# Patient Record
Sex: MALE
[De-identification: names, ages, dates, MRNs, and addresses within clinical notes are randomized per-mention and may not be internally consistent; named-entity substitution may affect disease eponyms.]

---

## 2019-09-24 NOTE — NUR
PATIENT BEING NON-COMPLIANT WITH NASAL CANNULA, MD AWARE, EXPLAINED TO PATIENT 
THE RISKS OF NOT WEARING A NASAL CANNULA.

## 2019-09-25 NOTE — HISTORY AND PHYSICAL
PRIMARY CARE PHYSICIAN:  [facility].

 

CHIEF COMPLAINT:  Chest pain, rapid heartbeat, shortness of breath, and abdominal pain.

 

HISTORY OF PRESENT ILLNESS:  The patient is a 54-year-old male, disabled, schizophrenic,

who lives at [facility].  The patient has multiple medical problems including

atrial fibrillation and hypertension.  The patient came in with increasing heart rate,

rapid heart beat, and also abdominal pain.  His heart rate was in the 130 to 140, atrial

fibrillation.  The patient has also complained of abdominal pain.  He does have some

diarrhea.  The patient is otherwise stable at this time.  He did not have any shortness

of breath unless he increase his exertion.  The patient is otherwise stable. 

 

PAST MEDICAL HISTORY:  Hypertension, diabetes type 2, atrial fibrillation,

hypothyroidism, anxiety disorder, depression, schizophrenia, and reflux. 

 

PAST SURGICAL HISTORY:  Left knee surgery and tonsillectomy.

 

SOCIAL HISTORY:  The patient is disabled with his schizophrenia and he lives at [facility].  His mother, per patient is his advocate. 

 

ALLERGIES:  NO KNOWN ALLERGIES.

 

HOME MEDICATIONS:  List will be reviewed and Reconciled.

 

PHYSICAL EXAMINATION:

VITAL SIGNS:  Temperature is 98, blood pressure 125/99, pulse rate is 110 to 130, atrial

fibrillation, rapid rate, and saturation 97% on room air. 

GENERAL:  The patient is not in acute distress.  He is little anxious. 

HEENT:  Normocephalic and atraumatic.  Pupils reactive.  Anicteric. 

NECK:  Supple grossly. 

PULMONARY:  Diminished breath sounds. 

CARDIOVASCULAR:  Tachycardia with atrial fibrillation. 

ABDOMEN:  Soft, obese.  Generalized discomfort, but no rebound or guarding. 

EXTREMITIES:  No cyanosis or edema. 

NEUROLOGIC:  No gross focal deficit.  Moving all extremities.

LABORATORY DATA:  Sodium is 134, potassium 4.8, chloride 99, bicarb 25, BUN 21,

creatinine 1.4, and glucose 273. 

 

WBC 12.5, hemoglobin 14.7, hematocrit 44, and platelets is 272.

 

IMAGING DATA:  Abdominal and pelvic CT scan showed no definitive acute finding.  He has

a small right and left pleural effusion. 

 

IMPRESSION:  

1. Atrial fibrillation with rapid ventricular response.

2. Bilateral pleural effusion secondary to possible early congestive heart failure.

Echocardiogram still pending. 

3. Baseline schizophrenia and disabled.

4. Baseline multiple medical problem as mentioned above.

 

PLAN:  

1. Resume home medication.

2. Antibiotics.

3. Echocardiogram.

4. Consultation with Dr. Huggins, Cardiology.

5. Rate control.

6. We will check for the patient's electrolyte and repeated blood work.

 

 

 

 

______________________________

MD SULEMAN Gaston/JOURDAN

D:  09/25/2019 08:51:01

T:  09/25/2019 16:04:38

Job #:  [id number]

## 2019-09-25 NOTE — XMS REPORT
Summary of Care: 16 - 16

                             Created on: 10/22/2108



JOANNA CABRERA

External Reference #: [id number]

: 1965

Sex: Male



Demographics







                          Address                   [address] [place] [address]

[place]  [address]-

 

                          Home Phone                [phone]

 

                          Preferred Language        English

 

                          Marital Status            Unknown

 

                          Oriental orthodox Affiliation     Mandaen

 

                          Race                      White/

 

                          Ethnic Group              Non-





Author







                          Author                    [facility]

 

                          Organization              [facility]

 

                          Address                   Unknown

 

                          Phone                     Unavailable







Encounter





HQ Shyann(LISA) [id number] Date(s): 16 - 16

[facility] [address] [place] [address]-     [phone]


Discharge Disposition: Home or Self Care

Attending Physician: Chucky Edwards MD





Vital Signs







                    1                   2                   3



                                         Most recent to   



                                         oldest [Reference   



                                         Range]:   

 

                                        170.18 cm 

                    (16 5:51 PM)                         



                                         Height   

 

                                        97.4 DegF 

                          (16 8:10 AM)         97.9 DegF 

                          (16 4:34 AM)         97.9 DegF 

                                        (16 12:08 AM)



                                         Temperature Oral   



                                         [96.4-99.1 DegF]   

 

                                        132/91 mmHg 

                          (16 8:10 AM)         117/87 mmHg 

                          (16 4:34 AM)         110/70 mmHg 

                                        (16 12:08 AM)



                                         Blood Pressure   



                                         [/60-90 mmHg]   

 

                                        18 BRMIN 

                          (16 8:10 AM)         12 BRMIN 

*LOW*

                          (16 4:34 AM)         16 BRMIN 

                                        (16 12:08 AM)



                                         Respiratory Rate   



                                         [14-20 BRMIN]   

 

                                        77 bpm 

                          (16 8:10 AM)         83 bpm 

                          (16 4:34 AM)         85 bpm 

                                        (16 12:08 AM)



                                         Peripheral Pulse   



                                         Rate [ bpm]   

 

                                        95.455 kg 

                    (16 5:51 PM)                         



                                         Weight   

 

                                        32.96 m2 

                    (16 5:51 PM)                         



                                         Body Mass Index   







Problem List







    



              Condition     Effective Dates     Status       Health Status     Informant

 

    



                     [D]Anterior chest     12             Active  



                                         wall pain(Confirmed)    

 

    



                           Anxiety(Confirmed)        Resolved  

 

    



                           BP+ -                     Active  



                                         Hypertension(Confirm    



                                         ed)    

 

    



                           Cardiomyopathy(Confi      Resolved  



                                         rmed)    

 

    



                           CHF - Congestive          Active  



                                         heart    



                                         failure(Confirmed)    

 

    



                           CHF - Congestive          Active  



                                         heart    



                                         failure(Confirmed)    

 

    



                           Depression(Confirmed      Active  



                                         )    

 

    



                           dm(Confirmed)             Active  

 

    



                           Down                      Active  



                                         syndrome(Confirmed)    

 

    



                           GERD -                    Active  



                                         Gastro-esophageal    



                                         reflux    



                                         disease(Confirmed)    

 

    



                           H/O:                      Active  



                                         schizophrenia(Confir    



                                         med)    

 

    



                           HTN(Confirmed)            Active  

 

    



                           ICD (implantable          Active  



                                         cardiac    



                                         defibrillator)    



                                         battery    



                                         depletion(Confirmed)    

 

    



                           NIDDM(Confirmed)          Active  

 

    



                           Obese                     Active  



                                         build(Confirmed)    

 

    



                           Schizophrenia(Confir      Active  



                                         med)    

 

    



                           Short of breath on        Active  



                                         exertion(Confirmed)    

 

    



                           Sleep                     Active  



                                         apnea(Confirmed)    







Allergies, Adverse Reactions, Alerts







   



                 Substance       Reaction        Severity        Status

 

   



                           NKDA                      Active







Medications





Abilify

10 mg, 1 tab, Route: PO, Drug form: TAB, Bedtime, Dosing Weight 95.455, kg, Star
t date: 16 21:00:00 CDT, Duration: 30 day, Stop date: 16 21:00:00 CD
T

Notes: Non-Formulary Drug. (Same as: Abilify)

Start Date: 16

Stop Date: 16

Status: Canceled



Abilify 10 mg oral tablet

10 mg=1 tab, PO, Bedtime, 0 Refill(s)

Start Date: 16

Status: Ordered



acetaminophen

650 mg, 2 tab, Route: PO, Drug form: TAB, Q4H, Dosing Weight 95.455, kg, PRN For
 Temp > 100.4 F, Start date: 16 21:51:00 CDT, Duration: 30 day, Stop date:
 16 21:50:00 CDT

Notes: Do not exceed 4 gm/day.  (Same as: Tylenol)

Start Date: 16

Stop Date: 16

Status: Discontinued



Ambien

5 mg, 0.5 tab, Route: PO, Drug form: TAB, Bedtime, Dosing Weight 95.455, kg, PRN
 Sleep, Start date: 16 21:53:00 CDT, Duration: 30 day, Stop date: 16
 21:52:00 CDT

Notes: (Same As: Ambien)

Start Date: 16

Stop Date: 16

Status: Discontinued



aspirin 81 mg tablet, enteric coated

81 mg, 1 tab, Route: PO, Drug form: ECTAB, Daily, Dosing Weight 95.455, kg, Star
t date: 16 9:00:00 CDT, Duration: 30 day, Stop date: 16 9:00:00 CDT

Notes: Do not crush or chew.(Same As: Ecotrin)

Start Date: 16

Stop Date: 16

Status: Discontinued



BD Normal Saline Flush

10 mL, Route: IVP, Drug Form: INJ, PRN, PRN Line Flush, Start date: 16 22:
24:00 CDT, Duration: 30 day, Stop date: 16 22:23:00 CDT

Notes: (Same as: BD Posiflush)

Start Date: 16

Stop Date: 16

Status: Discontinued



bisacodyl

10 mg, 1 supp, Route: WA, Drug form: SUPP, Daily, Dosing Weight 95.455, kg, PRN 
Constipation, Start date: 16 21:51:00 CDT, Duration: 30 day, Stop date:  21:50:00 CDT

Notes: (Same As: Dulcolax, Bisco-Lax)

Start Date: 16

Stop Date: 16

Status: Discontinued



calcium carbonate

1,000 mg, 2 tab, Route: CHEW, Drug form: CHEWTAB, ONCE, Dosing Weight 95.455, kg
, Priority: STAT, Start date: 16 20:12:00 CDT, Stop date: 16 20:12:0
0 CDT

Notes: (Same As: Tums)Calcium Carbonate 500 gw=281 mg elemental calcium   Dose=_
_____ mg calcium carbonate (______ mg elemental calcium)

Start Date: 16

Stop Date: 16

Status: Completed



carvedilol

3.125 mg, Route: PO, Drug form: TAB, Q12H, Dosing Weight 95.455, kg, Start date:
 16 9:00:00 CDT, Duration: 30 day, Stop date: 16 21:00:00 CDT

Start Date: 16

Stop Date: 16

Status: Canceled



codeine-guaiFENesin 10 mg-100 mg/5 mL oral syrup

10 mL, Route: PO, Drug Form: LIQ, Dosing Weight 95.455, kg, Q4H, PRN Cough, Star
t date: 16 21:51:00 CDT, Duration: 30 day, Stop date: 16 21:50:00 CD
T

Notes: (Same As: Robitussin AC)

Start Date: 16

Stop Date: 16

Status: Discontinued



Cogentin

1 mg, 1 tab, Route: PO, Drug form: TAB, Bedtime, Dosing Weight 95.455, kg, Start
 date: 16 21:00:00 CDT, Duration: 30 day, Stop date: 16 21:00:00 CDT

Notes: (Same As: Cogentin)

Start Date: 16

Stop Date: 16

Status: Canceled



Coreg

25 mg, 1 tab, Route: PO, Drug form: TAB, BID-Meals, Dosing Weight 95.455, kg, St
art date: 16 8:00:00 CDT, Duration: 30 day, Stop date: 16 17:00:00 C
DT

Notes: Give with food. (Same As: Coreg)

Start Date: 16

Stop Date: 16

Status: Discontinued



dextromethorphan-guaiFENesin 10 mg-100 mg/5 mL oral liquid

10 mL, Route: PO, Drug Form: SYRP, Dosing Weight 95.455, kg, Q4H, PRN Cough, Sta
rt date: 16 21:51:00 CDT, Duration: 30 day, Stop date: 16 21:50:00 C
DT

Notes: (dextromethorphan-guaifenesin 10-100mg/5ml 10 ml oral SOLN ud) (Same as: 
Robitussin DM)

Start Date: 16

Stop Date: 16

Status: Discontinued



Dextrose 50% Syringe

25 gm, 50 mL, Route: IVP, Drug Form: INJ, Dosing Weight 95.455, kg, PRN, PRN Blo
od Glucose Results, Start date: 16 21:52:00 CDT, Duration: 30 day, Stop da
te: 16 21:51:00 CDT

Start Date: 16

Stop Date: 16

Status: Discontinued



Dextrose 50% Syringe

12.5 gm, 25 mL, Route: IVP, Drug Form: INJ, Dosing Weight 95.455, kg, PRN, PRN B
lood Glucose Results, Start date: 16 21:52:00 CDT, Duration: 30 day, Stop 
date: 16 21:51:00 CDT

Start Date: 16

Stop Date: 16

Status: Discontinued



diphenhydrAMINE

25 mg, 1 cap, Route: PO, Drug form: CAP, Q6H, Dosing Weight 95.455, kg, PRN Itch
ing, Start date: 16 21:51:00 CDT, Duration: 30 day, Stop date: 16 21
:50:00 CDT

Notes: (Same as: Benadryl)

Start Date: 16

Stop Date: 16

Status: Discontinued



Geodon

20 mg, 1 cap, Route: PO, Drug form: CAP, QPM, Dosing Weight 95.455, kg, Start da
te: 16 17:00:00 CDT, Duration: 30 day, Stop date: 16 17:00:00 CDT

Notes: (Same As: Geodon)Give with Food

Start Date: 16

Stop Date: 16

Status: Canceled



glucagon

1 mg, Route: IM, Drug form: PDR/INJ, PRN, Dosing Weight 95.455, kg, PRN Blood Gl
ucose Results, Start date: 16 21:52:00 CDT, Duration: 30 day, Stop date: 0
16 21:51:00 CDT

Start Date: 16

Stop Date: 16

Status: Discontinued



insulin aspart

10 unit, 0.1 mL, Route: SUB-Q, Drug form: SOLN, TID-Before Meals, Dosing Weight 
95.455, kg, PRN Blood Glucose Results, Start date: 16 21:52:00 CDT, Durati
on: 30 day, Stop date: 16 21:51:00 CDT

Notes: Roll in palms of hands gently;  Do not shake vigorously. (Same as: Novo[place])"single patient use only"WASTE: F/P - Black; E - Municipal Trash Bin  Stable f
or 28 days at room temperature.Expires in _____ days from ______________Date

Start Date: 16

Stop Date: 16

Status: Discontinued



insulin aspart

4 unit, 0.04 mL, Route: SUB-Q, Drug form: SOLN, Bedtime, Dosing Weight 95.455, k
g, PRN Blood Glucose Results, Start date: 16 21:52:00 CDT, Duration: 30 da
y, Stop date: 16 21:51:00 CDT

Notes: Roll in palms of hands gently;  Do not shake vigorously. (Same as: Novo[place])"single patient use only"WASTE: F/P - Black; E - Municipal Trash Bin  Stable f
or 28 days at room temperature.Expires in _____ days from ______________Date

Start Date: 16

Stop Date: 16

Status: Discontinued



insulin aspart

2 unit, 0.02 mL, Route: SUB-Q, Drug form: SOLN, Bedtime, Dosing Weight 95.455, k
g, PRN Blood Glucose Results, Start date: 16 21:52:00 CDT, Duration: 30 da
y, Stop date: 16 21:51:00 CDT

Notes: Roll in palms of hands gently;  Do not shake vigorously. (Same as: Novo[place])"single patient use only"WASTE: F/P - Black; E - Municipal Trash Bin  Stable f
or 28 days at room temperature.Expires in _____ days from ______________Date

Start Date: 16

Stop Date: 16

Status: Discontinued



insulin aspart

3 unit, 0.03 mL, Route: SUB-Q, Drug form: SOLN, Bedtime, Dosing Weight 95.455, k
g, PRN Blood Glucose Results, Start date: 16 21:52:00 CDT, Duration: 30 da
y, Stop date: 16 21:51:00 CDT

Notes: Roll in palms of hands gently;  Do not shake vigorously. (Same as: Demetria [place])"single patient use only"WASTE: F/P - Black; E - Municipal Trash Bin  Stable f
or 28 days at room temperature.Expires in _____ days from ______________Date

Start Date: 16

Stop Date: 16

Status: Discontinued



insulin aspart

1 unit, 0.01 mL, Route: SUB-Q, Drug form: SOLN, Bedtime, Dosing Weight 95.455, k
g, PRN Blood Glucose Results, Start date: 16 21:52:00 CDT, Duration: 30 da
y, Stop date: 16 21:51:00 CDT

Notes: Roll in palms of hands gently;  Do not shake vigorously. (Same as: Demetria [place])"single patient use only"WASTE: F/P - Black; E - Municipal Trash Bin  Stable f
or 28 days at room temperature.Expires in _____ days from ______________Date

Start Date: 16

Stop Date: 16

Status: Discontinued



insulin aspart

4 unit, 0.04 mL, Route: SUB-Q, Drug form: SOLN, TID-Before Meals, Dosing Weight 
95.455, kg, PRN Blood Glucose Results, Start date: 16 21:52:00 CDT, Durati
on: 30 day, Stop date: 16 21:51:00 CDT

Notes: Roll in palms of hands gently;  Do not shake vigorously. (Same as: Demetria [place])"single patient use only"WASTE: F/P - Black; E - Municipal Trash Bin  Stable f
or 28 days at room temperature.Expires in _____ days from ______________Date

Start Date: 16

Stop Date: 16

Status: Discontinued



insulin aspart

2 unit, 0.02 mL, Route: SUB-Q, Drug form: SOLN, TID-Before Meals, Dosing Weight 
95.455, kg, PRN Blood Glucose Results, Start date: 16 21:52:00 CDT, Durati
on: 30 day, Stop date: 16 21:51:00 CDT

Notes: Roll in palms of hands gently;  Do not shake vigorously. (Same as: Demetria [place])"single patient use only"WASTE: F/P - Black; E - Municipal Trash Bin  Stable f
or 28 days at room temperature.Expires in _____ days from ______________Date

Start Date: 16

Stop Date: 16

Status: Discontinued



insulin aspart

6 unit, 0.06 mL, Route: SUB-Q, Drug form: SOLN, TID-Before Meals, Dosing Weight 
95.455, kg, PRN Blood Glucose Results, Start date: 16 21:52:00 CDT, Durati
on: 30 day, Stop date: 16 21:51:00 CDT

Notes: Roll in palms of hands gently;  Do not shake vigorously. (Same as: Demetria [place])"single patient use only"WASTE: F/P - Black; E - Municipal Trash Bin  Stable f
or 28 days at room temperature.Expires in _____ days from ______________Date

Start Date: 16

Stop Date: 16

Status: Discontinued



insulin aspart

8 unit, 0.08 mL, Route: SUB-Q, Drug form: SOLN, TID-Before Meals, Dosing Weight 
95.455, kg, PRN Blood Glucose Results, Start date: 16 21:52:00 CDT, Durati
on: 30 day, Stop date: 16 21:51:00 CDT

Notes: Roll in palms of hands gently;  Do not shake vigorously. (Same as: Demetria [place])"single patient use only"WASTE: F/P - Black; E - Municipal Trash Bin  Stable f
or 28 days at room temperature.Expires in _____ days from ______________Date

Start Date: 16

Stop Date: 16

Status: Discontinued



insulin detemir

10 unit, 0.1 mL, Route: SUB-Q, Drug form: INJ, Q12H, Dosing Weight 95.455, kg, S
tart date: 16 9:00:00 CDT, Duration: 30 day, Stop date: 16 21:00:00 
CDT

Notes: Same as LevemirDo not hold insulin without contacting prescriberWASTE: F/
P - Black; E - Municipal Trash Bin  "single patient use only"

Start Date: 16

Stop Date: 16

Status: Discontinued



isosorbide mononitrate extended release

30 mg, 1 tab, Route: PO, Drug form: ERTAB, Daily, Dosing Weight 95.455, kg, Star
t date: 16 9:00:00 CDT, Duration: 30 day, Stop date: 16 9:00:00 CDT

Notes: (Same as:Imdur)"Do Not Crush"  Take on empty stomach/ full glass of water
.  Do not crush

Start Date: 16

Stop Date: 16

Status: Discontinued



Lipitor

40 mg, 1 tab, Route: PO, Drug form: TAB, Bedtime, Dosing Weight 95.455, kg, Star
t date: 16 21:00:00 CDT, Duration: 30 day, Stop date: 16 21:00:00 CD
T

Notes: (Same as: Lipitor)

Start Date: 16

Stop Date: 16

Status: Canceled



lisinopril

5 mg, Route: PO, Drug form: TAB, Daily, Dosing Weight 95.455, kg, Start date:  9:00:00 CDT, Duration: 30 day, Stop date: 16 9:00:00 CDT

Start Date: 16

Stop Date: 16

Status: Canceled



lisinopril

40 mg, 2 tab, Route: PO, Drug form: TAB, Daily, Dosing Weight 95.455, kg, Start 
date: 16 9:00:00 CDT, Duration: 30 day, Stop date: 16 9:00:00 CDT

Notes: (Same as: Prinivil, Zestril)

Start Date: 16

Stop Date: 16

Status: Discontinued



nitroglycerin 2% ointment

0.5 inch, Route: TOP, Drug Form: OINT, Dosing Weight 95.455, kg, ONCE, STAT, Sta
rt date: 16 18:16:00 CDT, Stop date: 16 18:16:00 CDT

Notes: 1 gram is approximately 1 inch of nitroglycerin ointment    (20 mg NTG pe
r gram) (Same as:Nitro-Bid)

Start Date: 16

Stop Date: 16

Status: Completed



nitroglycerin 2% ointment

0.5 inch, Route: TOP, Drug Form: OINT, Dosing Weight 95.455, kg, TID, PRN Chest 
Pain, Start date: 16 21:51:00 CDT, Duration: 30 day, Stop date: 16 2
1:50:00 CDT

Notes: 1 gram is approximately 1 inch of nitroglycerin ointment    (20 mg NTG pe
r gram) (Same as:Nitro-Bid)

Start Date: 16

Stop Date: 16

Status: Discontinued



nitroglycerin SL Tab

0.4 mg, 1 tab, Route: SL, Drug form: TAB, Q5Min, Dosing Weight 95.455, kg, PRN C
hest Pain, Start date: 16 21:51:00 CDT, Duration: 3 doses or times, Stop d
ate: Limited # of times

Notes: (Same as:Nitroquick, Nitrostat)"Do Not Crush"  Sublingual tablet

Start Date: 16

Stop Date: 16

Status: Discontinued



ondansetron

4 mg, 2 mL, Route: IVP, Drug form: INJ, Q8H, Dosing Weight 95.455, kg, PRN Nause
a & Vomiting, Start date: 16 21:51:00 CDT, Duration: 30 day, Stop date: 
16 21:50:00 CDT

Notes: (Same as: Zofran)  *** MEDICATION WASTE ***Product Size:  4 mgProduct Was
helen:  ___ mg

Start Date: 16

Stop Date: 16

Status: Discontinued



potassium chloride

40 mEq, Route: PO, Drug form: ERTAB, ONCE, Dosing Weight 95.455, kg, Priority: S
TAT, Start date: 16 18:59:00 CDT, Stop date: 16 18:59:00 CDT

Start Date: 16

Stop Date: 16

Status: Completed



Saline Flush 0.9%

10 mL, Route: IVP, Drug Form: INJ, Dosing Weight 95.455, kg, PRN, PRN Line Flush
, Start date: 16 18:16:00 CDT, Duration: 30 day, Stop date: 16 18:15
:00 CDT

Notes: (Same as: BD Posiflush)

Start Date: 16

Stop Date: 16

Status: Discontinued



Saline Flush 0.9%

10 ml, Route: IVP, Drug Form: INJ, Dosing Weight 95.455, kg, Q12H, Start date: 0
16 9:00:00 CDT, Duration: 30 day, Stop date: 16 21:00:00 CDT

Start Date: 16

Stop Date: 16

Status: Canceled



Saline Flush 0.9%

10 ml, Route: IVP, Drug Form: INJ, Dosing Weight 95.455, kg, PRN, PRN Line Flush
, Start date: 16 21:51:00 CDT, Duration: 30 day, Stop date: 16 21:50
:00 CDT

Notes: (Same as: BD Posiflush)

Start Date: 16

Stop Date: 16

Status: Discontinued



simethicone

80 mg, 1 tab, Route: CHEW, Drug form: CHEWTAB, Q6H, Dosing Weight 95.455, kg, WA
N Gas, Start date: 16 21:51:00 CDT, Duration: 2 doses or times, Stop date:
 Limited # of times

Notes: (Same as: Mylicon)

Start Date: 16

Stop Date: 16

Status: Discontinued



Sodium Chloride 0.9% IV

250 mL, Route: IVPB, Start date: 16 22:25:00 CDT, Duration: 30 day, Stop d
ate: 16 22:24:00 CDT, PRN Line Flush

Start Date: 16

Stop Date: 16

Status: Discontinued



sucralfate

1 gm, 1 tab, Route: PO, Drug form: TAB, BID, Dosing Weight 95.455, kg, Start josefa
e: 16 9:00:00 CDT, Duration: 30 day, Stop date: 16 17:00:00 CDT

Notes: May interfere w/enteral feeds -  Take 1 hr before or 2 hr after antacids,
 dairy pdt, meals & minerals -  On empty stomach.  (Same As: Carafate)

Start Date: 16

Stop Date: 16

Status: Discontinued



temazepam

15 mg, 1 cap, Route: PO, Drug form: CAP, Bedtime, Dosing Weight 95.455, kg, PRN 
Insomnia, Start date: 16 21:51:00 CDT, Duration: 30 day, Stop date:  21:50:00 CDT

Notes: (Same As: Restoril)

Start Date: 16

Stop Date: 16

Status: Discontinued



Tessalon Perles

100 mg, 1 cap, Route: PO, Drug form: CAP, Q8H, Dosing Weight 95.455, kg, PRN Cou
gh, Start date: 16 21:51:00 CDT, Duration: 30 day, Stop date: 16 21:
50:00 CDT

Notes: (Same As: Tessalon Perles)"Do Not Crush"

Start Date: 16

Stop Date: 16

Status: Discontinued



trazodone 50 mg oral tablet

50 mg, 1 tab, Route: PO, Drug form: TAB, Bedtime, Dosing Weight 95.455, kg, Star
t date: 16 21:00:00 CDT, Duration: 30 day, Stop date: 16 21:00:00 CD
T

Notes: (Same As: Desyrel)

Start Date: 16

Stop Date: 16

Status: Canceled



Results





ELECTROLYTES





                    1                   2                   3



                                         Most recent to   



                                         oldest [Reference   



                                         Range]:   

 

                                        141 mEq/L 

                          (16 5:52 AM)         146 mEq/L 

*HI*

                          (16 6:25 PM)          



                                         Sodium Lvl [135-145   



                                         mEq/L]   

 

                                        4.1 mEq/L 

                          (16 5:52 AM)         2.9 mEq/L 1

*CRIT*

                          (16 6:25 PM)          



                                         Potassium Lvl   



                                         [3.5-5.1 mEq/L]   

 

                                        106 mEq/L 

                          (16 5:52 AM)         114 mEq/L 

*HI*

                          (16 6:25 PM)          



                                         Chloride Lvl [   



                                         mEq/L]   

 

                                        29 mEq/L 

                          (16 5:52 AM)         25 mEq/L 

                          (16 6:25 PM)          



                                         CO2 [24-32 mEq/L]   

 

                                        10.1 mEq/L 

                          (16 5:52 AM)         9.9 mEq/L 

*LOW*

                          (16 6:25 PM)          



                                         AGAP [10.0-20.0   



                                         mEq/L]   







1Result Comment: Critical Result(s) called to MATT at 2016 18:56 by
BP.  Read back OK.



CHEM PANEL





                    1                   2                   3



                                         Most recent to   



                                         oldest [Reference   



                                         Range]:   

 

                                        1.40 mg/dL 

                          (16 5:52 AM)         1.30 mg/dL 

                          (16 6:25 PM)          



                                         Creatinine Lvl   



                                         [0.50-1.40 mg/dL]   

 

                                        58 mL/min/1.73m2 1

*NA*

                          (16 5:52 AM)         63 mL/min/1.73m2 2

*NA*

                          (16 6:25 PM)          



                                         eGFR   

 

                                        30 mg/dL 

*HI*

                          (16 5:52 AM)         25 mg/dL 

*HI*

                          (16 6:25 PM)          



                                         BUN [7-22 mg/dL]   

 

                                        19 

                    (16 6:25 PM)                          



                                         B/C Ratio [6-25]   

 

                                        206 mg/dL 

*HI*

                          (16 5:52 AM)         121 mg/dL 

*HI*

                          (16 6:25 PM)          



                                         Glucose Lvl [70-99   



                                         mg/dL]   

 

                                        5.6 g/dL 

*LOW*

                    (16 6:25 PM)                          



                                         Total Protein   



                                         [6.4-8.4 g/dL]   

 

                                        3.2 g/dL 

*LOW*

                    (16 6:25 PM)                          



                                         Albumin Lvl [3.5-5.0   



                                         g/dL]   

 

                                        2.4 g/dL 

                    (16 6:25 PM)                          



                                         Globulin [2.0-4.0   



                                         g/dL]   

 

                                        1.3 

                    (16 6:25 PM)                          



                                         A/G Ratio [0.7-1.6]   

 

                                        8.9 mg/dL 

                          (16 5:52 AM)         6.5 mg/dL 

*CRIT*

                          (16 6:25 PM)          



                                         Calcium Lvl   



                                         [8.5-10.5 mg/dL]   

 

                                        2.1 mg/dL 

                    (16 5:52 AM)                          



                                         Magnesium Lvl   



                                         [1.8-2.4 mg/dL]   

 

                                        22 unit/L 

                    (16 6:25 PM)                          



                                         ALT [0-65 unit/L]   

 

                                        17 unit/L 

                    (16 6:25 PM)                          



                                         AST [0-37 unit/L]   

 

                                        80 unit/L 

                    (16 6:25 PM)                          



                                         Alk Phos [   



                                         unit/L]   

 

                                        0.7 mg/dL 

                    (16 6:25 PM)                          



                                         Bili Total [0.2-1.3   



                                         mg/dL]   







1Result Comment: The eGFR is calculated using the CKD-EPI formula. In most 
young, healthy individuals the eGFR will be >90 mL/min/1.73m2. The eGFR declines
with age. An eGFR of 60-89 may be normal in some populations, particularly the 
elderly, for whom the CKD-EPI formula has not been extensively validated. Use of
the eGFR is not recommended in the following populations:



Individuals with unstable creatinine concentrations, including pregnant patients
and those with serious co-morbid conditions.



Patients with extremes in muscle mass or diet. 



The data above are obtained from the National Kid[employer] Disease Education Program (
NKDEP) which additionally recommends that when the eGFR is used in patients with
extremes of body mass index for purposes of drug dosing, the eGFR should be mul
tiplied by the estimated BMI.



2Result Comment: The eGFR is calculated using the CKD-EPI formula. In most 
young, healthy individuals the eGFR will be >90 mL/min/1.73m2. The eGFR declines
with age. An eGFR of 60-89 may be normal in some populations, particularly the 
elderly, for whom the CKD-EPI formula has not been extensively validated. Use of
the eGFR is not recommended in the following populations:



Individuals with unstable creatinine concentrations, including pregnant patients
and those with serious co-morbid conditions.



Patients with extremes in muscle mass or diet. 



The data above are obtained from the National Kid[employer] Disease Education Program (
NKDEP) which additionally recommends that when the eGFR is used in patients with
extremes of body mass index for purposes of drug dosing, the eGFR should be mul
tiplied by the estimated BMI.



CARDIAC ENZYMES





                    1                   2                   3



                                         Most recent to   



                                         oldest [Reference   



                                         Range]:   

 

                                        499 unit/L 

*HI*

                          (16 5:52 AM)         578 unit/L 

*HI*

                          (16 12:52 AM)        549 unit/L 

*HI*

                                        (16 6:25 PM) 



                                         Total CK [   



                                         unit/L]   

 

                                        2.3 ng/mL 

                          (16 5:52 AM)         2.2 ng/mL 

                          (16 12:52 AM)        1.8 ng/mL 

                                        (16 6:25 PM) 



                                         CK MB [0.5-3.6   



                                         ng/mL]   

 

                                        0.3 

                    (16 6:25 PM)                          



                                         CK MB Index   



                                         [0.0-2.5]   

 

                                        0.04 ng/mL 

                          (16 12:52 AM)        0.04 ng/mL 

                          (16 6:25 PM)          



                                         Troponin-I   



                                         [0.00-0.40 ng/mL]   

 

                                        439 pg/mL 

*HI*

                    (16 6:25 PM)                          



                                         BNP [<=100 pg/mL]   







LIPIDS





                    1                   2                   3



                                         Most recent to   



                                         oldest [Reference   



                                         Range]:   

 

                                        3.53 

*LOW*

                    (16 5:52 AM)                          



                                         CHD Risk [4.00-7.30]   

 

                                        106 mg/dL 

                    (16 5:52 AM)                          



                                         Chol [<=199 mg/dL]   

 

                                        170 mg/dL 

*HI*

                    (16 5:52 AM)                          



                                         Trig [<=149 mg/dL]   

 

                                        30 mg/dL 

*LOW*

                    (16 5:52 AM)                          



                                         HDL [>=61 mg/dL]   

 

                                        42 mg/dL 

                    (16 5:52 AM)                          



                                         LDL (Calculated)   



                                         [<=99 mg/dL]   

 

                                        34 

*NA*

                    (16 5:52 AM)                          



                                         VLDL   







DRUG SCREEN





                    1                   2                   3



                                         Most recent to   



                                         oldest [Reference   



                                         Range]:   

 

                                        Negative 

*NA*

                    (16 9:49 AM)                          



                                         U Methadone Scr   



                                         [Negative]   

 

                                        Negative 

*NA*

                    (16 9:49 AM)                          



                                         U Propoxyph Scr   



                                         [Negative]   

 

                                        Negative 

*NA*

                    (16 9:49 AM)                          



                                         U Amph Scr   



                                         [Negative]   

 

                                        Negative 

*NA*

                    (16 9:49 AM)                          



                                         U Annie Scr   



                                         [Negative]   

 

                                        Negative 

*NA*

                    (16 9:49 AM)                          



                                         U Benzodia Scr   



                                         [Negative]   

 

                                        Positive 

*ABN*

                    (16 9:49 AM)                          



                                         U Cocaine Scr   



                                         [Negative]   

 

                                        Negative 

*NA*

                    (16 9:49 AM)                          



                                         U Opiate Scr   



                                         [Negative]   

 

                                        Negative 

*NA*

                    (16 9:49 AM)                          



                                         U Phencyc Scr   



                                         [Negative]   

 

                                        Negative 

*NA*

                    (16 9:49 AM)                          



                                         U Cannab Scr   



                                         [Negative]   

 

                                        See Note 

                    (16 9:49 AM)                          



                                         UDS Note   







HEMATOLOGY





                    1                   2                   3



                                         Most recent to   



                                         oldest [Reference   



                                         Range]:   

 

                                        6.4 K/CMM 

                          (16 5:52 AM)         11.5 K/CMM 

*HI*

                          (16 6:25 PM)          



                                         WBC [3.7-10.4 K/CMM]   

 

                                        4.73 M/CMM 

                          (16 5:52 AM)         5.06 M/CMM 

                          (16 6:25 PM)          



                                         RBC [4.70-6.10   



                                         M/CMM]   

 

                                        14.5 g/dL 

                          (16 5:52 AM)         15.2 g/dL 

                          (16 6:25 PM)          



                                         Hgb [14.0-18.0 g/dL]   

 

                                        42.3 % 

                          (16 5:52 AM)         45.3 % 

                          (16 6:25 PM)          



                                         Hct [42.0-54.0 %]   

 

                                        89.4 fL 

                          (16 5:52 AM)         89.5 fL 

                          (16 6:25 PM)          



                                         MCV [80.0-94.0 fL]   

 

                                        30.6 pg 

                          (16 5:52 AM)         29.9 pg 

                          (16 6:25 PM)          



                                         MCH [27.0-31.0 pg]   

 

                                        34.2 g/dL 

                          (16 5:52 AM)         33.5 g/dL 

                          (16 6:25 PM)          



                                         MCHC [32.0-36.0   



                                         g/dL]   

 

                                        13.5 % 

                          (16 5:52 AM)         13.4 % 

                          (16 6:25 PM)          



                                         RDW [11.5-14.5 %]   

 

                                        131 K/CMM 

*LOW*

                          (16 5:52 AM)         181 K/CMM 

                          (16 6:25 PM)          



                                         Platelet [133-450   



                                         K/CMM]   

 

                                        8.5 fL 

                          (16 5:52 AM)         8.3 fL 

                          (16 6:25 PM)          



                                         MPV [7.4-10.4 fL]   

 

                                        67.0 % 

                          (16 5:52 AM)         74.0 % 

                          (16 6:25 PM)          



                                         Segs [45.0-75.0 %]   

 

                                        18.0 % 

*LOW*

                          (16 5:52 AM)         14.5 % 

*LOW*

                          (16 6:25 PM)          



                                         Lymphocytes   



                                         [20.0-40.0 %]   

 

                                        11.9 % 

                          (16 5:52 AM)         10.8 % 

                          (16 6:25 PM)          



                                         Monocytes [2.0-12.0   



                                         %]   

 

                                        2.6 % 

                          (16 5:52 AM)         0.6 % 

                          (16 6:25 PM)          



                                         Eosinophils [0.0-4.0   



                                         %]   

 

                                        0.5 % 

                          (16 5:52 AM)         0.1 % 

                          (16 6:25 PM)          



                                         Basophils [0.0-1.0   



                                         %]   

 

                                        4.3 K/CMM 

                          (16 5:52 AM)         8.5 K/CMM 

*HI*

                          (16 6:25 PM)          



                                         Segs-Bands #   



                                         [1.5-8.1 K/CMM]   

 

                                        1.2 K/CMM 

                          (16 5:52 AM)         1.7 K/CMM 

                          (16 6:25 PM)          



                                         Lymphocytes #   



                                         [1.0-5.5 K/CMM]   

 

                                        0.8 K/CMM 

                          (16 5:52 AM)         1.2 K/CMM 

*HI*

                          (16 6:25 PM)          



                                         Monocytes # [0.0-0.8   



                                         K/CMM]   

 

                                        0.2 K/CMM 

                          (16 5:52 AM)         0.1 K/CMM 

                          (16 6:25 PM)          



                                         Eosinophils #   



                                         [0.0-0.5 K/CMM]   

 

                                        0.0 K/CMM 

                    (16 6:25 PM)                          



                                         Basophils # [0.0-0.2   



                                         K/CMM]   

 

                                        23.0 seconds 

*HI*

                    (16 6:25 PM)                          



                                         PT [12.0-14.7   



                                         seconds]   

 

                                        2.00 

*HI*

                    (16 6:25 PM)                          



                                         INR [0.85-1.17]   

 

                                        36.4 seconds 

*HI*

                    (16 6:25 PM)                          



                                         PTT [22.9-35.8   



                                         seconds]   







Immunizations





Given and Recorded





   



                 Vaccine         Date            Status          Refusal Reason

 

   



                     influenza virus vaccine, inactivated     10/1/13             Given 







Procedures







   



                 Procedure       Date            Related Diagnosis     Body Site

 

   



                                         ICD - Internal cardiac defibrillator   



                                         procedure1   

 

   



                                         torn ligament2   







1placed 2.5years ago



2left knee ligament surgery



Social History







 



                           Social History Type       Response

 

 



                           Alcohol                   Never

 

 



                           Smoking Status            Former smoker; Type: Cigarettes; Tobacco use per day: 0; Number

 of years:



                                         2; Total pack years: 2; Started at age: 22.0; Stopped at age: 24; Previous



                                         treatment: None; Ready to change: No; Concerns about tobacco use in



                                         household: No; Exposure to Tobacco Smoke None; Cigarette Smoking Last 365



                                         Days No; Reg Smoking Cessation Counseling No







Assessment and Plan

Extracted from:





  



                     Title: Discharge Summary *     Author: Jazzy Bonner NP     Date: 16











Patient:   JOANNA CABRERA            MRN: [id number]            FIN: 
[id number]

Age:   51 years     Sex:  Male     :  1965

Associated Diagnoses:   None

Author:   Jazzy Bonner NP



Attending Physician: Chucky Taylor



Date of admission: 2016



Date of discharge to home: 2016



Admitting diagnosis:

                                        1.  Atypical chest pain possible secondary to cocaine abuse

                                        2.  Systolic congestive heart failure-stable

                                        3.  Chronic renal failure-stable

                                        4.  Hypokalemia

                                        5.  Hypertension-stable

                                        6.  Type 2 diabetes mellitus-stable

                                        7.  History of schizophrenia-stable

                                        8.  History of GERD-stable

                                        9.  Obstructive sleep apnea-stable



Discharge diagnosis:

                                        1.  Atypical chest pain secondary to cocaine abuse-resolved

                                        2.  Systolic congestive heart failure stable

                                        3.  Chronic renal failure-stable

                                        4.  Hypokalemia-resolved

                                        5.  Hypertension-stable

                                        6.  Type 2 diabetes mellitus-stable

                                        7.  History of schizophrenia-stable

                                        8.  History of GERD-stable

                                        9.  Obstructive sleep apnea-stable

                                        10. Substance abuse- Counseling provided



Discharge condition: Stable



Consults: Cardiology consult with Dr. Anderson



Procedures: None



Results Review

General results



Labs (Last four charted values)

WBC                 6.4()H 11.5()

Hgb                 14.5()15.2()

Hct                 42.3()45.3()

Plt                 L 131()181()

Na                  141()H 146()

K                   4.1()C 2.9()

CO2                 29()25()

Cl                  106()H 114()

Cr                  1.40()1.30()

BUN                 H 30()H 25()

Glucose Random      H 206()H 121()

Mg                  2.1()

Ca                  8.9()C 6.5()

PT                  H 23.0()

INR                 H 2.00()

PTT                 H 36.4()

Troponin            0.04()0.04()

CK MB               2.3()2.2()1.8()

Total CK            H 499()H 578()H 549()





Physical Examination

VS/Measurements



Vital Signs (last 24 hrs)_____Last Charted___________

Temp Oral97.4 DegF  (:10)

Heart Rate [id number] bpm  (:10)

Resp Rate    18 BRMIN  (:10)

KIZ446 mmHg  (:10)

DBPH 91mmHg  (:10)

CbV160 %  (:10)

Zmqvil76.455 kg  ()

Qyrarw940.18 cm  ()

BMI32.96  ()





General:  Alert and oriented, No acute distress.

Eye:  Pupils are equal, round and reactive to light, Extraocular movements are 
intact.

HENT:  Normocephalic, Normal hearing, No pharyngeal erythema.

Neck:  Supple, No jugular venous distention.

Respiratory:  Lungs are clear to auscultation, Respirations are non-labored, 
Breath sounds are equal, Symmetrical chest wall expansion.

Cardiovascular:  Normal rate, Regular rhythm, No murmur, No gallop, Good pulses 
equal in all extremities, Normal peripheral perfusion, No edema.

Gastrointestinal:  Soft, Non-tender, Non-distended, Normal bowel sounds.

Musculoskeletal

Normal range of motion.

Normal strength.

No tenderness.

No swelling.

No deformity.

Normal gait.

Integumentary:  Warm.

Neurologic:  Alert, Oriented, Normal sensory, Normal motor function, No focal 
deficits.

Psychiatric:  Cooperative, Appropriate mood & affect, Normal judgment, 
Non-suicidal.



Hospital Course

The patient is a 51-year-old male with a history of systolic CHF, 
cardiomyopathy, pacemaker and defibrillator, hypertension, diabetes type 2, and 
schizoaffective disorder who presented to the emergency room for midsternal 
chest pain with the dizziness.  Patient also reported using cocaine the night 
before starting the symptoms. During the short hospital stay EKG and serial 
cardiac enzymes and an echocardiogram were obtained . Ekg showed atrial 
fibillation and non-specific ST/T wave changes and cardiac enzymes were normal 
except elevated ck. Interrogation of AICD/pacemaker was also done during the st
ay. A cardiology consult with  was also requested and seen and cleared 
for discharge. Patient had a left heart catheterization in  and showed 
normal coronal arterial structures and no stress test is needed at this time per
cardiology. Patient also found to have hypokalemia in the emergency room and was
treated with oral potassium. Substance abuse counseling was provided during the 
stay. Patient needs to follow-up with pcp as needed.



Discharge Plan

Discharge Summary Plan

Prescriptions: Please see home medication reconciliation list.



Discharge time spent: 30 min









 



                           Addendum                  pt. seen and examined agree with NP



                           by Miles,                 Agree with above note.



                                         Bhargavi JC 



                                         on 



                                         2016 



                                         11:13 





Extracted from:





  



                     Title: Clinical Document     Author: Sohail Anderson MD     Date: 16









                                        IMPRESSION:

                                        1.  Atrial fibrillation.

                                        2.  Severe nonischemic dilated cardiomyopathy, AICD placement.

                                        3.  Acute-on-chronic systolic congestive heart failure.

                                        4.  Cocaine abuse.

                                        5.  Acute renal failure.



TREATMENT PLAN:

chest pain could be due to multifactorial.  I did a left heart cardiac 
catheterization on him in .  It showed normal coronary arterial structures. 
I think his chest pain and shortness of breath could be related to recent 
cocaine abuse, in addition to the baseline severe dilated cardiomyopathy.  I 
would like to have an echocardiogram to evaluate left ventricular systolic 
function.  No stress test at this moment.

Overnight, feels better, had a good breakfast,

mild epigastric discomfort

Okay to d/c home to Cardiac standpoint,

 
_____________________________________________________________________________________________

Subjective:



Exp. Problem Focused History: (1-3 HPI elements, 1 ROS)



#



ROS: no fever, no chills

________________________________________________________________________________



Objective:

Expanded Problem Focused PE





Vitals and Temp:



VitalsTmp(F)[id number]

                                         04:[id number] 3.0L/m

                                         00:[id number]---

                                         21:[id number] 3.0L/m

                                         21:2197.605285/780263 3.0L/m

                                         20:10----16481/410842 3.0L/m



                                        24 Hr Tmax: 98.4F (36.89c) at  19:31Vital Signs are the last 5 in the past 

48 hours.



General:  No acute distress.

Respiratory:  Lungs are clear to auscultation, Respirations are non-labored.

Cardiovascular:  Normal rate, Regular rhythm, No murmur, No gallop, Normal 
peripheral perfusion, No edema.

Gastrointestinal:  Soft, Non-tender, Non-distended, Normal bowel sounds, No 
organomegaly.

Integumentary:  Clean, Dry, Intact, Warm, Moist, No rash.



                                        24hr Labs

                                         0800

Glucose IIR024  H

                                         0552

Tvde668  

Kzjx047  H

HDL30  L

LDL (Calculated)42  

VLDL34  

CHD Risk3.53  L

Magnesium Lvl2.1  

Glucose Cvy476  H

BUN30  H

Creatinine Lvl1.40  

Sodium Bvj108  

Potassium Lvl4.1  

Chloride Sep019  

  

AGAP10.1  

Calcium Lvl8.9  

eGFR58  

Total   H

CK MB2.3  

WBC6.4  

RBC4.73  

Hgb14.5  

Hct42.3  

MCV89.4  

MCH30.6  

MCHC34.2  

RDW13.5  

Gvafolzh642  L

MPV8.5  

Segs67.0  

Wfkesjogq09.9  

Ooahzqeupgu15.0  L

Eosinophils2.6  

Basophils0.5  

Segs-Bands #4.3  

Lymphocytes #1.2  

Monocytes #0.8  

Eosinophils #0.2  

                                         0052

Total   H

Troponin-I0.04  

CK MB2.2  

                                         2201

Glucose OUA650  H

                                         1825

Total   H

Troponin-I0.04  

CK MB1.8  

CK MB Index0.3  

Sodium Vio393  H

Potassium Lvl2.9  C

Chloride Wqm404  H

  

AGAP9.9  L

Glucose Jig308  H

Creatinine Lvl1.30  

BUN25  H

B/C Ratio19  

Total Protein5.6  L

Albumin Lvl3.2  L

Globulin2.4  

A/G Ratio1.3  

Calcium Lvl6.5  C

ALT22  

AST17  

Alk Phos80  

Bili Total0.7  

eGFR63  

SXY579  H

WBC11.5  H

RBC5.06  

Hgb15.2  

Hct45.3  

MCV89.5  

MCH29.9  

MCHC33.5  

RDW13.4  

Fffbhnis335  

MPV8.3  

Segs74.0  

Cqwlkpizc03.8  

Ewssbpryxek10.5  L

Eosinophils0.6  

Basophils0.1  

Segs-Bands #8.5  H

Lymphocytes #1.7  

Monocytes #1.2  H

Eosinophils #0.1  

Basophils #0.0  

PT23.0  H

INR2.00  H

PTT36.4  H





Extracted from:





  



                     Title: Clinical Document     Author: Caprice Barnhart NP     Date: 16









                                        History and Physical





Chief Complaint: Chest pain



History of Present Illness

The patient is a 51 year old with history of systolic CHF, cardiomyopathy, 
pacemaker and defibrillator, hypertension, diabetes type 2 manage with po 
medications, and schizoaffective controlled with medications. Patient was 
brought to the emergency department at [facility] for 
midsternal chest pain that is described as a chest pressure that is aggravated 
by a exertion and relieved with rest.  According to patient he has a pacemaker 
and he had a feeling that the pacemaker was squeezing his chest out, which made 
it very difficult to breathe. Patient also reports using coccaine last night, 
which was the first time in 10 years since he has quitted to use of illicits 
drugs. States, he didn't have any chest pain at the time he was using it until 
later this evening. Patient verbalizes experiencing dizziness with chest pain, 
but dinies, diaphoressis, palpitations, nausea, and vomiting; however, he felt 
his stomach getting distended like he is accumulated fluid in the abdomen. Fea
ring he is having a heart attack, he called EMS that he can be transferred to 
the hospital for further evaluation, and treatment. States that he had some 
releif with nitroglycerin given by EMS during transprotation. He was seen and 
evaluated by the ED MD before he was transferred to this unit. At this time, he 
rate his chest pain as 2 out 10. He will be admitted into observation unit for 
further evaluation and monitoring. Condition stable.



Past Medical History

                                        1.  Hypertension.

                                        2.  Type 2 diabetes mellitus.

                                        3.  Schizophrenia.

                                        4.  GERD.

                                        5.  Obstructive sleep apnea.

                                        6.  dilated cardiomyopathy.

                                        7.  Pulmonary emboli.

                                        8.  Systolic congestive heart failure.



Past Surgical History

                                        1.  AICD placements.

                                        2.  Cardiac catheterization.

Family History

Father had coronary artery disease as well as diabetes mellitus.

Social History

Patient denies tobacco use, denies alcohol use, but reports using coccainelast 
night first time after 10 years recession. Lives in group home.

Review Of Systems

Gen. awake alert and oriented deny fever, no chills

HEENT: No headaches no trauma to the head, bilateral eye clear no injection, 
Denies earache, and discharge, nasal passages are clear, no swelling, no 
drainage. No sore, throat, no neck pain

Respiratory system: No wheezing no cough no sputum production no hemoptysis

Cardiovascular system:Chest pressure aggravated by exertion, relief with rest. 
No radiation.

Gastrointestinal system: Patient denies abdominal pain nausea vomiting diarrhea

Musculoskeletal: Denies weakness, no joint pain no deformities

Psychiatric:No signs of depression, no suicidal ideation, positive mood. 
Schizoaffective disorder.

Neurological: Patient denies numbness and tingling no peripheral pain

Endocrine: Patient denying intolerance to cool any heat temperature no diabetic 
thyroid problem



Physical Exam

VitalsTmp(F)[id number]

                                         21:4497.234993/[id number] 3.0L/m

                                         21:2197.771899/259551 3.0L/m

                                         20:10----98070/657958 3.0L/m

                                         19:3198.974483/546363 3.0L/m

                                         17:5198.558030/729634---



General: The patient is a 51 year old male, well groomed, well developed for age
group. She's able to participate in her plan of treatment.

HEENT: Head is atraumatic normocephalic.  Eyes: conjunctivae clear, no icterus, 
no injection.  Ears: No earaches, no discharge. Neck: supple, no 
thyroidomegally, no JVD. Throat: No swellen tonsils, no exudates.

Skin: Intact, no rash, no itc, no lumps.

Cardiovascular: Regular, rate, and rhythms. No murmurs, no gallops.

Respiratory: Normal respiratory effort, no wheezing. CTAB.

Abdomen: Distended, non- tender, firm. Positive BS x 4 quadrants.

Extremities: No joints pain, normal ROM to all extremities.

Phychiatric: AAO x 3, positive mood, no signs of depression

Neurologic: Normal strenght, normal gait, CN 5/5, no numbness, no tingling

Labs

[facility]*

A/G Ratio: 1.3 16

AGAP: 9.9 Low 16

Albumin Lvl: 3.2 Low 16

Alk Phos: 80 16

ALT: 22 16

AST: 17 16

B/C Ratio: 19 16

Basophils: 0.1 16

Basophils #: 0.0 16

Bili Total: 0.7 16

BNP: 439 High 16

BUN: 25 High 16

Calcium Lvl: 6.5 Critical 16

Chloride Lvl: 114 High 16

CK MB: 1.8 16

CK MB Index: 0.3 16

CO2: 25 16

Creatinine Lvl: 1.30 16

eGFR: 63 16

Eosinophils: 0.6 16

Eosinophils #: 0.1 16

Globulin: 2.4 16

Glucose Lvl: 121 High 16

Glucose POC: 146 High 16

Hct: 45.3 16

Hgb: 15.2 16

INR: 2.00 High 16

Lymphocytes: 14.5 Low 16

Lymphocytes #: 1.7 16

MCH: 29.9 16

MCHC: 33.5 16

MCV: 89.5 16

Monocytes: 10.8 16

Monocytes #: 1.2 High 16

MPV: 8.3 16

Platelet: 181 16

Potassium Lvl: 2.9 Critical 16

PT: 23.0 High 16

PTT: 36.4 High 16

RBC: 5.06 07/18/16

RDW: 13.4 16

Segs: 74.0 16

Segs-Bands #: 8.5 High 16

Sodium Lvl: 146 High 16

Total CK: 549 High 16

Total Protein: 5.6 Low 16

Troponin-I: 0.04 16

WBC: 11.5 High 16



Radiology Report

Chest 1view DX

                                        16 19:05:37

IMPRESSION:



The lungs are underinflated.



The intracardiac pacer device is unchanged. The cardiac silhouette appears 
prominent, grossly without radiographic evidence of acute congestive failure.



No consolidation, pleural effusion, or pneumothorax are visible.





ASSESSMENT AND PLAN:

                                        1.  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  We will admit patient

 on observation unit for evaluation. Interrogation of AIDC/pacemaker completed. 
Will continue serial cardiac emzymes. Will continue home coreg. p.r.n 
nitroglycerine SL. p.r.n. oxygen, and morphine for pain. Low dose ASA, ACEI. and
continue coreg. Will consult cardiologist Dr. Anderson and will await further 
recommendations from cardiology as well.

                                        2.  Systolic congestive heart failure. Stable. Last TTE was done in  

shown ejection fraction less than 55%-60%.  We will continue with home 
medications.  The patient has an AICD in place.

                                        3.  Chronic renal failure. Stable. Will monitor with labs.

                                        4.  Hypokalemia. Will replaced electrolytes, and repeat potassium level in am.

                                        5.  Hypertension. Stable. Continue home medications.

                                        6.  Type 2 diabetes mellitus. Stable.  Will start the patient on a sliding scale

 insulin.

                                        7.  History of schizophrenia, stable. Continue home medication

                                        8.  History of gastroesophageal reflux disease, stable. Continue home medication.



                                        9.  Obstructive sleep apnea. Stable. Will monitor for respiratory distress. Follow

 up outpatient.

                                        10. DVT prophylaxis. SCDs. Ambulation. Lovenox 40 mg Subq every 24 hours.



HERNÁN score of 3 which correlates to 13% risk for severe or recurrent ischemic 
requiring urgent revascularization.  Further recommendations per Cardiology 
evaluation in AM.



Patient seen and examined.  Agree with the above assessment and plan.

## 2019-09-25 NOTE — XMS REPORT
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  

 

                                        



Results for this procedure are in the results section.



                 BUN             Routine         2019      Prostate cancer screening 



                                         3:39 PM CDT  

 

                                        



Results for this procedure are in the results section.



                 CREATININE SERUM     Routine         2019      Prostate cancer screening 



                                         3:39 PM CDT  

 

                                        



Results for this procedure are in the results section.



                 YONATAN,POST-VOID     Routine         2019      Prostate cancer screening 



                           RES,US,NON-IMG            2:12 PM CDT  

 

                                        



Results for this procedure are in the results section.



                 POCT URINALYSIS DIPSTICK     Routine         2019      Prostate cancer screening 



documented in this encounter



Results

* CULTURE, URINE/SENSITIVITY ON ALL (2019  3:39 PM CDT)





    



              Component     Value        Ref Range     Performed At     Pathologist



                                         Signature

 

    



                     CULTURE,            SPECIMEN NUMBER: [id number]      CPL 



                           URINE/SENSITIVI           Comment:   



                           TY ON ALL                 CULTURE, URINE/SENSITIVITY ON   



                                         ALL   



                                            



                                         SPECIMEN NUMBER: [id number]   



                                         SPECIMEN COMMENT: URINE   



                                         SOURCE: URINE   



                                         REPORT STATUS: FINAL   



                                          FINAL REPORT:   



                                         2019 NO   



                                         SIGNIFICANT PATHOGENS   



                                         RECOVERED. NO FURTHER   



                                            



                                          WORKUP REQUIRED. SEE BELOW   



                                         FOR ORGANISMS RECOVERED   



                                            



                                          <10,000 CFU/ML   



                                         ALPHA-STREPTOCOCCUS SPECIES   



                                            



                                            



                                            



                                          <10,000 CFU/ML   



                                         STAPHYLOCOCCUS SPECIES   



                                          PRELIMINARY URINE   



                                         CULTURE:   



                                         2019 NO   



                                         GROWTH AFTER 12 HOURS   



                                         Unless   



                                         Otherwise Indicated, All   



                                         Testing Performed At:   



                                         Clinical   



                                         [facility], [address] [place] [address]   



                                         Laboratory   



                                         Director: All Lawson M.D.   



                                         CLIA Number   



                                         [id number]   



                                         [place]. [id number]   













                                         Specimen

 





                                         Urine (substance)









   



                 Performing Organization     Address         City[facility]     Phone Number

 

   



                 [place] [address]     [phone]





* CREATININE SERUM (2019  3:39 PM CDT)





    



              Component     Value        Ref Range     Performed At     Pathologist



                                         Signature

 

    



                 CREATININE      TEST NOT PERFORMED     0.80 - 1.40 MG/DL     CPL 



                                         Comment:   



                                         Unable to perform testing,   



                                         specimen not received.   



                                         Charges adjusted as   



                                         applicable.   

 

    



                 EGFR AA         TEST NOT PERFORMED     >60 ML/MIN/1.73     CPL 

 

    



                 EGFR            TEST NOT PERFORMED     >60 ML/MIN/1.73     CPL 



                                         Comment:   



                                         Unless   



                                         Otherwise Indicated, All   



                                         Testing Performed At:   



                                         Clinical   



                                         [facility], [address] [place] [address]   



                                         Laboratory   



                                         Director: All Lawson M.D.   



                                         CLIA Number   



                                         [id number]   



                                         [place]. [id number]   













                                         Specimen

 





                                         Blood









   



                 Performing Organization     Address         [facility]     Phone Number

 

   



                 [place] [address]     [phone]





* BUN (2019  3:39 PM CDT)





    



              Component     Value        Ref Range     Performed At     Pathologist



                                         Signature

 

    



                 BLOOD UREA      TEST NOT PERFORMED     6 - 20 MG/DL     CPL 



                           NITROGEN                  Comment:   



                                         Unable to perform testing,   



                                         specimen not received.   



                                         Charges adjusted as   



                                         applicable.   



                                         Unless   



                                         Otherwise Indicated, All   



                                         Testing Performed At:   



                                         Clinical   



                                         [facility], [address] [place] [id number]   



                                         Laboratory   



                                         Director: All Lawson M.D.   



                                         CLIA Number   



                                         [id number]   



                                         [place]. [id number]   













                                         Specimen

 





                                         Blood









   



                 Performing Organization     Address         City[facility]     Phone Number

 

   



                 [address] [address]     [phone]





* YONATAN,POST-VOID RES,US,NON-IMG (2019  2:12 PM CDT)





    



              Component     Value        Ref Range     Performed At     Pathologist



                                         [facility]

 

    



                     PVR                 31                  cc/ml  













                                         Specimen

 







* POCT URINALYSIS DIPSTICK (2019)





    



              Component     Value        Ref Range     Performed At     Pathologist



                                         Signature

 

    



                     COLOR UA            Yellow              YELLOW/STRAW  

 

    



                     CLARITY UA          Clear               CLEAR  

 

    



                     GLUCOSE UA          Negative            NEGATIVE  

 

    



                     BILIRUBIN UA        Negative            NEGATIVE  

 

    



                     KETONES UA          Negative            NEGATIVE  

 

    



                     SPECIFIC            1.015               1.005 - 1.035  



                                         GRAVITY UA    

 

    



                     BLOOD UA            Negative            NEGATIVE  

 

    



                     PH UA               7.0                 5 - 9  

 

    



                     PROTEIN UA          1+                  NEGATIVE  

 

    



                     UROBILINOGEN UA     0.02 E.U/DL         NORMAL MG/DL  

 

    



                     LEUKOCYTE           Negative            NEGATIVE  



                                         ESTERASE UA    

 

    



                     NITRITE UA          Negative            NEGATIVE  

 

    



                           REDUCING                  NEGATIVE  



                                         SUBSTANCES    



                                         URINE    













                                         Specimen

 







* PHI PANEL (URO DEPT) (2019)





    



              Component     Value        Ref Range     Performed At     Pathologist



                                         Signature

 

    



                     PSA                 0.696               0 - 4.0 ng/mL  

 

    



                     PSA, FREE           0.179               ng/mL  

 

    



                     FPSA/PSA RATIO      26                  >25 %  

 

    



                     P2PSA               3                   pg/mL  

 

    



                           PHI                       12.4Comment: 0-26.9 PHI Score   



                                         has 90.2% likelihood prostate   



                                         cancer free   













                                         Specimen

 





                                         Serum





* TESTOSTERONE-TOTAL(URO DEPT) (2019)





    



              Component     Value        Ref Range     Performed At     Pathologist



                                         Signature

 

    



                     TESTOSTERONE        218 (A)Comment: Test was     300 - 1,000 ng/dl  



                           LEVEL                     repeated to confirm abnormal   



                                         value.   

 

    



                                         LABORATORY FOR    



                                         MALE    



                                         REPRODUCTIVE    



                                         RESEARCH &    



                                         TESTING    

 

    



                                         CLIA# [id number]    

 

    



                                         DIRECTOR:MANOHAR ZHENG,PH.D.,H    



                                         CLD    













                                         Specimen

 





                                         Serum





documented in this encounter



Visit Diagnoses











                                         Diagnosis

 





                                         Prostate cancer screening - Primary



                                         Special screening for malignant neoplasm of prostate

 





                                         Nocturia



documented in this encounter



Insurance







                                        Type



            Payer      Benefit     Subscriber ID     Effective     Phone      Address 



                           Plan /                    Dates   



                                         Group     

 

                                        Medicare



              [employer]     [employer]     xxxxxxxxx     3/1/2018-P      [address] 



                     [address]                [address]              [address] 



                           [place] 



                           [place]AN                      [id number] 









     



            Guarantor Name     Account     Relation to     Date of     Phone      Billing Address



                     Type                Patient             Birth  

 

     



            Valeriy Vasquez     Personal/F     Self       1965     [phone]     [address] 

[facility]



                     [address]               (Home)              [place] [id number] [phone] 



                                         (Work) 



documented as of this encounter



Advance Directives







                          Patient Representative    Explanation



                           Type                      Date Recorded  

 

                                                     



                                         Advance Directives   



                                         and Living Will   

 

                                                     



                                         Power of

## 2019-09-25 NOTE — XMS REPORT
Encounter CCD: 10/21/2013 to 10/22/2013

                             Created on: 2047



JOANNA CABRERA

External Reference #: [id number]

: 1965

Sex: Male



Demographics







                          Address                   [address] [place] [address]

[place]  [id number]

 

                          Home Phone                [phone]

 

                          Preferred Language        Unknown

 

                          Marital Status            Single

 

                          Spiritism Affiliation     Yazidism

 

                          Race                      White/

 

                          Ethnic Group              Non-





Author







                          Author                    Auto Generated

 

                          Organization              [facility]

 

                          Address                   Unknown

 

                          Phone                     Unavailable







Care Team Providers







                    Care Team Member Name    Role                Phone

 

                    Kalpesh Murillo     CP                  [phone]







Allergies, Adverse Reactions, Alerts







  



                     Substance           Reaction            Status

 

  



                           NKDA                      Active







Problem List







  



                     Condition           Effective Dates     Status

 

  



                     [D]Anterior chest wall pain     2012          Active

 

  



                           BP+ - Hypertension        Active

 

  



                           CHF - Congestive heart failure      Active

 

  



                           CHF - Congestive heart failure      Active

 

  



                           Depression                Active

 

  



                           dm                        Active

 

  



                           Down syndrome             Active

 

  



                           GERD - Gastro-esophageal reflux disease      Active

 

  



                           H/O: schizophrenia        Active

 

  



                           HTN                       Active

 

  



                           ICD (implantable cardiac defibrillator) battery depletion      Active

 

  



                           NIDDM                     Active

 

  



                           Obese build               Active

 

  



                           Sleep apnea               Active







Medications







    



              Medication     Instructions     Start Date     End Date     Status

 

    



              aspirin      325 mg, 1 tab, Route: PO, Drug     10/21/2013     10/21/2013     Deleted



                                         form: TAB, ONCE, Dosing Weight   



                                         90.909, kg, Priority: STAT, Start   



                                         date: 10/21/13 16:48:00, Stop date:   



                                         10/21/13 16:48:00Take with food.   

 

    



                 Dextrose 50% Syringe     12.5 gm, 25 mL, Route: IVP, Drug     10/21/2013      10/22/2013

                                         Discontinued



                                         Form: INJ, Dosing Weight 90.909,   



                                         kg, PRN, PRN Blood Glucose Results,   



                                         Start date: 10/21/13 16:42:00,   



                                         Duration: 30 day, Stop date:   



                                         13 15:41:00   

 

    



              glucagon     1 mg, Route: IM, Drug form:     10/21/2013     10/22/2013     Discontinued





                                         PDR/INJ, PRN, Dosing Weight 90.909,   



                                         kg, PRN Blood Glucose Results,   



                                         Start date: 10/21/13 16:42:00,   



                                         Duration: 30 day, Stop date:   



                                         13 15:41:00   

 

    



                 Dextrose 50% Syringe     25 gm, 50 mL, Route: IVP, Drug     10/21/2013      10/22/2013

                                         Discontinued



                                         Form: INJ, Dosing Weight 90.909,   



                                         kg, PRN, PRN Blood Glucose Results,   



                                         Start date: 10/21/13 16:42:00,   



                                         Duration: 30 day, Stop date:   



                                         13 15:41:00   

 

    



                 insulin aspart     4 unit, 0.04 mL, Route: SUB-Q, Drug     10/21/2013      10/22/2013 

                                         Discontinued



                                         form: SOLN, TID-Before Meals,   



                                         Dosing Weight 90.909, kg, PRN Blood   



                                         Glucose Results, Start date:   



                                         10/21/13 16:42:00, Duration: 30   



                                         day, Stop date: 13   



                                         16:41:00Roll in palms of hands   



                                         gently;  Do not shake vigorously.   



                                         (Same as: NovoLog)"single patient   



                                         use only"  Stable for 28 days at   



                                         room temperature.Expires in _____   



                                         days from ______________Date   

 

    



                 insulin aspart     5 unit, 0.05 mL, Route: SUB-Q, Drug     10/21/2013      10/22/2013 

                                         Discontinued



                                         form: SOLN, TID-Before Meals,   



                                         Dosing Weight 90.909, kg, PRN Blood   



                                         Glucose Results, Start date:   



                                         10/21/13 16:42:00, Duration: 30   



                                         day, Stop date: 13   



                                         16:41:00Roll in palms of hands   



                                         gently;  Do not shake vigorously.   



                                         (Same as: NovoLog)"single patient   



                                         use only"  Stable for 28 days at   



                                         room temperature.Expires in _____   



                                         days from ______________Date   

 

    



                 insulin aspart     1 unit, 0.01 mL, Route: SUB-Q, Drug     10/21/2013      10/22/2013 

                                         Discontinued



                                         form: SOLN, TID-Before Meals,   



                                         Dosing Weight 90.909, kg, PRN Blood   



                                         Glucose Results, Start date:   



                                         10/21/13 16:42:00, Duration: 30   



                                         day, Stop date: 13   



                                         16:41:00Roll in palms of hands   



                                         gently;  Do not shake vigorously.   



                                         (Same as: NovoLog)"single patient   



                                         use only"  Stable for 28 days at   



                                         room temperature.Expires in _____   



                                         days from ______________Date   

 

    



                 insulin aspart     3 unit, 0.03 mL, Route: SUB-Q, Drug     10/21/2013      10/22/2013 

                                         Discontinued



                                         form: SOLN, TID-Before Meals,   



                                         Dosing Weight 90.909, kg, PRN Blood   



                                         Glucose Results, Start date:   



                                         10/21/13 16:42:00, Duration: 30   



                                         day, Stop date: 13   



                                         16:41:00Roll in palms of hands   



                                         gently;  Do not shake vigorously.   



                                         (Same as: NovoLog)"single patient   



                                         use only"  Stable for 28 days at   



                                         room temperature.Expires in _____   



                                         days from ______________Date   

 

    



                 insulin aspart     2 unit, 0.02 mL, Route: SUB-Q, Drug     10/21/2013      10/22/2013 

                                         Discontinued



                                         form: SOLN, TID-Before Meals,   



                                         Dosing Weight 90.909, kg, PRN Blood   



                                         Glucose Results, Start date:   



                                         10/21/13 16:42:00, Duration: 30   



                                         day, Stop date: 13   



                                         16:41:00Roll in palms of hands   



                                         gently;  Do not shake vigorously.   



                                         (Same as: NovoLog)"single patient   



                                         use only"  Stable for 28 days at   



                                         room temperature.Expires in _____   



                                         days from ______________Date   

 

    



              Zocor        20 mg, 1 tab, Route: PO, Drug form:     10/21/2013     10/22/2013     Discontinued





                                         TAB, Bedtime, Start date: 10/21/13   



                                         21:00:00, Duration: 30 day, Stop   



                                         date: 13 21:00:00(Same as:   



                                         Zocor)   

 

    



              Lasix        40 mg, 4 mL, Route: IVP, Drug form:     10/21/2013     10/21/2013     Completed





                                         INJ, ONCE, Dosing Weight 90.909,   



                                         kg, Priority: STAT, Start date:   



                                         10/21/13 16:46:00, Stop date:   



                                         10/21/13 16:46:00(Same as: Lasix)   

 

    



              aspirin 81 mg     81 mg, Route: PO, Drug form:     10/22/2013     10/21/2013     Deleted





                           tablet, chewable          CHEWTAB, Daily, Dosing Weight   



                                         90.909, kg, Start date: 10/22/13   



                                         9:00:00, Duration: 30 day, Stop   



                                         date: 13 9:00:00   

 

    



                 nitroglycerin SL Tab     0.4 mg, 1 tab, Route: SL, Drug     10/21/2013      10/22/2013

                                         Discontinued



                                         form: TAB, Q5Min, Dosing Weight   



                                         90.909, kg, PRN Chest Pain, Start   



                                         date: 10/21/13 18:25:00, Duration:   



                                         3 doses or times, Stop date:   



                                         Limited # of times(Same   



                                         as:Nitroquick, Nitrostat)"Do Not   



                                         Crush"  Sublingual tablet   

 

    



                 Saline Flush 0.9%     5 ml, Route: IVP, Drug Form: INJ,     10/21/2013      10/22/2013

                                         Discontinued



                                         Dosing Weight 90.909, kg, PRN, PRN   



                                         Line Flush, Start date: 10/21/13   



                                         18:25:00, Duration: 30 day, Stop   



                                         date: 13 17:24:00(Same as: BD   



                                         Posiflush)   

 

    



                 Saline Flush 0.9%     5 ml, Route: IVP, Drug Form: INJ,     10/21/2013      10/22/2013

                                         Discontinued



                                         Dosing Weight 90.909, kg, Q12H,   



                                         Start date: 10/21/13 21:00:00,   



                                         Duration: 30 day, Stop date:   



                                         13 9:00:00(Same as: BD   



                                         Posiflush)   

 

    



                 metFORmin 500 mg     500 mg, 1 tab, PO, BID-Meals, 30     10/21/2013      Ordered



                           oral tablet               tab, Substitution Allowed   

 

    



              temazepam 30 mg oral     30 mg, 1 cap, PO, Bedtime,     10/21/2013     10/22/2013     Discontinued





                           capsule                   Substitution Allowed, CAP   

 

    



                 Saline Flush 0.9%     5 mL, Route: IVP, Drug Form: INJ,     10/21/2013      10/22/2013

                                         Discontinued



                                         Dosing Weight 90.909, kg, Q8H, PRN   



                                         Line Flush, Start date: 10/21/13   



                                         14:13:00, Duration: 30 day, Stop   



                                         date: 13 14:12:00, Administer   



                                         at least once every 8 hours   



                                         Administer at least once every 8   



                                         hours(Same as: BD Posiflush)   

 

    



              nitroglycerin 2%     0.5 inch, Route: TOP, Drug Form:     10/21/2013     10/21/2013    

 Completed



                           topical ointment          OINT, Dosing Weight 90.909, kg,   



                                         ONCE, STAT, Start date: 10/21/13   



                                         15:24:00, Stop date: 10/21/13   



                                         15:24:001 gram is approximately 1   



                                         inch of nitroglycerin ointment   



                                         (20 mg NTG per gram) (Same   



                                         as:Nitro-Bid)   

 

    



              Protonix     40 mg, 1 tab, Route: PO, Drug form:     10/21/2013     10/22/2013     Discontinued





                                         ECTAB, Before Dinner, Dosing Weight   



                                         90.909, kg, Priority: STAT, Start   



                                         date: 10/21/13 16:41:00, Duration:   



                                         30 day, Stop date: 13   



                                         16:30:00Tablet should not be chewed   



                                         or crushed.(Same as: Protonix)   

 

    



              aspirin      325 mg, 1 tab, Route: PO, Drug     10/21/2013     10/21/2013     Completed



                                         form: TAB, ONCE, Dosing Weight   



                                         90.909, kg, Priority: STAT, Start   



                                         date: 10/21/13 15:24:00, Stop date:   



                                         10/21/13 15:24:00Take with food.   

 

    



              Zofran       4 mg, 2 mL, Route: IVP, Drug form:     10/21/2013     10/22/2013     Discontinued





                                         INJ, Q8H, Dosing Weight 90.909, kg,   



                                         PRN as needed for nausea/vomiting,   



                                         Priority: STAT, Start date:   



                                         10/21/13 16:41:00, Duration: 30   



                                         day, Stop date: 13   



                                         16:40:00(Same as: Zofran)   

 

    



                 influenza virus     0.5 ml, Route: IM, Drug Form: SUSP,     10/01/2013      10/01/2013

                                         Completed



                           vaccine, inactivated      Start date: 10/01/13 9:00:00, Stop   



                                         date: 10/01/13 9:00:00   

 

    



              Geodon       20 mg, 1 cap, Route: PO, Drug form:     10/21/2013     10/22/2013     Discontinued





                                         CAP, Bedtime, Dosing Weight 90.909,   



                                         kg, Start date: 10/21/13 21:00:00,   



                                         Duration: 30 day, Stop date:   



                                         13 21:00:00(Same As:   



                                         Geodon)Give with Food   

 

    



                 Sodium Chloride 0.9%     25 mL, Route: IV, Start date:     10/21/2013      10/22/2013 

                                         Discontinued



                           IV                        10/21/13 14:20:00, Duration: 30   



                                         day, Stop date: 13 13:19:00,   



                                         PRN Line Flush   

 

    



              Remeron      15 mg, 1 tab, Route: PO, Drug form:     10/21/2013     10/22/2013     Discontinued





                                         TAB, Bedtime, Dosing Weight 90.909,   



                                         kg, Start date: 10/21/13 21:00:00,   



                                         Duration: 30 day, Stop date:   



                                         13 21:00:00(Same as:Remeron)   

 

    



              lisinopril     40 mg, 2 tab, Route: PO, Drug form:     10/22/2013     10/22/2013     Discontinued





                                         TAB, Daily, Dosing Weight 90.909,   



                                         kg, Start date: 10/22/13 9:00:00,   



                                         Duration: 30 day, Stop date:   



                                         13 9:00:00(Same as: Prinivil,   



                                         Zestril)   

 

    



                 furosemide 40 mg     40 mg, 1 tab, Route: PO, Drug form:     10/22/2013      10/22/2013

                                         Discontinued



                           oral tablet               TAB, Daily, Dosing Weight 90.909,   



                                         kg, Start date: 10/22/13 9:00:00,   



                                         Duration: 30 day, Stop date:   



                                         13 9:00:00(Same as: Lasix)   



                                         May cause GI upset.  Give with food   



                                         or milk.   

 

    



              clonazepam     2 mg, 4 tab, Route: PO, Drug form:     10/21/2013     10/22/2013     Discontinued





                                         TAB, Bedtime, Dosing Weight 90.909,   



                                         kg, Start date: 10/21/13 21:00:00,   



                                         Duration: 30 day, Stop date:   



                                         13 21:00:00(Same As:   



                                         Klonopin)   

 

    



                 BD Normal Saline     10 mL, Route: IV, Drug Form: INJ,     10/21/2013      10/22/2013 

                                         Discontinued



                           Flush                     PRN, PRN Line Flush, Start date:   



                                         10/21/13 14:20:00, Duration: 30   



                                         day, Stop date: 13   



                                         13:19:00(Same as: BD Posiflush)   

 

    



              carvedilol     3.125 mg, 1 tab, Route: PO, Drug     10/21/2013     10/22/2013     Discontinued





                                         form: TAB, Q12H, Dosing Weight   



                                         90.909, kg, Start date: 10/21/13   



                                         21:00:00, Duration: 30 day, Stop   



                                         date: 13 9:00:00Give with   



                                         food. (Same As: Coreg)   

 

    



                 simvastatin 20 mg     20 mg, 1 tab, PO, Bedtime, 30 tab,     10/22/2013      Ordered



                           oral tablet               Substitution Allowed, TAB   

 

    



                 pantoprazole 40 mg     40 mg, 1 tab, PO, Before Dinner, 30     10/22/2013      Ordered





                           oral enteric coated       tab, Substitution Allowed, ECTAB   



                                         tablet    

 

    



              aspirin      81 mg, 1 tab, Route: PO, Drug form:     10/22/2013     10/22/2013     Discontinued





                                         CHEWTAB, Daily, Dosing Weight   



                                         90.909, kg, Start date: 10/22/13   



                                         9:00:00, Duration: 30 day, Stop   



                                         date: 13 9:00:00Take with   



                                         food.   

 

    



                 acetaminophen 325 mg     650 mg, 2 tab, Route: PO, Drug     10/21/2013      10/22/2013

                                         Discontinued



                           oral tablet               form: TAB, Q4H, Dosing Weight   



                                         90.909, kg, PRN as needed for   



                                         fever, Start date: 10/21/13   



                                         16:38:00, Duration: 30 day, Stop   



                                         date: 13 16:37:00Do not   



                                         exceed 4 gm/day.  (Same as:   



                                         Tylenol)   

 

    



              Lipitor      10 mg, Route: PO, Bedtime, Dosing     10/21/2013     10/21/2013     Deleted





                                         Weight 90.909, kg, Start date:   



                                         10/21/13 21:00:00, Duration: 30   



                                         day, Stop date: 13 21:00:00   







Immunizations







  



                     Vaccine             Date                Status

 

  



                     influenza virus vaccine, inactivated     10/01/2013          Auth (Verified)







Vital Signs







                Most recent to oldest [Reference Range]:    1               2               3

 

                          Height                    172.72 cm 

                          (10/21/2013 19:16:00)      172.72 cm 

                          (10/21/2013 13:59:00)       

 

                          Temperature Oral [96.4-99.1 DegF]    96.0 DegF 

*LOW*

                          (10/22/2013 16:25:00)      96.0 DegF 

*LOW*

                          (10/22/2013 12:41:00)      96.8 DegF 

                                        (10/22/2013 07:48:00)  

 

                          Systolic Blood Pressure [ mmHg]    117 mmHg 

                          (10/22/2013 16:25:00)      108 mmHg 

                          (10/22/2013 12:41:00)      107 mmHg 

                                        (10/22/2013 07:48:00)  

 

                          Diastolic Blood Pressure [60-90 mmHg]    92 mmHg 

*HI*

                          (10/22/2013 16:25:00)      82 mmHg 

                          (10/22/2013 12:41:00)      76 mmHg 

                                        (10/22/2013 07:48:00)  

 

                          Respiratory Rate [14-20 BRMIN]    20 BRMIN 

                          (10/22/2013 16:25:00)      18 BRMIN 

                          (10/22/2013 12:41:00)      18 BRMIN 

                                        (10/22/2013 07:48:00)  

 

                          Peripheral Pulse Rate [ bpm]    64 bpm 

                          (10/22/2013 16:25:00)      55 bpm 

*LOW*

                          (10/22/2013 12:41:00)      65 bpm 

                                        (10/22/2013 07:48:00)  

 

                          Weight                    105.085 kg 

                          (10/21/2013 19:16:00)      90.909 kg 

                          (10/21/2013 13:59:00)       







Results





BEDSIDE GLUCOSE TESTING





                Most recent to  [Reference Range]:    1               2               3

 

                          Glucose POC [70-99 mg/dL]    111 mg/dL 1

*HI*

                          (10/22/2013 17:32:00)      202 mg/dL 2

*HI*

                          (10/22/2013 11:47:00)      103 mg/dL 3

*HI*

                                        (10/22/2013 07:17:00)  

 

                          Gluc POC Comment 1        Notified RN/MD 

*NA*

                    (10/21/2013 21:17:00)                           







1Interpretive Data:  Upper Reportable Limit: 200 mg/dL.



2Interpretive Data:  Upper Reportable Limit: 200 mg/dL.



3Interpretive Data:  Upper Reportable Limit: 200 mg/dL.



VIRAL - SEROLOGY





                Most recent to oldest [Reference Range]:    1               2               3

 

                          Influ A [Negative]        Negative 

                    (10/21/2013 15:55:00)                           

 

                          Influ B [Negative]        Negative 4

                    (10/21/2013 15:55:00)                           







4Interpretive Data:   Influenza A&B Antigen: 

Due to the low sensitivity of this test a negative result does not exclude influ
brendon virus infection. A diagnosis of influenza should be considered based on a p
atient's clinical presentation and empiric antiviral treatment should be conside
red, if indicated. If more conclusive testing is desired, follow-up confirmatory
 testing with either viral culture or PCR is warranted.



CHEMISTRY





                Most recent to oldest [Reference Range]:    1               2               3

 

                          Sodium Lvl [135-145 mEq/L]    139 mEq/L 

                          (10/22/2013 05:25:00)      139 mEq/L 

                          (10/21/2013 14:36:00)       

 

                          Potassium Lvl [3.5-5.1 mEq/L]    3.9 mEq/L 

                          (10/22/2013 05:25:00)      3.9 mEq/L 

                          (10/21/2013 14:36:00)       

 

                          Chloride Lvl [ mEq/L]    105 mEq/L 

                          (10/22/2013 05:25:00)      106 mEq/L 

                          (10/21/2013 14:36:00)       

 

                          CO2 [24-32 mEq/L]         24 mEq/L 

                          (10/22/2013 05:25:00)      26 mEq/L 

                          (10/21/2013 14:36:00)       

 

                          AGAP [10.0-20.0 mEq/L]    13.9 mEq/L 

                          (10/22/2013 05:25:00)      10.9 mEq/L 

                          (10/21/2013 14:36:00)       

 

                          Creatinine Lvl [0.5-1.4 mg/dL]    1.1 mg/dL 

                          (10/22/2013 05:25:00)      1.2 mg/dL 

                          (10/21/2013 14:36:00)       

 

                          eGFR                      79 mL/min/1.73m2 5

*NA*

                          (10/22/2013 05:25:00)      71 mL/min/1.73m2 6

*NA*

                          (10/21/2013 14:36:00)       

 

                          BUN [7-22 mg/dL]          25 mg/dL 

*HI*

                          (10/22/2013 05:25:00)      20 mg/dL 

                          (10/21/2013 14:36:00)       

 

                          B/C Ratio [6-25]          17 

                    (10/21/2013 14:36:00)                           

 

                          Glucose Lvl [70-99 mg/dL]    124 mg/dL 7

*HI*

                          (10/22/2013 05:25:00)      122 mg/dL 8

*HI*

                          (10/21/2013 14:36:00)       

 

                          Total Protein [6.4-8.4 g/dL]    6.5 g/dL 

                    (10/21/2013 14:36:00)                           

 

                          Albumin Lvl [3.5-5.0 g/dL]    3.5 g/dL 

                    (10/21/2013 14:36:00)                           

 

                          Globulin [2.0-4.0 g/dL]    3.0 g/dL 

                    (10/21/2013 14:36:00)                           

 

                          A/G Ratio [0.7-1.6]       1.2 

                    (10/21/2013 14:36:00)                           

 

                          Calcium Lvl [8.5-10.5 mg/dL]    8.0 mg/dL 

*LOW*

                          (10/22/2013 05:25:00)      8.6 mg/dL 

                          (10/21/2013 14:36:00)       

 

                          ALT [0-65 unit/L]         45 unit/L 

                    (10/21/2013 14:36:00)                           

 

                          AST [0-37 unit/L]         26 unit/L 

                    (10/21/2013 14:36:00)                           

 

                          Alk Phos [ unit/L]    131 unit/L 

                    (10/21/2013 14:36:00)                           

 

                          Bili Total [0.2-1.3 mg/dL]    1.1 mg/dL 

                    (10/21/2013 14:36:00)                           

 

                          Total CK [ unit/L]    118 unit/L 

                          (10/21/2013 23:10:00)      110 unit/L 

                          (10/21/2013 19:00:00)      101 unit/L 

                                        (10/21/2013 14:36:00)  

 

                          CK MB [0.5-3.6 ng/mL]     1.5 ng/mL 

                          (10/21/2013 23:10:00)      1.2 ng/mL 

                          (10/21/2013 19:00:00)      1.1 ng/mL 

                                        (10/21/2013 14:36:00)  

 

                          CK MB Index [0.0-2.5]     1.3 

                          (10/21/2013 23:10:00)      1.1 

                          (10/21/2013 19:00:00)      1.1 

                                        (10/21/2013 14:36:00)  

 

                          Troponin-I [0.00-0.40 ng/mL]    0.03 ng/mL 

                          (10/21/2013 23:10:00)      0.03 ng/mL 

                          (10/21/2013 19:00:00)      0.04 ng/mL 

                                        (10/21/2013 14:36:00)  

 

                          BNP [<=100 pg/mL]         1962 pg/mL 9

*HI*

                    (10/21/2013 14:36:00)                           







5Result Comment: The eGFR is calculated using the CKD-EPI formula. In most 
young, healthy individuals the eGFR will be >90 mL/min/1.73m2. The eGFR declines
with age. An eGFR of 60-89 may be normal in some populations, particularly the 
elderly, for whom the CKD-EPI formula has not been extensively validated. Use of
the eGFR is not recommended in the following populations:



Individuals with unstable creatinine concentrations, including pregnant patients
and those with serious co-morbid conditions.



Patients with extremes in muscle mass or diet. 



The data above are obtained from the National Kid[employer] Education Program (
NK[employer]) which additionally recommends that when the eGFR is used in patients with
extremes of body mass index for purposes of drug dosing, the eGFR should be mul
tiplied by the estimated BMI.



6Result Comment: The eGFR is calculated using the CKD-EPI formula. In most 
young, healthy individuals the eGFR will be >90 mL/min/1.73m2. The eGFR declines
with age. An eGFR of 60-89 may be normal in some populations, particularly the 
elderly, for whom the CKD-EPI formula has not been extensively validated. Use of
the eGFR is not recommended in the following populations:



Individuals with unstable creatinine concentrations, including pregnant patients
and those with serious co-morbid conditions.



Patients with extremes in muscle mass or diet. 



The data above are obtained from the National Kid[employer] Education Program (
NK[employer]) which additionally recommends that when the eGFR is used in patients with
extremes of body mass index for purposes of drug dosing, the eGFR should be mul
tiplied by the estimated BMI.



7Interpretive Data: Adult reference range values reflect the clinical guidelines

of the [employer].



8Interpretive Data: Adult reference range values reflect the clinical guidelines

of the [employer].



9Interpretive Data: Elevated results are in line with increasing severity of 

congestive heart failure. Minor elevations between 100 and 300 

may be seen with Myocardial Ischemia, Sodium retaining drugs, 

and compensated/treated heart failure.



HEMATOLOGY





                Most recent to oldest [Reference Range]:    1               2               3

 

                          WBC [3.7-10.4 K/CMM]      6.0 K/CMM 

                          (10/22/2013 05:25:00)      6.6 K/CMM 

                          (10/21/2013 14:36:00)       

 

                          RBC [4.70-6.10 M/CMM]     4.90 M/CMM 

                          (10/22/2013 05:25:00)      5.22 M/CMM 

                          (10/21/2013 14:36:00)       

 

                          Hgb [14.0-18.0 g/dL]      14.3 g/dL 

                          (10/22/2013 05:25:00)      15.2 g/dL 

                          (10/21/2013 14:36:00)       

 

                          Hct [42.0-54.0 %]         44.1 % 

                          (10/22/2013 05:25:00)      47.2 % 

                          (10/21/2013 14:36:00)       

 

                          MCV [80.0-94.0 fL]        89.9 fL 

                          (10/22/2013 05:25:00)      90.5 fL 

                          (10/21/2013 14:36:00)       

 

                          MCH [27.0-31.0 pg]        29.1 pg 

                          (10/22/2013 05:25:00)      29.2 pg 

                          (10/21/2013 14:36:00)       

 

                          MCHC [32.0-36.0 g/dL]     32.4 g/dL 

                          (10/22/2013 05:25:00)      32.3 g/dL 

                          (10/21/2013 14:36:00)       

 

                          RDW [11.5-14.5 %]         15.6 % 

*HI*

                          (10/22/2013 05:25:00)      15.4 % 

*HI*

                          (10/21/2013 14:36:00)       

 

                          Platelet [133-450 K/CMM]    198 K/CMM 

                          (10/22/2013 05:25:00)      227 K/CMM 

                          (10/21/2013 14:36:00)       

 

                          MPV [7.4-10.4 fL]         9.2 fL 

                          (10/22/2013 05:25:00)      8.9 fL 

                          (10/21/2013 14:36:00)       

 

                          Segs [45.0-75.0 %]        70.6 % 

                          (10/22/2013 05:25:00)      73.0 % 

                          (10/21/2013 14:36:00)       

 

                          Lymphocytes [20.0-40.0 %]    17.7 % 

*LOW*

                          (10/22/2013 05:25:00)      16.7 % 

*LOW*

                          (10/21/2013 14:36:00)       

 

                          Monocytes [2.0-12.0 %]    9.5 % 

                          (10/22/2013 05:25:00)      9.4 % 

                          (10/21/2013 14:36:00)       

 

                          Eosinophils [0.0-4.0 %]    1.6 % 

                          (10/22/2013 05:25:00)      0.7 % 

                          (10/21/2013 14:36:00)       

 

                          Basophils [0.0-1.0 %]     0.6 % 

                          (10/22/2013 05:25:00)      0.2 % 

                          (10/21/2013 14:36:00)       

 

                          Segs-Bands # [1.5-8.1 K/CMM]    4.3 K/CMM 

                          (10/22/2013 05:25:00)      4.8 K/CMM 

                          (10/21/2013 14:36:00)       

 

                          Lymphocytes # [1.0-5.5 K/CMM]    1.1 K/CMM 

                          (10/22/2013 05:25:00)      1.1 K/CMM 

                          (10/21/2013 14:36:00)       

 

                          Monocytes # [0.0-0.8 K/CMM]    0.6 K/CMM 

                          (10/22/2013 05:25:00)      0.6 K/CMM 

                          (10/21/2013 14:36:00)       

 

                          Eosinophils # [0.0-0.5 K/CMM]    0.1 K/CMM 

                          (10/22/2013 05:25:00)      0.0 K/CMM 

                          (10/21/2013 14:36:00)       

 

                          Basophils # [0.0-0.2 K/CMM]    0.0 K/CMM 

                          (10/22/2013 05:25:00)      0.0 K/CMM 

                          (10/21/2013 14:36:00)       

 

                          PT [12.0-14.7 seconds]    18.5 seconds 

*HI*

                    (10/21/2013 14:36:00)                           

 

                          INR [0.85-1.17]           1.57 10

*HI*

                    (10/21/2013 14:36:00)                           

 

                          PTT [22.9-35.8 seconds]    30.2 seconds 11

                    (10/21/2013 14:36:00)                           







10Interpretive Data: RECOMMENDED RANGES FOR PROTIME INR:

   2.0-3.0 for most medical and surgical thromboembolic states.

   2.5-3.5 for artificial heart valves and recurrent embolism.



INR SHOULD BE USED ONLY FOR PATIENTS ON STABLE ANTICOAGULANT THERAPY.



11Interpretive Data: Heparin Therapeutic Range:  57 - 92 Seconds

## 2019-09-25 NOTE — XMS REPORT
Encounter CCD: 10/11/2013 to 10/13/2013

                             Created on: 2066



JOANNA CABRERA CHARLIE

External Reference #: [id number]

: 1965

Sex: Male



Demographics







                          Address                   [address] [place] [address]

[place]  [id number]

 

                          Home Phone                [phone]

 

                          Preferred Language        Unknown

 

                          Marital Status            Single

 

                          Yazidism Affiliation     Christianity

 

                          Race                      White/

 

                          Ethnic Group              Non-





Author







                          Author                    Auto Generated

 

                          Organization              [facility]

 

                          Address                   Unknown

 

                          Phone                     Unavailable







Care Team Providers







                    Care Team Member Name    Role                Phone

 

                    Jose Stinson    CP                  [phone]







Allergies, Adverse Reactions, Alerts







  



                     Substance           Reaction            Status

 

  



                           NKDA                      Active







Problem List







  



                     Condition           Effective Dates     Status

 

  



                     [D]Anterior chest wall pain     2012          Active

 

  



                           BP+ - Hypertension        Resolved

 

  



                           CHF - Congestive heart failure      Active

 

  



                           CHF - Congestive heart failure      Resolved

 

  



                           Depression                Resolved

 

  



                           dm                        Active

 

  



                           Down syndrome             Active

 

  



                           GERD - Gastro-esophageal reflux disease      Resolved

 

  



                           H/O: schizophrenia        Active

 

  



                           HTN                       Active

 

  



                           ICD (implantable cardiac defibrillator) battery depletion      Active

 

  



                           NIDDM                     Resolved

 

  



                           Obese build               Resolved

 

  



                           Sleep apnea               Active







Medications







    



              Medication     Instructions     Start Date     End Date     Status

 

    



                 metoprolol 5 mg/5 ml     5 mg, 5 mL, Route: IVP, Drug form:     10/11/2013      10/11/2013

                                         Completed



                           INJ                       INJ, ONCE, Dosing Weight 129.545,   



                                         kg, Priority: STAT, Start date:   



                                         10/11/13 16:31:00, Stop date:   



                                         10/11/13 16:31:00(Same as:   



                                         Lopressor)   

 

    



              ondansetron     4 mg, 2 mL, Route: IVP, Drug form:     10/11/2013     10/13/2013     Discontinued





                                         INJ, Q8H, Dosing Weight 129.545,   



                                         kg, PRN Nausea & Vomiting, Start   



                                         date: 10/11/13 17:32:00, Duration:   



                                         30 day, Stop date: 11/10/13   



                                         17:31:00(Same as: Zofran)   

 

    



              docusate     100 mg, 1 cap, Route: PO, Drug     10/11/2013     10/13/2013     Discontinued





                                         form: CAP, BID, Dosing Weight   



                                         129.545, kg, PRN Constipation,   



                                         Start date: 10/11/13 17:32:00,   



                                         Duration: 30 day, Stop date:   



                                         11/10/13 17:31:00(Same as: Colace)   



                                         (Do Not Crush)   

 

    



              acetaminophen     650 mg, 20.3 mL, Route: PO, Drug     10/11/2013     10/13/2013     Discontinued





                                         form: LIQ, Q4H, Dosing Weight   



                                         129.545, kg, PRN Pain 1-3/Temp >   



                                         100.4 F, Start date: 10/11/13   



                                         17:32:00, Duration: 30 day, Stop   



                                         date: 11/10/13 17:31:00Max   



                                         acetaminophen=4000mg/day (4   



                                         gm/day).  (Same as: Tylenol)   

 

    



              enalapril     1.25 mg, 1 mL, Route: IVP, Drug     10/11/2013     10/11/2013     Completed





                                         form: INJ, ONCE, Dosing Weight   



                                         129.545, kg, Priority: STAT, Start   



                                         date: 10/11/13 16:30:00, Stop date:   



                                         10/11/13 16:30:00(Same as:   



                                         Vasotec-IV)   

 

    



                 Remeron 15 mg oral     15 mg, 1 tab, PO, Bedtime, 30 tab,     10/11/2013      10/13/2013

                                         Discontinued



                           tablet                    Substitution Allowed, TAB   

 

    



              cyanocobalamin     5 microgram, Route: PO, Drug form:     10/12/2013     10/11/2013    

 Deleted



                                         TAB, Daily, Dosing Weight 108.004,   



                                         kg, Start date: 10/12/13 9:00:00,   



                                         Duration: 30 day, Stop date:   



                                         11/10/13 9:00:00   

 

    



              clonazepam     2 mg, 2 tab, Route: PO, Drug form:     10/11/2013     10/13/2013     Discontinued





                                         TAB, Bedtime, Dosing Weight   



                                         108.004, kg, Start date: 10/11/13   



                                         21:00:00, Duration: 30 day, Stop   



                                         date: 13 21:00:00(Same As:   



                                         Klonopin)   

 

    



              lisinopril     40 mg, 2 tab, Route: PO, Drug form:     10/12/2013     10/13/2013     Discontinued





                                         TAB, Daily, Dosing Weight 108.004,   



                                         kg, Start date: 10/12/13 9:00:00,   



                                         Duration: 30 day, Stop date:   



                                         11/10/13 9:00:00(Same as: Prinivil,   



                                         Zestril)   

 

    



              Geodon       20 mg, 1 cap, Route: PO, Drug form:     10/11/2013     10/13/2013     Discontinued





                                         CAP, Bedtime, Dosing Weight   



                                         108.004, kg, Start date: 10/11/13   



                                         21:00:00, Duration: 30 day, Stop   



                                         date: 13 21:00:00(Same As:   



                                         Geodon)Give with Food   

 

    



              Remeron      15 mg, 1 tab, Route: PO, Drug form:     10/11/2013     10/13/2013     Discontinued





                                         TAB, Bedtime, Dosing Weight   



                                         108.004, kg, Start date: 10/11/13   



                                         21:00:00, Duration: 30 day, Stop   



                                         date: 13 21:00:00(Same   



                                         as:Remeron)   

 

    



              Geodon 20 mg oral     20 mg, 1 cap, PO, Bedtime, with     10/11/2013     10/13/2013    

 Discontinued



                           capsule                   food, 180 cap, Substitution   



                                         Allowed, CAP   



                                         with food   

 

    



              Vitamin B12     1,000 microgram, 1 tab, Route: PO,     10/12/2013     10/13/2013     Discontinued





                                         Drug form: TAB, Daily, Start date:   



                                         10/12/13 9:00:00, Duration: 30 day,   



                                         Stop date: 11/10/13 9:00:00(Same   



                                         As: Vitamin B-12)   

 

    



                 insulin regular 100     5 unit, 0.05 mL, Route: SUB-Q, Drug     10/12/2013      10/13/2013

                                         Discontinued



                           units/mL human            form: SOLN, TID-Before Meals, PRN   



                           recombinant               Blood Glucose Results, Start date:   



                                         10/12/13 20:10:00, Duration: 30   



                                         day, Stop date: 13   



                                         20:09:00(Same as: Humulin R) Roll   



                                         in palms of hands gently;  Do not   



                                         shake vigorously. "single patient   



                                         use only"(Restricted to patients   



                                         requiring a dose >  60 units)   



                                         Stable for 28 days at room   



                                         temperatureExpires in ______ days   



                                         from ______________Date   

 

    



                 insulin regular 100     4 unit, 0.04 mL, Route: SUB-Q, Drug     10/12/2013      10/13/2013

                                         Discontinued



                           units/mL human            form: SOLN, TID-Before Meals, PRN   



                           recombinant               Blood Glucose Results, Start date:   



                                         10/12/13 20:10:00, Duration: 30   



                                         day, Stop date: 13   



                                         20:09:00(Same as: Humulin R) Roll   



                                         in palms of hands gently;  Do not   



                                         shake vigorously. "single patient   



                                         use only"(Restricted to patients   



                                         requiring a dose >  60 units)   



                                         Stable for 28 days at room   



                                         temperatureExpires in ______ days   



                                         from ______________Date   

 

    



                 insulin regular 100     3 unit, 0.03 mL, Route: SUB-Q, Drug     10/12/2013      10/13/2013

                                         Discontinued



                           units/mL human            form: SOLN, TID-Before Meals, PRN   



                           recombinant               Blood Glucose Results, Start date:   



                                         10/12/13 20:10:00, Duration: 30   



                                         day, Stop date: 13   



                                         20:09:00(Same as: Humulin R) Roll   



                                         in palms of hands gently;  Do not   



                                         shake vigorously. "single patient   



                                         use only"(Restricted to patients   



                                         requiring a dose >  60 units)   



                                         Stable for 28 days at room   



                                         temperatureExpires in ______ days   



                                         from ______________Date   

 

    



              furosemide     40 mg, 4 mL, Route: IVP, Drug form:     10/11/2013     10/13/2013     Discontinued





                                         INJ, Q8H, Dosing Weight 129.545,   



                                         kg, Priority: Routine, Start date:   



                                         10/11/13 23:00:00, Duration: 30   



                                         day, Stop date: 11/10/13   



                                         15:00:00(Same as: Lasix)   

 

    



                 insulin regular 100     2 unit, 0.02 mL, Route: SUB-Q, Drug     10/12/2013      10/13/2013

                                         Discontinued



                           units/mL human            form: SOLN, TID-Before Meals, PRN   



                           recombinant               Blood Glucose Results, Start date:   



                                         10/12/13 20:10:00, Duration: 30   



                                         day, Stop date: 13   



                                         20:09:00(Same as: Humulin R) Roll   



                                         in palms of hands gently;  Do not   



                                         shake vigorously. "single patient   



                                         use only"(Restricted to patients   



                                         requiring a dose >  60 units)   



                                         Stable for 28 days at room   



                                         temperatureExpires in ______ days   



                                         from ______________Date   

 

    



                 insulin regular 100     1 unit, 0.01 mL, Route: SUB-Q, Drug     10/12/2013      10/13/2013

                                         Discontinued



                           units/mL human            form: SOLN, TID-Before Meals, PRN   



                           recombinant               Blood Glucose Results, Start date:   



                                         10/12/13 20:10:00, Duration: 30   



                                         day, Stop date: 13   



                                         20:09:00(Same as: Humulin R) Roll   



                                         in palms of hands gently;  Do not   



                                         shake vigorously. "single patient   



                                         use only"(Restricted to patients   



                                         requiring a dose >  60 units)   



                                         Stable for 28 days at room   



                                         temperatureExpires in ______ days   



                                         from ______________Date   

 

    



                 furosemide 40 mg     40 mg, 1 tab, PO, Daily, 30 tab,     10/13/2013      Ordered



                           oral tablet               Substitution Allowed, TAB   

 

    



              aspirin      81 mg, 1 tab, Route: PO, Drug form:     10/12/2013     10/13/2013     Discontinued





                                         ECTAB, Daily, Dosing Weight   



                                         108.004, kg, Start date: 10/12/13   



                                         9:00:00, Duration: 30 day, Stop   



                                         date: 11/10/13 9:00:00Do not crush   



                                         or chew.(Same As: Ecotrin)   

 

    



              furosemide     40 mg, 4 mL, Route: IVP, Drug form:     10/11/2013     10/11/2013     Completed





                                         INJ, ONCE, Dosing Weight 129.545,   



                                         kg, Priority: STAT, Start date:   



                                         10/11/13 14:35:00, Stop date:   



                                         10/11/13 14:35:00(Same as: Lasix)   

 

    



              simethicone     40 mg, 0.5 tab, Route: PO, Drug     10/13/2013     10/13/2013     Discontinued





                                         form: CHEWTAB, Q6H, Dosing Weight   



                                         108.004, kg, PRN Gas, Start date:   



                                         10/13/13 13:23:00, Duration: 30   



                                         day, Stop date: 13   



                                         13:22:00(Same as: Mylicon)   

 

    



              hydromorphone     1 mg, 1 mL, Route: IVP, Drug form:     10/11/2013     10/11/2013     

Completed



                                         INJ, ONCE, Dosing Weight 129.545,   



                                         kg, Priority: STAT, Start date:   



                                         10/11/13 13:00:00, Stop date:   



                                         10/11/13 13:00:00Same as: Dilaudid   

 

    



                 influenza virus     0.5 ml, Route: IM, Drug Form: SUSP,     10/01/2013      10/01/2013

                                         Completed



                           vaccine, inactivated      Start date: 10/01/13 9:00:00, Stop   



                                         date: 10/01/13 9:00:00   

 

    



                 carvedilol 3.125 mg     3.125 mg, 1 tab, PO, Q12H, 60 tab,     10/13/2013      Ordered





                           oral tablet               Substitution Allowed, TAB   

 

    



                 Aspirin Low Dose 81     81 mg, 1 tab, PO, Daily, 30 tab,     10/13/2013      Ordered



                           mg oral tablet            Substitution Allowed, TAB   

 

    



                 Remeron 15 mg oral     15 mg, 1 tab, PO, Bedtime, 14 tab,     10/13/2013      Ordered





                           tablet                    Substitution Allowed, TAB   

 

    



              Dextrose 50% in     25 mL, Route: IVP, Start date:     10/12/2013     10/13/2013     Discontinued





                           Water IV                  10/12/13 20:10:00, Duration: 30   



                                         day, Stop date: 13 19:09:00,   



                                         PRN Blood Glucose Results   

 

    



              Dextrose 50% in     50 mL, Route: IVP, Start date:     10/12/2013     10/13/2013     Discontinued





                           Water IV                  10/12/13 20:09:00, Duration: 30   



                                         day, Stop date: 13 19:08:00,   



                                         PRN Blood Glucose Results   

 

    



                 Geodon 20 mg oral     20 mg, 1 cap, PO, Bedtime, with     10/13/2013      Ordered



                           capsule                   food, 14 cap, Substitution Allowed,   



                                         CAP   



                                         with food   

 

    



                 lisinopril 40 mg     40 mg, 1 tab, PO, Daily, 14 tab,     10/13/2013      Ordered



                           oral tablet               Substitution Allowed, TAB   

 

    



                 clonazepam 2 mg oral     2 mg, 1 tab, PO, Bedtime, 14 tab,     10/13/2013      Ordered





                           tablet                    Substitution Allowed, TAB   

 

    



                 Saline Flush 0.9%     5 mL, Route: IVP, Drug Form: INJ,     10/11/2013      10/13/2013

                                         Discontinued



                                         Dosing Weight 129.545, kg, Q8H, PRN   



                                         Line Flush, Start date: 10/11/13   



                                         13:00:00, Duration: 30 day, Stop   



                                         date: 11/10/13 12:59:00, Administer   



                                         at least once every 8 hours   



                                         Administer at least once every 8   



                                         hours(Same as: BD Posiflush)   

 

    



              hydrALAZINE     20 mg, 1 mL, Route: IV, Drug form:     10/11/2013     10/13/2013     Discontinued





                                         INJ, Q4H, PRN Elevated BP, Start   



                                         date: 10/11/13 19:42:00, Duration:   



                                         30 day, Stop date: 11/10/13   



                                         19:41:00(Same as: Apresoline)   

 

    



              Coreg        3.125 mg, 1 tab, Route: PO, Drug     10/13/2013     10/13/2013     Canceled



                                         form: TAB, Q12H, Dosing Weight   



                                         108.004, kg, Start date: 10/13/13   



                                         21:00:00, Duration: 30 day, Stop   



                                         date: 13 20:59:00Give with   



                                         food. (Same As: Coreg)   

 

    



                 BD Normal Saline     10 mL, Route: IV, Drug Form: INJ,     10/12/2013      10/13/2013 

                                         Discontinued



                           Flush                     Q8H, Start date: 10/12/13 0:00:00,   



                                         Duration: 30 day, Stop date:   



                                         11/10/13 16:00:00(Same as: BD   



                                         Posiflush)   

 

    



                 cyanocobalamin     5 microgram, PO, Daily,     10/11/2013      Ordered



                                         Substitution Allowed, TAB   







Immunizations







  



                     Vaccine             Date                Status

 

  



                     influenza virus vaccine, inactivated     10/01/2013          Auth (Verified)







Vital Signs







                Most recent to oldest [Reference Range]:    1               2               3

 

                          Height                    172.72 cm 

                          (10/11/2013 17:51:00)      172.72 cm 

                          (10/11/2013 12:38:00)       

 

                          Current Weight            97.005 kg 

                          (10/13/2013 00:00:00)      106.364 kg 

                          (10/12/2013 05:00:00)       

 

                          Temperature Oral [96.4-99.1 DegF]    98.0 DegF 

                          (10/13/2013 12:00:00)      98.1 DegF 

                          (10/13/2013 07:51:00)      97.9 DegF 

                                        (10/13/2013 04:00:00)  

 

                          Systolic Blood Pressure [ mmHg]    125 mmHg 

                          (10/13/2013 12:00:00)      125 mmHg 

                          (10/13/2013 07:51:00)      110 mmHg 

                                        (10/13/2013 04:00:00)  

 

                          Diastolic Blood Pressure [60-90 mmHg]    89 mmHg 

                          (10/13/2013 12:00:00)      91 mmHg 

*HI*

                          (10/13/2013 07:51:00)      81 mmHg 

                                        (10/13/2013 04:00:00)  

 

                          Respiratory Rate [14-20 BRMIN]    18 BRMIN 

                          (10/13/2013 12:00:00)      18 BRMIN 

                          (10/13/2013 07:51:00)      18 BRMIN 

                                        (10/13/2013 04:00:00)  

 

                          Peripheral Pulse Rate [ bpm]    68 bpm 

                          (10/13/2013 12:00:00)      81 bpm 

                          (10/13/2013 07:51:00)      70 bpm 

                                        (10/13/2013 04:00:00)  

 

                          Weight                    108.004 kg 

                          (10/11/2013 17:51:00)      129.545 kg 

                          (10/11/2013 12:38:00)       







Results





INFECTIOUS DISEASES





                Most recent to oldest [Reference Range]:    1               2               3

 

                          C difficile DNA [Negative]    Negative 1

                    (10/13/2013 07:00:00)                           







1Interpretive Data: [employer] Clostridium difficile assay utilizes 
loop-mediated isothermal DNA amplification (LAMP) technology to detect a 204 bp 
region of the tcdA gene within the PaLoc gene segment present in all known 
toxigenic C. difficile strains.



The assay utilizes FDA cleared IVD reagents. Performance characteristics have be
en verified by the [facility] within the [facility]. The [facility] is authorized under the Clinical Labo
ratory Improvement Amendment of 1988 (CLIA-88) to perform high complexity testin
g.



BEDSIDE GLUCOSE TESTING





                Most recent to  [Reference Range]:    1               2               3

 

                          Glucose POC [70-99 mg/dL]    132 mg/dL 2

*HI*

                          (10/13/2013 11:46:00)      93 mg/dL 3

                          (10/13/2013 06:24:00)      142 mg/dL 4

*HI*

                                        (10/12/2013 21:25:00)  

 

                          Gluc POC Comment 1        Notify RN/MD 

*NA*

                          (10/13/2013 11:46:00)      Notify RN/MD 

*NA*

                          (10/13/2013 06:24:00)      Notify RN/MD 

*NA*

                                        (10/12/2013 21:25:00)  







2Interpretive Data:  Upper Reportable Limit: 200 mg/dL.



3Interpretive Data:  Upper Reportable Limit: 200 mg/dL.



4Interpretive Data:  Upper Reportable Limit: 200 mg/dL.



CHEMISTRY





                Most recent to oldest [Reference Range]:    1               2               3

 

                          Sodium Lvl [135-145 mEq/L]    141 mEq/L 

                          (10/12/2013 05:29:00)      140 mEq/L 

                          (10/11/2013 13:05:00)       

 

                          Potassium Lvl [3.5-5.1 mEq/L]    3.6 mEq/L 

                          (10/12/2013 05:29:00)      4.2 mEq/L 

                          (10/11/2013 13:05:00)       

 

                          Chloride Lvl [ mEq/L]    100 mEq/L 

                          (10/12/2013 05:29:00)      104 mEq/L 

                          (10/11/2013 13:05:00)       

 

                          CO2 [24-32 mEq/L]         29 mEq/L 

                          (10/12/2013 05:29:00)      24 mEq/L 

                          (10/11/2013 13:05:00)       

 

                          AGAP [10.0-20.0 mEq/L]    15.6 mEq/L 

                          (10/12/2013 05:29:00)      16.2 mEq/L 

                          (10/11/2013 13:05:00)       

 

                          Creatinine Lvl [0.5-1.4 mg/dL]    1.2 mg/dL 

                          (10/12/2013 05:29:00)      1.3 mg/dL 

                          (10/11/2013 13:05:00)       

 

                          eGFR                      71 mL/min/1.73m2 5

*NA*

                          (10/12/2013 05:29:00)      65 mL/min/1.73m2 6

*NA*

                          (10/11/2013 13:05:00)       

 

                          BUN [7-22 mg/dL]          17 mg/dL 

                          (10/12/2013 05:29:00)      20 mg/dL 

                          (10/11/2013 13:05:00)       

 

                          B/C Ratio [6-25]          15 

                    (10/11/2013 13:05:00)                           

 

                          Glucose Lvl [70-99 mg/dL]    95 mg/dL 7

                          (10/12/2013 05:29:00)      115 mg/dL 8

*HI*

                          (10/11/2013 13:05:00)       

 

                          Total Protein [6.4-8.4 g/dL]    6.9 g/dL 

                    (10/11/2013 13:05:00)                           

 

                          Albumin Lvl [3.5-5.0 g/dL]    3.8 g/dL 

                    (10/11/2013 13:05:00)                           

 

                          Globulin [2.0-4.0 g/dL]    3.1 g/dL 

                    (10/11/2013 13:05:00)                           

 

                          A/G Ratio [0.7-1.6]       1.2 

                    (10/11/2013 13:05:00)                           

 

                          Calcium Lvl [8.5-10.5 mg/dL]    8.0 mg/dL 

*LOW*

                          (10/12/2013 05:29:00)      8.4 mg/dL 

*LOW*

                          (10/11/2013 13:05:00)       

 

                          Magnesium Lvl [1.8-2.4 mg/dL]    2.0 mg/dL 

                    (10/11/2013 13:05:00)                           

 

                          ALT [0-65 unit/L]         136 unit/L 

*HI*

                    (10/11/2013 13:05:00)                           

 

                          AST [0-37 unit/L]         153 unit/L 

*HI*

                    (10/11/2013 13::00)                           

 

                          Alk Phos [ unit/L]    126 unit/L 

                    (10/11/2013 13::00)                           

 

                          Bili Total [0.2-1.3 mg/dL]    1.7 mg/dL 

*HI*

                    (10/11/2013 13:05:00)                           

 

                          Total CK [ unit/L]    293 unit/L 

*HI*

                    (10/11/2013 13:05:00)                           

 

                          CK MB [0.5-3.6 ng/mL]     1.8 ng/mL 

                    (10/11/2013 13:05:00)                           

 

                          CK MB Index [0.0-2.5]     0.6 

                    (10/11/2013 13::00)                           

 

                          Troponin-I [0.00-0.40 ng/mL]    0.03 ng/mL 

                    (10/11/2013 13:05:00)                           

 

                          BNP [<=100 pg/mL]         2290 pg/mL 9

*HI*

                    (10/11/2013 13:05:00)                           

 

                          Digoxin Lvl [0.8-2.0 ng/mL]    <0.1 ng/mL 

*LOW*

                    (10/11/2013 13:05:00)                           







5Result Comment: The eGFR is calculated using the CKD-EPI formula. In most 
young, healthy individuals the eGFR will be >90 mL/min/1.73m2. The eGFR declines
with age. An eGFR of 60-89 may be normal in some populations, particularly the 
elderly, for whom the CKD-EPI formula has not been extensively validated. Use of
the eGFR is not recommended in the following populations:



Individuals with unstable creatinine concentrations, including pregnant patients
and those with serious co-morbid conditions.



Patients with extremes in muscle mass or diet. 



The data above are obtained from the National Kidney Disease Education Program (
NKDEP) which additionally recommends that when the eGFR is used in patients with
extremes of body mass index for purposes of drug dosing, the eGFR should be mul
tiplied by the estimated BMI.



6Result Comment: The eGFR is calculated using the CKD-EPI formula. In most 
young, healthy individuals the eGFR will be >90 mL/min/1.73m2. The eGFR declines
with age. An eGFR of 60-89 may be normal in some populations, particularly the 
elderly, for whom the CKD-EPI formula has not been extensively validated. Use of
the eGFR is not recommended in the following populations:



Individuals with unstable creatinine concentrations, including pregnant patients
and those with serious co-morbid conditions.



Patients with extremes in muscle mass or diet. 



The data above are obtained from the National Kid[employer] Disease Education Program (
NKDEP) which additionally recommends that when the eGFR is used in patients with
extremes of body mass index for purposes of drug dosing, the eGFR should be mul
tiplied by the estimated BMI.



7Interpretive Data: Adult reference range values reflect the clinical guidelines

of the American Diabetes Association.



8Interpretive Data: Adult reference range values reflect the clinical guidelines

of the American Diabetes Association.



9Interpretive Data: Elevated results are in line with increasing severity of 

congestive heart failure. Minor elevations between 100 and 300 

may be seen with Myocardial Ischemia, Sodium retaining drugs, 

and compensated/treated heart failure.



HEMATOLOGY





                Most recent to oldest [Reference Range]:    1               2               3

 

                          WBC [3.7-10.4 K/CMM]      7.9 K/CMM 

                          (10/12/2013 05:29:00)      8.8 K/CMM 

                          (10/11/2013 13:05:00)       

 

                          RBC [4.70-6.10 M/CMM]     4.61 M/CMM 

*LOW*

                          (10/12/2013 05:29:00)      5.04 M/CMM 

                          (10/11/2013 13:05:00)       

 

                          Hgb [14.0-18.0 g/dL]      13.8 g/dL 

*LOW*

                          (10/12/2013 05:29:00)      14.7 g/dL 

                          (10/11/2013 13:05:00)       

 

                          Hct [42.0-54.0 %]         41.8 % 

*LOW*

                          (10/12/2013 05:29:00)      45.5 % 

                          (10/11/2013 13:05:00)       

 

                          MCV [80.0-94.0 fL]        90.6 fL 

                          (10/12/2013 05:29:00)      90.3 fL 

                          (10/11/2013 13:05:00)       

 

                          MCH [27.0-31.0 pg]        30.0 pg 

                          (10/12/2013 05:29:00)      29.1 pg 

                          (10/11/2013 13:05:00)       

 

                          MCHC [32.0-36.0 g/dL]     33.1 g/dL 

                          (10/12/2013 05:29:00)      32.2 g/dL 

                          (10/11/2013 13:05:00)       

 

                          RDW [11.5-14.5 %]         15.4 % 

*HI*

                          (10/12/2013 05:29:00)      15.2 % 

*HI*

                          (10/11/2013 13:05:00)       

 

                          Platelet [133-450 K/CMM]    241 K/CMM 

                          (10/12/2013 05:29:00)      266 K/CMM 

                          (10/11/2013 13:05:00)       

 

                          MPV [7.4-10.4 fL]         8.8 fL 

                          (10/12/2013 05:29:00)      8.7 fL 

                          (10/11/2013 13:05:00)       

 

                          Segs [45.0-75.0 %]        73.8 % 

                          (10/12/2013 05:29:00)      81.9 % 

*HI*

                          (10/11/2013 13:05:00)       

 

                          Lymphocytes [20.0-40.0 %]    13.6 % 

*LOW*

                          (10/12/2013 05:29:00)      9.1 % 

*LOW*

                          (10/11/2013 13:05:00)       

 

                          Monocytes [2.0-12.0 %]    12.1 % 

*HI*

                          (10/12/2013 05:29:00)      8.8 % 

                          (10/11/2013 13:05:00)       

 

                          Eosinophils [0.0-4.0 %]    0.1 % 

                          (10/12/2013 05:29:00)      0.1 % 

                          (10/11/2013 13:05:00)       

 

                          Basophils [0.0-1.0 %]     0.4 % 

                          (10/12/2013 05:29:00)      0.1 % 

                          (10/11/2013 13:05:00)       

 

                          Segs-Bands # [1.5-8.1 K/CMM]    5.8 K/CMM 

                          (10/12/2013 05:29:00)      7.2 K/CMM 

                          (10/11/2013 13:05:00)       

 

                          Lymphocytes # [1.0-5.5 K/CMM]    1.1 K/CMM 

                          (10/12/2013 05:29:00)      0.8 K/CMM 

*LOW*

                          (10/11/2013 13:05:00)       

 

                          Monocytes # [0.0-0.8 K/CMM]    1.0 K/CMM 

*HI*

                          (10/12/2013 05:29:00)      0.8 K/CMM 

                          (10/11/2013 13:05:00)       

 

                          Eosinophils # [0.0-0.5 K/CMM]    0.0 K/CMM 

                          (10/12/2013 05:29:00)      0.0 K/CMM 

                          (10/11/2013 13:05:00)       

 

                          Basophils # [0.0-0.2 K/CMM]    0.0 K/CMM 

                    (10/12/2013 05:29:00)

## 2019-09-25 NOTE — DIAGNOSTIC IMAGING REPORT
EXAMINATION:  CHEST SINGLE (PORTABLE)    



INDICATION:      

^chest pain

^[id number]

^[id number]

^Y



COMPARISON:  None

     

FINDINGS:  AP view   



TUBES and LINES:  Single lead left chest wall cardiac device in place with

distal tip projecting over right ventricle. Retrocardiac hazy opacification.



LUNGS:  Lungs are well inflated.  Mild central vascular congestion.



PLEURA:  No significant pleural effusion or pneumothorax.



HEART AND MEDIASTINUM:  The cardiomediastinal silhouette is enlarged.    



BONES AND SOFT TISSUES:  No acute osseous lesion.  Soft tissues are

unremarkable.



UPPER ABDOMEN: No free air under the diaphragm.    



IMPRESSION: 

Enlarged cardiomediastinal silhouette and mild vascular congestion.

Retrocardiac hazy opacification, representing atelectasis and/or pneumonia in

the appropriate clinical context.



Signed by: Dr. Nakul Britton MD on 9/25/2019 12:44 AM

## 2019-09-25 NOTE — XMS REPORT
Encounter CCD: 2014 to 01/15/2014

                             Created on: 2063



JOANNA CABRERA

External Reference #: [id number]

: 1965

Sex: Male



Demographics







                          Address                   [address] [place] [address]

[place]  [id number]

 

                          Home Phone                [phone]

 

                          Preferred Language        Unknown

 

                          Marital Status            Single

 

                          Taoism Affiliation     Taoism

 

                          Race                      White/

 

                          Ethnic Group              Non-





Author







                          Author                    Auto Generated

 

                          Organization              [facility]

 

                          Address                   Unknown

 

                          Phone                     Unavailable







Care Team Providers







                    Care Team Member Name    Role                Phone

 

                    Eric Alejandro       CP                  [phone]







Allergies, Adverse Reactions, Alerts







  



                     Substance           Reaction            Status

 

  



                           NKDA                      Active

 

  



                           potassium chloride        Active

 

  



                           sodium chloride           Active







Problem List







  



                     Condition           Effective Dates     Status

 

  



                     [D]Anterior chest wall pain     2012          Active

 

  



                           Anxiety                   Resolved

 

  



                           BP+ - Hypertension        Active

 

  



                           Cardiomyopathy            Resolved

 

  



                           CHF - Congestive heart failure      Active

 

  



                           CHF - Congestive heart failure      Active

 

  



                           Depression                Active

 

  



                           dm                        Active

 

  



                           Down syndrome             Active

 

  



                           GERD - Gastro-esophageal reflux disease      Active

 

  



                           H/O: schizophrenia        Active

 

  



                           HTN                       Active

 

  



                           ICD (implantable cardiac defibrillator) battery depletion      Active

 

  



                           NIDDM                     Active

 

  



                           Obese build               Active

 

  



                           Short of breath on exertion      Active

 

  



                           Sleep apnea               Active







Medications







    



              Medication     Instructions     Start Date     End Date     Status

 

    



              clonazePAM     2 mg, 2 tab, Route: PO, Drug form:     01/10/2014     2014     Discontinued





                                         TAB, Bedtime, Dosing Weight 101.2,   



                                         kg, Start date: 01/10/14 21:00:00,   



                                         Duration: 30 day, Stop date:   



                                         14 21:00:00(Same As:   



                                         KlonoPIN)   

 

    



              carvedilol     6.25 mg, 1 tab, Route: PO, Drug     01/10/2014     01/15/2014     Discontinued





                                         form: TAB, Q12H, Dosing Weight   



                                         101.2, kg, Start date: 01/10/14   



                                         11:00:00, Duration: 30 day, Stop   



                                         date: 14 9:00:00Give with   



                                         food. (Same As: Coreg)   

 

    



              folic acid     1 mg, 1 tab, Route: PO, Drug form:     2014     01/15/2014     Discontinued





                                         TAB, Daily, Dosing Weight 101.2,   



                                         kg, Start date: 14 10:00:00,   



                                         Duration: 30 day, Stop date:   



                                         14 9:00:00(Same as: Folvite)   

 

    



              Ativan       2 mg, 2 tab, Route: PO, Drug form:     2014     01/10/2014     Discontinued





                                         TAB, Q6H, Dosing Weight 109.091,   



                                         kg, PRN Anxiety, Start date:   



                                         14 17:13:00, Duration: 30   



                                         day, Stop date: 14   



                                         17:12:00(Same as: Ativan)   

 

    



              enoxaparin     110 mg, 0.73 mL, Route: SUB-Q, Drug     2014     Completed





                                         form: INJ, ONCE, Dosing Weight   



                                         109.091, kg, Priority: STAT, Start   



                                         date: 14 13:29:00, Stop date:   



                                         14 13:29:00Nurse to ensure   



                                         documentation of patient education   



                                         per anticoagulation policy. (Same   



                                         as: Lovenox)   

 

    



              magnesium sulfate     2 gm, 50 mL, Route: IVPB, Drug     2014     

Completed



                                         form: INJ, ONCE, Dosing Weight   



                                         101.2, kg, Total dose=2 gm, Start   



                                         date: 14 15:28:00, Duration:   



                                         1 doses or times, Stop date:   



                                         14 15:28:00   

 

    



                 furosemide      60 mg, PO, Daily, 0 Refill(s)     2014      Ordered

 

    



              thiamine     100 mg, 1 tab, Route: PO, Drug     2014     01/15/2014     Discontinued





                                         form: TAB, Daily, Dosing Weight   



                                         101.2, kg, Start date: 14   



                                         10:00:00, Duration: 30 day, Stop   



                                         date: 14 9:00:00(Same As:   



                                         Vitamin B1)   

 

    



                 Advair Diskus 250     1 inhalation, Route: INHALER, Drug     01/10/2014      01/15/2014

                                         Discontinued



                           mcg-50 mcg                Form: AERO, Dosing Weight 101.2,   



                           inhalation powder         kg, BID, Start date: 01/10/14   



                                         17:00:00, Duration: 30 day, Stop   



                                         date: 14 9:00:00(Same as:   



                                         Advair)   

 

    



                 Saline Flush 0.9%     5 ml, Route: IVP, Drug Form: INJ,     2014

                                         Discontinued



                                         Dosing Weight 109.091, kg, Q12H,   



                                         Start date: 14 21:00:00,   



                                         Duration: 30 day, Stop date:   



                                         14 9:00:00(Same as: BD   



                                         Posiflush)   

 

    



                 Saline Flush 0.9%     5 ml, Route: IVP, Drug Form: INJ,     2014

                                         Discontinued



                                         Dosing Weight 109.091, kg, PRN, PRN   



                                         Line Flush, Start date: 14   



                                         16:59:00, Duration: 30 day, Stop   



                                         date: 14 16:58:00(Same as: BD   



                                         Posiflush)   

 

    



              albuterol 0.083%     2.49 mg, 3 mL, Route: NEB, Drug     2014     01/10/2014     

Discontinued



                           inhalation solution       form: SOLN, PRN, Dosing Weight   



                                         109.091, kg, PRN Respiratory   



                                         Protocol, Start date: 14   



                                         16:59:00, Duration: 30 day, Stop   



                                         date: 14 16:58:00SEE RT   



                                         DOCUMENTATION  (Same as: Proventil)   

 

    



              acetaminophen     650 mg, 2 tab, Route: PO, Drug     2014     01/10/2014     Discontinued





                                         form: TAB, Q4H, Dosing Weight   



                                         109.091, kg, PRN For Temp > 100.4   



                                         F, Start date: 14 16:59:00,   



                                         Duration: 30 day, Stop date:   



                                         14 16:58:00Do not exceed 4   



                                         gm/day.  (Same as: Tylenol)   

 

    



              pantoprazole     40 mg, 1 tab, Route: PO, Drug form:     2014     01/10/2014     

Discontinued



                                         ECTAB, Before Breakfast, Dosing   



                                         Weight 109.091, kg, Start date:   



                                         14 7:30:00, Duration: 30 day,   



                                         Stop date: 14 7:30:00Tablet   



                                         should not be chewed or   



                                         crushed.(Same as: Protonix)   

 

    



                 simvastatin 20 mg     20 mg=1 tab, PO, Bedtime, # 30 tab,     2014      Ordered





                           oral tablet               0 Refill(s)   

 

    



              glucagon     1 mg, Route: IV, Drug form:     2014     01/15/2014     Discontinued





                                         PDR/INJ, PRN, PRN Blood Glucose   



                                         Results, Start date: 14   



                                         13:04:00, Duration: 30 day, Stop   



                                         date: 14 13:03:00   

 

    



              Spiriva      18 microgram, 1 inhalation, Route:     2014     01/15/2014     Discontinued





                                         INHALATION, Drug form: CAP, Daily,   



                                         Dosing Weight 101.2, kg, Start   



                                         date: 14 9:00:00, Duration:   



                                         30 day, Stop date: 14   



                                         9:00:00(Same As: Spiriva)   

 

    



              Dextrose 50% in     50 mL, Route: IVP, Start date:     2014     01/15/2014     Discontinued





                           Water IV                  14 13:03:00, Duration: 30   



                                         day, Stop date: 14 13:02:00,   



                                         PRN Blood Glucose Results   

 

    



                 Geodon 20 mg oral     20 mg=1 cap, PO, QPM, 0 Refill(s)     2014      Ordered



                                         capsule    

 

    



                 NovoLog FlexPen     8 unit, 0.08 mL, Route: SUB-Q, Drug     2014      01/15/2014

                                         Discontinued



                                         form: SOLN, Sliding Scale, PRN   



                                         Blood Glucose Results, Start date:   



                                         14 13:03:00, Duration: 30   



                                         day, Stop date: 14   



                                         13:02:00Roll in palms of hands   



                                         gently;  Do not shake vigorously.   



                                         (Same as: NovoLog)"single patient   



                                         use only"  Stable for 28 days at   



                                         room temperature.Expires in _____   



                                         days from ______________Date   

 

    



                 NovoLog FlexPen     6 unit, 0.06 mL, Route: SUB-Q, Drug     2014      01/15/2014

                                         Discontinued



                                         form: SOLN, Sliding Scale, PRN   



                                         Blood Glucose Results, Start date:   



                                         14 13:03:00, Duration: 30   



                                         day, Stop date: 14   



                                         13:02:00Roll in palms of hands   



                                         gently;  Do not shake vigorously.   



                                         (Same as: NovoLog)"single patient   



                                         use only"  Stable for 28 days at   



                                         room temperature.Expires in _____   



                                         days from ______________Date   

 

    



                 NovoLog FlexPen     5 unit, 0.05 mL, Route: SUB-Q, Drug     2014      01/15/2014

                                         Discontinued



                                         form: SOLN, Sliding Scale, PRN   



                                         Blood Glucose Results, Start date:   



                                         14 13:03:00, Duration: 30   



                                         day, Stop date: 14   



                                         13:02:00Roll in palms of hands   



                                         gently;  Do not shake vigorously.   



                                         (Same as: NovoLog)"single patient   



                                         use only"  Stable for 28 days at   



                                         room temperature.Expires in _____   



                                         days from ______________Date   

 

    



              ZyPREXA Zydis     2.5 mg, 0.5 tab, Route: PO, Drug     2014     01/15/2014     Discontinued





                                         form: TABDIS, BID, Start date:   



                                         14 17:00:00, Duration: 30   



                                         day, Stop date: 02/10/14   



                                         9:00:00(Same as: ZyPREXA Zydis   



                                         ODT-Oral Disintegrating Tablet)   

 

    



                 ALPRAZOLam 1 mg oral     1 mg=1 tab, PO, Bedtime, for     2014      Ordered



                           tablet,                   anxiety, 0 Refill(s)   



                                         disintegrating    

 

    



                 NovoLog FlexPen     4 unit, 0.04 mL, Route: SUB-Q, Drug     2014      01/15/2014

                                         Discontinued



                                         form: SOLN, Sliding Scale, PRN   



                                         Blood Glucose Results, Start date:   



                                         14 13:02:00, Duration: 30   



                                         day, Stop date: 14   



                                         13:01:00Roll in palms of hands   



                                         gently;  Do not shake vigorously.   



                                         (Same as: NovoLog)"single patient   



                                         use only"  Stable for 28 days at   



                                         room temperature.Expires in _____   



                                         days from ______________Date   

 

    



                 Tylenol 325 mg oral     650 mg=2 tab, PO, Q4H, Fever, # 120     2014      Ordered





                           tablet                    tab, 0 Refill(s)   

 

    



              Xarelto      15 mg, 1 tab, Route: PO, Drug form:     2014     01/15/2014     Discontinued





                                         TAB, Q12H, Dosing Weight 99.545,   



                                         kg, Start date: 14 9:00:00,   



                                         Duration: 30 day, Stop date:   



                                         14 21:00:00(Same as:   



                                         Xarelto)Administer with food   

 

    



                 thiamine 100 mg oral     100 mg=1 tab, PO, Daily, # 30 tab,     01/15/2014      Ordered





                           tablet                    0 Refill(s)   

 

    



                 rivaroxaban 15 mg     15 mg=1 tab, PO, Q12H, # 60 tab, 0     01/15/2014      Ordered



                           tablet                    Refill(s)   

 

    



                 potassium chloride     20 mEq=1 tab, PO, BID, # 60 tab, 0     01/15/2014      Ordered





                           20 mEq oral tablet,       Refill(s)   



                                         extended release    

 

    



                 OLANZapine 5 mg oral     2.5 mg=0.5 tab, PO, BID, # 30 tab,     01/15/2014      Ordered





                           tablet,                   0 Refill(s)   



                                         disintegrating    

 

    



              aspirin      81 mg, 1 tab, Route: PO, Drug form:     2014     01/15/2014     Discontinued





                                         ECTAB, Daily, Dosing Weight   



                                         109.091, kg, Start date: 14   



                                         9:00:00, Duration: 30 day, Stop   



                                         date: 14 9:00:00Do not crush   



                                         or chew.(Same As: Ecotrin)   

 

    



                 metoprolol tartrate     25 mg, 1 tab, Route: PO, Drug form:     2014      01/15/2014

                                         Discontinued



                                         TAB, Q6H, Dosing Weight 109.091,   



                                         kg, Start date: 14 12:00:00,   



                                         Duration: 30 day, Stop date:   



                                         14 6:00:00(Same as:   



                                         Lopressor)   

 

    



                 nitroglycerin SL Tab     0.4 mg, 1 tab, Route: SL, Drug     2014      01/15/2014

                                         Discontinued



                                         form: TAB, Q5Min, Dosing Weight   



                                         109.091, kg, PRN Chest Pain, Start   



                                         date: 14 11:33:00, Duration:   



                                         3 doses or times, Stop date:   



                                         Limited # of times(Same   



                                         as:Nitroquick, Nitrostat)"Do Not   



                                         Crush"  Sublingual tablet   

 

    



                 Saline Flush 0.9%     5 ml, Route: IVP, Drug Form: INJ,     2014

                                         Discontinued



                                         Dosing Weight 109.091, kg, Q12H,   



                                         Start date: 14 21:00:00,   



                                         Duration: 30 day, Stop date:   



                                         14 9:00:00(Same as: BD   



                                         Posiflush)   

 

    



                 Saline Flush 0.9%     5 ml, Route: IVP, Drug Form: INJ,     2014

                                         Discontinued



                                         Dosing Weight 109.091, kg, PRN, PRN   



                                         Line Flush, Start date: 14   



                                         11:33:00, Duration: 30 day, Stop   



                                         date: 14 11:32:00(Same as: BD   



                                         Posiflush)   

 

    



                 losartan 50 mg oral     50 mg=1 tab, PO, Daily, # 30     01/15/2014      Ordered



                           tablet                    caplet, 0 Refill(s)   

 

    



              Geodon       20 mg, 1 cap, Route: PO, Drug form:     01/10/2014     2014     Discontinued





                                         CAP, QPM, Dosing Weight 101.2, kg,   



                                         Start date: 01/10/14 17:00:00,   



                                         Duration: 30 day, Stop date:   



                                         14 17:00:00(Same As:   



                                         Geodon)Give with Food   

 

    



              thiothixene     5 mg, 1 cap, Route: PO, Drug form:     01/10/2014     2014     Discontinued





                                         CAP, TID, Dosing Weight 101.2, kg,   



                                         Start date: 01/10/14 13:00:00,   



                                         Duration: 30 day, Stop date:   



                                         14 9:00:00(Same As: Navane)   

 

    



                 Advair Diskus 250     1 inhalation, INHALER, BID, # 28     01/15/2014      Ordered



                           mcg-50 mcg                ea, 0 Refill(s)   



                                         inhalation powder    

 

    



              glucagon     1 mg, Route: IM, Drug form:     2014     Discontinued





                                         PDR/INJ, PRN, Dosing Weight   



                                         109.091, kg, PRN Blood Glucose   



                                         Results, Start date: 14   



                                         17:58:00, Duration: 30 day, Stop   



                                         date: 14 17:57:00   

 

    



                 Dextrose 50% Syringe     12.5 gm, 25 mL, Route: IVP, Drug     2014

                                         Discontinued



                                         Form: INJ, Dosing Weight 109.091,   



                                         kg, PRN, PRN Blood Glucose Results,   



                                         Start date: 14 17:58:00,   



                                         Duration: 30 day, Stop date:   



                                         14 17:57:00   

 

    



                 Dextrose 50% Syringe     25 gm, 50 mL, Route: IVP, Drug     2014

                                         Discontinued



                                         Form: INJ, Dosing Weight 109.091,   



                                         kg, PRN, PRN Blood Glucose Results,   



                                         Start date: 14 17:58:00,   



                                         Duration: 30 day, Stop date:   



                                         14 17:57:00   

 

    



                 insulin aspart     2 unit, 0.02 mL, Route: SUB-Q, Drug     2014 

                                         Discontinued



                                         form: SOLN, TID-Before Meals,   



                                         Dosing Weight 109.091, kg, PRN   



                                         Blood Glucose Results, Start date:   



                                         14 17:58:00, Duration: 30   



                                         day, Stop date: 14   



                                         17:57:00Roll in palms of hands   



                                         gently;  Do not shake vigorously.   



                                         (Same as: NovoLog)"single patient   



                                         use only"  Stable for 28 days at   



                                         room temperature.Expires in _____   



                                         days from ______________Date   

 

    



                 insulin aspart     10 unit, 0.1 mL, Route: SUB-Q, Drug     2014 

                                         Discontinued



                                         form: SOLN, TID-Before Meals,   



                                         Dosing Weight 109.091, kg, PRN   



                                         Blood Glucose Results, Start date:   



                                         14 17:58:00, Duration: 30   



                                         day, Stop date: 14   



                                         17:57:00Roll in palms of hands   



                                         gently;  Do not shake vigorously.   



                                         (Same as: NovoLog)"single patient   



                                         use only"  Stable for 28 days at   



                                         room temperature.Expires in _____   



                                         days from ______________Date   

 

    



                 insulin aspart     4 unit, 0.04 mL, Route: SUB-Q, Drug     2014 

                                         Discontinued



                                         form: SOLN, TID-Before Meals,   



                                         Dosing Weight 109.091, kg, PRN   



                                         Blood Glucose Results, Start date:   



                                         14 17:58:00, Duration: 30   



                                         day, Stop date: 14   



                                         17:57:00Roll in palms of hands   



                                         gently;  Do not shake vigorously.   



                                         (Same as: NovoLog)"single patient   



                                         use only"  Stable for 28 days at   



                                         room temperature.Expires in _____   



                                         days from ______________Date   

 

    



                 insulin aspart     8 unit, 0.08 mL, Route: SUB-Q, Drug     2014 

                                         Discontinued



                                         form: SOLN, TID-Before Meals,   



                                         Dosing Weight 109.091, kg, PRN   



                                         Blood Glucose Results, Start date:   



                                         14 17:58:00, Duration: 30   



                                         day, Stop date: 14   



                                         17:57:00Roll in palms of hands   



                                         gently;  Do not shake vigorously.   



                                         (Same as: NovoLog)"single patient   



                                         use only"  Stable for 28 days at   



                                         room temperature.Expires in _____   



                                         days from ______________Date   

 

    



                 insulin aspart     6 unit, 0.06 mL, Route: SUB-Q, Drug     2014 

                                         Discontinued



                                         form: SOLN, TID-Before Meals,   



                                         Dosing Weight 109.091, kg, PRN   



                                         Blood Glucose Results, Start date:   



                                         14 17:58:00, Duration: 30   



                                         day, Stop date: 14   



                                         17:57:00Roll in palms of hands   



                                         gently;  Do not shake vigorously.   



                                         (Same as: NovoLog)"single patient   



                                         use only"  Stable for 28 days at   



                                         room temperature.Expires in _____   



                                         days from ______________Date   

 

    



                 Remeron 15 mg oral     15 mg=1 tab, PO, Bedtime, # 30 tab,     2014      01/15/2014

                                         Discontinued



                           tablet                    0 Refill(s)   

 

    



                 spironolactone     25 mg, 1 tab, Route: PO, Drug form:     01/10/2014      01/15/2014 

                                         Discontinued



                                         TAB, Daily, Dosing Weight 101.2,   



                                         kg, Start date: 01/10/14 11:00:00,   



                                         Duration: 30 day, Stop date:   



                                         14 9:00:00(Same As:   



                                         Aldactone)   

 

    



              simvastatin     20 mg, 1 tab, Route: PO, Drug form:     01/10/2014     01/15/2014     Discontinued





                                         TAB, Bedtime, Dosing Weight 101.2,   



                                         kg, Start date: 01/10/14 21:00:00,   



                                         Duration: 30 day, Stop date:   



                                         14 21:00:00(Same as: Zocor)   

 

    



                 carvedilol 6.25 mg     6.25 mg=1 tab, PO, Q12H, # 60 tab,     2014      Ordered





                           oral tablet               0 Refill(s)   

 

    



              Remeron      15 mg, 1 tab, Route: PO, Drug form:     01/10/2014     2014     Discontinued





                                         TAB, Bedtime, Dosing Weight 101.2,   



                                         kg, Start date: 01/10/14 21:00:00,   



                                         Duration: 30 day, Stop date:   



                                         14 21:00:00(Same as:Remeron)   

 

    



              furosemide     60 mg, 6 mL, Route: IVP, Drug form:     2014     Canceled





                                         INJ, BID, Dosing Weight 109.091,   



                                         kg, Start date: 14 17:00:00,   



                                         Duration: 30 day, Stop date:   



                                         14 9:00:00(Same as: Lasix)   

 

    



              Cozaar       50 mg, 1 tab, Route: PO, Drug form:     2014     01/15/2014     Discontinued





                                         TAB, Daily, Dosing Weight 109.091,   



                                         kg, Start date: 14 18:30:00,   



                                         Duration: 30 day, Stop date:   



                                         14 9:00:00(Same as: Cozaar)   

 

    



                 pantoprazole 40 mg     40 mg=1 tab, PO, Daily, # 30 tab, 0     2014      Ordered





                           oral enteric coated       Refill(s)   



                                         tablet    

 

    



              potassium chloride     20 mEq, 1 tab, Route: PO, Drug     2014     01/15/2014    

 Discontinued



                                         form: ERTAB, BID, Dosing Weight   



                                         109.091, kg, Start date: 14   



                                         18:30:00, Duration: 30 day, Stop   



                                         date: 14 17:00:00(Same as:   



                                         K-Dur 20)"Do Not Crush"  With food   



                                         and full glass of water   

 

    



              Lasix        40 mg, 4 mL, Route: IV, Drug form:     2014     01/15/2014     Discontinued





                                         INJ, BID, Dosing Weight 109.091,   



                                         kg, Start date: 14 18:30:00,   



                                         Duration: 30 day, Stop date:   



                                         14 17:00:00(Same as: Lasix)   

 

    



              metFORMIN 500 mg     500 mg, 1 tab, Route: PO, Drug     01/10/2014     01/15/2014     Discontinued





                           oral tablet               form: TAB, BID, Dosing Weight   



                                         101.2, kg, Start date: 01/10/14   



                                         17:00:00, Duration: 30 day, Stop   



                                         date: 14 9:00:00(Same as:   



                                         Glucophage)  Take with meal   

 

    



                 Sodium Chloride 0.9%     250 mL, Route: IVPB, Start date:     2014      01/15/2014

                                         Discontinued



                           IV                        14 18:06:00, Duration: 30   



                                         day, Stop date: 14 18:05:00,   



                                         PRN Line Flush   

 

    



                 BD Normal Saline     10 mL, Route: IVP, Drug Form: INJ,     2014      01/15/2014

                                         Discontinued



                           Flush                     PRN, PRN Line Flush, Start date:   



                                         14 18:06:00, Duration: 30   



                                         day, Stop date: 14   



                                         18:05:00(Same as: BD Posiflush)   

 

    



              Lovenox      110 mg, 0.73 mL, Route: SUB-Q, Drug     2014     Discontinued





                                         form: INJ, [id number], Dosing Weight   



                                         109.091, kg, Start date: 14   



                                         1:00:00, Duration: 30 day, Stop   



                                         date: 14 4:00:00Nurse to   



                                         ensure documentation of patient   



                                         education per anticoagulation   



                                         policy. (Same as: Lovenox)   

 

    



                 Saline Flush 0.9%     5 mL, Route: IVP, Drug Form: INJ,     2014

                                         Discontinued



                                         Dosing Weight 109.091, kg, Q8H, PRN   



                                         Line Flush, Start date: 14   



                                         6:18:00, Duration: 30 day, Stop   



                                         date: 14 6:17:00, Administer   



                                         at least once every 8 hours   



                                         Administer at least once every 8   



                                         hours(Same as: BD Posiflush)   

 

    



              aspirin      324 mg, Route: PO, ONCE, Dosing     2014     Completed





                                         Weight 109.091, kg, Priority: STAT,   



                                         Start date: 14 6:18:00, Stop   



                                         date: 14 6:18:00   

 

    



                 metFORMIN 500 mg     500 mg=1 tab, PO, BID, # 30 tab, 0     2014      Ordered



                           oral tablet               Refill(s)   

 

    



                 ALPRAZOLam 1 mg oral     1 mg=1 tab, PO, BID, for anxiety, 0     2014      Ordered





                           tablet,                   Refill(s)   



                                         disintegrating    

 

    



                 influenza virus     0.5 ml, Route: IM, Drug Form: SUSP,     10/01/2013      10/01/2013

                                         Completed



                           vaccine, inactivated      Start date: 10/01/13 9:00:00, Stop   



                                         date: 10/01/13 9:00:00   

 

    



              hydrALAZINE     10 mg, 0.5 mL, Route: IV, Drug     2014     Completed





                                         form: INJ, ONCE, Start date:   



                                         14 5:32:00, Stop date:   



                                         14 5:32:00(Same as:   



                                         Apresoline)Push over 5 minutes   

 

    



              Ativan       2 mg, Route: IVP, Drug form: INJ,     2014     Completed





                                         ONCE, Dosing Weight 109.091, kg,   



                                         Priority: STAT, Start date:   



                                         14 9:52:00, Stop date:   



                                         14 9:52:00   

 

    



              potassium chloride     20 mEq, 15 mL, Route: NJ, Drug     2014     01/10/2014    

 Discontinued



                                         form: LIQ, PRN, Dosing Weight   



                                         101.2, kg, PRN Abnormal Lab Result,   



                                         Start date: 14 9:40:00,   



                                         Duration: 30 day, Stop date:   



                                         14 9:39:00, FOR ICU USE ONLY   



                                         FOR ICU USE ONLY(Same as: Potassium   



                                         Chloride)   

 

    



                 sodium phosphate +     30 mmol, 10 mL, Route: IVPB, PRN,     2014      01/10/2014

                                         Discontinued



                           Sodium Chloride 0.9%      Dosing Weight 101.2, kg, PRN   



                            mL                 Abnormal Lab Result, Start date:   



                                         14 9:40:00, Duration: 30 day,   



                                         Stop date: 14 9:39:00, FOR   



                                         ICU USE ONLY   



                                         FOR ICU USE ONLY   

 

    



                 sodium phosphate +     45 mmol, 15 mL, Route: IVPB, Drug     2014      01/10/2014

                                         Discontinued



                           Sodium Chloride 0.9%      form: INJ, PRN, Dosing Weight   



                            mL                 101.2, kg, PRN Abnormal Lab Result,   



                                         Start date: 14 9:40:00,   



                                         Duration: 30 day, Stop date:   



                                         14 9:39:00, FOR ICU USE ONLY   



                                         FOR ICU USE ONLY   

 

    



                 potassium phosphate     15 mmol, 5 mL, Route: IVPB, PRN,     2014      01/10/2014

                                         Discontinued



                           + Sodium Chloride         Dosing Weight 101.2, kg, PRN   



                           0.9%  mL            Abnormal Lab Result, Start date:   



                                         14 9:40:00, Duration: 30 day,   



                                         Stop date: 14 9:39:00, FOR   



                                         ICU USE ONLY   



                                         FOR ICU USE ONLY(Same as: K   



                                         Phosphate.)  1 mMol phoshate has   



                                         1.47 mEq potassium  Infuse over 4   



                                         hours   

 

    



                 potassium phosphate     30 mmol, 10 mL, Route: IVPB, PRN,     2014      01/10/2014

                                         Discontinued



                           + Sodium Chloride         Dosing Weight 101.2, kg, PRN   



                           0.9%  mL            Abnormal Lab Result, Start date:   



                                         14 9:40:00, Duration: 30 day,   



                                         Stop date: 14 9:39:00, FOR   



                                         ICU USE ONLY   



                                         FOR ICU USE ONLY(Same as: K   



                                         Phosphate.)  1 mMol phoshate has   



                                         1.47 mEq potassium  Infuse over 4   



                                         hours   

 

    



                 potassium phosphate     45 mmol, 15 mL, Route: IVPB, Drug     2014      01/10/2014

                                         Discontinued



                           + Sodium Chloride         form: INJ, PRN, Dosing Weight   



                           0.9%  mL            101.2, kg, PRN Abnormal Lab Result,   



                                         Start date: 14 9:40:00, Stop   



                                         date: 14 9:39:00, FOR ICU USE   



                                         ONLY   



                                         FOR ICU USE ONLY(Same as: K   



                                         Phosphate.)  1 mMol phoshate has   



                                         1.47 mEq potassium  Infuse over 4   



                                         hours   

 

    



              magnesium sulfate     2 gm, 50 mL, Route: IVPB, Drug     2014     01/10/2014     

Discontinued



                                         form: INJ, PRN, Dosing Weight   



                                         101.2, kg, PRN Abnormal Lab Result,   



                                         Start date: 14 9:40:00,   



                                         Duration: 30 day, Stop date:   



                                         14 9:39:00, FOR ICU USE ONLY   



                                         FOR ICU USE ONLY   

 

    



                 sodium phosphate +     15 mmol, 5 mL, Route: IVPB, PRN,     2014      01/10/2014

                                         Discontinued



                           Sodium Chloride 0.9%      Dosing Weight 101.2, kg, PRN   



                            mL                 Abnormal Lab Result, Start date:   



                                         14 9:40:00, Duration: 30 day,   



                                         Stop date: 14 9:39:00, FOR   



                                         ICU USE ONLY   



                                         FOR ICU USE ONLY   

 

    



              calcium gluconate     1 gm, 50 mL, Route: IVPB, Drug     2014     01/10/2014     

Discontinued



                                         form: INJ, PRN, Dosing Weight   



                                         101.2, kg, PRN Abnormal Lab Result,   



                                         Start date: 14 9:40:00,   



                                         Duration: 30 day, Stop date:   



                                         14 9:39:00, FOR ICU USE ONLY   



                                         FOR ICU USE ONLY   

 

    



              enoxaparin     110 mg, 0.73 mL, Route: SUB-Q, Drug     2014     Discontinued





                                         form: INJ, [id number], Dosing Weight   



                                         109.091, kg, Start date: 14   



                                         17:00:00, Duration: 30 day, Stop   



                                         date: 14 5:00:00Nurse to   



                                         ensure documentation of patient   



                                         education per anticoagulation   



                                         policy. (Same as: Lovenox)   

 

    



              Colace 100 mg oral     100 mg, 1 cap, Route: PO, Drug     2014     01/15/2014    

 Discontinued



                           capsule                   form: CAP, BID, Dosing Weight   



                                         101.2, kg, Start date: 14   



                                         10:00:00, Duration: 30 day, Stop   



                                         date: 14 9:00:00(Same as:   



                                         Colace) (Do Not Crush)   

 

    



                 aspirin 81 mg     81 mg=1 tab, PO, Daily, # 0 tab, 0     2014      Ordered



                           tablet, enteric           Refill(s)   



                                         coated    

 

    



              Xanax        1 mg, 1 tab, Route: PO, Drug form:     2014     01/15/2014     Discontinued





                                         TAB, Q6H, PRN Anxiety, Start date:   



                                         14 16:24:00, Duration: 30   



                                         day, Stop date: 02/10/14   



                                         16:23:00With food or milk(Same as:   



                                         Xanax)   

 

    



                 lisinopril 40 mg     40 mg=1 tab, PO, Daily, # 30 tab, 0     2014      Ordered



                           oral tablet               Refill(s)   

 

    



                 thiothixene     5 mg, PO, TID, 0 Refill(s)     2014      Ordered

 

    



              Navane       10 mg, 2 cap, Route: PO, Drug form:     2014     01/15/2014     Discontinued





                                         CAP, Bedtime, Start date: 14   



                                         21:00:00, Duration: 30 day, Stop   



                                         date: 14 21:00:00(Same As:   



                                         Navane)   

 

    



                 morphine Sulfate     2 mg, 1 mL, Route: IV, Drug form:     2014 

                                         Completed



                                         INJ, ONCE, Dosing Weight 101.2, kg,   



                                         Priority: NOW, Start date: 14   



                                         20:47:00, Stop date: 14   



                                         20:47:00(Same as:MORPhine Sulfate)   

 

    



                 spironolactone 25 mg     =25 mg, PO, Daily, 0 Refill(s)     2014      Ordered



                                         oral tablet    

 

    



                 clonazePAM 2 mg oral     =2 mg, Bedtime, 0 Refill(s)     2014      Ordered



                                         tablet    

 

    



              lisinopril     40 mg, 2 tab, Route: PO, Drug form:     2014     01/15/2014     Discontinued





                                         TAB, Daily, Dosing Weight 101.2,   



                                         kg, Start date: 14 9:00:00,   



                                         Duration: 30 day, Stop date:   



                                         14 9:00:00(Same as: Prinivil,   



                                         Zestril)   

 

    



              Navane       5 mg, 1 cap, Route: PO, Drug form:     2014     01/15/2014     Discontinued





                                         CAP, BID, Dosing Weight 101.2, kg,   



                                         Start date: 14 9:00:00,   



                                         Duration: 30 day, Stop date:   



                                         02/10/14 17:00:00(Same As: Navane)   

 

    



                 Robitussin 100 mg/5     200 mg, 10 mL, Route: PO, Drug     2014      01/15/2014 

                                         Discontinued



                           mL oral liquid            form: SYRP, Q4H, Dosing Weight   



                                         101.2, kg, PRN Cough, Start date:   



                                         14 14:37:00, Duration: 30   



                                         day, Stop date: 14   



                                         14:36:00(Same as: Robitussin)   

 

    



              ALPRAZOLam     1 mg, 1 tab, Route: PO, Drug form:     01/10/2014     2014     Discontinued





                                         TAB, BID, Dosing Weight 101.2, kg,   



                                         PRN as needed for anxiety, Start   



                                         date: 01/10/14 9:51:00, Duration:   



                                         30 day, Stop date: 14   



                                         9:50:00With food or milk(Same as:   



                                         Xanax)   

 

    



              ZyPREXA Zydis     5 mg, 1 tab, Route: PO, Drug form:     2014     01/15/2014     

Discontinued



                                         TABDIS, Bedtime, Start date:   



                                         14 21:00:00, Duration: 30   



                                         day, Stop date: 14   



                                         21:00:00(Same as: ZyPREXA Zydis   



                                         ODT-Oral Disintegrating Tablet)   







Immunizations







  



                     Vaccine             Date                Status

 

  



                     influenza virus vaccine, inactivated     10/01/2013          Auth (Verified)







Vital Signs







                Most recent to oldest [Reference Range]:    1               2               3

 

                          Height                    172.72 cm 

                          (2014 18:03:00)      180.34 cm 

                          (2014 05:32:00)       

 

                          Current Weight            96.903 kg 

                          (01/15/2014 05:01:00)      98.002 kg 

                          (01/10/2014 06:00:00)      104.007 kg 

                                        (2014 04:00:00)  

 

                          Temperature Oral [96.4-99.1 DegF]    97.8 DegF 

                          (01/15/2014 11:00:00)      97.4 DegF 

                          (01/15/2014 08:11:00)      98.3 DegF 

                                        (01/15/2014 05:01:00)  

 

                          Systolic Blood Pressure [ mmHg]    117 mmHg 

                          (01/15/2014 11:00:00)      108 mmHg 

                          (01/15/2014 09:01:00)      92 mmHg 

                                        (01/15/2014 08:11:00)  

 

                          Diastolic Blood Pressure [60-90 mmHg]    81 mmHg 

                          (01/15/2014 11:00:00)      74 mmHg 

                          (01/15/2014 09:01:00)      60 mmHg 

                                        (01/15/2014 08:11:00)  

 

                          Respiratory Rate [14-20 BRMIN]    20 BRMIN 

                          (01/15/2014 11:00:00)      20 BRMIN 

                          (01/15/2014 08:11:00)      20 BRMIN 

                                        (01/15/2014 05:01:00)  

 

                          Peripheral Pulse Rate [ bpm]    72 bpm 

                          (01/15/2014 11:00:00)      69 bpm 

                          (01/15/2014 08:11:00)      76 bpm 

                                        (01/15/2014 05:01:00)  

 

                          Weight                    98.295 kg 

                          (2014 05:15:00)      99.545 kg 

                          (2014 05:43:00)      101.2 kg 

                                        (2014 18:03:00)  







Results





BACTERIAL - SEROLOGY





                Most recent to oldest [Reference Range]:    1               2               3

 

                          MRSA by PCR               Negative 1

                    (2014 18:50:51)                           







1Interpretive Data: Interpretive Data: The Roche LightCycler MRSA assay is a 
qualitative test for the direct detection of nasal colonization with 
methicillin-resistant Staphylococcus aureus (MRSA) to aid in the prevention and 
control of MRSA infections in healthcare settings. A positive result does not 
indicate an infection or require treatment. A negative result does not exclude 
colonization or infection.



The polymerase chain reaction (PCR) assay detects a proprietary sequence indicat
emilie of the integration of the SCCmec cassette into the Staphylococcus aureus chr
omosome, indicating the presence of MRSA DNA.  The assay utilizes FDA cleared IV
D reagents. Performance characteristics have been verified by the [facility] within the [facility]. The [email or web address]atory is authorized under the Clinical Laboratory Improvement Amendment of 1
988 (CLIA-88) to perform high complexity testing.



BEDSIDE GLUCOSE TESTING





                Most recent to  [Reference Range]:    1               2               3

 

                          Glucose POC [70-99 mg/dL]    175 mg/dL 2

*HI*

                          (01/15/2014 10:11:00)      141 mg/dL 3

*HI*

                          (01/15/2014 07:52:00)      146 mg/dL 4

*HI*

                                        (2014 20:45:00)  

 

                          Gluc POC Comment 1        Notified RN/MD 

*NA*

                          (2014 20:45:00)      Notified RN/MD 

*NA*

                          (2014 17:37:00)      Notified RN/MD 

*NA*

                                        (2014 11:51:00)  







2Interpretive Data:  Upper Reportable Limit: 200 mg/dL.



3Interpretive Data:  Upper Reportable Limit: 200 mg/dL.



4Interpretive Data:  Upper Reportable Limit: 200 mg/dL.



URINALYSIS





                Most recent to oldest [Reference Range]:    1               2               3

 

                          UA Turbidity [Clear]      Clear 

                          (2014 12:50:32)      Clear 

                          (2014 07:25:00)       

 

                          UA Color [Yellow]         Yellow 

*NA*

                          (2014 12:50:32)      Yellow 

*NA*

                          (2014 07:25:00)       

 

                          UA pH [5.0-8.0]           7.5 

                    (2014 12:50:32)                           

 

                          UA pH [5.0-8.0]           6.0 

                    (2014 07:25:00)                           

 

                          UA Spec Grav [<=1.030]    1.008 

                          (2014 12:50:32)      >=1.030 

*ABN*

                          (2014 07:25:00)       

 

                          UA Glucose [Negative mg/dL]    Negative mg/dL 

*NA*

                    (2014 12:50:32)                           

 

                          UA Glucose [Negative]     Negative 

                    (2014 07:25:00)                           

 

                          UA Blood [Negative]       Negative 

                          (2014 12:50:32)      Negative 

                          (2014 07:25:00)       

 

                          UA Ketones [Negative mg/dL]    Negative mg/dL 

*NA*

                    (2014 12:50:32)                           

 

                          UA Ketones [Negative]     Negative 

*NA*

                    (2014 07:25:00)                           

 

                          UA Protein [Negative mg/dL]    10 mg/dL 

*ABN*

                          (2014 12:50:32)      100 mg/dL 

*ABN*

                          (2014 07:25:00)       

 

                          UA Urobilinogen [0.1-1.0 mg/dL]    2.0 mg/dL 

*HI*

                    (2014 12:50:32)                           

 

                          UA Urobilinogen [0.1-1.0 EU/dL]    1.0 EU/dL 

                    (2014 07:25:00)                           

 

                          UA Bili [Negative]        Negative 

*NA*

                          (2014 12:50:32)      Moderate 

*ABN*

                          (2014 07:25:00)       

 

                          UA Leuk Est [Negative]    Negative 

                          (2014 12:50:32)      Negative 

                          (2014 07:25:00)       

 

                          UA Nitrite [Negative]     Negative 

                          (2014 12:50:32)      Negative 

                          (2014 07:25:00)       

 

                          UA WBC [0-5 /HPF]         <1 /HPF 

                    (2014 12:50:32)                           

 

                          UA WBC [None Seen /HPF]    0-2 /HPF 

                    (2014 07:25:00)                           

 

                          UA RBC [0-2 /HPF]         8 /HPF 

*HI*

                          (2014 12:50:32)      0-2 /HPF 

                          (2014 07:25:00)       

 

                          UA Bacteria [None Seen /HPF]    Occasional /HPF 

                    (2014 07:25:00)                           

 

                          UA Sq Epi [Few /LPF]      Occasional /LPF 

*NA*

                          (2014 12:50:32)      Occasional /LPF 

                          (2014 07:25:00)       

 

                          UA Mucus [None Seen /LPF]    Few /LPF 

*NA*

                          (2014 12:50:32)      Moderate /LPF 

*ABN*

                          (2014 07:25:00)       

 

                          Micro?                    Performed 

*NA*

                          (2014 12:50:32)      Performed 

                          (2014 07:25:00)       







CHEMISTRY





                Most recent to oldest [Reference Range]:    1               2               3

 

                          Sodium Lvl [135-145 mEq/L]    142 mEq/L 

                          (2014 05:00:00)      138 mEq/L 

                          (2014 04:30:00)      140 mEq/L 

                                        (2014 05:10:00)  

 

                          Potassium Lvl [3.5-5.1 mEq/L]    3.7 mEq/L 

                          (2014 05:00:00)      4.2 mEq/L 

                          (2014 04:30:00)      3.8 mEq/L 

                                        (2014 05:10:00)  

 

                          Chloride Lvl [ mEq/L]    104 mEq/L 

                          (2014 05:00:00)      99 mEq/L 

                          (2014 04:30:00)      100 mEq/L 

                                        (2014 05:10:00)  

 

                          CO2 [24-32 mEq/L]         30 mEq/L 

                          (2014 05:00:00)      28 mEq/L 

                          (2014 04:30:00)      29 mEq/L 

                                        (2014 05:10:00)  

 

                          AGAP [10.0-20.0 mEq/L]    11.7 mEq/L 

                          (2014 05:00:00)      15.2 mEq/L 

                          (2014 04:30:00)      14.8 mEq/L 

                                        (2014 05:10:00)  

 

                          Creatinine Lvl [0.5-1.4 mg/dL]    1.2 mg/dL 

                          (2014 05:00:00)      1.4 mg/dL 

                          (2014 04:30:00)      1.1 mg/dL 

                                        (2014 05:10:00)  

 

                          eGFR                      73 mL/min/1.73m2 5

*NA*

                          (2014 05:00:00)      59 mL/min/1.73m2 6

*NA*

                          (2014 04:30:00)      79 mL/min/1.73m2 7

*NA*

                                        (2014 05:10:00)  

 

                          BUN [7-22 mg/dL]          21 mg/dL 

                          (2014 05:00:00)      25 mg/dL 

*HI*

                          (2014 04:30:00)      19 mg/dL 

                                        (2014 05:10:00)  

 

                          B/C Ratio [6-25]          16 

                    (2014 06:57:00)                           

 

                          Glucose Lvl [70-99 mg/dL]    115 mg/dL 8

*HI*

                          (2014 05:00:00)      133 mg/dL 9

*HI*

                          (2014 04:30:00)      71 mg/dL 10

                                        (2014 05:10:00)  

 

                          Total Protein [6.4-8.4 g/dL]    5.9 g/dL 

*LOW*

                          (2014 05:00:00)      6.1 g/dL 

*LOW*

                          (2014 04:30:00)      6.0 g/dL 

*LOW*

                                        (2014 05:10:00)  

 

                          Albumin Lvl [3.5-5.0 g/dL]    3.2 g/dL 

*LOW*

                          (2014 05:00:00)      3.4 g/dL 

*LOW*

                          (2014 04:30:00)      3.4 g/dL 

*LOW*

                                        (2014 05:10:00)  

 

                          Globulin [2.0-4.0 g/dL]    2.7 g/dL 

                          (2014 05:00:00)      2.7 g/dL 

                          (2014 04:30:00)      2.6 g/dL 

                                        (2014 05:10:00)  

 

                          A/G Ratio [0.7-1.6]       1.2 

                          (2014 05:00:00)      1.3 

                          (2014 04:30:00)      1.3 

                                        (2014 05:10:00)  

 

                          Calcium Lvl [8.5-10.5 mg/dL]    7.8 mg/dL 

*LOW*

                          (2014 05:00:00)      8.3 mg/dL 

*LOW*

                          (2014 04:30:00)      8.6 mg/dL 

                                        (2014 05:10:00)  

 

                          Phosphorus [2.5-4.5 mg/dL]    3.5 mg/dL 

                          (2014 05:00:00)      4.0 mg/dL 

                          (2014 04:30:00)      4.8 mg/dL 

*HI*

                                        (2014 05:10:00)  

 

                          Magnesium Lvl [1.8-2.4 mg/dL]    2.0 mg/dL 

                          (2014 05:00:00)      1.7 mg/dL 

*LOW*

                          (2014 04:30:00)      1.9 mg/dL 

                                        (2014 05:10:00)  

 

                          ALT [0-65 unit/L]         27 unit/L 

                          (2014 05:00:00)      30 unit/L 

                          (2014 04:30:00)      31 unit/L 

                                        (2014 05:10:00)  

 

                          AST [0-37 unit/L]         23 unit/L 

                          (2014 05:00:00)      32 unit/L 

                          (2014 04:30:00)      38 unit/L 

*HI*

                                        (2014 05:10:00)  

 

                          Alk Phos [ unit/L]    140 unit/L 

*HI*

                          (2014 05:00:00)      149 unit/L 

*HI*

                          (2014 04:30:00)      148 unit/L 

*HI*

                                        (2014 05:10:00)  

 

                          Bili Total [0.2-1.3 mg/dL]    1.4 mg/dL 

*HI*

                          (2014 05:00:00)      2.5 mg/dL 

*HI*

                          (2014 04:30:00)      2.5 mg/dL 

*HI*

                                        (2014 05:10:00)  

 

                          Bili Direct [0.0-0.3 mg/dL]    0.7 mg/dL 

*HI*

                          (2014 05:00:00)      1.3 mg/dL 

*HI*

                          (2014 04:30:00)      1.3 mg/dL 

*HI*

                                        (2014 05:10:00)  

 

                          Bili Indirect [0.0-1.0 mg/dL]    0.7 mg/dL 

                          (2014 05:00:00)      1.2 mg/dL 

*HI*

                          (2014 04:30:00)      1.2 mg/dL 

*HI*

                                        (2014 05:10:00)  

 

                          Total CK [ unit/L]    396 unit/L 

*HI*

                          (2014 04:15:00)      491 unit/L 

*HI*

                          (2014 18:50:00)      447 unit/L 

*HI*

                                        (2014 13:10:00)  

 

                          CK MB [0.5-3.6 ng/mL]     1.4 ng/mL 

                          (2014 04:15:00)      1.6 ng/mL 

                          (2014 18:50:00)      1.6 ng/mL 

                                        (2014 13:10:00)  

 

                          CK MB Index [0.0-2.5]     0.4 

                          (2014 04:15:00)      0.3 

                          (2014 18:50:00)      0.4 

                                        (2014 13:10:00)  

 

                          Troponin-I [0.00-0.40 ng/mL]    0.05 ng/mL 

                          (2014 04:15:00)      0.04 ng/mL 

                          (2014 18:50:00)      0.04 ng/mL 

                                        (2014 13:10:00)  

 

                          BNP [<=100 pg/mL]         2033 pg/mL 11

*HI*

                    (2014 06:57:00)                           

 

                          CHD Risk [4.00-7.30]      6.69 

                          (2014 04:15:00)      6.40 

                          (2014 13:10:00)       

 

                          Chol [<=199 mg/dL]        87 mg/dL 

                          (2014 04:15:00)      96 mg/dL 

                          (2014 13:10:00)       

 

                          Trig [<=149 mg/dL]        70 mg/dL 

                          (2014:15:00)      76 mg/dL 

                          (2014 13:10:00)       

 

                          HDL [>=61 mg/dL]          13 mg/dL 

*LOW*

                          (2014:15:00)      15 mg/dL 

*LOW*

                          (2014 13:10:00)       

 

                          LDL (Calculated) [<=99 mg/dL]    60 mg/dL 

                          (2014 04:15:00)      66 mg/dL 

                          (2014 13:10:00)       

 

                          Hgb A1C [<=5.6 %]         8.6 % 

*HI*

                    (2014 18:50:00)                           

 

                          TSH [0.360-3.740 uIU/mL]    2.300 uIU/mL 

                    (2014 04:15:00)                           

 

                          Ca Ion WB [1.05-1.25 mMol/L]    1.00 mMol/L 

*LOW*

                          (2014 05:00:00)      1.01 mMol/L 

*LOW*

                          (2014 04:30:00)      1.03 mMol/L 

*LOW*

                                        (2014 05:10:00)  

 

                          Ca Norm WB [1.05-1.25 mMol/L]    1.02 mMol/L 

*LOW*

                          (2014 05:00:00)      1.00 mMol/L 

*LOW*

                          (2014 04:30:00)      1.00 mMol/L 

*LOW*

                                        (2014 05:10:00)  

 

                          U Amph Scr [Negative]     Negative 

*NA*

                    (2014 07:25:00)                           

 

                          U Annie Scr [Negative]     Negative 

*NA*

                    (2014 07:25:00)                           

 

                          U Benzodia Scr [Negative]    Positive 

*ABN*

                    (2014 07:25:00)                           

 

                          U Cocaine Scr [Negative]    Negative 

*NA*

                    (2014 07:25:00)                           

 

                          U Opiate Scr [Negative]    Negative 

*NA*

                    (2014 07:25:00)                           

 

                          U Phencyc Scr [Negative]    Negative 

*NA*

                    (2014 07:25:00)                           

 

                          U Cannab Scr [Negative]    Negative 

*NA*

                    (2014 07:25:00)                           

 

                          UDS Note                  See Note 12

                    (2014 07:25:00)                           







5Result Comment: The eGFR is calculated using the CKD-EPI formula. In most 
young, healthy individuals the eGFR will be >90 mL/min/1.73m2. The eGFR declines
 with age. An eGFR of 60-89 may be normal in some populations, particularly the 
elderly, for whom the CKD-EPI formula has not been extensively validated. Use of
 the eGFR is not recommended in the following populations:



Individuals with unstable creatinine concentrations, including pregnant patients
 and those with serious co-morbid conditions.



Patients with extremes in muscle mass or diet. 



The data above are obtained from the National Kidney Disease Education Program (
NKDEP) which additionally recommends that when the eGFR is used in patients with
 extremes of body mass index for purposes of drug dosing, the eGFR should be mul
tiplied by the estimated BMI.



6Result Comment: The eGFR is calculated using the CKD-EPI formula. In most 
young, healthy individuals the eGFR will be >90 mL/min/1.73m2. The eGFR declines
 with age. An eGFR of 60-89 may be normal in some populations, particularly the 
elderly, for whom the CKD-EPI formula has not been extensively validated. Use of
 the eGFR is not recommended in the following populations:



Individuals with unstable creatinine concentrations, including pregnant patients
 and those with serious co-morbid conditions.



Patients with extremes in muscle mass or diet. 



The data above are obtained from the National Kidney Disease Education Program (
NKDEP) which additionally recommends that when the eGFR is used in patients with
 extremes of body mass index for purposes of drug dosing, the eGFR should be mul
tiplied by the estimated BMI.



7Result Comment: The eGFR is calculated using the CKD-EPI formula. In most 
young, healthy individuals the eGFR will be >90 mL/min/1.73m2. The eGFR declines
 with age. An eGFR of 60-89 may be normal in some populations, particularly the 
elderly, for whom the CKD-EPI formula has not been extensively validated. Use of
 the eGFR is not recommended in the following populations:



Individuals with unstable creatinine concentrations, including pregnant patients
 and those with serious co-morbid conditions.



Patients with extremes in muscle mass or diet. 



The data above are obtained from the National Kidney Disease Education Program (
NKDEP) which additionally recommends that when the eGFR is used in patients with
 extremes of body mass index for purposes of drug dosing, the eGFR should be mul
tiplied by the estimated BMI.



8Interpretive Data: Adult reference range values reflect the clinical guidelines

of the American Diabetes Association.



9Interpretive Data: Adult reference range values reflect the clinical guidelines

of the American Diabetes Association.



10Interpretive Data: Adult reference range values reflect the clinical 
guidelines

of the American Diabetes Association.



11Interpretive Data: Elevated results are in line with increasing severity of 

congestive heart failure. Minor elevations between 100 and 300 

may be seen with Myocardial Ischemia, Sodium retaining drugs, 

and compensated/treated heart failure.



12Interpretive Data: Drugs reported as positive have not been confirmed by a 
second 

method and should be used for medical purposes only. To order

confirmation, contact laboratory. 



**note: Below are cut-off concentrations for all urine drugs of 

abuse performed in the laboratory. Some drugs listed in the table 

may not be included in this panel.



Description               Cut-off concentration

--------------------------------------------------

Amphetamine                  1000 ng/mL

Barbiturates                  200 ng/mL

Benzodiazepines               300 ng/mL

Cocaine metabolites           300 ng/mL

Opiates                       300 ng/mL

Phencyclidine                  25 ng/mL

Propoxyphene                  300 ng/mL

Marijuana metabolites          50 ng/mL

Methadone                     300 ng/mL

Urine alcohol                  20 mg/dL



HEMATOLOGY





                Most recent to oldest [Reference Range]:    1               2               3

 

                          WBC [3.7-10.4 K/CMM]      6.6 K/CMM 

                          (2014 05:00:00)      6.6 K/CMM 

                          (2014 08:00:34)      7.7 K/CMM 

                                        (2014 05:10:00)  

 

                          RBC [4.70-6.10 M/CMM]     5.29 M/CMM 

                          (2014 05:00:00)      5.02 M/CMM 

                          (2014 08:00:34)      5.40 M/CMM 

                                        (2014 05:10:00)  

 

                          Hgb [14.0-18.0 g/dL]      13.6 g/dL 

*LOW*

                          (2014 05:00:00)      13.0 g/dL 

*LOW*

                          (2014 08:00:34)      13.7 g/dL 

*LOW*

                                        (2014 05:10:00)  

 

                          Hct [42.0-54.0 %]         42.3 % 

                          (2014 05:00:00)      39.6 % 

*LOW*

                          (2014 08:00:34)      43.3 % 

                                        (2014 05:10:00)  

 

                          MCV [80.0-94.0 fL]        79.9 fL 

*LOW*

                          (2014 05:00:00)      78.8 fL 

*LOW*

                          (2014 08:00:34)      80.1 fL 

                                        (2014 05:10:00)  

 

                          MCH [27.0-31.0 pg]        25.7 pg 

*LOW*

                          (2014 05:00:00)      25.9 pg 

*LOW*

                          (2014 08:00:34)      25.4 pg 

*LOW*

                                        (2014 05:10:00)  

 

                          MCHC [32.0-36.0 g/dL]     32.1 g/dL 

                          (2014 05:00:00)      32.9 g/dL 

                          (2014 08:00:34)      31.7 g/dL 

*LOW*

                                        (2014 05:10:00)  

 

                          RDW [11.5-14.5 %]         18.3 % 

*HI*

                          (2014 05:00:00)      19.0 % 

*HI*

                          (2014 08:00:34)      19.2 % 

*HI*

                                        (2014 05:10:00)  

 

                          Platelet [133-450 K/CMM]    221 K/CMM 

                          (2014 05:00:00)      242 K/CMM 

                          (2014 08:00:34)      254 K/CMM 

                                        (2014 05:10:00)  

 

                          MPV [7.4-10.4 fL]         9.1 fL 

                          (2014 05:00:00)      9.3 fL 

                          (2014 08:00:34)      9.3 fL 

                                        (2014 05:10:00)  

 

                          Segs [45.0-75.0 %]        75.1 % 

*HI*

                          (01/10/2014 05:30:58)      74.8 % 

                          (2014 04:15:00)      79.9 % 

*HI*

                                        (2014 06:25:00)  

 

                          Lymphocytes [20.0-40.0 %]    12.7 % 

*LOW*

                          (01/10/2014 05:30:58)      13.8 % 

*LOW*

                          (2014 04:15:00)      9.7 % 

*LOW*

                                        (2014 06:25:00)  

 

                          Monocytes [2.0-12.0 %]    10.6 % 

                          (01/10/2014 05:30:58)      10.3 % 

                          (2014 04:15:00)      9.0 % 

                                        (2014 06:25:00)  

 

                          Eosinophils [0.0-4.0 %]    1.3 % 

                          (01/10/2014 05:30:58)      0.7 % 

                          (2014 04:15:00)      0.6 % 

                                        (2014 06:25:00)  

 

                          Basophils [0.0-1.0 %]     0.3 % 

                          (01/10/2014 05:30:58)      0.4 % 

                          (2014 04:15:00)      0.8 % 

                                        (2014 06:25:00)  

 

                          Segs-Bands # [1.5-8.1 K/CMM]    6.2 K/CMM 

                          (01/10/2014 05:30:58)      7.0 K/CMM 

                          (2014 04:15:00)      7.4 K/CMM 

                                        (2014 06:25:00)  

 

                          Lymphocytes # [1.0-5.5 K/CMM]    1.0 K/CMM 

                          (01/10/2014 05:30:58)      1.3 K/CMM 

                          (2014 04:15:00)      0.9 K/CMM 

*LOW*

                                        (2014 06:25:00)  

 

                          Monocytes # [0.0-0.8 K/CMM]    0.9 K/CMM 

*HI*

                          (01/10/2014 05:30:58)      1.0 K/CMM 

*HI*

                          (2014 04:15:00)      0.8 K/CMM 

                                        (2014 06:25:00)  

 

                          Eosinophils # [0.0-0.5 K/CMM]    0.1 K/CMM 

                          (01/10/2014 05:30:58)      0.1 K/CMM 

                          (2014 04:15:00)      0.1 K/CMM 

                                        (2014 06:25:00)  

 

                          Basophils # [0.0-0.2 K/CMM]    0.0 K/CMM 

                          (01/10/2014 05:30:58)      0.0 K/CMM 

                          (2014 04:15:00)      0.1 K/CMM 

                                        (2014 06:25:00)  

 

                          Anisocyte [None Seen]     1+ 

*ABN*

                    (01/10/2014 05:30:58)                           

 

                          Target Cell [None Seen]    Slight 

*ABN*

                    (01/10/2014 05:30:58)                           

 

                          Plt Morph                 Normal 

                    (01/10/2014 05:30:58)                           

 

                          PT [12.0-14.7 seconds]    17.0 seconds 

*HI*

                          (2014 05:00:00)      19.3 seconds 

*HI*

                          (2014 04:30:00)      20.3 seconds 

*HI*

                                        (2014 05:10:00)  

 

                          INR [0.85-1.17]           1.40 13

*HI*

                          (2014 05:00:00)      1.65 14

*HI*

                          (2014 04:30:00)      1.77 15

*HI*

                                        (2014 05:10:00)  

 

                          F2 Mutation PCR           Negative 

                    (2014 18:50:00)                           

 

                          F2 Mut Interp             FACTOR II PT:   Negative                                         

             

                                                                              

INTERPRETATION:                                                               

                                                                              

Molecular analysis for the Factor II (Prothrombin) [id number]>A mutation was      

negative.  Other causes of elevated prothrombin levels and hereditary forms   

of venous thrombosis are not ruled out.  Final diagnosis requires correlation 

with clinical history and other pertinent laboratory findings.                

                                                                              

Where appropriate, medical consultation and genetic counseling should be      

offered to inform and explain the risk implications and genetic implications  

of these test results.                                                        

                                                                              

ASSAY LIMITATIONS:                                                            

                                                                                
 

The assay uses the FDA-cleared Roche Factor II (Prothrombin) [id number] IVD      

                                        (Polymerase chain reaction/FRET detection), Roche MagNA Pure LC Instrument as 

well as Roche LightCycler 1.2 Instrument. A 165-bp fragment of Factor II      

gene(FII) containing the Factor II [id number] sequence is amplified in the       

assay. The assay is designed to detect the [id number] mutation only. Other       

causes of elevated prothrombin levels and hereditary forms of venous          

thrombosis are not ruled out. However, the melting curve analysis may         

implicate the presence of a possible rare mutation at position 26592 (Further 

testing will be recommended in the report). A minimum detection level is 198  

copies of Factor II per reaction. The level of agreement between the Factor   

II(Prothrombin) [id number] Kit and sequence analysis was 98.9%. The test result  

must be interpreted along with the patient's clinical history and other       

pertinent laboratory data. This assay has been validated by [facility] 
[facility]. 

*NA*

                    (2014 18:50:00)                           

 

                          F5 Leiden PCR             Negative 

                    (2014 18:50:00)                           

 

                          F5 Leiden Intrp           FACTOR V LEIDEN: Negative                                      

               

                                                                              

INTERPRETATION:                                                               

                                                                              

Molecular analysis for the Factor V Leiden, [id number] mutation was negative.      

Other causes of activated protein C resistance and hereditary forms of venous 

thrombosis are not ruled out. Final diagnosis requires correlation with       

clinical history and other pertinent laboratory findings.                     

                                                                              

Where appropriate, medical consultation and/or genetic counseling should be   

offered to inform and explain the risk implications and genetic implications  

of these test results.                                                        

                                                                 

ASSAY LIMITATIONS:                                                            

The assay uses the FDA-cleared Roche Factor V Leiden IVD(Poymerase chain 
reaction/FRET detection), Roche MagNA Pure LC Instrument as well as Roche  
LightCycler 1.2 Instrument. A 222-bp fragment of Factor V gene (FV) containing 
the Factor V Leiden sequence is amplified in the assay. The assay is designed to
 detect the G [id number] mutation only. Other causes of activated protein C 
resistance and hereditary forms of venous thrombosis are to ruled out. 
However,the melting curve analysis may implicate the presence of possible rare 
mutations at position. 1689, 1692 and 1696 (further testing will be recommended 
in the report). A minimum detection level is 202 copies of Factor V Leiden per 
reaction. The level of agreement between the Factor V Leiden Kit and sequence 
analysis was 99.4%. The test result must be interpreted along with the patient's
 clinical history and other pertinent laboratory data.This assay has been 
validated by [facility]. 

*NA*

                    (2014 18:50:00)                           

 

                          AT III Func [ %]    65 % 

*LOW*

                    (2014 18:50:00)                           

 

                          PTT [22.9-35.8 seconds]    32.6 seconds 16

                    (2014 06:25:19)                           

 

                          dRVV [<=1.20]             0.99 

                    (2014 18:50:00)                           

 

                          Hex Phos N [Negative]     Negative 

                    (2014 18:50:00)                           

 

                          Lup Interp                Negative for lupus anticoagulant by DRVV screen and hexagonal phospholipid



neutralization test.  If there is a strong clinical suspicion of lupus 
anticoagulant,  

additional testing, to include repeat studies at a clinically appropriate 
interval and anticardiolipin antibody assays, is recommended. 

                                                         

Interpretation performed at [facility]. 

*NA*

                    (2014 18:50:00)                           

 

                          Protein C Func [ %]    59 % 

*LOW*

                    (2014 18:50:00)                           

 

                          Protein S Func [ %]    94 % 

                    (2014 18:50:00)                           







13Interpretive Data: RECOMMENDED RANGES FOR PROTIME INR:

   2.0-3.0 for most medical and surgical thromboembolic states.

   2.5-3.5 for artificial heart valves and recurrent embolism.



INR SHOULD BE USED ONLY FOR PATIENTS ON STABLE ANTICOAGULANT THERAPY.



14Interpretive Data: RECOMMENDED RANGES FOR PROTIME INR:

   2.0-3.0 for most medical and surgical thromboembolic states.

   2.5-3.5 for artificial heart valves and recurrent embolism.



INR SHOULD BE USED ONLY FOR PATIENTS ON STABLE ANTICOAGULANT THERAPY.



15Interpretive Data: RECOMMENDED RANGES FOR PROTIME INR:

   2.0-3.0 for most medical and surgical thromboembolic states.

   2.5-3.5 for artificial heart valves and recurrent embolism.



INR SHOULD BE USED ONLY FOR PATIENTS ON STABLE ANTICOAGULANT THERAPY.



16Interpretive Data: Heparin Therapeutic Range:  57 - 92 Seconds



IMMUNOLOGY





                Most recent to oldest [Reference Range]:    1               2               3

 

                          ISRA [Negative]            Negative 

                    (2014 18:50:00)                           

 

                          Cardiolipin IgA           1 IgA Phospholipid Units 17

*NA*

                    (2014 18:50:00)                           

 

                          Cardiolipin IgG           16 IgG Phospholipid Units 18

*NA*

                    (2014 18:50:00)                           

 

                          Cardiolipin IgM           4.0 IgM Phospholipid Units 19

*NA*

                    (2014 18:50:00)                           

 

                          Beta2-Glycoprotein IgM [< OR=20 SMU]    <9 SMU 20

*NA*

                    (2014 18:50:00)                           

 

                          Beta2-Glycoprotein IgG [< OR=20 SGU]    <9 SGU 21

*NA*

                    (2014 18:50:00)                           

 

                          Beta2-Glycoprotein IgA [< OR=20 BETTE]    <9 BETTE 22

*NA*

                    (2014 18:50:00)                           

 

                          Homocyst Tot [3.7-13.9 uMol/L]    11.0 uMol/L 

                    (2014 18:50:00)                           







17Interpretive Data: Reference Ranges for IgA:

0-11  APL ------------------------- Negative

12-20 APL ------------------------- Inconclusive

21-80 APL ------------------------- Low-Med Positive

> 80  APL ------------------------- Strong Positive



18Interpretive Data: Reference Ranges for Ig-14  GPL ------------------------- Negative

15-20 GPL ------------------------- Inconclusive

21-80 GPL ------------------------- Low-Med Positive

> 80  GPL ------------------------- Strong Positive



19Interpretive Data: Reference Ranges for IgM:

0-12.4  MPL ------------------------ Negative

12.5-20 MPL ------------------------ Inconclusive

21-80   MPL ------------------------ Low-Med Positive

> 80    MPL ------------------------ Strong Positive



20Result Comment: Test Performed at:

[facility] [address]  [address]     MYRTLE Lovett MD, PhD



21Result Comment: Test Performed at:

[facility] [address]  [address]     MYRTLE Lovett MD, PhD



22Result Comment: Clinical Significance:

The Antiphospholipid Antibody Syndrome (APS) is a

clinical pathologic correlation that includes a

clinical event (e.g. thrombosis, pregnancy loss,

thrombocytopenia) and persistent positive

Antiphospholipid Antibodies (IgM or IgG LEXX >40

MPL/GPL, IgM or IgG anti-B2GP1 antibodies, or a

Lupus Anticoagulant). The IgA isotype has been

implicated in smaller studies, but have not yet

been incorporated into the APS criteria.

International consensus guidelines suggest waiting

at least 12 weeks before retesting to confirm

antibody persistence. Reference J Thromb Haemost

2006: 4; 295.

Test Performed at:

[facility] [address]  [address]     MYRTLE Lovett MD, PhD

## 2019-09-25 NOTE — XMS REPORT
Summary of Care: 17 - 17

                             Created on: 10/25/2038



JOANNA CABRERA

External Reference #: [id number]

: 1965

Sex: Male



Demographics







                          Address                   [address] [place] [address]  [phone]

 

                          Home Phone                [phone]

 

                          Preferred Language        English

 

                          Marital Status            Single

 

                          Spiritism Affiliation     [employer]

 

                          Race                      White/

 

                          Ethnic Group              Non-





Author







                          Author                    [facility]

 

                          Organization              [facility]

 

                          Address                   Unknown

 

                          Phone                     Unavailable







Encounter





HQ Shyann(LISA) [id number] Date(s): 17 - 17

[facility] [address] [place] [address]-     [phone]


Discharge Disposition: Home or Self Care

Attending Physician: Bernardo Pitts MD





Vital Signs







                    1                   2                   3



                                         Most recent to   



                                         oldest [Reference   



                                         Range]:   

 

                                        170.18 cm 

                    (17 12:56 PM)                         



                                         Height   

 

                                        98.8 DegF 

                          (17 5:13 PM)         97.9 DegF 

                          (17 12:56 PM)         



                                         Temperature Oral   



                                         [96.4-99.1 DegF]   

 

                                        125/92 mmHg 

                          (17 5:13 PM)         153/98 mmHg 

*HI*

                          (17 3:42 PM)         133/93 mmHg 

                                        (17 12:56 PM)



                                         Blood Pressure   



                                         [/60-90 mmHg]   

 

                                        17 BRMIN 

                          (17 5:13 PM)         18 BRMIN 

                          (17 3:42 PM)         18 BRMIN 

                                        (17 12:56 PM)



                                         Respiratory Rate   



                                         [14-20 BRMIN]   

 

                                        80 bpm 

                          (17 5:13 PM)         100 bpm 

                          (17 3:42 PM)         70 bpm 

                                        (17 12:56 PM)



                                         Peripheral Pulse   



                                         Rate [ bpm]   

 

                                        106.818 kg 

                    (17 12:56 PM)                         



                                         Weight   

 

                                        36.88 m2 

                    (17 12:56 PM)                         



                                         Body Mass Index   







Problem List







    



              Condition     Effective Dates     Status       Health Status     Informant

 

    



                     [D]Anterior chest     12             Active  



                                         wall pain(Confirmed)    

 

    



                           Anxiety(Confirmed)        Resolved  

 

    



                           BP+ -                     Active  



                                         Hypertension(Confirm    



                                         ed)    

 

    



                           Cardiomyopathy(Confi      Resolved  



                                         rmed)    

 

    



                           CHF - Congestive          Active  



                                         heart    



                                         failure(Confirmed)    

 

    



                           CHF - Congestive          Active  



                                         heart    



                                         failure(Confirmed)    

 

    



                           Depression(Confirmed      Active  



                                         )    

 

    



                           dm(Confirmed)             Active  

 

    



                           Down                      Active  



                                         syndrome(Confirmed)    

 

    



                           GERD -                    Active  



                                         Gastro-esophageal    



                                         reflux    



                                         disease(Confirmed)    

 

    



                           H/O:                      Active  



                                         schizophrenia(Confir    



                                         med)    

 

    



                           HTN(Confirmed)            Active  

 

    



                           ICD (implantable          Active  



                                         cardiac    



                                         defibrillator)    



                                         battery    



                                         depletion(Confirmed)    

 

    



                           NIDDM(Confirmed)          Active  

 

    



                           Obese                     Active  



                                         build(Confirmed)    

 

    



                           Schizophrenia(Confir      Active  



                                         med)    

 

    



                           Short of breath on        Active  



                                         exertion(Confirmed)    

 

    



                           Sleep                     Active  



                                         apnea(Confirmed)    







Allergies, Adverse Reactions, Alerts







   



                 Substance       Reaction        Severity        Status

 

   



                           NKDA                      Active







Medications





acetaminophen

650 mg, 2 tab, Route: PO, Drug form: TAB, Q4H, Dosing Weight 106.818, kg, PRN Pa
in 1-3/Temp > 100.4 F, Start date: 17 17:59:00 CDT, Duration: 30 day, Stop
 date: 09/15/17 17:58:00 CDT

Notes: Do not exceed 4 gm/day.  (Same as: Tylenol)

Start Date: 17

Stop Date: 17

Status: Discontinued



acetaminophen-hydrocodone 325 mg-5 mg oral tablet

1 tab, Route: PO, Drug Form: TAB, Dosing Weight 106.818, kg, Q4H, PRN Pain Score
 4-6, Start date: 17 17:59:00 CDT, Duration: 30 day, Stop date: 09/15/17 1
7:58:00 CDT

Notes: (Same as: Norco 325/5)  Do not exceed 4gm/day of acetaminophen.

Start Date: 17

Stop Date: 17

Status: Discontinued



aspirin

324 mg, 4 tab, Route: PO, Drug form: CHEWTAB, ONCE, Dosing Weight 106.818, kg, P
riority: STAT, Start date: 17 14:24:00 CDT, Stop date: 17 14:24:00 C
DT

Notes: Take with food.

Start Date: 17

Stop Date: 17

Status: Completed



aspirin 325 mg tablet, enteric coated

325 mg, 1 tab, Route: PO, Drug form: ECTAB, Daily, Dosing Weight 106.818, kg, Pr
iority: NOW, Start date: 17 17:59:00 CDT, Duration: 30 day, Stop date: 09/
15/17 9:00:00 CDT

Notes: (Do Not Crush)  Do not crush or chew.

Start Date: 17

Stop Date: 17

Status: Discontinued



atorvastatin

20 mg, 1 tab, Route: PO, Drug form: TAB, Bedtime, Dosing Weight 106.818, kg, Sta
rt date: 17 21:00:00 CDT, Duration: 30 day, Stop date: 17 21:00:00 C
DT

Notes: (Same As: Lipitor)

Start Date: 17

Stop Date: 17

Status: Discontinued



clonazePAM 1 mg oral tablet

1 mg=1 tab, PO, Bedtime, 0 Refill(s)

Start Date: 17

Status: Ordered



codeine-[id number] mg-300 mg/5 mL oral liquid

5 ml, Route: PO, Drug Form: LIQ, Dosing Weight 106.818, kg, Q4H, PRN Cough/Conge
stion, NOW, Start date: 17 17:59:00 CDT, Duration: 30 day, Stop date:  17:58:00 CDT

Notes: (Same As: Robitussin AC)

Start Date: 17

Stop Date: 17

Status: Discontinued



Dextrose 50% Syringe

25 gm, 50 mL, Route: IVP, Drug Form: INJ, Dosing Weight 106.818, kg, PRN, PRN Bl
ood Glucose Results, Start date: 17 18:02:00 CDT, Duration: 30 day, Stop d
ate: 09/15/17 18:01:00 CDT

Start Date: 17

Stop Date: 17

Status: Discontinued



Dextrose 50% Syringe

12.5 gm, 25 mL, Route: IVP, Drug Form: INJ, Dosing Weight 106.818, kg, PRN, PRN 
Blood Glucose Results, Start date: 17 18:02:00 CDT, Duration: 30 day, Stop
 date: 09/15/17 18:01:00 CDT

Start Date: 17

Stop Date: 17

Status: Discontinued



digoxin 250 mcg (0.25 mg) oral tablet

0.25 mg, PO, Daily, # 30 tab, 0 Refill(s)

Start Date: 17

Status: Ordered



DuoNeb inhalation solution

3 ml, Route: INHALATION, Drug Form: SOLN, Dosing Weight 106.818, kg, Q6H, PRN Re
spiratory Protocol, Start date: 17 17:59:00 CDT, Duration: 30 day, Stop da
te: 09/15/17 17:58:00 CDT

Notes: (Same as: Duoneb)

Start Date: 17

Stop Date: 17

Status: Discontinued



famotidine

20 mg, 2 mL, Route: IVP, Drug form: INJ, Q12H, Dosing Weight 106.818, kg, Priori
ty: NOW, Start date: 17 17:59:00 CDT, Duration: 30 day, Stop date: 09/15/1
7 9:00:00 CDT

Notes: (Same as: Pepcid)Can be dilute in 5-10cc NS  IVP: Slow IV push over at le
ast 2 minutes.

Start Date: 17

Stop Date: 17

Status: Discontinued



glucagon

1 mg, Route: IM, Drug form: PDR/INJ, PRN, Dosing Weight 106.818, kg, PRN Blood G
lucose Results, Start date: 17 18:02:00 CDT, Duration: 30 day, Stop date: 
09/15/17 18:01:00 CDT

Start Date: 17

Stop Date: 17

Status: Discontinued



hydrALAZINE

10 mg, 0.5 mL, Route: IV, Drug form: INJ, Q4H, Dosing Weight 106.818, kg, PRN Hy
pertension, Start date: 17 17:59:00 CDT, Duration: 30 day, Stop date:  17:58:00 CDT, HTN

Notes: (Same as: Apresoline)Push over 5 minutes

Start Date: 17

Stop Date: 17

Status: Discontinued



insulin lispro

4 unit, 0.04 mL, Route: SUB-Q, Drug form: SOLN, Bedtime, Dosing Weight 106.818, 
kg, PRN Blood Glucose Results, Start date: 17 18:02:00 CDT, Duration: 30 d
ay, Stop date: 09/15/17 18:01:00 CDT

Notes: Roll in palms of hands gently;  Do not shake `vigorously. (Same as: Humal
og )"Single Patient Use Only "WASTE: F/P - Black; E - Municipal Trash Bin  Stabl
e for 28 days at room temperature.Expires in _____ days from ______________Date

Start Date: 17

Stop Date: 17

Status: Discontinued



insulin lispro

1 unit, 0.01 mL, Route: SUB-Q, Drug form: SOLN, Bedtime, Dosing Weight 106.818, 
kg, PRN Blood Glucose Results, Start date: 17 18:02:00 CDT, Duration: 30 d
ay, Stop date: 09/15/17 18:01:00 CDT

Notes: Roll in palms of hands gently;  Do not shake `vigorously. (Same as: Humal
og )"Single Patient Use Only "WASTE: F/P - Black; E - Municipal Trash Bin  Stabl
e for 28 days at room temperature.Expires in _____ days from ______________Date

Start Date: 17

Stop Date: 17

Status: Discontinued



insulin lispro

3 unit, 0.03 mL, Route: SUB-Q, Drug form: SOLN, Bedtime, Dosing Weight 106.818, 
kg, PRN Blood Glucose Results, Start date: 17 18:02:00 CDT, Duration: 30 d
ay, Stop date: 09/15/17 18:01:00 CDT

Notes: Roll in palms of hands gently;  Do not shake `vigorously. (Same as: Humal
og )"Single Patient Use Only "WASTE: F/P - Black; E - Municipal Trash Bin  Stabl
e for 28 days at room temperature.Expires in _____ days from ______________Date

Start Date: 17

Stop Date: 17

Status: Discontinued



insulin lispro

2 unit, 0.02 mL, Route: SUB-Q, Drug form: SOLN, Bedtime, Dosing Weight 106.818, 
kg, PRN Blood Glucose Results, Start date: 17 18:02:00 CDT, Duration: 30 d
ay, Stop date: 09/15/17 18:01:00 CDT

Notes: Roll in palms of hands gently;  Do not shake `vigorously. (Same as: Humal
og )"Single Patient Use Only "WASTE: F/P - Black; E - Municipal Trash Bin  Stabl
e for 28 days at room temperature.Expires in _____ days from ______________Date

Start Date: 17

Stop Date: 17

Status: Discontinued



insulin lispro

4 unit, 0.04 mL, Route: SUB-Q, Drug form: SOLN, TID-Before Meals, Dosing Weight 
106.818, kg, PRN Blood Glucose Results, Start date: 17 18:02:00 CDT, Durat
ion: 30 day, Stop date: 09/15/17 18:01:00 CDT

Notes: Roll in palms of hands gently;  Do not shake `vigorously. (Same as: Humal
og )"Single Patient Use Only "WASTE: F/P - Black; E - Municipal Trash Bin  Stabl
e for 28 days at room temperature.Expires in _____ days from ______________Date

Start Date: 17

Stop Date: 17

Status: Discontinued



insulin lispro

8 unit, 0.08 mL, Route: SUB-Q, Drug form: SOLN, TID-Before Meals, Dosing Weight 
106.818, kg, PRN Blood Glucose Results, Start date: 17 18:02:00 CDT, Durat
ion: 30 day, Stop date: 09/15/17 18:01:00 CDT

Notes: Roll in palms of hands gently;  Do not shake `vigorously. (Same as: Humal
og )"Single Patient Use Only "WASTE: F/P - Black; E - Municipal Trash Bin  Stabl
e for 28 days at room temperature.Expires in _____ days from ______________Date

Start Date: 17

Stop Date: 17

Status: Discontinued



insulin lispro

2 unit, 0.02 mL, Route: SUB-Q, Drug form: SOLN, TID-Before Meals, Dosing Weight 
106.818, kg, PRN Blood Glucose Results, Start date: 17 18:02:00 CDT, Durat
ion: 30 day, Stop date: 09/15/17 18:01:00 CDT

Notes: Roll in palms of hands gently;  Do not shake `vigorously. (Same as: Humal
og )"Single Patient Use Only "WASTE: F/P - Black; E - Municipal Trash Bin  Stabl
e for 28 days at room temperature.Expires in _____ days from ______________Date

Start Date: 17

Stop Date: 17

Status: Discontinued



insulin lispro

6 unit, 0.06 mL, Route: SUB-Q, Drug form: SOLN, TID-Before Meals, Dosing Weight 
106.818, kg, PRN Blood Glucose Results, Start date: 17 18:02:00 CDT, Durat
ion: 30 day, Stop date: 09/15/17 18:01:00 CDT

Notes: Roll in palms of hands gently;  Do not shake `vigorously. (Same as: Humal
og )"Single Patient Use Only "WASTE: F/P - Black; E - Municipal Trash Bin  Stabl
e for 28 days at room temperature.Expires in _____ days from ______________Date

Start Date: 17

Stop Date: 17

Status: Discontinued



insulin lispro

10 unit, 0.1 mL, Route: SUB-Q, Drug form: SOLN, TID-Before Meals, Dosing Weight 
106.818, kg, PRN Blood Glucose Results, Start date: 17 18:02:00 CDT, Durat
ion: 30 day, Stop date: 09/15/17 18:01:00 CDT

Notes: Roll in palms of hands gently;  Do not shake `vigorously. (Same as: Brittnee
og )"Single Patient Use Only "WASTE: F/P - Black; E - Municipal Trash Bin  Stabl
e for 28 days at room temperature.Expires in _____ days from ______________Date

Start Date: 17

Stop Date: 17

Status: Discontinued



lisinopril

5 mg, 1 tab, Route: PO, Drug form: TAB, Daily, Dosing Weight 106.818, kg, Priori
ty: NOW, Start date: 17 17:59:00 CDT, Duration: 30 day, Stop date: 09/15/1
7 9:00:00 CDT

Notes: (Same as: Prinivil, Zestril)

Start Date: 17

Stop Date: 17

Status: Discontinued



Lovenox

40 mg, Route: SUB-Q, Drug form: INJ, [id number], Dosing Weight 106.818, kg, Priori
ty: NOW, Start date: 17 17:59:00 CDT, Duration: 30 day, Stop date: 
7 17:59:00 CDT

Start Date: 17

Stop Date: 17

Status: Discontinued



Milk of Magnesia

30 ml, Route: PO, Drug Form: SUSP, Dosing Weight 106.818, kg, Q6H, PRN Heartburn
, Start date: 17 17:59:00 CDT, Duration: 30 day, Stop date: 09/15/17 17:58
:00 CDT

Notes: (Same as: Milk of Magnesia, MOM)

Start Date: 17

Stop Date: 17

Status: Discontinued



MiraLax

17 gm, 1 pkt, Route: PO, Drug form: PWDR, Daily, Dosing Weight 106.818, kg, PRN 
Constipation, Priority: Within 4 hours, Start date: 17 17:59:00 CDT, Durat
ion: 30 day, Stop date: 09/15/17 17:58:00 CDT

Notes: Dissolve in 8 oz of water or juice.(Same as: Miralax)

Start Date: 17

Stop Date: 17

Status: Discontinued



morphine Sulfate

1 mg, 0.25 mL, Route: IVP, Drug form: INJ, Q2H, Dosing Weight 106.818, kg, PRN P
ain Score 4-6, Start date: 17 17:59:00 CDT, Duration: 30 day, Stop date: 0
9/15/17 17:58:00 CDT

Notes: (Same as:MORPhine Sulfate)

Start Date: 17

Stop Date: 17

Status: Discontinued



nitroglycerin SL Tab

0.4 mg, 1 tab, Route: SL, Drug form: TAB, Q5Min, Dosing Weight 106.818, kg, PRN 
Chest Pain, Start date: 17 17:59:00 CDT, Duration: 3 doses or times, Stop 
date: Limited # of times

Notes: (Same as:Nitroquick, Nitrostat)"Do Not Crush"  Sublingual tablet

Start Date: 17

Stop Date: 17

Status: Discontinued



ondansetron

4 mg, 2 mL, Route: IVP, Drug form: INJ, Q4H, Dosing Weight 106.818, kg, PRN Naus
ea & Vomiting, Start date: 17 17:59:00 CDT, Duration: 30 day, Stop date: 
09/15/17 17:58:00 CDT

Notes: (Same as: Zofran)  *** MEDICATION WASTE ***Product Size:  4 mgProduct Was
helen:  ___ mg

Start Date: 17

Stop Date: 17

Status: Discontinued



pantoprazole 40 mg intravenous injection

40 mg, PO, Daily, 0 Refill(s)

Start Date: 17

Status: Ordered



Saline Flush 0.9%

10 ml, Route: IVP, Drug Form: INJ, Dosing Weight 106.818, kg, PRN, PRN Line Flus
h, Start date: 17 17:59:00 CDT, Duration: 30 day, Stop date: 09/15/17 17:5
8:00 CDT

Notes: (Same as: BD Posiflush)

Start Date: 17

Stop Date: 17

Status: Discontinued



Saline Flush 0.9%

10 ml, Route: IVP, Drug Form: INJ, Dosing Weight 106.818, kg, Q12H, Start date: 
17 21:00:00 CDT, Duration: 30 day, Stop date: 09/15/17 9:00:00 CDT

Notes: (Same as: BD Posiflush)

Start Date: 17

Stop Date: 17

Status: Discontinued



Saline Flush 0.9%

10 mL, Route: IVP, Drug Form: INJ, Dosing Weight 106.818, kg, PRN, PRN Line Flus
h, Start date: 17 14:24:00 CDT, Duration: 30 day, Stop date: 09/15/17 14:2
3:00 CDT

Notes: (Same as: BD Posiflush)

Start Date: 17

Stop Date: 17

Status: Discontinued



Sodium Chloride 0.9% (Bolus) IV

1,000 mL, 1000 ml/hr, Infuse Over: 1 hr, Route: IV, 1,000, Drug form: INJ, ONCE,
 Priority: STAT, Dosing Weight 106.818 kg, Start date: 17 16:29:00 CDT, Du
ration: 1 doses or times, Stop date: 17 16:29:00 CDT

Start Date: 17

Stop Date: 17

Status: Completed



sodium chloride 0.9% 1000 ml INJ 1,000 mL

1,000 mL, Rate: 150 ml/hr, Infuse over: 6.7 hr, Route: IV, Dosing Weight 106.818
 kg, Total Volume: 1,000, Priority: STAT, Start date: 17 18:01:00 CDT, Dur
ation: 1 doses or times, Stop date: 17 0:42:00 CDT

Start Date: 17

Stop Date: 17

Status: Discontinued



thiothixene 5 mg oral capsule

5 mg=1 cap, PO, Bedtime, 0 Refill(s)

Start Date: 17

Status: Ordered



trazodone 50 mg oral tablet

50 mg=1 tab, PO, Bedtime, # 30 tab, 1 Refill(s)

Start Date: 17

Status: Ordered



Xarelto

20 mg, 1 tab, Route: PO, Drug form: TAB, QPM, Dosing Weight 106.818, kg, Priorit
y: NOW, Start date: 17 20:45:00 CDT, Duration: 30 day, Stop date: 09/15/17
 17:00:00 CDT

Notes: (Same as: Xarelto)Administer with food

Start Date: 17

Stop Date: 17

Status: Discontinued



Xarelto

20 mg, 1 tab, Route: PO, Drug form: TAB, QPM, Dosing Weight 106.818, kg, Priorit
y: NOW, Start date: 17 18:02:00 CDT, Duration: 30 day, Stop date: 09/15/17
 17:00:00 CDT

Notes: (Same as: Xarelto)Administer with food

Start Date: 17

Stop Date: 17

Status: Deleted



zolpidem 10 mg oral tablet

10 mg=1 tab, PO, Bedtime, PRN for sleep, # 14 tab, 0 Refill(s)

Start Date: 17

Stop Date: 17

Status: Ordered



Results





ELECTROLYTES





 



                           Most recent to            1



                                         oldest [Reference 



                                         Range]: 

 

 



                           Sodium Lvl [135-145       138 mEq/L



                           mEq/L]                    (17 3:33 PM)

 

 



                           Potassium Lvl             3.9 mEq/L



                           [3.5-5.1 mEq/L]           (17 3:33 PM)

 

 



                           Chloride Lvl [      102 mEq/L



                           mEq/L]                    (17 3:33 PM)

 

 



                           CO2 [24-32 mEq/L]         31 mEq/L



                                         (17 3:33 PM)

 

 



                           AGAP [10.0-20.0           8.9 mEq/L



                           mEq/L]                    *LOW*



                                         (17 3:33 PM)







CHEM PANEL





 



                           Most recent to            1



                                         oldest [Reference 



                                         Range]: 

 

 



                           Creatinine Lvl            1.64 mg/dL



                           [0.50-1.40 mg/dL]         *HI*



                                         (17 3:33 PM)

 

 



                           eGFR                      47 mL/min/1.73m2 1



                                         *NA*



                                         (17 3:33 PM)

 

 



                           BUN [7-22 mg/dL]          16 mg/dL



                                         (17 3:33 PM)

 

 



                           B/C Ratio [6-25]          10



                                         (17 3:33 PM)

 

 



                           Glucose Lvl [70-99        143 mg/dL



                           mg/dL]                    *HI*



                                         (17 3:33 PM)

 

 



                           Total Protein             8.3 g/dL



                           [6.4-8.4 g/dL]            (17 3:33 PM)

 

 



                           Albumin Lvl [3.5-5.0      4.0 g/dL



                           g/dL]                     (17 3:33 PM)

 

 



                           Globulin [2.7-4.2         4.3 g/dL



                           g/dL]                     *HI*



                                         (17 3:33 PM)

 

 



                           A/G Ratio [0.7-1.6]       0.9



                                         (17 3:33 PM)

 

 



                           Calcium Lvl               8.4 mg/dL



                           [8.5-10.5 mg/dL]          *LOW*



                                         (17 3:33 PM)

 

 



                           ALT [0-65 unit/L]         38 unit/L



                                         (17 3:33 PM)

 

 



                           AST [0-37 unit/L]         81 unit/L



                                         *HI*



                                         (17 3:33 PM)

 

 



                           Alk Phos [          118 unit/L



                           unit/L]                   (17 3:33 PM)

 

 



                           Bili Total [0.2-1.3       0.5 mg/dL



                           mg/dL]                    (17 3:33 PM)

 

 



                           Lipase Lvl [        275 unit/L



                           unit/L]                   (17 3:33 PM)







1Result Comment: The eGFR is calculated using the CKD-EPI formula. In most 
young, healthy individuals the eGFR will be >90 mL/min/1.73m2. The eGFR declines
with age. An eGFR of 60-89 may be normal in some populations, particularly the 
elderly, for whom the CKD-EPI formula has not been extensively validated. Use of
the eGFR is not recommended in the following populations:



Individuals with unstable creatinine concentrations, including pregnant patients
and those with serious co-morbid conditions.



Patients with extremes in muscle mass or diet. 



The data above are obtained from the National Kid[employer] Education Program (
NKDEP) which additionally recommends that when the eGFR is used in patients with
extremes of body mass index for purposes of drug dosing, the eGFR should be mul
tiplied by the estimated BMI.



CARDIAC ENZYMES





 



                           Most recent to            1



                                         oldest [Reference 



                                         Range]: 

 

 



                           Total CK [          2494 unit/L



                           unit/L]                   *NA*



                                         (17 3:33 PM)

 

 



                           CK MB [0.5-3.6            2.8 ng/mL



                           ng/mL]                    (17 3:33 PM)

 

 



                           CK MB Index               See Note 1



                           [0.0-2.5]                 (17 3:33 PM)

 

 



                           Troponin-I                0.03 ng/mL



                           [0.00-0.40 ng/mL]         (17 3:33 PM)

 

 



                           BNP [<=100 pg/mL]         252 pg/mL



                                         *HI*



                                         (17 3:33 PM)







1Result Comment: Calculated values are not available



HEMATOLOGY





 



                           Most recent to            1



                                         oldest [Reference 



                                         Range]: 

 

 



                           WBC [3.7-10.4 K/CMM]      7.8 K/CMM



                                         (17 3:33 PM)

 

 



                           RBC [4.70-6.10            5.00 M/CMM



                           M/CMM]                    (17 3:33 PM)

 

 



                           Hgb [14.0-18.0 g/dL]      15.6 g/dL



                                         (17 3:33 PM)

 

 



                           Hct [42.0-54.0 %]         46.1 %



                                         (17 3:33 PM)

 

 



                           MCV [80.0-94.0 fL]        92.2 fL



                                         (17 3:33 PM)

 

 



                           MCH [27.0-31.0 pg]        31.2 pg



                                         *HI*



                                         (17 3:33 PM)

 

 



                           MCHC [32.0-36.0           33.8 g/dL



                           g/dL]                     (17 3:33 PM)

 

 



                           RDW [11.5-14.5 %]         15.3 %



                                         *HI*



                                         (17 3:33 PM)

 

 



                           Platelet [133-450         206 K/CMM



                           K/CMM]                    (17 3:33 PM)

 

 



                           MPV [7.4-10.4 fL]         8.0 fL



                                         (17 3:33 PM)

 

 



                           Segs [45.0-75.0 %]        73.9 %



                                         (17 3:33 PM)

 

 



                           Lymphocytes               14.3 %



                           [20.0-40.0 %]             *LOW*



                                         (17 3:33 PM)

 

 



                           Monocytes [2.0-12.0       9.8 %



                           %]                        (17 3:33 PM)

 

 



                           Eosinophils [0.0-4.0      1.9 %



                           %]                        (17 3:33 PM)

 

 



                           Basophils [0.0-1.0        0.1 %



                           %]                        (17 3:33 PM)

 

 



                           Segs-Bands #              5.8 K/CMM



                           [1.5-8.1 K/CMM]           (17 3:33 PM)

 

 



                           Lymphocytes #             1.1 K/CMM



                           [1.0-5.5 K/CMM]           (17 3:33 PM)

 

 



                           Monocytes # [0.0-0.8      0.8 K/CMM



                           K/CMM]                    (17 3:33 PM)

 

 



                           Eosinophils #             0.1 K/CMM



                           [0.0-0.5 K/CMM]           (17 3:33 PM)

 

 



                           Basophils # [0.0-0.2      0.0 K/CMM



                           K/CMM]                    (17 3:33 PM)







Immunizations





Given and Recorded





   



                 Vaccine         Date            Status          Refusal Reason

 

   



                     influenza virus vaccine, inactivated     10/1/13             Given 









Not Given





   



                 Vaccine         Date            Status          Refusal Reason

 

   



                 pneumococcal 23-valent vaccine     17         Not Given       Patient Refuses







Procedures







   



                 Procedure       Date            Related Diagnosis     Body Site

 

   



                                         ICD - Internal cardiac defibrillator   



                                         procedure1   

 

   



                                         torn ligament2   







1placed 2.5years ago



2left knee ligament surgery



Social History







 



                           Social History Type       Response

 

 



                           Substance Abuse           Use: Current.  Type: Cocaine.  Recreational Drug Route: Inhaled.

  Amount:



                                         uses 5x a year.

 

 



                           Alcohol                   Never

 

 



                           Smoking Status            Former smoker; Type: Cigarettes; Tobacco use per day: 0; Number

 of years:



                                         2; Total pack years: 2; Started [age] age: 22.0; Stopped at age: 24; Previous



                                         treatment: None; Ready to change: No; Concerns about tobacco use in



                                         household: No; Exposure to Tobacco Smoke None; Cigarette Smoking Last 365



                                         Days No; Reg Smoking Cessation Counseling No







Assessment and Plan

Extracted from:





  



                     Title: Discharge Summary *     Author: Bernardo Pitts MD     Date: 17









                                         Discharge Information

Date of Admission:



      



Date of Discharge:



       



Active Diagnosis During this Hospitalization:





Chest pain

Acute rhabdomyolysis

History of chronic atrial fibrillation

History of advanced congestive heart failure combined systolic and diastolic 
status post pacemaker and defibrillator placement stable

History of for chronic kidney disease stage III

History of diabetes mellitus type 2 uncontrolled

History of schizophrenia 

Chronic Diagnosis : Please see the Past Medical History





 Operations and procedures :





Discharge Condition:

Stable



Complications:

None



Discharge Meds: Please see the list of Discharge Medications:



Discharge Follow Up:

F/U Primary Care Doctor in 1 week.

F/U



Discharge Instructions:



Please take the medications as prescribed and F/U with your doctor as 
instructed.

Please call your doctor or Return to the ER if symptoms worsen or return.



Diet : Heart healthy

Activity: No restrictions

Restrictions: No restrictions.





Total Time Spent in discharge: 35 Minutes

 

                                         Discharge Plan

Discharge Summary Plan

Discharge Status: stable.

Discharge instructions given: to patient.

Discharge disposition: discharge to home.

Prescriptions: reviewed.

Course

Improving.

Education and Follow-up

Counseled: patient, regarding diagnosis, regarding treatment, regarding 
medications.





Extracted from:





  



                     Title: Clinical Document     Author: Bernardo Pitts MD     Date: 17









                                        Disussed with Dr Anderson

The cardiologist and lillian madriagl for the patient to be discharged tonight.

Will d/c the patient





Extracted from:





  



                     Title: Clinical Document     Author: Sohail Anderson MD     Date: 17













PLAN & TREATMENT



chest pain: likely due to s/p substance, cocaine abuse, (monday)

   he has mild chest tender

   had a very good dinner, feels fine now,

   Okay to d/c home,

   case d/w Dr. Pitts



Atrial Fibrillation: Rate currently controlled, continue amiodarone and ASA.

Severe Nonischemic LV Systolic DCMP: [facility]  revealed no CAD, current 
exacerbation due to tachycardia and illicit drug use.

Acute on CKD: Likely due to illicit drug use.

DM Type 2: Continue Rx.

HTN: BP Stable.



Tele: atrial with HR controlled well

Less soB

okay to d/c home

advise to be off cocaine again

___________________________________________________,

 
_____________________________________________________________________________________________

Subjective:



Exp. Problem Focused History: (1-3 HPI elements, 1 ROS)



#



ROS: no fever, no chills

________________________________________________________________________________



Objective:

Expanded Problem Focused PE





Vitals and Temp:



VitalsTmp(F)ZpfjhYPVDJoY9ROH2

                                         17:[id number]/857801---

                                         15:42----299892/974466---

                                         12:5697.952758/788619---



                                        24 Hr Tmax: 98.8F (37.11c) at  17:13Vital Signs are the last 5 in the past 

48 hours.



General:  No acute distress.

Respiratory:  Lungs are clear to auscultation, Respirations are non-labored.

Cardiovascular:  Normal rate, Regular rhythm, No murmur, No gallop, Normal 
peripheral perfusion, No edema.

Gastrointestinal:  Soft, Non-tender, Non-distended, Normal bowel sounds, No 
organomegaly.

Integumentary:  Clean, Dry, Intact, Warm, Moist, No rash.



                                        24hr Labs

                                         1826

Glucose POC75  

                                         1533

Lipase Aww137  

Sodium Abu233  

Potassium Lvl3.9  

Chloride Izr753  

  

AGAP8.9  L

Glucose Rlw088  H

Creatinine Lvl1.64  H

BUN16  

B/C Ratio10  

Total Protein8.3  

Albumin Lvl4.0  

Globulin4.3  H

A/G Ratio0.9  

Calcium Lvl8.4  L

ALT38  

AST81  H

Alk Tpxg507  

Bili Total0.5  

eGFR47  

Troponin-I0.03  

CK MB2.8  

XKN598  H

Total GY0623  

CK MB IndexSee Note  

WBC7.8  

RBC5.00  

Hgb15.6  

Hct46.1  

MCV92.2  

MCH31.2  H

MCHC33.8  

RDW15.3  H

Lqdglnbk683  

MPV8.0  

Segs73.9  

Monocytes9.8  

Owwpxnlyvca53.3  L

Eosinophils1.9  

Basophils0.1  

Segs-Bands #5.8  

Lymphocytes #1.1  

Monocytes #0.8  

Eosinophils #0.1  

Basophils #0.0  





Extracted from:





  



                     Title: General Admission H&P *     Author: Bernardo Pitts MD     Date: 17











Patient:   JOANNA CABRERA            MRN: [id number]            FIN: 
[id number]

Age:   52 years     Sex:  Male     :  1965

Associated Diagnoses:   None

Author:   Bernardo Pitts MD



Chief Complaint

Acute chest pain shortness of breath palpitations



History of Present Illness



PCP/Cardiologist: Dr. Tony Moran



                                        51 y/o M with PMHx of DM, Afib on Xarelto, HTN, combined systolic and diastolic 

CHF CKD stage III and schizophrenia presents to the ED for evaluation of 
generalized chest pain that started 1 hour PTA. Pain is described as constant, 
moderate, "tightness", does not radiate, and has no exacerbating or alleviating 
factors. Additionally reports mild SOB and palpitations. Denies N/V, swelling, 
or any other symptoms. Denies LOC.  Denies association of the chest pain with 
exertion food intake and deep breathing .  Chest pain almost resolved in the ER

patient has a SHx of pacemaker and defibrillator placement. Patient is currently
on xarelto. Of note, patient is a smoker.  He is also says he used cocaine 2 
days ago he has been having a history of polysubstance abuse in the past found 
to have acute rhabdomyolysis

We will admit the patient for chest pain rule out acute coronary syndrome at 
this time EKG and cardiac enzymes negative and the ER will do observation





Review of Systems

Constitutional:  Negative except as documented in history of present illness.

Eye

Ear/Nose/Mouth/Throat

Respiratory:  Negative except as documented in history of present illness.

Cardiovascular:  Negative except as documented in history of present illness.

Breast

Gastrointestinal:  Negative except as documented in history of present illness.

Immunologic:  Negative except as documented in history of present illness.

Musculoskeletal:  Negative except as documented in history of present illness.

Integumentary:  Negative except as documented in history of present illness.

Neurologic:  Negative except as documented in history of present illness.

Psychiatric:  Negative except as documented in history of present illness.

All other systems are negative

All other review of systems were obtained and pertinent positives and negatives 
were discussed in the history of presenting illness



Health Status

Allergies:

Allergic Reactions (Selected)

Severity Not Documented

NKDA- No reactions were documented.,

Allergies (1) ActiveReaction

NKDANone Documented



Current medications:  (Selected)

Inpatient Medications

Ordered

Dextrose 50% Syringe: 25 mL, IVP, PRN, PRN: Blood Glucose Results

Dextrose 50% Syringe: 50 mL, IVP, PRN, PRN: Blood Glucose Results

DuoNeb inhalation solution: 3 ml, INHALATION, Q6H, PRN: Respiratory Protocol

Milk of Magnesia: 30 ml, PO, Q6H, PRN: Heartburn

MiraLax: 17 gm, PO, Daily, PRN: Constipation

Saline Flush 0.9%: 10 mL, IVP, PRN, PRN: Line Flush

Saline Flush 0.9%: 10 ml, IVP, PRN, PRN: Line Flush

Saline Flush 0.9%: 10 ml, IVP, Q12H

Sodium Chloride 0.9% IV 1000 mL: 150 ml/hr, IV, Stop: 17 0:42:00 CDT

Xarelto: 20 mg, PO, QPM

acetaminophen-hydrocodone 325 mg-5 mg oral tablet: 1 tab, PO, Q4H, PRN: Pain 
Score 4-6

acetaminophen: 650 mg, PO, Q4H, PRN: Pain 1-3/Temp > 100.4 F

aspirin 325 mg tablet, enteric coated: 325 mg, PO, Daily

atorvastatin: 20 mg, PO, Bedtime

codeine-ffckPILinsg30 mg-300 mg/5 mL oral liquid: 5 ml, PO, Q4H, PRN: 
Cough/Congestion

famotidine: 20 mg, IVP, Q12H

glucagon: 1 mg, IM, PRN, PRN: Blood Glucose Results

hydrALAZINE: 10 mg, IV, Q4H, PRN: HTN

insulin lispro: 1 unit, SUB-Q, Bedtime, PRN: Blood Glucose Results

insulin lispro: 10 unit, SUB-Q, TID-Before Meals, PRN: Blood Glucose Results

insulin lispro: 2 unit, SUB-Q, Bedtime, PRN: Blood Glucose Results

insulin lispro: 2 unit, SUB-Q, TID-Before Meals, PRN: Blood Glucose Results

insulin lispro: 3 unit, SUB-Q, Bedtime, PRN: Blood Glucose Results

insulin lispro: 4 unit, SUB-Q, Bedtime, PRN: Blood Glucose Results

insulin lispro: 4 unit, SUB-Q, TID-Before Meals, PRN: Blood Glucose Results

insulin lispro: 6 unit, SUB-Q, TID-Before Meals, PRN: Blood Glucose Results

insulin lispro: 8 unit, SUB-Q, TID-Before Meals, PRN: Blood Glucose Results

lisinopril: 5 mg, PO, Daily

morphine Sulfate: 1 mg, IVP, Q2H, PRN: Pain Score 4-6

nitroglycerin SL Tab: 0.4 mg, SL, Q5Min, PRN: Chest Pain

ondansetron: 4 mg, IVP, Q4H, PRN: Nausea & Vomiting

Prescriptions

Prescribed

carvedilol 3.125 mg oral tablet: 3.125 mg, 1 tab, PO, Q12H, 60 tab, 0 Refill(s)

lisinopril 5 mg oral tablet: 5 mg, 1 tab, PO, Daily, 30 tab, 0 Refill(s)

rivaroxaban 20 mg oral tablet: 20 mg, 1 tab, PO, Daily, 30 tab, 0 Refill(s)

torsemide 20 mg oral tablet: 20 mg, 1 tab, PO, Daily, 30 tab, 0 Refill(s)

Documented Medications

Documented

ARIPiprazole: 5 mg, PO, Daily, 0 Refill(s)

FLUoxetine: 40 mg, PO, Daily, 0 Refill(s)

Vitamin D3 50,000 intl units oral capsule: 50,000 IntlUnit, 1 cap, PO, qWeek, 
for 12 week, 12 cap, 0 Refill(s)

glipiZIDE: 10 mg, PO, Daily, 0 Refill(s)

metFORMIN: 500 mg, PO, Daily, 0 Refill(s),

Medications (31) Active

Scheduled: (6)

aspirin  325 mg, PO, Daily

atorvastatin  20 mg, PO, Bedtime

famotidine  20 mg, IVP, Q12H

lisinopril  5 mg, PO, Daily

rivaroxaban  20 mg, PO, QPM

sodium chloride  10 ml, IVP, Q12H

Continuous: (1)

Sodium Chloride 0.9% IV 1000 mL  1,000 mL, IV, 150 ml/hr

PRN: (24)

acetaminophen  650 mg, PO, Q4H

acetaminophen-hydrocodone  1 tab, PO, Q4H

albuterol-ipratropium  3 ml, INHALATION, Q6H

codeine-guaiFENesin  5 ml, PO, Q4H

Dextrose 50% in Water IV  25 mL, IVP, PRN

Dextrose 50% in Water IV  50 mL, IVP, PRN

glucagon  1 mg, IM, PRN

hydrALAZINE  10 mg, IV, Q4H

insulin lispro  2 unit, SUB-Q, TID-Before Meals

insulin lispro  4 unit, SUB-Q, TID-Before Meals

insulin lispro  6 unit, SUB-Q, TID-Before Meals

insulin lispro  8 unit, SUB-Q, TID-Before Meals

insulin lispro  10 unit, SUB-Q, TID-Before Meals

insulin lispro  1 unit, SUB-Q, Bedtime

insulin lispro  2 unit, SUB-Q, Bedtime

insulin lispro  3 unit, SUB-Q, Bedtime

insulin lispro  4 unit, SUB-Q, Bedtime

magnesium hydroxide  30 ml, PO, Q6H

morphine Sulfate  1 mg, IVP, Q2H

nitroglycerin  0.4 mg, SL, Q5Min

ondansetron  4 mg, IVP, Q4H

polyethylene glycol 3350  17 gm, PO, Daily

sodium chloride  10 ml, IVP, PRN

sodium chloride 0.9% 10 ml flush syr BD  10 mL, IVP, PRN



Problem list:

All Problems

[D]Anterior chest wall pain / 804738976 / Confirmed

BP+ - Hypertension / 906236902 / Confirmed

CHF - Congestive heart failure / 723956929 / Confirmed

CHF - Congestive heart failure / 686726450 / Confirmed

Depression / 746376562 / Confirmed

dm / 235185698 / Confirmed

Down syndrome / 73285000 / Confirmed

GERD - Gastro-esophageal reflux disease / 4258362265 / Confirmed

H/O: schizophrenia / 246292719 / Confirmed

HTN / 401.9 / Confirmed

ICD (implantable cardiac defibrillator) battery depletion / V53.32 / Confirmed

NIDDM / 683058238 / Confirmed

Obese build / 975212926 / Confirmed

Schizophrenia / 28407556 / Confirmed

Short of breath on exertion / 3462018008 / Confirmed

Sleep apnea / 134537112 / Confirmed,

Active Problems (16)

BP+ - Hypertension 

CHF - Congestive heart failure 

CHF - Congestive heart failure 

Depression 

dm 

Down syndrome 

GERD - Gastro-esophageal reflux disease 

H/O: schizophrenia 

HTN 

ICD (implantable cardiac defibrillator) battery depletion 

NIDDM 

Obese build 

Schizophrenia 

Short of breath on exertion 

Sleep apnea 

[D]Anterior chest wall pain 





Histories

Past Medical History:

Active

BP+ - Hypertension (460505118)

CHF - Congestive heart failure (566833768)

NIDDM (882058368)

Obese build (513234568)

Sleep apnea (242518174)

GERD - Gastro-esophageal reflux disease (5284023733)

ICD (implantable cardiac defibrillator) battery depletion (V53.32)

Depression (707749857)

HTN (401.9)

dm (780882604)

Schizophrenia (09154372)

Resolved

Cardiomyopathy (799498584):  Resolved.

Anxiety (08859032):  Resolved.

Family History:

Type 2 diabetes mellitus

Father



Procedure history:

torn ligament (SNOMED CT 069785337).

Comments:

                                        2013 04:51 - Maya Barnard RN

left knee ligament surgery

ICD - Internal cardiac defibrillator procedure (SNOMED CT 946252671).

Comments:

                                        10/21/2013 19:28 - Catie Mejia

placed 2.5years ago

Social History



Social & Psychosocial Habits







Alcohol

                                        2017  Use: Never



Substance Abuse

                                        2017  Use: Current

  Type: Cocaine

  Route Inhaled

  Amount uses 5x a year



Tobacco

                                        2017  Use: Former smoker

  Type: Cigarettes

  Tobacco use per day: 0

  Number of years: 2

  Total pack years: 2

  Started at age: 22.0 Years

  Stopped at age: 24 Years

  Previous treatment: None

  Ready to change: No

  Concerns about tobacco use in household: No

  Exposure to Tobacco Smoke None

  Cigarette Smoking Last 365 Days No

  Reg Smoking Cessation Counseling No

                                        .



Physical Examination

VS/Measurements

Measurements from flowsheet : Measurements

                                        2017 12:59

Heparin Dosing Weight (kg)

                                        82.39

                                        2017 12:56

Height

                                        170.18 cm



Height Collection Method

Stated



Weight

                                        106.818 kg



Dosing Weight Difference Percent

                                        5.381 %



Dosing Weight Collection Method

Estimated



Body Surface Area

                                        2.2471 m2



Body Mass Index

                                        36.88 m2



,

Vital Signs (last 24 hrs)_____Last Charted___________

Temp Oral98.8 DegF  (AUG 16 17:)

Heart Rate Uatndqkmsj10 bpm  (AUG 16 17:)

Resp Rate    17 BRMIN  (AUG 16 17:)

SUZ899 mmHg  (AUG 16 17:)

DBPH 92mmHg  (AUG 16 17:)

CoT732 %  (AUG 16 17:)

Dufkee280.81 kg  (AUG 16 12:56)

Vtaomi681.18 cm  (AUG 16 12:)

BMI36.88  (AUG 16 12:)



General:  Alert and oriented, No acute distress.

Neck:  Supple.

Respiratory:  Lungs are clear to auscultation, Respirations are non-labored, 
Symmetrical chest wall expansion, No chest wall tenderness.

Cardiovascular:  Normal rate, Normal peripheral perfusion, No edema.

Gastrointestinal:  Soft, Non-tender, Non-distended.

Musculoskeletal

Normal range of motion.

Integumentary:  Warm, Pink.

Neurologic:  Alert, Oriented.

Psychiatric:  Cooperative.



Review / Management

Results review:

Labs (Last four charted values)

WBC                 7.8(AUG 16)

Hgb                 15.6(AUG 16)

Hct                 46.1(AUG 16)

Plt                 206(AUG 16)

Na                  138(AUG 16)

K                   3.9(AUG 16)

CO2                 31(AUG 16)

Cl                  102(AUG 16)

Cr                  H 1.64(AUG 16)

BUN                 16(AUG 16)

Glucose Random      H 143(AUG 16)

Ca                  L 8.4(AUG 16)

Troponin            0.03(AUG 16)

CK MB               2.8(AUG 16)

Total CK            2494(AUG 16).



Impression and Plan



Chest pain rule out acute coronary syndrome at this time EKG cardiac enzymes are
 negative we will do serial cardiac enzymes will continue daily aspirin and 
statin as needed morphine oxygen Nitrostat sublingual since the patient has a 
history of advanced congestive heart failure and multiple risk factors we will 
consult Dr. Anderson the cardiologist for further evaluation and management



Acute rhabdomyolysis we will continue IV fluids recheck CK level in a.m. 1 L 
bolus has been given in  the ER



History of chronic atrial fibrillation on Xarelto at this time heart rate is a 
stable we will continue home medications once available and continue  Xarelto



History of advanced congestive heart failure combined systolic and diastolic 
status post pacemaker and defibrillator placement we will continue 
anticoagulation with Xarelto will resume home medications at this time 
congestive heart failure is a stable without any decompensation



History of for chronic kidney disease stage III at this time stable we will 
monitor continue with IV fluid hydration recheck BMP in a.m.



History of diabetes mellitus type 2 uncontrolled with hemoglobin A1c in  was
 more than 10 we will continue sliding scale insulin and resume insulin home 
dose once available



History of schizophrenia at this time stable we will resume home meds once 
available



Anticoagulation with Xarelto no need of further anticoagulation prophylaxis









 



                           Addendum                  History of cocaine abuse and he used it 2 days ago will avoid beta blockers

 will get stat



                           by Hong,                 urine drug screen



                                         Bernardo Rowell MD on 



                                         2017 



                                         18:15

## 2019-09-25 NOTE — XMS REPORT
Summary of Care: 18 - 18

                             Created on: 2097



AJJOANNA CRUZ

External Reference #: [id number]

: 1965

Sex: Male



Demographics







                          Address                   [address] [place] [address]  [address]

 

                          Home Phone                [phone]

 

                          Preferred Language        English

 

                          Marital Status            Single

 

                          Christian Affiliation     V[employer]

 

                          Race                      White

 

                                        Additional Race(s)  

 

                          Ethnic Group              Non-





Author







                          Author                    [facility]

 

                          Organization              [facility]

 

                          Address                   Unknown

 

                          Phone                     Unavailable







Encounter





HQ Shyann(FIN) [id number] Date(s): 18 - 18

[facility] [address] [place] [address]-     [phone]


Encounter Diagnosis

AF (atrial fibrillation) (Discharge Diagnosis) - 18

Acute chest pain (Discharge Diagnosis) - 18

Discharge Disposition: Home or Self Care

Attending Physician: Jose Banks MD





Vital Signs







  



                     Most recent to      1                   2



                                         oldest [Reference  



                                         Range]:  

 

  



                           Temperature Oral          97.8 DegF 



                           [96.4-99.1 DegF]          (18 6:00 PM) 

 

  



                     Blood Pressure      149/105 mmHg        136/108 mmHg



                     [/60-90 mmHg]     *HI*                (18 6:00 PM)



                                         (18 9:56 PM) 

 

  



                     Respiratory Rate     20 BRMIN            20 BRMIN



                     [14-20 BRMIN]       (18 9:56 PM)     (18 6:00 PM)

 

  



                     Peripheral Pulse     85 bpm              80 bpm



                     Rate [ bpm]     (18 9:56 PM)     (18 6:00 PM)

 

  



                           Weight                    109.091 kg 



                                         (18 6:00 PM) 







Problem List







    



              Condition     Effective Dates     Status       Health Status     Informant

 

    



                     [D]Anterior chest     12             Active  



                                         wall pain(Confirmed)    

 

    



                           Anxiety(Confirmed)        Active  

 

    



                           Atrial                    Active  



                                         fibrillation(Confirm    



                                         ed)    

 

    



                           BP+ -                     Active  



                                         Hypertension(Confirm    



                                         ed)    

 

    



                           Pacemaker(Confirmed)      Active  

 

    



                           Cardiomyopathy(Confi      Resolved  



                                         rmed)    

 

    



                           CHF - Congestive          Active  



                                         heart    



                                         failure(Confirmed)    

 

    



                           CHF - Congestive          Active  



                                         heart    



                                         failure(Confirmed)    

 

    



                           Chronic renal             Active  



                                         failure(Confirmed)    

 

    



                           Down                      Active  



                                         syndrome(Confirmed)    

 

    



                           GERD -                    Active  



                                         Gastro-esophageal    



                                         reflux    



                                         disease(Confirmed)    

 

    



                           H/O:                      Active  



                                         schizophrenia(Confir    



                                         med)    

 

    



                           HTN(Confirmed)            Active  

 

    



                           ICD (implantable          Active  



                                         cardiac    



                                         defibrillator)    



                                         battery    



                                         depletion(Confirmed)    

 

    



                           NIDDM(Confirmed)          Active  

 

    



                           Obese                     Active  



                                         build(Confirmed)    

 

    



                           Schizophrenia(Confir      Active  



                                         med)    

 

    



                           Short of breath on        Active  



                                         exertion(Confirmed)    

 

    



                           Sleep                     Active  



                                         apnea(Confirmed)    

 

    



                           Sleep                     Active  



                                         apnea(Confirmed)    







Allergies, Adverse Reactions, Alerts







   



                 Substance       Reaction        Severity        Status

 

   



                           NKDA                      Active

 

   



                           Sodium Chloride Compound       Active







Medications





furosemide

20 mg, Route: IVP, Drug form: INJ, ONCE, Dosing Weight 109.091, kg, Priority: ST
AT, Start date: 18 20:38:00 CDT, Stop date: 18 20:38:00 CDT

Start Date: 18

Stop Date: 18

Status: Completed



Results





ELECTROLYTES





  



                     Most recent to      1                   2



                                         oldest [Reference  



                                         Range]:  

 

  



                           Sodium Lvl [135-145       137 mEq/L 



                           mEq/L]                    (18 7:01 PM) 

 

  



                           Potassium Lvl             3.9 mEq/L 



                           [3.5-5.1 mEq/L]           (18 7:01 PM) 

 

  



                           Chloride Lvl [      106 mEq/L 



                           mEq/L]                    (18 7:01 PM) 

 

  



                           CO2 [24-32 mEq/L]         23 mEq/L 



                                         *LOW* 



                                         (18 7:01 PM) 

 

  



                           AGAP [10.0-20.0           11.9 mEq/L 



                           mEq/L]                    (18 7:01 PM) 







CHEM PANEL





  



                     Most recent to      1                   2



                                         oldest [Reference  



                                         Range]:  

 

  



                           Creatinine Lvl            1.30 mg/dL 



                           [0.50-1.40 mg/dL]         (18 7:01 PM) 

 

  



                           eGFR                      62 mL/min/1.73m2 1 



                                         *NA* 



                                         (18 7:01 PM) 

 

  



                           BUN [7-22 mg/dL]          15 mg/dL 



                                         (18 7:01 PM) 

 

  



                           B/C Ratio [6-25]          12 



                                         (18 7:01 PM) 

 

  



                           Glucose Lvl [70-99        80 mg/dL 



                           mg/dL]                    (18 7:01 PM) 

 

  



                           Total Protein             7.3 g/dL 



                           [6.4-8.4 g/dL]            (18 7:01 PM) 

 

  



                           Albumin Lvl [3.5-5.0      3.8 g/dL 



                           g/dL]                     (18 7:01 PM) 

 

  



                           Globulin [2.7-4.2         3.5 g/dL 



                           g/dL]                     (18 7: PM) 

 

  



                           A/G Ratio [0.7-1.6]       1.1 



                                         (18 7: PM) 

 

  



                           Calcium Lvl               8.3 mg/dL 



                           [8.5-10.5 mg/dL]          *LOW* 



                                         (18 7: PM) 

 

  



                           Phosphorus [2.5-4.5       3.5 mg/dL 



                           mg/dL]                    (18 7: PM) 

 

  



                           Magnesium Lvl             1.8 mg/dL 



                           [1.8-2.4 mg/dL]           (18 7: PM) 

 

  



                           ALT [0-65 unit/L]         38 unit/L 



                                         (18 7 PM) 

 

  



                           AST [0-37 unit/L]         28 unit/L 



                                         (18 7: PM) 

 

  



                           Alk Phos [          158 unit/L 



                           unit/L]                   *HI* 



                                         (18 7: PM) 

 

  



                           Bili Total [0.2-1.3       1.3 mg/dL 



                           mg/dL]                    (18 7:01 PM) 

 

  



                           Lipase Lvl [        87 unit/L 



                           unit/L]                   (18 7: PM) 







1Result Comment: The eGFR is calculated using the CKD-EPI formula. In most 
young, healthy individuals the eGFR will be >90 mL/min/1.73m2. The eGFR declines
with age. An eGFR of 60-89 may be normal in some populations, particularly the 
elderly, for whom the CKD-EPI formula has not been extensively validated. Use of
the eGFR is not recommended in the following populations:



Individuals with unstable creatinine concentrations, including pregnant patients
and those with serious co-morbid conditions.



Patients with extremes in muscle mass or diet. 



The data above are obtained from the National Kidney Disease Education Program (
NKDEP) which additionally recommends that when the eGFR is used in patients with
extremes of body mass index for purposes of drug dosing, the eGFR should be mul
tiplied by the estimated BMI.



CARDIAC ENZYMES





  



                     Most recent to      1                   2



                                         oldest [Reference  



                                         Range]:  

 

  



                           Total CK [          262 unit/L 



                           unit/L]                   *HI* 



                                         (18 7:01 PM) 

 

  



                           CK MB [0.5-3.6            2.6 ng/mL 



                           ng/mL]                    (18 7:01 PM) 

 

  



                           CK MB Index               1.0 



                           [0.0-2.5]                 (18 7:01 PM) 

 

  



                     Troponin-I          0.03 ng/mL          0.02 ng/mL



                     [0.00-0.40 ng/mL]     (18 9:02 PM)     (18 7:01 PM)







HEMATOLOGY





  



                     Most recent to      1                   2



                                         oldest [Reference  



                                         Range]:  

 

  



                           WBC [3.7-10.4 K/CMM]      10.2 K/CMM 



                                         (18 6:50 PM) 

 

  



                           RBC [4.70-6.10            4.56 M/CMM 



                           M/CMM]                    *LOW* 



                                         (18 6:50 PM) 

 

  



                           Hgb [14.0-18.0 g/dL]      13.7 g/dL 



                                         *LOW* 



                                         (18 6:50 PM) 

 

  



                           Hct [42.0-54.0 %]         41.0 % 



                                         *LOW* 



                                         (18 6:50 PM) 

 

  



                           MCV [80.0-94.0 fL]        89.8 fL 



                                         (18 6:50 PM) 

 

  



                           MCH [27.0-31.0 pg]        30.0 pg 



                                         (18 6:50 PM) 

 

  



                           MCHC [32.0-36.0           33.4 g/dL 



                           g/dL]                     (18 6:50 PM) 

 

  



                           RDW [11.5-14.5 %]         18.3 % 



                                         *HI* 



                                         (18 6:50 PM) 

 

  



                           MPV [7.4-10.4 fL]         7.9 fL 



                                         (18 6:50 PM) 

 

  



                           Platelet [133-450         210 K/CMM 



                           K/CMM]                    (18 6:50 PM) 

 

  



                           Segs [45.0-75.0 %]        81.0 % 



                                         *HI* 



                                         (18 6:50 PM) 

 

  



                           Lymphocytes               11.3 % 



                           [20.0-40.0 %]             *LOW* 



                                         (18 6:50 PM) 

 

  



                           Monocytes [2.0-12.0       6.8 % 



                           %]                        (18 6:50 PM) 

 

  



                           Eosinophils [0.0-4.0      0.8 % 



                           %]                        (18 6:50 PM) 

 

  



                           Basophils [0.0-1.0        0.1 % 



                           %]                        (18 6:50 PM) 

 

  



                           Segs-Bands #              8.3 K/CMM 



                           [1.5-8.1 K/CMM]           *HI* 



                                         (18 6:50 PM) 

 

  



                           Lymphocytes #             1.2 K/CMM 



                           [1.0-5.5 K/CMM]           (18 6:50 PM) 

 

  



                           Monocytes # [0.0-0.8      0.7 K/CMM 



                           K/CMM]                    (18 6:50 PM) 

 

  



                           Eosinophils #             0.1 K/CMM 



                           [0.0-0.5 K/CMM]           (18 6:50 PM) 

 

  



                           Basophils # [0.0-0.2      0.0 K/CMM 



                           K/CMM]                    (18 6:50 PM) 

 

  



                           PT [12.0-14.7             18.4 seconds 



                           seconds]                  *HI* 



                                         (18 7:01 PM) 

 

  



                           INR [0.85-1.17]           1.52 



                                         *HI* 



                                         (18 7:01 PM) 

 

  



                           PTT [22.9-35.8            38.8 seconds 



                           seconds]                  *HI* 



                                         (18 7:01 PM) 







Immunizations





Given and Recorded





   



                 Vaccine         Date            Status          Refusal Reason

 

   



                     pneumococcal 23-valent vaccine     3/16/18             Given 

 

   



                     influenza virus vaccine, inactivated     3/16/18             Given 

 

   



                     influenza virus vaccine, inactivated     10/1/13             Given 









Not Given





   



                 Vaccine         Date            Status          Refusal Reason

 

   



                 pneumococcal 23-valent vaccine     17         Not Given       Patient Refuses







Procedures







    



              Procedure     Date         Related Diagnosis     Body Site     Status

 

    



                           ICD - Internal cardiac defibrillator        Completed



                                         procedure1    

 

    



                           torn ligament2            Completed







1placed 2.5years ago



2left knee ligament surgery



Social History







 



                           Social History Type       Response

 

 



                           Substance Abuse           Use: Past.  Type: Cocaine.  Recreational Drug Route: Inhaled.



 

 



                           Alcohol                   Never

 

 



                           Smoking Status            Former smoker; Type: Cigarettes; Previous treatment: None; Ready

 to change:



                                         No; Concerns about tobacco use in household: No; Exposure to Tobacco Smoke



                                         None; Cigarette Smoking Last 365 Days No; Reg Smoking Cessation Counseling



                                         No; Tobacco use per day: 0; Number of years: 2; Total pack years: 2;



                                         Started at age: 22.0; Stopped at age: 24;



                                         entered on: 18







Assessment and Plan





No data available for this section

## 2019-09-25 NOTE — XMS REPORT
Continuity of Care Document

                             Created on: 2019



JOANNA VASQUEZ

External Reference #: [id number]

: 1965

Sex: Male



Demographics







                          Address                   [address] [place] [address]

[place]  [address]

 

                          Home Phone                [phone]

 

                          Preferred Language        English

 

                          Marital Status            Unknown

 

                          [employer] Affiliation     Unknown

 

                          Race                      Unknown

 

                          Ethnic Group              Unknown





Author







                          Author                    [employer]

 

                          Address                   Unknown

 

                          Phone                     Unavailable







Care Team Providers







                    Care Team Member Name    Role                Phone

 

                    [employer]    Unavailable         Unavailable



                                    



Problems

                    





                    Problem                            Status                            Onset Date     

                          Classification                            Date Reported       

                          Comments                            Source                    

 

                    AF                                                        2018                

                                                07/10/2018                                     

                                        [facility]                    

 

                    Acute chest pain                                                        2018  

                                                      07/10/2018                     

                                                      [facility]                    

 

                    CHEST PAIN                            Active                            2018  

                                                                                       

                                        [facility]              

      

 

                    Chest pain, unspecified                                                        2018                   

                                                      [facility]                    

 

                    PALPITATION                            Active                            2018 

                                                                                       

                                        [facility]                    

 

                          ACUTE CHEST PAIN, ATRIAL FIBRILLATION WI                            Active        

                    2018                                                         

                                                       [facility]                  

  

 

                    Precordial pain                                                        2018                      

                                                      [facility]                    

 

                    DIZZINESS                            Active                            2018   

                                                                                       

                                        [facility]                    

 

                          ATYPICAL CHEST PAIN, ATRIAL FIBRILLATION                            Active        

                    10/27/2017                                                         

                                                       [facility]                  

  

 

                          Discharge Diagnosis: Cocaine abuse                                                

                    2017                                                        10/02/2017   

                                                      [facility]                    



 

                          Discharge Diagnosis: Atypical chest pain                                          

                    2017                                                        10/02/2017

                                                        [facility]                 

   

 

                    CHEST PAIN/SOB                            Active                            2017

                                                                                       

                                        [facility]                    

 

                          RHABDOMYOLYSIS, CHEST TIGHTNESS, ACUTE O                            Active        

                    2017                                                         

                                                       [facility]                  

  

 

                    SYNCOPE                            Active                            2017     

                                                                                       

                                        [facility]                    

 

                          ATRIAL FIBRILLATION WITH RVR                            Active                    

                    2017                                                                     

                                                      [facility]                    

 

                    Discharge Diagnosis: Dyspnea                                                        

2017         

                                                      [facility]                    

 

                          Discharge Diagnosis: Acute pulmonary edema                                        

                    2017

                                                        [facility]                 

   

 

                          Discharge Diagnosis: Nasal congestion                                             

                    2017

                                                        [facility]                 

   

 

                    DIFF. BREATHING                            Active                            2017

                                                                                       

                                        [facility]                    

 

                    CHF I50.22 I42.9                            Active                            2016

                                                                                       

                                        [facility]                    

 

                    HYPOTENSION                            Active                            2015 

                                                                                       

                                        [facility]                    

 

                    ABD PAIN                            Active                            2014    

                                                                                       

                                        [facility]                    

 

                    SHORTNESS OF BREATH                            Active                            12/15/2013

                                                                                       

                                        [facility]                    

 

                    SUICIDAL IDEATION                            Active                            2013

                                                                                       

                                        [facility]                    

 

                          FOOD POISIONING / CHEST PAIN                            Active                    

                    2013                                                                     

                                                      [facility]                    

 

                    SOB                            Active                            2013         

                                                                                        

                                        [facility]                    

 

                          ACUTE CHF EXACERBATION, DYSPNEA                            Active                 

                    2013                                                                  

                                                      [facility]                    

 

                    CHF CHEST PAIN                            Active                            10/29/2013

                                                                                       

                                        [facility]                    

 

                    ACUTE CHF EXACERBATION                            Active                            

10/11/2013                                                                           

                                                      [facility]                    

 

                    IRREGULAR HEART RATE                            Active                            10/11/2013

                                                                                       

                                        [facility]                    

 

                    ABDOMINAL PAIN                            Active                            2013

                                                                                       

                                        [facility]                    

 

                    DYSPNEA,CHEST PAIN                            Active                            2013

                                                                                       

                                        [facility]                    

 

                          CHEST PAIN, CHF EXACERBATION                            Active                    

                    2013                                                                     

                                                      [facility]                    

 

                          FEELING ANXIOUS AND CAN'T SLEEP                            Active                 

                    2012                                                                  

                                                      [facility]                    

 

                          [D]Anterior chest wall pain                            Active                     

                    2012                            Problem                            07/10/2018

                                                        [facility]                    

 

                          [D]Anterior chest wall pain                            Active                     

                    2012                            Problem                            10/04/2013

                                                        [facility]             

       

 

                    FOREIGN OBJECT IN EAR                            Active                            2012

                                                                                       

                                        [facility]                    

 

                    ICD battery depletion                            Active                             

                           Problem                            2014             

                                                      [facility]

                    

 

                    Anxiety                            Active                                           

                    Problem                            07/10/2018                             

                                        [facility]          

          

 

                    BP+ - Hypertension                            Active                                

                          Problem                            07/10/2018                

                                                      [facility]

                    

 

                    Cardiomyopathy                            Resolved                                  

                          Problem                            07/10/2018                  

                                                      [facility] 

                   

 

                          CHF - Congestive heart failure                            Active                  

                                                Problem                            07/10/2018    

                                                      [facility]                    

 

                    Depression                            Active                                        

                          Problem                            2017                        

                                                      [facility]       

             

 

                    dm                            Active                                                

                    Problem                            2017                                  

                                        [facility]               

     

 

                    Down syndrome                            Active                                     

                          Problem                            07/10/2018                     

                                                      [facility]    

                

 

                          GERD - Gastro-esophageal reflux disease                            Active         

                                                Problem                            07/10/2018

                                                        [facility]                    

 

                    H/O: schizophrenia                            Active                                

                          Problem                            07/10/2018                

                                                      [facility]

                    

 

                    HTN                            Active                                               

                    Problem                            2013                                 

                                        [facility]              

      

 

                    NIDDM                            Active                                             

                    Problem                            07/10/2018                               

                                        [facility]            

        

 

                    Obese build                            Active                                       

                          Problem                            07/10/2018                       

                                                      [facility]      

              

 

                    Sleep apnea                            Active                                       

                          Problem                            07/10/2018                       

                                                      [facility]      

              

 

                          CHF - Congestive heart failure                            Active                  

                                                Problem                            10/04/2013    

                                                      [facility]                 

   

 

                    Down syndrome                            Active                                     

                          Problem                            10/04/2013                     

                                                      [facility]                    

 

                    H/O: schizophrenia                            Active                                

                          Problem                            10/04/2013                

                                                      [facility]                    

 

                    BP+ - Hypertension                            Resolved                              

                          Problem                            10/04/2013              

                                                      [facility]                    

 

                          GERD - Gastro-esophageal reflux disease                            Resolved       

                                                 Problem                            10/04/2013

                                                        [facility]             

       

 

                    NIDDM                            Resolved                                           

                    Problem                            10/04/2013                             

                                        [facility]                    

 

                    Obese build                            Resolved                                     

                          Problem                            10/04/2013                     

                                                      [facility]                    

 

                    Sleep apnea                            Active                                       

                          Problem                            10/04/2013                       

                                                      [facility]                    

 

                    Depression                            Resolved                                      

                          Problem                            10/04/2013                      

                                                      [facility]                    

 

                    dm                            Active                                                

                    Problem                            10/04/2013                                  

                                        [facility]                    

 

                    HTN                            Active                                               

                    Problem                            07/10/2018                                 

                                        [facility]              

      

 

                          Short of breath on exertion                            Active                     

                                                Problem                            07/10/2018       

                                                      [facility]                    

 

                          ICD battery depletion(Confirmed)                            Active                

                                                Problem                            07/10/2018  

                                                      [facility]  

                  

 

                    Schizophrenia                            Active                                     

                          Problem                            07/10/2018                     

                                                      [facility]    

                

 

                    Atrial fibrillation                            Active                               

                          Problem                            07/10/2018               

                                                      [facility]                    

 

                    Pacemaker                            Active                                         

                          Problem                            07/10/2018                         

                                                      [facility]                    

 

                    Chronic renal failure                            Active                             

                           Problem                            07/10/2018             

                                                      [facility]                    

 

                    Chronic atrial fibrillation                                                         

                                                       2018                    

                                                      [facility]                    

 

                    Dilated cardiomyopathy                                                              

                                                      2018                         

                                                      [facility]                    

 

                                        Hypertensive heart and chronic kidney disease with heart failure and stage 1 through

 stage 4 chronic kidney disease, or unspecified chronic kidney disease          
                                                                                         

                    2018                                                        [facility]

                    

 

                                        Type 2 diabetes mellitus with diabetic chronic kidney disease                   

                                                                                                  

                    2018                                                        [facility]   

                 

 

                          Chronic kidney disease, unspecified                                               

                                                                            2018            

                                                      [facility]                    

 

                          Chronic systolic heart failure                                                    

                                                                            2018                 

                                                      [facility]                    

 

                          Presence of automatic cardiac defibrillator                                       

                                                                            2018    

                                                      [facility]                    

 

                          Gastro-esophageal reflux disease without esophagitis                              

                                                                                  2018

                                                        [facility]                 

   

 

                    Schizophrenia, unspecified                                                          

                                                      2018                     

                                                      [facility]                    

 

                    Cough                                                                               

                                                2018                                            

                                        [facility]                    

 

                          Long term use of anticoagulants                                                   

                                                                            2018                

                                                      [facility]                    

 

                    Other long term drug therapy                                                        

                                                        2018                   

                                                      [facility]                    

 

                          Morbid obesity due to excess calories                                             

                                                                            2018          

                                                      [facility]                    

 

                          Patient's other noncompliance with medication regimen                             

                                                                                   2018

                                                        [facility]                 

   

 

                    Encounter for immunization                                                          

                                                      2018                     

                                                      [facility]                    

 

                          Obstructive sleep apnea (pediatric)                                               

                                                                            2018            

                                                      [facility]                    

 

                          Type 2 diabetes mellitus without complications                                    

                                                                            2018 

                                                       [facility]                  

  

 

                          Personal history of nicotine dependence                                           

                                                                            2018        

                                                      [facility]                    

 

                          Unspecified systolic heart failure                                                

                                                                            2018             

                                                      [facility]                    

 

                          Long term use of antithrombotics/antiplatelets                                    

                                                                            2018 

                                                       [facility]                  

  

 

                    Long term use of insulin                                                            

                                                      2018                       

                                                      [facility]                    

 

                          Presence of cardiac pacemaker                                                     

                                                                            2018                  

                                                      [facility]                    

 

                          Body mass index 29.0-29.9, adult                                                  

                                                                            2018               

                                                      [facility]                    

 

                    HEART FAILURE NOS                            Active                                 

                                                                                       

                                        [facility]                    

 

                    SYNCOPE AND COLLAPSE                            Active                              

                                                                                       

                                        [facility]                    

 

                    CHF NOS                            Active                                           

                                                                                              

                                        [facility]                    

 

                    SUICIDAL IDEATION                            Active                                 

                                                                                       

                                        [facility]                    

 

                    CHEST PAIN NOS                            Active                                    

                                                                                       

                                        [facility]                    



 

                          UNSPECIFIED ATRIAL FIBRILLATION                            Active                 

                                                                                                

                                                      [facility]                    

 

                          CHEST PAIN, UNSPECIFIED                            Active                         

                                                                                                      

                                        [facility]                    

 

                    OTHER CHEST PAIN                            Active                                  

                                                                                       

                                        [facility]                    



                                                                                
                                                                                
                                                                                
                                                                                
                                                                                
                                                                                
                                                                                
                                                                                
                                                                                
                                                                                
                                                                                
                                                                                
                                                                                
                                                                                
                                                                                
                                                                                
                                                                                
                                                                                
                                                                                
                                                                                
                                        



Medications

                    





                    Medication                            Details                            Route      

                          Status                            Patient Instructions         

                          Ordering Provider                            Order Date           

                                        Source                    

 

                          Furosemide                            20 mg, Route: IVP, Drug form: INJ, ONCE, Dosing

 Weight 109.091, kg, Priority: STAT, Start date: 18 20:38:00 CDT, Stop 
date: 18 20:38:00 CDT                                                        Inactive

                                                                                    2018

                                        [facility]                    

 

                          rivaroxaban                            20 mg, 1 tab, Route: PO, Drug form: TAB, Daily,

 Dosing Weight 106.364, kg, Start date: 18 9:00:00 CDT, Duration: 30 day, 
Stop date: 18 9:00:00 CDTNotes: (Same as: Xarelto) Administer with food   
                                                      Inactive                        

                                                                            2018                 

                                        [facility]                    

 

                          Digoxin                            125 microgram, 1 tab, Route: PO, Drug form: TAB,

 Daily, Dosing Weight 106.364, kg, Start date: 18 9:00:00 CDT, Duration: 
30 day, Stop date: 18 9:00:00 CDTNotes: Take on an Empty Stomach  (Same 
as: Lanoxin)                                                        Inactive         

                                                                            2018  

                                        [facility]                    

 

                          Saphris                            5 mg, Route: SL, Drug form: TAB, Daily, Dosing 

Weight 106.364, kg, Start date: 18 9:00:00 CDT, Duration: 30 day, Stop 
date: 18 9:00:00 CDT                                                        Inactive

                                                                                    2018

                                        [facility]                    

 

                          oxcarbazepine                            600 mg, 2 tab, Route: PO, Drug form: TAB,

 Bedtime, Dosing Weight 106.364, kg, Start date: 18 21:00:00 CDT, 
Duration: 30 day, Stop date: 18 21:00:00 CDTNotes: Do not crush or chew. 
(Same as: Trileptal)                                                        No Longer

 Active                                                                              

                          2018                            [facility]                    

 

                          Levemir                            Route: SUB-Q, Bedtime, Dosing Weight 106.364, kg,

 Start date: 18 21:00:00 CDT, Duration: 30 day, Stop date: 18 
21:00:00 CDT                                                        Inactive         

                                                                            2018  

                                        [facility]                    

 

                          Clonazepam                            1 mg, 1 tab, Route: PO, Drug form: TAB, Bedtime,

 Dosing Weight 106.364, kg, Start date: 18 21:00:00 CDT, Duration: 30 day,
 Stop date: 18 21:00:00 CDTNotes: (Same As: KlonoPIN)                     
                                                No Longer Active                                    

                                                2018                            [facility]                    

 

                          Coreg                            25 mg, 1 tab, Route: PO, Drug form: TAB, BID, Dosing

 Weight 106.364, kg, Start date: 18 21:00:00 CDT, Duration: 30 day, Stop 
date: 18 9:00:00 CDTNotes: Give with food. (Same As: Coreg)               
                                                No Longer Active                              

                                                      2018                     

                                        [facility]                    

 

                          atorvastatin                            40 mg, 1 tab, Route: PO, Drug form: TAB, Bedtime,

 Dosing Weight 106.364, kg, Start date: 18 21:00:00 CDT, Duration: 30 day,
 Stop date: 18 21:00:00 CDTNotes: (Same as: Lipitor)                      
                                                No Longer Active                                     

                                                2018                            [facility]                    

 

                          insulin glargine                            22 unit, 0.22 mL, Route: SUB-Q, Drug form:

 SOLN, Bedtime, Start date: 18 21:00:00 CDT, Duration: 30 day, Stop date: 
18 21:00:00 CDTNotes: (Same as: Lantus) Do not hold insulin without contac
ting prescriber  WASTE: F/P - Black; E - Municipal Trash Bin  "single patient 
use only"                                                        No Longer Active    

                                                                                2018

                                        [facility]                    

 

                          pantoprazole                            40 mg, 1 tab, Route: PO, Drug form: ECTAB,

 Daily, Dosing Weight 106.364, kg, Start date: 18 17:33:00 CDT, Duration: 
30 day, Stop date: 18 9:00:00 CDTNotes: Tablet should not be chewed or 
crushed. (Same as: Protonix)                                                        No

 Longer Active                                                                       

                          2018                            [facility]                    

 

                          Metformin                            850 mg, 1 tab, Route: PO, Drug form: TAB, BID,

 Dosing Weight 106.364, kg, Start date: 18 17:00:00 CDT, Duration: 30 day,
 Stop date: 18 9:00:00 CDTNotes: (Same as: Glucophage)  Take with meal    
                                                      No Longer Active                 

                                                                            2018          

                                        [facility]                    

 

                          Glipizide                            10 mg, 1 tab, Route: PO, Drug form: TAB, BID,

 Dosing Weight 106.364, kg, Start date: 18 17:00:00 CDT, Duration: 30 day,
 Stop date: 18 9:00:00 CDTNotes: (Same as: Glucotrol)  30 min before 
meals.                                                        No Longer Active       

                                                                             2018

                                        [facility]                    

 

                          Furosemide                            20 mg, 1 tab, Route: PO, Drug form: TAB, BID,

 Dosing Weight 106.364, kg, Start date: 18 17:00:00 CDT, Duration: 30 day,
 Stop date: 18 9:00:00 CDTNotes: (Same as: Lasix)  May cause GI upset.  
Give with food or milk.                                                        No Longer

 Active                                                                              

                          2018                            [facility]                    

 

                          NovoLOG FlexPen                            15 unit, Route: SUB-Q, TID-Before Meals,

 Dosing Weight 106.364, kg, Start date: 18 16:30:00 CDT, Duration: 30 day,
 Stop date: 18 11:30:00 CDT                                                  

                    Inactive                                                                         

                          2018                            [facility]                    

 

                          Humalog                            15 unit, 0.15 mL, Route: SUB-Q, Drug form: SOLN,

 TID-Before Meals, Start date: 18 16:30:00 CDT, Duration: 30 day, Stop 
date: 18 11:30:00 CDTNotes: (Same as: Humalog ) Roll in palms of hands 
gently;  Do not shake `vigorously. "Single Patient Use Only "     WASTE: F/P - 
Black; E - Municipal Trash Bin  Stable for 28 days at room temperature. Expires 
in _____ days from ______________Date                                              

                    No Longer Active                                                             

                          2018                            [facility]            

        

 

                          Dextrose 50% Syringe                            25 gm, 50 mL, Route: IVP, Drug Form:

 INJ, Dosing Weight 106.364, kg, PRN, PRN Blood Glucose Results, Start date: 
18 14:25:00 CDT, Duration: 30 day, Stop date: 18 14:24:00 CDT       
                                                      No Longer Active                    

                                                                            2018             

                                        [facility]                    

 

                          Glucagon                            1 mg, Route: IM, Drug form: PDR/INJ, PRN, Dosing

 Weight 106.364, kg, PRN Blood Glucose Results, Start date: 18 14:25:00 
CDT, Duration: 30 day, Stop date: 18 14:24:00 CDT                            

                            No Longer Active                                         

                                                2018                            [facility]

                    

 

                          Insulin Lispro                            3 unit, 0.03 mL, Route: SUB-Q, Drug form:

 SOLN, TID-Before Meals, Dosing Weight 106.364, kg, PRN Blood Glucose Results, 
Start date: 18 14:25:00 CDT, Duration: 30 day, Stop date: 18 14:24:
00 CDTNotes: (Same as: Humalog ) Roll in palms of hands gently;  Do not shake 
`vigorously. "Single Patient Use Only "     WASTE: F/P - Black; E - Municipal 
Trash Bin  Stable for 28 days at room temperature. Expires in _____ days from 
______________Date                                                        No Longer Active

                                                                                    2018

                                        [facility]                    

 

                          Tylenol                            650 mg, 2 tab, Route: PO, Drug form: TAB, Q4H, 

Dosing Weight 106.364, kg, PRN Pain 1-3/Temp > 100.4 F, Start date: 18 
14:18:00 CDT, Duration: 30 day, Stop date: 18 14:17:00 CDTNotes: Do not 
exceed 4 gm/day.  (Same as: Tylenol)                                               

                    No Longer Active                                                              

                          2018                            [facility]             

       

 

                          Sodium Chloride 0.9% (Bolus) IV                            500 mL, 500 ml/hr, Infuse

 Over: 1 hr, Route: IV, 500, Drug form: INJ, ONCE, Priority: STAT, Dosing Weight
 86.364 kg, Start date: 18 8:37:00 CDT, Stop date: 18 8:37:00 CDT   
                                                      Inactive                        

                                                                            2018                 

                                        [facility]                    

 

                          Cardizem                            10 mg, 2 mL, Route: IVP, Drug form: INJ, ONCE,

 Dosing Weight 86.364, kg, Priority: STAT, Start date: 18 8:20:00 CDT, 
Stop date: 18 8:20:00 CDTNotes: (Same as: Cardizem)                       
                                                Inactive                                              

                                                2018                            [facility]

                    

 

                          Aluminum Hydroxide / Magnesium Hydroxide / Simethicone                            

30 mL, Route: PO, Drug Form: SUSP, Dosing Weight 86.364, kg, ONCE, STAT, Start 
date: 18 8:18:00 CDT, Stop date: 18 8:18:00 CDTNotes: (aluminum 
hydroxide-magnesium hyd-simethicone [id number]/5ml 30 ml ud INA)              
                                                Inactive                                     

                                                2018                            [facility]                    

 

                          Aspirin                            325 mg, Route: PO, Drug form: TAB, ONCE, Dosing

 Weight 86.364, kg, Priority: STAT, Start date: 18 7:20:00 CDT, Stop date:
 18 7:20:00 CDT                                                        Inactive

                                                                                    2018

                                        [facility]                    

 

                          pantoprazole                            40 mg, 1 tab, Route: PO, Drug form: ECTAB,

 Daily, Dosing Weight 72.727, kg, Start date: 18 9:00:00 CDT, Duration: 30
 day, Stop date: 04/15/18 9:00:00 CDTNotes: Tablet should not be chewed or cr
ushed. (Same as: Protonix)                                                        Inactive

                                                                                    2018

                                        [facility]                    

 

                          Saphris                            5 mg, Route: SL, Drug form: TAB, Daily, Dosing 

Weight 72.727, kg, Start date: 18 9:00:00 CDT, Duration: 30 day, Stop 
date: 04/15/18 9:00:00 CDT                                                        Inactive

                                                                                    2018

                                        [facility]                    

 

                          Digoxin                            125 microgram, 1 tab, Route: PO, Drug form: TAB,

 Daily, Dosing Weight 72.727, kg, Start date: 18 9:00:00 CDT, Duration: 30
 day, Stop date: 04/15/18 9:00:00 CDTNotes: Take on an Empty Stomach  (Same as: 
Lanoxin)                                                        Inactive             

                                                                            2018      

                                        [facility]                    

 

                          Tessalon Perles                            200 mg, 2 cap, Route: PO, Drug form: CAP,

 Q6H, Dosing Weight 72.727, kg, PRN Cough/Congestion, Start date: 18 
21:26:00 CDT, Duration: 30 day, Stop date: 04/15/18 21:25:00 CDTNotes: (Same As:
 Tessalon Perles) "Do Not Crush"                                                   

                    No Longer Active                                                                  

                          2018                            [facility]                 

   

 

                          Risperdal                            2 mg, 1 tab, Route: PO, Drug form: TAB, Bedtime,

 Dosing Weight 90.909, kg, Start date: 18 21:00:00 CDT, Duration: 30 day, 
Stop date: 18 21:00:00 CDTNotes: (Same as: Risperdal)                     
                                                Inactive                                            

                                                2018                            [facility]

                    

 

                          oxcarbazepine                            600 mg, 1 tab, Route: PO, Drug form: TAB,

 Bedtime, Dosing Weight 72.727, kg, Start date: 18 21:00:00 CDT, Duration:
 30 day, Stop date: 18 21:00:00 CDTNotes: (Same as: Trileptal)            
                                                      No Longer Active                         

                                                                            2018                  

                                        [facility]                    

 

                          Clonazepam                            1 mg, 1 tab, Route: PO, Drug form: TAB, Bedtime,

 Dosing Weight 72.727, kg, Start date: 18 21:00:00 CDT, Duration: 30 day, 
Stop date: 18 21:00:00 CDTNotes: (Same As: KlonoPIN)                      
                                                No Longer Active                                     

                                                2018                            [facility]                    

 

                          atorvastatin                            40 mg, 1 tab, Route: PO, Drug form: TAB, Bedtime,

 Dosing Weight 72.727, kg, Start date: 18 21:00:00 CDT, Duration: 30 day, 
Stop date: 18 21:00:00 CDTNotes: (Same as: Lipitor)                       
                                                No Longer Active                                      

                                                2018                            [facility]                    

 

                          insulin glargine                            22 unit, 0.22 mL, Route: SUB-Q, Drug form:

 SOLN, Bedtime, Start date: 18 21:00:00 CDT, Duration: 30 day, Stop date: 
18 21:00:00 CDTNotes: (Same as: Lantus) Do not hold insulin without contac
ting prescriber  WASTE: F/P - Black; E - [facility]  "single patient 
use only"                                                        No Longer Active    

                                                                                2018

                                        [facility]                    

 

                          Levemir                            Route: SUB-Q, Bedtime, Dosing Weight 72.727, kg,

 Start date: 18 21:00:00 CDT, Duration: 30 day, Stop date: 18 
21:00:00 CDT                                                        Inactive         

                                                                            2018  

                                        [facility]                    

 

                          Coreg                            25 mg, 1 tab, Route: PO, Drug form: TAB, BID, Dosing

 Weight 72.727, kg, Start date: 18 17:00:00 CDT, Duration: 30 day, Stop 
date: 04/15/18 9:00:00 CDTNotes: Give with food. (Same As: Coreg)               
                                                No Longer Active                              

                                                      2018                     

                                        [facility]                    

 

                          Glimepride 2 mg tab                            Glimepride 2 mg tab, 4 mg, Drug form:

 MISC, Route: PO, BID, 18 17:00:00 CDT, Duration: 30 day, Stop date: 
04/15/18 9:00:00 CDT                                                        Inactive 

                                                                                   2018

                                        [facility]                    

 

                          Furosemide                            20 mg, 1 tab, Route: PO, Drug form: TAB, BID,

 Dosing Weight 72.727, kg, Start date: 18 17:00:00 CDT, Duration: 30 day, 
Stop date: 04/15/18 9:00:00 CDTNotes: (Same as: Lasix)  May cause GI upset.  
Give with food or milk.                                                        No Longer

 Active                                                                              

                          2018                            [facility]                    

 

                          Glipizide                            10 mg, 1 tab, Route: PO, Drug form: TAB, BID,

 Dosing Weight 72.727, kg, Start date: 18 17:00:00 CDT, Duration: 30 day, 
Stop date: 04/15/18 9:00:00 CDTNotes: (Same as: Glucotrol)  30 min before meals.
                                                        No Longer Active             

                                                                            2018      

                                        [facility]                    

 

                          Metformin                            850 mg, 1 tab, Route: PO, Drug form: TAB, BID,

 Dosing Weight 72.727, kg, Start date: 18 17:00:00 CDT, Duration: 30 day, 
Stop date: 04/15/18 9:00:00 CDTNotes: (Same as: Glucophage)  Take with meal     
                                                      No Longer Active                  

                                                                            2018           

                                        [facility]                    

 

                          Insulin Lispro                            10 unit, 0.1 mL, Route: SUB-Q, Drug form:

 SOLN, TID-Before Meals, Dosing Weight 72.727, kg, PRN Blood Glucose Results, 
Start date: 18 16:15:00 CDT, Duration: 30 day, Stop date: 04/15/18 16:14:0
0 CDTNotes: (Same as: Humalog ) Roll in palms of hands gently;  Do not shake 
`vigorously. "Single Patient Use Only "     WASTE: F/P - Black; E - [facility] 
Trash [facility]  Stable for 28 days at room temperature. Expires in _____ days from 
______________Date                                                        No Longer Active

                                                                                    2018

                                        [facility]                    

 

                          Dextrose 50% Syringe                            12.5 gm, 25 mL, Route: IVP, Drug Form:

 INJ, Dosing Weight 72.727, kg, PRN, PRN Blood Glucose Results, Start date: 
18 16:15:00 CDT, Duration: 30 day, Stop date: 04/15/18 16:14:00 CDT       
                                                      No Longer Active                    

                                                                            2018             

                                        [facility]                    

 

                          Glucagon                            1 mg, Route: IM, Drug form: PDR/INJ, PRN, Dosing

 Weight 72.727, kg, PRN Blood Glucose Results, Start date: 18 16:15:00 
CDT, Duration: 30 day, Stop date: 04/15/18 16:14:00 CDT                            

                            No Longer Active                                         

                                                2018                            [facility]

                    

 

                          Tylenol                            650 mg, 2 tab, Route: PO, Drug form: TAB, Q4H, 

Dosing Weight 72.727, kg, PRN Pain 1-3/Temp > 100.4 F, Start date: 18 
16:08:00 CDT, Duration: 30 day, Stop date: 04/15/18 16:07:00 CDTNotes: Do not 
exceed 4 gm/day.  (Same as: Tylenol)                                               

                    No Longer Active                                                              

                          2018                            [facility]             

       

 

                          Humalog                            See Instructions, 15-25 units before meals depending

 on the blood sugar, 0 Refill(s)                                                   

                    Active                                                                            

                          2018                            [facility]                    

 

                          Levemir                            22units, SUB-Q, Bedtime, 0 Refill(s)           

                                                Active                                    

                                                2018                            [facility]                    

 

                          oxcarbazepine                            600 mg=1 tab, PO, Bedtime, # 30 tab, 0 Refill(s)

                                                        Active                       

                                                                            2018                

                                        [facility]                    

 

                          atorvastatin                            40 mg, PO, Bedtime, 0 Refill(s)           

                                                Active                                    

                                                2018                            [facility]                    

 

                          Glipizide                            10 mg, PO, BID, 0 Refill(s)                  

                                                Active                                           

                                                2018                            [facility]

                    

 

                          Metformin                            850 mg, PO, BID, 0 Refill(s)                 

                                                Active                                          

                                                2018                            [facility]

                    

 

                          Coreg                            25 mg, PO, BID, 0 Refill(s)                      

                                                Active                                               

                                                2018                            [facility]

                    

 

                          Asenapine 5 MG Sublingual Tablet [Saphris]                            5 mg=1 tab, 

SL, Daily, 0 Refill(s)                                                        Active 

                                                                                   2018

                                        [facility]                    

 

                          Digoxin                            125 microgram, PO, Daily, 0 Refill(s)          

                                                Active                                   

                                                 2018                            

[facility]                    

 

                    zolpidem                            Bedtime, 0 Refill(s)                            

                            Inactive                                                 

                                                2018                            [facility]  

                  

 

                          Furosemide                            20 mg, PO, BID, 0 Refill(s)                 

                                                Active                                          

                                                2018                            [facility]

                    

 

                          Spironolactone                            25 mg, 1 tab, Route: PO, Drug form: TAB,

 BID, Dosing Weight 90.909, kg, Start date: 18 9:00:00 CDT, Duration: 30 
day, Stop date: 18 17:00:00 CDTNotes: (Same As: Aldactone)                
                                                Inactive                                       

                                                2018                            [facility]

                    

 

                                        12 HR ranolazine 500 MG Extended Release Tablet [Ranexa]                        

                                        500 mg, 1 tab, Route: PO, Drug form: TAB, BID, Dosing Weight 90.909, kg, Start 

date: 18 9:00:00 CDT, Duration: 30 day, Stop date: 18 17:00:00 
CDTNotes: Same as Ranexa "Do Not Crush"                                            

                    Inactive                                                                   

                          2018                            [facility]                  

  

 

                          Metformin                            1,000 mg, 2 tab, Route: PO, Drug form: TAB, BID,

 Dosing Weight 90.909, kg, Start date: 18 9:00:00 CDT, Duration: 30 day, 
Stop date: 18 17:00:00 CDTNotes: (Same as: Glucophage)  Take with meal    
                                                      Inactive                         

                                                                            2018                  

                                        [facility]                    

 

                          Lisinopril                            2.5 mg, 0.5 tab, Route: PO, Drug form: TAB, 

Daily, Dosing Weight 90.909, kg, Start date: 18 9:00:00 CDT, Duration: 30 
day, Stop date: 18 9:00:00 CDTNotes: (Same as: Prinivil, Zestril)         
                                                      No Longer Active                      

                                                                            2018               

                                        [facility]                    

 

                          glimepiride                            4 mg, Route: PO, Drug form: TAB, BID, Dosing

 Weight 90.909, kg, Start date: 18 9:00:00 CDT, Duration: 30 day, Stop 
date: 18 17:00:00 CDT                                                        Inactive

                                                                                    2018

                                        [facility]                    

 

                          Furosemide 40 MG Oral Tablet [Lasix]                            40 mg, 1 tab, Route:

 PO, Drug form: TAB, BID, Dosing Weight 90.909, kg, Start date: 18 9:00:00
 CDT, Duration: 30 day, Stop date: 18 17:00:00 CDTNotes: (Same as: Lasix) 
 May cause GI upset.  Give with food or milk.                                      

                    Inactive                                                             

                          2018                            [facility]            

        

 

                          Digoxin 0.25 MG Oral Tablet                            0.25 mg, 1 tab, Route: PO, 

Drug form: TAB, Daily, Dosing Weight 90.909, kg, Start date: 18 9:00:00 
CDT, Duration: 30 day, Stop date: 18 9:00:00 CDTNotes: Take on an Empty 
Stomach  (Same as: Lanoxin)                                                        Inactive

                                                                                    2018

                                        [facility]                    

 

                          carvedilol                            18.75 mg, 3 tab, Route: PO, Drug form: TAB, 

Q12H, Dosing Weight 90.909, kg, Start date: 18 9:00:00 CDT, Duration: 30 
day, Stop date: 18 21:00:00 CDTNotes: Give with food. (Same As: Coreg)    
                                                      Inactive                         

                                                                            2018                  

                                        [facility]                    

 

                          Aspirin 81 MG Enteric Coated Tablet                            81 mg, 1 tab, Route:

 PO, Drug form: ECTAB, Daily, Dosing Weight 90.909, kg, Start date: 18 
9:00:00 CDT, Duration: 30 day, Stop date: 18 9:00:00 CDTNotes: Do not 
crush or chew. (Same As: Ecotrin)                                                  

                    No Longer Active                                                                 

                          2018                            [facility]                

    

 

                          Zofran                            4 mg, Route: IVP, Drug form: INJ, ONCE, Dosing Weight

 90.909, kg, Priority: STAT, Start date: 18 3:31:00 CDT, Stop date: 
18 3:31:00 CDT                                                        Inactive 

                                                                                   2018

                                        [facility]                    

 

                          Ondansetron                            4 mg, 2 mL, Route: IVP, Drug form: INJ, ONCE,

 Dosing Weight 90.909, kg, Priority: STAT, Start date: 18 3:07:00 CDT, 
Stop date: 18 3:07:00 CDTNotes: (Same as: Zofran)   *** MEDICATION WASTE *
** Product Size:  4 mg Product Wasted:  ___ mg                                     

                    Inactive                                                            

                          2018                            [facility]           

         

 

                          Morphine                            2 mg, 0.5 mL, Route: IVP, Drug form: INJ, ONCE,

 Dosing Weight 90.909, kg, Priority: STAT, Start date: 18 3:07:00 CDT, 
Stop date: 18 3:07:00 CDTNotes: (Same as:MORPhine Sulfate)                
                                                Inactive                                       

                                                2018                            [facility]

                    

 

                          Aspirin                            324 mg, 4 tab, Route: PO, Drug form: CHEWTAB, ONCE,

 Dosing Weight 90.909, kg, Priority: STAT, Start date: 18 3:07:00 CDT, 
Stop date: 18 3:07:00 CDTNotes: Take with food.                              

                          Inactive                                                   

                                                2018                            [facility]    

                

 

                          Saline Flush 0.9%                            10 mL, Route: IVP, Drug Form: INJ, Dosing

 Weight 90.909, kg, PRN, PRN Line Flush, Start date: 18 3:07:00 CDT, 
Duration: 30 day, Stop date: 04/15/18 3:06:00 CDTNotes: (Same as: BD Posiflush) 
                                                       No Longer Active              

                                                                            2018       

                                        [facility]                    

 

                          rivaroxaban                            20 mg, 1 tab, Route: PO, Drug form: TAB, Dinner,

 Dosing Weight 99.6, kg, Start date: 17 17:00:00 CST, Duration: 30 day, 
Stop date: 18 17:00:00 CSTNotes: (Same as: Xarelto) Administer with food  
                                                      Inactive                       

                                                                            2017                

                                        [facility]                    

 

                          tizanidine 2 mg oral capsule                            2 mg=1 cap, PO, Q8H, PRN for

 muscle spasm, # 21 cap, 0 Refill(s), Pharmacy: [facility] [id number]      
                                                  Active                               

                                                      2017                      

                                        [facility]                    

 

                                        potassium chloride 20 mEq oral tablet, extended release                         

                                        20 mEq=1 tab, PO, Daily, # 3 tab, 0 Refill(s), Pharmacy: [facility] [id number]

                                                        Active                       

                                                                            2017                

                                        [facility]                    

 

                          tizanidine 2 mg oral capsule                            2 mg=1 cap, PO, Q8H, PRN for

 muscle spasm, # 21 cap, 0 Refill(s), Pharmacy: [facility] [id number]      
                                                  Inactive                             

                                                       2017                    

                                        [facility]                    

 

                                        potassium chloride 20 mEq oral tablet, extended release                         

                                        20 mEq=1 tab, PO, Daily, # 3 tab, 0 Refill(s), Pharmacy: [facility] [id number]

                                                        Inactive                     

                                                                            2017              

                                        [facility]                    

 

                          K-Dur 20                            40 mEq, 2 tab, Route: PO, Drug form: ERTAB, ONCE,

 Start date: 17 10:00:00 CST, Stop date: 17 10:00:00 CSTNotes: (Same
 as: K-Dur 20) "Do Not Crush"  With food and full glass of water                
                                                Inactive                                       

                                                2017                            [facility]

                    

 

                          Lasix                            40 mg, 1 tab, Route: PO, Drug form: TAB, BID, Dosing

 Weight 99.6, kg, Start date: 17 9:00:00 CST, Duration: 30 day, Stop date:
 18 17:00:00 CSTNotes: (Same as: Lasix)  May cause GI upset.  Give with 
food or milk.                                                        Inactive        

                                                                            2017 

                                        [facility]                    

 

                          Coreg                            12.5 mg, 1 tab, Route: PO, Drug form: TAB, Q12H, 

Dosing Weight 99.6, kg, Start date: 17 9:00:00 CST, Duration: 30 day, Stop
 date: 18 21:00:00 CSTNotes: Give with food. (Same As: Coreg)             
                                                Inactive                                    

                                                2017                            [facility]                    

 

                          ranolazine                            500 mg, 1 tab, Route: PO, Drug form: TAB, BID,

 Dosing Weight 99.6, kg, Start date: 17 9:00:00 CST, Duration: 30 day, 
Stop date: 18 17:00:00 CSTNotes: Same as Ranexa "Do Not Crush"            
                                                Inactive                                   

                                                 2017                            

[facility]                    

 

                                        potassium chloride 20 mEq oral tablet, extended release                         

                                        40 mEq, 30 mL, Route: PO, Drug form: LIQ, ONCE, Dosing Weight 99.6, kg, Start date:

 17 8:36:00 CST, Stop date: 17 8:36:00 CSTNotes: (Same as: Potassium
 Chloride)                                                        Inactive           

                                                                            2017    

                                        [facility]                    

 

                                        pneumococcal capsular polysaccharide type 1 vaccine / pneumococcal capsular polysaccharide

 type 10A vaccine / pneumococcal capsular polysaccharide type 11A vaccine / 
pneumococcal capsular polysaccharide type 12F vaccine / pneumococcal capsular 
polysacchar                             0.5 mL, Route: IM, Drug Form: INJ, ONCALL, Start

 date: 17 6:38:10 CST, Stop date: 18 6:33:10 CSTNotes: (Same as: 
Pneumovax 23)  Refrigerate                                                        Inactive

                                                                                    2017

                                        [facility]                    

 

                          Insulin Lispro                            1 unit, 0.01 mL, Route: SUB-Q, Drug form:

 SOLN, Bedtime, Dosing Weight 99.6, kg, PRN Blood Glucose Results, Start date: 
17 6:10:00 CST, Duration: 30 day, Stop date: 18 6:09:00 CSTNotes: R
oll in palms of hands gently;  Do not shake `vigorously. (Same as: Humalog )  
"Single Patient Use Only "  WASTE: F/P - Black; E - Municipal Trash Bin  Stable 
for 28 days at room temperature. Expires in _____ days from ______________Date  
                                                      Inactive                       

                                                                            2017                

                                        [facility]                    

 

                          Glucagon                            1 mg, Route: IM, PRN, Dosing Weight 99.6, kg, 

PRN Blood Glucose Results, Start date: 17 6:10:00 CST, Duration: 30 day, 
Stop date: 18 6:09:00 CST                                                    

                    Inactive                                                                           

                          2017                            [facility]                    

 

                          Dextrose 50% Syringe                            50 mL, Route: IVP, Dosing Weight 99.6,

 kg, PRN, PRN Blood Glucose Results, Start date: 17 6:10:00 CST, Duration:
 30 day, Stop date: 18 6:09:00 CST                                           

                    Inactive                                                                  

                          2017                            [facility]                 

   

 

                          Insulin Lispro                            5 unit, 0.05 mL, Route: SUB-Q, Drug form:

 SOLN, TID-Before Meals, Dosing Weight 99.6, kg, PRN Blood Glucose Results, 
Start date: 17 6:09:00 CST, Duration: 30 day, Stop date: 18 6:08:00 
CSTNotes: Roll in palms of hands gently;  Do not shake `vigorously. (Same as: 
Humalog )  "Single Patient Use Only "  WASTE: F/P - Black; E - Municipal Trash 
Bin  Stable for 28 days at room temperature. Expires in _____ days from 
______________Date                                                        Inactive   

                                                                                 2017

                                        [facility]                    

 

                          Dextrose 50% Syringe                            25 gm, 50 mL, Route: IVP, Drug Form:

 INJ, Dosing Weight 99.6, kg, PRN, PRN Blood Glucose Results, Start date: 
17 6:09:00 CST, Duration: 30 day, Stop date: 18 6:08:00 CST         
                                                Inactive                                

                                                      2017                       

                                        [facility]                    

 

                          Glucagon                            1 mg, Route: IM, Drug form: PDR/INJ, PRN, Dosing

 Weight 99.6, kg, PRN Blood Glucose Results, Start date: 17 6:09:00 CST, 
Duration: 30 day, Stop date: 18 6:08:00 CST                                  

                      Inactive                                                         

                           2017                            [facility]        

            

 

                          Ondansetron                            4 mg, 2 mL, Route: IVP, Drug form: INJ, Q6H,

 Dosing Weight 99.6, kg, PRN Nausea & Vomiting, Start date: 17 4:38:00 
CST, Duration: 30 day, Stop date: 18 4:37:00 CSTNotes: (Same as: Zofran)  
 *** MEDICATION WASTE *** Product Size:  4 mg Product Wasted:  ___ mg           
                                                Inactive                                  

                                                      2017                         

                                        [facility]                    

 

                          Acetaminophen                            650 mg, 2 tab, Route: PO, Drug form: TAB,

 Q4H, Dosing Weight 99.6, kg, PRN Pain 1-3/Temp > 100.4 F, Start date: 17 
4:38:00 CST, Duration: 30 day, Stop date: 18 4:37:00 CSTNotes: Do not ex
ceed 4 gm/day.  (Same as: Tylenol)                                                 

                    Inactive                                                                        

                          2017                            [facility]                    

 

                          Trazodone Hydrochloride 50 MG Oral Tablet                            50 mg, 1 tab,

 Route: PO, Drug form: TAB, Bedtime, Dosing Weight 99.6, kg, Priority: STAT, 
Start date: 17 1:20:00 CST, Duration: 30 day, Stop date: 18 21:00:00
 CSTNotes: (Same As: Desyrel)                                                        

Inactive                                                                             

                          2017                            [facility]                    

 

                          Furosemide 40 MG Oral Tablet [Lasix]                            40 mg=1 tab, PO, BID,

 0 Refill(s)                                                        Active           

                                                                            2017    

                                        [facility]                    

 

                          digoxin 250 mcg (0.25 mg) oral tablet                            0.25 mg, PO, Daily,

 0 Refill(s)                                                        Active           

                                                                            2017    

                                        [facility]                    

 

                          Bisacodyl 5 MG Enteric Coated Tablet [Dulcolax]                            10 mg=2

 tab, PO, Daily, PRN Constipation, 0 Refill(s)                                     

                    Active                                                              

                          2017                            [facility]             

       

 

                          aspirin 81 mg tablet, enteric coated                            81 mg=1 tab, PO, Daily,

 # 90 tab, 3 Refill(s)                                                        Active 

                                                                                   2017

                                        [facility]                    

 

                          loratadine 10 mg oral tablet                            10 mg=1 tab, PO, Daily, 0 

Refill(s)                                                        Active              

                                                                            2017       

                                        [facility]                    

 

                          Acetaminophen 325 MG Oral Tablet [Tylenol]                            650 mg=2 tab,

 PO, Q4H, PRN Fever, 0 Refill(s)                                                   

                    Active                                                                            

                          2017                            [facility]                    

 

                          Zolpidem tartrate 5 MG Oral Tablet [Ambien]                            5 mg=1 tab,

 PO, Bedtime, PRN for sleep, 0 Refill(s)                                           

                    Active                                                                    

                          2017                            [facility]                   

 

 

                          spironolactone 25 mg oral tablet                            25 mg=1 tab, PO, BID, 

0 Refill(s)                                                        Active            

                                                                            2017     

                                        [facility]                    

 

                                        200 ACTUAT Albuterol 0.09 MG/ACTUAT / Ipratropium Bromide 0.018 MG/ACTUAT Metered

 Dose Inhaler [Combivent]                            2 puff, INHALATION, QID, # 29 

gm, 0 Refill(s)                                                        Active        

                                                                            2017 

                                        [facility]                    

 

                          glimepiride 4 mg oral tablet                            4 mg=1 tab, PO, BID, 0 Refill(s)

                                                        Active                       

                                                                            2017                

                                        [facility]                    

 

                                        Albuterol 0.833 MG/ML / Ipratropium Bromide 0.167 MG/ML Inhalant Solution [DuoNeb]

                                        3 mL, NEB, Q6H, PRN as needed for shortness of breath or

 wheezing, 0 Refill(s)                                                        Active 

                                                                                   2017

                                        [facility]                    

 

                                        12 HR ranolazine 500 MG Extended Release Tablet [Ranexa]                        

                          500 mg=1 tab, PO, BID, 0 Refill(s)                                               

                    Active                                                                        

                          2017                            [facility]                    

 

                          pantoprazole 40 mg oral enteric coated tablet                            40 mg=1 tab,

 PO, Daily, 0 Refill(s)                                                        Active

                                                                                    2017

                                        [facility]                    

 

                          Potassium Chloride                            40 mEq, 2 tab, Route: PO, Drug form:

 ERTAB, ONCE, Dosing Weight 101.364, kg, Priority: STAT, Start date: 17 
22:25:00 CST, Stop date: 17 22:25:00 CSTNotes: (Same as: K-Dur 20) "Do Not
 Crush"  With food and full glass of water                                         

                    Inactive                                                                

                          2017                            [facility]               

     

 

                          Aspirin 81 MG Chewable Tablet                            324 mg, 4 tab, Route: PO,

 Drug form: CHEWTAB, ONCE, Dosing Weight 101.364, kg, Priority: STAT, Start 
date: 17 20:53:00 CST, Stop date: 17 20:53:00 CSTNotes: Take with 
food.                                                        Inactive                

                                                                            2017         

                                        [facility]                    

 

                          Saline Flush 0.9%                            10 mL, Route: IVP, Drug Form: INJ, Dosing

 Weight 101.364, kg, PRN, PRN Line Flush, Start date: 17 20:47:00 CST, 
Duration: 30 day, Stop date: 18 20:46:00 CSTNotes: (Same as: BD Posiflush)
                                                        No Longer Active             

                                                                            2017      

                                        [facility]                    

 

                          torsemide                            20 mg, 2 tab, Route: PO, Drug form: TAB, Daily,

 Dosing Weight 101.364, kg, Start date: 17 9:00:00 CST, Duration: 30 day, 
Stop date: 17 9:00:00 CSTNotes: (Same As: Demadex)                        
                                                No Longer Active                                       

                                                2017                            [facility]                    

 

                          Lisinopril                            2.5 mg, 0.5 tab, Route: PO, Drug form: TAB, 

Daily, Dosing Weight 101.364, kg, Start date: 17 9:00:00 CST, Duration: 30
 day, Stop date: 17 9:00:00 CSTNotes: (Same as: Prinivil, Zestril)        
                                                      No Longer Active                     

                                                                            2017              

                                        [facility]                    

 

                          carvedilol                            18.75 mg, 1.5 tab, Route: PO, Drug form: TAB,

 Q12H, Dosing Weight 101.364, kg, Start date: 17 9:00:00 CST, Duration: 30
 day, Stop date: 17 21:00:00 CSTNotes: Give with food. (Same As: Coreg)   
                                                      No Longer Active                

                                                                            2017         

                                        [facility]                    

 

                          Geodon                            10 mg, Route: IM, Drug form: PDR/INJ, ONCE, Dosing

 Weight 101.364, kg, PRN Agitation, Start date: 17 6:38:00 CSTNotes: 
Reconstitute with 1.2 ml of sterile water.  Final concentration=20 mg/1ml. **** 
Maximum 40 mg/24 hours ***** (Same As: Geodon).   *** MEDICATION WASTE *** 
Product Size:  20 mg Product Wasted:  _10__ mg                                     

                    Inactive                                                            

                          2017                            [facility]       

             

 

                          Lorazepam                            1 mg, 1 tab, Route: PO, Drug form: TAB, ONCE,

 Dosing Weight 101.364, kg, Priority: NOW, Start date: 17 4:55:00 CST, 
Stop date: 17 4:55:00 CSTNotes: (Same as: Ativan)                            

                            Inactive                                                 

                                                2017                            [facility]

                    

 

                          Trazodone Hydrochloride 50 MG Oral Tablet                            50 mg, 1 tab,

 Route: PO, Drug form: TAB, Bedtime, Dosing Weight 101.364, kg, Start date: 
17 23:30:00 CST, Duration: 30 day, Stop date: 17 21:00:00 CSTNotes: 
(Same As: Desyrel)                                                        No Longer Active

                                                                                    2017

                                        [facility]                    

 

                          Risperdal                            2 mg, 2 tab, Route: PO, Drug form: TAB, Bedtime,

 Dosing Weight 101.364, kg, Start date: 17 23:30:00 CST, Duration: 30 day,
 Stop date: 17 21:00:00 CSTNotes: (Same as: Risperdal)                    
                                                No Longer Active                                   

                                                 2017                            

[facility]                    

 

                          Clonazepam                            1 mg, 2 tab, Route: PO, Drug form: TAB, Bedtime,

 Dosing Weight 101.364, kg, Start date: 17 23:30:00 CST, Duration: 30 day,
 Stop date: 17 21:00:00 CSTNotes: (Same As: KlonoPIN)                     
                                                No Longer Active                                    

                                                2017                            [facility]                    

 

                          morphine Sulfate                            6 mg, 3 mL, Route: PO, Drug form: SOLN,

 Q4H, PRN Pain Score 7-10, Start date: 17 22:13:00 CST, Duration: 30 day, 
Stop date: 17 22:12:00 CSTNotes: (Same as:MORPhine Sulfate)               
                                                No Longer Active                              

                                                      2017                     

                                        [facility]                    

 

                          Insulin Lispro                            4 unit, 0.04 mL, Route: SUB-Q, Drug form:

 SOLN, TID-Before Meals, Dosing Weight 101.364, kg, PRN Blood Glucose Results, 
Start date: 17 21:28:00 CST, Duration: 30 day, Stop date: 17 21:27:
00 CSTNotes: Roll in palms of hands gently;  Do not shake `vigorously. (Same as:
 Humalog )  "Single Patient Use Only "  WASTE: F/P - Black; E - Municipal Trash 
Bin  Stable for 28 days at room temperature. Expires in _____ days from 
______________Date                                                        No Longer Active

                                                                                    2017

                                        [facility]                    

 

                          Glucagon                            1 mg, Route: IM, Drug form: PDR/INJ, PRN, Dosing

 Weight 101.364, kg, PRN Blood Glucose Results, Start date: 17 21:28:00 
CST, Duration: 30 day, Stop date: 17 21:27:00 CST                            

                            No Longer Active                                         

                                                2017                            [facility]                    

 

                          Dextrose 50% Syringe                            25 gm, 50 mL, Route: IVP, Drug Form:

 INJ, Dosing Weight 101.364, kg, PRN, PRN Blood Glucose Results, Start date: 
17 21:28:00 CST, Duration: 30 day, Stop date: 17 21:27:00 CST       
                                                      No Longer Active                    

                                                                            2017             

                                        [facility]                    

 

                                        Acetaminophen 325 MG / Hydrocodone Bitartrate 5 MG Oral Tablet                  

                                        1 tab, Route: PO, Drug Form: TAB, Dosing Weight 101.364, kg, Q4H, PRN Pain

 Score 4-6, Start date: 17 21:21:00 CST, Duration: 30 day, Stop date: 
17 21:20:00 CSTNotes: (Same as: Norco 325/5)  Do not exceed 4gm/day of 
acetaminophen.                                                        No Longer Active

                                                                                    2017

                                        [facility]                    

 

                          Morphine                            2 mg, Route: IVP, Q4H, Dosing Weight 101.364, 

kg, PRN Pain Score 7-10, Start date: 17 21:21:00 CST, Duration: 30 day, 
Stop date: 17 21:20:00 CST                                                   

                    Inactive                                                                          

                          2017                            [facility]                    

 

                          rivaroxaban 20 mg oral tablet                            20 mg=1 tab, PO, Daily, 0

 Refill(s)                                                        Active             

                                                                            2017      

                                        [facility]                    

 

                          torsemide 20 mg oral tablet                            20 mg=1 tab, PO, Daily, # 30

 tab, 1 Refill(s)                                                        Active      

                                                                              2017

                                        [facility]                    

 

                          Saline Flush 0.9%                            10 mL, Route: IVP, Drug Form: INJ, Dosing

 Weight 102.727, kg, PRN, PRN Line Flush, Start date: 17 16:50:00 CST, 
Duration: 30 day, Stop date: 17 16:49:00 CSTNotes: (Same as: BD Posiflush)
                                                        No Longer Active             

                                                                            2017      

                                        [facility]                    

 

                          trazodone 50 mg oral tablet                            50 mg, 1 tab, Route: PO, Drug

 form: TAB, Bedtime, Dosing Weight 101.449, kg, Start date: 10/28/17 21:00:00 
CDT, Duration: 30 day, Stop date: 17 21:00:00 CSTNotes: (Same As: Desyrel)
                                                        No Longer Active             

                                                                            10/29/2017      

                                        [facility]                    

 

                          Risperdal                            2 mg, 1 tab, Route: PO, Drug form: TAB, Bedtime,

 Dosing Weight 101.449, kg, Start date: 10/28/17 21:00:00 CDT, Duration: 30 day,
 Stop date: 17 21:00:00 CSTNotes: (Same as: Risperdal)                    
                                                No Longer Active                                   

                                                 10/29/2017                            

[facility]                    

 

                          clonazePAM                            1 mg, 1 tab, Route: PO, Drug form: TAB, Bedtime,

 Dosing Weight 101.449, kg, Start date: 10/28/17 21:00:00 CDT, Duration: 30 day,
 Stop date: 17 21:00:00 CSTNotes: (Same As: KlonoPIN)                     
                                                No Longer Active                                    

                                                10/29/2017                            [facility]                    

 

                          digoxin 125 mcg (0.125 mg) oral tablet                            125 microgram=1 

tab, PO, Daily, # 30 tab, 0 Refill(s), Pharmacy: [facility] [id number]     
                                                   Active                              

                                                      10/28/2017                     

                                        [facility]                    

 

                          digoxin 250 mcg (0.25 mg) oral tablet                            0.125 mg, PO, Daily,

 # 30 tab, 0 Refill(s)                                                        Inactive

                                                                                    10/28/2017

                                        [facility]                    

 

                          torsemide                            20 mg, 1 tab, Route: PO, Drug form: TAB, Daily,

 Dosing Weight 101.449, kg, Start date: 10/28/17 9:00:00 CDT, Duration: 30 day, 
Stop date: 17 9:00:00 CSTNotes: (Same As: Demadex)                        
                                                No Longer Active                                       

                                                10/28/2017                            [facility]

                    

 

                          rivaroxaban                            20 mg, 1 tab, Route: PO, Drug form: TAB, Daily,

 Dosing Weight 101.449, kg, Start date: 10/28/17 9:00:00 CDT, Duration: 30 day, 
Stop date: 17 9:00:00 CSTNotes: (Same as: Xarelto) Administer with food   
                                                      No Longer Active                

                                                                            10/28/2017         

                                        [facility]                    

 

                          Ranexa 500 mg oral tablet, extended release                            500 mg, 1 tab,

 Route: PO, Drug form: TAB, Q12H, Dosing Weight 101.449, kg, Start date: 
10/28/17 9:00:00 CDT, Duration: 30 day, Stop date: 17 21:00:00 CSTNotes: 
Same as Ranexa "Do Not Crush"                                                        

No Longer Active                                                                     

                          10/28/2017                            [facility]                    



 

                          lisinopril                            2.5 mg, 0.5 tab, Route: PO, Drug form: TAB, 

Daily, Dosing Weight 101.449, kg, Start date: 10/28/17 9:00:00 CDT, Duration: 30
 day, Stop date: 17 9:00:00 CSTNotes: (Same as: Prinivil, Zestril)        
                                                      No Longer Active                     

                                                                            10/28/2017              

                                        [facility]                    

 

                          digoxin 250 mcg (0.25 mg) oral tablet                            0.125 mg, 1 tab, 

Route: PO, Drug form: TAB, Daily, Dosing Weight 101.449, kg, Start date: 
10/28/17 9:00:00 CDT, Stop date: 17 9:00:00 CSTNotes: Take on an Empty 
Stomach  (Same as: Lanoxin)                                                        No

 Longer Active                                                                       

                          10/28/2017                            [facility]                    

 

                          carvedilol                            18.75 mg, 1.5 tab, Route: PO, Drug form: TAB,

 Q12H, Dosing Weight 101.449, kg, Start date: 10/28/17 9:00:00 CDT, Duration: 30
 day, Stop date: 17 21:00:00 CSTNotes: Give with food. (Same As: Coreg)   
                                                      No Longer Active                

                                                                            10/28/2017         

                                        [facility]                    

 

                          pantoprazole                            40 mg, 1 tab, Route: PO, Drug form: ECTAB,

 Before Breakfast, Dosing Weight 101.449, kg, Start date: 10/28/17 7:30:00 CDT, 
Duration: 30 day, Stop date: 17 7:30:00 CSTNotes: Tablet should not be 
chewed or crushed. (Same as: Protonix)                                             

                    No Longer Active                                                            

                          10/28/2017                            [facility]           

         

 

                          Sodium Chloride 0.9% IV                            250 mL, Route: IVPB, Start date:

 10/28/17 3:54:00 CDT, Duration: 30 day, Stop date: 17 2:53:00 CST, PRN 
Line Flush                                                        No Longer Active   

                                                                                 10/28/2017

                                        [facility]                    

 

                          zolpidem                            10 mg, 2 tab, Route: PO, Drug form: TAB, Bedtime,

 Dosing Weight 101.449, kg, PRN Sleep, Start date: 10/28/17 3:49:00 CDT, 
Duration: 30 day, Stop date: 17 3:48:00 CSTNotes: (Same As: Ambien)       
                                                      No Longer Active                    

                                                                            10/28/2017             

                                        [facility]                    

 

                          potassium chloride                            40 mEq, 2 tab, Route: PO, Drug form:

 ERTAB, ONCE, Dosing Weight 101.449, kg, Start date: 10/28/17 3:48:00 CDT, Stop 
date: 10/28/17 3:48:00 CDTNotes: (Same as: K-Dur 20) "Do Not Crush"  With food 
and full glass of water                                                        Inactive

                                                                                    10/28/2017

                                        [facility]                    

 

                          Dextrose 50% Syringe                            25 gm, 50 mL, Route: IVP, Drug Form:

 INJ, Dosing Weight 101.449, kg, PRN, PRN Blood Glucose Results, Start date: 
10/28/17 1:57:00 CDT, Duration: 30 day, Stop date: 17 0:56:00 CST         
                                                      No Longer Active                      

                                                                            10/28/2017               

                                        [facility]                    

 

                          glucagon                            1 mg, Route: IM, Drug form: PDR/INJ, PRN, Dosing

 Weight 101.449, kg, PRN Blood Glucose Results, Start date: 10/28/17 1:57:00 
CDT, Duration: 30 day, Stop date: 17 0:56:00 CST                             

                           No Longer Active                                          

                                                10/28/2017                            [facility]

                    

 

                          insulin lispro                            4 unit, 0.04 mL, Route: SUB-Q, Drug form:

 SOLN, Sliding Scale, Dosing Weight 101.449, kg, PRN Blood Glucose Results, 
Start date: 10/28/17 1:57:00 CDT, Duration: 30 day, Stop date: 17 0:56:00 
CSTNotes: Roll in palms of hands gently;  Do not shake `vigorously. (Same as: 
Humalog )  "Single Patient Use Only "  WASTE: F/P - Black; E - Municipal Trash 
Bin  Stable for 28 days at room temperature. Expires in _____ days from 
______________Date                                                        No Longer Active

                                                                                    10/28/2017

                                        [facility]                    

 

                          morphine Sulfate                            6 mg, 3 mL, Route: PO, Drug form: SOLN,

 Q4H, Dosing Weight 101.449, kg, PRN Pain Score 7-10, Start date: 10/28/17 
1:54:00 CDT, Duration: 30 day, Stop date: 17 1:53:00 CSTNotes: (Same 
as:MORPhine Sulfate)                                                        No Longer

 Active                                                                              

                          10/28/2017                            [facility]                    

 

                          ondansetron                            4 mg, 2 mL, Route: IVP, Drug form: INJ, Q6H,

 Dosing Weight 101.449, kg, PRN Nausea & Vomiting, Start date: 10/28/17 1:54:00 
CDT, Duration: 30 day, Stop date: 17 1:53:00 CSTNotes: (Same as: Zofran)  
 *** MEDICATION WASTE *** Product Size:  4 mg Product Wasted:  ___ mg           
                                                      No Longer Active                        

                                                                            10/28/2017                 

                                        [facility]                    

 

                          acetaminophen                            650 mg, 2 tab, Route: PO, Drug form: TAB,

 Q4H, Dosing Weight 101.449, kg, PRN Pain 1-3/Temp > 100.4 F, Start date: 
10/28/17 1:54:00 CDT, Duration: 30 day, Stop date: 17 1:53:00 CSTNotes: Do
 not exceed 4 gm/day.  (Same as: Tylenol)                                          

                    No Longer Active                                                         

                           10/28/2017                            [facility]        

            

 

                          Ranexa 500 mg oral tablet, extended release                            500 mg=1 tab,

 PO, Q12H, 0 Refill(s)                                                        Active 

                                                                                   10/28/2017

                                        [facility]                    

 

                          carvedilol 12.5 mg oral tablet                            18.75 mg=1.5 tab, PO, Q12H,

 0 Refill(s)                                                        Active           

                                                                            10/28/2017    

                                        [facility]                    

 

                          lisinopril 2.5 mg oral tablet                            2.5 mg=1 tab, PO, Daily, 

0 Refill(s)                                                        Active            

                                                                            10/28/2017     

                                        [facility]                    

 

                          glipiZIDE 10 mg oral tablet, extended release                            10 mg=1 tab,

 PO, BID, 0 Refill(s)                                                        Active  

                                                                                  10/28/2017

                                        [facility]                    

 

                          Risperdal 2 mg oral tablet                            2 mg=1 tab, PO, Bedtime, # 60

 tab, 0 Refill(s)                                                        Active      

                                                                              10/28/2017

                                        [facility]                    

 

                          Norco 10/325 oral tablet                            1 tab, Route: PO, Drug Form: TAB,

 Dosing Weight 102.273, kg, ONCE, STAT, Start date: 10/27/17 20:43:00 CDT, Stop 
date: 10/27/17 20:43:00 CDT                                                        Inactive

                                                                                    10/28/2017

                                        [facility]                    

 

                          Ativan                            1 mg, Route: PO, Drug form: TAB, ONCE, Dosing Weight

 102.273, kg, Priority: STAT, Start date: 10/27/17 20:43:00 CDT, Stop date: 
10/27/17 20:43:00 CDT                                                        Inactive

                                                                                    10/28/2017

                                        [facility]                    

 

                          ondansetron                            4 mg, Route: IVP, ONCE, Dosing Weight 102.273,

 kg, Priority: STAT, Start date: 10/27/17 20:32:00 CDT, Stop date: 10/27/17 
20:32:00 CDT                                                        Inactive         

                                                                            10/28/2017  

                                        [facility]                    

 

                          nitroglycerin 2% ointment                            1 inch, Route: TOP, Dosing Weight

 102.273, kg, ONCE, STAT, Start date: 10/27/17 20:32:00 CDT, Stop date: 10/27/17
 20:32:00 CDT                                                        Inactive        

                                                                            10/28/2017 

                                        [facility]                    

 

                          Saline Flush 0.9%                            10 mL, Route: IVP, Drug Form: INJ, Dosing

 Weight 102.273, kg, PRN, PRN Line Flush, Start date: 10/27/17 20:32:00 CDT, 
Duration: 30 day, Stop date: 17 19:31:00 CSTNotes: (Same as: BD Posiflush)
                                                        No Longer Active             

                                                                            10/28/2017      

                                        [facility]                    

 

                          Saline Flush 0.9%                            10 mL, Route: IVP, Drug Form: INJ, Dosing

 Weight 107.273, kg, PRN, PRN Line Flush, Start date: 17 1:17:00 CDT, 
Duration: 30 day, Stop date: 10/29/17 1:16:00 CDTNotes: (Same as: BD Posiflush) 
                                                       Inactive                      

                                                                            2017               

                                        [facility]                    

 

                          Saline Flush 0.9%                            10 ml, Route: IVP, Drug Form: INJ, Dosing

 Weight 106.818, kg, Q12H, Start date: 17 21:00:00 CDT, Duration: 30 day, 
Stop date: 09/15/17 9:00:00 CDTNotes: (Same as: BD Posiflush)                   
                                                Inactive                                          

                                                2017                            [facility]

                    

 

                          atorvastatin                            20 mg, 1 tab, Route: PO, Drug form: TAB, Bedtime,

 Dosing Weight 106.818, kg, Start date: 17 21:00:00 CDT, Duration: 30 day,
 Stop date: 17 21:00:00 CDTNotes: (Same As: Lipitor)                      
                                                Inactive                                             

                                                2017                            [facility]

                    

 

                          Xarelto                            20 mg, 1 tab, Route: PO, Drug form: TAB, QPM, Dosing

 Weight 106.818, kg, Priority: NOW, Start date: 17 20:45:00 CDT, Duration:
 30 day, Stop date: 09/15/17 17:00:00 CDTNotes: (Same as: Xarelto) Administer 
with food                                                        Inactive            

                                                                            2017     

                                        [facility]                    

 

                          thiothixene 5 mg oral capsule                            5 mg=1 cap, PO, Bedtime, 

0 Refill(s)                                                        Active            

                                                                            2017     

                                        [facility]                    

 

                          zolpidem 10 mg oral tablet                            10 mg=1 tab, PO, Bedtime, PRN

 for sleep, # 14 tab, 0 Refill(s)                                                  

                    Active                                                                           

                          2017                            [facility]                    

 

                          pantoprazole 40 mg intravenous injection                            40 mg, PO, Daily,

 0 Refill(s)                                                        Active           

                                                                            2017    

                                        [facility]                    

 

                          digoxin 250 mcg (0.25 mg) oral tablet                            0.25 mg, PO, Daily,

 # 30 tab, 0 Refill(s)                                                        Active 

                                                                                   2017

                                        [facility]                    

 

                          clonazePAM 1 mg oral tablet                            1 mg=1 tab, PO, Bedtime, 0 

Refill(s)                                                        Active              

                                                                            2017       

                                        [facility]                    

 

                          Trazodone Hydrochloride 50 MG Oral Tablet                            50 mg=1 tab, 

PO, Bedtime, # 30 tab, 1 Refill(s)                                                 

                    Active                                                                          

                          2017                            [facility]                    

 

                          Xarelto                            20 mg, 1 tab, Route: PO, Drug form: TAB, QPM, Dosing

 Weight 106.818, kg, Priority: NOW, Start date: 17 18:02:00 CDT, Duration:
 30 day, Stop date: 09/15/17 17:00:00 CDTNotes: (Same as: Xarelto) Administer 
with food                                                        Inactive            

                                                                            2017     

                                        [facility]                    

 

                          Insulin Lispro                            4 unit, 0.04 mL, Route: SUB-Q, Drug form:

 SOLN, Bedtime, Dosing Weight 106.818, kg, PRN Blood Glucose Results, Start 
date: 17 18:02:00 CDT, Duration: 30 day, Stop date: 09/15/17 18:01:00 
CDTNotes: Roll in palms of hands gently;  Do not shake `vigorously. (Same as: 
Humalog )  "Single Patient Use Only "  WASTE: F/P - Black; E - Municipal Trash 
Bin  Stable for 28 days at room temperature. Expires in _____ days from 
______________Date                                                        Inactive   

                                                                                 2017

                                        [facility]                    

 

                          Dextrose 50% Syringe                            25 gm, 50 mL, Route: IVP, Drug Form:

 INJ, Dosing Weight 106.818, kg, PRN, PRN Blood Glucose Results, Start date: 
17 18:02:00 CDT, Duration: 30 day, Stop date: 09/15/17 18:01:00 CDT       
                                                 Inactive                              

                                                      2017                     

                                        [facility]                    

 

                          Glucagon                            1 mg, Route: IM, Drug form: PDR/INJ, PRN, Dosing

 Weight 106.818, kg, PRN Blood Glucose Results, Start date: 17 18:02:00 
CDT, Duration: 30 day, Stop date: 09/15/17 18:01:00 CDT                            

                            Inactive                                                 

                                                2017                            [facility]  

                  

 

                          sodium chloride 0.9% 1000 ml INJ 1,000 mL                            1,000 mL, Rate:

 150 ml/hr, Infuse over: 6.7 hr, Route: IV, Dosing Weight 106.818 kg, Total 
Volume: 1,000, Priority: STAT, Start date: 17 18:01:00 CDT, Duration: 1 
doses or times, Stop date: 17 0:42:00 CDT                                    

                    Inactive                                                           

                          2017                            [facility]          

          

 

                          Saline Flush 0.9%                            10 ml, Route: IVP, Drug Form: INJ, Dosing

 Weight 106.818, kg, PRN, PRN Line Flush, Start date: 17 17:59:00 CDT, 
Duration: 30 day, Stop date: 09/15/17 17:58:00 CDTNotes: (Same as: BD Posiflush)
                                                        Inactive                     

                                                                            2017              

                                        [facility]                    

 

                          Milk of Magnesia                            30 ml, Route: PO, Drug Form: SUSP, Dosing

 Weight 106.818, kg, Q6H, PRN Heartburn, Start date: 17 17:59:00 CDT, 
Duration: 30 day, Stop date: 09/15/17 17:58:00 CDTNotes: (Same as: Milk of 
Magnesia, MOM)                                                        Inactive       

                                                                             2017

                                        [facility]                    

 

                          Hydralazine                            10 mg, 0.5 mL, Route: IV, Drug form: INJ, Q4H,

 Dosing Weight 106.818, kg, PRN Hypertension, Start date: 17 17:59:00 CDT,
 Duration: 30 day, Stop date: 09/15/17 17:58:00 CDT, HTNNotes: (Same as: 
Apresoline) Push over 5 minutes                                                    

                    Inactive                                                                           

                          2017                            [facility]                    

 

                          Famotidine                            20 mg, 2 mL, Route: IVP, Drug form: INJ, Q12H,

 Dosing Weight 106.818, kg, Priority: NOW, Start date: 17 17:59:00 CDT, 
Duration: 30 day, Stop date: 09/15/17 9:00:00 CDTNotes: (Same as: Pepcid) Can be
 dilute in 5-10cc NS  IVP: Slow IV push over at least 2 minutes.                
                                                Inactive                                       

                                                2017                            [facility]

                    

 

                                        Codeine Phosphate 2 MG/ML / Guaifenesin 60 MG/ML Oral Solution                  

                                        5 ml, Route: PO, Drug Form: LIQ, Dosing Weight 106.818, kg, Q4H, PRN Cough/Congestion,

 NOW, Start date: 17 17:59:00 CDT, Duration: 30 day, Stop date: 09/15/17 
17:58:00 CDTNotes: (Same As: Robitussin AC)                                        

                    Inactive                                                               

                          2017                            [facility]              

      

 

                          Miralax                            17 gm, 1 pkt, Route: PO, Drug form: PWDR, Daily,

 Dosing Weight 106.818, kg, PRN Constipation, Priority: Within 4 hours, Start 
date: 17 17:59:00 CDT, Duration: 30 day, Stop date: 09/15/17 17:58:00 
CDTNotes: Dissolve in 8 oz of water or juice. (Same as: Miralax)                
                                                Inactive                                       

                                                2017                            [facility]

                    

 

                                        Albuterol 0.833 MG/ML / Ipratropium Bromide 0.167 MG/ML Inhalant Solution [DuoNeb]

                                        3 ml, Route: INHALATION, Drug Form: SOLN, Dosing Weight

 106.818, kg, Q6H, PRN Respiratory Protocol, Start date: 17 17:59:00 CDT, 
Duration: 30 day, Stop date: 09/15/17 17:58:00 CDTNotes: (Same as: Duoneb)      
                                                  Inactive                             

                                                       2017                    

                                        [facility]                    

 

                          Lovenox                            40 mg, Route: SUB-Q, Drug form: INJ, [id number], 

Dosing Weight 106.818, kg, Priority: NOW, Start date: 17 17:59:00 CDT, 
Duration: 30 day, Stop date: 17 17:59:00 CDT                                 

                       Inactive                                                        

                            2017                            [facility]       

             

 

                          Ondansetron                            4 mg, 2 mL, Route: IVP, Drug form: INJ, Q4H,

 Dosing Weight 106.818, kg, PRN Nausea & Vomiting, Start date: 17 17:59:00
 CDT, Duration: 30 day, Stop date: 09/15/17 17:58:00 CDTNotes: (Same as: Zofran)
   *** MEDICATION WASTE *** Product Size:  4 mg Product Wasted:  ___ mg         
                                                Inactive                                

                                                      2017                       

                                        [facility]                    

 

                                        Acetaminophen 325 MG / Hydrocodone Bitartrate 5 MG Oral Tablet                  

                                        1 tab, Route: PO, Drug Form: TAB, Dosing Weight 106.818, kg, Q4H, PRN Pain

 Score 4-6, Start date: 17 17:59:00 CDT, Duration: 30 day, Stop date: 
09/15/17 17:58:00 CDTNotes: (Same as: Norco 325/5)  Do not exceed 4gm/day of 
acetaminophen.                                                        Inactive       

                                                                             2017

                                        [facility]                    

 

                          Acetaminophen                            650 mg, 2 tab, Route: PO, Drug form: TAB,

 Q4H, Dosing Weight 106.818, kg, PRN Pain 1-3/Temp > 100.4 F, Start date: 
17 17:59:00 CDT, Duration: 30 day, Stop date: 09/15/17 17:58:00 CDTNotes: 
Do not exceed 4 gm/day.  (Same as: Tylenol)                                        

                    Inactive                                                               

                          2017                            [facility]              

      

 

                          Morphine                            1 mg, 0.25 mL, Route: IVP, Drug form: INJ, Q2H,

 Dosing Weight 106.818, kg, PRN Pain Score 4-6, Start date: 17 17:59:00 
CDT, Duration: 30 day, Stop date: 09/15/17 17:58:00 CDTNotes: (Same as:MORPhine 
Sulfate)                                                        Inactive             

                                                                            2017      

                                        [facility]                    

 

                          Lisinopril                            5 mg, 1 tab, Route: PO, Drug form: TAB, Daily,

 Dosing Weight 106.818, kg, Priority: NOW, Start date: 17 17:59:00 CDT, 
Duration: 30 day, Stop date: 09/15/17 9:00:00 CDTNotes: (Same as: Prinivil, 
Zestril)                                                        Inactive             

                                                                            2017      

                                        [facility]                    

 

                          Nitroglycerin                            0.4 mg, 1 tab, Route: SL, Drug form: TAB,

 Q5Min, Dosing Weight 106.818, kg, PRN Chest Pain, Start date: 17 17:59:00
 CDT, Duration: 3 doses or times, Stop date: Limited # of timesNotes: (Same as
:Nitroquick, Nitrostat) "Do Not Crush"  Sublingual tablet                       
                                                Inactive                                              

                                                2017                            [facility]

                    

 

                          Aspirin 325 MG Enteric Coated Tablet                            325 mg, 1 tab, Route:

 PO, Drug form: ECTAB, Daily, Dosing Weight 106.818, kg, Priority: NOW, Start 
date: 17 17:59:00 CDT, Duration: 30 day, Stop date: 09/15/17 9:00:00 
CDTNotes: (Do Not Crush)  Do not crush or chew.                                    

                    Inactive                                                           

                          2017                            [facility]          

          

 

                          Sodium Chloride 0.9% (Bolus) IV                            1,000 mL, 1000 ml/hr, Infuse

 Over: 1 hr, Route: IV, 1,000, Drug form: INJ, ONCE, Priority: STAT, Dosing 
Weight 106.818 kg, Start date: 17 16:29:00 CDT, Duration: 1 doses or 
times, Stop date: 17 16:29:00 CDT                                            

                    Inactive                                                                   

                          2017                            [facility]                  

  

 

                          Saline Flush 0.9%                            10 mL, Route: IVP, Drug Form: INJ, Dosing

 Weight 106.818, kg, PRN, PRN Line Flush, Start date: 17 14:24:00 CDT, 
Duration: 30 day, Stop date: 09/15/17 14:23:00 CDTNotes: (Same as: BD Posiflush)
                                                        Inactive                     

                                                                            2017              

                                        [facility]                    

 

                          Aspirin                            324 mg, 4 tab, Route: PO, Drug form: CHEWTAB, ONCE,

 Dosing Weight 106.818, kg, Priority: STAT, Start date: 17 14:24:00 CDT, 
Stop date: 17 14:24:00 CDTNotes: Take with food.                             

                           Inactive                                                  

                                                2017                            [facility]   

                 

 

                          carvedilol 3.125 mg oral tablet                            3.125 mg=1 tab, PO, Q12H,

 # 60 tab, 0 Refill(s), given to patient                                           

                    Active                                                                    

                          2017                            [facility]                   

 

 

                          Levemir                            10 unit, 0.1 mL, Route: SUB-Q, Drug form: INJ, 

Bedtime, Dosing Weight 101.364, kg, Start date: 17 21:00:00 CDT, Duration:
 30 day, Stop date: 17 21:00:00 CDTNotes: Same as Levemir Do not hold 
insulin without contacting prescriber  WASTE: F/P - Black; E - Municipal Trash 
Bin  "single patient use only"                                                     

                    No Longer Active                                                                    

                          2017                            [facility]                   

 

 

                          Coreg                            3.125 mg, 1 tab, Route: PO, Drug form: TAB, Q12H,

 Dosing Weight 101.364, kg, Start date: 17 21:00:00 CDT, Duration: 30 day,
 Stop date: 17 9:00:00 CDTNotes: Give with food. (Same As: Coreg)         
                                                      No Longer Active                      

                                                                            2017               

                                        [facility]                    

 

                          atorvastatin                            40 mg, 1 tab, Route: PO, Drug form: TAB, Bedtime,

 Dosing Weight 101.364, kg, Start date: 17 21:00:00 CDT, Duration: 30 day,
 Stop date: 17 21:00:00 CDTNotes: (Same as: Lipitor)                      
                                                No Longer Active                                     

                                                2017                            [facility]                    

 

                          thiothixene 5 mg oral capsule                            5 mg=1 cap, PO, Bedtime, 

# 60 cap, 0 Refill(s)                                                        No Longer

 Active                                                                              

                          2017                            [facility]                    

 

                          clonazePAM 1 mg oral tablet                            1 mg=1 tab, PO, Q12H, PRN Anxiety,

 # 30 tab, 0 Refill(s)                                                        No Longer

 Active                                                                              

                          2017                            [facility]                    

 

                          Ambien                            See Instructions, 15mg PO Bedtime, 0 Refill(s)  

                                                      No Longer Active               

                                                                            2017        

                                        [facility]                    

 

                          pantoprazole                            40 mg, 1 tab, Route: PO, Drug form: ECTAB,

 Before Dinner, Dosing Weight 101.364, kg, Start date: 17 16:30:00 CDT, 
Duration: 30 day, Stop date: 17 16:30:00 CDTNotes: Tablet should not be c
hewed or crushed. (Same as: Protonix)                                              

                    No Longer Active                                                             

                          2017                            [facility]            

        

 

                          Fluoxetine                            40 mg, 2 cap, Route: PO, Drug form: CAP, Daily,

 Dosing Weight 101.364, kg, Priority: NOW, Start date: 17 16:18:00 CDT, 
Duration: 30 day, Stop date: 17 9:00:00 CDTNotes: (Same as: Prozac, 
Sarafem)                                                        No Longer Active     

                                                                               2017

                                        [facility]                    

 

                          ARIPiprazole                            5 mg, 1 tab, Route: PO, Drug form: TAB, Daily,

 Dosing Weight 101.364, kg, Priority: NOW, Start date: 17 16:17:00 CDT, 
Duration: 30 day, Stop date: 17 9:00:00 CDTNotes: Non-Formulary Drug.  
(Same as: Abilify)                                                        No Longer Active

                                                                                    2017

                                        [facility]                    

 

                          carvedilol 25 mg oral tablet                            25 mg=1 tab, PO, BID, # 60

 tab, 0 Refill(s)                                                        Inactive    

                                                                                2017

                                        [facility]                    

 

                          rivaroxaban 20 mg oral tablet                            20 mg=1 tab, PO, Daily, #

 30 tab, 0 Refill(s)                                                        Active   

                                                                                 2017

                                        [facility]                    

 

                          lisinopril 5 mg oral tablet                            5 mg=1 tab, PO, Daily, # 30

 tab, 0 Refill(s)                                                        Active      

                                                                              2017

                                        [facility]                    

 

                          torsemide 20 mg oral tablet                            20 mg=1 tab, PO, Daily, # 30

 tab, 0 Refill(s)                                                        Active      

                                                                              2017

                                        [facility]                    

 

                          torsemide                            20 mg, 1 tab, Route: PO, Drug form: TAB, Daily,

 Dosing Weight 101.364, kg, Start date: 17 9:00:00 CDT, Duration: 30 day, 
Stop date: 17 9:00:00 CDTNotes: (Same As: Demadex)                        
                                                No Longer Active                                       

                                                2017                            [facility]

                    

 

                          rivaroxaban                            20 mg, 1 tab, Route: PO, Drug form: TAB, Daily,

 Dosing Weight 101.364, kg, Start date: 17 9:00:00 CDT, Duration: 30 day, 
Stop date: 17 9:00:00 CDTNotes: (Same as: Xarelto) Administer with food   
                                                      No Longer Active                

                                                                            2017         

                                        [facility]                    

 

                          Lisinopril                            5 mg, 1 tab, Route: PO, Drug form: TAB, Daily,

 Dosing Weight 101.364, kg, Start date: 17 9:00:00 CDT, Duration: 30 day, 
Stop date: 17 9:00:00 CDTNotes: (Same as: Prinivil, Zestril)              
                                                No Longer Active                             

                                                       2017                    

                                        [facility]                    

 

                          Coreg                            25 mg, 1 tab, Route: PO, Drug form: TAB, BID, Dosing

 Weight 101.364, kg, Start date: 17 9:00:00 CDT, Duration: 30 day, Stop 
date: 17 17:00:00 CDTNotes: Give with food. (Same As: Coreg)              
                                                Inactive                                     

                                                2017                            [facility]                    

 

                          aspirin 81 mg tablet, enteric coated                            81 mg, 1 tab, Route:

 PO, Drug form: ECTAB, Daily, Dosing Weight 101.364, kg, Start date: 17 
9:00:00 CDT, Duration: 30 day, Stop date: 17 9:00:00 CDTNotes: Do not 
crush or chew. (Same As: Ecotrin)                                                  

                    No Longer Active                                                                 

                          2017                            [facility]                

    

 

                          Clonazepam                            0.5 mg, 0.5 tab, Route: PO, Drug form: TAB, 

ONCE, Dosing Weight 101.364, kg, Start date: 17 1:28:00 CDT, Stop date: 
17 1:28:00 CDTNotes: (Same As: KlonoPIN)                                     

                    Inactive                                                            

                          2017                            [facility]           

         

 

                          Insulin, Aspart, Human                            1 unit, 0.01 mL, Route: SUB-Q, Drug

 form: SOLN, Bedtime, Dosing Weight 101.364, kg, PRN Blood Glucose Results, 
Start date: 17 1:19:00 CDT, Duration: 30 day, Stop date: 17 1:18:00 
CDTNotes: Roll in palms of hands gently;  Do not shake vigorously. (Same as: 
NovoLOG) "single patient use only"  WASTE: F/P - Black; E - Municipal Trash Bin 
 Stable for 28 days at room temperature. Expires in _____ days from 
______________Date                                                        No Longer Active

                                                                                    2017

                                        [facility]                    

 

                          Dextrose 50% Syringe                            12.5 gm, 25 mL, Route: IVP, Drug Form:

 INJ, Dosing Weight 101.364, kg, PRN, PRN Blood Glucose Results, Start date: 
17 1:19:00 CDT, Duration: 30 day, Stop date: 17 1:18:00 CDT         
                                                      No Longer Active                      

                                                                            2017               

                                        [facility]                    

 

                          Glucagon                            1 mg, Route: IM, Drug form: PDR/INJ, PRN, Dosing

 Weight 101.364, kg, PRN Blood Glucose Results, Start date: 17 1:19:00 
CDT, Duration: 30 day, Stop date: 17 1:18:00 CDT                             

                           No Longer Active                                          

                                                2017                            [facility]

                    

 

                                        insulin detemir 100 UNT/ML Injectable Solution [Levemir]                        

                          20 unit, SUB-Q, Bedtime, 0 Refill(s)                                             

                    Inactive                                                                    

                          2017                            [facility]                   

 

 

                          Ondansetron                            4 mg, 1 tab, Route: PO, Drug form: TAB, Q8H,

 Dosing Weight 101.364, kg, PRN Nausea & Vomiting, Start date: 17 1:08:00 
CDT, Duration: 30 day, Stop date: 17 1:07:00 CDTNotes: (Same as: Zofran)  
                                                      No Longer Active               

                                                                            2017        

                                        [facility]                    

 

                          Morphine                            2 mg, 1 mL, Route: IVP, Drug form: INJ, Q6H, Dosing

 Weight 101.364, kg, PRN Pain Score 4-6, Start date: 17 1:08:00 CDT, 
Duration: 30 day, Stop date: 17 1:07:00 CDTNotes: (Same as:MORPhine 
Sulfate)                                                        No Longer Active     

                                                                               2017

                                        [facility]                    

 

                          Diphenhydramine                            25 mg, 1 cap, Route: PO, Drug form: CAP,

 Bedtime, Dosing Weight 101.364, kg, PRN Insomnia, Start date: 17 1:08:00 
CDT, Duration: 30 day, Stop date: 17 1:07:00 CDTNotes: (Same as: Benadryl)
                                                        No Longer Active             

                                                                            2017      

                                        [facility]                    

 

                          Acetaminophen                            650 mg, 2 tab, Route: PO, Drug form: TAB,

 Q4H, Dosing Weight 101.364, kg, PRN Headache 1-3, Start date: 17 1:08:00 
CDT, Duration: 30 day, Stop date: 17 1:07:00 CDTNotes: Do not exceed 4 g
m/day.  (Same as: Tylenol)                                                        No 

Longer Active                                                                        

                          2017                            [facility]                    

 

                                        Acetaminophen 325 MG / Hydrocodone Bitartrate 5 MG Oral Tablet                  

                                        1 tab, Route: PO, Drug Form: TAB, Dosing Weight 101.364, kg, Q4H, PRN Pain

 Score 4-6, Start date: 17 1:08:00 CDT, Duration: 30 day, Stop date: 
17 1:07:00 CDTNotes: (Same as: Norco 325/5)  Do not exceed 4gm/day of 
acetaminophen.                                                        No Longer Active

                                                                                    2017

                                        [facility]                    

 

                          Fluoxetine                            40 mg, PO, Daily, 0 Refill(s)               

                                                Active                                        

                                                2017                            [facility]

                    

 

                          Vitamin D3 50,000 intl units oral capsule                            50,000 IntlUnit=1

 cap, PO, qWeek, # 12 cap, 0 Refill(s)                                             

                    Active                                                                      

                          2017                            [facility]                    

 

                          thiothixene 5 mg oral capsule                            5 mg=1 cap, PO, Bedtime, 

# 60 cap, 0 Refill(s)                                                        Inactive

                                                                                    2017

                                        [facility]                    

 

                          Aspirin                            324 mg, 4 tab, Route: PO, Drug form: CHEWTAB, ONCE,

 Dosing Weight 101.364, kg, Priority: STAT, Start date: 17 20:38:00 CDT, 
Stop date: 17 20:38:00 CDTNotes: Take with food.                             

                           Inactive                                                  

                                                2017                            [facility]   

                 

 

                          Ondansetron                            4 mg, 2 mL, Route: IVP, Drug form: INJ, ONCE,

 Dosing Weight 101.364, kg, Priority: STAT, Start date: 17 20:38:00 CDT, 
Stop date: 17 20:38:00 CDTNotes: (Same as: Zofran)   *** MEDICATION WASTE 
*** Product Size:  4 mg Product Wasted:  ___ mg                                    

                    Inactive                                                           

                          2017                            [facility]          

          

 

                          Morphine                            4 mg, 1 mL, Route: IVP, Drug form: INJ, ONCE, 

Dosing Weight 101.364, kg, Priority: STAT, Start date: 17 20:38:00 CDT, 
Stop date: 17 20:38:00 CDTNotes: (Same as:MORPhine Sulfate)               
                                                Inactive                                      

                                                2017                            [facility]                    

 

                          Saline Flush 0.9%                            10 mL, Route: IVP, Drug Form: INJ, Dosing

 Weight 101.364, kg, PRN, PRN Line Flush, Start date: 17 20:38:00 CDT, 
Duration: 30 day, Stop date: 17 20:37:00 CDTNotes: (Same as: BD Posiflush)
                                                        No Longer Active             

                                                                            2017      

                                        [facility]                    

 

                          Clonazepam                            1 mg, 1 tab, Route: PO, Drug form: TAB, ONCE,

 Dosing Weight 102.926, kg, Priority: STAT, Start date: 17 14:30:00 CDT, 
Stop date: 17 14:30:00 CDTNotes: (Same As: KlonoPIN)                      
                                                Inactive                                             

                                                2017                            [facility]

                    

 

                          Morphine                            2 mg, 1 mL, Route: IVP, Drug form: INJ, Q2H, Dosing

 Weight 102.926, kg, PRN Pain Score 7-10, Start date: 17 3:05:00 CDT, 
Duration: 30 day, Stop date: 17 3:04:00 CDTNotes: (Same as:MORPhine 
Sulfate)                                                        Inactive             

                                                                            2017      

                                        [facility]                    

 

                          Insulin, Aspart, Human                            2 unit, 0.02 mL, Route: SUB-Q, Drug

 form: SOLN, Sliding Scale, Dosing Weight 102.926, kg, PRN Blood Glucose 
Results, Start date: 17 2:03:00 CDT, Duration: 30 day, Stop date: 17
 2:02:00 CDTNotes: Roll in palms of hands gently;  Do not shake vigorously. 
(Same as: NovoLOG) "single patient use only"  WASTE: F/P - Black; E - [facility]  Stable for 28 days at room temperature. Expires in _____ days from 
______________Date                                                        Inactive   

                                                                                 2017

                                        [facility]                    

 

                          Glucagon                            1 mg, Route: IM, Drug form: PDR/INJ, PRN, Dosing

 Weight 102.926, kg, PRN Blood Glucose Results, Start date: 17 2:03:00 
CDT, Duration: 30 day, Stop date: 17 2:02:00 CDT                             

                           Inactive                                                  

                                                2017                            [facility]   

                 

 

                          Dextrose 50% Syringe                            25 gm, 50 mL, Route: IVP, Drug Form:

 INJ, Dosing Weight 102.926, kg, PRN, PRN Blood Glucose Results, Start date: 
17 2:03:00 CDT, Duration: 30 day, Stop date: 17 2:02:00 CDT         
                                                Inactive                                

                                                      2017                       

                                        [facility]                    

 

                          Sodium Chloride 0.9% IV                            250 mL, Route: IVPB, Start date:

 17 0:57:00 CDT, Duration: 30 day, Stop date: 17 0:56:00 CDT, PRN 
Line Flush                                                        Inactive           

                                                                            2017    

                                        [facility]                    

 

                          BD Normal Saline Flush                            10 mL, Route: IVP, Drug Form: INJ,

 PRN, PRN Line Flush, Start date: 17 0:57:00 CDT, Duration: 30 day, Stop 
date: 17 0:56:00 CDTNotes: (Same as: BD Posiflush)                        
                                                Inactive                                               

                                                2017                            [facility]

                    

 

                          Ondansetron                            4 mg, 2 mL, Route: IVP, Drug form: INJ, Q6H,

 Dosing Weight 90.909, kg, PRN Nausea & Vomiting, Start date: 17 0:55:00 
CDT, Duration: 30 day, Stop date: 17 0:54:00 CDTNotes: (Same as: Zofran)  
 *** MEDICATION WASTE *** Product Size:  4 mg Product Wasted:  ___ mg           
                                                Inactive                                  

                                                      2017                         

                                        [facility]                    

 

                          Sodium Chloride 0.154 MEQ/ML Injectable Solution                            500 mL,

 500 ml/hr, Infuse Over: 1 hr, Route: IV, ONCE, Priority: STAT, Dosing Weight 
90.909 kg, Start date: 17 22:58:00 CDT, Duration: 1 doses or times, Stop 
date: 17 22:58:00 CDT                                                        Inactive

                                                                                    2017

                                        [facility]                    

 

                          Cardizem                            20 mg, Route: IVP, ONCE, Dosing Weight 90.909,

 kg, Priority: STAT, Start date: 17 21:59:00 CDT, Stop date: 17 
21:59:00 CDT                                                        Inactive         

                                                                            2017  

                                        [facility]                    

 

                          Aspirin                            325 mg, Route: PO, Drug form: TAB, ONCE, Dosing

 Weight 90.909, kg, Priority: STAT, Start date: 17 21:58:00 CDT, Stop 
date: 17 21:58:00 CDT                                                        Inactive

                                                                                    2017

                                        [facility]                    

 

                          rivaroxaban                            20 mg, 1 tab, Route: PO, Drug form: TAB, Daily,

 Dosing Weight 99, kg, Start date: 17 9:00:00 CDT, Duration: 30 day, Stop 
date: 17 9:00:00 CDTNotes: (Same as: Xarelto) Administer with food        
                                                Inactive                               

                                                      2017                      

                                        [facility]                    

 

                          Clonazepam                            1 mg, 1 tab, Route: PO, Drug form: TAB, Daily,

 Dosing Weight 99, kg, Start date: 17 9:00:00 CDT, Duration: 30 day, Stop 
date: 17 9:00:00 CDTNotes: (Same As: KlonoPIN)                               

                          Inactive                                                    

                                                2017                            [facility]     

               

 

                          ARIPiprazole                            5 mg, 1 tab, Route: PO, Drug form: TAB, Daily,

 Dosing Weight 99, kg, Start date: 17 9:00:00 CDT, Duration: 30 day, Stop 
date: 17 9:00:00 CDTNotes: Non-Formulary Drug.  (Same as: Abilify)        
                                                Inactive                               

                                                      2017                      

                                        [facility]                    

 

                          Glipizide                            10 mg, 1 tab, Route: PO, Drug form: TAB, Before

 Breakfast, Dosing Weight 99, kg, Start date: 17 7:30:00 CDT, Duration: 30
 day, Stop date: 17 7:30:00 CDTNotes: (Same as: Glucotrol)  30 min before 
meals.                                                        Inactive               

                                                                            2017        

                                        [facility]                    

 

                          Cogentin                            1 mg, 1 tab, Route: PO, Drug form: TAB, Bedtime,

 Dosing Weight 99, kg, Start date: 17 21:00:00 CDT, Duration: 30 day, Stop
 date: 17 21:00:00 CDTNotes: (Same As: Cogentin)                             

                           No Longer Active                                          

                                                2017                            [facility]

                    

 

                          Ambien                            5 mg, 0.5 tab, Route: PO, Drug form: TAB, Bedtime,

 Dosing Weight 99, kg, PRN Sleep, Start date: 17 12:20:00 CDT, Duration: 
30 day, Stop date: 17 12:19:00 CDTNotes: (Same As: Ambien)                
                                                No Longer Active                               

                                                      2017                      

                                        [facility]                    

 

                          sodium phosphate                            15 mmol, 250 mL, Route: IVPB, Drug form:

 INJ, ONCE, Dosing Weight 99, kg, PRN Abnormal Lab Result, Priority: NOW, Start 
date: 17 11:39:00 CDT                                                        Inactive

                                                                                    2017

                                        [facility]                    

 

                          Potassium Chloride 1.33 MEQ/ML Oral Solution                            40 mEq, 30

 mL, Route: PO, Drug form: LIQ, ONCE, Dosing Weight 99, kg, Priority: NOW, Start
 date: 17 11:39:00 CDT, Stop date: 17 11:39:00 CDTNotes: (Same as: 
Potassium Chloride)                                                        Inactive  

                                                                                  2017

                                        [facility]                    

 

                          Coreg                            3.125 mg, Route: PO, Drug form: TAB, Daily, Dosing

 Weight 99, kg, Start date: 17 9:00:00 CDT, Duration: 30 day, Stop date: 
17 9:00:00 CDT                                                        No Longer

 Active                                                                              

                          2017                            [facility]                    

 

                                        potassium chloride 20 mEq oral tablet, extended release                         

                                        20 mEq, 1 tab, Route: PO, Drug form: ERTAB, ONCE, Dosing Weight 99, kg, Start date:

 17 7:45:00 CDT, Stop date: 17 7:45:00 CDTNotes: (Same as: K-Dur 20)
 "Do Not Crush"  With food and full glass of water                                 

                       Inactive                                                        

                            2017                            [facility]       

             

 

                          atorvastatin                            10 mg, 1 tab, Route: PO, Drug form: TAB, Bedtime,

 Dosing Weight 104.545, kg, Start date: 17 21:00:00 CDT, Duration: 30 day,
 Stop date: 17 21:00:00 CDTNotes: (Same As: Lipitor)                      
                                                Inactive                                             

                                                2017                            [facility]

                    

 

                          atorvastatin                            10 mg, PO, Daily, 0 Refill(s)             

                                                Active                                      

                                                2017                            [facility]                    

 

                          rivaroxaban                            20 mg, PO, Daily, 0 Refill(s)              

                                                Active                                       

                                                2017                            [facility]

                    

 

                          Lisinopril                            5 mg, PO, Daily, 0 Refill(s)                

                                                Active                                         

                                                2017                            [facility]

                    

 

                          pantoprazole                            40 mg, PO, Daily, # 30 tab, 0 Refill(s)   

                                                     Active                            

                                                        2017                   

                                        [facility]                    

 

                          ARIPiprazole                            5 mg, PO, Daily, 0 Refill(s)              

                                                Active                                       

                                                2017                            [facility]

                    

 

                          Glipizide                            10 mg, PO, Daily, 0 Refill(s)                

                                                Active                                         

                                                2017                            [facility]

                    

 

                          Clonazepam                            1 mg, PO, Daily, 0 Refill(s)                

                                                Active                                         

                                                2017                            [facility]

                    

 

                          Metolazone                            2.5 mg, PO, qWeek, # 15 tab, 0 Refill(s)    

                                                    Active                             

                                                       2017                    

                                        [facility]                    

 

                          Metformin                            500 mg, PO, Daily, 0 Refill(s)               

                                                Active                                        

                                                2017                            [facility]

                    

 

                          torsemide                            20 mg, PO, Daily, # 20 tab, 0 Refill(s)      

                                                  Active                               

                                                      2017                      

                                        [facility]                    

 

                          Glipizide 5 MG Oral Tablet                            5 mg, 1 tab, Route: PO, Drug

 form: TAB, Daily, Dosing Weight 99, kg, Priority: NOW, Start date: 17 
12:21:00 CDT, Duration: 30 day, Stop date: 17 9:00:00 CDTNotes: (Same as: 
Glucotrol)  30 min before meals.                                                   

                    No Longer Active                                                                  

                          2017                            [facility]                 

   

 

                          Metformin hydrochloride 500 MG Oral Tablet                            500 mg, Route:

 PO, Drug form: TAB, BID, Dosing Weight 99, kg, Priority: NOW, Start date: 
17 12:21:00 CDT, Duration: 30 day, Stop date: 17 9:00:00 CDT        
                                                Inactive                               

                                                      2017                      

                                        [facility]                    

 

                          Sodium Bicarbonate 650 MG Oral Tablet                            650 mg, 1 tab, Route:

 PO, Drug form: TAB, TID, Dosing Weight 99, kg, Priority: NOW, Start date: 
17 11:29:00 CDT, Duration: 2 day, Stop date: 17 9:00:00 CDTNotes: 
"Dissolve tablet in a glass of water prior to oral administration.  STOMACH 
WARNING: To avoid serious injury, do not take until tablet is completely 
dissolved.  It is very important not to take this product when overly full from 
food or drink."                                                        No Longer Active

                                                                                    2017

                                        [facility]                    

 

                          Potassium Chloride 1.33 MEQ/ML Oral Solution                            40 mEq, 30

 mL, Route: PO, Drug form: LIQ, ONCE, Dosing Weight 99, kg, Start date: 17
 11:28:00 CDT, Stop date: 17 11:28:00 CDT                                    

                    Inactive                                                           

                          2017                            [facility]          

          

 

                          potassium chloride                            20 mEq, 1 tab, Route: PO, Drug form:

 ERTAB, ONCE, Dosing Weight 99, kg, Start date: 17 10:44:00 CDT, Stop 
date: 17 10:44:00 CDTNotes: (Same as: K-Dur 20) "Do Not Crush"  With food 
and full glass of water                                                        Inactive

                                                                                    2017

                                        [facility]                    

 

                          heparin                            5,000 unit, 1 mL, Route: SUB-Q, Drug form: INJ,

 Q12H, Dosing Weight 104.545, kg, Start date: 17 9:00:00 CDT, Duration: 30
 day, Stop date: 17 21:00:00 CDTNotes: porcine heparin                    
                                                No Longer Active                                   

                                                 2017                            

[facility]                    

 

                          Saline Flush 0.9%                            10 ml, Route: IVP, Drug Form: INJ, Dosing

 Weight 104.545, kg, Q12H, Start date: 17 9:00:00 CDT, Duration: 30 day, 
Stop date: 17 21:00:00 CDTNotes: (Same as: BD Posiflush)                  
                                                No Longer Active                                 

                                                      2017                        

                                        [facility]                    

 

                          Amiodarone                            200 mg, 1 tab, Route: PO, Drug form: TAB, Q12H,

 Dosing Weight 104.545, kg, Start date: 17 9:00:00 CDT, Duration: 30 day, 
Stop date: 17 21:00:00 CDTNotes: (Same as: Cordarone)                     
                                                No Longer Active                                    

                                                2017                            [facility]                    

 

                          Aspirin 325 MG Enteric Coated Tablet                            325 mg, 1 tab, Route:

 PO, Drug form: ECTAB, Daily, Dosing Weight 104.545, kg, Start date: 17 
9:00:00 CDT, Duration: 30 day, Stop date: 17 9:00:00 CDTNotes: (Do Not 
Crush)  Do not crush or chew.                                                        

No Longer Active                                                                     

                          2017                            [facility]                    



 

                          pantoprazole                            40 mg, Route: IVP, Drug form: INJ, Before 

Dinner, Dosing Weight 104.545, kg, Start date: 17 0:12:00 CDT, Duration: 
30 day, Stop date: 17 16:30:00 CDTNotes: For IV push reconstitute with 10 
ml 0.9% sodium chloride and push over 2 minutes. (Same as: Protonix)            
                                                      No Longer Active                         

                                                                            2017                  

                                        [facility]                    

 

                          Sodium Chloride 0.154 MEQ/ML Injectable Solution                            1,000 

mL, 1,000 ml/hr, Infuse Over: 1 hr, Route: IV, ONCE, Priority: STAT, Dosing 
Weight 104.545 kg, Start date: 17 0:05:00 CDT, Duration: 1 doses or times,
 Stop date: 17 0:05:00 CDT                                                   

                    Inactive                                                                          

                          2017                            [facility]                    

 

                          Insulin, Aspart, Human                            6 unit, 0.06 mL, Route: SUB-Q, Drug

 form: SOLN, TID-Before Meals, Dosing Weight 104.545, kg, PRN Blood Glucose 
Results, Start date: 17 0:05:00 CDT, Duration: 30 day, Stop date: 17
 0:04:00 CDTNotes: Roll in palms of hands gently;  Do not shake vigorously. 
(Same as: NovoLOG) "single patient use only"  WASTE: F/P - Black; E - [facility]  Stable for 28 days at room temperature. Expires in _____ days from 
______________Date                                                        No Longer Active

                                                                                    2017

                                        [facility]                    

 

                          Dextrose 50% Syringe                            12.5 gm, 25 mL, Route: IVP, Drug Form:

 INJ, Dosing Weight 104.545, kg, PRN, PRN Blood Glucose Results, Start date: 
17 0:05:00 CDT, Duration: 30 day, Stop date: 17 0:04:00 CDT         
                                                      No Longer Active                      

                                                                            2017               

                                        [facility]                    

 

                          Glucagon                            1 mg, Route: IM, Drug form: PDR/INJ, PRN, Dosing

 Weight 104.545, kg, PRN Blood Glucose Results, Start date: 17 0:05:00 
CDT, Duration: 30 day, Stop date: 17 0:04:00 CDT                             

                           No Longer Active                                          

                                                2017                            [facility]

                    

 

                                        Albuterol 0.833 MG/ML / Ipratropium Bromide 0.167 MG/ML Inhalant Solution [DuoNeb]

                                        3 mL, Route: NEB, Drug Form: SOLN, Dosing Weight 104.545,

 kg, RQID, PRN Shortness of breath, Start date: 17 0:03:00 CDT, Duration: 
30 day, Stop date: 17 0:02:00 CDTNotes: (Same as: Duoneb)                 
                                                No Longer Active                                

                                                      2017                       

                                        [facility]                    

 

                          Hydralazine                            20 mg, 1 mL, Route: IVP, Drug form: INJ, Q4H,

 Dosing Weight 104.545, kg, PRN See Nurse's Notes, Start date: 17 0:03:00 
CDT, Duration: 30 day, Stop date: 17 0:02:00 CDT, systolic > 160Notes: 
(Same as: Apresoline) Push over 5 minutes                                          

                    No Longer Active                                                         

                           2017                            [facility]        

            

 

                          Trazodone                            50 mg, 1 tab, Route: PO, Drug form: TAB, Bedtime,

 Dosing Weight 104.545, kg, PRN Insomnia, Start date: 17 0:03:00 CDT, 
Duration: 30 day, Stop date: 17 0:02:00 CDTNotes: (Same As: Desyrel)      
                                                      No Longer Active                   

                                                                            2017            

                                        [facility]                    

 

                          Tylenol                            650 mg, 2 tab, Route: PO, Drug form: TAB, Q6H, 

Dosing Weight 104.545, kg, PRN See Nurse's Notes, Start date: 17 0:03:00 
CDT, Duration: 30 day, Stop date: 17 0:02:00 CDT, Pain 1-3/Temp > 100.4 F,
 headacheNotes: Do not exceed 4 gm/day.  (Same as: Tylenol)                     
                                                No Longer Active                                    

                                                2017                            [facility]                    

 

                          Docusate Sodium 100 MG Oral Capsule [Colace]                            100 mg, 1 

cap, Route: PO, Drug form: CAP, BID, Dosing Weight 104.545, kg, PRN 
Constipation, Start date: 17 0:03:00 CDT, Duration: 30 day, Stop date: 
17 0:02:00 CDTNotes: (Same as: Colace) (Do Not Crush)                     
                                                No Longer Active                                    

                                                2017                            [facility]                    

 

                          Guaifenesin 20 MG/ML Oral Solution [Robitussin]                            100 mg,

 5 mL, Route: PO, Drug form: SYRP, Q4H, Dosing Weight 104.545, kg, PRN See 
Nurse's Notes, Start date: 17 0:03:00 CDT, Duration: 30 day, Stop date: 
17 0:02:00 CDT, as needed for congestion, coughNotes: (Same as: 
Robitussin)                                                        No Longer Active  

                                                                                  2017

                                        [facility]                    

 

                          Saline Flush 0.9%                            10 ml, Route: IVP, Drug Form: INJ, Dosing

 Weight 104.545, kg, PRN, PRN Line Flush, Start date: 17 0:03:00 CDT, 
Duration: 30 day, Stop date: 17 0:02:00 CDT                                  

                      Inactive                                                         

                           2017                            [facility]        

            

 

                          Ondansetron                            4 mg, 1 tab, Route: PO, Drug form: TAB, Q8H,

 Dosing Weight 104.545, kg, PRN Nausea & Vomiting, Start date: 17 0:03:00 
CDT, Duration: 30 day, Stop date: 17 0:02:00 CDTNotes: (Same as: Zofran)  
                                                      No Longer Active               

                                                                            2017        

                                        [facility]                    

 

                          Morphine                            2 mg, 1 mL, Route: IVP, Drug form: INJ, Q15Min,

 Dosing Weight 104.545, kg, PRN Chest Pain, Start date: 17 0:03:00 CDT, 
Duration: 2 doses or times, Stop date: Limited # of timesNotes: (Same as:M
ORPhine Sulfate)                                                        No Longer Active

                                                                                    2017

                                        [facility]                    

 

                          Amiodarone                            150 mg, Route: IVPB, ONCE, Dosing Weight 104.545,

 kg, Start date: 17 0:03:00 CDT, Stop date: 17 0:03:00 CDT          
                                                Inactive                                 

                                                      2017                        

                                        [facility]                    

 

                                        Acetaminophen 325 MG / Hydrocodone Bitartrate 5 MG Oral Tablet                  

                                        2 tab, Route: PO, Drug Form: TAB, Dosing Weight 104.545, kg, Q6H, PRN Pain

 Score 4-6, Start date: 17 0:03:00 CDT, Duration: 30 day, Stop date: 
17 0:02:00 CDTNotes: (Same as: Norco 325/5)  Do not exceed 4gm/day of 
acetaminophen.                                                        No Longer Active

                                                                                    2017

                                        [facility]                    

 

                          Diltiazem                            30 mg, 1 tab, Route: PO, Drug form: TAB, Q6H,

 Dosing Weight 104.545, kg, Priority: STAT, Start date: 17 0:03:00 CDT, 
Duration: 30 day, Stop date: 17 0:00:00 CDTNotes: (Same as: Cardizem)  
Before meals                                                        Inactive         

                                                                            2017  

                                        [facility]                    

 

                          Sodium Chloride 0.154 MEQ/ML Injectable Solution                            1,000 

mL, Rate: 60 ml/hr, Infuse over: 16.7 hr, Route: IV, Dosing Weight 104.545 kg, 
Total Volume: 1,000, Start date: 17 0:03:00 CDT, Stop date: 17 
0:02:00 CDT                                                        No Longer Active  

                                                                                  2017

                                        [facility]                    

 

                          Aspirin                            324 mg, 4 tab, Route: CHEW, Drug form: CHEWTAB,

 ONCE, Dosing Weight 104.545, kg, Priority: STAT, Start date: 17 23:08:00 
CDT, Stop date: 17 23:08:00 CDTNotes: Take with food.                     
                                                Inactive                                            

                                                2017                            [facility]

                    

 

                          Insulin regular                            4 unit, 0.04 mL, Route: IVP, Drug form:

 SOLN, ONCE, Dosing Weight 104.545, kg, Priority: STAT, Start date: 17 
22:47:00 CDT, Stop date: 17 22:47:00 CDTNotes: (Same as: Humulin R) Roll 
in palms of hands gently;  Do not shake vigorously. "single patient use only"  
(Restricted to patients requiring a dose >  60 units)  WASTE: F/P - Black; E - 
Municipal Trash Bin  Stable for 28 days at room temperature Expires in ______ 
days from ______________Date                                                        Inactive

                                                                                    2017

                                        [facility]                    

 

                          Diltiazem                            10 mg, 2 mL, Route: IVP, Drug form: INJ, ONCE,

 Dosing Weight 104.545, kg, Priority: STAT, Start date: 17 22:34:00 CDT, 
Stop date: 17 22:34:00 CDTNotes: (Same as: Cardizem)                      
                                                Inactive                                             

                                                2017                            [facility]

                    

 

                          Sodium Chloride 0.9% IV                            250 mL, Route: IVPB, Start date:

 17 21:20:00 CDT, Duration: 30 day, Stop date: 17 21:19:00 CDT, PRN 
Line Flush                                                        No Longer Active   

                                                                                 2017

                                        [facility]                    

 

                          BD Normal Saline Flush                            10 mL, Route: IVP, Drug Form: INJ,

 PRN, PRN Line Flush, Start date: 17 21:20:00 CDT, Duration: 30 day, Stop 
date: 17 21:19:00 CDTNotes: (Same as: BD Posiflush)                       
                                                No Longer Active                                      

                                                2017                            [facility]                    

 

                          Saline Flush 0.9%                            10 mL, Route: IVP, Drug Form: INJ, Dosing

 Weight 104.545, kg, PRN, PRN Line Flush, Start date: 17 21:15:00 CDT, 
Duration: 30 day, Stop date: 17 21:14:00 CDT                                 

                       Inactive                                                        

                            2017                            [facility]       

             

 

                          Loratadine 10 MG Oral Tablet [Claritin]                            10 mg=1 tab, PO,

 Daily, X 7 day, # 7 tab, 0 Refill(s)                                              

                    Active                                                                       

                          2017                            [facility]                    

 

                          Furosemide                            40 mg, 4 mL, Route: IVP, Drug form: INJ, ONCE,

 Dosing Weight 104.545, kg, Priority: STAT, Start date: 17 1:03:00 CDT, 
Stop date: 17 1:03:00 CDTNotes: (Same as: Lasix)   *** MEDICATION WASTE 
*** Product Size:  40 mg Product Wasted:  ___ mg                                   

                     Inactive                                                          

                          2017                            [facility]         

           

 

                          Benadryl                            12.5 mg, 0.25 mL, Route: IVP, Drug form: INJ, 

ONCE, Dosing Weight 104.545, kg, Priority: STAT, Start date: 17 0:09:00 
CDT, Stop date: 17 0:09:00 CDTNotes: (Same as: Benadryl)                  
                                                Inactive                                         

                                                2017                            [facility]

                    

 

                          Trazodone Hydrochloride 50 MG Oral Tablet                            50 mg, 1 tab,

 Route: PO, Drug form: TAB, Bedtime, Dosing Weight 95.455, kg, Start date: 
16 21:00:00 CDT, Duration: 30 day, Stop date: 16 21:00:00 CDTNotes: 
(Same As: Desyrel)                                                        Inactive   

                                                                                 2016

                                        [facility]                    

 

                          Cogentin                            1 mg, 1 tab, Route: PO, Drug form: TAB, Bedtime,

 Dosing Weight 95.455, kg, Start date: 16 21:00:00 CDT, Duration: 30 day, 
Stop date: 16 21:00:00 CDTNotes: (Same As: Cogentin)                      
                                                Inactive                                             

                                                2016                            [facility]

                    

 

                          Lipitor                            40 mg, 1 tab, Route: PO, Drug form: TAB, Bedtime,

 Dosing Weight 95.455, kg, Start date: 16 21:00:00 CDT, Duration: 30 day, 
Stop date: 16 21:00:00 CDTNotes: (Same as: Lipitor)                       
                                                Inactive                                              

                                                2016                            [facility]

                    

 

                          Abilify                            10 mg, 1 tab, Route: PO, Drug form: TAB, Bedtime,

 Dosing Weight 95.455, kg, Start date: 16 21:00:00 CDT, Duration: 30 day, 
Stop date: 16 21:00:00 CDTNotes: Non-Formulary Drug. (Same as: Abilify)   
                                                      Inactive                        

                                                                            2016                 

                                        [facility]                    

 

                          Geodon                            20 mg, 1 cap, Route: PO, Drug form: CAP, QPM, Dosing

 Weight 95.455, kg, Start date: 16 17:00:00 CDT, Duration: 30 day, Stop 
date: 16 17:00:00 CDTNotes: (Same As: Geodon) Give with Food              
                                                Inactive                                     

                                                2016                            [facility]                    

 

                          insulin detemir                            10 unit, 0.1 mL, Route: SUB-Q, Drug form:

 INJ, Q12H, Dosing Weight 95.455, kg, Start date: 16 9:00:00 CDT, 
Duration: 30 day, Stop date: 16 21:00:00 CDTNotes: Same as Levemir Do not 
hold insulin without contacting prescriber  WASTE: F/P - Black; E - Municipal 
Trash Bin  "single patient use only"                                               

                    Inactive                                                                      

                          2016                            [facility]                    

 

                          Saline Flush 0.9%                            10 ml, Route: IVP, Drug Form: INJ, Dosing

 Weight 95.455, kg, Q12H, Start date: 16 9:00:00 CDT, Duration: 30 day, 
Stop date: 16 21:00:00 CDT                                                   

                    No Longer Active                                                                  

                          2016                            [facility]                 

   

 

                          isosorbide mononitrate extended release                            30 mg, 1 tab, Route:

 PO, Drug form: ERTAB, Daily, Dosing Weight 95.455, kg, Start date: 16 
9:00:00 CDT, Duration: 30 day, Stop date: 16 9:00:00 CDTNotes: (Same 
as:Imdur) "Do Not Crush&quot;  Take on empty stomach/ full glass of water.  Do 
not crush                                                        Inactive            

                                                                            2016     

                                        [facility]                    

 

                          Aspirin 81 MG Enteric Coated Tablet                            81 mg, 1 tab, Route:

 PO, Drug form: ECTAB, Daily, Dosing Weight 95.455, kg, Start date: 16 
9:00:00 CDT, Duration: 30 day, Stop date: 16 9:00:00 CDTNotes: Do not 
crush or chew. (Same As: Ecotrin)                                                  

                    Inactive                                                                         

                          2016                            [facility]                    

 

                          Lisinopril                            5 mg, Route: PO, Drug form: TAB, Daily, Dosing

 Weight 95.455, kg, Start date: 16 9:00:00 CDT, Duration: 30 day, Stop 
date: 16 9:00:00 CDT                                                        No 

Longer Active                                                                        

                          2016                            [facility]                    

 

                          carvedilol                            3.125 mg, Route: PO, Drug form: TAB, Q12H, Dosing

 Weight 95.455, kg, Start date: 16 9:00:00 CDT, Duration: 30 day, Stop 
date: 16 21:00:00 CDT                                                        No

 Longer Active                                                                       

                          2016                            [facility]                    

 

                          Sucralfate                            1 gm, 1 tab, Route: PO, Drug form: TAB, BID,

 Dosing Weight 95.455, kg, Start date: 16 9:00:00 CDT, Duration: 30 day, 
Stop date: 16 17:00:00 CDTNotes: May interfere w/enteral feeds -  Take 1 
hr before or 2 hr after antacids, dairy pdt, meals & minerals -  On empty 
stomach.  (Same As: Carafate)                                                        

Inactive                                                                             

                          2016                            [facility]                    

 

                          Coreg                            25 mg, 1 tab, Route: PO, Drug form: TAB, BID-Meals,

 Dosing Weight 95.455, kg, Start date: 16 8:00:00 CDT, Duration: 30 day, 
Stop date: 16 17:00:00 CDTNotes: Give with food. (Same As: Coreg)         
                                                Inactive                                

                                                      2016                       

                                        [facility]                    

 

                          Sodium Chloride 0.9% IV                            250 mL, Route: IVPB, Start date:

 16 22:25:00 CDT, Duration: 30 day, Stop date: 16 22:24:00 CDT, PRN 
Line Flush                                                        No Longer Active   

                                                                                 2016

                                        [facility]                    

 

                          BD Normal Saline Flush                            10 mL, Route: IVP, Drug Form: INJ,

 PRN, PRN Line Flush, Start date: 16 22:24:00 CDT, Duration: 30 day, Stop 
date: 16 22:23:00 CDTNotes: (Same as: BD Posiflush)                       
                                                No Longer Active                                      

                                                2016                            [facility]                    

 

                          Ambien                            5 mg, 0.5 tab, Route: PO, Drug form: TAB, Bedtime,

 Dosing Weight 95.455, kg, PRN Sleep, Start date: 16 21:53:00 CDT, 
Duration: 30 day, Stop date: 16 21:52:00 CDTNotes: (Same As: Ambien)      
                                                      No Longer Active                   

                                                                            2016            

                                        [facility]                    

 

                          Insulin, Aspart, Human                            10 unit, 0.1 mL, Route: SUB-Q, Drug

 form: SOLN, TID-Before Meals, Dosing Weight 95.455, kg, PRN Blood Glucose 
Results, Start date: 16 21:52:00 CDT, Duration: 30 day, Stop date: 
16 21:51:00 CDTNotes: Roll in palms of hands gently;  Do not shake 
vigorously. (Same as: NovoLOG) "single patient use only"  WASTE: F/P - Black; E 
- Municipal Trash Bin  Stable for 28 days at room temperature. Expires in _____ 
days from ______________Date                                                        No

 Longer Active                                                                       

                          2016                            [facility]                    

 

                          Glucagon                            1 mg, Route: IM, Drug form: PDR/INJ, PRN, Dosing

 Weight 95.455, kg, PRN Blood Glucose Results, Start date: 16 21:52:00 
CDT, Duration: 30 day, Stop date: 16 21:51:00 CDT                            

                            No Longer Active                                         

                                                2016                            [facility]

                    

 

                          Dextrose 50% Syringe                            25 gm, 50 mL, Route: IVP, Drug Form:

 INJ, Dosing Weight 95.455, kg, PRN, PRN Blood Glucose Results, Start date: 
16 21:52:00 CDT, Duration: 30 day, Stop date: 16 21:51:00 CDT       
                                                      No Longer Active                    

                                                                            2016             

                                        [facility]                    

 

                                        Codeine Phosphate 2 MG/ML / Guaifenesin 20 MG/ML Oral Solution                  

                                        10 mL, Route: PO, Drug Form: LIQ, Dosing Weight 95.455, kg, Q4H, PRN Cough,

 Start date: 16 21:51:00 CDT, Duration: 30 day, Stop date: 16 
21:50:00 CDTNotes: (Same As: Robitussin AC)                                        

                          No Longer Active                                                     

                                                2016                            [facility]      

              

 

                          Acetaminophen                            650 mg, 2 tab, Route: PO, Drug form: TAB,

 Q4H, Dosing Weight 95.455, kg, PRN For Temp > 100.4 F, Start date: 16 
21:51:00 CDT, Duration: 30 day, Stop date: 16 21:50:00 CDTNotes: Do not 
exceed 4 gm/day.  (Same as: Tylenol)                                               

                    No Longer Active                                                              

                          2016                            [facility]             

       

 

                          Tessalon Perles                            100 mg, 1 cap, Route: PO, Drug form: CAP,

 Q8H, Dosing Weight 95.455, kg, PRN Cough, Start date: 16 21:51:00 CDT, 
Duration: 30 day, Stop date: 16 21:50:00 CDTNotes: (Same As: Tessalon Rory
es) "Do Not Crush"                                                        No Longer Active

                                                                                    2016

                                        [facility]                    

 

                          Diphenhydramine                            25 mg, 1 cap, Route: PO, Drug form: CAP,

 Q6H, Dosing Weight 95.455, kg, PRN Itching, Start date: 16 21:51:00 CDT, 
Duration: 30 day, Stop date: 16 21:50:00 CDTNotes: (Same as: Benadryl)    
                                                      No Longer Active                 

                                                                            2016          

                                        [facility]                    

 

                          Bisacodyl                            10 mg, 1 supp, Route: FL, Drug form: SUPP, Daily,

 Dosing Weight 95.455, kg, PRN Constipation, Start date: 16 21:51:00 CDT, 
Duration: 30 day, Stop date: 16 21:50:00 CDTNotes: (Same As: Dulcolax, 
Bisco-Lax)                                                        No Longer Active   

                                                                                 2016

                                        [facility]                    

 

                          Simethicone                            80 mg, 1 tab, Route: CHEW, Drug form: CHEWTAB,

 Q6H, Dosing Weight 95.455, kg, PRN Gas, Start date: 16 21:51:00 CDT, 
Duration: 2 doses or times, Stop date: Limited # of timesNotes: (Same as: Myl
icon)                                                        No Longer Active        

                                                                            2016 

                                        [facility]                    

 

                                        dextromethorphan-guaiFENesin 10 mg-100 mg/5 mL oral liquid                      

                                        10 mL, Route: PO, Drug Form: SYRP, Dosing Weight 95.455, kg, Q4H, PRN Cough, 

Start date: 16 21:51:00 CDT, Duration: 30 day, Stop date: 16 
21:50:00 CDTNotes: (dextromethorphan-guaifenesin 10-100mg/5ml 10 ml oral SOLN 
ud) (Same as: Robitussin DM)                                                        No

 Longer Active                                                                       

                          2016                            [facility]                    

 

                          Ondansetron                            4 mg, 2 mL, Route: IVP, Drug form: INJ, Q8H,

 Dosing Weight 95.455, kg, PRN Nausea & Vomiting, Start date: 16 21:51:00 
CDT, Duration: 30 day, Stop date: 16 21:50:00 CDTNotes: (Same as: Zofran) 
  *** MEDICATION WASTE *** Product Size:  4 mg Product Wasted:  ___ mg          
                                                      No Longer Active                       

                                                                            2016                

                                        [facility]                    

 

                          Temazepam                            15 mg, 1 cap, Route: PO, Drug form: CAP, Bedtime,

 Dosing Weight 95.455, kg, PRN Insomnia, Start date: 16 21:51:00 CDT, 
Duration: 30 day, Stop date: 16 21:50:00 CDTNotes: (Same As: Restoril)    
                                                      No Longer Active                 

                                                                            2016          

                                        [facility]                    

 

                          Saline Flush 0.9%                            10 ml, Route: IVP, Drug Form: INJ, Dosing

 Weight 95.455, kg, PRN, PRN Line Flush, Start date: 16 21:51:00 CDT, 
Duration: 30 day, Stop date: 16 21:50:00 CDTNotes: (Same as: BD Posiflush)
                                                        Inactive                     

                                                                            2016              

                                        [facility]                    

 

                          Nitroglycerin                            0.4 mg, 1 tab, Route: SL, Drug form: TAB,

 Q5Min, Dosing Weight 95.455, kg, PRN Chest Pain, Start date: 16 21:51:00 
CDT, Duration: 3 doses or times, Stop date: Limited # of timesNotes: (Same as:
Nitroquick, Nitrostat) "Do Not Crush"  Sublingual tablet                        
                                                No Longer Active                                       

                                                2016                            [facility]

                    

 

                          Nitroglycerin 0.02 MG/MG Topical Ointment                            0.5 inch, Route:

 TOP, Drug Form: OINT, Dosing Weight 95.455, kg, TID, PRN Chest Pain, Start 
date: 16 21:51:00 CDT, Duration: 30 day, Stop date: 16 21:50:00 
CDTNotes: 1 gram is approximately 1 inch of nitroglycerin ointment    (20 mg NTG
 per gram) (Same as:Nitro-Bid)                                                     

                    No Longer Active                                                                    

                          2016                            [facility]                   

 

 

                          aripiprazole 10 MG Oral Tablet [Abilify]                            10 mg=1 tab, PO,

 Bedtime, 0 Refill(s)                                                        Active  

                                                                                  2016

                                        [facility]                    

 

                          Calcium Carbonate                            1,000 mg, 2 tab, Route: CHEW, Drug form:

 CHEWTAB, ONCE, Dosing Weight 95.455, kg, Priority: STAT, Start date: 16 
20:12:00 CDT, Stop date: 16 20:12:00 CDTNotes: (Same As: Tums) Calcium Car
bonate 500 mu=363 mg elemental calcium   Dose=______ mg calcium carbonate 
(______ mg elemental calcium)                                                        

Inactive                                                                             

                          2016                            [facility]                    

 

                          potassium chloride                            40 mEq, Route: PO, Drug form: ERTAB,

 ONCE, Dosing Weight 95.455, kg, Priority: STAT, Start date: 16 18:59:00 
CDT, Stop date: 16 18:59:00 CDT                                              

                    Inactive                                                                     

                          2016                            [facility]                    



 

                          Nitroglycerin 0.02 MG/MG Topical Ointment                            0.5 inch, Route:

 TOP, Drug Form: OINT, Dosing Weight 95.455, kg, ONCE, STAT, Start date: 
16 18:16:00 CDT, Stop date: 16 18:16:00 CDTNotes: 1 gram is 
approximately 1 inch of nitroglycerin ointment    (20 mg NTG per gram) (Same 
as:Nitro-Bid)                                                        Inactive        

                                                                            2016 

                                        [facility]                    

 

                          Saline Flush 0.9%                            10 mL, Route: IVP, Drug Form: INJ, Dosing

 Weight 95.455, kg, PRN, PRN Line Flush, Start date: 16 18:16:00 CDT, 
Duration: 30 day, Stop date: 16 18:15:00 CDTNotes: (Same as: BD Posiflush)
                                                        Inactive                     

                                                                            2016              

                                        [facility]                    

 

                          tramadol 50 mg oral tablet                            50 mg=1 tab, PO, Q6H, Pain, 

# 40 tab, 0 Refill(s)                                                        Active  

                                                      [place]                            2014

                                        [facility]                    

 

                          ondansetron 4 mg oral disintegrating strip                            4 mg=1 ea, PO,

 Q8H, # 10 tab, 0 Refill(s)                                                        Active

                                                        [place]                         

                          2014                            [facility]                    

 

                          Lasix                            40 mg, 4 mL, Route: IVP, Drug form: INJ, ONCE, Dosing

 Weight 106.818, kg, Priority: STAT, Start date: 14 8:17:00, Stop date: 
14 8:17:00(Same as: Lasix)                                                   

                    Inactive                                                        [place]              

                          2014                            [facility]                 

   

 

                          aspirin                            324 mg, 4 tab, Route: PO, Drug form: CHEWTAB, ONCE,

 Dosing Weight 106.818, kg, Priority: STAT, Start date: 14 4:32:00, Stop 
date: 14 4:32:00Take with food.                                              

                    Inactive                                                        [place]         

                          2014                            [facility]            

        

 

                          Saline Flush 0.9%                            5 mL, Route: IVP, Drug Form: INJ, Dosing

 Weight 106.818, kg, Q8H, PRN Line Flush, Start date: 14 4:32:00, 
Duration: 30 day, Stop date: 14 4:31:00, Administer at least once every 8 
hoursAdminister at least once every 8 hours(Same as: BD Posiflush)              
                                                Inactive                                     

                          [place]                            2014                        

                                        [facility]                    

 

                          ondansetron                            4 mg, 2 mL, Route: IVP, Drug form: INJ, ONCE,

 Dosing Weight 106.818, kg, Priority: STAT, Start date: 14 4:32:00, Stop 
date: 14 4:32:00(Same as: Zofran)                                            

                    Inactive                                                        [place]       

                          2014                            [facility]          

          

 

                          Maalox Advanced Maximum Strength oral suspension                            30 mL,

 PO, QID-Before Meals, # 500 mL, 0 Refill(s)                                       

                    Active                                                        [place]   

                          2014                            [facility]      

              

 

                          omeprazole 20 mg oral delayed release capsule                            20 mg=1 cap,

 PO, Daily, # 30 cap, 0 Refill(s)                                                  

                    Active                                                        [place]              

                          2014                            [facility]                 

   

 

                          Zofran ODT 4 mg oral tablet, disintegrating                            4 mg=1 tab,

 PO, BID, Nausea and Vomiting, Dissolve tab under tongue, # 10 tab, 0 
Refill(s)Dissolve tab under tongue                                                 

                    Active                                                        [place]             

                          2014                            [facility]                

    

 

                          GI cocktail                            30 mL, Route: PO, Drug Form: SUSP, Dosing Weight

 104.545, kg, ONCE, STAT, Start date: 14 10:50:00, Stop date: 14 
10:50:00G.I. Cocktail=antacid with simethicone 22.5 mL - lidocaine viscous 7.5 
mL                                                        Inactive                   

                                                [place]                            2014       

                                        [facility]                    

 

                          Kayexalate                            30 gm, 120 mL, Route: PO, Drug form: SUSP, ONCE,

 Dosing Weight 104.545, kg, Priority: STAT, Start date: 14 10:46:00, Stop 
date: 14 10:46:00(sodium polystyrene sulfonate 15 gm/60 ml INA)  Shake 
well before use.   (Same as: Kayexalate, SPS)                                      

                    Inactive                                                        [place]

                            2014                            [facility]   

                 

 

                          ondansetron                            4 mg, 2 mL, Route: IVP, Drug form: INJ, ONCE,

 Dosing Weight 104.545, kg, Priority: STAT, Start date: 14 9:53:00, Stop 
date: 14 9:53:00(Same as: Zofran)                                            

                    Inactive                                                        [place]      

                          2014                            [facility]         

           

 

                          aspirin 325 mg tablet                            325 mg, 1 tab, Route: PO, Drug form:

 TAB, ONCE, Dosing Weight 104.545, kg, Priority: STAT, Start date: 14 
9:53:00, Stop date: 14 9:53:00Take with food.                                

                          Inactive                                                     

                    [facility] [place]                            2014                            [facility]

                    

 

                          Sodium Chloride 0.9% (Bolus)  mL                            500 mL, Rate: 500

 ml/hr, Infuse over: 1 hr, Route: IV, Dosing Weight 104.545 kg, Total Volume: 
500, Priority: STAT, Start date: 14 9:53:00, Duration: 1 doses or times, 
Stop date: 14 10:52:00                                                        Inactive

                                                        [facility] [place]                        

                          2014                            [facility]                    

 

                          Saline Flush 0.9%                            5 ml, Route: IVP, Drug Form: INJ, Dosing

 Weight 104.545, kg, PRN, PRN Line Flush, Start date: 14 9:53:00, 
Duration: 30 day, Stop date: 14 10:52:00(Same as: BD Posiflush)           
                                                Inactive                                  

                          [facility] [place]                            2014                    

                                        [facility]                    

 

                          Protonix + sodium chloride 10 mL                            40 mg, Route: IVP, ONCE,

 Dosing Weight 104.545, kg, Priority: STAT, Start date: 14 9:53:00, Stop 
date: 14 9:53:00For IV push reconstitute with 10 ml 0.9% sodium chloride 
and push over 2 minutes. (Same as: Protonix)                                       

                    Inactive                                                        [facility] [place] 

                           2014                            [facility]    

                

 

                          ranitidine 150 mg oral tablet                            150 mg=1 tab, PO, BID, # 

60 tab, 0 Refill(s)                                                        Active    

                                                    [place]                            2014

                                        [facility]                    

 

                          Ultram 50 mg oral tablet                            50 mg=1 tab, PO, Q4H, pain, # 

20 tab, 0 Refill(s)                                                        Inactive  

                                                      [place]                            

2014                              [facility]                    

 

                          Zofran ODT 4 mg oral tablet, disintegrating                            4 mg=1 tab,

 PO, TID, as needed for nausea/vomiting, Dissolve tab under tongue, # 10 tab, 0 
Refill(s)Dissolve tab under tongue                                                 

                    Active                                                        [place]             

                          2014                            [facility]                    



 

                          morphine Sulfate                            2 mg, 1 mL, Route: IVP, Drug form: INJ,

 ONCE, Dosing Weight 111.364, kg, Priority: STAT, Start date: 14 10:45:00,
 Stop date: 14 10:45:00(Same as:MORPhine Sulfate)                            

                            Inactive                                                 

                    [place]                            2014                            [facility]

                    

 

                          Sodium Chloride 0.9% (Bolus)  mL                            250 mL, Rate: 1,000

 ml/hr, Infuse over: 15 minutes, Route: IV, Dosing Weight 111.364 kg, Total 
Volume: 250, Priority: STAT, Start date: 14 9:23:00, Duration: 1 doses or 
times, Stop date: 14 9:37:00                                                 

                    Inactive                                                        [place]           

                          2014                            [facility]                  

  

 

                          Saline Flush 0.9%                            5 mL, Route: IVP, Drug Form: INJ, Dosing

 Weight 111.364, kg, PRN, PRN Line Flush, Start date: 14 9:23:00, 
Duration: 24 hr, Stop date: 14 9:22:00(Same as: BD Posiflush)             
                                                Inactive                                    

                          [place]                            2014                      

                                        [facility]                    

 

                          pantoprazole                            40 mg, Route: IVP, Drug form: INJ, ONCE, Dosing

 Weight 111.364, kg, For IV push reconstitute with 10 ml 0.9% sodium chloride 
and push over at least 3 minutes, Priority: STAT, Start date: 14 9:23:00, 
Stop date: 14 9:23:00For IV push reconstitute with 10 ml 0.9% sodium 
chloride and push over 2 minutes. (Same as: Protonix)                              

                          Inactive                                                   

                    [place]                            2014                            [facility]

                    

 

                          ondansetron                            4 mg, 2 mL, Route: IVP, Drug form: INJ, ONCE,

 Dosing Weight 111.364, kg, Priority: STAT, Start date: 14 9:23:00, Stop 
date: 14 9:23:00(Same as: Zofran)                                            

                    Inactive                                                        [place]      

                          2014                            [facility]             

       

 

                          thiamine 100 mg oral tablet                            100 mg=1 tab, PO, Daily, # 

30 tab, 0 Refill(s)                                                        Active    

                                                    [facility]                            01/15/2014

                                        [facility]                    

 

                          rivaroxaban 15 mg tablet                            15 mg=1 tab, PO, Q12H, # 60 tab,

 0 Refill(s)                                                        Active           

                                                [facility]                            01/15/2014

                                        [facility]                    

 

                                        potassium chloride 20 mEq oral tablet, extended release                         

                          20 mEq=1 tab, PO, BID, # 60 tab, 0 Refill(s)                                      

                    Active                                                        [facility]  

                          01/15/2014                            [facility]         

           

 

                          OLANZapine 5 mg oral tablet, disintegrating                            2.5 mg=0.5 

tab, PO, BID, # 30 tab, 0 Refill(s)                                                

                    Active                                                        [facility]            

                          01/15/2014                            [facility]                   

 

 

                          losartan 50 mg oral tablet                            50 mg=1 tab, PO, Daily, # 30

 caplet, 0 Refill(s)                                                        Active   

                                                     [facility]                            01/15/2014

                                        [facility]                    

 

                          Advair Diskus 250 mcg-50 mcg inhalation powder                            1 inhalation,

 INHALER, BID, # 28 ea, 0 Refill(s)                                                

                    Active                                                        [facility]            

                          01/15/2014                            [facility]                   

 

 

                          Xarelto                            15 mg, 1 tab, Route: PO, Drug form: TAB, Q12H, 

Dosing Weight 99.545, kg, Start date: 14 9:00:00, Duration: 30 day, Stop 
date: 14 21:00:00(Same as: Xarelto) Administer with food                  
                                                No Longer Active                                 

                          [facility]                            2014                   

                                        [facility]                    

 

                          magnesium sulfate                            2 gm, 50 mL, Route: IVPB, Drug form: 

INJ, ONCE, Dosing Weight 101.2, kg, Total dose=2 gm, Start date: 14 
15:28:00, Duration: 1 doses or times, Stop date: 14 15:28:00              
                                                Inactive                                     

                          [place]                            2014                        

                                        [facility]                    

 

                          Navane                            5 mg, 1 cap, Route: PO, Drug form: CAP, BID, Dosing

 Weight 101.2, kg, Start date: 14 9:00:00, Duration: 30 day, Stop date: 
02/10/14 17:00:00(Same As: Navane)                                                 

                    No Longer Active                                                        [place]   

                          2014                            [facility]          

          

 

                          Navane                            10 mg, 2 cap, Route: PO, Drug form: CAP, Bedtime,

 Start date: 14 21:00:00, Duration: 30 day, Stop date: 14 
21:00:00(Same As: Navane)                                                        No Longer

 Active                                                        [place]              

                          2014                            [facility]                    

 

                          ZyPREXA Zydis                            5 mg, 1 tab, Route: PO, Drug form: TABDIS,

 Bedtime, Start date: 14 21:00:00, Duration: 30 day, Stop date: 14 
21:00:00(Same as: ZyPREXA Zydis ODT-Oral Disintegrating Tablet)                 
                                                No Longer Active                                

                          [facility] [place]                            2014               

                                        [facility]                    

 

                          morphine Sulfate                            2 mg, 1 mL, Route: IV, Drug form: INJ,

 ONCE, Dosing Weight 101.2, kg, Priority: NOW, Start date: 14 20:47:00, 
Stop date: 14 20:47:00(Same as:MORPhine Sulfate)                             

                           Inactive                                                  

                    [facility]                            2014                            [facility]

                    

 

                          ZyPREXA Zydis                            2.5 mg, 0.5 tab, Route: PO, Drug form: TABDIS,

 BID, Start date: 14 17:00:00, Duration: 30 day, Stop date: 02/10/14 
9:00:00(Same as: ZyPREXA Zydis ODT-Oral Disintegrating Tablet)                  
                                                No Longer Active                                 

                          [facility]                            2014                

                                        [facility]                    

 

                          Xanax                            1 mg, 1 tab, Route: PO, Drug form: TAB, Q6H, PRN 

Anxiety, Start date: 14 16:24:00, Duration: 30 day, Stop date: 02/10/14 
16:23:00With food or milk (Same as: Xanax)                                         

                    No Longer Active                                                        

[facility]                            2014                            [facility]

                    

 

                          Spiriva                            18 microgram, 1 inhalation, Route: INHALATION, 

Drug form: CAP, Daily, Dosing Weight 101.2, kg, Start date: 14 9:00:00, 
Duration: 30 day, Stop date: 14 9:00:00(Same As: Spiriva)                 
                                                No Longer Active                                

                          [place]                            2014                 

                                        [facility]                    

 

                          lisinopril                            40 mg, 2 tab, Route: PO, Drug form: TAB, Daily,

 Dosing Weight 101.2, kg, Start date: 14 9:00:00, Duration: 30 day, Stop 
date: 14 9:00:00(Same as: Prinivil, Zestril)                                 

                          No Longer Active                                              

                    [place]                            2014                            [facility]

                    

 

                          clonazePAM                            2 mg, 2 tab, Route: PO, Drug form: TAB, Bedtime,

 Dosing Weight 101.2, kg, Start date: 01/10/14 21:00:00, Duration: 30 day, Stop 
date: 14 21:00:00(Same As: KlonoPIN)                                         

                    No Longer Active                                                        

[place]                            2014                            [facility]  

                  

 

                          simvastatin                            20 mg, 1 tab, Route: PO, Drug form: TAB, Bedtime,

 Dosing Weight 101.2, kg, Start date: 01/10/14 21:00:00, Duration: 30 day, Stop 
date: 14 21:00:00(Same as: Zocor)                                            

                    No Longer Active                                                        [place]

                            2014                            [facility]       

             

 

                          Remeron                            15 mg, 1 tab, Route: PO, Drug form: TAB, Bedtime,

 Dosing Weight 101.2, kg, Start date: 01/10/14 21:00:00, Duration: 30 day, Stop 
date: 14 21:00:00(Same as:Remeron)                                           

                    No Longer Active                                                        [place]

                            2014                            [facility]       

             

 

                          Advair Diskus 250 mcg-50 mcg inhalation powder                            1 inhalation,

 Route: INHALER, Drug Form: AERO, Dosing Weight 101.2, kg, BID, Start date: 
01/10/14 17:00:00, Duration: 30 day, Stop date: 14 9:00:00(Same as: 
Advair)                                                        No Longer Active      

                                                  [place]                            01/10/2014

                                        [facility]                    

 

                          Geodon                            20 mg, 1 cap, Route: PO, Drug form: CAP, QPM, Dosing

 Weight 101.2, kg, Start date: 01/10/14 17:00:00, Duration: 30 day, Stop date: 
14 17:00:00(Same As: Geodon) Give with Food                                  

                          No Longer Active                                               

                    [place]                            01/10/2014                            [facility]

                    

 

                          metFORMIN 500 mg oral tablet                            500 mg, 1 tab, Route: PO, 

Drug form: TAB, BID, Dosing Weight 101.2, kg, Start date: 01/10/14 17:00:00, 
Duration: 30 day, Stop date: 14 9:00:00(Same as: Glucophage)  Take with 
meal                                                        No Longer Active         

                                                [place]                            01/10/2014

                                        [facility]                    

 

                          thiothixene                            5 mg, 1 cap, Route: PO, Drug form: CAP, TID,

 Dosing Weight 101.2, kg, Start date: 01/10/14 13:00:00, Duration: 30 day, Stop 
date: 14 9:00:00(Same As: Navane)                                            

                    No Longer Active                                                        [place]

                            01/10/2014                            [facility]       

             

 

                          carvedilol                            6.25 mg, 1 tab, Route: PO, Drug form: TAB, Q12H,

 Dosing Weight 101.2, kg, Start date: 01/10/14 11:00:00, Duration: 30 day, Stop 
date: 14 9:00:00Give with food. (Same As: Coreg)                             

                           No Longer Active                                          

                    [place]                            01/10/2014                            [facility]                    

 

                          spironolactone                            25 mg, 1 tab, Route: PO, Drug form: TAB,

 Daily, Dosing Weight 101.2, kg, Start date: 01/10/14 11:00:00, Duration: 30 
day, Stop date: 14 9:00:00(Same As: Aldactone)                               

                          No Longer Active                                            

                    [place]                            01/10/2014                            [facility]

                    

 

                          ALPRAZOLam                            1 mg, 1 tab, Route: PO, Drug form: TAB, BID,

 Dosing Weight 101.2, kg, PRN as needed for anxiety, Start date: 01/10/14 
9:51:00, Duration: 30 day, Stop date: 14 9:50:00With food or milk (Same 
as: Xanax)                                                        No Longer Active   

                                                     [place]                            01/10/2014

                                        [facility]                    

 

                          clonazePAM 2 mg oral tablet                            =2 mg, Bedtime, 0 Refill(s)

                                                        Active                       

                                                [place]                            01/10/2014           

                                        [facility]                    

 

                          Robitussin 100 mg/5 mL oral liquid                            200 mg, 10 mL, Route:

 PO, Drug form: SYRP, Q4H, Dosing Weight 101.2, kg, PRN Cough, Start date: 
14 14:37:00, Duration: 30 day, Stop date: 14 14:36:00(Same as: 
Robitussin)                                                        No Longer Active  

                                                      [place]                       

                          2014                            [facility]                    

 

                          furosemide                            60 mg, PO, Daily, 0 Refill(s)               

                                                Active                                        

                                                2014                            [facility]

                    

 

                          simvastatin 20 mg oral tablet                            20 mg=1 tab, PO, Bedtime,

 # 30 tab, 0 Refill(s)                                                        Active 

                                                       [place]                         

                          2014                            [facility]                    

 

                          ALPRAZOLam 1 mg oral tablet, disintegrating                            1 mg=1 tab,

 PO, Bedtime, for anxiety, 0 Refill(s)                                             

                    Active                                                                      

                          2014                            [facility]                    

 

                          carvedilol 6.25 mg oral tablet                            6.25 mg=1 tab, PO, Q12H,

 # 60 tab, 0 Refill(s)                                                        Active 

                                                       [place]                         

                          2014                            [facility]                    

 

                          pantoprazole 40 mg oral enteric coated tablet                            40 mg=1 tab,

 PO, Daily, # 30 tab, 0 Refill(s)                                                  

                    Active                                                                           

                          2014                            [facility]                    

 

                          metFORMIN 500 mg oral tablet                            500 mg=1 tab, PO, BID, # 30

 tab, 0 Refill(s)                                                        Active      

                                                  [place]                            2014

                                        [facility]                    

 

                          lisinopril 40 mg oral tablet                            40 mg=1 tab, PO, Daily, # 

30 tab, 0 Refill(s)                                                        Active    

                                                    [place]                            2014

                                        [facility]                    

 

                          thiothixene                            5 mg, PO, TID, 0 Refill(s)                 

                                                Active                                          

                    [place]                            2014                            [facility]                    

 

                          spironolactone 25 mg oral tablet                            =25 mg, PO, Daily, 0 Refill(s)

                                                        Active                       

                                                [place]                            2014           

                                        [facility]                    

 

                          Geodon 20 mg oral capsule                            20 mg=1 cap, PO, QPM, 0 Refill(s)

                                                        Active                       

                                                [place]                            2014           

                                        [facility]                    

 

                          Tylenol 325 mg oral tablet                            650 mg=2 tab, PO, Q4H, Fever,

 # 120 tab, 0 Refill(s)                                                        Active

                                                                                    2014

                                        [facility]                    

 

                          Remeron 15 mg oral tablet                            15 mg=1 tab, PO, Bedtime, # 30

 tab, 0 Refill(s)                                                        No Longer Active

                                                        [place]                        

                          2014                            [facility]                    

 

                          aspirin 81 mg tablet, enteric coated                            81 mg=1 tab, PO, Daily,

 # 0 tab, 0 Refill(s)                                                        Active  

                                                                                  2014

                                        [facility]                    

 

                          glucagon                            1 mg, Route: IV, Drug form: PDR/INJ, PRN, PRN 

Blood Glucose Results, Start date: 14 13:04:00, Duration: 30 day, Stop 
date: 14 13:03:00                                                        No Longer

 Active                                                        [place]              

                          2014                            [facility]                    

 

                          Dextrose 50% in Water IV                            50 mL, Route: IVP, Start date:

 14 13:03:00, Duration: 30 day, Stop date: 14 13:02:00, PRN Blood 
Glucose Results                                                        No Longer Active

                                                        [place]                     

                          2014                            [facility]                    

 

                          NovoLog FlexPen                            8 unit, 0.08 mL, Route: SUB-Q, Drug form:

 SOLN, Sliding Scale, PRN Blood Glucose Results, Start date: 14 13:03:00, 
Duration: 30 day, Stop date: 14 13:02:00Roll in palms of hands gently;  Do
 not shake vigorously. (Same as: NovoLog)  "single patient use only"  Stable for
 28 days at room temperature. Expires in _____ days from ______________Date     
                                                      No Longer Active                  

                                                [place]                            2014   

                                        [facility]                    

 

                          NovoLog FlexPen                            4 unit, 0.04 mL, Route: SUB-Q, Drug form:

 SOLN, Sliding Scale, PRN Blood Glucose Results, Start date: 14 13:02:00, 
Duration: 30 day, Stop date: 14 13:01:00Roll in palms of hands gently;  Do
 not shake vigorously. (Same as: NovoLog)  "single patient use only"  Stable for
 28 days at room temperature. Expires in _____ days from ______________Date     
                                                      No Longer Active                  

                                                [place]                            2014   

                                        [facility]                    

 

                          folic acid                            1 mg, 1 tab, Route: PO, Drug form: TAB, Daily,

 Dosing Weight 101.2, kg, Start date: 14 10:00:00, Duration: 30 day, Stop 
date: 14 9:00:00(Same as: Folvite)                                           

                    No Longer Active                                                        [place]

                            2014                            [facility]       

             

 

                          thiamine                            100 mg, 1 tab, Route: PO, Drug form: TAB, Daily,

 Dosing Weight 101.2, kg, Start date: 14 10:00:00, Duration: 30 day, Stop 
date: 14 9:00:00(Same As: Vitamin B1)                                        

                          No Longer Active                                                     

                    [place]                            2014                            [facility] 

                   

 

                          Colace 100 mg oral capsule                            100 mg, 1 cap, Route: PO, Drug

 form: CAP, BID, Dosing Weight 101.2, kg, Start date: 14 10:00:00, 
Duration: 30 day, Stop date: 14 9:00:00(Same as: Colace) (Do Not Crush)   
                                                      No Longer Active                

                                                [place]                            2014    

                                        [facility]                    

 

                          potassium chloride                            20 mEq, 15 mL, Route: NJ, Drug form:

 LIQ, PRN, Dosing Weight 101.2, kg, PRN Abnormal Lab Result, Start date: 
14 9:40:00, Duration: 30 day, Stop date: 14 9:39:00, FOR ICU USE 
ONLYFOR ICU USE ONLY(Same as: Potassium Chloride)                                  

                          No Longer Active                                               

                    [place]                            2014                            [facility]

                    

 

                          sodium phosphate + Sodium Chloride 0.9%  mL                            30 mmol,

 10 mL, Route: IVPB, PRN, Dosing Weight 101.2, kg, PRN Abnormal Lab Result, 
Start date: 14 9:40:00, Duration: 30 day, Stop date: 14 9:39:00, FOR
 ICU USE ONLYFOR ICU USE ONLY                                                        

No Longer Active                                                        [place]        

                          2014                            [facility]               

     

 

                          potassium phosphate + Sodium Chloride 0.9%  mL                            15

 mmol, 5 mL, Route: IVPB, PRN, Dosing Weight 101.2, kg, PRN Abnormal Lab Result,
 Start date: 14 9:40:00, Duration: 30 day, Stop date: 14 9:39:00, 
FOR ICU USE ONLYFOR ICU USE ONLY(Same as: K Phosphate.)  1 mMol phoshate has 
1.47 mEq potassium  Infuse over 4 hours                                            

                    No Longer Active                                                        [place]

                            2014                            [facility]       

             

 

                          magnesium sulfate                            2 gm, 50 mL, Route: IVPB, Drug form: 

INJ, PRN, Dosing Weight 101.2, kg, PRN Abnormal Lab Result, Start date: 14
 9:40:00, Duration: 30 day, Stop date: 14 9:39:00, FOR ICU USE ONLYFOR ICU
 USE ONLY                                                        No Longer Active    

                                                    [place]                            2014

                                        [facility]                    

 

                          calcium gluconate                            1 gm, 50 mL, Route: IVPB, Drug form: 

INJ, PRN, Dosing Weight 101.2, kg, PRN Abnormal Lab Result, Start date: 14
 9:40:00, Duration: 30 day, Stop date: 14 9:39:00, FOR ICU USE ONLYFOR ICU
 USE ONLY                                                        No Longer Active    

                                                    [place]                            2014

                                        [facility]                    

 

                          aspirin                            81 mg, 1 tab, Route: PO, Drug form: ECTAB, Daily,

 Dosing Weight 109.091, kg, Start date: 14 9:00:00, Duration: 30 day, Stop
 date: 14 9:00:00Do not crush or chew. (Same As: Ecotrin)                 
                                                No Longer Active                                

                          [place]                            2014                  

                                        [facility]                    

 

                          pantoprazole                            40 mg, 1 tab, Route: PO, Drug form: ECTAB,

 Before Breakfast, Dosing Weight 109.091, kg, Start date: 14 7:30:00, 
Duration: 30 day, Stop date: 14 7:30:00Tablet should not be chewed or 
crushed. (Same as: Protonix)                                                        No

 Longer Active                                                        [place]          

                          2014                            [facility]                 

   

 

                          hydrALAZINE                            10 mg, 0.5 mL, Route: IV, Drug form: INJ, ONCE,

 Start date: 14 5:32:00, Stop date: 14 5:32:00(Same as: Apresoline) 
Push over 5 minutes                                                        Inactive  

                                                      [place]                        

                          2014                            [facility]                    

 

                          Lovenox                            110 mg, 0.73 mL, Route: SUB-Q, Drug form: INJ, 

[id number], Dosing Weight 109.091, kg, Start date: 14 1:00:00, Duration: 30 
day, Stop date: 14 4:00:00Nurse to ensure documentation of patient ed
ucation per anticoagulation policy. (Same as: Lovenox)                             

                           No Longer Active                                          

                    [place]                            2014                            [facility]                    

 

                          Saline Flush 0.9%                            5 ml, Route: IVP, Drug Form: INJ, Dosing

 Weight 109.091, kg, Q12H, Start date: 14 21:00:00, Duration: 30 day, Stop
 date: 14 9:00:00(Same as: BD Posiflush)                                     

                    Inactive                                                        [place]

                            2014                            [facility]       

             

 

                          Cozaar                            50 mg, 1 tab, Route: PO, Drug form: TAB, Daily, 

Dosing Weight 109.091, kg, Start date: 14 18:30:00, Duration: 30 day, Stop
 date: 14 9:00:00(Same as: Cozaar)                                           

                    No Longer Active                                                        [facility]

                            2014                            [facility]       

             

 

                          potassium chloride                            20 mEq, 1 tab, Route: PO, Drug form:

 ERTAB, BID, Dosing Weight 109.091, kg, Start date: 14 18:30:00, Duration:
 30 day, Stop date: 14 17:00:00(Same as: K-Dur 20) "Do Not Crush"  With 
food and full glass of water                                                        No

 Longer Active                                                        [facility]          

                          2014                            [facility]                 

   

 

                          Lasix                            40 mg, 4 mL, Route: IV, Drug form: INJ, BID, Dosing

 Weight 109.091, kg, Start date: 14 18:30:00, Duration: 30 day, Stop date:
 14 17:00:00(Same as: Lasix)                                                 

                    No Longer Active                                                        [facility]   

                          2014                            [facility]          

          

 

                          Sodium Chloride 0.9% IV                            250 mL, Route: IVPB, Start date:

 14 18:06:00, Duration: 30 day, Stop date: 14 18:05:00, PRN Line 
Flush                                                        No Longer Active        

                                                [facility]                            2014

                                        [facility]                    

 

                          BD Normal Saline Flush                            10 mL, Route: IVP, Drug Form: INJ,

 PRN, PRN Line Flush, Start date: 14 18:06:00, Duration: 30 day, Stop 
date: 14 18:05:00(Same as: BD Posiflush)                                     

                          No Longer Active                                                  

                    [facility]                            2014                            [facility]

                    

 

                          glucagon                            1 mg, Route: IM, Drug form: PDR/INJ, PRN, Dosing

 Weight 109.091, kg, PRN Blood Glucose Results, Start date: 14 17:58:00, 
Duration: 30 day, Stop date: 14 17:57:00                                     

                          No Longer Active                                                  

                    [place]                            2014                            [facility]

                    

 

                          Dextrose 50% Syringe                            12.5 gm, 25 mL, Route: IVP, Drug Form:

 INJ, Dosing Weight 109.091, kg, PRN, PRN Blood Glucose Results, Start date: 
14 17:58:00, Duration: 30 day, Stop date: 14 17:57:00               
                                                No Longer Active                              

                          [place]                            2014                

                                        [facility]                    

 

                          insulin aspart                            2 unit, 0.02 mL, Route: SUB-Q, Drug form:

 SOLN, TID-Before Meals, Dosing Weight 109.091, kg, PRN Blood Glucose Results, 
Start date: 14 17:58:00, Duration: 30 day, Stop date: 14 17:57:00Ro
ll in palms of hands gently;  Do not shake vigorously. (Same as: NovoLog)  
"single patient use only"  Stable for 28 days at room temperature. Expires in 
_____ days from ______________Date                                                 

                    No Longer Active                                                        [place]   

                          2014                            [facility]          

          

 

                          Ativan                            2 mg, 2 tab, Route: PO, Drug form: TAB, Q6H, Dosing

 Weight 109.091, kg, PRN Anxiety, Start date: 14 17:13:00, Duration: 30 
day, Stop date: 14 17:12:00(Same as: Ativan)                                 

                          No Longer Active                                              

                    [place]                            2014                            [facility]

                    

 

                          furosemide                            60 mg, 6 mL, Route: IVP, Drug form: INJ, BID,

 Dosing Weight 109.091, kg, Start date: 14 17:00:00, Duration: 30 day, 
Stop date: 14 9:00:00(Same as: Lasix)                                        

                    Inactive                                                        [facility]

                            2014                            [facility]       

             

 

                          enoxaparin                            110 mg, 0.73 mL, Route: SUB-Q, Drug form: INJ,

 [id number], Dosing Weight 109.091, kg, Start date: 14 17:00:00, Duration: 
30 day, Stop date: 14 5:00:00Nurse to ensure documentation of patient e
ducation per anticoagulation policy. (Same as: Lovenox)                            

                            Inactive                                                 

                    [facility]                            2014                            [facility]

                    

 

                          Saline Flush 0.9%                            5 ml, Route: IVP, Drug Form: INJ, Dosing

 Weight 109.091, kg, PRN, PRN Line Flush, Start date: 14 16:59:00, 
Duration: 30 day, Stop date: 14 16:58:00(Same as: BD Posiflush)           
                                                Inactive                                  

                          [facility]                            2014                  

                                        [facility]                    

 

                          albuterol 0.083% inhalation solution                            2.49 mg, 3 mL, Route:

 NEB, Drug form: SOLN, PRN, Dosing Weight 109.091, kg, PRN Respiratory Protocol,
 Start date: 14 16:59:00, Duration: 30 day, Stop date: 14 
16:58:00SEE RT DOCUMENTATION  (Same as: Proventil)                                 

                          No Longer Active                                              

                    Jacklyn                            2014                            [facility]

                    

 

                          acetaminophen                            650 mg, 2 tab, Route: PO, Drug form: TAB,

 Q4H, Dosing Weight 109.091, kg, PRN For Temp > 100.4 F, Start date: 14 
16:59:00, Duration: 30 day, Stop date: 14 16:58:00Do not exceed 4 gm/day. 
 (Same as: Tylenol)                                                        No Longer 

Active                                                        Jacklyn                  

                          2014                            [facility]                    

 

                          enoxaparin                            110 mg, 0.73 mL, Route: SUB-Q, Drug form: INJ,

 ONCE, Dosing Weight 109.091, kg, Priority: STAT, Start date: 14 13:29:00,
 Stop date: 14 13:29:00Nurse to ensure documentation of patient education 
per anticoagulation policy. (Same as: Lovenox)                                     

                    Inactive                                                        Sophia

                            2014                            [facility]       

             

 

                          metoprolol tartrate                            25 mg, 1 tab, Route: PO, Drug form:

 TAB, Q6H, Dosing Weight 109.091, kg, Start date: 14 12:00:00, Duration: 
30 day, Stop date: 14 6:00:00(Same as: Lopressor)                            

                            No Longer Active                                         

                    Alejandro                            2014                            [facility]                    

 

                          nitroglycerin SL Tab                            0.4 mg, 1 tab, Route: SL, Drug form:

 TAB, Q5Min, Dosing Weight 109.091, kg, PRN Chest Pain, Start date: 14 
11:33:00, Duration: 3 doses or times, Stop date: Limited # of times(Same 
as:Nitroquick, Nitrostat) "Do Not Crush"  Sublingual tablet                     
                                                No Longer Active                                    

                          Alejandro                            2014                      

                                        [facility]                    

 

                          Saline Flush 0.9%                            5 ml, Route: IVP, Drug Form: INJ, Dosing

 Weight 109.091, kg, PRN, PRN Line Flush, Start date: 14 11:33:00, 
Duration: 30 day, Stop date: 14 11:32:00(Same as: BD Posiflush)           
                                                Inactive                                  

                          Alejandro                            2014                    

                                        [facility]                    

 

                          Ativan                            2 mg, Route: IVP, Drug form: INJ, ONCE, Dosing Weight

 109.091, kg, Priority: STAT, Start date: 14 9:52:00, Stop date: 14 
9:52:00                                                        Inactive              

                                                [facility]                            2014

                                        [facility]                    

 

                          Saline Flush 0.9%                            5 mL, Route: IVP, Drug Form: INJ, Dosing

 Weight 109.091, kg, Q8H, PRN Line Flush, Start date: 14 6:18:00, 
Duration: 30 day, Stop date: 14 6:17:00, Administer at least once every 8 
hoursAdminister at least once every 8 hours(Same as: BD Posiflush)              
                                                Inactive                                     

                          [facility]                            2014                

                                        [facility]                    

 

                          aspirin                            324 mg, Route: PO, ONCE, Dosing Weight 109.091,

 kg, Priority: STAT, Start date: 14 6:18:00, Stop date: 14 6:18:00  
                                                      Inactive                       

                                                [facility]                            2014    

                                        [facility]                    

 

                          promethazine 25 mg oral tablet                            25 mg=1 tab, PO, Q6H, Nausea

 & Vomiting, # 15 tab, 0 Refill(s)                                                 

                    Active                                                        Clyde            

                          12/15/2013                            [facility]                   

 

 

                          Phenergan                            25 mg, 1 mL, Route: IM, Drug form: INJ, ONCE,

 Dosing Weight 129.545, kg, Priority: STAT, Start date: 12/15/13 3:16:00, Stop 
date: 12/15/13 3:16:00Do not give IV push.  (Same as: Phenergan)                
                                                Inactive                                       

                          Clyde                            12/15/2013                        

                                        [facility]                    

 

                          Reglan                            10 mg, Route: IVP, Drug form: INJ, ONCE, Dosing 

Weight 129.545, kg, Priority: STAT, Start date: 12/15/13 2:58:00, Stop date: 
12/15/13 2:58:00                                                        Inactive     

                                                   Clyde                            12/15/2013

                                        [facility]                    

 

                          Sodium Chloride 0.9% (Bolus)  mL                            500 mL, Rate: 1,000

 ml/hr, Infuse over: 30 minutes, Route: IV, Dosing Weight 129.545 kg, Total 
Volume: 500, Priority: STAT, Start date: 12/15/13 2:58:00, Duration: 1 doses or 
times, Stop date: 12/15/13 3:27:00                                                 

                    Inactive                                                        Clyde          

                          12/15/2013                            [facility]                 

   

 

                          Saline Flush 0.9%                            5 mL, Route: IVP, Drug Form: INJ, Dosing

 Weight 129.545, kg, PRN, PRN Line Flush, Start date: 12/15/13 2:58:00, 
Duration: 24 hr, Stop date: 13 2:57:00(Same as: BD Posiflush)             
                                                Inactive                                    

                          Clyde                            12/15/2013                     

                                        [facility]                    

 

                          ALPRAZOLam 1 mg oral tablet, disintegrating                            =2 mg, PO, 

Bedtime, # 30 tab, 0 Refill(s)                                                     

                    Active                                                        [facility] [place]              

                          2013                            [facility]                    

 

                          thiothixene 5 mg oral capsule                            5 mg=1 cap, PO, TID, # 90

 cap, 0 Refill(s)                                                        Active      

                                                  [facility] [place]                            2013

                                        [facility]                    

 

                          Haldol                            5 mg, 1 mL, Route: IM, Drug form: INJ, ONCE, Start

 date: 13 16:26:00, Stop date: 13 16:26:00(Same as: Haldol)         
                                                      No Longer Active                      

                                                [facility]                            2013       

                                        [facility]                    

 

                          Dextrose 50% Syringe                            12.5 gm, 25 mL, Route: IVP, Drug Form:

 INJ, Dosing Weight 129.545, kg, PRN, PRN Blood Glucose Results, Start date: 
13 15:33:00, Duration: 30 day, Stop date: 14 15:32:00               
                                                No Longer Active                              

                          [facility] [place]               

                                        [facility]                    

 

                          glucagon                            1 mg, Route: IM, Drug form: PDR/INJ, PRN, Dosing

 Weight 129.545, kg, PRN Blood Glucose Results, Start date: 13 15:33:00, 
Duration: 30 day, Stop date: 14 15:32:00                                     

                          No Longer Active                                                  

                    [facility] [place]                            [facility]

                    

 

                          insulin aspart                            4 unit, 0.04 mL, Route: SUB-Q, Drug form:

 SOLN, TID-Before Meals, Dosing Weight 129.545, kg, PRN Blood Glucose Results, 
Start date: 13 15:33:00, Duration: 30 day, Stop date: 14 15:32:00Ro
ll in palms of hands gently;  Do not shake vigorously. (Same as: NovoLog)  
"single patient use only"  Stable for 28 days at room temperature. Expires in 
_____ days from ______________Date                                                 

                    No Longer Active                                                        [facility] [place]  

                          [place]                            [facility]         

           

 

                          albuterol-ipratropium 2.5-0.5 mg inhalation solution                            3 

ml, Route: NEB, Drug Form: SOLN, Dosing Weight 129.545, kg, PRN, PRN Respiratory
 Protocol, Start date: 13 10:07:00, Duration: 30 day, Stop date: 14 
10:06:00(Same as: Duoneb)                                                        No Longer

 Active                                                        [facility] [place]                

                          [place]                            [facility]                    

 

                          Sodium Chloride 0.9% IV                            250 mL, Route: IVPB, Start date:

 13 21:05:00, Duration: 30 day, Stop date: 14 21:04:00, PRN Line 
Flush                                                        No Longer Active        

                                                [facility]                            2013

                                        [facility]                    

 

                          BD Normal Saline Flush                            10 mL, Route: IVP, Drug Form: INJ,

 PRN, PRN Line Flush, Start date: 13 21:05:00, Duration: 30 day, Stop 
date: 14 21:04:00(Same as: BD Posiflush)                                     

                          No Longer Active                                                  

                    [facility]                            2013                            [facility]

                    

 

                          acetaminophen-hydrocodone 325 mg-5 mg oral tablet                            2 tab,

 Route: PO, Drug Form: TAB, Q6H, PRN Pain Score 4-6, Start date: 13 
21:04:00, Duration: 30 day, Stop date: 14 21:03:00(Same as: Norco 325/5)  
Do not exceed 4gm/day of acetaminophen.                                            

                    No Longer Active                                                        [facility]

                            2013                            [facility]       

             

 

                          acetaminophen-hydrocodone 325 mg-5 mg oral tablet                            1 tab,

 Route: PO, Drug Form: TAB, Q6H, PRN Pain Score 4-6, Start date: 13 
21:03:00, Duration: 30 day, Stop date: 14 21:02:00(Same as: Norco 325/5)  
Do not exceed 4gm/day of acetaminophen.                                            

                    No Longer Active                                                        [facility]

                            2013                            [facility]       

             

 

                          Klonopin                            2 mg, 2 tab, Route: PO, Drug form: TAB, Bedtime,

 Dosing Weight 129.545, kg, Start date: 13 21:00:00, Duration: 30 day, 
Stop date: 13 21:00:00(Same As: Klonopin)                                    

                          No Longer Active                                                 

                    [facility]                            2013                            [facility]

                    

 

                          Xanax                            2 mg, 2 tab, Route: PO, Drug form: TAB, Bedtime, 

Start date: 13 21:00:00, Duration: 30 day, Stop date: 13 
21:00:00With food or milk (Same as: Xanax)                                         

                    No Longer Active                                                        

[facility] [place]                            2013                            [facility]

                    

 

                          Norco 5/325 oral tablet                            Route: PO, Drug Form: TAB, Dosing

 Weight 129.545, kg, Q6H, PRN as needed for pain, Start date: 13 20:12:00,
 Duration: 30 day, Stop date: 14 20:11:00                                    

                    Inactive                                                        [facility]

                            2013                            [facility]       

             

 

                          Xanax                            1 mg, 1 tab, Route: PO, Drug form: TAB, BID, Start

 date: 13 17:00:00, Duration: 30 day, Stop date: 14 9:00:00With food
 or milk (Same as: Xanax)                                                        No Longer

 Active                                                        [facility] [place]              

                          2013                            [facility]                    

 

                          Cogentin                            1 mg, 1 tab, Route: PO, Drug form: TAB, TID, Start

 date: 13 17:00:00, Duration: 30 day, Stop date: 14 13:00:00(Same 
As: Cogentin)                                                        Inactive        

                                                [facility]                            2013

                                        [facility]                    

 

                          Navane                            5 mg, 1 cap, Route: PO, Drug form: CAP, TID, Start

 date: 13 17:00:00, Duration: 30 day, Stop date: 14 13:00:00(Same 
As: Navane)                                                        No Longer Active  

                                                      Shonda                       

                          2013                            [facility]                    

 

                          Klonopin 2 mg oral tablet                            2 mg=1 tab, PO, Bedtime, 0 Refill(s)

                                                        No Longer Active             

                                                [facility]                            2013

                                        [facility]                    

 

                    Xanax                            2.5 mg, PO, 0 Refill(s)                            

                            No Longer Active                                         

                                                2013                            [facility]

                    

 

                          Norco 5/325 oral tablet                            1-2 tab, PO, Q4-6H, PRN, 15 tab,

 Pain, Substitution Allowed, Maintenance                                           

                    On Hold                                                        [facility]     

                          2013                            [facility]            

        

 

                          spironolactone                            25 mg, 1 tab, Route: PO, Drug form: TAB,

 Daily, Dosing Weight 91.5, kg, Start date: 13 9:00:00, Duration: 30 day, 
Stop date: 13 9:00:00(Same As: Aldactone)                                    

                          No Longer Active                                                 

                    Cory                            2013                            [facility]

                    

 

                          lisinopril                            40 mg, 2 tab, Route: PO, Drug form: TAB, Daily,

 Dosing Weight 91.5, kg, Start date: 13 9:00:00, Duration: 30 day, Stop 
date: 13 9:00:00(Same as: Prinivil, Zestril)                                 

                          No Longer Active                                              

                    Cory                            2013                            [facility]                    

 

                          Geodon                            20 mg, 1 cap, Route: PO, Drug form: CAP, Bedtime,

 Dosing Weight 91.5, kg, Start date: 13 21:00:00, Duration: 30 day, Stop 
date: 13 21:00:00(Same As: Geodon) Give with Food                            

                            Inactive                                                 

                    Cory                            2013                            [facility]

                    

 

                          simvastatin                            20 mg, 1 tab, Route: PO, Drug form: TAB, Bedtime,

 Dosing Weight 91.5, kg, Start date: 13 21:00:00, Duration: 30 day, Stop 
date: 13 21:00:00(Same as: Zocor)                                            

                    Inactive                                                        Cory   

                          2013                            [facility]          

          

 

                          Remeron                            15 mg, 1 tab, Route: PO, Drug form: TAB, Bedtime,

 Dosing Weight 91.5, kg, Start date: 13 21:00:00, Duration: 30 day, Stop 
date: 13 21:00:00(Same as:Remeron)                                           

                    Inactive                                                        [facility]  

                          2013                            [facility]         

           

 

                          clonazepam                            2 mg, 2 tab, Route: PO, Drug form: TAB, Bedtime,

 Dosing Weight 91.5, kg, Start date: 13 21:00:00, Duration: 30 day, Stop 
date: 13 21:00:00(Same As: Klonopin)                                         

                    Inactive                                                        [facility]

                            2013                            [facility]       

             

 

                          carvedilol                            6.25 mg, 1 tab, Route: PO, Drug form: TAB, Q12H,

 Dosing Weight 91.5, kg, Start date: 13 21:00:00, Duration: 30 day, Stop 
date: 13 9:00:00Give with food. (Same As: Coreg)                             

                           Inactive                                                  

                    [facility]                            2013                            [facility]

                    

 

                          Lasix                            60 mg, 6 mL, Route: IV, Drug form: INJ, ONCE, Start

 date: 13 17:51:00, Stop date: 13 17:51:00(Same as: Lasix)          
                                                Inactive                                 

                          [facility]                            2013                

                                        [facility]                    

 

                          pantoprazole                            40 mg, 1 tab, Route: PO, Drug form: ECTAB,

 Before Dinner, Dosing Weight 91.5, kg, Start date: 13 16:30:00, Duration:
 30 day, Stop date: 13 16:30:00Tablet should not be chewed or crushed. 
(Same as: Protonix)                                                        Inactive  

                                                      [facility]                       

                          2013                            [facility]                    

 

                          metFORmin 500 mg oral tablet                            500 mg, 1 tab, Route: PO, 

Drug form: TAB, BID-Meals, Dosing Weight 91.5, kg, Start date: 13 9:16:00,
 Duration: 30 day, Stop date: 13 8:00:00(Same as: Glucophage)  Take with 
meal                                                        Inactive                 

                                                [facility]                            2013  

                                        [facility]                    

 

                          acetaminophen 325 mg oral tablet                            650 mg, 2 tab, Route: 

PO, Drug form: TAB, Q4H, Dosing Weight 91.5, kg, PRN as needed for fever, Start 
date: 13 9:09:00, Duration: 30 day, Stop date: 13 9:08:00Do not 
exceed 4 gm/day.  (Same as: Tylenol)                                               

                    Inactive                                                        [facility]      

                          2013                            [facility]             

       

 

                          Saline Flush 0.9%                            5 ml, Route: IVP, Drug Form: INJ, Dosing

 Weight 129.545, kg, Q12H, Start date: 13 9:00:00, Duration: 30 day, Stop 
date: 13 21:00:00(Same as: BD Posiflush)                                     

                    Inactive                                                        [place]

                            2013                            [facility]       

             

 

                          spironolactone                            25 mg, 1 tab, Route: PO, Drug form: TAB,

 Daily, Dosing Weight 129.545, kg, Start date: 13 9:00:00, Duration: 30 
day, Stop date: 13 9:00:00(Same As: Aldactone)                               

                          Inactive                                                    

                    [place]                            2013                            [facility]

                    

 

                          carvedilol                            6.25 mg, 1 tab, Route: PO, Drug form: TAB, Q12H,

 Dosing Weight 129.545, kg, Start date: 13 9:00:00, Duration: 30 day, Stop
 date: 13 21:00:00Give with food. (Same As: Coreg)                        
                                                Inactive                                               

                    [place]                            2013                            [facility]

                    

 

                          furosemide                            60 mg, 6 mL, Route: IVP, Drug form: INJ, Q12H,

 Dosing Weight 129.545, kg, Start date: 13 9:00:00, Duration: 30 day, Stop
 date: 13 21:00:00(Same as: Lasix)                                           

                    Inactive                                                        [place]     

                          2013                            [facility]            

        

 

                          aspirin 81 mg tablet, enteric coated                            81 mg, 1 tab, Route:

 PO, Drug form: ECTAB, Daily, Dosing Weight 129.545, kg, Start date: 13 
9:00:00, Duration: 30 day, Stop date: 13 9:00:00Do not crush or chew. 
(Same As: Ecotrin)                                                        Inactive   

                                                     [place]                            2013

                                        [facility]                    

 

                          lisinopril                            40 mg, 2 tab, Route: PO, Drug form: TAB, Daily,

 Dosing Weight 129.545, kg, Start date: 13 9:00:00, Duration: 30 day, Stop
 date: 13 9:00:00(Same as: Prinivil, Zestril)                                

                          Inactive                                                     

                    [place]                            2013                            [facility] 

                   

 

                          insulin aspart                            2 unit, 0.02 mL, Route: SUB-Q, Drug form:

 SOLN, TID-Before Meals, Dosing Weight 129.545, kg, PRN Blood Glucose Results, 
Start date: 13 0:24:00, Duration: 30 day, Stop date: 13 0:23:00Roll 
in palms of hands gently;  Do not shake vigorously. (Same as: NovoLog)  "single 
patient use only"  Stable for 28 days at room temperature. Expires in _____ days
 from ______________Date                                                        Inactive

                                                        [place]                        

                          2013                            [facility]                    

 

                          glucagon                            1 mg, Route: IM, Drug form: PDR/INJ, PRN, Dosing

 Weight 129.545, kg, PRN Blood Glucose Results, Start date: 13 0:24:00, 
Duration: 30 day, Stop date: 13 0:23:00                                      

                    Inactive                                                        [place]

                            2013                            [facility]       

             

 

                          Dextrose 50% Syringe                            25 gm, 50 mL, Route: IVP, Drug Form:

 INJ, Dosing Weight 129.545, kg, PRN, PRN Blood Glucose Results, Start date: 
13 0:24:00, Duration: 30 day, Stop date: 13 0:23:00                 
                                                Inactive                                        

                    [place]                            2013                            

[facility]                    

 

                          Saline Flush 0.9%                            5 ml, Route: IVP, Drug Form: INJ, Dosing

 Weight 129.545, kg, PRN, PRN Line Flush, Start date: 13 0:23:00, 
Duration: 30 day, Stop date: 13 0:22:00(Same as: BD Posiflush)            
                                                Inactive                                   

                          [place]                            2013                     

                                        [facility]                    

 

                          ondansetron                            4 mg, 2 mL, Route: IVP, Drug form: INJ, Q8H,

 Dosing Weight 129.545, kg, PRN Nausea & Vomiting, Start date: 13 0:23:00,
 Duration: 30 day, Stop date: 13 0:22:00(Same as: Zofran)                 
                                                Inactive                                        

                    [place]                            2013                            

[facility]                    

 

                          Ativan                            1 mg, 0.5 mL, Route: IVP, Drug form: INJ, Q6H, Dosing

 Weight 129.545, kg, PRN Anxiety, Start date: 13 0:23:00, Duration: 30 
day, Stop date: 13 0:22:00(Same as: Ativan)                                  

                      Inactive                                                        [place]

                            2013                            [facility]       

             

 

                          furosemide                            60 mg, Route: IV, ONCE, Dosing Weight 129.545,

 kg, Priority: STAT, Start date: 13 22:46:00, Stop date: 13 22:46:00
                                                        No Longer Active             

                                                [place]                            2013 

                                        [facility]                    

 

                          Sodium Chloride 0.9% IV                            250 mL, Route: IVPB, Start date:

 13 21:06:00, Duration: 30 day, Stop date: 13 21:05:00, PRN Line 
Flush                                                        No Longer Active        

                                                [facility]                            2013

                                        [facility]                    

 

                          BD Normal Saline Flush                            10 mL, Route: IVP, Drug Form: INJ,

 PRN, PRN Line Flush, Start date: 13 21:06:00, Duration: 30 day, Stop 
date: 13 21:05:00(Same as: BD Posiflush)                                     

                          No Longer Active                                                  

                    [facility]                            2013                            [facility]

                    

 

                          aspirin                            325 mg, 1 tab, Route: PO, Drug form: TAB, ONCE,

 Dosing Weight 129.545, kg, Priority: STAT, Start date: 13 20:44:00, Stop 
date: 13 20:44:00Take with food.                                             

                    Inactive                                                        [place]       

                          2013                            [facility]              

      

 

                          Saline Flush 0.9%                            5 mL, Route: IVP, Drug Form: INJ, Dosing

 Weight 129.545, kg, Q8H, PRN Line Flush, Start date: 13 20:44:00, 
Duration: 30 day, Stop date: 13 20:43:00, Administer at least once every 8
 hoursAdminister at least once every 8 hours                                       

                    Inactive                                                        [place] 

                           2013                            [facility]        

            

 

                          Lasix 40 mg oral tablet                            40 mg, 1 tab, Route: PO, Drug form:

 TAB, BID, Dosing Weight 127.273, kg, Start date: 10/31/13 17:00:00, Duration: 
30 day, Stop date: 13 9:00:00(Same as: Lasix)  May cause GI upset.  Give 
with food or milk.                                                        Inactive   

                                                     [place]                            10/31/2013

                                        [facility]                    

 

                          spironolactone 25 mg oral tablet                            25 mg, 1 tab, PO, Daily,

 30 tab, Substitution Allowed, TAB                                                 

                    Active                                                        [facility]           

                          10/31/2013                            [facility]              

      

 

                          simvastatin 20 mg oral tablet                            20 mg, 1 tab, PO, Bedtime,

 30 tab, Substitution Allowed, TAB                                                 

                    Active                                                        [facility]           

                          10/31/2013                            [facility]              

      

 

                          pantoprazole 40 mg oral enteric coated tablet                            40 mg, 1 

tab, PO, Before Dinner, 30 tab, Substitution Allowed, ECTAB                     
                                                Active                                              

                    [place]                            10/31/2013                            [facility]                    

 

                          metFORmin 500 mg oral tablet                            500 mg, 1 tab, PO, BID-Meals,

 60 tab, Substitution Allowed, TAB                                                 

                    Active                                                        [place]           

                          10/31/2013                            [facility]              

      

 

                          lisinopril 40 mg oral tablet                            40 mg, 1 tab, PO, Daily, 14

 tab, Substitution Allowed, TAB                                                    

                    Active                                                        [place]              

                          10/31/2013                            [facility]                 

   

 

                          furosemide 40 mg oral tablet                            40 mg, 1 tab, PO, Daily, 30

 tab, Substitution Allowed, TAB                                                    

                    Active                                                        [place]              

                          10/31/2013                            [facility]                 

   

 

                          carvedilol 6.25 mg oral tablet                            6.25 mg, 1 tab, PO, Q12H,

 60 tab, Substitution Allowed, TAB                                                 

                    Active                                                        [facility]           

                          10/31/2013                            [facility]              

      

 

                          Aspirin Low Dose 81 mg oral tablet                            81 mg, 1 tab, PO, Daily,

 30 tab, Substitution Allowed, TAB                                                 

                    Active                                                        [facility]           

                          10/31/2013                            [facility]              

      

 

                          Geodon                            20 mg, 1 cap, Route: PO, Drug form: CAP, Bedtime,

 Dosing Weight 127.273, kg, Start date: 10/30/13 21:00:00, Duration: 30 day, 
Stop date: 13 21:00:00(Same As: Geodon) Give with Food                    
                                                No Longer Active                                   

                          [facility]                            10/31/2013                   

                                        [facility]                    

 

                          simvastatin                            20 mg, 1 tab, Route: PO, Drug form: TAB, Bedtime,

 Dosing Weight 127.273, kg, Start date: 10/30/13 21:00:00, Duration: 30 day, 
Stop date: 13 21:00:00(Same as: Zocor)                                       

                          No Longer Active                                                    

                    [place]                            10/31/2013                            [facility]                    

 

                          Remeron                            15 mg, 1 tab, Route: PO, Drug form: TAB, Bedtime,

 Dosing Weight 127.273, kg, Start date: 10/30/13 21:00:00, Duration: 30 day, 
Stop date: 13 21:00:00(Same as:Remeron)                                      

                          No Longer Active                                                   

                    [place]                            10/31/2013                            [facility]                    

 

                          clonazepam                            2 mg, 2 tab, Route: PO, Drug form: TAB, Bedtime,

 Dosing Weight 127.273, kg, Start date: 10/30/13 21:00:00, Duration: 30 day, 
Stop date: 13 21:00:00(Same As: Klonopin)                                    

                          No Longer Active                                                 

                    [place]                            10/31/2013                            [facility]                    

 

                          metFORmin 500 mg oral tablet                            500 mg, 1 tab, Route: PO, 

Drug form: TAB, BID-Meals, Dosing Weight 127.273, kg, Start date: 10/30/13 
17:00:00, Duration: 30 day, Stop date: 13 8:00:00(Same as: Glucophage)  
Take with meal                                                        No Longer Active

                                                        [facility]                      

                          10/30/2013                            [facility]                    

 

                          pantoprazole                            40 mg, 1 tab, Route: PO, Drug form: ECTAB,

 Before Dinner, Dosing Weight 127.273, kg, Start date: 10/30/13 16:30:00, 
Duration: 30 day, Stop date: 13 16:30:00Tablet should not be chewed or 
crushed. (Same as: Protonix)                                                        No

 Longer Active                                                        [facility]        

                          10/30/2013                            [facility]           

         

 

                          aspirin                            81 mg, 1 tab, Route: PO, Drug form: CHEWTAB, Daily,

 Dosing Weight 127.273, kg, Start date: 10/30/13 12:57:00, Duration: 30 day, 
Stop date: 13 9:00:00Take with food.                                         

                    No Longer Active                                                        

[place]                            10/30/2013                            [facility]                    

 

                          spironolactone                            25 mg, 1 tab, Route: PO, Drug form: TAB,

 Daily, Dosing Weight 127.273, kg, Start date: 10/30/13 9:00:00, Duration: 30 
day, Stop date: 13 9:00:00(Same As: Aldactone)                               

                          No Longer Active                                            

                    [place]                            10/30/2013                            [facility]                    

 

                          Lasix                            40 mg, Route: IVP, Drug form: INJ, Daily, Dosing 

Weight 127.273, kg, Start date: 10/30/13 9:00:00, Duration: 30 day, Stop date: 
13 9:00:00                                                        No Longer Active

                                                        [facility]                      

                          10/30/2013                            [facility]                    

 

                          lisinopril                            40 mg, 2 tab, Route: PO, Drug form: TAB, Daily,

 Dosing Weight 127.273, kg, Start date: 10/30/13 9:00:00, Duration: 30 day, Stop
 date: 13 9:00:00(Same as: Prinivil, Zestril)                                

                          No Longer Active                                             

                    [place]                            10/30/2013                            [facility]                    

 

                          Saline Flush 0.9%                            3 ml, Route: IVP, Drug Form: INJ, Dosing

 Weight 127.273, kg, Q8H, Start date: 10/30/13 0:00:00, Duration: 30 day, Stop 
date: 13 16:00:00(Same as: BD Posiflush)                                     

                          No Longer Active                                                  

                    [place]                            10/30/2013                            [facility]                    

 

                          Coreg                            6.25 mg, 1 tab, Route: PO, Drug form: TAB, Q12H, 

Dosing Weight 127.273, kg, Start date: 10/29/13 21:00:00, Duration: 30 day, Stop
 date: 13 9:00:00Give with food. (Same As: Coreg)                            

                            No Longer Active                                         

                    [place]                            10/30/2013                            [facility]                    

 

                          Lasix                            20 mg, 2 mL, Route: IV, Drug form: INJ, BID, Dosing

 Weight 127.273, kg, Start date: 10/29/13 17:00:00, Duration: 30 day, Stop date:
 13 9:00:00(Same as: Lasix)                                                  

                    No Longer Active                                                        [place]    

                          10/29/2013                            [facility]       

             

 

                          temazepam                            15 mg, 1 cap, Route: PO, Drug form: CAP, Bedtime,

 Dosing Weight 127.273, kg, PRN Sleep, Start date: 10/29/13 15:31:00, Duration: 
30 day, Stop date: 13 15:30:00(Same As: Restoril)                            

                            No Longer Active                                         

                          [facility]                            10/29/2013                         

                                        [facility]                    

 

                          Norco 5/325 oral tablet                            1 tab, Route: PO, Drug Form: TAB,

 Dosing Weight 127.273, kg, Q6H, PRN Pain, Start date: 10/29/13 15:31:00, 
Duration: 30 day, Stop date: 13 15:30:00(Same as: Norco 325/5)  Do not 
exceed 4gm/day of acetaminophen.                                                   

                    No Longer Active                                                        [facility]   

                          10/29/2013                            [facility]      

              

 

                          Tylenol                            650 mg, 2 tab, Route: PO, Drug form: TAB, Q6H, 

Dosing Weight 127.273, kg, PRN Fever, Start date: 10/29/13 15:30:00, Duration: 
30 day, Stop date: 13 15:29:00Do not exceed 4 gm/day.  (Same as: Tylenol) 
                                                       No Longer Active              

                                                [facility]                            10/29/2013

                                        [facility]                    

 

                          Zofran                            4 mg, 2 mL, Route: IV, Drug form: INJ, Q8H, Dosing

 Weight 127.273, kg, PRN Nausea, Start date: 10/29/13 15:30:00, Duration: 30 
day, Stop date: 13 14:29:00(Same as: Zofran)                                 

                          No Longer Active                                              

                    [facility]                            10/29/2013                            [facility]                    

 

                          potassium chloride                            40 mEq, 2 tab, Route: PO, Drug form:

 ERTAB, ONCE, Dosing Weight 127.273, kg, Start date: 10/29/13 15:28:00, Stop 
date: 10/29/13 15:28:00(Same as: K-Dur 20) "Do Not Crush"  With food and full 
glass of water                                                        Inactive       

                                                 [facility]                            10/29/2013

                                        [facility]                    

 

                          Saline Flush 0.9%                            5 ml, Route: IVP, Drug Form: INJ, Dosing

 Weight 127.273, kg, PRN, PRN Line Flush, Start date: 10/29/13 15:16:00, 
Duration: 30 day, Stop date: 13 14:15:00(Same as: BD Posiflush)           
                                                      No Longer Active                        

                                                [place]                            10/29/2013          

                                        [facility]                    

 

                          enoxaparin                            40 mg, 0.4 mL, Route: SUB-Q, Drug form: INJ,

 [id number], Dosing Weight 127.273, kg, Start date: 10/29/13 15:00:00, Duration: 
30 day, Stop date: 13 15:00:00(Same as: Lovenox)                             

                           No Longer Active                                          

                    [place]                            10/29/2013                            

[facility]                    

 

                          Lasix                            40 mg, 4 mL, Route: IVP, Drug form: INJ, ONCE, Dosing

 Weight 127.273, kg, Priority: STAT, Start date: 10/29/13 13:02:00, Stop date: 
10/29/13 13:02:00(Same as: Lasix)                                                  

                    Inactive                                                        [place]         

                          10/29/2013                            [facility]            

        

 

                          Saline Flush 0.9%                            5 mL, Route: IVP, Drug Form: INJ, Dosing

 Weight 127.273, kg, Q8H, PRN Line Flush, Start date: 10/29/13 10:02:00, 
Duration: 30 day, Stop date: 13 10:01:00, Administer at least once every 8
 hoursAdminister at least once every 8 hours(Same as: BD Posiflush)             
                                                No Longer Active                            

                            [place]                            10/29/2013           

                                        [facility]                    

 

                          aspirin                            324 mg, 4 tab, Route: PO, Drug form: CHEWTAB, ONCE,

 Dosing Weight 127.273, kg, Priority: STAT, Start date: 10/29/13 10:02:00, Stop 
date: 10/29/13 10:02:00Take with food.                                             

                    Inactive                                                        [facility]    

                          10/29/2013                            [facility]       

             

 

                          simvastatin 20 mg oral tablet                            20 mg, 1 tab, PO, Bedtime,

 30 tab, Substitution Allowed, TAB                                                 

                    Active                                                        [facility]             

                          10/22/2013                            [facility]                

    

 

                          pantoprazole 40 mg oral enteric coated tablet                            40 mg, 1 

tab, PO, Before Dinner, 30 tab, Substitution Allowed, ECTAB                     
                                                Active                                              

                    [facility]                            10/22/2013                            [facility]                    

 

                          aspirin 81 mg tablet, chewable                            81 mg, Route: PO, Drug form:

 CHEWTAB, Daily, Dosing Weight 90.909, kg, Start date: 10/22/13 9:00:00, 
Duration: 30 day, Stop date: 13 9:00:00                                      

                          No Longer Active                                                   

                    [place]                            10/22/2013                            [facility]

                    

 

                          lisinopril                            40 mg, 2 tab, Route: PO, Drug form: TAB, Daily,

 Dosing Weight 90.909, kg, Start date: 10/22/13 9:00:00, Duration: 30 day, Stop 
date: 13 9:00:00(Same as: Prinivil, Zestril)                                 

                       Inactive                                                        

[facility]                            10/22/2013                            [facility]

                    

 

                          furosemide 40 mg oral tablet                            40 mg, 1 tab, Route: PO, Drug

 form: TAB, Daily, Dosing Weight 90.909, kg, Start date: 10/22/13 9:00:00, 
Duration: 30 day, Stop date: 13 9:00:00(Same as: Lasix)  May cause GI 
upset.  Give with food or milk.                                                    

                    Inactive                                                        [facility]              

                          10/22/2013                            [facility]                 

   

 

                          aspirin                            81 mg, 1 tab, Route: PO, Drug form: CHEWTAB, Daily,

 Dosing Weight 90.909, kg, Start date: 10/22/13 9:00:00, Duration: 30 day, Stop 
date: 13 9:00:00Take with food.                                              

                    Inactive                                                        [facility]        

                          10/22/2013                            [facility]           

         

 

                          Zocor                            20 mg, 1 tab, Route: PO, Drug form: TAB, Bedtime,

 Start date: 10/21/13 21:00:00, Duration: 30 day, Stop date: 13 
21:00:00(Same as: Zocor)                                                        No Longer

 Active                                                        [facility]               

                          10/22/2013                            [facility]                  

  

 

                          Saline Flush 0.9%                            5 ml, Route: IVP, Drug Form: INJ, Dosing

 Weight 90.909, kg, Q12H, Start date: 10/21/13 21:00:00, Duration: 30 day, Stop 
date: 13 9:00:00(Same as: BD Posiflush)                                      

                          No Longer Active                                                   

                    [place]                            10/22/2013                            [facility]

                    

 

                          Geodon                            20 mg, 1 cap, Route: PO, Drug form: CAP, Bedtime,

 Dosing Weight 90.909, kg, Start date: 10/21/13 21:00:00, Duration: 30 day, Stop
 date: 13 21:00:00(Same As: Geodon) Give with Food                        
                                                No Longer Active                                       

                          [facility]                            10/22/2013                         

                                        [facility]                    

 

                          Remeron                            15 mg, 1 tab, Route: PO, Drug form: TAB, Bedtime,

 Dosing Weight 90.909, kg, Start date: 10/21/13 21:00:00, Duration: 30 day, Stop
 date: 13 21:00:00(Same as:Remeron)                                          

                    No Longer Active                                                        [facility]

                            10/22/2013                            [facility]   

                 

 

                          clonazepam                            2 mg, 4 tab, Route: PO, Drug form: TAB, Bedtime,

 Dosing Weight 90.909, kg, Start date: 10/21/13 21:00:00, Duration: 30 day, Stop
 date: 13 21:00:00(Same As: Klonopin)                                        

                          No Longer Active                                                     

                    [facility]                            10/22/2013                            [facility]

                    

 

                          carvedilol                            3.125 mg, 1 tab, Route: PO, Drug form: TAB, 

Q12H, Dosing Weight 90.909, kg, Start date: 10/21/13 21:00:00, Duration: 30 day,
 Stop date: 13 9:00:00Give with food. (Same As: Coreg)                    
                                                No Longer Active                                   

                          [facility]                            10/22/2013                     

                                        [facility]                    

 

                          Lipitor                            10 mg, Route: PO, Bedtime, Dosing Weight 90.909,

 kg, Start date: 10/21/13 21:00:00, Duration: 30 day, Stop date: 13 
21:00:00                                                        Inactive             

                                                [facility]                            10/22/2013 

                                        [facility]                    

 

                          nitroglycerin SL Tab                            0.4 mg, 1 tab, Route: SL, Drug form:

 TAB, Q5Min, Dosing Weight 90.909, kg, PRN Chest Pain, Start date: 10/21/13 
18:25:00, Duration: 3 doses or times, Stop date: Limited # of times(Same 
as:Nitroquick, Nitrostat) "Do Not Crush"  Sublingual tablet                     
                                                No Longer Active                                    

                          [facility]                            10/21/2013                      

                                        [facility]                    

 

                          Saline Flush 0.9%                            5 ml, Route: IVP, Drug Form: INJ, Dosing

 Weight 90.909, kg, PRN, PRN Line Flush, Start date: 10/21/13 18:25:00, 
Duration: 30 day, Stop date: 13 17:24:00(Same as: BD Posiflush)           
                                                      No Longer Active                        

                                                [facility]                            10/21/2013            

                                        [facility]                    

 

                          aspirin                            325 mg, 1 tab, Route: PO, Drug form: TAB, ONCE,

 Dosing Weight 90.909, kg, Priority: STAT, Start date: 10/21/13 16:48:00, Stop 
date: 10/21/13 16:48:00Take with food.                                             

                    Inactive                                                        [facility]       

                          10/21/2013                            [facility]          

          

 

                          Lasix                            40 mg, 4 mL, Route: IVP, Drug form: INJ, ONCE, Dosing

 Weight 90.909, kg, Priority: STAT, Start date: 10/21/13 16:46:00, Stop date: 
10/21/13 16:46:00(Same as: Lasix)                                                  

                    Inactive                                                        [facility]            

                          10/21/2013                            [facility]               

     

 

                          Dextrose 50% Syringe                            12.5 gm, 25 mL, Route: IVP, Drug Form:

 INJ, Dosing Weight 90.909, kg, PRN, PRN Blood Glucose Results, Start date: 
10/21/13 16:42:00, Duration: 30 day, Stop date: 13 15:41:00               
                                                No Longer Active                              

                          [facility]                            10/21/2013                

                                        [facility]                    

 

                          glucagon                            1 mg, Route: IM, Drug form: PDR/INJ, PRN, Dosing

 Weight 90.909, kg, PRN Blood Glucose Results, Start date: 10/21/13 16:42:00, 
Duration: 30 day, Stop date: 13 15:41:00                                     

                          No Longer Active                                                  

                    [facility]                            10/21/2013                            [facility]

                    

 

                          insulin aspart                            4 unit, 0.04 mL, Route: SUB-Q, Drug form:

 SOLN, TID-Before Meals, Dosing Weight 90.909, kg, PRN Blood Glucose Results, 
Start date: 10/21/13 16:42:00, Duration: 30 day, Stop date: 13 16:41:00Rol
l in palms of hands gently;  Do not shake vigorously. (Same as: NovoLog)  
"single patient use only"  Stable for 28 days at room temperature. Expires in 
_____ days from ______________Date                                                 

                    No Longer Active                                                        [facility]   

                          10/21/2013                            [facility]      

              

 

                          Protonix                            40 mg, 1 tab, Route: PO, Drug form: ECTAB, Before

 Dinner, Dosing Weight 90.909, kg, Priority: STAT, Start date: 10/21/13 
16:41:00, Duration: 30 day, Stop date: 13 16:30:00Tablet should not be c
hewed or crushed. (Same as: Protonix)                                              

                    No Longer Active                                                        [facility]

                            10/21/2013                            [facility]   

                 

 

                          Zofran                            4 mg, 2 mL, Route: IVP, Drug form: INJ, Q8H, Dosing

 Weight 90.909, kg, PRN as needed for nausea/vomiting, Priority: STAT, Start 
date: 10/21/13 16:41:00, Duration: 30 day, Stop date: 13 16:40:00(Same as:
 Zofran)                                                        No Longer Active     

                                                   [facility]                            10/21/2013

                                        [facility]                    

 

                          acetaminophen 325 mg oral tablet                            650 mg, 2 tab, Route: 

PO, Drug form: TAB, Q4H, Dosing Weight 90.909, kg, PRN as needed for fever, 
Start date: 10/21/13 16:38:00, Duration: 30 day, Stop date: 13 16:37:00Do 
not exceed 4 gm/day.  (Same as: Tylenol)                                           

                    No Longer Active                                                        [facility]

                            10/21/2013                            [facility]   

                 

 

                          metFORmin 500 mg oral tablet                            500 mg, 1 tab, PO, BID-Meals,

 30 tab, Substitution Allowed                                                        

Active                                                                               

                          10/21/2013                            [facility]                    

 

                          temazepam 30 mg oral capsule                            30 mg, 1 cap, PO, Bedtime,

 Substitution Allowed, CAP                                                        No 

Longer Active                                                                        

                          10/21/2013                            [facility]                   

 

 

                          nitroglycerin 2% topical ointment                            0.5 inch, Route: TOP,

 Drug Form: OINT, Dosing Weight 90.909, kg, ONCE, STAT, Start date: 10/21/13 
15:24:00, Stop date: 10/21/13 15:24:001 gram is approximately 1 inch of 
nitroglycerin ointment    (20 mg NTG per gram) (Same as:Nitro-Bid)              
                                                Inactive                                     

                          [facility]                            10/21/2013                       

                                        [facility]                    

 

                          aspirin                            325 mg, 1 tab, Route: PO, Drug form: TAB, ONCE,

 Dosing Weight 90.909, kg, Priority: STAT, Start date: 10/21/13 15:24:00, Stop 
date: 10/21/13 15:24:00Take with food.                                             

                    Inactive                                                        [facility]       

                          10/21/2013                            [facility]          

          

 

                          Sodium Chloride 0.9% IV                            25 mL, Route: IV, Start date: 10/21/13

 14:20:00, Duration: 30 day, Stop date: 13 13:19:00, PRN Line Flush       
                                                      No Longer Active                    

                                                [facility]                            10/21/2013      

                                        [facility]                    

 

                          BD Normal Saline Flush                            10 mL, Route: IV, Drug Form: INJ,

 PRN, PRN Line Flush, Start date: 10/21/13 14:20:00, Duration: 30 day, Stop 
date: 13 13:19:00(Same as: BD Posiflush)                                     

                          No Longer Active                                                  

                    [facility]                            10/21/2013                            [facility]                    

 

                          Saline Flush 0.9%                            5 mL, Route: IVP, Drug Form: INJ, Dosing

 Weight 90.909, kg, Q8H, PRN Line Flush, Start date: 10/21/13 14:13:00, 
Duration: 30 day, Stop date: 13 14:12:00, Administer at least once every 8
 hoursAdminister at least once every 8 hours(Same as: BD Posiflush)             
                                                No Longer Active                            

                            [facility]                            10/21/2013            

                                        [facility]                    

 

                          Coreg                            3.125 mg, 1 tab, Route: PO, Drug form: TAB, Q12H,

 Dosing Weight 108.004, kg, Start date: 10/13/13 21:00:00, Duration: 30 day, 
Stop date: 13 20:59:00Give with food. (Same As: Coreg)                    
                                                Inactive                                           

                    [facility]                            10/14/2013                            [facility]                    

 

                          carvedilol 3.125 mg oral tablet                            3.125 mg, 1 tab, PO, Q12H,

 60 tab, Substitution Allowed, TAB                                                 

                    Active                                                        [place]           

                          10/13/2013                            [facility]              

      

 

                          Aspirin Low Dose 81 mg oral tablet                            81 mg, 1 tab, PO, Daily,

 30 tab, Substitution Allowed, TAB                                                 

                    Active                                                        [place]           

                          10/13/2013                            [facility]              

      

 

                          Remeron 15 mg oral tablet                            15 mg, 1 tab, PO, Bedtime, 14

 tab, Substitution Allowed, TAB                                                    

                    Active                                                        [place]              

                          10/13/2013                            [facility]                 

   

 

                          Geodon 20 mg oral capsule                            20 mg, 1 cap, PO, Bedtime, with

 food, 14 cap, Substitution Allowed, CAPwith food                                  

                      Active                                                        [place]

                            10/13/2013                            [facility]   

                 

 

                          lisinopril 40 mg oral tablet                            40 mg, 1 tab, PO, Daily, 14

 tab, Substitution Allowed, TAB                                                    

                    Active                                                        [place]              

                          10/13/2013                            [facility]                 

   

 

                          clonazepam 2 mg oral tablet                            2 mg, 1 tab, PO, Bedtime, 14

 tab, Substitution Allowed, TAB                                                    

                    Active                                                        [place]              

                          10/13/2013                            [facility]                 

   

 

                          furosemide 40 mg oral tablet                            40 mg, 1 tab, PO, Daily, 30

 tab, Substitution Allowed, TAB                                                    

                    Active                                                        [place]              

                          10/13/2013                            [facility]                 

   

 

                          simethicone                            40 mg, 0.5 tab, Route: PO, Drug form: CHEWTAB,

 Q6H, Dosing Weight 108.004, kg, PRN Gas, Start date: 10/13/13 13:23:00, 
Duration: 30 day, Stop date: 13 13:22:00(Same as: Mylicon)                
                                                Inactive                                       

                          [place]                            10/13/2013                       

                                        [facility]                    

 

                          insulin regular 100 units/mL human recombinant                            5 unit, 

0.05 mL, Route: SUB-Q, Drug form: SOLN, TID-Before Meals, PRN Blood Glucose 
Results, Start date: 10/12/13 20:10:00, Duration: 30 day, Stop date: 13 
20:09:00(Same as: Humulin R) Roll in palms of hands gently;  Do not shake 
vigorously. "single patient use only"  (Restricted to patients requiring a dose 
>  60 units)  Stable for 28 days at room temperature Expires in ______ days from
 ______________Date                                                        No Longer 

Active                                                        [place]                

                          10/13/2013                            [facility]                   

 

 

                          Dextrose 50% in Water IV                            25 mL, Route: IVP, Start date:

 10/12/13 20:10:00, Duration: 30 day, Stop date: 13 19:09:00, PRN Blood 
Glucose Results                                                        No Longer Active

                                                        [place]                      

                          10/13/2013                            [facility]                    

 

                          Dextrose 50% in Water IV                            50 mL, Route: IVP, Start date:

 10/12/13 20:09:00, Duration: 30 day, Stop date: 13 19:08:00, PRN Blood 
Glucose Results                                                        No Longer Active

                                                        [place]                      

                          10/13/2013                            [facility]                    

 

                          cyanocobalamin                            5 microgram, Route: PO, Drug form: TAB, 

Daily, Dosing Weight 108.004, kg, Start date: 10/12/13 9:00:00, Duration: 30 
day, Stop date: 11/10/13 9:00:00                                                   

                    No Longer Active                                                        [place]   

                          10/12/2013                            [facility]      

              

 

                          lisinopril                            40 mg, 2 tab, Route: PO, Drug form: TAB, Daily,

 Dosing Weight 108.004, kg, Start date: 10/12/13 9:00:00, Duration: 30 day, Stop
 date: 11/10/13 9:00:00(Same as: Prinivil, Zestril)                                

                          No Longer Active                                             

                    [place]                            10/12/2013                            [facility]                    

 

                          Vitamin B12                            1,000 microgram, 1 tab, Route: PO, Drug form:

 TAB, Daily, Start date: 10/12/13 9:00:00, Duration: 30 day, Stop date: 11/10/13
 9:00:00(Same As: Vitamin B-12)                                                    

                    No Longer Active                                                        [place]    

                          10/12/2013                            [facility]       

             

 

                          aspirin                            81 mg, 1 tab, Route: PO, Drug form: ECTAB, Daily,

 Dosing Weight 108.004, kg, Start date: 10/12/13 9:00:00, Duration: 30 day, Stop
 date: 11/10/13 9:00:00Do not crush or chew. (Same As: Ecotrin)                 
                                                No Longer Active                                

                          [place]                            10/12/2013                

                                        [facility]                    

 

                          BD Normal Saline Flush                            10 mL, Route: IV, Drug Form: INJ,

 Q8H, Start date: 10/12/13 0:00:00, Duration: 30 day, Stop date: 11/10/13 
16:00:00(Same as: BD Posiflush)                                                    

                    No Longer Active                                                        [place]    

                          10/12/2013                            [facility]       

             

 

                          furosemide                            40 mg, 4 mL, Route: IVP, Drug form: INJ, Q8H,

 Dosing Weight 129.545, kg, Priority: Routine, Start date: 10/11/13 23:00:00, 
Duration: 30 day, Stop date: 11/10/13 15:00:00(Same as: Lasix)                  
                                                No Longer Active                                 

                          [place]                            10/12/2013                 

                                        [facility]                    

 

                          clonazepam                            2 mg, 2 tab, Route: PO, Drug form: TAB, Bedtime,

 Dosing Weight 108.004, kg, Start date: 10/11/13 21:00:00, Duration: 30 day, 
Stop date: 13 21:00:00(Same As: Klonopin)                                    

                          No Longer Active                                                 

                    [place]                            10/12/2013                            [facility]                    

 

                          Geodon                            20 mg, 1 cap, Route: PO, Drug form: CAP, Bedtime,

 Dosing Weight 108.004, kg, Start date: 10/11/13 21:00:00, Duration: 30 day, 
Stop date: 13 21:00:00(Same As: Geodon) Give with Food                    
                                                No Longer Active                                   

                          [place]                            10/12/2013                   

                                        [facility]                    

 

                          Remeron                            15 mg, 1 tab, Route: PO, Drug form: TAB, Bedtime,

 Dosing Weight 108.004, kg, Start date: 10/11/13 21:00:00, Duration: 30 day, 
Stop date: 13 21:00:00(Same as:Remeron)                                      

                          No Longer Active                                                   

                    [place]                            10/12/2013                            [facility]                    

 

                          hydrALAZINE                            20 mg, 1 mL, Route: IV, Drug form: INJ, Q4H,

 PRN Elevated BP, Start date: 10/11/13 19:42:00, Duration: 30 day, Stop date: 
11/10/13 19:41:00(Same as: Apresoline)                                             

                    No Longer Active                                                        [place]

                            10/12/2013                            [facility]   

                 

 

                          cyanocobalamin                            5 microgram, PO, Daily, Substitution Allowed,

 TAB                                                        Active                   

                                                [place]                            10/11/2013     

                                        [facility]                    

 

                          Remeron 15 mg oral tablet                            15 mg, 1 tab, PO, Bedtime, 30

 tab, Substitution Allowed, TAB                                                    

                    No Longer Active                                                        [place]    

                          10/11/2013                            [facility]       

             

 

                          Geodon 20 mg oral capsule                            20 mg, 1 cap, PO, Bedtime, with

 food, 180 cap, Substitution Allowed, CAPwith food                                 

                          No Longer Active                                              

                    [place]                            10/11/2013                            [facility]                    

 

                          ondansetron                            4 mg, 2 mL, Route: IVP, Drug form: INJ, Q8H,

 Dosing Weight 129.545, kg, PRN Nausea & Vomiting, Start date: 10/11/13 
17:32:00, Duration: 30 day, Stop date: 11/10/13 17:31:00(Same as: Zofran)       
                                                      No Longer Active                    

                                                [place]                            10/11/2013      

                                        [facility]                    

 

                          docusate                            100 mg, 1 cap, Route: PO, Drug form: CAP, BID,

 Dosing Weight 129.545, kg, PRN Constipation, Start date: 10/11/13 17:32:00, 
Duration: 30 day, Stop date: 11/10/13 17:31:00(Same as: Colace) (Do Not Crush)  
                                                      No Longer Active               

                                                [place]                            10/11/2013 

                                        [facility]                    

 

                          acetaminophen                            650 mg, 20.3 mL, Route: PO, Drug form: LIQ,

 Q4H, Dosing Weight 129.545, kg, PRN Pain 1-3/Temp > 100.4 F, Start date: 
10/11/13 17:32:00, Duration: 30 day, Stop date: 11/10/13 17:31:00Max 
acetaminophen=4000mg/day (4 gm/day).  (Same as: Tylenol)                        
                                                No Longer Active                                       

                          [place]                            10/11/2013                       

                                        [facility]                    

 

                          metoprolol 5 mg/5 ml INJ                            5 mg, 5 mL, Route: IVP, Drug form:

 INJ, ONCE, Dosing Weight 129.545, kg, Priority: STAT, Start date: 10/11/13 
16:31:00, Stop date: 10/11/13 16:31:00(Same as: Lopressor)                      
                                                Inactive                                             

                    [facility]                            10/11/2013                            [facility]                    

 

                          enalapril                            1.25 mg, 1 mL, Route: IVP, Drug form: INJ, ONCE,

 Dosing Weight 129.545, kg, Priority: STAT, Start date: 10/11/13 16:30:00, Stop 
date: 10/11/13 16:30:00(Same as: Vasotec-IV)                                       

                    Inactive                                                        [facility]

                            10/11/2013                            [facility]   

                 

 

                          furosemide                            40 mg, 4 mL, Route: IVP, Drug form: INJ, ONCE,

 Dosing Weight 129.545, kg, Priority: STAT, Start date: 10/11/13 14:35:00, Stop 
date: 10/11/13 14:35:00(Same as: Lasix)                                            

                    Inactive                                                        [facility]  

                          10/11/2013                            [facility]     

               

 

                          hydromorphone                            1 mg, 1 mL, Route: IVP, Drug form: INJ, ONCE,

 Dosing Weight 129.545, kg, Priority: STAT, Start date: 10/11/13 13:00:00, Stop 
date: 10/11/13 13:00:00Same as: Dilaudid                                           

                    Inactive                                                        [facility] 

                           10/11/2013                            [facility]    

                

 

                          Saline Flush 0.9%                            5 mL, Route: IVP, Drug Form: INJ, Dosing

 Weight 129.545, kg, Q8H, PRN Line Flush, Start date: 10/11/13 13:00:00, 
Duration: 30 day, Stop date: 11/10/13 12:59:00, Administer at least once every 8
 hoursAdminister at least once every 8 hours(Same as: BD Posiflush)             
                                                No Longer Active                            

                            [facility]                            10/11/2013          

                                        [facility]                    

 

                          Flagyl 500 mg oral tablet                            500 mg, 1 tab, PO, Q8H, 21 tab,

 Substitution Allowed                            PO                            Active

                                                        [facility]                        

                          10/02/2013                            [facility]                    

 

                          Cipro 500 mg oral tablet                            500 mg, 1 tab, PO, Q12H, 14 tab,

 Substitution Allowed, TAB                            PO                            Active

                                                        [facility]                        

                          10/02/2013                            [facility]                    

 

                          Flagyl 500 mg oral tablet                            500 mg, 1 tab, PO, Q8H, 21 tab,

 Substitution Allowed                            PO                            No Longer

 Active                                                        [facility]                 

                          10/02/2013                            [facility]                    



 

                          Cipro 500 mg oral tablet                            500 mg, 1 tab, PO, Q12H, 14 tab,

 Substitution Allowed, TAB                            PO                            No

 Longer Active                                                        [facility]          

                          10/02/2013                            [facility]             

       

 

                          Lactated Ringers Injection IV 1,000 mL                            1,000 mL, Rate: 

25 ml/hr, Infuse over: 40 hr, Route: IV, Dosing Weight 111.364 kg, Total Volume:
 1,000, Start date: 10/02/13 9:23:00, Duration: 30 day, Stop date: 13 
9:22:00                            IV                            No Longer Active    

                                                    [place]                            10/02/2013

                                        [facility]                    

 

                          ondansetron                            4 mg, 2 mL, Route: IVP, Drug form: INJ, ONCE,

 Dosing Weight 111.364, kg, PRN Nausea & Vomiting, Start date: 10/02/13 9:22:00 
                           IVP                            No Longer Active           

                                                [place]                            10/02/2013 

                                        [facility]                    

 

                          naloxone                            0.04 mg, 0.1 mL, Route: IVP, Drug form: INJ, Q2MIN,

 Dosing Weight 111.364, kg, PRN Narcotic Reversal, Start date: 10/02/13 9:22:00,
 Duration: 8 doses or times, Stop date: Limited # of times                      
                    IVP                            No Longer Active                                  

                          [place]                            10/02/2013                      

                                        [facility]                    

 

                          morphine Sulfate                            2 mg, 1 mL, Route: IVP, Drug form: INJ,

 Q5Min, Dosing Weight 111.364, kg, PRN Pain Score 4-6, Start date: 10/02/13 
9:22:00, Duration: 5 doses or times, Stop date: Limited # of times              
                          IVP                            No Longer Active                        

                                                [place]                            10/02/2013              

                                        [facility]                    

 

                          flumazenil                            0.2 mg, 2 mL, Route: IVP, Drug form: INJ, PRN,

 Dosing Weight 111.364, kg, PRN Benzodiazepine Reversal, Initial dose, Start 
date: 10/02/13 9:22:00, Duration: 30 day, Stop date: 13 9:21:00           
                          IVP                            No Longer Active                     

                                                [place]                            10/02/2013           

                                        [facility]                    

 

                          GoLYTELY                            2,000 mL, Route: PO, Drug Form: PDR/REC, Dosing

 Weight 111.364, kg, ONCE, NOW, Start date: 10/01/13 19:36:00, Stop date: 
10/01/13 19:36:00                            PO                            No Longer 

Active                                                        [place]                  

                          10/02/2013                            [facility]                    

 

                          influenza virus vaccine, inactivated                            0.5 ml, Route: IM,

 Drug Form: SUSP, Start date: 10/01/13 9:00:00, Stop date: 10/01/13 9:00:00     
                                                   Inactive                            

                            [facility]                            10/01/2013             

                                        [facility]                    

 

                          spironolactone                            25 mg, Route: PO, Drug form: TAB, Daily,

 Dosing Weight 111.364, kg, Start date: 10/01/13 9:00:00, Duration: 30 day, Stop
 date: 10/30/13 9:00:00                            PO                            No Longer

 Active                                                        [place]                 

                          10/01/2013                            [facility]                    



 

                          lisinopril                            40 mg, 2 tab, Route: PO, Drug form: TAB, Daily,

 Dosing Weight 111.364, kg, Start date: 10/01/13 9:00:00, Duration: 30 day, Stop
 date: 10/30/13 9:00:00                            PO                            No Longer

 Active                                                        [facility]                 

                          10/01/2013                            [facility]                    



 

                          aspirin                            81 mg, 1 tab, Route: PO, Drug form: CHEWTAB, Daily,

 Dosing Weight 111.364, kg, Start date: 10/01/13 9:00:00, Duration: 30 day, Stop
 date: 10/30/13 9:00:00                            PO                            No Longer

 Active                                                        [facility]                 

                          10/01/2013                            [facility]                    



 

                          risperidone                            3 mg, 3 tab, Route: PO, Drug form: TAB, Bedtime,

 Dosing Weight 111.364, kg, Start date: 13 21:00:00, Duration: 30 day, 
Stop date: 10/29/13 21:00:00                            PO                         

                    No Longer Active                                                        [facility]       

                          10/01/2013                            [facility]          

          

 

                          mirtazapine                            30 mg, 2 tab, Route: PO, Drug form: TAB, Bedtime,

 Dosing Weight 111.364, kg, Start date: 13 21:00:00, Duration: 30 day, 
Stop date: 10/29/13 21:00:00                            PO                         

                    No Longer Active                                                        [facility]       

                          10/01/2013                            [facility]          

          

 

                          clonazepam                            2 mg, 4 tab, Route: PO, Drug form: TAB, Bedtime,

 Dosing Weight 111.364, kg, Start date: 13 21:00:00, Duration: 30 day, 
Stop date: 10/29/13 21:00:00                            PO                         

                    No Longer Active                                                        [facility]       

                          10/01/2013                            [facility]          

          

 

                          furosemide 40 mg oral tablet                            40 mg, 1 tab, Route: PO, Drug

 form: TAB, BID, Dosing Weight 111.364, kg, Start date: 13 17:00:00, 
Duration: 30 day, Stop date: 10/30/13 9:00:00                            PO        

                          No Longer Active                                                 

                    [facility]                            2013                            [facility]

                    

 

                          Prinivil                            40 mg, 2 tab, Route: PO, Drug form: TAB, Daily,

 Dosing Weight 111.364, kg, Start date: 13 16:00:00, Duration: 30 day, 
Stop date: 10/30/13 9:00:00                            PO                            

No Longer Active                                                        [facility]        

                          2013                            [facility]           

         

 

                          1 NS 1,000 mL                            1,000 mL, Rate: 50 ml/hr, Infuse over: 

20 hr, Route: IV, Dosing Weight 111.364 kg, Total Volume: 1,000, Start date: 
13 15:56:00, Duration: 30 day, Stop date: 10/30/13 15:55:00               
                    IV                            No Longer Active                            

                            [place]                            2013              

                                        [facility]                    

 

                          hydrALAZINE                            10 mg, 1 tab, Route: PO, Drug form: TAB, Q6H,

 Dosing Weight 111.364, kg, PRN Elevated BP, for sbp > 160, Start date: 13
 15:10:00, Duration: 30 day, Stop date: 10/30/13 15:09:00                       
                    PO                            No Longer Active                                    

                          [facility]                            2013                      

                                        [facility]                    

 

                          aspirin                            325 mg, 1 tab, Route: PO, Drug form: TAB, ONCE,

 Dosing Weight 111.364, kg, Start date: 13 15:09:00, Stop date: 13 
15:09:00                            PO                            No Longer Active   

                                                      [facility]                         

                          2013                            [facility]                    

 

                          Flagyl                            500 mg, 1 tab, Route: PO, Drug form: TAB, [id number],

 Dosing Weight 129.545, kg, Start date: 13 15:00:00, Duration: 30 day, 
Stop date: 10/30/13 9:00:00                            PO                            

No Longer Active                                                        [facility]      

                          2013                            [facility]         

           

 

                          Cipro I.V. 400 mg/200 mL intravenous solution                            400 mg, 200

 mL, Route: IV, Drug form: INJ, [id number], Dosing Weight 129.545, kg, Start date: 
13 15:00:00, Duration: 30 day, Stop date: 10/30/13 3:00:00                
                    IV                            No Longer Active                             

                           [facility]                            2013             

                                        [place] [address] NS 1,000 mL                            1,000 mL, Rate: 100 ml/hr, Infuse over:

 10 hr, Route: IV, Dosing Weight 111.364 kg, Total Volume: 1,000, Start date: 
13 14:37:00, Duration: 30 day, Stop date: 10/30/13 14:36:00               
                    IV                            No Longer Active                            

                            [place]                            2013              

                                        [facility]                    

 

                          Tylenol                            650 mg, 2 tab, Route: PO, Drug form: TAB, Q4H, 

Dosing Weight 129.545, kg, PRN Fever, Start date: 13 13:56:00, Duration: 
30 day, Stop date: 10/30/13 13:55:00                            PO                 

                    No Longer Active                                                        [facility]

                            2013                            [facility]   

                 

 

                          Saline Flush 0.9%                            5 ml, Route: IVP, Drug Form: INJ, Dosing

 Weight 129.545, kg, PRN, PRN Line Flush, Start date: 13 13:49:00, 
Duration: 30 day, Stop date: 10/30/13 13:48:00                            IVP      

                          No Longer Active                                               

                    [facility]                            2013                            [facility]                    

 

                          ondansetron                            4 mg, 2 mL, Route: IVP, Drug form: INJ, Q8H,

 Dosing Weight 129.545, kg, PRN Nausea & Vomiting, Start date: 13 
13:49:00, Duration: 30 day, Stop date: 10/30/13 13:48:00                        
                    IVP                            No Longer Active                                    

                          [facility]                            2013                    

                                        [facility]                    

 

                          morphine Sulfate                            2 mg, 1 mL, Route: IVP, Drug form: INJ,

 Q3H, Dosing Weight 129.545, kg, PRN Pain Score 1-5, Start date: 13 
13:49:00, Duration: 30 day, Stop date: 10/30/13 13:48:00                        
                    IVP                            No Longer Active                                    

                          [facility]                            2013                    

                                        [facility]                    

 

                          Sodium Chloride 0.9% IV                            25 mL, Route: IV, Start date: 13

 11:10:00, Duration: 30 day, Stop date: 10/30/13 11:09:00, PRN Line Flush       
                          IV                            No Longer Active                  

                                                [place]                            2013    

                                        [facility]                    

 

                          BD Normal Saline Flush                            10 mL, Route: IV, Drug Form: INJ,

 PRN, PRN Line Flush, Start date: 13 11:10:00, Duration: 30 day, Stop 
date: 10/30/13 11:09:00                            IV                            No Longer

 Active                                                        [facility]               

                          2013                            [facility]                  

  

 

                          Zofran                            4 mg, Route: IVP, Drug form: INJ, ONCE, Dosing Weight

 129.545, kg, Priority: STAT, Start date: 13 11:07:00, Stop date: 13
 11:07:00                            IVP                            No Longer Active 

                                                       [facility]                       

                          2013                            [facility]                    

 

                          Zofran                            4 mg, Route: IVP, Drug form: INJ, ONCE, Dosing Weight

 129.545, kg, Priority: STAT, Start date: 13 11:06:00, Stop date: 13
 11:06:00                            IVP                            No Longer Active 

                                                       [facility]                       

                          2013                            [facility]                    

 

                          digoxin 125 mcg (0.125 mg) oral tablet                            125 microgram, 1

 tab, PO, Daily, 30 tab, Substitution Allowed, TAB                            PO   

                         Active                                                        

[facility]                            2013                            [facility]

                    

 

                          potassium chloride 20 mEq/15 mL oral liquid                            40 mEq, 30 

mL, PO, Daily, 7 mL, Substitution Allowed, LIQ                            PO       

                     Active                                                        [facility]

                            2013                            [facility]   

                 

 

                          Glucophage 500 mg oral tablet                            500 mg, 1 tab, PO, BID-Meals,

 60 tab, Substitution Allowed, TAB                            PO                   

                    Active                                                        [facility]            

                          2013                            [facility]               

     

 

                          calcium-vitamin D 500 mg-125 units oral tablet                            1 tab, PO,

 BID, 30 tab, Substitution Allowed, Maintenance, TAB                            PO 

                           Active                                                    

                    [facility]                            2013                            [facility]

                    

 

                          magnesium sulfate 2gm / NS 50ml (premixed)                            2 gm, 50 mL,

 Route: IVPB, Drug form: INJ, ONCE, Dosing Weight 103.21, kg, Start date: 
13 11:00:00, Duration: 2 hr, Stop date: 13 11:00:00                 
                    IVPB                            No Longer Active                            

                            [facility]                            2013            

                                        [facility]                    

 

                          potassium chloride 20 mEq/15 mL oral liquid                            40 mEq, 30 

mL, Route: PO, Drug form: LIQ, ONCE, Dosing Weight 103.21, kg, Start date: 
13 11:00:00, Stop date: 13 11:00:00                            PO      

                          No Longer Active                                               

                    [facility]                            2013                            [facility]                    

 

                          metFORmin                            500 mg, 1 tab, Route: PO, Drug form: TAB, BID-

Meals, Dosing Weight 106.676, kg, Start date: 13 8:00:00, Duration: 30 
day, Stop date: 13 17:00:00                            PO                    

                    No Longer Active                                                        [facility]   

                          2013                            [facility]      

              

 

                          calcium-vitamin D 500 mg-125 units oral tablet                            1 tab, Route:

 PO, Drug Form: TAB, Dosing Weight 106.676, kg, BID, Start date: 13 
17:00:00, Duration: 30 day, Stop date: 13 9:00:00                            

PO                            No Longer Active                                       

                    [facility]                            2013                            

[facility]                    

 

                          potassium chloride 20 mEq/15 mL oral liquid                            20 mEq, 15 

mL, Route: PO, Drug form: LIQ, ONCE, Dosing Weight 106.676, kg, Start date: 
13 16:01:00, Stop date: 13 16:01:00                            PO      

                          No Longer Active                                               

                    [facility]                            2013                            [facility]                    

 

                          potassium chloride                            40 mEq, 30 mL, Route: PO, Drug form:

 LIQ, Daily, Start date: 13 16:00:00, Duration: 30 day, Stop date: 
13 9:00:00                            PO                            No Longer Active

                                                        [facility]                         

                          2013                            [facility]                    

 

                          Lasix                            40 mg, 4 mL, Route: IVP, Drug form: INJ, Q8H, Dosing

 Weight 106.676, kg, Start date: 13 16:00:00, Duration: 30 day, Stop date:
 13 8:00:00                            IVP                            No Longer

 Active                                                        [facility]               

                          2013                            [facility]                  

  

 

                          Zaroxolyn                            2.5 mg, 1 tab, Route: PO, Drug form: TAB, ONCE,

 Dosing Weight 106.676, kg, Start date: 13 15:54:00, Stop date: 13 
15:54:00                            PO                            No Longer Active   

                                                      [facility]                         

                          2013                            [facility]                    

 

                                        potassium chloride 20 mEq oral tablet, extended release                         

                                        20 mEq, 1 tab, Route: PO, Drug form: ERTAB, ONCE, Dosing Weight 106.676, kg, Start

 date: 13 11:54:00, Stop date: 13 11:54:00                            PO

                            No Longer Active                                         

                    [facility]                            2013                            [facility]                    

 

                          aspirin                            81 mg, Route: PO, Drug form: TAB, Daily, Dosing

 Weight 106.676, kg, Start date: 13 9:00:00, Duration: 30 day, Stop date: 
13 9:00:00                            PO                            No Longer Active

                                                        [facility]                         

                          2013                            [facility]                    

 

                          Tylenol                            650 mg, 2 tab, Route: PO, Drug form: TAB, Q6H, 

PRN Pain, Start date: 13 22:42:00, Duration: 30 day, Stop date: 13 
22:41:00                            PO                            No Longer Active   

                                                     [facility]                            2013

                                        [facility]                    

 

                          risperidone                            3 mg, 3 tab, Route: PO, Drug form: TAB, Bedtime,

 Dosing Weight 106.676, kg, Start date: 13 21:00:00, Duration: 30 day, 
Stop date: 13 21:00:00                            PO                         

                    No Longer Active                                                        [facility]        

                          2013                            [facility]           

         

 

                          flurazepam                            15 mg, Route: PO, Drug form: CAP, Bedtime, Dosing

 Weight 106.676, kg, Start date: 13 21:00:00, Duration: 30 day, Stop date:
 13 21:00:00                            PO                            No Longer

 Active                                                        [facility]                  

                          2013                            [facility]                    

 

                          clonazepam                            2 mg, 4 tab, Route: PO, Drug form: TAB, Bedtime,

 Dosing Weight 106.676, kg, Start date: 13 21:00:00, Duration: 30 day, 
Stop date: 13 21:00:00                            PO                         

                    No Longer Active                                                        [facility]        

                          2013                            [facility]           

         

 

                          Restoril                            15 mg, 1 cap, Route: PO, Drug form: CAP, Bedtime,

 Start date: 13 21:00:00, Duration: 30 day, Stop date: 13 21:00:00  
                          PO                            No Longer Active             

                                                [facility]                            2013  

                                        [facility]                    

 

                          Levemir                            10 unit, 0.1 mL, Route: SUB-Q, Drug form: INJ, 

Daily, Dosing Weight 106.676, kg, Start date: 13 13:30:00, Duration: 30 
day, Stop date: 13 9:00:00                            SUB-Q                  

                    No Longer Active                                                        [facility] 

                           2013                            [facility]    

                

 

                          Dextrose 50% Syringe                            25 gm, 50 mL, Route: IVP, Drug Form:

 INJ, Dosing Weight 106.676, kg, PRN, PRN Blood Glucose Results, Start date: 
13 12:44:00, Duration: 30 day, Stop date: 13 12:43:00               
                          IVP                            No Longer Active                         

                                                [facility]                            2013              

                                        [facility]                    

 

                          glucagon                            1 mg, Route: IM, Drug form: PDR/INJ, PRN, Dosing

 Weight 106.676, kg, PRN Blood Glucose Results, Start date: 13 12:44:00, 
Duration: 30 day, Stop date: 13 12:43:00                            IM       

                          No Longer Active                                                

                    [facility]                            2013                            [facility]

                    

 

                          insulin aspart                            8 unit, 0.08 mL, Route: SUB-Q, Drug form:

 SOLN, TID-Before Meals, Dosing Weight 106.676, kg, PRN Blood Glucose Results, 
Start date: 13 12:44:00, Duration: 30 day, Stop date: 13 12:43:00   
                          SUB-Q                            No Longer Active           

                                                [facility]                            2013

                                        [facility]                    

 

                          digoxin 125 mcg (0.125 mg) oral tablet                            0.125 mg, 1 tab,

 Route: PO, Drug form: TAB, Daily, Dosing Weight 106.676, kg, Start date: 
13 11:30:00, Duration: 30 day, Stop date: 13 9:00:00                
                    PO                            No Longer Active                             

                           [facility]                            2013             

                                        [facility]                    

 

                          aspirin 81 mg tablet, enteric coated                            81 mg, 1 tab, Route:

 PO, Drug form: ECTAB, Daily, Dosing Weight 106.676, kg, Start date: 13 
11:30:00, Duration: 30 day, Stop date: 13 9:00:00                            

PO                            No Longer Active                                       

                          [facility]                            2013                       

                                        [facility]                    

 

                                        potassium chloride 20 mEq oral tablet, extended release                         

                                        40 mEq, 2 tab, Route: PO, Drug form: ERTAB, Daily, Dosing Weight 106.676, kg, Start

 date: 13 11:30:00, Duration: 30 day, Stop date: 13 9:00:00         
                          PO                            No Longer Active                    

                                                [facility]                            2013      

                                        [facility]                    

 

                          Lasix                            40 mg, 4 mL, Route: IVP, Drug form: INJ, Daily, Dosing

 Weight 106.676, kg, Start date: 13 11:30:00, Duration: 30 day, Stop date:
 13 9:00:00                            IVP                            No Longer

 Active                                                        [facility]               

                          2013                            [facility]                  

  

 

                          ibuprofen 200 mg oral tablet                            200 mg, 1 tab, PO, PRN, as

 needed for pain, Substitution Allowed                            PO               

                    Active                                                                    

                          2013                            [facility]               

     

 

                          acetaminophen 325 mg oral tablet                            325 mg, 1 tab, PO, PRN,

 as needed for pain, Substitution Allowed                            PO            

                    Active                                                                 

                          2013                            [facility]            

        

 

                          Aspirin Low Dose 81 mg oral tablet                            81 mg, 1 tab, PO, Daily,

 Substitution Allowed                            PO                            Active

                                                        [facility]                         

                          2013                            [facility]                    

 

                          lisinopril 40 mg oral tablet                            40 mg, 1 tab, PO, Daily, Substitution

 Allowed                            PO                            Active             

                                                                            2013      

                                        [facility]                    

 

                          risperidone 3 mg oral tablet                            3 mg, 1 tab, PO, Bedtime, 

60 tab, Substitution Allowed, TAB                            PO                    

                    Active                                                        [facility]             

                          2013                            [facility]                

    

 

                          carvedilol 12.5 mg oral tablet                            12.5 mg, 1 tab, PO, BID,

 Substitution Allowed                            PO                            No Longer

 Active                                                                              

                          2013                            [facility]                    

 

                          spironolactone 25 mg oral tablet                            25 mg, 1 tab, PO, Daily,

 Substitution Allowed                            PO                            Active

                                                                                    2013

                                        [facility]                    

 

                          flurazepam 15 mg oral capsule                            15 mg, 1 cap, PO, Bedtime,

 Substitution Allowed, CAP                            PO                            Active

                                                        [facility]                         

                          2013                            [facility]                    

 

                          mirtazapine 30 mg oral tablet                            30 mg, 1 tab, PO, Bedtime,

 Substitution Allowed                            PO                            Active

                                                                                    2013

                                        [facility]                    

 

                          furosemide 40 mg oral tablet                            40 mg, 1 tab, PO, BID, Substitution

 Allowed                            PO                            Active             

                                                                            2013      

                                        [facility]                    

 

                          temazepam 30 mg oral capsule                            30 mg, 1 cap, PO, Bedtime,

 Substitution Allowed, CAP                            PO                            Active

                                                                                    2013

                                        [facility]                    

 

                          Saline Flush 0.9%                            5 ml, Route: IVP, Drug Form: INJ, Dosing

 Weight 102.727, kg, Q12H, Start date: 13 9:00:00, Duration: 30 day, Stop 
date: 13 21:00:00                            IVP                            No 

Longer Active                                                        [place]           

                          2013                            [facility]              

      

 

                          spironolactone                            25 mg, 1 tab, Route: PO, Drug form: TAB,

 Daily, Dosing Weight 102.727, kg, Start date: 13 9:00:00, Duration: 30 
day, Stop date: 13 9:00:00                            PO                     

                    No Longer Active                                                        [place]   

                          2013                            [facility]      

              

 

                          lisinopril                            40 mg, 2 tab, Route: PO, Drug form: TAB, Daily,

 Dosing Weight 102.727, kg, Start date: 13 9:00:00, Duration: 30 day, Stop
 date: 13 9:00:00                            PO                            No Longer

 Active                                                        [place]                 

                          2013                            [facility]                    



 

                          carvedilol                            12.5 mg, 1 tab, Route: PO, Drug form: TAB, Q12H,

 Dosing Weight 102.727, kg, Start date: 13 9:00:00, Duration: 30 day, Stop
 date: 13 21:00:00                            PO                            No 

Longer Active                                                        [place]           

                          2013                            [facility]              

      

 

                          Saline Flush 0.9%                            5 ml, Route: IVP, Drug Form: INJ, Dosing

 Weight 102.727, kg, PRN, PRN Line Flush, Start date: 13 1:26:00, 
Duration: 30 day, Stop date: 13 1:25:00                            IVP       

                          No Longer Active                                                

                    [place]                            2013                            [facility]                    

 

                          temazepam                            15 mg, 1 cap, Route: PO, Drug form: CAP, Bedtime,

 Dosing Weight 102.727, kg, PRN Insomnia, Start date: 13 1:26:00, 
Duration: 30 day, Stop date: 13 1:25:00                            PO        

                          No Longer Active                                                 

                    [facility]                            2013                            [facility]

                    

 

                          ondansetron                            4 mg, 2 mL, Route: IVP, Drug form: INJ, Q8H,

 Dosing Weight 102.727, kg, PRN Nausea & Vomiting, Start date: 13 1:26:00,
 Duration: 30 day, Stop date: 13 1:25:00                            IVP      

                          No Longer Active                                               

                    [place]                            2013                            [facility]                    

 

                          Coreg                            12.5 mg, 1 tab, Route: PO, Drug form: TAB, ONCE, 

Dosing Weight 102.727, kg, Start date: 13 23:39:00, Stop date: 13 
23:39:00                            PO                            No Longer Active   

                                                     [place]                            2013

                                        [facility]                    

 

                          hydrALAZINE                            20 mg, 1 mL, Route: IVP, Drug form: INJ, ONCE,

 Dosing Weight 102.727, kg, Priority: STAT, Start date: 13 23:39:00, Stop 
date: 13 23:39:00                            IVP                            No 

Longer Active                                                        [place]           

                          2013                            [facility]              

      

 

                          mirtazapine                            30 mg, 2 tab, Route: PO, Drug form: TAB, ONCE,

 Dosing Weight 102.727, kg, Start date: 13 23:38:00, Stop date: 13 
23:38:00                            PO                            No Longer Active   

                                                     [place]                            2013

                                        [facility]                    

 

                          Lasix                            40 mg, 4 mL, Route: IVP, Drug form: INJ, ONCE, Dosing

 Weight 102.727, kg, Priority: STAT, Start date: 13 22:54:00, Stop date: 
13 22:54:00                            IVP                            No Longer

 Active                                                        [place]                 

                          2013                            [facility]                    



 

                          Sodium Chloride 0.9% IV                            25 mL, Route: IV, Start date: 13

 22:22:00, Duration: 30 day, Stop date: 13 22:21:00, PRN Line Flush       
                          IV                            No Longer Active                  

                                                [place]                            2013      

                                        [facility]                    

 

                          BD Normal Saline Flush                            10 mL, Route: IV, Drug Form: INJ,

 PRN, PRN Line Flush, Start date: 13 22:22:00, Duration: 30 day, Stop 
date: 13 22:21:00                            IV                            No Longer

 Active                                                        [place]                 

                          2013                            [facility]                    



 

                          clonidine                            0.1 mg, 1 tab, Route: PO, Drug form: TAB, ONCE,

 Dosing Weight 102.727, kg, Priority: STAT, Start date: 13 22:19:00, Stop 
date: 13 22:19:00                            PO                            No Longer

 Active                                                        [place]                 

                          2013                            [facility]                    



 

                          GI cocktail                            30 mL, Route: PO, Drug Form: SUSP, Dosing Weight

 102.727, kg, ONCE, STAT, Start date: 13 22:19:00, Stop date: 13 
22:19:00                            PO                            No Longer Active   

                                                     [place]                            2013

                                        [facility]                    

 

                          aspirin                            324 mg, 4 tab, Route: PO, Drug form: CHEWTAB, ONCE,

 Dosing Weight 102.727, kg, Priority: STAT, Start date: 13 22:18:00, Stop 
date: 13 22:18:00                            PO                            No Longer

 Active                                                        [place]                 

                          2013                            [facility]                    



 

                          Saline Flush 0.9%                            5 mL, Route: IVP, Drug Form: INJ, Dosing

 Weight 102.727, kg, Q8H, PRN Line Flush, Start date: 13 22:18:00, 
Duration: 30 day, Stop date: 13 22:17:00, Administer at least once every 8
 hoursAdminister at least once every 8 hours                            IVP        

                          No Longer Active                                                 

                    [place]                            2013                            [facility]

                    

 

                          Ultram 50 mg oral tablet                            50 mg, 1 tab, PO, Q4H, PRN, 20

 tab, pain, Substitution Allowed                            PO                     

                    Active                                                        [place]              

                          2012                            [facility]                 

   

 

                          folic acid 1 mg oral tablet                            1 mg, 1 tab, PO, Daily, 10 

tab, Substitution Allowed, TAB                            PO                       

                    Active                                                        [place]                

                          2012                            [facility]                   

 

 

                          risperidone 1 mg oral tablet                            2 mg, 2 tab, PO, Daily, 10

 tab, Substitution Allowed, TAB                            PO                      

                    Active                                                        [place]               

                          2012                            [facility]                  

  

 

                          atorvastatin 10 mg oral tablet                            10 mg, 1 tab, PO, QPM, 30

 tab, Substitution Allowed, TAB                            PO                      

                    Active                                                        [place]               

                          2012                            [facility]                  

  

 

                          Lasix 40 mg oral tablet                            60 mg, 3 tab, Route: PO, Drug form:

 TAB, Daily, Dosing Weight 105.909, kg, Start date: 12 9:00:00, Duration: 
30 day, Stop date: 10/15/12 9:00:00                            PO                  

                    No Longer Active                                                        [facility]

                            2012                            [facility]   

                 

 

                          Tylenol                            650 mg, 2 tab, Route: PO, Drug form: TAB, Q4H, 

PRN Pain, Start date: 12 5:23:00, Duration: 30 day, Stop date: 10/16/12 
5:22:00                            PO                            No Longer Active    

                                                    [place]                            2012

                                        [facility]                    

 

                          Sodium Chloride 0.9% IV                            25 mL, Route: IV, Start date: 09/15/12

 12:46:00, Duration: 30 day, Stop date: 10/15/12 12:45:00, PRN Line Flush       
                          IV                            No Longer Active                  

                                                [place]ayo                            09/15/2012    

                                        [facility]                    

 

                          Lasix 40 mg oral tablet                            60 mg, 1.5 tab, PO, Daily, 45 tab,

 2, 2, Substitution Allowed, TAB                            PO                     

                    Active                                                        [facility]           

                          09/15/2012                            [facility]              

      

 

                          aspirin 81 mg tablet, enteric coated                            81 mg, 1 tab, PO, 

Daily, 0 tab, Substitution Allowed, ECTAB                            PO            

                    Active                                                        [facility]  

                          09/15/2012                            [facility]     

               

 

                          Lasix 40 mg oral tablet                            40 mg, 1 tab, PO, Daily, 90 tab,

 Substitution Allowed, TAB                            PO                            No

 Longer Active                                                        [facility]        

                          09/15/2012                            [facility]           

         

 

                          spironolactone 25 mg oral tablet                            25 mg, 1 tab, PO, Daily,

 90 tab, Substitution Allowed, TAB                            PO                   

                    Active                                                        [facility]         

                          09/15/2012                            [facility]            

        

 

                          Coreg 12.5 mg oral tablet                            12.5 mg, 1 tab, PO, BID, 180 

tab, Substitution Allowed, TAB                            PO                       

                    Active                                                        [facility]             

                          09/15/2012                            [facility]                

    

 

                          lisinopril 10 mg oral tablet                            10 mg, 1 tab, PO, Daily, 90

 tab, Substitution Allowed, TAB                            PO                      

                    Active                                                        [facility]            

                          09/15/2012                            [facility]               

     

 

                          folic acid                            1 mg, 1 tab, Route: PO, Drug form: TAB, Daily,

 Dosing Weight 105.909, kg, Priority: NOW, Start date: 09/15/12 11:00:00, 
Duration: 30 day, Stop date: 10/15/12 9:00:00                            PO        

                          No Longer Active                                                 

                    [place]                            09/15/2012                            [facility]

                    

 

                          thiamine                            100 mg, 1 mL, Route: IM, Drug form: INJ, Daily,

 Dosing Weight 105.909, kg, Start date: 09/15/12 11:00:00, Duration: 30 day, 
Stop date: 10/14/12 11:00:00                            IM                         

                    No Longer Active                                                        [place]        

                          09/15/2012                            [facility]           

         

 

                          Zaroxolyn                            5 mg, 1 tab, Route: PO, Drug form: TAB, After

 Breakfast, Dosing Weight 105.909, kg, Start date: 09/15/12 8:30:00, Duration: 
30 day, Stop date: 10/14/12 8:30:00                            PO                  

                    No Longer Active                                                        [place]

                            09/15/2012                            [facility]   

                 

 

                          atorvastatin                            10 mg, 1 tab, Route: PO, Drug form: TAB, QPM,

 Dosing Weight 105.909, kg, Start date: 12 17:00:00, Duration: 30 day, 
Stop date: 10/13/12 17:00:00                            PO                         

                    No Longer Active                                                        [facility]     

                          2012                            [facility]        

            

 

                          carvedilol                            12.5 mg, 1 tab, Route: PO, Drug form: TAB, BID,

 Dosing Weight 105.909, kg, Start date: 12 9:00:00, Duration: 30 day, Stop
 date: 10/13/12 17:00:00                            PO                            No 

Longer Active                                                        [facility]         

                          2012                            [facility]            

        

 

                          spironolactone                            25 mg, 1 tab, Route: PO, Drug form: TAB,

 Daily, Dosing Weight 105.909, kg, Start date: 12 9:00:00, Duration: 30 
day, Stop date: 10/13/12 9:00:00                            PO                     

                    No Longer Active                                                        [facility] 

                           2012                            [facility]    

                

 

                          risperidone                            2 mg, 2 tab, Route: PO, Drug form: TAB, Daily,

 Dosing Weight 105.909, kg, Start date: 12 9:00:00, Duration: 30 day, Stop
 date: 10/13/12 9:00:00                            PO                            No Longer

 Active                                                        [facility]               

                          2012                            [facility]                  

  

 

                                        potassium chloride 20 mEq oral tablet, extended release                         

                                        20 mEq, 1 tab, Route: PO, Drug form: ERTAB, ONCE, Dosing Weight 105.909, kg, Start

 date: 12 7:10:00, Stop date: 12 7:10:00                            PO 

                           No Longer Active                                          

                    [facility]                            2012                            

[facility]                    

 

                          Robitussin-DM                            10 mL, Route: PO, Drug Form: LIQ, Q4H, PRN

 Cough, Start date: 12 22:26:00, Duration: 30 day, Stop date: 10/13/12 
22:25:00                            PO                            No Longer Active   

                                                      [facility]                         

                          2012                            [facility]                    

 

                          clonazepam                            2 mg, 4 tab, Route: PO, Drug form: TAB, Bedtime,

 Dosing Weight 105.909, kg, PRN Insomnia, Start date: 12 21:49:00, 
Duration: 30 day, Stop date: 10/13/12 21:48:00                            PO       

                          No Longer Active                                                

                    [facility]                            2012                            [facility]                    

 

                          atorvastatin 10 mg oral tablet                            Daily, Substitution Allowed

                                                        No Longer Active             

                                                [facility]                            2012

                                        [facility]                    

 

                          risperidone                            2 mg, Daily, Substitution Allowed          

                                                      No Longer Active                       

                                                [facility]                            2012         

                                        [facility]                    

 

                          clonazepam 2 mg oral tablet                            Bedtime, Substitution Allowed

                                                        Active                       

                                                [facility]                            2012         

                                        [facility]                    

 

                          mirtazapine 30 mg oral tablet                            Bedtime, Substitution Allowed

                                                        No Longer Active             

                                                                            2012      

                                        [facility]                    

 

                          flurazepam 30 mg oral capsule                            Bedtime, Substitution Allowed

                                                        No Longer Active             

                                                                            2012      

                                        [facility]                    

 

                          spironolactone 25 mg oral tablet                            Daily, Substitution Allowed

                                                        No Longer Active             

                                                [facility]                            2012

                                        [facility]                    

 

                          furosemide                            40 mg, Daily, Substitution Allowed          

                                                      No Longer Active                       

                                                                            2012                

                                        [facility]                    

 

                          carvedilol 25 mg oral tablet                            BID, Substitution Allowed 

                                                       No Longer Active              

                                                [facility]                            2012

                                        [facility]                    

 

                          Saline Flush 0.9%                            5 ml, Route: IVP, Drug Form: INJ, Dosing

 Weight 105.909, kg, Q12H, Start date: 12 9:00:00, Duration: 30 day, Stop 
date: 10/12/12 21:00:00                            IVP                            No 

Longer Active                                                        [facility]         

                          2012                            [facility]            

        

 

                          furosemide                            40 mg, 4 mL, Route: IVP, Drug form: INJ, Q12H,

 Dosing Weight 105.909, kg, Start date: 12 9:00:00, Duration: 30 day, Stop
 date: 10/12/12 21:00:00                            IVP                            No

 Longer Active                                                        [place]        

                          2012                            [facility]           

         

 

                          famotidine                            20 mg, 1 tab, Route: PO, Drug form: TAB, Q12H,

 Dosing Weight 105.909, kg, Start date: 12 9:00:00, Duration: 30 day, Stop
 date: 10/12/12 21:00:00                            PO                            No 

Longer Active                                                        [facility]         

                          2012                            [facility]            

        

 

                          enoxaparin                            40 mg, 0.4 mL, Route: SUB-Q, Drug form: INJ,

 [id number], Dosing Weight 105.909, kg, Start date: 12 8:00:00, Duration: 30
 day, Stop date: 10/12/12 8:00:00                            SUB-Q                 

                    No Longer Active                                                        [facility]

                            2012                            [facility]   

                 

 

                          aspirin 81 mg tablet, chewable                            81 mg, 1 tab, Route: PO,

 Drug form: CHEWTAB, Breakfast, Dosing Weight 105.909, kg, Start date: 12 
8:00:00, Duration: 30 day, Stop date: 10/12/12 8:00:00                            PO

                            No Longer Active                                         

                          [facility]                            2012                         

                                        [facility]                    

 

                          lisinopril                            10 mg, 1 tab, Route: PO, Drug form: TAB, Daily,

 Dosing Weight 105.909, kg, Priority: NOW, Start date: 12 7:51:00, 
Duration: 30 day, Stop date: 10/12/12 9:00:00                            PO        

                          No Longer Active                                                 

                    [facility]                            2012                            [facility]                    

 

                          Saline Flush 0.9%                            5 ml, Route: IVP, Drug Form: INJ, Dosing

 Weight 105.909, kg, PRN, PRN Line Flush, Start date: 12 2:57:00, 
Duration: 30 day, Stop date: 10/13/12 2:56:00                            IVP       

                          No Longer Active                                                

                    [place]                            2012                            [facility]                    

 

                          ondansetron                            4 mg, 2 mL, Route: IVP, Drug form: INJ, Q8H,

 Dosing Weight 105.909, kg, PRN Nausea & Vomiting, Start date: 12 2:57:00,
 Duration: 30 day, Stop date: 10/13/12 2:56:00                            IVP      

                          No Longer Active                                               

                    [place]                            2012                            [facility]                    

 

                          ibuprofen                            600 mg, 1 tab, Route: PO, Drug form: TAB, ONCE,

 Dosing Weight 105.909, kg, Priority: STAT, Start date: 12 0:54:00, Stop 
date: 12 0:54:00                            PO                            No Longer

 Active                                                        [facility]               

                          2012                            [facility]                  

  

 

                          Lasix                            40 mg, Route: IVP, Drug form: INJ, ONCE, Dosing Weight

 105.909, kg, Priority: STAT, Start date: 12 23:26:00, Stop date: 12
 23:26:00                            IVP                            No Longer Active 

                                                       [facility]                       

                          2012                            [facility]                    

 

                          nitroglycerin 2% ointment                            1 inch, Route: TOP, Drug Form:

 OINT, Dosing Weight 105.909, kg, ONCE, STAT, Start date: 12 22:27:00, 
Stop date: 12 22:27:00                            TOP                        

                    No Longer Active                                                        [facility]    

                          2012                            [facility]       

             

 

                          aspirin 81 mg tablet, chewable                            324 mg, 4 tab, Route: PO,

 Drug form: CHEWTAB, ONCE, Dosing Weight 105.909, kg, Priority: STAT, Start 
date: 12 22:26:00, Stop date: 12 22:26:00                            PO

                            No Longer Active                                         

                          [facility]                            2012                         

                                        [facility]                    

 

                          Saline Flush 0.9%                            5 ml, Route: IVP, Drug Form: INJ, Dosing

 Weight 105.909, kg, PRN, PRN Line Flush, Start date: 12 22:07:00, 
Duration: 30 day, Stop date: 10/12/12 22:06:00                            IVP      

                          No Longer Active                                               

                    [facility]                            2012                            [facility]                    

 

                          aspirin 325 mg tablet                            325 mg, 1 tab, Route: PO, Drug form:

 TAB, ONCE, Dosing Weight 105.909, kg, Priority: STAT, Start date: 12 
22:07:00, Stop date: 12 22:07:00                            PO               

                    No Longer Active                                                        [facility]

                            2012                            [facility]   

                 

 

                          A/B Otic solution                            2 drp, OTIC, Q4H, PRN for pain, 15 ml,

 Substitute Allowed, SOLN                            OTIC                            

Active                                                        [facility]                  

                          2012                            [facility]                    

 

                          ibuprofen 800 mg oral tablet                            800 mg, PO, TID, PRN, 10 tab,

 Pain, Substitution Allowed, Take with foodTake with food                       
                    PO                            Active                                              

                    Alejandro                            2012                            [facility]                    

 

                          Cortisporin Otic solution                            2 drp, OTIC, QID, 10 ml, Substitute

 Allowed                            OTIC                            Active           

                                                [facility]                            2012

                                        [facility]                    

 

                          amoxicillin 500 mg oral tablet                            500 mg, 1 tab, PO, TID, 

30 tab, Substitution Allowed, TAB                            PO                    

                    Active                                                        [facility]            

                          2012                            [facility]               

     



                                                                                
                                                                                
                                                                                
                                                                                
                                                                                
                                                                                
                                                                                
                                                                                
                                                                                
                                                                                
                                                                                
                                                                                
                                                                                
                                                                                
                                                                                
                                                                                
                                                                                
                                                                                
                                                                                
                                                                                
                                                                                
                                                                                
                                                                                
                                                                                
                                                                                
                                                                                
                                                                                
                                                                                
                                                                                
                                                                                
                                                                                
                                                                                
                                                                                
                                                                                
                                                                                
                                                                                
                                                                                
                                                                                
                                                                                
                                                                                
                                                                                
                                                                                
                                                                                
                                                                                
                                                                                
                                                                                
                                                                                
                                                                                
                                                                                
                                                                                
                                                                                
                                                                                
                                                                                
                                                                                
                                                                                
                                                                                
                                                                                
                                                                                
                                                                                
                                                                                
                                                                                
                                                                                
                                                                                
                                                                                
                                                                                
                                                                                
                                                                                
                                                                                
                                                                                
                                                                                
                                                                                
                                                                                
                                                                                
                                                                                
                                                                                
                                                                                
                                                                                
                                                                                
                                                                                
                                                                                
                                                                                
                                                                                
                                                                                
                                                                                
                                                                                
                                                                                
                                                                                
                                                                                
                                                                                
                                                                                
                                                                                
                                                                                
                                                                                
                                                                                
                                                                                
                                                                                
                                                                                
                                                                                
                                                                                
                                                                                
                                                                                
                                                                                
                                                                                
                                                                                
                                                                                
                                                                                
                                                                                
                                                                                
                                                                                
                                                                                
                                                                                
                                                                                
                                                                                
                                                                                
                                                                                
                                                                                
                                                                                
                                                                                
                                                                                
                                                                                
                                                                                
                                                                                
                                                                                
                                                                                
                                                                                
                                                                                
                                                                                
                                                                                
                                                                                
                                                                                
                                                                                
                                                                                
                                                                                
                                                                                
                                                                                
                                                                                
                                                                                
                                                                                
                                                                                
                                                                                
                                                                                
                                                                                
                                                                                
                                    



Allergies, Adverse Reactions, Alerts

                    





                    Substance                            Category                            Reaction   

                          Severity                            Reaction type           

                          Status                            Date Reported                     

                          Comments                            Source                    

 

                          potassium chloride                            drug allergy                        

                                                                            Allergy                    

                                                                                                   

                                        [facility]                    

 

                    sodium chloride                            drug allergy                             

                                                       Allergy                       

                                                                                                    

[facility]                    

 

                          Sodium Chloride Compound                            Assertion                     

                                                                            Drug allergy            

                    Active                                                                 

                                        [facility]                    



                                                                        



Immunizations

                    





                    Immunization                            Date Given                            Site  

                          Status                            Last Updated             

                          Comments                            Source                    

 

                          pneumococcal 23-valent vaccine                            2018              

                          Left Deltoid                            completed                      

                    [facility]         

           

 

                          influenza virus vaccine, inactivated                            2018        

                          Right Deltoid                            completed               

                    [facility]  

                  

 

                          pneumococcal 23-valent vaccine                            2017              

                                                Not Given                                    

                                                      [facility]        

            

 

                          pneumococcal 23-valent vaccine                            2017              

                                                Not Given                                    

                                                      [facility]                    

 

                          influenza virus vaccine, inactivated                            10/01/2013        

                                                completed                            [facility]                    

 

                          influenza virus vaccine, inactivated                            10/01/2013        

                          Right deltoid                            completed               

                    [facility]                    



                                                                                
                                            



Results







                    Order Name                            Results                            Value      

                          Reference Range                            Date                

                          Interpretation                            Comments                       

                                        Source                    

 

                    CARDIAC ENZYMES                            Troponin-I          0.03                       

                    0.00 - 0.40                            2018                                 

                                                      [facility]                    

 

                    CARDIAC ENZYMES                            CK MB Index         1.0                       

                    0.0 - 2.5                            2018                                   

                                                      [facility]                    

 

                CARDIAC ENZYMES                            Total CK        262                            

12 - 191                            2018                                       

                                                      [facility]                    

 

                CARDIAC ENZYMES                            CK MB           2.6                            0.5

 - 3.6                            2018                                         

                                                      [facility]                    

 

                    CARDIAC ENZYMES                            Troponin-I          0.02                       

                    0.00 - 0.40                            2018                                 

                                                      [facility]                    

 

                CHEM PANEL                            Magnesium Lvl    1.8                            

1.8 - 2.4                            2018                                      

                                                      [facility]                    

 

                CHEM PANEL                            eGFR            62                                      

                    2018                                                        Result

 Comment: The eGFR is calculated using the CKD-EPI formula. In most young, 
healthy individuals the eGFR will be >90 mL/min/1.73m2. The eGFR declines with 
age. An eGFR of 60-89 may be normal in some populations, particularly the 
elderly, for whom the CKD-EPI formula has not been extensively validated. Use of
 the eGFR is not recommended in the following populations:<br/><br/>Individuals 
with unstable creatinine concentrations, including pregnant patients and those 
with serious co-morbid conditions.<br/><br/>Patients with extremes in muscle 
mass or diet. <br/><br/>The data above are obtained from the [employer] 
D[employer] Education Program (NKDEP) which additionally recommends that when the 
eGFR is used in patients with extremes of body mass index for purposes of drug 
dosing, the eGFR should be multiplied by the estimated BMI.                     
                                        [facility]                    

 

                CHEM PANEL                            CO2             23                            24 - 32    

                          2018                                                   

                                                      [facility]                    

 

                CHEM PANEL                            Calcium Lvl     8.3                            8.5

 - 10.5                            2018                                        

                                                      [facility]                    

 

                CHEM PANEL                            AGAP            11.9                            10.0 - 20.0

                            2018                                               

                                                      [facility]                    

 

                CHEM PANEL                            B/C Ratio       12                            6 - 25

                            2018                                               

                                                      [facility]                    

 

                CHEM PANEL                            Potassium Lvl    3.9                            

3.5 - 5.1                            2018                                      

                                                      [facility]                    

 

                CHEM PANEL                            Chloride Lvl    106                            95

 - 109                            2018                                         

                                                      [facility]                    

 

                CHEM PANEL                            Sodium Lvl      137                            135

 - 145                            2018                                         

                                                      [facility]                    

 

                CHEM PANEL                            Albumin Lvl     3.8                            3.5

 - 5.0                            2018                                         

                                                      [facility]                    

 

                CHEM PANEL                            ALT             38                            0 - 65     

                          2018                                                    

                                                      [facility]                    

 

                CHEM PANEL                            AST             28                            0 - 37     

                          2018                                                    

                                                      [facility]                    

 

                    CHEM PANEL                            Creatinine Lvl      1.30                        

                    0.50 - 1.40                            2018                                  

                                                      [facility]                    

 

                CHEM PANEL                            Glucose Lvl     80                            70 

- 99                            2018                                           

                                                      [facility]                    

 

                CHEM PANEL                            BUN             15                            7 - 22     

                          2018                                                    

                                                      [facility]                    

 

                CHEM PANEL                            A/G Ratio       1.1                            0.7 

- 1.6                            2018                                          

                                                      [facility]                    

 

                CHEM PANEL                            Globulin        3.5                            2.7 -

 4.2                            2018                                           

                                                      [facility]                    

 

                CHEM PANEL                            Bili Total      1.3                            0.2

 - 1.3                            2018                                         

                                                      [facility]                    

 

                CHEM PANEL                            Total Protein    7.3                            

6.4 - 8.4                            2018                                      

                                                      [facility]                    

 

                CHEM PANEL                            Alk Phos        158                            39 - 

136                            2018                                            

                                                      [facility]                    

 

                CHEM PANEL                            Phosphorus      3.5                            2.5

 - 4.5                            2018                                         

                                                      [facility]                    

 

                CHEM PANEL                            Lipase Lvl      87                            73 -

 393                            2018                                           

                                                      [facility]                    

 

                HEMATOLOGY                            PTT             38.8                            22.9 - 35.8

                            2018                                               

                                                      [facility]                    

 

                HEMATOLOGY                            PT              18.4                            12.0 - 14.7

                            2018                                               

                                                      [facility]                            INR             1.52                            0.85 - 1.17

                            2018                                               

                                                      [facility]                            Basophils #     0.0                            0.0

 - 0.2                            2018                                         

                                                      [facility]                            Eosinophils #    0.1                            

0.0 - 0.5                            2018                                      

                                                      [facility]                            Monocytes #     0.7                            0.0

 - 0.8                            2018                                         

                                                      [facility]                            Lymphocytes #    1.2                            

1.0 - 5.5                            2018                                      

                                                      [facility]                            Monocytes       6.8                            2.0 

- 12.0                            2018                                         

                                                      [facility]                            Lymphocytes     11.3                            20.0

 - 40.0                            2018                                        

                                                      [facility]                            Segs            81.0                            45.0 - 75.0

                            2018                                               

                                                      [facility]                            Eosinophils     0.8                            0.0

 - 4.0                            2018                                         

                                                      [facility]                            Basophils       0.1                            0.0 

- 1.0                            2018                                          

                                                      [facility]                            Segs-Bands #    8.3                            1.5

 - 8.1                            2018                                         

                                                      [facility]                            MCV             89.8                            80.0 - 94.0

                            2018                                               

                                                      [facility]                            Hgb             13.7                            14.0 - 18.0

                            2018                                               

                                                      [facility]                            WBC             10.2                            3.7 - 10.4

                            2018                                               

                                                      [facility]                            Hct             41.0                            42.0 - 54.0

                            2018                                               

                                                      [facility]                            RBC             4.56                            4.70 - 6.10

                            2018                                               

                                                      [facility]                            Platelet        210                            133 -

 450                            2018                                           

                                                      [facility]                            MCHC            33.4                            32.0 - 36.0

                            2018                                               

                                                      [facility]                            RDW             18.3                            11.5 - 14.5

                            2018                                               

                                                      [facility]                            MCH             30.0                            27.0 - 31.0

                            2018                                               

                                                      [facility]                    

 

                HEMATOLOGY                            MPV             7.9                            7.4 - 10.4

                            2018                                               

                                                      [facility]                    

 

                CHEM PANEL                            eGFR            57                                      

                    2018                                                        Result

 Comment: The eGFR is calculated using the CKD-EPI formula. In most young, 
healthy individuals the eGFR will be >90 mL/min/1.73m2. The eGFR declines with 
age. An eGFR of 60-89 may be normal in some populations, particularly the 
elderly, for whom the CKD-EPI formula has not been extensively validated. Use of
 the eGFR is not recommended in the following populations:<br/><br/>Individuals 
with unstable creatinine concentrations, including pregnant patients and those 
with serious co-morbid conditions.<br/><br/>Patients with extremes in muscle 
mass or diet. <br/><br/>The data above are obtained from the National Kid[employer] 
Disease Education Program (NKDEP) which additionally recommends that when the 
eGFR is used in patients with extremes of body mass index for purposes of drug 
dosing, the eGFR should be multiplied by the estimated BMI.                     
                                        [facility]                    

 

                CHEM PANEL                            Glucose Lvl     147                            70

 - 99                            2018                                          

                                                      [facility]                    

 

                CHEM PANEL                            Sodium Lvl      139                            135

 - 145                            2018                                         

                                                      [facility]                    

 

                    CHEM PANEL                            Creatinine Lvl      1.40                        

                    0.50 - 1.40                            2018                                  

                                                      [facility]                    

 

                CHEM PANEL                            Chloride Lvl    102                            95

 - 109                            2018                                         

                                                      [facility]                    

 

                CHEM PANEL                            Potassium Lvl    4.2                            

3.5 - 5.1                            2018                                      

                                                      [facility]                    

 

                CHEM PANEL                            BUN             21                            7 - 22     

                          2018                                                    

                                                      [facility]                    

 

                CHEM PANEL                            Calcium Lvl     7.9                            8.5

 - 10.5                            2018                                        

                                                      [facility]                    

 

                CHEM PANEL                            CO2             30                            24 - 32    

                          2018                                                   

                                                      [facility]                    

 

                CHEM PANEL                            AGAP            11.2                            10.0 - 20.0

                            2018                                               

                                                      [facility]                    

 

                    CARDIAC ENZYMES                            CK MB Index         1.2                       

                    0.0 - 2.5                            2018                                   

                                                      [facility]                    

 

                CARDIAC ENZYMES                            CK MB           2.7                            0.5

 - 3.6                            2018                                         

                                                      [facility]                    

 

                    CARDIAC ENZYMES                            Troponin-I          0.02                       

                    0.00 - 0.40                            2018                                 

                                                      [facility]                    

 

                CARDIAC ENZYMES                            Total CK        221                            

12 - 191                            2018                                       

                                                      [facility]                    

 

                CARDIAC ENZYMES                            Total CK        195                            

12 - 191                            2018                                       

                                                      [facility]                    

 

                    CARDIAC ENZYMES                            Troponin-I          0.03                       

                    0.00 - 0.40                            2018                                 

                                                      [facility]                    

 

                    CARDIAC ENZYMES                            CK MB Index         1.2                       

                    0.0 - 2.5                            2018                                   

                                                      [facility]                    

 

                CARDIAC ENZYMES                            CK MB           2.4                            0.5

 - 3.6                            2018                                         

                                                      [facility]                    

 

                    CARDIAC ENZYMES                            Troponin-I          0.03                       

                    0.00 - 0.40                            2018                                 

                                                      [facility]                    

 

                CARDIAC ENZYMES                            CK MB           2.6                            0.5

 - 3.6                            2018                                         

                                                      [facility]                    

 

                CARDIAC ENZYMES                            Total CK        214                            

12 - 191                            2018                                       

                                                      [facility]                    

 

                    CARDIAC ENZYMES                            CK MB Index         1.2                       

                    0.0 - 2.5                            2018                                   

                                                      [facility]                    

 

                CHEM PANEL                            Albumin Lvl     3.6                            3.5

 - 5.0                            2018                                         

                                                      [facility]                    

 

                CHEM PANEL                            ALT             46                            0 - 65     

                          2018                                                    

                                                      [facility]                    

 

                CHEM PANEL                            Total Protein    7.1                            

6.4 - 8.4                            2018                                      

                                                      [facility]                    

 

                CHEM PANEL                            Globulin        3.5                            2.7 -

 4.2                            2018                                           

                                                      [facility]                    

 

                CHEM PANEL                            Bili Total      1.1                            0.2

 - 1.3                            2018                                         

                                                      [facility]                    

 

                CHEM PANEL                            Alk Phos        131                            39 - 

136                            2018                                            

                                                      [facility]                    

 

                CHEM PANEL                            AST             20                            0 - 37     

                          2018                                                    

                                                      [facility]                    

 

                CHEM PANEL                            A/G Ratio       1.0                            0.7 

- 1.6                            2018                                          

                                                      [facility]                    

 

                CHEM PANEL                            Bili Indirect    0.8                            

0.0 - 1.0                            2018                                      

                                                      [facility]                    

 

                CHEM PANEL                            Bili Direct     0.3                            0.0

 - 0.3                            2018                                         

                                                      [facility]                    

 

                CHEM PANEL                            eGFR            53                                      

                    2018                                                        Result

 Comment: The eGFR is calculated using the CKD-EPI formula. In most young, 
healthy individuals the eGFR will be >90 mL/min/1.73m2. The eGFR declines with 
age. An eGFR of 60-89 may be normal in some populations, particularly the 
elderly, for whom the CKD-EPI formula has not been extensively validated. Use of
 the eGFR is not recommended in the following populations:<br/><br/>Individuals 
with unstable creatinine concentrations, including pregnant patients and those 
with serious co-morbid conditions.<br/><br/>Patients with extremes in muscle 
mass or diet. <br/><br/>The data above are obtained from the National Kid[employer] 
Disease Education Program (NKDEP) which additionally recommends that when the 
eGFR is used in patients with extremes of body mass index for purposes of drug 
dosing, the eGFR should be multiplied by the estimated BMI.                     
                                        [facility]                    

 

                CHEM PANEL                            AGAP            13.1                            10.0 - 20.0

                            2018                                               

                                                      [facility]                    

 

                CHEM PANEL                            Glucose Lvl     176                            70

 - 99                            2018                                          

                                                      [facility]                    

 

                CHEM PANEL                            BUN             27                            7 - 22     

                          2018                                                    

                                                      [facility]                    

 

                CHEM PANEL                            CO2             29                            24 - 32    

                          2018                                                   

                                                      [facility]                    

 

                CHEM PANEL                            Potassium Lvl    4.1                            

3.5 - 5.1                            2018                                      

                                                      [facility]                    

 

                    CHEM PANEL                            Creatinine Lvl      1.50                        

                    0.50 - 1.40                            2018                                  

                                                      [facility]                    

 

                CHEM PANEL                            Sodium Lvl      137                            135

 - 145                            2018                                         

                                                      [facility]                    

 

                CHEM PANEL                            Chloride Lvl    99                            95

 - 109                            2018                                         

                                                      [facility]                    

 

                CHEM PANEL                            Calcium Lvl     8.4                            8.5

 - 10.5                            2018                                        

                                                      [facility]                    

 

                HEMATOLOGY                            MPV             8.2                            7.4 - 10.4

                            2018                                               

                                                      [facility]                            RDW             14.9                            11.5 - 14.5

                            2018                                               

                                                      [facility]                            Platelet        203                            133 -

 450                            2018                                           

                                                      [facility]                            WBC             10.1                            3.7 - 10.4

                            2018                                               

                                                      [facility]                            RBC             4.31                            4.70 - 6.10

                            2018                                               

                                                      [facility]                            Hct             41.2                            42.0 - 54.0

                            2018                                               

                                                      [facility]                            MCV             95.6                            80.0 - 94.0

                            2018                                               

                                                      [facility]                            Hgb             13.5                            14.0 - 18.0

                            2018                                               

                                                      [facility]                            MCH             31.4                            27.0 - 31.0

                            2018                                               

                                                      [facility]                            MCHC            32.8                            32.0 - 36.0

                            2018                                               

                                                      [facility]                            Monocytes       5.6                            2.0 

- 12.0                            2018                                         

                                                      [facility]                    

 

                HEMATOLOGY                            Segs            80.4                            45.0 - 75.0

                            2018                                               

                                                      [facility]                            Lymphocytes     12.0                            20.0

 - 40.0                            2018                                        

                                                      [facility]                            Eosinophils     2.0                            0.0

 - 4.0                            2018                                         

                                                      [facility]                            Segs-Bands #    8.1                            1.5

 - 8.1                            2018                                         

                                                      [facility]                    

 

                HEMATOLOGY                            Basophils       0.0                            0.0 

- 1.0                            2018                                          

                                                      [facility]                            Lymphocytes #    1.2                            

1.0 - 5.5                            2018                                      

                                                      [facility]                    

 

                HEMATOLOGY                            Monocytes #     0.6                            0.0

 - 0.8                            2018                                         

                                                      [facility]                    

 

                HEMATOLOGY                            Eosinophils #    0.2                            

0.0 - 0.5                            2018                                      

                                                      [facility]                    

 

                HEMATOLOGY                            Basophils #     0.0                            0.0

 - 0.2                            2018                                         

                                                      [facility]                    

 

                TOXICOLOGY                            Digoxin Lvl     0.5                            0.8

 - 2.0                            2018                                         

                                                      [facility]                    

 

                    CARDIAC ENZYMES                            Troponin-I          0.03                       

                    0.00 - 0.40                            2018                                 

                                                      [facility]                    

 

                    CARDIAC ENZYMES                            Troponin-I          <0.02                      

                    0.00 - 0.40                            2018                                

                                                      [facility]                    

 

                    DRUG SCREEN                            UDS Note            See Note 

*NA*

                    (3/16/18 4:30 AM)                                                        2018 

                                                                                   [facility]

                    

 

                    DRUG SCREEN                            U Amph Scr          Negative 

*NA*

                    (3/16/18 4:30 AM)                            Negative                            2018

                                                                                    [facility]                    

 

                    DRUG SCREEN                            U Phencyc Scr       Negative 

*NA*

                    (3/16/18 4:30 AM)                            Negative                            2018

                                                                                    [facility]                    

 

                    DRUG SCREEN                            U Cocaine Scr       Negative 

*NA*

                    (3/16/18 4:30 AM)                            Negative                            2018

                                                                                    [facility]                    

 

                    DRUG SCREEN                            U Opiate Scr        Negative 

*NA*

                    (3/16/18 4:30 AM)                            Negative                            2018

                                                                                    [facility]                    

 

                    DRUG SCREEN                            U Annie Scr          Negative 

*NA*

                    (3/16/18 4:30 AM)                            Negative                            2018

                                                                                    [facility]                    

 

                    DRUG SCREEN                            U Benzodia Scr      Negative 

*NA*

                    (3/16/18 4:30 AM)                            Negative                            2018

                                                                                    [facility]                    

 

                    DRUG SCREEN                            U Cannab Scr        Negative 

*NA*

                    (3/16/18 4:30 AM)                            Negative                            2018

                                                                                    [facility]                    

 

                    URINE AND STOOL                            UA Color            Yellow                       

                                                2018                                            

                                                      [facility]                    

 

                    URINE AND STOOL                            UA Urobilinogen     2.0                   

                    0.1 - 1.0                            2018                               

                                                      [facility]                    



 

                    URINE AND STOOL                            UA Blood            Negative 

                    (3/16/18 4:30 AM)                            Negative                            2018

                                                                                    [facility]                    

 

                    URINE AND STOOL                            UA Mucus            Few /LPF                     

                    None Seen /LPF                            2018                            

                                                        [facility]                 

   

 

                URINE AND STOOL                            UA RBC          <1                            0 -

 2                            2018                                             

                                                      [facility]                    

 

                    URINE AND STOOL                            UA Bili             Negative 

*NA*

                    (3/16/18 4:30 AM)                            Negative                            2018

                                                                                    [facility]                    

 

                    URINE AND STOOL                            UA Ketones          Negative mg/dL             

                          Negative mg/dL                            2018                  

                                                                            [facility]         

           

 

                    URINE AND STOOL                            UA Protein          30 mg/dL                   

                          Negative mg/dL                            2018                        

                                                                            [facility]               

     

 

                    URINE AND STOOL                            UA Glucose          Negative mg/dL             

                          Negative mg/dL                            2018                  

                                                                            [facility]         

           

 

                URINE AND STOOL                            UA pH           6.0                            5.0

 - 8.0                            2018                                         

                                                      [facility]                    

 

                    URINE AND STOOL                            UA Spec Grav        1.014                    

                    <=1.030                            2018                                  

                                                      [facility]                    

 

                    URINE AND STOOL                            UA Turbidity        Clear 

                    (3/16/18 4:30 AM)                            Clear                            2018

                                                                                    [facility]                    

 

                    URINE AND STOOL                            UA Leuk Est         Negative 

                    (3/16/18 4:30 AM)                            Negative                            2018

                                                                                    [facility]                    

 

                URINE AND STOOL                            UA WBC          <1                            0 -

 5                            2018                                             

                                                      [facility]                    

 

                    URINE AND STOOL                            UA Nitrite          Negative 

                    (3/16/18 4:30 AM)                            Negative                            2018

                                                                                    [facility]                    

 

                    URINE AND STOOL                            UA Sq Epi           Occasional /LPF             

                          Few /LPF                            2018                        

                                                                            [facility]               

     

 

                    CARDIAC ENZYMES                            CK MB Index         0.6                       

                    0.0 - 2.5                            2018                                   

                                                      [facility]                    

 

                CARDIAC ENZYMES                            BNP             726                            <=100

 pg/mL                            2018                                         

                                                      [facility]                    

 

                    CARDIAC ENZYMES                            Troponin-I          0.03                       

                    0.00 - 0.40                            2018                                 

                                                      [facility]                    

 

                CARDIAC ENZYMES                            CK MB           1.8                            0.5

 - 3.6                            2018                                         

                                                      [facility]                    

 

                CARDIAC ENZYMES                            Total CK        327                            

12 - 191                            2018                                       

                                                      [facility]                    

 

                CHEM PANEL                            A/G Ratio       1.1                            0.7 

- 1.6                            2018                                          

                                                      [facility]                    

 

                CHEM PANEL                            Bili Indirect    0.9                            

0.0 - 1.0                            2018                                      

                                                      [facility]                    

 

                CHEM PANEL                            Bili Direct     0.1                            0.0

 - 0.3                            2018                                         

                                                      [facility]                    

 

                CHEM PANEL                            Globulin        3.5                            2.7 -

 4.2                            2018                                           

                                                      [facility]                    

 

                CHEM PANEL                            Bili Total      1.0                            0.2

 - 1.3                            2018                                         

                                                      [facility]                    

 

                CHEM PANEL                            Alk Phos        109                            39 - 

136                            2018                                            

                                                      [facility]                    

 

                CHEM PANEL                            Albumin Lvl     3.8                            3.5

 - 5.0                            2018                                         

                                                      [facility]                    

 

                CHEM PANEL                            Total Protein    7.3                            

6.4 - 8.4                            2018                                      

                                                      [facility]                    

 

                CHEM PANEL                            AST             46                            0 - 37     

                          2018                                                    

                                                      [facility]                    

 

                CHEM PANEL                            ALT             18                            0 - 65     

                          2018                                                    

                                                      [facility]                    

 

                CHEM PANEL                            Lipase Lvl      105                            73 

- 393                            2018                                          

                                                      [facility]                    

 

                CHEM PANEL                            eGFR            53                                      

                    2018                                                        Result

 Comment: The eGFR is calculated using the CKD-EPI formula. In most young, 
healthy individuals the eGFR will be >90 mL/min/1.73m2. The eGFR declines with 
age. An eGFR of 60-89 may be normal in some populations, particularly the 
elderly, for whom the CKD-EPI formula has not been extensively validated. Use of
 the eGFR is not recommended in the following populations:<br/><br/>Individuals 
with unstable creatinine concentrations, including pregnant patients and those 
with serious co-morbid conditions.<br/><br/>Patients with extremes in muscle 
mass or diet. <br/><br/>The data above are obtained from the [employer] 
D[employer] Education Program (NKDEP) which additionally recommends that when the 
eGFR is used in patients with extremes of body mass index for purposes of drug 
dosing, the eGFR should be multiplied by the estimated BMI.                     
                                        [facility]                    

 

                CHEM PANEL                            Calcium Lvl     8.4                            8.5

 - 10.5                            2018                                        

                                                      [facility]                    

 

                CHEM PANEL                            CO2             29                            24 - 32    

                          2018                                                   

                                                      [facility]                    

 

                CHEM PANEL                            AGAP            10.3                            10.0 - 20.0

                            2018                                               

                                                      [facility]                    

 

                CHEM PANEL                            Potassium Lvl    5.3                            

3.5 - 5.1                            2018                                      

                                                      [facility]                    

 

                CHEM PANEL                            Sodium Lvl      137                            135

 - 145                            2018                                         

                                                      [facility]                    

 

                CHEM PANEL                            Chloride Lvl    103                            95

 - 109                            2018                                         

                                                      [facility]                    

 

                CHEM PANEL                            BUN             21                            7 - 22     

                          2018                                                    

                                                      [facility]                    

 

                    CHEM PANEL                            Creatinine Lvl      1.50                        

                    0.50 - 1.40                            2018                                  

                                                      [facility]                    

 

                CHEM PANEL                            Glucose Lvl     140                            70

 - 99                            2018                                          

                                                      [facility]                    

 

                HEMATOLOGY                            Basophils       0.2                            0.0 

- 1.0                            2018                                          

                                                      [facility]                    

 

                HEMATOLOGY                            Eosinophils     1.8                            0.0

 - 4.0                            2018                                         

                                                      [facility]                    

 

                HEMATOLOGY                            Lymphocytes #    1.0                            

1.0 - 5.5                            2018                                      

                                                      [facility]                    

 

                HEMATOLOGY                            Segs-Bands #    6.8                            1.5

 - 8.1                            2018                                         

                                                      [facility]                    

 

                HEMATOLOGY                            Monocytes #     0.6                            0.0

 - 0.8                            2018                                         

                                                      [facility]                    

 

                HEMATOLOGY                            Lymphocytes     11.3                            20.0

 - 40.0                            2018                                        

                                                      [facility]                            Segs            79.2                            45.0 - 75.0

                            2018                                               

                                                      [facility]                            Monocytes       7.5                            2.0 

- 12.0                            2018                                         

                                                      [facility]                            Basophils #     0.0                            0.0

 - 0.2                            2018                                         

                                                      [facility]                            Eosinophils #    0.2                            

0.0 - 0.5                            2018                                      

                                                      [facility]                            PTT             48.6                            22.9 - 35.8

                            2018                                               

                                                      [facility]                            RDW             14.1                            11.5 - 14.5

                            2018                                               

                                                      [facility]                            MCH             31.7                            27.0 - 31.0

                            2018                                               

                                                      [facility]                            Platelet        229                            133 -

 450                            2018                                           

                                                      [facility]                            MPV             8.4                            7.4 - 10.4

                            2018                                               

                                                      [facility]                            MCHC            33.1                            32.0 - 36.0

                            2018                                               

                                                      [facility]                            MCV             95.7                            80.0 - 94.0

                            2018                                               

                                                      [facility]                            Hgb             13.6                            14.0 - 18.0

                            2018                                               

                                                      [facility]                            Hct             41.1                            42.0 - 54.0

                            2018                                               

                                                      [facility]                            WBC             8.5                            3.7 - 10.4

                            2018                                               

                                                      [facility]                            RBC             4.29                            4.70 - 6.10

                            2018                                               

                                                      [facility]                            PT              32.2                            12.0 - 14.7

                            2018                                               

                                                      [facility]                            INR             3.08                            0.85 - 1.17

                            2018                                               

                                                      [facility]                    

 

                    CARDIAC ENZYMES                            Troponin-I          0.04                       

                    0.00 - 0.40                            2017                                 

                                                      [facility]                    

 

                CARDIAC ENZYMES                            Total CK        177                            

12 - 191                            2017                                       

                                                      [facility]                    

 

                CARDIAC ENZYMES                            CK MB           0.9                            0.5

 - 3.6                            2017                                         

                                                      [facility]                    

 

                    CARDIAC ENZYMES                            CK MB Index         0.5                       

                    0.0 - 2.5                            2017                                   

                                                      [facility]                    

 

                CHEM PANEL                            eGFR            42                                      

                    2017                                                        Result

 Comment: The eGFR is calculated using the CKD-EPI formula. In most young, 
healthy individuals the eGFR will be >90 mL/min/1.73m2. The eGFR declines with 
age. An eGFR of 60-89 may be normal in some populations, particularly the 
elderly, for whom the CKD-EPI formula has not been extensively validated. Use of
 the eGFR is not recommended in the following populations:<br/><br/>Individuals 
with unstable creatinine concentrations, including pregnant patients and those 
with serious co-morbid conditions.<br/><br/>Patients with extremes in muscle 
mass or diet. <br/><br/>The data above are obtained from the National Kid[employer] 
Disease Education Program (NKDEP) which additionally recommends that when the 
eGFR is used in patients with extremes of body mass index for purposes of drug 
dosing, the eGFR should be multiplied by the estimated BMI.                     
                                        [facility]                    

 

                CHEM PANEL                            Potassium Lvl    3.0                            

3.5 - 5.1                            2017                                      

                                        Result Comment: Critical Result(s) called to gloria knight at 2017

 07:31 by fabi.  Read back OK.                            [facility]               

     

 

                CHEM PANEL                            Sodium Lvl      139                            135

 - 145                            2017                                         

                                                      [facility]                    

 

                CHEM PANEL                            CO2             32                            24 - 32    

                          2017                                                   

                                                      [facility]                    

 

                CHEM PANEL                            Chloride Lvl    99                            95

 - 109                            2017                                         

                                                      [facility]                    

 

                CHEM PANEL                            Calcium Lvl     9.2                            8.5

 - 10.5                            2017                                        

                                                      [facility]                    

 

                CHEM PANEL                            AGAP            11.0                            10.0 - 20.0

                            2017                                               

                                                      [facility]                    

 

                    CHEM PANEL                            Creatinine Lvl      1.80                        

                    0.50 - 1.40                            2017                                  

                                                      [facility]                    

 

                CHEM PANEL                            BUN             36                            7 - 22     

                          2017                                                    

                                                      [facility]                    

 

                CHEM PANEL                            Glucose Lvl     66                            70 

- 99                            2017                                           

                                                      [facility]                    

 

                CARDIAC ENZYMES                            CK MB           0.9                            0.5

 - 3.6                            2017                                         

                                                      [facility]                    

 

                    CARDIAC ENZYMES                            CK MB Index         0.6                       

                    0.0 - 2.5                            2017                                   

                                                      [facility]                    

 

                CARDIAC ENZYMES                            Total CK        163                            

12 - 191                            2017                                       

                                                      [facility]                    

 

                    CARDIAC ENZYMES                            Troponin-I          0.04                       

                    0.00 - 0.40                            2017                                 

                                                      [facility]                    

 

                TOXICOLOGY                            Digoxin Lvl     0.8                            0.8

 - 2.0                            2017                                         

                                                      [facility]                    

 

                    CARDIAC ENZYMES                            CK MB Index         0.8                       

                    0.0 - 2.5                            2017                                   

                                                      [facility]                    

 

                CARDIAC ENZYMES                            BNP             144                            <=100

 pg/mL                            2017                                         

                                                      [facility]                    

 

                    CARDIAC ENZYMES                            Troponin-I          0.03                       

                    0.00 - 0.40                            2017                                 

                                                      [facility]                    

 

                CARDIAC ENZYMES                            Total CK        197                            

12 - 191                            2017                                       

                                                      [facility]                    

 

                CARDIAC ENZYMES                            CK MB           1.5                            0.5

 - 3.6                            2017                                         

                                                      [facility]                    

 

                CHEM PANEL                            eGFR            37                                      

                    2017                                                        Result

 Comment: The eGFR is calculated using the CKD-EPI formula. In most young, 
healthy individuals the eGFR will be >90 mL/min/1.73m2. The eGFR declines with 
age. An eGFR of 60-89 may be normal in some populations, particularly the 
elderly, for whom the CKD-EPI formula has not been extensively validated. Use of
 the eGFR is not recommended in the following populations:<br/><br/>Individuals 
with unstable creatinine concentrations, including pregnant patients and those 
with serious co-morbid conditions.<br/><br/>Patients with extremes in muscle 
mass or diet. <br/><br/>The data above are obtained from the National Kidney 
Disease Education Program (NKDEP) which additionally recommends that when the 
eGFR is used in patients with extremes of body mass index for purposes of drug 
dosing, the eGFR should be multiplied by the estimated BMI.                     
                                        [facility]                    

 

                CHEM PANEL                            Potassium Lvl    2.9                            

3.5 - 5.1                            2017                                      

                                        Result Comment: Critical Result(s) called to Mirian Lee at 2017

 22:20 by ln.  Read back OK.                            [facility]               

     

 

                CHEM PANEL                            Glucose Lvl     175                            70

 - 99                            2017                                          

                                                      [facility]                    

 

                CHEM PANEL                            BUN             35                            7 - 22     

                          2017                                                    

                                                      [facility]                    

 

                CHEM PANEL                            Sodium Lvl      137                            135

 - 145                            2017                                         

                                                      [facility]                    

 

                    CHEM PANEL                            Creatinine Lvl      2.00                        

                    0.50 - 1.40                            2017                                  

                                                      [facility]                    

 

                CHEM PANEL                            CO2             32                            24 - 32    

                          2017                                                   

                                                      [facility]                    

 

                CHEM PANEL                            Chloride Lvl    97                            95

 - 109                            2017                                         

                                                      [facility]                    

 

                CHEM PANEL                            AGAP            10.9                            10.0 - 20.0

                            2017                                               

                                                      [facility]                    

 

                CHEM PANEL                            AST             18                            0 - 37     

                          2017                                                    

                                                      [facility]                    

 

                CHEM PANEL                            Alk Phos        160                            39 - 

136                            2017                                            

                                                      [facility]                    

 

                CHEM PANEL                            Bili Total      0.5                            0.2

 - 1.3                            2017                                         

                                                      [facility]                    

 

                CHEM PANEL                            B/C Ratio       18                            6 - 25

                            2017                                               

                                                      [facility]                    

 

                CHEM PANEL                            Albumin Lvl     4.0                            3.5

 - 5.0                            2017                                         

                                                      [facility]                    

 

                CHEM PANEL                            Calcium Lvl     8.7                            8.5

 - 10.5                            2017                                        

                                                      [facility]                    

 

                CHEM PANEL                            Total Protein    7.6                            

6.4 - 8.4                            2017                                      

                                                      [facility]                    

 

                CHEM PANEL                            ALT             24                            0 - 65     

                          2017                                                    

                                                      [facility]                    

 

                CHEM PANEL                            Globulin        3.6                            2.7 -

 4.2                            2017                                           

                                                      [facility]                    

 

                CHEM PANEL                            A/G Ratio       1.1                            0.7 

- 1.6                            2017                                          

                                                      [facility]                    

 

                HEMATOLOGY                            Eosinophils     1.6                            0.0

 - 4.0                            2017                                         

                                                      [facility]                            Monocytes       8.4                            2.0 

- 12.0                            2017                                         

                                                      [facility]                            Eosinophils #    0.2                            

0.0 - 0.5                            2017                                      

                                                      [facility]                            Monocytes #     0.8                            0.0

 - 0.8                            2017                                         

                                                      [facility]                            Lymphocytes     13.0                            20.0

 - 40.0                            2017                                        

                                                      [facility]                            Segs            76.9                            45.0 - 75.0

                            2017                                               

                                                      [facility]                            Segs-Bands #    7.1                            1.5

 - 8.1                            2017                                         

                                                      [facility]                            Lymphocytes #    1.2                            

1.0 - 5.5                            2017                                      

                                                      [facility]                            Basophils       0.1                            0.0 

- 1.0                            2017                                          

                                                      [facility]                            Basophils #     0.0                            0.0

 - 0.2                            2017                                         

                                                      [facility]                            INR             2.12                            0.85 - 1.17

                            2017                                               

                                                      [facility]                            PT              24.0                            12.0 - 14.7

                            2017                                               

                                                      [facility]                            PTT             40.9                            22.9 - 35.8

                            2017                                               

                                                      [facility]                            Hct             45.4                            42.0 - 54.0

                            2017                                               

                                                      [facility]                            WBC             9.2                            3.7 - 10.4

                            2017                                               

                                                      [facility]                            MCHC            33.9                            32.0 - 36.0

                            2017                                               

                                                      [facility]                            Platelet        195                            133 -

 450                            2017                                           

                                                      [facility]                            MPV             8.1                            7.4 - 10.4

                            2017                                               

                                                      [facility]                            RDW             16.6                            11.5 - 14.5

                            2017                                               

                                                      [facility]                            MCH             29.0                            27.0 - 31.0

                            2017                                               

                                                      [facility]                            RBC             5.30                            4.70 - 6.10

                            2017                                               

                                                      [facility]                            Hgb             15.4                            14.0 - 18.0

                            2017                                               

                                                      [facility]                            MCV             85.7                            80.0 - 94.0

                            2017                                               

                                                      [facility]                            INR             1.37                            0.85 - 1.17

                            2017                                               

                                                      [facility]                    

 

                HEMATOLOGY                            PT              16.9                            12.0 - 14.7

                            2017                                               

                                                      [facility]                    

 

                HEMATOLOGY                            PTT             33.7                            22.9 - 35.8

                            2017                                               

                                                      [facility]                    

 

                    DRUG SCREEN                            U Amph Scr          Negative 

*NA*

                    (17 2:03 AM)                            Negative                            2017

                                                                                    [facility]                    

 

                    DRUG SCREEN                            U Annie Scr          Negative 

*NA*

                    (17 2:03 AM)                            Negative                            2017

                                                                                    [facility]                    

 

                    DRUG SCREEN                            U Cannab Scr        Negative 

*NA*

                    (17 2:03 AM)                            Negative                            2017

                                                                                    [facility]                    

 

                    DRUG SCREEN                            U Opiate Scr        Positive 

*ABN*

                    (17 2:03 AM)                            Negative                            2017

                                                                                    [facility]                    

 

                    DRUG SCREEN                            UDS Note            See Note 

                    (17 2:03 AM)                                                        2017

                                                                                    [facility]                    

 

                    DRUG SCREEN                            U Phencyc Scr       Negative 

*NA*

                    (17 2:03 AM)                            Negative                            2017

                                                                                    [facility]                    

 

                    DRUG SCREEN                            U Cocaine Scr       Negative 

*NA*

                    (17 2:03 AM)                            Negative                            2017

                                                                                    [facility]                    

 

                    DRUG SCREEN                            U Benzodia Scr      Negative 

*NA*

                    (17 2:03 AM)                            Negative                            2017

                                                                                    [facility]                    

 

                HEMATOLOGY                            INR             2.41                            0.85 - 1.17

                            2017                                               

                                                      [facility]                    

 

                HEMATOLOGY                            PTT             44.3                            22.9 - 35.8

                            2017                                               

                                                      [facility]                    

 

                HEMATOLOGY                            PT              26.5                            12.0 - 14.7

                            2017                                               

                                                      [facility]                    

 

                    CARDIAC ENZYMES                            Troponin-I          <0.02                      

                    0.00 - 0.40                            2017                                

                                                      [facility]                 

   

 

                CARDIAC ENZYMES                            Total CK        152                            

12 - 191                            2017                                       

                                                      [facility]                    

 

                CARDIAC ENZYMES                            CK MB           1.1                            0.5

 - 3.6                            2017                                         

                                                      [facility]                    

 

                    CARDIAC ENZYMES                            CK MB Index         0.7                       

                    0.0 - 2.5                            2017                                   

                                                      [facility]                    



 

                LIPIDS                            VLDL            31                                          

                    2017                                                               

                                        [facility]                    

 

                LIPIDS                            LDL (Calculated)    92                            <=99

 mg/dL                            2017                                         

                                                      [facility]                    

 

                LIPIDS                            CHD Risk        4.62                            4.00 - 7.30

                            2017                                               

                                                      [facility]                    

 

                LIPIDS                            HDL             34                            >=61 mg/dL     

                          2017                                                    

                                                      [facility]                    

 

                LIPIDS                            Chol            157                            <=199 mg/dL  

                          2017                                                 

                                                      [facility]                    

 

                LIPIDS                            Trig            154                            <=149 mg/dL  

                          2017                                                 

                                                      [facility]                    

 

                    CARDIAC ENZYMES                            CK MB Index         0.7                       

                    0.0 - 2.5                            2017                                   

                                                      [facility]                    



 

                CARDIAC ENZYMES                            CK MB           1.1                            0.5

 - 3.6                            2017                                         

                                                      [facility]                    

 

                    CARDIAC ENZYMES                            Troponin-I          <0.02                      

                    0.00 - 0.40                            2017                                

                                                      [facility]                 

   

 

                CARDIAC ENZYMES                            Total CK        168                            

12 - 191                            2017                                       

                                                      [facility]                    

 

                CARDIAC ENZYMES                            BNP             205                            <=100

 pg/mL                            2017                                         

                                                      [facility]                    

 

                CARDIAC ENZYMES                            CK MB           0.9                            0.5

 - 3.6                            2017                                         

                                                      [facility]                    

 

                CARDIAC ENZYMES                            Total CK        224                            

12 - 191                            2017                                       

                                                      [facility]                    

 

                    CARDIAC ENZYMES                            Troponin-I          <0.02                      

                    0.00 - 0.40                            2017                                

                                                      [facility]                 

   

 

                    CARDIAC ENZYMES                            CK MB Index         0.4                       

                    0.0 - 2.5                            2017                                   

                                                      [facility]                    



 

                CHEM PANEL                            eGFR            43                                      

                    2017                                                        Result

 Comment: The eGFR is calculated using the CKD-EPI formula. In most young, 
healthy individuals the eGFR will be >90 mL/min/1.73m2. The eGFR declines with 
age. An eGFR of 60-89 may be normal in some populations, particularly the 
elderly, for whom the CKD-EPI formula has not been extensively validated. Use of
 the eGFR is not recommended in the following populations:<br/><br/>Individuals 
with unstable creatinine concentrations, including pregnant patients and those 
with serious co-morbid conditions.<br/><br/>Patients with extremes in muscle 
mass or diet. <br/><br/>The data above are obtained from the National Kid[employer] 
Disease Education Program (NKDEP) which additionally recommends that when the 
eGFR is used in patients with extremes of body mass index for purposes of drug 
dosing, the eGFR should be multiplied by the estimated BMI.                     
                                        [facility]                    

 

                CHEM PANEL                            A/G Ratio       1.0                            0.7 

- 1.6                            2017                                          

                                                      [facility]                    

 

                CHEM PANEL                            B/C Ratio       16                            6 - 25

                            2017                                               

                                                      [facility]                    

 

                CHEM PANEL                            Globulin        3.8                            2.7 -

 4.2                            2017                                           

                                                      [facility]                    

 

                CHEM PANEL                            Bili Total      0.6                            0.2

 - 1.3                            2017                                         

                                                      [facility]                    

 

                CHEM PANEL                            ALT             26                            0 - 65     

                          2017                                                    

                                                      [facility]                    

 

                CHEM PANEL                            AST             24                            0 - 37     

                          2017                                                    

                                                      [facility]                    

 

                CHEM PANEL                            AGAP            11.3                            10.0 - 20.0

                            2017                                               

                                                      [facility]                    

 

                CHEM PANEL                            Alk Phos        107                            39 - 

136                            2017                                            

                                                      [facility]                    

 

                CHEM PANEL                            Total Protein    7.5                            

6.4 - 8.4                            2017                                      

                                                      [facility]                    

 

                CHEM PANEL                            Albumin Lvl     3.7                            3.5

 - 5.0                            2017                                         

                                                      [facility]                    

 

                CHEM PANEL                            CO2             32                            24 - 32    

                          2017                                                   

                                                      [facility]                    

 

                CHEM PANEL                            BUN             29                            7 - 22     

                          2017                                                    

                                                      [facility]                    

 

                    CHEM PANEL                            Creatinine Lvl      1.79                        

                    0.50 - 1.40                            2017                                  

                                                      [facility]                   

 

 

                CHEM PANEL                            Calcium Lvl     8.8                            8.5

 - 10.5                            2017                                        

                                                      [facility]                    

 

                CHEM PANEL                            Chloride Lvl    99                            95

 - 109                            2017                                         

                                                      [facility]                    

 

                CHEM PANEL                            Sodium Lvl      138                            135

 - 145                            2017                                         

                                                      [facility]                    

 

                CHEM PANEL                            Potassium Lvl    4.3                            

3.5 - 5.1                            2017                                      

                                                      [facility]                    

 

                CHEM PANEL                            Glucose Lvl     76                            70 

- 99                            2017                                           

                                                      [facility]                    

 

                HEMATOLOGY                            PTT             49.6                            22.9 - 35.8

                            2017                                               

                                                      [facility]                    

 

                HEMATOLOGY                            D-Dimer         <0.27                                

                          2017                                                   

                                                      [facility]                    

 

                HEMATOLOGY                            INR             3.17                            0.85 - 1.17

                            2017                                               

                                                      [facility]                    

 

                HEMATOLOGY                            PT              33.0                            12.0 - 14.7

                            2017                                               

                                                      [facility]                    

 

                HEMATOLOGY                            RDW             16.1                            11.5 - 14.5

                            2017                                               

                                                      [facility]                    

 

                HEMATOLOGY                            MCHC            34.2                            32.0 - 36.0

                            2017                                               

                                                      [facility]                    

 

                HEMATOLOGY                            MCH             28.4                            27.0 - 31.0

                            2017                                               

                                                      [facility]                    

 

                HEMATOLOGY                            MCV             83.3                            80.0 - 94.0

                            2017                                               

                                                      [facility]                    

 

                HEMATOLOGY                            MPV             8.3                            7.4 - 10.4

                            2017                                               

                                                      [facility]                    

 

                HEMATOLOGY                            Platelet        178                            133 -

 450                            2017                                           

                                                      [facility]                    

 

                HEMATOLOGY                            Hgb             15.8                            14.0 - 18.0

                            2017                                               

                                                      [facility]                    

 

                HEMATOLOGY                            RBC             5.55                            4.70 - 6.10

                            2017                                               

                                                      [facility]                    

 

                HEMATOLOGY                            WBC             13.9                            3.7 - 10.4

                            2017                                               

                                                      [facility]                    

 

                HEMATOLOGY                            Hct             46.2                            42.0 - 54.0

                            2017                                               

                                                      [facility]                    

 

                HEMATOLOGY                            Monocytes #     1.1                            0.0

 - 0.8                            2017                                         

                                                      [facility]                    

 

                HEMATOLOGY                            Lymphocytes #    1.3                            

1.0 - 5.5                            2017                                      

                                                      [facility]                    

 

                HEMATOLOGY                            Eosinophils #    0.2                            

0.0 - 0.5                            2017                                      

                                                      [facility]                    

 

                HEMATOLOGY                            Segs            80.6                            45.0 - 75.0

                            2017                                               

                                                      [facility]                    

 

                HEMATOLOGY                            Lymphocytes     9.6                            20.0

 - 40.0                            2017                                        

                                                      [facility]                    

 

                HEMATOLOGY                            Monocytes       7.9                            2.0 

- 12.0                            2017                                         

                                                      [facility]                    

 

                HEMATOLOGY                            Basophils       0.3                            0.0 

- 1.0                            2017                                          

                                                      [facility]                    

 

                HEMATOLOGY                            Eosinophils     1.6                            0.0

 - 4.0                            2017                                         

                                                      [facility]                    

 

                HEMATOLOGY                            Segs-Bands #    11.2                            

1.5 - 8.1                            2017                                      

                                                      [facility]                    

 

                TOXICOLOGY                            Digoxin Lvl     0.9                            0.8

 - 2.0                            2017                                         

                                                      [facility]                    

 

                CARDIAC ENZYMES                            CK MB           1.2                            0.5

 - 3.6                            10/28/2017                                         

                                                      [facility]                    

 

                    CARDIAC ENZYMES                            CK MB Index         1.0                       

                    0.0 - 2.5                            10/28/2017                                   

                                                      [facility]                    

 

                    CARDIAC ENZYMES                            Troponin-I          0.02                       

                    0.00 - 0.40                            10/28/2017                                 

                                                      [facility]                    

 

                CARDIAC ENZYMES                            Total CK        123                            

12 - 191                            10/28/2017                                       

                                                      [facility]                    

 

                CHEM PANEL                            eGFR            37                                      

                    10/28/2017                                                        Result

 Comment: The eGFR is calculated using the CKD-EPI formula. In most young, 
healthy individuals the eGFR will be >90 mL/min/1.73m2. The eGFR declines with 
age. An eGFR of 60-89 may be normal in some populations, particularly the 
elderly, for whom the CKD-EPI formula has not been extensively validated. Use of
 the eGFR is not recommended in the following populations:<br/><br/>Individuals 
with unstable creatinine concentrations, including pregnant patients and those 
with serious co-morbid conditions.<br/><br/>Patients with extremes in muscle 
mass or diet. <br/><br/>The data above are obtained from the [employer] Kid[employer] 
Disease Education Program (NKDEP) which additionally recommends that when the 
eGFR is used in patients with extremes of body mass index for purposes of drug 
dosing, the eGFR should be multiplied by the estimated BMI.                     
                                        [facility]                    

 

                CHEM PANEL                            Glucose Lvl     159                            70

 - 99                            10/28/2017                                          

                                                      [facility]                    

 

                    CHEM PANEL                            Creatinine Lvl      2.00                        

                    0.50 - 1.40                            10/28/2017                                  

                                                      [facility]                    

 

                CHEM PANEL                            BUN             38                            7 - 22     

                          10/28/2017                                                    

                                                      [facility]                    

 

                CHEM PANEL                            Chloride Lvl    92                            95

 - 109                            10/28/2017                                         

                                                      [facility]                    

 

                CHEM PANEL                            Potassium Lvl    3.1                            

3.5 - 5.1                            10/28/2017                                      

                                                      [facility]                    

 

                CHEM PANEL                            Sodium Lvl      134                            135

 - 145                            10/28/2017                                         

                                                      [facility]                    

 

                CHEM PANEL                            AGAP            14.1                            10.0 - 20.0

                            10/28/2017                                               

                                                      [facility]                    

 

                CHEM PANEL                            CO2             31                            24 - 32    

                          10/28/2017                                                   

                                                      [facility]                    

 

                CHEM PANEL                            Calcium Lvl     9.8                            8.5

 - 10.5                            10/28/2017                                        

                                                      [facility]                    

 

                HEMATOLOGY                            Hgb             16.8                            14.0 - 18.0

                            10/28/2017                                               

                                                      [facility]                            WBC             10.2                            3.7 - 10.4

                            10/28/2017                                               

                                                      [facility]                            RBC             5.88                            4.70 - 6.10

                            10/28/2017                                               

                                                      [facility]                            MPV             8.4                            7.4 - 10.4

                            10/28/2017                                               

                                                      [facility]                            Platelet        187                            133 -

 450                            10/28/2017                                           

                                                      [facility]                    

 

                HEMATOLOGY                            RDW             16.4                            11.5 - 14.5

                            10/28/2017                                               

                                                      [facility]                    

 

                HEMATOLOGY                            MCV             84.6                            80.0 - 94.0

                            10/28/2017                                               

                                                      [facility]                            Hct             49.8                            42.0 - 54.0

                            10/28/2017                                               

                                                      [facility]                            MCH             28.6                            27.0 - 31.0

                            10/28/2017                                               

                                                      [facility]                            MCHC            33.8                            32.0 - 36.0

                            10/28/2017                                               

                                                      [facility]                    

 

                HEMATOLOGY                            Eosinophils     1.6                            0.0

 - 4.0                            10/28/2017                                         

                                                      [facility]                    

 

                HEMATOLOGY                            Basophils       0.4                            0.0 

- 1.0                            10/28/2017                                          

                                                      [facility]                            Lymphocytes #    1.9                            

1.0 - 5.5                            10/28/2017                                      

                                                      [facility]                    

 

                HEMATOLOGY                            Segs-Bands #    6.7                            1.5

 - 8.1                            10/28/2017                                         

                                                      [facility]                    

 

                HEMATOLOGY                            Eosinophils #    0.2                            

0.0 - 0.5                            10/28/2017                                      

                                                      [facility]                    

 

                HEMATOLOGY                            Monocytes #     1.3                            0.0

 - 0.8                            10/28/2017                                         

                                                      [facility]                    

 

                HEMATOLOGY                            Monocytes       13.2                            2.0

 - 12.0                            10/28/2017                                        

                                                      [facility]                    

 

                HEMATOLOGY                            Lymphocytes     18.5                            20.0

 - 40.0                            10/28/2017                                        

                                                      [facility]                    

 

                HEMATOLOGY                            Segs            66.3                            45.0 - 75.0

                            10/28/2017                                               

                                                      [facility]                    

 

                    DRUG SCREEN                            U Phencyc Scr       Negative 

*NA*

                    (10/27/17 9:45 PM)                            Negative                            10/28/2017

                                                                                    [facility]                    

 

                    DRUG SCREEN                            UDS Note            See Note 

                    (10/27/17 9:45 PM)                                                        10/28/2017

                                                                                    [facility]                    

 

                    DRUG SCREEN                            U Cocaine Scr       Negative 

*NA*

                    (10/27/17 9:45 PM)                            Negative                            10/28/2017

                                                                                    [facility]                    

 

                    DRUG SCREEN                            U Cannab Scr        Negative 

*NA*

                    (10/27/17 9:45 PM)                            Negative                            10/28/2017

                                                                                    [facility]                    

 

                    DRUG SCREEN                            U Opiate Scr        Negative 

*NA*

                    (10/27/17 9:45 PM)                            Negative                            10/28/2017

                                                                                    [facility]                    

 

                    DRUG SCREEN                            U Amph Scr          Negative 

*NA*

                    (10/27/17 9:45 PM)                            Negative                            10/28/2017

                                                                                    [facility]                    

 

                    DRUG SCREEN                            U Benzodia Scr      Negative 

*NA*

                    (10/27/17 9:45 PM)                            Negative                            10/28/2017

                                                                                    [facility]                    

 

                    DRUG SCREEN                            U Annie Scr          Negative 

*NA*

                    (10/27/17 9:45 PM)                            Negative                            10/28/2017

                                                                                    [facility]                    

 

                    CARDIAC ENZYMES                            CK MB Index         0.9                       

                    0.0 - 2.5                            10/28/2017                                   

                                                      [facility]                    

 

                CARDIAC ENZYMES                            BNP             200                            <=100

 pg/mL                            10/28/2017                                         

                                                      [facility]                    

 

                CARDIAC ENZYMES                            CK MB           1.2                            0.5

 - 3.6                            10/28/2017                                         

                                                      [facility]                    

 

                    CARDIAC ENZYMES                            Troponin-I          0.02                       

                    0.00 - 0.40                            10/28/2017                                 

                                                      [facility]                    

 

                CARDIAC ENZYMES                            Total CK        139                            

12 - 191                            10/28/2017                                       

                                                      [facility]                    

 

                CHEM PANEL                            eGFR            35                                      

                    10/28/2017                                                        Result

 Comment: The eGFR is calculated using the CKD-EPI formula. In most young, 
healthy individuals the eGFR will be >90 mL/min/1.73m2. The eGFR declines with 
age. An eGFR of 60-89 may be normal in some populations, particularly the 
elderly, for whom the CKD-EPI formula has not been extensively validated. Use of
 the eGFR is not recommended in the following populations:<br/><br/>Individuals 
with unstable creatinine concentrations, including pregnant patients and those 
with serious co-morbid conditions.<br/><br/>Patients with extremes in muscle 
mass or diet. <br/><br/>The data above are obtained from the [employer] 
[employer] ([employer]) which additionally recommends that when the 
eGFR is used in patients with extremes of body mass index for purposes of drug 
dosing, the eGFR should be multiplied by the estimated BMI.                     
                                        [facility]                    

 

                CHEM PANEL                            Chloride Lvl    94                            95

 - 109                            10/28/2017                                         

                                                      [facility]                    

 

                CHEM PANEL                            ALT             34                            0 - 65     

                          10/28/2017                                                    

                                                      [facility]                    

 

                CHEM PANEL                            A/G Ratio       1.0                            0.7 

- 1.6                            10/28/2017                                          

                                                      [facility]                    

 

                CHEM PANEL                            B/C Ratio       17                            6 - 25

                            10/28/2017                                               

                                                      [facility]                    

 

                CHEM PANEL                            CO2             34                            24 - 32    

                          10/28/2017                                                   

                                                      [facility]                    

 

                CHEM PANEL                            Total Protein    8.5                            

6.4 - 8.4                            10/28/2017                                      

                                                      [facility]                    

 

                CHEM PANEL                            Albumin Lvl     4.2                            3.5

 - 5.0                            10/28/2017                                         

                                                      [facility]                    

 

                CHEM PANEL                            Calcium Lvl     9.4                            8.5

 - 10.5                            10/28/2017                                        

                                                      [facility]                    

 

                CHEM PANEL                            Potassium Lvl    3.1                            

3.5 - 5.1                            10/28/2017                                      

                                                      [facility]                    

 

                CHEM PANEL                            Sodium Lvl      136                            135

 - 145                            10/28/2017                                         

                                                      [facility]                    

 

                CHEM PANEL                            AST             24                            0 - 37     

                          10/28/2017                                                    

                                                      [facility]                    

 

                CHEM PANEL                            Alk Phos        140                            39 - 

136                            10/28/2017                                            

                                                      [facility]                    

 

                CHEM PANEL                            Globulin        4.3                            2.7 -

 4.2                            10/28/2017                                           

                                                      [facility]                    

 

                CHEM PANEL                            AGAP            11.1                            10.0 - 20.0

                            10/28/2017                                               

                                                      [facility]                    

 

                CHEM PANEL                            Bili Total      0.6                            0.2

 - 1.3                            10/28/2017                                         

                                                      [facility]                    

 

                    CHEM PANEL                            Creatinine Lvl      2.10                        

                    0.50 - 1.40                            10/28/2017                                  

                                                      [facility]                    

 

                CHEM PANEL                            Glucose Lvl     159                            70

 - 99                            10/28/2017                                          

                                                      [facility]                    

 

                CHEM PANEL                            BUN             35                            7 - 22     

                          10/28/2017                                                    

                                                      [facility]                    

 

                CHEM PANEL                            Lipase Lvl      158                            73 

- 393                            10/28/2017                                          

                                                      [facility]                    

 

                HEMATOLOGY                            Lymphocytes #    1.2                            

1.0 - 5.5                            10/28/2017                                      

                                                      [facility]                    

 

                HEMATOLOGY                            Monocytes #     0.8                            0.0

 - 0.8                            10/28/2017                                         

                                                      [facility]                    

 

                HEMATOLOGY                            Eosinophils #    0.1                            

0.0 - 0.5                            10/28/2017                                      

                                                      [facility]                    

 

                HEMATOLOGY                            Basophils #     0.0                            0.0

 - 0.2                            10/28/2017                                         

                                                      [facility]                    

 

                HEMATOLOGY                            Lymphocytes     11.0                            20.0

 - 40.0                            10/28/2017                                        

                                                      [facility]                    

 

                HEMATOLOGY                            Segs            81.2                            45.0 - 75.0

                            10/28/2017                                               

                                                      [facility]                            Segs-Bands #    9.1                            1.5

 - 8.1                            10/28/2017                                         

                                                      [facility]                    

 

                HEMATOLOGY                            Basophils       0.1                            0.0 

- 1.0                            10/28/2017                                          

                                                      [facility]                    

 

                HEMATOLOGY                            Eosinophils     0.5                            0.0

 - 4.0                            10/28/2017                                         

                                                      [facility]                            Monocytes       7.2                            2.0 

- 12.0                            10/28/2017                                         

                                                      [facility]                            Hgb             17.8                            14.0 - 18.0

                            10/28/2017                                               

                                                      [facility]                            Hct             54.1                            42.0 - 54.0

                            10/28/2017                                               

                                                      [facility]                            RBC             6.17                            4.70 - 6.10

                            10/28/2017                                               

                                                      [facility]                            WBC             11.2                            3.7 - 10.4

                            10/28/2017                                               

                                                      [facility]                            Platelet        194                            133 -

 450                            10/28/2017                                           

                                                      [facility]                            RDW             16.0                            11.5 - 14.5

                            10/28/2017                                               

                                                      [facility]                            MCHC            32.8                            32.0 - 36.0

                            10/28/2017                                               

                                                      [facility]                            MCH             28.8                            27.0 - 31.0

                            10/28/2017                                               

                                                      [facility]                            MCV             87.7                            80.0 - 94.0

                            10/28/2017                                               

                                                      [facility]                            MPV             8.4                            7.4 - 10.4

                            10/28/2017                                               

                                                      [facility]                            PTT             42.9                            22.9 - 35.8

                            10/28/2017                                               

                                                      [facility]                    

 

                HEMATOLOGY                            PT              26.9                            12.0 - 14.7

                            10/28/2017                                               

                                                      [facility]                    

 

                HEMATOLOGY                            INR             2.45                            0.85 - 1.17

                            10/28/2017                                               

                                                      [facility]                    

 

                    CARDIAC ENZYMES                            Troponin-I          0.04                       

                    0.00 - 0.40                            2017                                 

                                                      [facility]                    

 

                    DRUG SCREEN                            U Amph Scr          Negative 

*NA*

                    (17 2:30 AM)                            Negative                            2017

                                                                                    [facility]                    

 

                    DRUG SCREEN                            U Annie Scr          Negative 

*NA*

                    (17 2:30 AM)                            Negative                            2017

                                                                                    [facility]                    

 

                    DRUG SCREEN                            U Benzodia Scr      Negative 

*NA*

                    (17 2:30 AM)                            Negative                            2017

                                                                                    [facility]                    

 

                    DRUG SCREEN                            U Cannab Scr        Negative 

*NA*

                    (17 2:30 AM)                            Negative                            2017

                                                                                    [facility]                    

 

                    DRUG SCREEN                            U Cocaine Scr       Positive 

*ABN*

                    (17 2:30 AM)                            Negative                            2017

                                                                                    [facility]                    

 

                    DRUG SCREEN                            U Opiate Scr        Negative 

*NA*

                    (17 2:30 AM)                            Negative                            2017

                                                                                    [facility]                    

 

                    DRUG SCREEN                            U Phencyc Scr       Negative 

*NA*

                    (17 2:30 AM)                            Negative                            2017

                                                                                    [facility]                    

 

                    DRUG SCREEN                            UDS Note            See Note 

                    (17 2:30 AM)                                                        2017 

                                                                                   [facility]

                    

 

                CARDIAC ENZYMES                            Total CK        117                            

12 - 191                            2017                                       

                                                      [facility]                    

 

                CARDIAC ENZYMES                            CK MB           1.6                            0.5

 - 3.6                            2017                                         

                                                      [facility]                    

 

                    CARDIAC ENZYMES                            Troponin-I          0.04                       

                    0.00 - 0.40                            2017                                 

                                                      [facility]                    

 

                CHEM PANEL                            eGFR            45                                      

                    2017                                                        Result

 Comment: The eGFR is calculated using the CKD-EPI formula. In most young, 
healthy individuals the eGFR will be >90 mL/min/1.73m2. The eGFR declines with 
age. An eGFR of 60-89 may be normal in some populations, particularly the 
elderly, for whom the CKD-EPI formula has not been extensively validated. Use of
 the eGFR is not recommended in the following populations:<br/><br/>Individuals 
with unstable creatinine concentrations, including pregnant patients and those 
with serious co-morbid conditions.<br/><br/>Patients with extremes in muscle 
mass or diet. <br/><br/>The data above are obtained from the [employer] Kid[employer] 
Disease Education Program (NKDEP) which additionally recommends that when the 
eGFR is used in patients with extremes of body mass index for purposes of drug 
dosing, the eGFR should be multiplied by the estimated BMI.                     
                                        [facility]                    

 

                CHEM PANEL                            Bili Total      0.5                            0.2

 - 1.3                            2017                                         

                                                      [facility]                    

 

                CHEM PANEL                            AGAP            9.6                            10.0 - 20.0

                            2017                                               

                                                      [facility]                    

 

                CHEM PANEL                            B/C Ratio       10                            6 - 25

                            2017                                               

                                                      [facility]                    

 

                CHEM PANEL                            Globulin        3.5                            2.7 -

 4.2                            2017                                           

                                                      [facility]                    

 

                CHEM PANEL                            A/G Ratio       0.9                            0.7 

- 1.6                            2017                                          

                                                      [facility]                    

 

                CHEM PANEL                            Chloride Lvl    103                            95

 - 109                            2017                                         

                                                      [facility]                    

 

                CHEM PANEL                            CO2             33                            24 - 32    

                          2017                                                   

                                                      [facility]                    

 

                CHEM PANEL                            Calcium Lvl     8.1                            8.5

 - 10.5                            2017                                        

                                                      [facility]                    

 

                CHEM PANEL                            Albumin Lvl     3.1                            3.5

 - 5.0                            2017                                         

                                                      [facility]                    

 

                CHEM PANEL                            Total Protein    6.6                            

6.4 - 8.4                            2017                                      

                                                      [facility]                    

 

                CHEM PANEL                            BUN             17                            7 - 22     

                          2017                                                    

                                                      [facility]                    

 

                    CHEM PANEL                            Creatinine Lvl      1.70                        

                    0.50 - 1.40                            2017                                  

                                                      [facility]                    

 

                CHEM PANEL                            Glucose Lvl     61                            70 

- 99                            2017                                           

                                                      [facility]                    

 

                CHEM PANEL                            Sodium Lvl      142                            135

 - 145                            2017                                         

                                                      [facility]                    

 

                CHEM PANEL                            Potassium Lvl    3.6                            

3.5 - 5.1                            2017                                      

                                                      [facility]                    

 

                CHEM PANEL                            Alk Phos        135                            39 - 

136                            2017                                            

                                                      [facility]                    

 

                CHEM PANEL                            ALT             32                            0 - 65     

                          2017                                                    

                                                      [facility]                    

 

                CHEM PANEL                            AST             10                            0 - 37     

                          2017                                                    

                                                      [facility]                    

 

                HEMATOLOGY                            RBC             4.70                            4.70 - 6.10

                            2017                                               

                                                      [facility]                    

 

                HEMATOLOGY                            WBC             9.8                            3.7 - 10.4

                            2017                                               

                                                      [facility]                            Hgb             13.8                            14.0 - 18.0

                            2017                                               

                                                      [facility]                    

 

                HEMATOLOGY                            RDW             15.8                            11.5 - 14.5

                            2017                                               

                                                      [facility]                    

 

                HEMATOLOGY                            Hct             42.4                            42.0 - 54.0

                            2017                                               

                                                      [facility]                    

 

                HEMATOLOGY                            MCHC            32.5                            32.0 - 36.0

                            2017                                               

                                                      [facility]                            MCH             29.3                            27.0 - 31.0

                            2017                                               

                                                      [facility]                    

 

                HEMATOLOGY                            MCV             90.2                            80.0 - 94.0

                            2017                                               

                                                      [facility]                    

 

                HEMATOLOGY                            MPV             8.4                            7.4 - 10.4

                            2017                                               

                                                      [facility]                    

 

                HEMATOLOGY                            Platelet        210                            133 -

 450                            2017                                           

                                                      [facility]                    

 

                HEMATOLOGY                            Eosinophils     1.7                            0.0

 - 4.0                            2017                                         

                                                      [facility]                    

 

                HEMATOLOGY                            Monocytes       7.6                            2.0 

- 12.0                            2017                                         

                                                      [facility]                    

 

                HEMATOLOGY                            Basophils       0.4                            0.0 

- 1.0                            2017                                          

                                                      [facility]                    

 

                HEMATOLOGY                            Lymphocytes     13.3                            20.0

 - 40.0                            2017                                        

                                                      [facility]                    

 

                HEMATOLOGY                            Monocytes #     0.7                            0.0

 - 0.8                            2017                                         

                                                      [facility]                    

 

                HEMATOLOGY                            Lymphocytes #    1.3                            

1.0 - 5.5                            2017                                      

                                                      [facility]                    

 

                HEMATOLOGY                            Basophils #     0.0                            0.0

 - 0.2                            2017                                         

                                                      [facility]                    

 

                HEMATOLOGY                            Eosinophils #    0.2                            

0.0 - 0.5                            2017                                      

                                                      [facility]                    

 

                HEMATOLOGY                            Segs-Bands #    7.5                            1.5

 - 8.1                            2017                                         

                                                      [facility]                    

 

                HEMATOLOGY                            Segs            77.0                            45.0 - 75.0

                            2017                                               

                                                      [facility]                    

 

                    CARDIAC ENZYMES                            CK-MB INDEX         See Note 1

                    (17 3:33 PM)                            0.0 - 2.5                            2017

                                                        Result Comment: Calculated values

 are not available                            [facility]                    

 

                    CARDIAC ENZYMES                            Troponin-I          0.03                       

                    0.00 - 0.40                            2017                                 

                                                      [facility]                    

 

                CARDIAC ENZYMES                            CK MB           2.8                            0.5

 - 3.6                            2017                                         

                                                      [facility]                    

 

                    CARDIAC ENZYMES                            Total CK            2494                         

                    12 - 191                            2017                                      

                                                      [facility]                    

 

                CARDIAC ENZYMES                            BNP             252                            <=100

 pg/mL                            2017                                         

                                                      [facility]                    

 

                CHEM PANEL                            eGFR            47                                      

                    2017                                                        Result

 Comment: The eGFR is calculated using the CKD-EPI formula. In most young, 
healthy individuals the eGFR will be >90 mL/min/1.73m2. The eGFR declines with 
age. An eGFR of 60-89 may be normal in some populations, particularly the 
elderly, for whom the CKD-EPI formula has not been extensively validated. Use of
 the eGFR is not recommended in the following populations:<br/><br/>Individuals 
with unstable creatinine concentrations, including pregnant patients and those 
with serious co-morbid conditions.<br/><br/>Patients with extremes in muscle 
mass or diet. <br/><br/>The data above are obtained from the National Kid[employer] 
Disease Education Program (NKDEP) which additionally recommends that when the 
eGFR is used in patients with extremes of body mass index for purposes of drug 
dosing, the eGFR should be multiplied by the estimated BMI.                     
                                        [facility]                    

 

                CHEM PANEL                            A/G Ratio       0.9                            0.7 

- 1.6                            2017                                          

                                                      [facility]                    

 

                CHEM PANEL                            Calcium Lvl     8.4                            8.5

 - 10.5                            2017                                        

                                                      [facility]                    

 

                CHEM PANEL                            Total Protein    8.3                            

6.4 - 8.4                            2017                                      

                                                      [facility]                    

 

                CHEM PANEL                            Albumin Lvl     4.0                            3.5

 - 5.0                            2017                                         

                                                      [facility]                    

 

                CHEM PANEL                            ALT             38                            0 - 65     

                          2017                                                    

                                                      [facility]                    

 

                CHEM PANEL                            CO2             31                            24 - 32    

                          2017                                                   

                                                      [facility]                    

 

                CHEM PANEL                            Globulin        4.3                            2.7 -

 4.2                            2017                                           

                                                      [facility]                    

 

                CHEM PANEL                            B/C Ratio       10                            6 - 25

                            2017                                               

                                                      [facility]                    

 

                CHEM PANEL                            Bili Total      0.5                            0.2

 - 1.3                            2017                                         

                                                      [facility]                    

 

                CHEM PANEL                            AGAP            8.9                            10.0 - 20.0

                            2017                                               

                                                      [facility]                    

 

                CHEM PANEL                            Alk Phos        118                            39 - 

136                            2017                                            

                                                      [facility]                    

 

                CHEM PANEL                            AST             81                            0 - 37     

                          2017                                                    

                                                      [facility]                    

 

                    CHEM PANEL                            Creatinine Lvl      1.64                        

                    0.50 - 1.40                            2017                                  

                                                      [facility]                    

 

                CHEM PANEL                            Glucose Lvl     143                            70

 - 99                            2017                                          

                                                      [facility]                    

 

                CHEM PANEL                            BUN             16                            7 - 22     

                          2017                                                    

                                                      [facility]                    

 

                CHEM PANEL                            Potassium Lvl    3.9                            

3.5 - 5.1                            2017                                      

                                                      [facility]                    

 

                CHEM PANEL                            Chloride Lvl    102                            95

 - 109                            2017                                         

                                                      [facility]                    

 

                CHEM PANEL                            Sodium Lvl      138                            135

 - 145                            2017                                         

                                                      [facility]                    

 

                CHEM PANEL                            Lipase Lvl      275                            73 

- 393                            2017                                          

                                                      [facility]                    

 

                HEMATOLOGY                            Basophils #     0.0                            0.0

 - 0.2                            2017                                         

                                                      [facility]                    

 

                HEMATOLOGY                            Basophils       0.1                            0.0 

- 1.0                            2017                                          

                                                      [facility]                    

 

                HEMATOLOGY                            Segs-Bands #    5.8                            1.5

 - 8.1                            2017                                         

                                                      [facility]                    

 

                HEMATOLOGY                            Eosinophils #    0.1                            

0.0 - 0.5                            2017                                      

                                                      [facility]                    

 

                HEMATOLOGY                            Monocytes #     0.8                            0.0

 - 0.8                            2017                                         

                                                      [facility]                    

 

                HEMATOLOGY                            Lymphocytes #    1.1                            

1.0 - 5.5                            2017                                      

                                                      [facility]                    

 

                HEMATOLOGY                            Eosinophils     1.9                            0.0

 - 4.0                            2017                                         

                                                      [facility]                    

 

                HEMATOLOGY                            Segs            73.9                            45.0 - 75.0

                            2017                                               

                                                      [facility]                    

 

                HEMATOLOGY                            Monocytes       9.8                            2.0 

- 12.0                            2017                                         

                                                      [facility]                    

 

                HEMATOLOGY                            Lymphocytes     14.3                            20.0

 - 40.0                            2017                                        

                                                      [facility]                            Platelet        206                            133 -

 450                            2017                                           

                                                      [facility]                            MPV             8.0                            7.4 - 10.4

                            2017                                               

                                                      [facility]                            RDW             15.3                            11.5 - 14.5

                            2017                                               

                                                      [facility]                            MCH             31.2                            27.0 - 31.0

                            2017                                               

                                                      [facility]                            MCHC            33.8                            32.0 - 36.0

                            2017                                               

                                                      [facility]                            Hct             46.1                            42.0 - 54.0

                            2017                                               

                                                      [facility]                            Hgb             15.6                            14.0 - 18.0

                            2017                                               

                                                      [facility]                            WBC             7.8                            3.7 - 10.4

                            2017                                               

                                                      [facility]                            MCV             92.2                            80.0 - 94.0

                            2017                                               

                                                      [facility]                            RBC             5.00                            4.70 - 6.10

                            2017                                               

                                                      [facility]                    

 

                    CARDIAC ENZYMES                            Troponin-I          <0.02                      

                    0.00 - 0.40                            2017                                

                                                      [facility]                    

 

                CARDIAC ENZYMES                            CK MB           1.1                            0.5

 - 3.6                            2017                                         

                                                      [facility]                    

 

                CARDIAC ENZYMES                            Total CK        160                            

12 - 191                            2017                                       

                                                      [facility]                    

 

                CARDIAC ENZYMES                            Total CK        193                            

12 - 191                            2017                                       

                                                      [facility]                    

 

                    CARDIAC ENZYMES                            Troponin-I          0.02                       

                    0.00 - 0.40                            2017                                 

                                                      [facility]                    

 

                CARDIAC ENZYMES                            Total CK        195                            

12 - 191                            2017                                       

                                                      [facility]                    

 

                CARDIAC ENZYMES                            CK MB           1.2                            0.5

 - 3.6                            2017                                         

                                                      [facility]                    

 

                CHEM PANEL                            Calcium Lvl     8.3                            8.5

 - 10.5                            2017                                        

                                                      [facility]                    

 

                CHEM PANEL                            AGAP            11.9                            10.0 - 20.0

                            2017                                               

                                                      [facility]                    

 

                CHEM PANEL                            eGFR            57                                      

                    2017                                                        Result

 Comment: The eGFR is calculated using the CKD-EPI formula. In most young, 
healthy individuals the eGFR will be >90 mL/min/1.73m2. The eGFR declines with 
age. An eGFR of 60-89 may be normal in some populations, particularly the 
elderly, for whom the CKD-EPI formula has not been extensively validated. Use of
 the eGFR is not recommended in the following populations:<br/><br/>Individuals 
with unstable creatinine concentrations, including pregnant patients and those 
with serious co-morbid conditions.<br/><br/>Patients with extremes in muscle 
mass or diet. <br/><br/>The data above are obtained from the National Kid[employer] 
Disease Education Program (NKDEP) which additionally recommends that when the 
eGFR is used in patients with extremes of body mass index for purposes of drug 
dosing, the eGFR should be multiplied by the estimated BMI.                     
                                        [facility]                    

 

                CHEM PANEL                            CO2             27                            24 - 32    

                          2017                                                   

                                                      [facility]                    

 

                CHEM PANEL                            BUN             23                            7 - 22     

                          2017                                                    

                                                      [facility]                    

 

                    CHEM PANEL                            Creatinine Lvl      1.40                        

                    0.50 - 1.40                            2017                                  

                                                      [facility]                    

 

                CHEM PANEL                            Sodium Lvl      141                            135

 - 145                            2017                                         

                                                      [facility]                    

 

                CHEM PANEL                            Chloride Lvl    106                            95

 - 109                            2017                                         

                                                      [facility]                    

 

                CHEM PANEL                            Potassium Lvl    3.9                            

3.5 - 5.1                            2017                                      

                                                      [facility]                    

 

                CHEM PANEL                            Glucose Lvl     196                            70

 - 99                            2017                                          

                                                      [facility]                    

 

                    DRUG SCREEN                            UDS Note            See Note 

                    (17 12:02 AM)                                                        2017

                                                                                    [facility]                    

 

                    DRUG SCREEN                            U Phencyc Scr       Negative 

*NA*

                    (17 12:02 AM)                            Negative                            2017

                                                                                    [facility]                    

 

                    DRUG SCREEN                            U Opiate Scr        Positive 

*ABN*

                    (17 12:02 AM)                            Negative                            2017

                                                                                    [facility]                    

 

                    DRUG SCREEN                            U Cannab Scr        Negative 

*NA*

                    (17 12:02 AM)                            Negative                            2017

                                                                                    [facility]                    

 

                    DRUG SCREEN                            U Cocaine Scr       Positive 

*ABN*

                    (17 12:02 AM)                            Negative                            2017

                                                                                    [facility]                    

 

                    DRUG SCREEN                            U Amph Scr          Negative 

*NA*

                    (17 12:02 AM)                            Negative                            2017

                                                                                    [facility]                    

 

                    DRUG SCREEN                            U Benzodia Scr      Negative 

*NA*

                    (17 12:02 AM)                            Negative                            2017

                                                                                    [facility]                    

 

                    DRUG SCREEN                            U Annie Scr          Negative 

*NA*

                    (17 12:02 AM)                            Negative                            2017

                                                                                    [facility]                    

 

                    CARDIAC ENZYMES                            CK MB Index         0.7                       

                    0.0 - 2.5                            2017                                   

                                                      [facility]                    

 

                CARDIAC ENZYMES                            BNP             208                            <=100

 pg/mL                            2017                                         

                                                      [facility]                    

 

                CARDIAC ENZYMES                            CK MB           1.7                            0.5

 - 3.6                            2017                                         

                                                      [facility]                    

 

                    CARDIAC ENZYMES                            Troponin-I          0.02                       

                    0.00 - 0.40                            2017                                 

                                                      [facility]                    

 

                CHEM PANEL                            eGFR            50                                      

                    2017                                                        Result

 Comment: The eGFR is calculated using the CKD-EPI formula. In most young, 
healthy individuals the eGFR will be >90 mL/min/1.73m2. The eGFR declines with 
age. An eGFR of 60-89 may be normal in some populations, particularly the 
elderly, for whom the CKD-EPI formula has not been extensively validated. Use of
 the eGFR is not recommended in the following populations:<br/><br/>Individuals 
with unstable creatinine concentrations, including pregnant patients and those 
with serious co-morbid conditions.<br/><br/>Patients with extremes in muscle 
mass or diet. <br/><br/>The data above are obtained from the National Kid[employer] 
Disease Education Program (NKDEP) which additionally recommends that when the 
eGFR is used in patients with extremes of body mass index for purposes of drug 
dosing, the eGFR should be multiplied by the estimated BMI.                     
                                        [facility]                    

 

                CHEM PANEL                            Albumin Lvl     3.9                            3.5

 - 5.0                            2017                                         

                                                      [facility]                    

 

                CHEM PANEL                            AST             28                            0 - 37     

                          2017                                                    

                                                      [facility]                    

 

                CHEM PANEL                            ALT             27                            0 - 65     

                          2017                                                    

                                                      [facility]                    

 

                CHEM PANEL                            Bili Total      0.6                            0.2

 - 1.3                            2017                                         

                                                      [facility]                    

 

                CHEM PANEL                            Alk Phos        124                            39 - 

136                            2017                                            

                                                      [facility]                    

 

                CHEM PANEL                            B/C Ratio       13                            6 - 25

                            2017                                               

                                                      [facility]                    

 

                CHEM PANEL                            AGAP            11.7                            10.0 - 20.0

                            2017                                               

                                                      [facility]                    

 

                CHEM PANEL                            A/G Ratio       1.1                            0.7 

- 1.6                            2017                                          

                                                      [facility]                    

 

                CHEM PANEL                            Globulin        3.4                            2.7 -

 4.2                            2017                                           

                                                      [facility]                    

 

                    CHEM PANEL                            Creatinine Lvl      1.56                        

                    0.50 - 1.40                            2017                                  

                                                      [facility]                    

 

                CHEM PANEL                            BUN             21                            7 - 22     

                          2017                                                    

                                                      [facility]                    

 

                CHEM PANEL                            Sodium Lvl      142                            135

 - 145                            2017                                         

                                                      [facility]                    

 

                CHEM PANEL                            Potassium Lvl    3.7                            

3.5 - 5.1                            2017                                      

                                                      [facility]                    

 

                CHEM PANEL                            Chloride Lvl    104                            95

 - 109                            2017                                         

                                                      [facility]                    

 

                CHEM PANEL                            Calcium Lvl     8.4                            8.5

 - 10.5                            2017                                        

                                                      [facility]                    

 

                CHEM PANEL                            CO2             30                            24 - 32    

                          2017                                                   

                                                      [facility]                    

 

                CHEM PANEL                            Total Protein    7.3                            

6.4 - 8.4                            2017                                      

                                                      [facility]                    

 

                CHEM PANEL                            Glucose Lvl     188                            70

 - 99                            2017                                          

                                                      [facility]                            Monocytes #     0.6                            0.0

 - 0.8                            2017                                         

                                                      [facility]                            Lymphocytes #    1.1                            

1.0 - 5.5                            2017                                      

                                                      [facility]                            Segs-Bands #    7.0                            1.5

 - 8.1                            2017                                         

                                                      [facility]                            Basophils       0.1                            0.0 

- 1.0                            2017                                          

                                                      [facility]                            Eosinophils     0.9                            0.0

 - 4.0                            2017                                         

                                                      [facility]                            Monocytes       6.9                            2.0 

- 12.0                            2017                                         

                                                      [facility]                            Lymphocytes     12.9                            20.0

 - 40.0                            2017                                        

                                                      [facility]                            Segs            79.2                            45.0 - 75.0

                            2017                                               

                                                      [facility]                            Eosinophils #    0.1                            

0.0 - 0.5                            2017                                      

                                                      [facility]                            Basophils #     0.0                            0.0

 - 0.2                            2017                                         

                                                      [facility]                            PTT             43.5                            22.9 - 35.8

                            2017                                               

                                                      [facility]                            INR             1.79                            0.85 - 1.17

                            2017                                               

                                                      [facility]                            PT              21.1                            12.0 - 14.7

                            2017                                               

                                                      [facility]                            MPV             8.4                            7.4 - 10.4

                            2017                                               

                                                      [facility]                            MCHC            33.2                            32.0 - 36.0

                            2017                                               

                                                      [facility]                            Platelet        169                            133 -

 450                            2017                                           

                                                      [facility]                            RDW             14.3                            11.5 - 14.5

                            2017                                               

                                                      [facility]                            Hct             47.9                            42.0 - 54.0

                            2017                                               

                                                      [facility]                            WBC             8.9                            3.7 - 10.4

                            2017                                               

                                                      [facility]                            MCV             91.5                            80.0 - 94.0

                            2017                                               

                                                      [facility]                            MCH             30.4                            27.0 - 31.0

                            2017                                               

                                                      [facility]                            RBC             5.23                            4.70 - 6.10

                            2017                                               

                                                      [facility]                            Hgb             15.9                            14.0 - 18.0

                            2017                                               

                                                      [facility]                    

 

                CHEM PANEL                            eGFR            69                                      

                    2017                                                        Result

 Comment: The eGFR is calculated using the CKD-EPI formula. In most young, 
healthy individuals the eGFR will be >90 mL/min/1.73m2. The eGFR declines with 
age. An eGFR of 60-89 may be normal in some populations, particularly the 
elderly, for whom the CKD-EPI formula has not been extensively validated. Use of
 the eGFR is not recommended in the following populations:<br/><br/>Individuals 
with unstable creatinine concentrations, including pregnant patients and those 
with serious co-morbid conditions.<br/><br/>Patients with extremes in muscle 
mass or diet. <br/><br/>The data above are obtained from the National Kid[employer] 
Disease Education Program (NKDEP) which additionally recommends that when the 
eGFR is used in patients with extremes of body mass index for purposes of drug 
dosing, the eGFR should be multiplied by the estimated BMI.                     
                                        [facility]                    

 

                CHEM PANEL                            B/C Ratio       24                            6 - 25

                            2017                                               

                                                      [facility]                    

 

                CHEM PANEL                            Total Protein    6.6                            

6.4 - 8.4                            2017                                      

                                                      [facility]                    

 

                CHEM PANEL                            ALT             24                            0 - 65     

                          2017                                                    

                                                      [facility]                    

 

                CHEM PANEL                            Calcium Lvl     8.5                            8.5

 - 10.5                            2017                                        

                                                      [facility]                    

 

                CHEM PANEL                            Bili Total      0.6                            0.2

 - 1.3                            2017                                         

                                                      [facility]                    

 

                CHEM PANEL                            Potassium Lvl    3.9                            

3.5 - 5.1                            2017                                      

                                        Result Comment: Specimen Slightly Hemolyzed.                     

                                        [facility]                    

 

                CHEM PANEL                            AST             26                            0 - 37     

                          2017                                                    

                                                      [facility]                    

 

                CHEM PANEL                            Alk Phos        94                            39 - 136

                            2017                                               

                                                      [facility]                    

 

                CHEM PANEL                            Sodium Lvl      137                            135

 - 145                            2017                                         

                                                      [facility]                    

 

                CHEM PANEL                            CO2             26                            24 - 32    

                          2017                                                   

                                                      [facility]                    

 

                CHEM PANEL                            AGAP            11.9                            10.0 - 20.0

                            2017                                               

                                                      [facility]                    

 

                CHEM PANEL                            Chloride Lvl    103                            95

 - 109                            2017                                         

                                                      [facility]                    

 

                CHEM PANEL                            Albumin Lvl     2.9                            3.5

 - 5.0                            2017                                         

                                                      [facility]                    

 

                CHEM PANEL                            Globulin        3.7                            2.7 -

 4.2                            2017                                           

                                                      [facility]                    

 

                CHEM PANEL                            A/G Ratio       0.8                            0.7 

- 1.6                            2017                                          

                                                      [facility]                    

 

                    CHEM PANEL                            Creatinine Lvl      1.20                        

                    0.50 - 1.40                            2017                                  

                                                      [facility]                    

 

                CHEM PANEL                            BUN             29                            7 - 22     

                          2017                                                    

                                                      [facility]                    

 

                CHEM PANEL                            Glucose Lvl     157                            70

 - 99                            2017                                          

                                                      [facility]                            MCHC            35.6                            32.0 - 36.0

                            2017                                               

                                                      [facility]                            MPV             10.0                            7.4 - 10.4

                            2017                                               

                                                      [facility]                    

 

                HEMATOLOGY                            RDW             14.7                            11.5 - 14.5

                            2017                                               

                                                      [facility]                            Platelet        203                            133 -

 450                            2017                                           

                                                      [facility]                            MCV             88.4                            80.0 - 94.0

                            2017                                               

                                                      [facility]                            MCH             31.5                            27.0 - 31.0

                            2017                                               

                                                      [facility]                            Hgb             16.5                            14.0 - 18.0

                            2017                                               

                                                      [facility]                            Hct             46.4                            42.0 - 54.0

                            2017                                               

                                                      [facility]                            RBC             5.25                            4.70 - 6.10

                            2017                                               

                                                      [facility]                            WBC             9.7                            3.7 - 10.4

                            2017                                               

                                                      [facility]                    

 

                HEMATOLOGY                            Basophils #     0.0                            0.0

 - 0.2                            2017                                         

                                                      [facility]                            Monocytes #     0.8                            0.0

 - 0.8                            2017                                         

                                                      [facility]                    

 

                HEMATOLOGY                            Segs-Bands #    6.8                            1.5

 - 8.1                            2017                                         

                                                      [facility]                            Lymphocytes #    1.9                            

1.0 - 5.5                            2017                                      

                                                      [facility]                    

 

                HEMATOLOGY                            Eosinophils #    0.1                            

0.0 - 0.5                            2017                                      

                                                      [facility]                    

 

                HEMATOLOGY                            Eosinophils     1.1                            0.0

 - 4.0                            2017                                         

                                                      [facility]                    

 

                HEMATOLOGY                            Lymphocytes     19.8                            20.0

 - 40.0                            2017                                        

                                                      [facility]                            Monocytes       8.7                            2.0 

- 12.0                            2017                                         

                                                      [facility]                    

 

                HEMATOLOGY                            Segs            70.1                            45.0 - 75.0

                            2017                                               

                                                      [facility]                    

 

                HEMATOLOGY                            Basophils       0.3                            0.0 

- 1.0                            2017                                          

                                                      [facility]                    

 

                    SPECIAL CHEMISTRY                            Hgb A1C             10.3                        

                    <=5.6 %                            2017                                      

                                                      [facility]                    

 

                    CARDIAC ENZYMES                            Troponin-I          <0.02                      

                    0.00 - 0.40                            2017                                

                                                      [facility]                    

 

                    CARDIAC ENZYMES                            Troponin-I          <0.02                      

                    0.00 - 0.40                            2017                                

                                                      [facility]                    

 

                CARDIAC ENZYMES                            Total CK        182                            

12 - 191                            2017                                       

                                                      [facility]                    

 

                CARDIAC ENZYMES                            CK MB           1.4                            0.5

 - 3.6                            2017                                         

                                                      [facility]                    

 

                    CARDIAC ENZYMES                            CK MB Index         0.8                       

                    0.0 - 2.5                            2017                                   

                                                      [facility]                    

 

                CHEM PANEL                            eGFR            45                                      

                    2017                                                        Result

 Comment: The eGFR is calculated using the CKD-EPI formula. In most young, 
healthy individuals the eGFR will be >90 mL/min/1.73m2. The eGFR declines with 
age. An eGFR of 60-89 may be normal in some populations, particularly the 
elderly, for whom the CKD-EPI formula has not been extensively validated. Use of
 the eGFR is not recommended in the following populations:<br/><br/>Individuals 
with unstable creatinine concentrations, including pregnant patients and those 
with serious co-morbid conditions.<br/><br/>Patients with extremes in muscle 
mass or diet. <br/><br/>The data above are obtained from the National Kid[employer] 
Disease Education Program (NKDEP) which additionally recommends that when the 
eGFR is used in patients with extremes of body mass index for purposes of drug 
dosing, the eGFR should be multiplied by the estimated BMI.                     
                                        [facility]                    

 

                CHEM PANEL                            B/C Ratio       19                            6 - 25

                            2017                                               

                                                      [facility]                    

 

                CHEM PANEL                            AST             16                            0 - 37     

                          2017                                                    

                                                      [facility]                    

 

                CHEM PANEL                            Alk Phos        127                            39 - 

136                            2017                                            

                                                      [facility]                    

 

                CHEM PANEL                            AGAP            12.5                            10.0 - 20.0

                            2017                                               

                                                      [facility]                    

 

                CHEM PANEL                            Bili Total      0.5                            0.2

 - 1.3                            2017                                         

                                                      [facility]                    

 

                CHEM PANEL                            Globulin        3.4                            2.7 -

 4.2                            2017                                           

                                                      [facility]                    

 

                CHEM PANEL                            A/G Ratio       1.2                            0.7 

- 1.6                            2017                                          

                                                      [facility]                    

 

                CHEM PANEL                            Calcium Lvl     9.0                            8.5

 - 10.5                            2017                                        

                                                      [facility]                    

 

                CHEM PANEL                            Total Protein    7.4                            

6.4 - 8.4                            2017                                      

                                                      [facility]                    

 

                CHEM PANEL                            Albumin Lvl     4.0                            3.5

 - 5.0                            2017                                         

                                                      [facility]                    

 

                CHEM PANEL                            ALT             23                            0 - 65     

                          2017                                                    

                                                      [facility]                    

 

                CHEM PANEL                            CO2             32                            24 - 32    

                          2017                                                   

                                                      [facility]                    

 

                CHEM PANEL                            Glucose Lvl     204                            70

 - 99                            2017                                          

                                                      [facility]                    

 

                CHEM PANEL                            Chloride Lvl    98                            95

 - 109                            2017                                         

                                                      [facility]                    

 

                CHEM PANEL                            Potassium Lvl    3.5                            

3.5 - 5.1                            2017                                      

                                                      [facility]                    

 

                CHEM PANEL                            BUN             33                            7 - 22     

                          2017                                                    

                                                      [facility]                    

 

                    CHEM PANEL                            Creatinine Lvl      1.70                        

                    0.50 - 1.40                            2017                                  

                                                      [facility]                    

 

                CHEM PANEL                            Sodium Lvl      139                            135

 - 145                            2017                                         

                                                      [facility]                    

 

                    DRUG SCREEN                            U Cannab Scr        Negative 

*NA*

                    (17 10:30 PM)                            Negative                            2017

                                                                                    [facility]                    

 

                    DRUG SCREEN                            U Cocaine Scr       Negative 

*NA*

                    (17 10:30 PM)                            Negative                            2017

                                                                                    [facility]                    

 

                    DRUG SCREEN                            U Phencyc Scr       Negative 

*NA*

                    (17 10:30 PM)                            Negative                            2017

                                                                                    [facility]                    

 

                    DRUG SCREEN                            U Opiate Scr        Negative 

*NA*

                    (17 10:30 PM)                            Negative                            2017

                                                                                    [facility]                    

 

                    DRUG SCREEN                            UDS Note            See Note 

                    (17 10:30 PM)                                                        2017 

                                                                                   [facility]

                    

 

                    DRUG SCREEN                            U Amph Scr          Negative 

*NA*

                    (17 10:30 PM)                            Negative                            2017

                                                                                    [facility]                    

 

                    DRUG SCREEN                            U Benzodia Scr      Negative 

*NA*

                    (17 10:30 PM)                            Negative                            2017

                                                                                    [facility]                    

 

                    DRUG SCREEN                            U Annie Scr          Negative 

*NA*

                    (17 10:30 PM)                            Negative                            2017

                                                                                    [facility]                    

 

                HEMATOLOGY                            INR             1.62                            0.85 - 1.17

                            2017                                               

                                                      [facility]                    

 

                HEMATOLOGY                            PT              19.5                            12.0 - 14.7

                            2017                                               

                                                      [facility]                            PTT             32.7                            22.9 - 35.8

                            2017                                               

                                                      [facility]                            MPV             8.6                            7.4 - 10.4

                            2017                                               

                                                      [facility]                            RBC             5.85                            4.70 - 6.10

                            2017                                               

                                                      [facility]                            WBC             12.5                            3.7 - 10.4

                            2017                                               

                                                      [facility]                    

 

                HEMATOLOGY                            RDW             14.1                            11.5 - 14.5

                            2017                                               

                                                      [facility]                    

 

                HEMATOLOGY                            MCV             90.2                            80.0 - 94.0

                            2017                                               

                                                      [facility]                    

 

                HEMATOLOGY                            Platelet        189                            133 -

 450                            2017                                           

                                                      [facility]                            MCHC            33.3                            32.0 - 36.0

                            2017                                               

                                                      [facility]                            MCH             30.0                            27.0 - 31.0

                            2017                                               

                                                      [facility]                    

 

                HEMATOLOGY                            Hct             52.8                            42.0 - 54.0

                            2017                                               

                                                      [facility]                    

 

                HEMATOLOGY                            Hgb             17.6                            14.0 - 18.0

                            2017                                               

                                                      [facility]                    

 

                HEMATOLOGY                            Segs            83.7                            45.0 - 75.0

                            2017                                               

                                                      [facility]                    

 

                HEMATOLOGY                            Lymphocytes     10.2                            20.0

 - 40.0                            2017                                        

                                                      [facility]                    

 

                HEMATOLOGY                            Monocytes #     0.7                            0.0

 - 0.8                            2017                                         

                                                      [facility]                    

 

                HEMATOLOGY                            Eosinophils #    0.1                            

0.0 - 0.5                            2017                                      

                                                      [facility]                    

 

                HEMATOLOGY                            Basophils #     0.0                            0.0

 - 0.2                            2017                                         

                                                      [facility]                    

 

                HEMATOLOGY                            Monocytes       5.6                            2.0 

- 12.0                            2017                                         

                                                      [facility]                    

 

                HEMATOLOGY                            Eosinophils     0.4                            0.0

 - 4.0                            2017                                         

                                                      [facility]                    

 

                HEMATOLOGY                            Basophils       0.1                            0.0 

- 1.0                            2017                                          

                                                      [facility]                            Lymphocytes #    1.3                            

1.0 - 5.5                            2017                                      

                                                      [facility]                            Segs-Bands #    10.5                            

1.5 - 8.1                            2017                                      

                                                      [facility]                    

 

                    URINE AND STOOL                            UA Protein          Negative mg/dL             

                          Negative mg/dL                            2017                  

                                                                            [facility]         

           

 

                    URINE AND STOOL                            UA Turbidity        Clear 

                    (17 10:30 PM)                            Clear                            2017

                                                                                    [facility]                    

 

                    URINE AND STOOL                            UA Spec Grav        1.016                    

                    <=1.030                            2017                                  

                                                      [facility]                    

 

                URINE AND STOOL                            UA pH           5.0                            5.0

 - 8.0                            2017                                         

                                                      [facility]                    

 

                    URINE AND STOOL                            UA Hyal Cast        7                        

                    0 - 2                            2017                                        

                                                      [facility]                    

 

                    URINE AND STOOL                            UA Sq Epi           None Seen                   

                                                2017                                        

                                                      [facility]                    

 

                    URINE AND STOOL                            UA Leuk Est         Negative 

                    (17 10:30 PM)                            Negative                            2017

                                                                                    [facility]                    

 

                    URINE AND STOOL                            UA Blood            Negative 

                    (17 10:30 PM)                            Negative                            2017

                                                                                    [facility]                    

 

                    URINE AND STOOL                            UA Nitrite          Negative 

                    (17 10:30 PM)                            Negative                            2017

                                                                                    [facility]                    

 

                    URINE AND STOOL                            UA Ketones          Negative mg/dL             

                          Negative mg/dL                            2017                  

                                                                            [facility]         

           

 

                    URINE AND STOOL                            UA Bili             Negative 

*NA*

                    (17 10:30 PM)                            Negative                            2017

                                                                                    [facility]                    

 

                URINE AND STOOL                            UA RBC          <1                            0 -

 2                            2017                                             

                                                      [facility]                    

 

                    URINE AND STOOL                            UA Mucus            Few /LPF                     

                    None Seen /LPF                            2017                            

                                                        [facility]                 

   

 

                    URINE AND STOOL                            UA Glucose          Negative mg/dL             

                          Negative mg/dL                            2017                  

                                                                            [facility]         

           

 

                    URINE AND STOOL                            UA Urobilinogen     <=1.0 mg/dL           

                          0.1 - 1.0                            2017                     

                                                                            [facility]            

        

 

                    URINE AND STOOL                            UA Color            Light Yellow 

*NA*

                    (17 10:30 PM)                            Yellow                            2017

                                                                                    [facility]                    

 

                CARDIAC ENZYMES                            CK MB           12.1                            0.5

 - 3.6                            2017                                         

                                                      [facility]                    

 

                    CARDIAC ENZYMES                            Total CK            3802                         

                    12 - 191                            2017                                      

                                                      [facility]                    

 

                CHEM PANEL                            Magnesium Lvl    2.2                            

1.8 - 2.4                            2017                                      

                                                      [facility]                    

 

                ELECTROLYTES                            AGAP            11.5                            10.0 -

 20.0                            2017                                          

                                                      [facility]                    

 

                ELECTROLYTES                            Globulin        3.1                            2.7

 - 4.2                            2017                                         

                                                      [facility]                    

 

                ELECTROLYTES                            B/C Ratio       19                            6 -

 25                            2017                                            

                                                      [facility]                    

 

                ELECTROLYTES                            A/G Ratio       1.1                            0.7

 - 1.6                            2017                                         

                                                      [facility]                    

 

                ELECTROLYTES                            eGFR            77                                    

                    2017                                                        Result

 Comment: The eGFR is calculated using the CKD-EPI formula. In most young, 
healthy individuals the eGFR will be >90 mL/min/1.73m2. The eGFR declines with 
age. An eGFR of 60-89 may be normal in some populations, particularly the 
elderly, for whom the CKD-EPI formula has not been extensively validated. Use of
 the eGFR is not recommended in the following populations:<br/><br/>Individuals 
with unstable creatinine concentrations, including pregnant patients and those 
with serious co-morbid conditions.<br/><br/>Patients with extremes in muscle 
mass or diet. <br/><br/>The data above are obtained from the [employer] 
D[employer] Education Program (NKDEP) which additionally recommends that when the 
eGFR is used in patients with extremes of body mass index for purposes of drug 
dosing, the eGFR should be multiplied by the estimated BMI.                     
                                        [facility]                    

 

                ELECTROLYTES                            Bili Total      0.4                            0.2

 - 1.3                            2017                                         

                                                      [facility]                    

 

                ELECTROLYTES                            Alk Phos        109                            39 

- 136                            2017                                          

                                                      [facility]                    

 

                ELECTROLYTES                            AST             182                            0 - 37  

                          2017                                                 

                                                      [facility]                    

 

                ELECTROLYTES                            Glucose Lvl     131                            

70 - 99                            2017                                        

                                                      [facility]                    

 

                ELECTROLYTES                            BUN             21                            7 - 22   

                          2017                                                  

                                                      [facility]                    

 

                ELECTROLYTES                            Sodium Lvl      140                            135

 - 145                            2017                                         

                                                      [facility]                    

 

                    ELECTROLYTES                            Creatinine Lvl      1.10                      

                    0.50 - 1.40                            2017                                

                                                      [facility]                    

 

                ELECTROLYTES                            ALT             109                            0 - 65  

                          2017                                                 

                                                      [facility]                    

 

                ELECTROLYTES                            Albumin Lvl     3.3                            

3.5 - 5.0                            2017                                      

                                                      [facility]                    

 

                    ELECTROLYTES                            Total Protein       6.4                        

                    6.4 - 8.4                            2017                                    

                                                      [facility]                    

 

                ELECTROLYTES                            Calcium Lvl     8.2                            

8.5 - 10.5                            2017                                     

                                                      [facility]                    

 

                ELECTROLYTES                            CO2             23                            24 - 32  

                          2017                                                 

                                                      [facility]                    

 

                    ELECTROLYTES                            Chloride Lvl        110                         

                    95 - 109                            2017                                      

                                                      [facility]                    

 

                    ELECTROLYTES                            Potassium Lvl       4.5                        

                    3.5 - 5.1                            2017                                    

                                                      [facility]                    

 

                HEMATOLOGY                            Basophils       0.3                            0.0 

- 1.0                            2017                                          

                                                      [facility]                    

 

                HEMATOLOGY                            Eosinophils     2.3                            0.0

 - 4.0                            2017                                         

                                                      [facility]                            Basophils #     0.0                            0.0

 - 0.2                            2017                                         

                                                      [facility]                            Lymphocytes     21.0                            20.0

 - 40.0                            2017                                        

                                                      [facility]                            Monocytes       7.9                            2.0 

- 12.0                            2017                                         

                                                      [facility]                            Segs            68.5                            45.0 - 75.0

                            2017                                               

                                                      [facility]                            Eosinophils #    0.2                            

0.0 - 0.5                            2017                                      

                                                      [facility]                            Lymphocytes #    1.5                            

1.0 - 5.5                            2017                                      

                                                      [facility]                            Monocytes #     0.6                            0.0

 - 0.8                            2017                                         

                                                      [facility]                            Segs-Bands #    5.0                            1.5

 - 8.1                            2017                                         

                                                      [facility]                            MCHC            33.5                            32.0 - 36.0

                            2017                                               

                                                      [facility] [facility]                            RDW             13.2                            11.5 - 14.5

                            2017                                               

                                                      [facility]                            MCH             30.4                            27.0 - 31.0

                            2017                                               

                                                      [facility]                            Hct             44.5                            42.0 - 54.0

                            2017                                               

                                                      [facility]                            MCV             90.9                            80.0 - 94.0

                            2017                                               

                                                      [facility]                            MPV             9.0                            7.4 - 10.4

                            2017                                               

                                                      [facility]                            Platelet        139                            133 -

 450                            2017                                           

                                                      [facility]                            Hgb             14.9                            14.0 - 18.0

                            2017                                               

                                                      [facility]                    

 

                HEMATOLOGY                            RBC             4.89                            4.70 - 6.10

                            2017                                               

                                                      [facility]                    

 

                HEMATOLOGY                            WBC             7.3                            3.7 - 10.4

                            2017                                               

                                                      [facility]                    

 

                HEMATOLOGY                            PT              14.6                            12.0 - 14.7

                            2017                                               

                                                      [facility]                    

 

                HEMATOLOGY                            INR             1.12                            0.85 - 1.17

                            2017                                               

                                                      [facility]                    

 

                    CARDIAC ENZYMES                            Total CK            6880                         

                    12 - 191                            2017                                      

                                                      [facility]                    

 

                CHEM PANEL                            Magnesium Lvl    2.0                            

1.8 - 2.4                            2017                                      

                                                      [facility]                    

 

                CHEM PANEL                            Phosphorus      2.1                            2.5

 - 4.5                            2017                                         

                                                      [facility]                    

 

                ELECTROLYTES                            AGAP            12.3                            10.0 -

 20.0                            2017                                          

                                                      [facility]                    

 

                ELECTROLYTES                            Globulin        2.9                            2.7

 - 4.2                            2017                                         

                                                      [facility]                    

 

                ELECTROLYTES                            B/C Ratio       21                            6 -

 25                            2017                                            

                                                      [facility]                    

 

                ELECTROLYTES                            A/G Ratio       1.0                            0.7

 - 1.6                            2017                                         

                                                      [facility]                    

 

                ELECTROLYTES                            Bili Total      0.5                            0.2

 - 1.3                            2017                                         

                                                      [facility]                    

 

                ELECTROLYTES                            Alk Phos        119                            39 

- 136                            2017                                          

                                                      [facility]                    

 

                    ELECTROLYTES                            ASPARTATE TRANSAMINASE    219               

                    0 - 37                            2017                              

                                                      [facility]                   

 

 

                ELECTROLYTES                            eGFR            63                                    

                    2017                                                        Result

 Comment: The eGFR is calculated using the CKD-EPI formula. In most young, 
healthy individuals the eGFR will be >90 mL/min/1.73m2. The eGFR declines with 
age. An eGFR of 60-89 may be normal in some populations, particularly the 
elderly, for whom the CKD-EPI formula has not been extensively validated. Use of
 the eGFR is not recommended in the following populations:<br/><br/>Individuals 
with unstable creatinine concentrations, including pregnant patients and those 
with serious co-morbid conditions.<br/><br/>Patients with extremes in muscle 
mass or diet. <br/><br/>The data above are obtained from the National Kid[employer] 
Disease Education Program (NKDEP) which additionally recommends that when the 
eGFR is used in patients with extremes of body mass index for purposes of drug 
dosing, the eGFR should be multiplied by the estimated BMI.                     
                                        [facility]                    

 

                    ELECTROLYTES                            ALANINE AMINOTRANSFERASE    87              

                    0 - 65                            2017                             

                                                       [facility]                  

  

 

                ELECTROLYTES                            Albumin Lvl     3.0                            

3.5 - 5.0                            2017                                      

                                                      [facility]                    

 

                ELECTROLYTES                            BUN             27                            7 - 22   

                          2017                                                  

                                                      [facility]                    

 

                ELECTROLYTES                            Glucose Lvl     180                            

70 - 99                            2017                                        

                                                      [facility]                    

 

                ELECTROLYTES                            Calcium Lvl     7.8                            

8.5 - 10.5                            2017                                     

                                                      [facility]                    

 

                    ELECTROLYTES                            Total Protein       5.9                        

                    6.4 - 8.4                            2017                                    

                                                      [facility]                    

 

                ELECTROLYTES                            CO2             26                            24 - 32  

                          2017                                                 

                                                      [facility]                    

 

                    ELECTROLYTES                            Potassium Lvl       3.3                        

                    3.5 - 5.1                            2017                                    

                                                      [facility]                    

 

                    ELECTROLYTES                            Chloride Lvl        103                         

                    95 - 109                            2017                                      

                                                      [facility]                    

 

                ELECTROLYTES                            Sodium Lvl      138                            135

 - 145                            2017                                         

                                                      [facility]                    

 

                    ELECTROLYTES                            Creatinine Lvl      1.30                      

                    0.50 - 1.40                            2017                                

                                                      [facility]                    

 

                HEMATOLOGY                            MPV             9.1                            7.4 - 10.4

                            2017                                               

                                                      [facility]                            MCH             30.9                            27.0 - 31.0

                            2017                                               

                                                      [facility]                            MCV             89.1                            80.0 - 94.0

                            2017                                               

                                                      [facility]                    

 

                HEMATOLOGY                            Platelet        129                            133 -

 450                            2017                                           

                                                      [facility]                            RDW             13.4                            11.5 - 14.5

                            2017                                               

                                                      [facility]                            MCHC            34.7                            32.0 - 36.0

                            2017                                               

                                                      [facility]                            Hgb             15.0                            14.0 - 18.0

                            2017                                               

                                                      [facility]                            WBC             6.8                            3.7 - 10.4

                            2017                                               

                                                      [facility]                            Hct             43.4                            42.0 - 54.0

                            2017                                               

                                                      [facility]                            RBC             4.87                            4.70 - 6.10

                            2017                                               

                                                      [facility]                    

 

                HEMATOLOGY                            PT              14.6                            12.0 - 14.7

                            2017                                               

                                                      [facility]                    

 

                HEMATOLOGY                            INR             1.12                            0.85 - 1.17

                            2017                                               

                                                      [facility]                            Lymphocytes     19.0                            20.0

 - 40.0                            2017                                        

                                                      [facility]                            Monocytes       8.2                            2.0 

- 12.0                            2017                                         

                                                      [facility]                    

 

                HEMATOLOGY                            Segs-Bands #    4.8                            1.5

 - 8.1                            2017                                         

                                                      [facility]                    

 

                HEMATOLOGY                            Eosinophils     2.1                            0.0

 - 4.0                            2017                                         

                                                      [facility]                    

 

                HEMATOLOGY                            Basophils       0.3                            0.0 

- 1.0                            2017                                          

                                                      [facility]                    

 

                HEMATOLOGY                            Monocytes #     0.6                            0.0

 - 0.8                            2017                                         

                                                      [facility]                    

 

                HEMATOLOGY                            Eosinophils #    0.1                            

0.0 - 0.5                            2017                                      

                                                      [facility]                    

 

                HEMATOLOGY                            Lymphocytes #    1.3                            

1.0 - 5.5                            2017                                      

                                                      [facility]                    

 

                HEMATOLOGY                            Segs            70.4                            45.0 - 75.0

                            2017                                               

                                                      [facility]                    

 

                    CARDIAC ENZYMES                            Troponin-I          0.12                       

                    0.00 - 0.40                            2017                                 

                                                      [facility]                    

 

                    CARDIAC ENZYMES                            Total CK            77582                        

                    12 - 191                            2017                                     

                                                      [facility]                    

 

                    CARDIAC ENZYMES                            CK MB Index         0.3                       

                    0.0 - 2.5                            2017                                   

                                                      [facility]                    

 

                CARDIAC ENZYMES                            CK MB           31.0                            0.5

 - 3.6                            2017                                         

                                                      [facility]                    

 

                    CARDIAC ENZYMES                            Troponin-I          0.25                       

                    0.00 - 0.40                            2017                                 

                                                      [facility]                    

 

                CARDIAC ENZYMES                            CK MB           28.7                            0.5

 - 3.6                            2017                                         

                                                      [facility]                    

 

                CHEM PANEL                            Magnesium Lvl    2.1                            

1.8 - 2.4                            2017                                      

                                                      [facility]                    

 

                CHEM PANEL                            eGFR            30                                      

                    2017                                                        Result

 Comment: The eGFR is calculated using the CKD-EPI formula. In most young, 
healthy individuals the eGFR will be >90 mL/min/1.73m2. The eGFR declines with 
age. An eGFR of 60-89 may be normal in some populations, particularly the 
elderly, for whom the CKD-EPI formula has not been extensively validated. Use of
 the eGFR is not recommended in the following populations:<br/><br/>Individuals 
with unstable creatinine concentrations, including pregnant patients and those 
with serious co-morbid conditions.<br/><br/>Patients with extremes in muscle 
mass or diet. <br/><br/>The data above are obtained from the National Kid[employer] 
Disease Education Program (NKDEP) which additionally recommends that when the 
eGFR is used in patients with extremes of body mass index for purposes of drug 
dosing, the eGFR should be multiplied by the estimated BMI.                     
                                        [facility]                    

 

                CHEM PANEL                            AST             231                            0 - 37    

                          2017                                                   

                                                      [facility]                    

 

                CHEM PANEL                            Alk Phos        96                            39 - 136

                            2017                                               

                                                      [facility]                    

 

                CHEM PANEL                            Bili Total      1.0                            0.2

 - 1.3                            2017                                         

                                                      [facility]                    

 

                CHEM PANEL                            A/G Ratio       0.7                            0.7 

- 1.6                            2017                                          

                                                      [facility]                    

 

                CHEM PANEL                            Globulin        5.3                            2.7 -

 4.2                            2017                                           

                                                      [facility]                    

 

                CHEM PANEL                            ALT             72                            0 - 65     

                          2017                                                    

                                                      [facility]                    

 

                CHEM PANEL                            B/C Ratio       16                            6 - 25

                            2017                                               

                                                      [facility]                    

 

                CHEM PANEL                            Calcium Lvl     8.4                            8.5

 - 10.5                            2017                                        

                                                      [facility]                    

 

                CHEM PANEL                            Albumin Lvl     3.7                            3.5

 - 5.0                            2017                                         

                                                      [facility]                    

 

                CHEM PANEL                            Total Protein    9.0                            

6.4 - 8.4                            2017                                      

                                                      [facility]                    

 

                CHEM PANEL                            Glucose Lvl     270                            70

 - 99                            2017                                          

                                                      [facility]                    

 

                CHEM PANEL                            Sodium Lvl      134                            135

 - 145                            2017                                         

                                                      [facility]                    

 

                    CHEM PANEL                            Creatinine Lvl      2.40                        

                    0.50 - 1.40                            2017                                  

                                                      [facility]                    

 

                CHEM PANEL                            BUN             39                            7 - 22     

                          2017                                                    

                                                      [facility]                    

 

                CHEM PANEL                            Potassium Lvl    3.1                            

3.5 - 5.1                            2017                                      

                                                      [facility]                    

 

                CHEM PANEL                            Chloride Lvl    96                            95

 - 109                            2017                                         

                                                      [facility]                    

 

                CHEM PANEL                            CO2             22                            24 - 32    

                          2017                                                   

                                                      [facility]                    

 

                CHEM PANEL                            AGAP            19.1                            10.0 - 20.0

                            2017                                               

                                                      [facility]                    

 

                HEMATOLOGY                            WBC             14.7                            3.7 - 10.4

                            2017                                               

                                                      [facility]                            RBC             5.12                            4.70 - 6.10

                            2017                                               

                                                      [facility]                            MPV             9.2                            7.4 - 10.4

                            2017                                               

                                                      [facility]                            RDW             13.7                            11.5 - 14.5

                            2017                                               

                                                      [facility]                            Platelet        169                            133 -

 450                            2017                                           

                                                      [facility]                            MCH             31.0                            27.0 - 31.0

                            2017                                               

                                                      [facility]                            MCV             89.1                            80.0 - 94.0

                            2017                                               

                                                      [facility]                            MCHC            34.8                            32.0 - 36.0

                            2017                                               

                                                      [facility]                    

 

                HEMATOLOGY                            Hgb             15.9                            14.0 - 18.0

                            2017                                               

                                                      [facility]                    

 

                HEMATOLOGY                            Hct             45.6                            42.0 - 54.0

                            2017                                               

                                                      [facility]                    

 

                HEMATOLOGY                            PT              16.4                            12.0 - 14.7

                            2017                                               

                                                      [facility]                    

 

                HEMATOLOGY                            INR             1.30                            0.85 - 1.17

                            2017                                               

                                                      [facility]                    

 

                HEMATOLOGY                            Basophils       0.1                            0.0 

- 1.0                            2017                                          

                                                      [facility]                    

 

                HEMATOLOGY                            Monocytes #     1.7                            0.0

 - 0.8                            2017                                         

                                                      [facility]                    

 

                HEMATOLOGY                            Lymphocytes #    1.8                            

1.0 - 5.5                            2017                                      

                                                      [facility]                    

 

                HEMATOLOGY                            Segs-Bands #    11.1                            

1.5 - 8.1                            2017                                      

                                                      [facility]                    

 

                HEMATOLOGY                            Segs            75.8                            45.0 - 75.0

                            2017                                               

                                                      [facility]                    

 

                HEMATOLOGY                            Eosinophils     0.3                            0.0

 - 4.0                            2017                                         

                                                      [facility]                    

 

                HEMATOLOGY                            Lymphocytes     12.0                            20.0

 - 40.0                            2017                                        

                                                      [facility]                    

 

                HEMATOLOGY                            Monocytes       11.8                            2.0

 - 12.0                            2017                                        

                                                      [facility]                    

 

                LIPIDS                            CHD Risk        5.00                            4.00 - 7.30

                            2017                                               

                                                      [facility]                    

 

                LIPIDS                            VLDL            38                                          

                    2017                                                               

                                        [facility]                    

 

                LIPIDS                            LDL (Calculated)    66                            <=99

 mg/dL                            2017                                         

                                                      [facility]                    

 

                LIPIDS                            Chol            130                            <=199 mg/dL  

                          2017                                                 

                                                      [facility]                    

 

                LIPIDS                            Trig            189                            <=149 mg/dL  

                          2017                                                 

                                                      [facility]                    

 

                LIPIDS                            HDL             26                            >=61 mg/dL     

                          2017                                                    

                                                      [facility]                    

 

                    DRUG SCREEN                            U Annie Scr          Negative 

*NA*

                    (17 12:27 AM)                            Negative                            2017

                                                                                    [facility]                    

 

                    DRUG SCREEN                            U Cocaine Scr       Positive 

*ABN*

                    (17 12:27 AM)                            Negative                            2017

                                                                                    [facility]                    

 

                    DRUG SCREEN                            U Benzodia Scr      Negative 

*NA*

                    (17 12:27 AM)                            Negative                            2017

                                                                                    [facility]                    

 

                    DRUG SCREEN                            U Amph Scr          Negative 

*NA*

                    (17 12:27 AM)                            Negative                            2017

                                                                                    [facility]                    

 

                    DRUG SCREEN                            U Phencyc Scr       Negative 

*NA*

                    (17 12:27 AM)                            Negative                            2017

                                                                                    [facility]                    

 

                    DRUG SCREEN                            U Opiate Scr        Negative 

*NA*

                    (17 12:27 AM)                            Negative                            2017

                                                                                    [facility]                    

 

                    DRUG SCREEN                            U Cannab Scr        Negative 

*NA*

                    (17 12:27 AM)                            Negative                            2017

                                                                                    [facility]                    

 

                    DRUG SCREEN                            UDS Note            See Note 

                    (17 12:27 AM)                                                        2017

                                                                                    [facility]                    

 

                    URINE AND STOOL                            UA Urobilinogen     <=1.0 mg/dL           

                          0.1 - 1.0                            2017                     

                                                                            [facility]            

        

 

                    URINE AND STOOL                            UA Color            Yellow                       

                                                2017                                            

                                                      [facility]                    

 

                    URINE AND STOOL                            UA Gran Cast        3                        

                                                2017                                             

                                                      [facility]                    

 

                    URINE AND STOOL                            UA Glucose          500 mg/dL                  

                          Negative mg/dL                            2017                       

                                                                            [facility]              

      

 

                URINE AND STOOL                            UA pH           5.0                            5.0

 - 8.0                            2017                                         

                                                      [facility]                    

 

                    URINE AND STOOL                            UA Spec Grav        1.022                    

                    <=1.030                            2017                                  

                                                      [facility]                    

 

                    URINE AND STOOL                            UA Protein          100 mg/dL                  

                          Negative mg/dL                            2017                       

                                                                            [facility]              

      

 

                    URINE AND STOOL                            UA Turbidity        Moderate 

*ABN*

                    (17 12:27 AM)                            Clear                            2017

                                                                                    [facility]                    

 

                    URINE AND STOOL                            UA Mucus            Few /LPF                     

                    None Seen /LPF                            2017                            

                                                        [facility]                 

   

 

                    URINE AND STOOL                            UA Hyal Cast        38                       

                    0 - 2                            2017                                       

                                                      [facility]                    

 

                    URINE AND STOOL                            UA Sq Epi           Many /LPF                   

                    Few /LPF                            2017                                

                                                      [facility]                    

 

                    URINE AND STOOL                            UA Leuk Est         Negative 

                    (17 12:27 AM)                            Negative                            2017

                                                                                    [facility]                    

 

                    URINE AND STOOL                            UA Bacteria         Occasional /HPF           

                          None Seen /HPF                            2017                

                                                                            [facility]       

             

 

                URINE AND STOOL                            UA WBC          21                            0 -

 5                            2017                                             

                                                      [facility]                    

 

                    URINE AND STOOL                            UA Nitrite          Negative 

                    (17 12:27 AM)                            Negative                            2017

                                                                                    [facility]                    

 

                    URINE AND STOOL                            UA Ketones          Negative mg/dL             

                          Negative mg/dL                            2017                  

                                                                            [facility]         

           

 

                    URINE AND STOOL                            UA Blood            Large 

*ABN*

                    (17 12:27 AM)                            Negative                            2017

                                                                                    [facility]                    

 

                    URINE AND STOOL                            UA Bili             Negative 

*NA*

                    (17 12:27 AM)                            Negative                            2017

                                                                                    [facility]                    

 

                URINE CHEM                            U Sodium        19                                  

                          2017                                                     

                                                      [facility]                    

 

                URINE CHEM                            U Chloride      <10                               

                          2017                                                  

                                                      [facility]                    

 

                URINE CHEM                            U Potassium     63.2                             

                           2017                                                

                                                      [facility]                    

 

                URINE CHEM                            U Protein       178.5                              

                          2017                                                 

                                                      [facility]                    

 

                    URINE CHEM                            U Creatinine        282.00                        

                                                2017                                             

                                                      [facility]                    

 

                URINE CHEM                            U Osmolality    620                            300

 - 800                            2017                                         

                                                      [facility]                    

 

                URINE CHEM                            U Urea          724                                   

                     2017                                                        

                                        [facility]                    

 

                    URINE CHEM                            U Eos               None Seen 

                    (17 12:27 AM)                            None Seen                            2017

                                                                                    [facility]                    

 

                URINE CHEM                            U Prot/Creat    0.6                             

                           2017                                                

                                                      [facility]                    

 

                    CARDIAC ENZYMES                            CK-MB INDEX         0.3                       

                    0.0 - 2.5                            2017                                   

                                                      [facility]                    

 

                CARDIAC ENZYMES                            BNP             881                            <=100

 pg/mL                            2017                                         

                                                      [facility]                    

 

                    CARDIAC ENZYMES                            Troponin-I          0.34                       

                    0.00 - 0.40                            2017                                 

                                                      [facility]                    

 

                CHEM PANEL                            Lipase Lvl      185                            73 

- 393                            2017                                          

                                                      [facility]                    

 

                HEMATOLOGY                            Eosinophils #    0.0                            

0.0 - 0.5                            2017                                      

                                                      [facility]                    

 

                HEMATOLOGY                            Basophils #     0.0                            0.0

 - 0.2                            2017                                         

                                                      [facility]                    

 

                HEMATOLOGY                            PTT             32.9                            22.9 - 35.8

                            2017                                               

                                                      [facility]                    

 

                CARDIAC ENZYMES                            Total CK        141                            

12 - 191                            2017                                       

                                                      [facility]                    

 

                    CARDIAC ENZYMES                            CK MB Index         1.8                       

                    0.0 - 2.5                            2017                                   

                                                      [facility]                    

 

                CARDIAC ENZYMES                            CK MB           2.6                            0.5

 - 3.6                            2017                                         

                                                      [facility]                    

 

                    CARDIAC ENZYMES                            Troponin-I          0.03                       

                    0.00 - 0.40                            2017                                 

                                                      [facility]                    

 

                ELECTROLYTES                            Sodium Lvl      141                            135

 - 145                            2017                                         

                                                      [facility]                    

 

                    ELECTROLYTES                            Creatinine Lvl      1.34                      

                    0.50 - 1.40                            2017                                

                                                      [facility]                    

 

                ELECTROLYTES                            BUN             21                            7 - 22   

                          2017                                                  

                                                      [facility]                    

 

                    ELECTROLYTES                            Potassium Lvl       4.1                        

                    3.5 - 5.1                            2017                                    

                                                      [facility]                    

 

                ELECTROLYTES                            Glucose Lvl     154                            

70 - 99                            2017                                        

                                                      [facility]                    

 

                ELECTROLYTES                            CO2             27                            24 - 32  

                          2017                                                 

                                                      [facility]                    

 

                ELECTROLYTES                            Calcium Lvl     8.0                            

8.5 - 10.5                            2017                                     

                                                      [facility]                    

 

                    ELECTROLYTES                            Chloride Lvl        107                         

                    95 - 109                            2017                                      

                                                      [facility]                    

 

                ELECTROLYTES                            eGFR            61                                    

                    2017                                                        Result

 Comment: The eGFR is calculated using the CKD-EPI formula. In most young, 
healthy individuals the eGFR will be >90 mL/min/1.73m2. The eGFR declines with 
age. An eGFR of 60-89 may be normal in some populations, particularly the 
elderly, for whom the CKD-EPI formula has not been extensively validated. Use of
 the eGFR is not recommended in the following populations:<br/><br/>Individuals 
with unstable creatinine concentrations, including pregnant patients and those 
with serious co-morbid conditions.<br/><br/>Patients with extremes in muscle 
mass or diet. <br/><br/>The data above are obtained from the [employer] Kid[employer] 
Disease Education Program (NKDEP) which additionally recommends that when the 
eGFR is used in patients with extremes of body mass index for purposes of drug 
dosing, the eGFR should be multiplied by the estimated BMI.                     
                                        [facility]                    

 

                ELECTROLYTES                            AGAP            11.1                            10.0 -

 20.0                            2017                                          

                                                      [facility]                    

 

                HEMATOLOGY                            Segs            79.5                            45.0 - 75.0

                            2017                                               

                                                      [facility]                    

 

                HEMATOLOGY                            Lymphocytes     13.3                            20.0

 - 40.0                            2017                                        

                                                      [facility]                    

 

                HEMATOLOGY                            Monocytes       4.6                            2.0 

- 12.0                            2017                                         

                                                      [facility]                    

 

                HEMATOLOGY                            Eosinophils     2.4                            0.0

 - 4.0                            2017                                         

                                                      [facility]                    

 

                HEMATOLOGY                            Basophils       0.2                            0.0 

- 1.0                            2017                                          

                                                      [facility]                    

 

                HEMATOLOGY                            Monocytes #     0.4                            0.0

 - 0.8                            2017                                         

                                                      [facility]                    

 

                HEMATOLOGY                            Eosinophils #    0.2                            

0.0 - 0.5                            2017                                      

                                                      [facility]                    

 

                HEMATOLOGY                            Lymphocytes #    1.1                            

1.0 - 5.5                            2017                                      

                                                      [facility]                            Segs-Bands #    6.4                            1.5

 - 8.1                            2017                                         

                                                      [facility]                            Basophils #     0.0                            0.0

 - 0.2                            2017                                         

                                                      [facility]                            MCV             92.3                            80.0 - 94.0

                            2017                                               

                                                      [facility]                            Hct             42.3                            42.0 - 54.0

                            2017                                               

                                                      [facility]                            WBC             8.1                            3.7 - 10.4

                            2017                                               

                                                      [facility]                            Platelet        193                            133 -

 450                            2017                                           

                                                      [facility]                            RDW             14.3                            11.5 - 14.5

                            2017                                               

                                                      [facility]                            MCHC            34.3                            32.0 - 36.0

                            2017                                               

                                                      [facility]                            RBC             4.58                            4.70 - 6.10

                            2017                                               

                                                      [facility]                            Hgb             14.5                            14.0 - 18.0

                            2017                                               

                                                      [facility]                            MPV             8.5                            7.4 - 10.4

                            2017                                               

                                                      [facility]                            MCH             31.7                            27.0 - 31.0

                            2017                                               

                                                      [facility]                    

 

                HEMATOLOGY                            INR             1.17                            0.85 - 1.17

                            2017                                               

                                                      [facility]                    

 

                HEMATOLOGY                            PT              15.1                            12.0 - 14.7

                            2017                                               

                                                      [facility]                            PTT             30.3                            22.9 - 35.8

                            2017                                               

                                                      [facility]                    

 

                    DRUG SCREEN                            U Propoxyph Scr     Negative 

*NA*

                    (16 9:49 AM)                            Negative                            2016

                                                                                    [facility]                    

 

                    DRUG SCREEN                            U Methadone Scr     Negative 

*NA*

                    (16 9:49 AM)                            Negative                            2016

                                                                                    [facility]                    

 

                    DRUG SCREEN                            UDS Note            See Note 

                    (16 9:49 AM)                                                        2016 

                                                                                   [facility]

                    

 

                    DRUG SCREEN                            U Opiate Scr        Negative 

*NA*

                    (16 9:49 AM)                            Negative                            2016

                                                                                    [facility]                    

 

                    DRUG SCREEN                            U Phencyc Scr       Negative 

*NA*

                    (16 9:49 AM)                            Negative                            2016

                                                                                    [facility]                    

 

                    DRUG SCREEN                            U Cocaine Scr       Positive 

*ABN*

                    (16 9:49 AM)                            Negative                            2016

                                                                                    [facility]                    

 

                    DRUG SCREEN                            U Benzodia Scr      Negative 

*NA*

                    (16 9:49 AM)                            Negative                            2016

                                                                                    [facility]                    

 

                    DRUG SCREEN                            U Cannab Scr        Negative 

*NA*

                    (16 9:49 AM)                            Negative                            2016

                                                                                    [facility]                    

 

                    DRUG SCREEN                            U Annie Scr          Negative 

*NA*

                    (16 9:49 AM)                            Negative                            2016

                                                                                    [facility]                    

 

                    DRUG SCREEN                            U Amph Scr          Negative 

*NA*

                    (16 9:49 AM)                            Negative                            2016

                                                                                    [facility]                    

 

                CARDIAC ENZYMES                            CK MB           2.3                            0.5

 - 3.6                            2016                                         

                                                      [facility]                    

 

                CARDIAC ENZYMES                            Total CK        499                            

12 - 191                            2016                                       

                                                      [facility]                    

 

                CHEM PANEL                            Magnesium Lvl    2.1                            

1.8 - 2.4                            2016                                      

                                                      [facility]                    

 

                CHEM PANEL                            eGFR            58                                      

                    2016                                                        Result

 Comment: The eGFR is calculated using the CKD-EPI formula. In most young, 
healthy individuals the eGFR will be >90 mL/min/1.73m2. The eGFR declines with 
age. An eGFR of 60-89 may be normal in some populations, particularly the 
elderly, for whom the CKD-EPI formula has not been extensively validated. Use of
 the eGFR is not recommended in the following populations:<br/><br/>Individuals 
with unstable creatinine concentrations, including pregnant patients and those 
with serious co-morbid conditions.<br/><br/>Patients with extremes in muscle 
mass or diet. <br/><br/>The data above are obtained from the National Kid[employer] 
Disease Education Program (NKDEP) which additionally recommends that when the 
eGFR is used in patients with extremes of body mass index for purposes of drug 
dosing, the eGFR should be multiplied by the estimated BMI.                     
                                        [facility]                    

 

                CHEM PANEL                            Chloride Lvl    106                            95

 - 109                            2016                                         

                                                      [facility]                    

 

                CHEM PANEL                            Calcium Lvl     8.9                            8.5

 - 10.5                            2016                                        

                                                      [facility]                    

 

                CHEM PANEL                            AGAP            10.1                            10.0 - 20.0

                            2016                                               

                                                      [facility]                    

 

                CHEM PANEL                            CO2             29                            24 - 32    

                          2016                                                   

                                                      [facility]                    

 

                CHEM PANEL                            Glucose Lvl     206                            70

 - 99                            2016                                          

                                                      [facility]                    

 

                CHEM PANEL                            Sodium Lvl      141                            135

 - 145                            2016                                         

                                                      [facility]                    

 

                CHEM PANEL                            Potassium Lvl    4.1                            

3.5 - 5.1                            2016                                      

                                                      [facility]                    

 

                CHEM PANEL                            BUN             30                            7 - 22     

                          2016                                                    

                                                      [facility]                    

 

                    CHEM PANEL                            Creatinine Lvl      1.40                        

                    0.50 - 1.40                            2016                                  

                                                      [facility]                    

 

                HEMATOLOGY                            Platelet        131                            133 -

 450                            2016                                           

                                                      [facility]                    

 

                HEMATOLOGY                            Hgb             14.5                            14.0 - 18.0

                            2016                                               

                                                      [facility]                    

 

                HEMATOLOGY                            WBC             6.4                            3.7 - 10.4

                            2016                                               

                                                      [facility]                    

 

                HEMATOLOGY                            RBC             4.73                            4.70 - 6.10

                            2016                                               

                                                      [facility]                    

 

                HEMATOLOGY                            Hct             42.3                            42.0 - 54.0

                            2016                                               

                                                      [facility]                            MCHC            34.2                            32.0 - 36.0

                            2016                                               

                                                      [facility]                            RDW             13.5                            11.5 - 14.5

                            2016                                               

                                                      [facility]                    

 

                HEMATOLOGY                            MCV             89.4                            80.0 - 94.0

                            2016                                               

                                                      [facility]                            MCH             30.6                            27.0 - 31.0

                            2016                                               

                                                      [facility]                            MPV             8.5                            7.4 - 10.4

                            2016                                               

                                                      [facility]                    

 

                HEMATOLOGY                            Segs            67.0                            45.0 - 75.0

                            2016                                               

                                                      [facility]                            Lymphocytes     18.0                            20.0

 - 40.0                            2016                                        

                                                      [facility]                    

 

                HEMATOLOGY                            Monocytes #     0.8                            0.0

 - 0.8                            2016                                         

                                                      [facility]                    

 

                HEMATOLOGY                            Eosinophils #    0.2                            

0.0 - 0.5                            2016                                      

                                                      [facility]                    

 

                HEMATOLOGY                            Monocytes       11.9                            2.0

 - 12.0                            2016                                        

                                                      [facility]                    

 

                HEMATOLOGY                            Eosinophils     2.6                            0.0

 - 4.0                            2016                                         

                                                      [facility]                    

 

                HEMATOLOGY                            Segs-Bands #    4.3                            1.5

 - 8.1                            2016                                         

                                                      [facility]                            Lymphocytes #    1.2                            

1.0 - 5.5                            2016                                      

                                                      [facility]                    

 

                HEMATOLOGY                            Basophils       0.5                            0.0 

- 1.0                            2016                                          

                                                      [facility]                    

 

                LIPIDS                            CHD Risk        3.53                            4.00 - 7.30

                            2016                                               

                                                      [facility]                    

 

                LIPIDS                            LDL (Calculated)    42                            <=99

 mg/dL                            2016                                         

                                                      [facility]                    

 

                LIPIDS                            VLDL            34                                          

                    2016                                                               

                                        [facility]                    

 

                LIPIDS                            Chol            106                            <=199 mg/dL  

                          2016                                                 

                                                      [facility]                    

 

                LIPIDS                            Trig            170                            <=149 mg/dL  

                          2016                                                 

                                                      [facility]                    

 

                LIPIDS                            HDL             30                            >=61 mg/dL     

                          2016                                                    

                                                      [facility]                    

 

                    CARDIAC ENZYMES                            Troponin-I          0.04                       

                    0.00 - 0.40                            2016                                 

                                                      [facility]                    

 

                CARDIAC ENZYMES                            CK MB           2.2                            0.5

 - 3.6                            2016                                         

                                                      [facility]                    

 

                CARDIAC ENZYMES                            Total CK        578                            

12 - 191                            2016                                       

                                                      [facility]                    

 

                CARDIAC ENZYMES                            BNP             439                            <=100

 pg/mL                            2016                                         

                                                      [facility]                    

 

                CARDIAC ENZYMES                            CK MB           1.8                            0.5

 - 3.6                            2016                                         

                                                      [facility]                    

 

                CARDIAC ENZYMES                            Total CK        549                            

12 - 191                            2016                                       

                                                      [facility]                    

 

                    CARDIAC ENZYMES                            Troponin-I          0.04                       

                    0.00 - 0.40                            2016                                 

                                                      [facility]                    

 

                    CARDIAC ENZYMES                            CK MB Index         0.3                       

                    0.0 - 2.5                            2016                                   

                                                      [facility]                    

 

                CHEM PANEL                            eGFR            63                                      

                    2016                                                        Result

 Comment: The eGFR is calculated using the CKD-EPI formula. In most young, 
healthy individuals the eGFR will be >90 mL/min/1.73m2. The eGFR declines with 
age. An eGFR of 60-89 may be normal in some populations, particularly the 
elderly, for whom the CKD-EPI formula has not been extensively validated. Use of
 the eGFR is not recommended in the following populations:<br/><br/>Individuals 
with unstable creatinine concentrations, including pregnant patients and those 
with serious co-morbid conditions.<br/><br/>Patients with extremes in muscle 
mass or diet. <br/><br/>The data above are obtained from the National Kid[employer] 
Disease Education Program (NKDEP) which additionally recommends that when the 
eGFR is used in patients with extremes of body mass index for purposes of drug 
dosing, the eGFR should be multiplied by the estimated BMI.                     
                                        [facility]                    

 

                CHEM PANEL                            Bili Total      0.7                            0.2

 - 1.3                            2016                                         

                                                      [facility]                    

 

                CHEM PANEL                            Chloride Lvl    114                            95

 - 109                            2016                                         

                                                      [facility]                    

 

                CHEM PANEL                            Glucose Lvl     121                            70

 - 99                            2016                                          

                                                      [facility]                    

 

                CHEM PANEL                            BUN             25                            7 - 22     

                          2016                                                    

                                                      [facility]                    

 

                    CHEM PANEL                            Creatinine Lvl      1.30                        

                    0.50 - 1.40                            2016                                  

                                                      [facility]                    

 

                CHEM PANEL                            Sodium Lvl      146                            135

 - 145                            2016                                         

                                                      [facility]                    

 

                CHEM PANEL                            Potassium Lvl    2.9                            

3.5 - 5.1                            2016                                      

                                        Result Comment: Critical Result(s) called to MATT at 2016

 18:56 by BP.  Read back OK.                            [facility]               

     

 

                CHEM PANEL                            AST             17                            0 - 37     

                          2016                                                    

                                                      [facility]                    

 

                CHEM PANEL                            Alk Phos        80                            39 - 136

                            2016                                               

                                                      [facility]                    

 

                CHEM PANEL                            ALT             22                            0 - 65     

                          2016                                                    

                                                      [facility]                    

 

                CHEM PANEL                            A/G Ratio       1.3                            0.7 

- 1.6                            2016                                          

                                                      [facility]                    

 

                CHEM PANEL                            Globulin        2.4                            2.0 -

 4.0                            2016                                           

                                                      [facility]                    

 

                CHEM PANEL                            Calcium Lvl     6.5                            8.5

 - 10.5                            2016                                        

                                                      [facility]                    

 

                CHEM PANEL                            Total Protein    5.6                            

6.4 - 8.4                            2016                                      

                                                      [facility]                    

 

                CHEM PANEL                            Albumin Lvl     3.2                            3.5

 - 5.0                            2016                                         

                                                      [facility]                    

 

                CHEM PANEL                            AGAP            9.9                            10.0 - 20.0

                            2016                                               

                                                      [facility]                    

 

                CHEM PANEL                            B/C Ratio       19                            6 - 25

                            2016                                               

                                                      [facility]                    

 

                CHEM PANEL                            CO2             25                            24 - 32    

                          2016                                                   

                                                      [facility]                    

 

                HEMATOLOGY                            Platelet        181                            133 -

 450                            2016                                           

                                                      [facility]                    

 

                HEMATOLOGY                            RDW             13.4                            11.5 - 14.5

                            2016                                               

                                                      [facility]                    

 

                HEMATOLOGY                            MPV             8.3                            7.4 - 10.4

                            2016                                               

                                                      [facility]                    

 

                HEMATOLOGY                            MCHC            33.5                            32.0 - 36.0

                            2016                                               

                                                      [facility]                    

 

                HEMATOLOGY                            WBC             11.5                            3.7 - 10.4

                            2016                                               

                                                      [facility]                    

 

                HEMATOLOGY                            RBC             5.06                            4.70 - 6.10

                            2016                                               

                                                      [facility]                    

 

                HEMATOLOGY                            Hct             45.3                            42.0 - 54.0

                            2016                                               

                                                      [facility]                    

 

                HEMATOLOGY                            MCH             29.9                            27.0 - 31.0

                            2016                                               

                                                      [facility]                    

 

                HEMATOLOGY                            Hgb             15.2                            14.0 - 18.0

                            2016                                               

                                                      [facility]                    

 

                HEMATOLOGY                            MCV             89.5                            80.0 - 94.0

                            2016                                               

                                                      [facility]                    

 

                HEMATOLOGY                            Segs            74.0                            45.0 - 75.0

                            2016                                               

                                                      [facility]                    

 

                HEMATOLOGY                            Monocytes       10.8                            2.0

 - 12.0                            2016                                        

                                                      [facility]                    

 

                HEMATOLOGY                            Lymphocytes     14.5                            20.0

 - 40.0                            2016                                        

                                                      [facility]                    

 

                HEMATOLOGY                            Eosinophils     0.6                            0.0

 - 4.0                            2016                                         

                                                      [facility]                    

 

                HEMATOLOGY                            Monocytes #     1.2                            0.0

 - 0.8                            2016                                         

                                                      [facility]                    

 

                HEMATOLOGY                            Eosinophils #    0.1                            

0.0 - 0.5                            2016                                      

                                                      [facility]                    

 

                HEMATOLOGY                            Basophils       0.1                            0.0 

- 1.0                            2016                                          

                                                      [facility]                    

 

                HEMATOLOGY                            Segs-Bands #    8.5                            1.5

 - 8.1                            2016                                         

                                                      [facility]                            Lymphocytes #    1.7                            

1.0 - 5.5                            2016                                      

                                                      [facility]                            Basophils #     0.0                            0.0

 - 0.2                            2016                                         

                                                      [facility]                    

 

                HEMATOLOGY                            INR             2.00                            0.85 - 1.17

                            2016                                               

                                                      [facility]                    

 

                HEMATOLOGY                            PT              23.0                            12.0 - 14.7

                            2016                                               

                                                      [facility]                            PTT             36.4                            22.9 - 35.8

                            2016                                               

                                                      [facility]                    

 

                URINALYSIS                            UA Mucus        Few /LPF                            

None Seen                            2014                                      

                                                      [facility]                    

 

                URINALYSIS                            UA Ketones      TR                                

                          2014                                                   

                                                      [facility]                    

 

                    URINALYSIS                            UA Protein          >=300 mg/dL                     

                    Negative                            2014                            ABN   

                                                      [facility]                

    

 

                    URINALYSIS                            UA Spec Grav        1.028                         

                    <=1.030                            2014                            Normal     

                                                      [facility]                  

  

 

                    URINALYSIS                            UA Turbidity        Slight 

*ABN*

                    (2014 05:25:57)                              Clear                            

2014                            ABN                                            

                                        [facility]                    

 

                    URINALYSIS                            UA Color            Orange 

*ABN*

                          (2014 05:25:57)                              Yellow                         

                    2014                            ABN                                           

                                        [facility]                    

 

                URINALYSIS                            UA pH           6.0                            5.0 - 8.0

                            2014                            Normal             

                                                      [facility]                    

 

                    URINALYSIS                            UA Leuk Est         Negative 

                          (2014 05:25:57)                              Negative                       

                    2014                            Normal                                      

                                        [facility]                    

 

                    URINALYSIS                            UA Nitrite          Negative 

                          (2014 05:25:57)                              Negative                       

                    2014                            Normal                                      

                                        [facility]                    

 

                    URINALYSIS                            UA Bili             Moderate 

*ABN*

                          (2014 05:25:57)                              Negative                       

                    2014                            ABN                                         

                                        [facility]                    

 

                    URINALYSIS                            UA Glucose          Negative mg/dL                  

                    Negative                            2014                               

                                                      [facility]                

    

 

                    URINALYSIS                            UA Urobilinogen     8.0                        

                    0.1 - 1.0                            2014                            HI      

                                                      [facility]                   

 

 

                    URINALYSIS                            UA Blood            Negative 

                          (2014 05:25:57)                              Negative                       

                    2014                            Normal                                      

                                        [facility]                    

 

                    URINALYSIS                            UA Sq Epi           Moderate /LPF                    

                    Few                            2014                            ABN       

                                                      [facility]                    



 

                    URINALYSIS                            UA Bacteria         Moderate /HPF                  

                    None Seen                            2014                            ABN

                                                        [facility]             

       

 

                CHEMISTRY                            BNP             2058                            <=100     

                          2014                            [facility]                      

                                        <sup>3</sup>Interpretive Data: Elevated results are in line with increasing severity

 of <br/>congestive heart failure. Minor elevations between 100 and 300 <br/>may
 be seen with Myocardial Ischemia, Sodium retaining drugs, <br/>and 
compensated/treated heart failure.                            [facility]     

               

 

                CHEMISTRY                            Lipase Lvl      83                            73 - 

393                            2014                            Normal          

                                                      [facility]                    

 

                CHEMISTRY                            Albumin Lvl     3.7                            3.5

 - 5.0                            2014                            Normal       

                                                      [facility]                    



 

                CHEMISTRY                            Bili Total      2.6                            0.2 

- 1.3                            2014                            [facility]            

                                                      [facility]                    

 

                    CHEMISTRY                            ALANINE AMINOTRANSFERASE    65                 

                    0 - 65                            2014                            Normal

                                                        [facility]             

       

 

                    CHEMISTRY                            ASPARTATE TRANSAMINASE    50                   

                    0 - 37                            2014                            [facility]                 

   

 

                CHEMISTRY                            A/G Ratio       1.2                            0.7 -

 1.6                            2014                            Normal         

                                                      [facility]                    

 

                CHEMISTRY                            AGAP            14.4                            10.0 - 20.0

                            2014                            Normal             

                                                      [facility]                    

 

                CHEMISTRY                            Globulin        3.0                            2.0 - 

4.0                            2014                            Normal          

                                                      [facility]                    

 

                CHEMISTRY                            B/C Ratio       16                            6 - 25

                            2014                            Normal             

                                                      [facility]                    

 

                CHEMISTRY                            Glucose Lvl     131                            70 

- 99                            2014                            [facility]             

                                        <sup>2</sup>Interpretive Data: Adult reference range values reflect 

the clinical guidelines<br/>of the [employer].               
                                        [facility]                    

 

                CHEMISTRY                            Creatinine Lvl    1.2                            

0.5 - 1.4                            2014                            Normal    

                                                      [facility]                 

   

 

                CHEMISTRY                            BUN             19                            7 - 22      

                          2014                            Normal                   

                                                      [facility]                    

 

                CHEMISTRY                            Sodium Lvl      138                            135 

- 145                            2014                            Normal        

                                                      [facility]                    

 

                CHEMISTRY                            Potassium Lvl    4.4                            3.5

 - 5.1                            2014                            Normal       

                                                      [facility]                    



 

                CHEMISTRY                            CO2             22                            24 - 32     

                          2014                            LOW                     

                                                      [facility]                    

 

                CHEMISTRY                            Chloride Lvl    106                            95

 - 109                            2014                            Normal       

                                                      [facility]                    



 

                CHEMISTRY                            Total Protein    6.7                            6.4

 - 8.4                            2014                            Normal       

                                                      [facility]                    



 

                CHEMISTRY                            Calcium Lvl     8.5                            8.5

 - 10.5                            2014                            Normal      

                                                      [facility]                   

 

 

                CHEMISTRY                            eGFR            71                                       

                    2014                                                        <sup>1</sup>Result

 Comment: The eGFR is calculated using the CKD-EPI formula. In most young, 
healthy individuals the eGFR will be >90 mL/min/1.73m2. The eGFR declines with 
age. An eGFR of 60-89 may be normal in some populations, particularly the 
elderly, for whom the CKD-EPI formula has not been extensively validated. Use of
 the eGFR is not recommended in the following populations:&
lt;br/><br/>Individuals with unstable creatinine concentrations, including 
pregnant patients and those with serious co-morbid conditions.<br/><br/>Patients
 with extremes in muscle mass or diet. <br/><br/>The data above are obtained 
from the National Kidney Disease Education Program (NKDEP) which additionally 
recommends that when the eGFR is used in patients with extremes of body mass 
index for purposes of drug dosing, the eGFR should be multiplied by the 
estimated BMI.                            [facility]                    

 

                CHEMISTRY                            Alk Phos        155                            39 - 136

                            2014                            [facility]                    

 

                CHEMISTRY                            Troponin-I      <0.02                            0.00

 - 0.40                            2014                            Normal      

                                                      [facility]                   

 

 

                CHEMISTRY                            CK MB           1.0                            0.5 - 3.6

                            2014                            Normal             

                                                      [facility]                    

 

                CHEMISTRY                            Total CK        93                            12 - 191

                            2014                            Normal             

                                                      [facility]                    

 

                CHEMISTRY                            CK-MB INDEX     1.1                            0.0

 - 2.5                            2014                            Normal       

                                                      [facility]                    



 

                HEMATOLOGY                            Platelet        213                            133 -

 450                            2014                            Normal         

                                                      [facility]                    

 

                HEMATOLOGY                            MPV             8.9                            7.4 - 10.4

                            2014                            Normal             

                                                      [facility]                    

 

                HEMATOLOGY                            RDW             22.5                            11.5 - 14.5

                            2014                            [facility]                    

 

                HEMATOLOGY                            MCHC            32.5                            32.0 - 36.0

                            2014                            Normal             

                                                      [facility]                    

 

                HEMATOLOGY                            WBC X 10x3      8.1                            3.7

 - 10.4                            2014                            Normal      

                                                      [facility]                   

 

 

                HEMATOLOGY                            RBC X 10x6      5.27                            4.70

 - 6.10                            2014                            Normal      

                                                      [facility]                   

 

 

                HEMATOLOGY                            Hgb             14.0                            14.0 - 18.0

                            2014                            Normal             

                                                      [facility]                    

 

                HEMATOLOGY                            Hct             43.2                            42.0 - 54.0

                            2014                            Normal             

                                                      [facility]                    

 

                HEMATOLOGY                            MCH             26.6                            27.0 - 31.0

                            2014                            LOW                

                                                      [facility]                    

 

                HEMATOLOGY                            MCV             81.9                            80.0 - 94.0

                            2014                            Normal             

                                                      [facility]                    

 

                HEMATOLOGY                            aPTT            30.7                            22.9 - 35.8

                            2014                            Normal             

                                        <sup>5</sup>Interpretive Data: Heparin Therapeutic Range:  57 - 92 Seconds

                                        [facility]                    

 

                HEMATOLOGY                            INR             1.78                            0.85 - 1.17

                            2014                            [facility]                 

                                        <sup>4</sup>Interpretive Data: RECOMMENDED RANGES FOR PROTIME INR:<br/> 

  2.0-3.0 for most medical and surgical thromboembolic states.<br/>   2.5-3.5 
for artificial heart valves and recurrent embolism.<br/><br/>INR SHOULD BE USED 
ONLY FOR PATIENTS ON STABLE ANTICOAGULANT THERAPY.                            [facility]                    

 

                HEMATOLOGY                            PROTIME         20.4                            12.0 

- 14.7                            2014                            HI           

                                                      [facility]                    

 

                    HEMATOLOGY                            Polychrom           Slight 

                          (2014 05:15:00)                              None Seen                      

                    2014                            Normal                                     

                                        [facility]                    

 

                HEMATOLOGY                            Basophils #     0.0                            0.0

 - 0.2                            2014                            Normal       

                                                      [facility]                    



 

                    HEMATOLOGY                            Tear Cell           Slight 

*ABN*

                          (2014 05:15:00)                              None Seen                      

                    2014                            ABN                                        

                                        [facility]                    

 

                HEMATOLOGY                            Lymphocytes #    1.0                            

1.0 - 5.5                            2014                            Normal    

                                                      [facility]                 

   

 

                HEMATOLOGY                            Segs-Bands #    6.4                            1.5

 - 8.1                            2014                            Normal       

                                                      [facility]                    



 

                HEMATOLOGY                            Basophils       0.3                            0.0 

- 1.0                            2014                            Normal        

                                                      [facility]                    

 

                HEMATOLOGY                            Eosinophils #    0.1                            

0.0 - 0.5                            2014                            Normal    

                                                      [facility]                 

   

 

                HEMATOLOGY                            Monocytes #     0.7                            0.0

 - 0.8                            2014                            Normal       

                                                      [facility]                    



 

                    HEMATOLOGY                            Elliptocyte         Slight 

*ABN*

                          (2014 05:15:00)                              None Seen                      

                    2014                            ABN                                        

                                        [facility]                    

 

                HEMATOLOGY                            Eosinophils     0.7                            0.0

 - 4.0                            2014                            Normal       

                                                      [facility]                    



 

                HEMATOLOGY                            Monocytes       8.0                            2.0 

- 12.0                            2014                            Normal       

                                                      [facility]                    



 

                HEMATOLOGY                            Lymphocytes     12.2                            20.0

 - 40.0                            2014                            LOW         

                                                      [facility]                    

 

                HEMATOLOGY                            Segs            78.8                            45.0 - 75.0

                            2014                            HI                 

                                                      [facility]                    

 

                    HEMATOLOGY                            Plt Morph           Normal 

                    (2014 05:15:00)                                                          2014

                            Normal                                                   

                                        [facility]                    

 

                CHEMISTRY                            Globulin        3.2                            2.0 - 

4.0                            2014                            Normal          

                                                      [facility]                    

 

                CHEMISTRY                            A/G Ratio       1.2                            0.7 -

 1.6                            2014                            Normal         

                                                      [facility]                    

 

                CHEMISTRY                            B/C Ratio       14                            6 - 25

                            2014                            Normal             

                                                      [facility]                    

 

                CHEMISTRY                            AGAP            14.5                            10.0 - 20.0

                            2014                            Normal             

                                                      [facility]                    

 

                CHEMISTRY                            eGFR            112                                      

                    2014                                                        <sup>1</sup>Result

 Comment: The eGFR is calculated using the CKD-EPI formula. In most young, 
healthy individuals the eGFR will be >90 mL/min/1.73m2. The eGFR declines with 
age. An eGFR of 60-89 may be normal in some populations, particularly the 
elderly, for whom the CKD-EPI formula has not been extensively validated. Use of
 the eGFR is not recommended in the following populations:&
lt;br/><br/>Individuals with unstable creatinine concentrations, including 
pregnant patients and those with serious co-morbid conditions.<br/><br/>Patients
 with extremes in muscle mass or diet. <br/><br/>The data above are obtained 
from the National Kidney Disease Education Program (NKDEP) which additionally 
recommends that when the eGFR is used in patients with extremes of body mass 
index for purposes of drug dosing, the eGFR should be multiplied by the 
estimated BMI.                            [facility]                    

 

                    CHEMISTRY                            ASPARTATE TRANSAMINASE    36                   

                    0 - 37                            2014                            Normal

                                                        [facility]             

       

 

                CHEMISTRY                            Albumin Lvl     3.7                            3.5

 - 5.0                            2014                            Normal       

                                                      [facility]                    



 

                CHEMISTRY                            Bili Total      2.5                            0.2 

- 1.3                            2014                            HI            

                                                      [facility]                    

 

                    CHEMISTRY                            ALANINE AMINOTRANSFERASE    22                 

                    0 - 65                            2014                            Normal

                                                        [facility]             

       

 

                CHEMISTRY                            Alk Phos        158                            39 - 136

                            2014                            [facility]                    

 

                CHEMISTRY                            Chloride Lvl    106                            95

 - 109                            2014                            Normal       

                                                      [facility]                    



 

                CHEMISTRY                            CO2             23                            24 - 32     

                          2014                            LOW                     

                                                      [facility]                    

 

                CHEMISTRY                            Total Protein    6.9                            6.4

 - 8.4                            2014                            Normal       

                                                      [facility]                    



 

                CHEMISTRY                            Calcium Lvl     8.3                            8.5

 - 10.5                            2014                            LOW         

                                                      [facility]                    

 

                CHEMISTRY                            Potassium Lvl    5.5                            3.5

 - 5.1                            2014                            [facility]                    

 

                CHEMISTRY                            BUN             10                            7 - 22      

                          2014                            Normal                   

                                                      [facility]                    

 

                CHEMISTRY                            Glucose Lvl     131                            70 

- 99                            2014                            [facility]             

                                        <sup>2</sup>Interpretive Data: Adult reference range values reflect 

the clinical guidelines<br/>of the [employer] Diabetes [employer].               
                                        [facility]                    

 

                CHEMISTRY                            Sodium Lvl      138                            135 

- 145                            2014                            Normal        

                                                      [facility]                    

 

                CHEMISTRY                            Creatinine Lvl    0.7                            

0.5 - 1.4                            2014                            Normal    

                                                      [facility]                 

   

 

                CHEMISTRY                            BNP             2487                            <=100     

                          2014                            [facility]                      

                                        <sup>3</sup>Interpretive Data: Elevated results are in line with increasing severity

 of <br/>congestive heart failure. Minor elevations between 100 and 300 <br/>may
 be seen with Myocardial Ischemia, Sodium retaining drugs, <br/>and 
compensated/treated heart failure.                            [facility]     

               

 

                CHEMISTRY                            CK-MB INDEX     0.6                            0.0

 - 2.5                            2014                            Normal       

                                                      [facility]                    



 

                CHEMISTRY                            CK MB           1.0                            0.5 - 3.6

                            2014                            Normal             

                                                      [facility]                    

 

                CHEMISTRY                            Total CK        162                            12 - 191

                            2014                            Normal             

                                                      [facility]                    

 

                CHEMISTRY                            Troponin-I      0.02                            0.00

 - 0.40                            2014                            Normal      

                                                      [facility]                   

 

 

                HEMATOLOGY                            RDW             21.4                            11.5 - 14.5

                            2014                            [facility]                    

 

                HEMATOLOGY                            MPV             9.2                            7.4 - 10.4

                            2014                            Normal             

                                                      [facility]                    

 

                HEMATOLOGY                            Platelet        217                            133 -

 450                            2014                            Normal         

                                                      [facility]                    

 

                HEMATOLOGY                            MCHC            32.3                            32.0 - 36.0

                            2014                            Normal             

                                                      [facility]                    

 

                HEMATOLOGY                            MCH             26.3                            27.0 - 31.0

                            2014                            LOW                

                                                      [facility]                    

 

                HEMATOLOGY                            MCV             81.4                            80.0 - 94.0

                            2014                            Normal             

                                                      [facility]                    

 

                HEMATOLOGY                            Hct             43.3                            42.0 - 54.0

                            2014                            Normal             

                                                      [facility]                    

 

                HEMATOLOGY                            Hgb             14.0                            14.0 - 18.0

                            2014                            Normal             

                                                      [facility]                    

 

                HEMATOLOGY                            RBC X 10x6      5.32                            4.70

 - 6.10                            2014                            Normal      

                                                      [facility]                   

 

 

                HEMATOLOGY                            WBC X 10x3      9.4                            3.7

 - 10.4                            2014                            Normal      

                                                      [facility]                   

 

 

                HEMATOLOGY                            Basophils #     0.0                            0.0

 - 0.2                            2014                            Normal       

                                                      [facility]                    



 

                HEMATOLOGY                            Lymphocytes     11.5                            20.0

 - 40.0                            2014                            LOW         

                                                      [facility]                    

 

                HEMATOLOGY                            Eosinophils #    0.0                            

0.0 - 0.5                            2014                            Normal    

                                                      [facility]                 

   

 

                HEMATOLOGY                            Lymphocytes #    1.1                            

1.0 - 5.5                            2014                            Normal    

                                                      [facility]                 

   

 

                HEMATOLOGY                            Monocytes #     0.8                            0.0

 - 0.8                            2014                            Normal       

                                                      [facility]                    



 

                HEMATOLOGY                            Basophils       0.2                            0.0 

- 1.0                            2014                            Normal        

                                                      [facility]                    

 

                HEMATOLOGY                            Segs-Bands #    7.5                            1.5

 - 8.1                            2014                            Normal       

                                                      [facility]                    



 

                HEMATOLOGY                            Monocytes       8.4                            2.0 

- 12.0                            2014                            Normal       

                                                      [facility]                    



 

                HEMATOLOGY                            Eosinophils     0.4                            0.0

 - 4.0                            2014                            Normal       

                                                      [facility]                    



 

                HEMATOLOGY                            Segs            79.5                            45.0 - 75.0

                            2014                            HI                 

                                                      [facility]                    

 

                    URINALYSIS                            UA Hyal Cast        0-2 

                    (2014 10:35:18)                              0 - 2                            

2014                            Normal                                         

                                        [facility]                    

 

                    URINALYSIS                            UA Sq Epi           Rare /LPF                        

                    Few                            2014                            Normal        

                                                      [facility]                    

 

                    URINALYSIS                            UA Leuk Est         Negative 

                          (2014 10:35:18)                              Negative                       

                    2014                            Normal                                      

                                        [facility]                    

 

                    URINALYSIS                            UA Urobilinogen     0.2                        

                    0.1 - 1.0                            2014                            Normal  

                                                      [facility]                   

 

 

                    URINALYSIS                            UA Nitrite          Negative 

                          (2014 10:35:18)                              Negative                       

                    2014                            Normal                                      

                                        [facility]                    

 

                    URINALYSIS                            UA Protein          30 mg/dL                        

                    Negative                            2014                            ABN      

                                                      [facility]                    

 

                    URINALYSIS                            UA Bili             Negative 

*NA*

                          (2014 10:35:18)                              Negative                       

                    2014                                                                        

                                        [facility]                    

 

                    URINALYSIS                            UA Blood            Negative 

                          (2014 10:35:18)                              Negative                       

                    2014                            Normal                                      

                                        [facility]                    

 

                URINALYSIS                            UA pH           6.0                            5.0 - 8.0

                            2014                            Normal             

                                                      [facility]                    

 

                    URINALYSIS                            UA Glucose          Negative 

                          (2014 10:35:18)                              Negative                       

                    2014                            Normal                                      

                                        [facility]                    

 

                    URINALYSIS                            UA Ketones          Negative 

*NA*

                          (2014 10:35:18)                              Negative                       

                    2014                                                                        

                                        [facility]                    

 

                    URINALYSIS                            UA Turbidity        Clear 

                    (2014 10:35:18)                              Clear                            

2014                            Normal                                         

                                        [facility]                    

 

                    URINALYSIS                            UA Spec Grav        1.020                         

                    <=1.030                            2014                            Normal     

                                                      [facility]                    

 

                    URINALYSIS                            UA Color            Yellow 

*NA*

                          (2014 10:35:18)                              Yellow                         

                    2014                                                                          

                                        [facility]                    

 

                    IMMUNOLOGY                            CDC HIV 4th GEN     Negative 

                          (2014 10:05:00)                              Negative                       

                    2014                            Normal                                      

                                        [facility]                    

 

                CHEMISTRY                            proBNP          6468                            0 - 125

                            2014                            HI                 

                                                      [facility]                    

 

                CHEMISTRY                            CK-MB INDEX     0.7                            0.0

 - 2.5                            2014                            Normal       

                                                      [facility]                    

 

                CHEMISTRY                            Lipase Lvl      129                            73 -

 393                            2014                            Normal         

                                                      [facility]                    

 

                CHEMISTRY                            eGFR            112                                      

                    2014                                                        <sup>1</sup>Result

 Comment: The eGFR is calculated using the CKD-EPI formula. In most young, 
healthy individuals the eGFR will be >90 mL/min/1.73m2. The eGFR declines with 
age. An eGFR of 60-89 may be normal in some populations, particularly the 
elderly, for whom the CKD-EPI formula has not been extensively validated. Use of
 the eGFR is not recommended in the following populations:&
lt;br/><br/>Individuals with unstable creatinine concentrations, including 
pregnant patients and those with serious co-morbid conditions.<br/><br/>Patients
 with extremes in muscle mass or diet. <br/><br/>The data above are obtained 
from the National Kidney Disease Education Program (NKDEP) which additionally 
recommends that when the eGFR is used in patients with extremes of body mass 
index for purposes of drug dosing, the eGFR should be multiplied by the 
estimated BMI.                            [facility]                    

 

                CHEMISTRY                            Total Protein    7.2                            6.4

 - 8.4                            2014                            Normal       

                                                      [facility]                    

 

                CHEMISTRY                            B/C Ratio       21                            6 - 25

                            2014                            Normal             

                                                      [facility]                    

 

                CHEMISTRY                            Alk Phos        151                            39 - 136

                            2014                            HI                 

                                                      [facility]                    

 

                CHEMISTRY                            Globulin        3.5                            2.0 - 

4.0                            2014                            Normal          

                                                      [facility]                    

 

                CHEMISTRY                            Albumin Lvl     3.7                            3.5

 - 5.0                            2014                            Normal       

                                                      [facility]                    

 

                CHEMISTRY                            A/G Ratio       1.1                            0.7 -

 1.6                            2014                            Normal         

                                                      [facility]                    

 

                    CHEMISTRY                            ASPARTATE TRANSAMINASE    40                   

                    0 - 37                            2014                            HI    

                                                      [facility]                    

 

                CHEMISTRY                            CO2             28                            24 - 32     

                          2014                            Normal                  

                                                      [facility]                    

 

                CHEMISTRY                            AGAP            13.5                            10.0 - 20.0

                            2014                            Normal             

                                                      [facility]                    

 

                CHEMISTRY                            Calcium Lvl     8.5                            8.5

 - 10.5                            2014                            Normal      

                                                      [facility]                    

 

                    CHEMISTRY                            ALANINE AMINOTRANSFERASE    33                 

                    0 - 65                            2014                            Normal

                                                        [facility]                 

   

 

                CHEMISTRY                            Bili Total      1.1                            0.2 

- 1.3                            2014                            Normal        

                                                      [facility]                    

 

                CHEMISTRY                            Sodium Lvl      136                            135 

- 145                            2014                            Normal        

                                                      [facility]                    

 

                CHEMISTRY                            Potassium Lvl    5.5                            3.5

 - 5.1                            2014                            HI           

                                                      [facility]                    

 

                CHEMISTRY                            Chloride Lvl    100                            95

 - 109                            2014                            Normal       

                                                      [facility]                    

 

                CHEMISTRY                            Creatinine Lvl    0.7                            

0.5 - 1.4                            2014                            Normal    

                                                      [facility]                    

 

                CHEMISTRY                            Glucose Lvl     214                            70 

- 99                            2014                            HI             

                                        <sup>2</sup>Interpretive Data: Adult reference range values reflect 

the clinical guidelines<br/>of the American Diabetes Association.               
                                        [facility]                    

 

                CHEMISTRY                            BUN             15                            7 - 22      

                          2014                            Normal                   

                                                      [facility]                    

 

                CHEMISTRY                            Troponin-I      0.02                            0.00

 - 0.40                            2014                            Normal      

                                                      [facility]                    

 

                CHEMISTRY                            Total CK        169                            12 - 191

                            2014                            Normal             

                                                      [facility]                    

 

                CHEMISTRY                            CK MB           1.1                            0.5 - 3.6

                            2014                            Normal             

                                                      [facility]                    

 

                CHEMISTRY                            Magnesium Lvl    1.8                            1.8

 - 2.4                            2014                            Normal       

                                                      [facility]                    

 

                    HEMATOLOGY                            Anisocyte           1+ 

*ABN*

                          (2014 09:30:00)                              None Seen                      

                    2014                            ABN                                        

                                        [facility]                    

 

                HEMATOLOGY                            Basophils #     0.0                            0.0

 - 0.2                            2014                            Normal       

                                                      [facility]                    

 

                HEMATOLOGY                            Lymphocytes #    1.1                            

1.0 - 5.5                            2014                            Normal    

                                                      [facility]                    

 

                HEMATOLOGY                            Eosinophils #    0.1                            

0.0 - 0.5                            2014                            Normal    

                                                      [facility]                    

 

                HEMATOLOGY                            Monocytes #     0.5                            0.0

 - 0.8                            2014                            Normal       

                                                      [facility]                    

 

                HEMATOLOGY                            Lymphocytes     11.0                            20.0

 - 40.0                            2014                            LOW         

                                                      [facility]                    

 

                HEMATOLOGY                            Basophils       0.1                            0.0 

- 1.0                            2014                            Normal        

                                                      [facility]                    

 

                HEMATOLOGY                            Segs-Bands #    8.1                            1.5

 - 8.1                            2014                            Normal       

                                                      [facility]                    

 

                HEMATOLOGY                            Segs            82.5                            45.0 - 75.0

                            2014                            HI                 

                                                      [facility]                    

 

                HEMATOLOGY                            Eosinophils     0.9                            0.0

 - 4.0                            2014                            Normal       

                                                      [facility]                    

 

                HEMATOLOGY                            Monocytes       5.5                            2.0 

- 12.0                            2014                            Normal       

                                                      [facility]                    

 

                HEMATOLOGY                            WBC X 10x3      9.8                            3.7

 - 10.4                            2014                            Normal      

                                                      [facility]                    

 

                HEMATOLOGY                            Platelet        234                            133 -

 450                            2014                            Normal         

                                                      [facility]                            MPV             10.0                            7.4 - 10.4

                            2014                            Normal             

                                                      [facility]                            MCHC            33.0                            32.0 - 36.0

                            2014                            Normal             

                                                      [facility]                            Hgb             14.4                            14.0 - 18.0

                            2014                            Normal             

                                                      [facility]                    

 

                HEMATOLOGY                            RDW             19.1                            11.5 - 14.5

                            2014                            HI                 

                                                      [facility]                    

 

                HEMATOLOGY                            MCH             26.4                            27.0 - 31.0

                            2014                            LOW                

                                                      [facility]                    

 

                HEMATOLOGY                            Hct             43.8                            42.0 - 54.0

                            2014                            Normal             

                                                      [facility]                    

 

                HEMATOLOGY                            MCV             79.8                            80.0 - 94.0

                            2014                            LOW                

                                                      [facility]                    

 

                HEMATOLOGY                            RBC X 10x6      5.48                            4.70

 - 6.10                            2014                            Normal      

                                                      [facility]                    

 

                HEMATOLOGY                            aPTT            27.5                            22.9 - 35.8

                            2014                            Normal             

                                        <sup>4</sup>Interpretive Data: Heparin Therapeutic Range:  57 - 92 Seconds

                                        [facility]                    

 

                HEMATOLOGY                            PROTIME         15.0                            12.0 

- 14.7                            2014                            HI           

                                                      [facility]                    

 

                HEMATOLOGY                            INR             1.19                            0.85 - 1.17

                            2014                            [facility]                 

                                        <sup>3</sup>Interpretive Data: RECOMMENDED RANGES FOR PROTIME INR:<br/> 

  2.0-3.0 for most medical and surgical thromboembolic states.<br/>   2.5-3.5 
for artificial heart valves and recurrent embolism.<br/><br/>INR SHOULD BE USED 
ONLY FOR PATIENTS ON STABLE ANTICOAGULANT THERAPY.                            [facility]

                    

 

                    BEDSIDE GLUCOSE TESTING                            Glucose POC         175               

                    70 - 99                            01/15/2014                            [facility]

                                        <sup>2</sup>Interpretive Data:  Upper Reportable

 Limit: 200 mg/dL.                            [facility]                    

 

                    BEDSIDE GLUCOSE TESTING                            Glucose POC         141               

                    70 - 99                            01/15/2014                            [facility]

                                        <sup>3</sup>Interpretive Data:  Upper Reportable

 Limit: 200 mg/dL.                            [facility]                    

 

                    BEDSIDE GLUCOSE TESTING                            Gluc POC Comment 1    Notified RN/MD

                                                        01/15/2014                   

                                                                            [facility]          

          

 

                    BEDSIDE GLUCOSE TESTING                            Glucose POC         146               

                    70 - 99                            01/15/2014                            [facility]

                                        <sup>4</sup>Interpretive Data:  Upper Reportable

 Limit: 200 mg/dL.                            [facility]                    

 

                    BEDSIDE GLUCOSE TESTING                            Gluc POC Comment 1    Notified RN/MD

                                                        2014                   

                                                                            [facility]          

          

 

                    BEDSIDE GLUCOSE TESTING                            Gluc POC Comment 1    Notified RN/MD

                                                        2014                   

                                                                            [facility]          

          

 

                CHEMISTRY                            Ca Ion WB       1.00                            1.05

 - 1.25                            2014                            LOW         

                                                      [facility]                    

 

                CHEMISTRY                            Ca Norm WB      1.02                            1.05

 - 1.25                            2014                            LOW         

                                                      [facility]                    

 

                CHEMISTRY                            Magnesium Lvl    2.0                            1.8

 - 2.4                            2014                            Normal       

                                                      [facility]                    

 

                CHEMISTRY                            Bili Indirect    0.7                            0.0

 - 1.0                            2014                            Normal       

                                                      [facility]                    

 

                CHEMISTRY                            Globulin        2.7                            2.0 - 

4.0                            2014                            Normal          

                                                      [facility]                    

 

                CHEMISTRY                            A/G Ratio       1.2                            0.7 -

 1.6                            2014                            Normal         

                                                      [facility]                    

 

                CHEMISTRY                            Alk Phos        140                            39 - 136

                            2014                            [facility]                    

 

                    CHEMISTRY                            ALANINE AMINOTRANSFERASE    27                 

                    0 - 65                            2014                            Normal

                                                        [facility]                 

   

 

                CHEMISTRY                            Albumin Lvl     3.2                            3.5

 - 5.0                            2014                            LOW          

                                                      [facility]                    

 

                CHEMISTRY                            Bili Total      1.4                            0.2 

- 1.3                            2014                            [facility]                    

 

                CHEMISTRY                            Total Protein    5.9                            6.4

 - 8.4                            2014                            LOW          

                                                      [facility]                    

 

                CHEMISTRY                            Bili Direct     0.7                            0.0

 - 0.3                            2014                            [facility]                    

 

                    CHEMISTRY                            ASPARTATE TRANSAMINASE    23                   

                    0 - 37                            2014                            Normal

                                                        [facility]                 

   

 

                CHEMISTRY                            Phosphorus      3.5                            2.5 

- 4.5                            2014                            Normal        

                                                      [facility]                    

 

                CHEMISTRY                            eGFR            73                                       

                    2014                                                        <sup>5</sup>Result

 Comment: The eGFR is calculated using the CKD-EPI formula. In most young, 
healthy individuals the eGFR will be >90 mL/min/1.73m2. The eGFR declines with 
age. An eGFR of 60-89 may be normal in some populations, particularly the 
elderly, for whom the CKD-EPI formula has not been extensively validated. Use of
 the eGFR is not recommended in the following populations:&
lt;br/><br/>Individuals with unstable creatinine concentrations, including 
pregnant patients and those with serious co-morbid conditions.<br/><br/>Patients
 with extremes in muscle mass or diet. <br/><br/>The data above are obtained 
from the National Kidney Disease Education Program (NKDEP) which additionally 
recommends that when the eGFR is used in patients with extremes of body mass 
index for purposes of drug dosing, the eGFR should be multiplied by the 
estimated BMI.                            [facility]                    

 

                CHEMISTRY                            Calcium Lvl     7.8                            8.5

 - 10.5                            2014                            LOW         

                                                      [facility]                    

 

                CHEMISTRY                            Sodium Lvl      142                            135 

- 145                            2014                            Normal        

                                                      [facility]                    

 

                CHEMISTRY                            CO2             30                            24 - 32     

                          2014                            Normal                  

                                                      [facility]                    

 

                CHEMISTRY                            Chloride Lvl    104                            95

 - 109                            2014                            Normal       

                                                      [facility]                    

 

                CHEMISTRY                            Potassium Lvl    3.7                            3.5

 - 5.1                            2014                            Normal       

                                                      [facility]                    

 

                CHEMISTRY                            Creatinine Lvl    1.2                            

0.5 - 1.4                            2014                            Normal    

                                                      [facility]                    

 

                CHEMISTRY                            Glucose Lvl     115                            70 

- 99                            2014                            [facility]             

                                        <sup>8</sup>Interpretive Data: Adult reference range values reflect 

the clinical guidelines<br/>of the American Diabetes Association.               
                                        [facility]                    

 

                CHEMISTRY                            BUN             21                            7 - 22      

                          2014                            Normal                   

                                                      [facility]                    

 

                CHEMISTRY                            AGAP            11.7                            10.0 - 20.0

                            2014                            Normal             

                                                      [facility]                    

 

                HEMATOLOGY                            Platelet        221                            133 -

 450                            2014                            Normal         

                                                      [facility]                    

 

                HEMATOLOGY                            MPV             9.1                            7.4 - 10.4

                            2014                            Normal             

                                                      [facility]                    

 

                HEMATOLOGY                            RDW             18.3                            11.5 - 14.5

                            2014                            HI                 

                                                      [facility]                    

 

                HEMATOLOGY                            MCHC            32.1                            32.0 - 36.0

                            2014                            Normal             

                                                      [facility]                    

 

                HEMATOLOGY                            MCV             79.9                            80.0 - 94.0

                            2014                            LOW                

                                                      [facility]                    

 

                HEMATOLOGY                            MCH             25.7                            27.0 - 31.0

                            2014                            LOW                

                                                      [facility]                    

 

                HEMATOLOGY                            WBC X 10x3      6.6                            3.7

 - 10.4                            2014                            Normal      

                                                      [facility]                    

 

                HEMATOLOGY                            RBC X 10x6      5.29                            4.70

 - 6.10                            2014                            Normal      

                                                      [facility]                    

 

                HEMATOLOGY                            Hgb             13.6                            14.0 - 18.0

                            2014                            LOW                

                                                      [facility]                    

 

                HEMATOLOGY                            Hct             42.3                            42.0 - 54.0

                            2014                            Normal             

                                                      [facility]                    

 

                HEMATOLOGY                            INR             1.40                            0.85 - 1.17

                            2014                            [facility]                 

                                        <sup>13</sup>Interpretive Data: RECOMMENDED RANGES FOR PROTIME INR:<br/>

   2.0-3.0 for most medical and surgical thromboembolic states.<br/>   2.5-3.5 
for artificial heart valves and recurrent embolism.<br/><br/>INR SHOULD BE USED 
ONLY FOR PATIENTS ON STABLE ANTICOAGULANT THERAPY.                            [facility]

                    

 

                HEMATOLOGY                            PROTIME         17.0                            12.0 

- 14.7                            2014                            [facility]                    

 

                HEMATOLOGY                            MCHC            32.9                            32.0 - 36.0

                            2014                            Normal             

                                                      [facility]                    

 

                HEMATOLOGY                            RDW             19.0                            11.5 - 14.5

                            2014                            [facility]                    

 

                HEMATOLOGY                            RBC X 10x6      5.02                            4.70

 - 6.10                            2014                            Normal      

                                                      [facility]                    

 

                HEMATOLOGY                            WBC X 10x3      6.6                            3.7

 - 10.4                            2014                            Normal      

                                                      [facility]                    

 

                HEMATOLOGY                            MCV             78.8                            80.0 - 94.0

                            2014                            LOW                

                                                      [facility]                    

 

                HEMATOLOGY                            MCH             25.9                            27.0 - 31.0

                            2014                            [facility]                    

 

                HEMATOLOGY                            Hct             39.6                            42.0 - 54.0

                            2014                            LOW                

                                                      [facility]                    

 

                HEMATOLOGY                            Hgb             13.0                            14.0 - 18.0

                            2014                            LOW                

                                                      [facility]                    

 

                HEMATOLOGY                            Platelet        242                            133 -

 450                            2014                            Normal         

                                                      [facility]                    

 

                HEMATOLOGY                            MPV             9.3                            7.4 - 10.4

                            2014                            Normal             

                                                      [facility]                    

 

                CHEMISTRY                            Magnesium Lvl    1.7                            1.8

 - 2.4                            2014                            LOW          

                                                      [facility]                    

 

                CHEMISTRY                            Phosphorus      4.0                            2.5 

- 4.5                            2014                            Normal        

                                                      [facility]                    

 

                CHEMISTRY                            Bili Indirect    1.2                            0.0

 - 1.0                            2014                            [facility]                    

 

                CHEMISTRY                            Globulin        2.7                            2.0 - 

4.0                            2014                            Normal          

                                                      [facility]                    

 

                CHEMISTRY                            A/G Ratio       1.3                            0.7 -

 1.6                            2014                            Normal         

                                                      [facility]                    

 

                    CHEMISTRY                            ASPARTATE TRANSAMINASE    32                   

                    0 - 37                            2014                            Normal

                                                        [facility]                 

   

 

                    CHEMISTRY                            ALANINE AMINOTRANSFERASE    30                 

                    0 - 65                            2014                            Normal

                                                        [facility]                 

   

 

                CHEMISTRY                            Albumin Lvl     3.4                            3.5

 - 5.0                            2014                            LOW          

                                                      [facility]                    

 

                CHEMISTRY                            Alk Phos        149                            39 - 136

                            2014                            [facility]                    

 

                CHEMISTRY                            Bili Direct     1.3                            0.0

 - 0.3                            2014                            [facility]                    

 

                CHEMISTRY                            Bili Total      2.5                            0.2 

- 1.3                            2014                            [facility]                    

 

                CHEMISTRY                            Total Protein    6.1                            6.4

 - 8.4                            2014                            [facility]                    

 

                CHEMISTRY                            Ca Ion WB       1.01                            1.05

 - 1.25                            2014                            [facility]                    

 

                CHEMISTRY                            Ca Norm WB      1.00                            1.05

 - 1.25                            2014                            LOW         

                                                      [facility]                    

 

                CHEMISTRY                            eGFR            59                                       

                    2014                                                        <sup>6</sup>Result

 Comment: The eGFR is calculated using the CKD-EPI formula. In most young, 
healthy individuals the eGFR will be >90 mL/min/1.73m2. The eGFR declines with 
age. An eGFR of 60-89 may be normal in some populations, particularly the 
elderly, for whom the CKD-EPI formula has not been extensively validated. Use of
 the eGFR is not recommended in the following populations:&
lt;br/><br/>Individuals with unstable creatinine concentrations, including 
pregnant patients and those with serious co-morbid conditions.<br/><br/>Patients
 with extremes in muscle mass or diet. <br/><br/>The data above are obtained 
from the National Kidney Disease Education Program (NKDEP) which additionally 
recommends that when the eGFR is used in patients with extremes of body mass 
index for purposes of drug dosing, the eGFR should be multiplied by the 
estimated BMI.                            [facility]                    

 

                CHEMISTRY                            Potassium Lvl    4.2                            3.5

 - 5.1                            2014                            Normal       

                                                      [facility]                    

 

                CHEMISTRY                            Calcium Lvl     8.3                            8.5

 - 10.5                            2014                            LOW         

                                                      [facility]                    

 

                CHEMISTRY                            AGAP            15.2                            10.0 - 20.0

                            2014                            Normal             

                                                      [facility]                    

 

                CHEMISTRY                            CO2             28                            24 - 32     

                          2014                            Normal                  

                                                      [facility]                    

 

                CHEMISTRY                            Chloride Lvl    99                            95 

- 109                            2014                            Normal        

                                                      [facility]                    

 

                CHEMISTRY                            Sodium Lvl      138                            135 

- 145                            2014                            Normal        

                                                      [facility]                    

 

                CHEMISTRY                            BUN             25                            7 - 22      

                          2014                            [facility]                    

 

                CHEMISTRY                            Creatinine Lvl    1.4                            

0.5 - 1.4                            2014                            Normal    

                                                      [facility]                    

 

                CHEMISTRY                            Glucose Lvl     133                            70 

- 99                            2014                            [facility]             

                                        <sup>9</sup>Interpretive Data: Adult reference range values reflect 

the clinical guidelines<br/>of the American Diabetes Association.               
                                        [facility]                    

 

                HEMATOLOGY                            PROTIME         19.3                            12.0 

- 14.7                            2014                            [facility]           

                                                      [facility]                    

 

                HEMATOLOGY                            INR             1.65                            0.85 - 1.17

                            2014                            [facility]                 

                                        <sup>14</sup>Interpretive Data: RECOMMENDED RANGES FOR PROTIME INR:<br/>

   2.0-3.0 for most medical and surgical thromboembolic states.<br/>   2.5-3.5 
for artificial heart valves and recurrent embolism.<br/><br/>INR SHOULD BE USED 
ONLY FOR PATIENTS ON STABLE ANTICOAGULANT THERAPY.                            [facility]

                    

 

                CHEMISTRY                            Phosphorus      4.8                            2.5 

- 4.5                            2014                            [facility]            

                                                      [facility]                    

 

                CHEMISTRY                            eGFR            79                                       

                    2014                                                        <sup>7</sup>Result

 Comment: The eGFR is calculated using the CKD-EPI formula. In most young, 
healthy individuals the eGFR will be >90 mL/min/1.73m2. The eGFR declines with 
age. An eGFR of 60-89 may be normal in some populations, particularly the 
elderly, for whom the CKD-EPI formula has not been extensively validated. Use of
 the eGFR is not recommended in the following populations:&
lt;br/><br/>Individuals with unstable creatinine concentrations, including 
pregnant patients and those with serious co-morbid conditions.<br/><br/>Patients
 with extremes in muscle mass or diet. <br/><br/>The data above are obtained 
from the National Kidney Disease Education Program (NKDEP) which additionally 
recommends that when the eGFR is used in patients with extremes of body mass 
index for purposes of drug dosing, the eGFR should be multiplied by the 
estimated BMI.                            [facility] [facility]                    

 

                CHEMISTRY                            Sodium Lvl      140                            135 

- 145                            2014                            Normal        

                                                      [facility]                    

 

                CHEMISTRY                            Creatinine Lvl    1.1                            

0.5 - 1.4                            2014                            Normal    

                                                      [facility]                    

 

                CHEMISTRY                            BUN             19                            7 - 22      

                          2014                            Normal                   

                                                      [facility]                    

 

                CHEMISTRY                            Glucose Lvl     71                            70 -

 99                            2014                            Normal          

                                        <sup>10</sup>Interpretive Data: Adult reference range values reflect

 the clinical guidelines<br/>of the [employer].              
                                        [facility]                    

 

                CHEMISTRY                            Calcium Lvl     8.6                            8.5

 - 10.5                            2014                            Normal      

                                                      [facility]                    

 

                CHEMISTRY                            Potassium Lvl    3.8                            3.5

 - 5.1                            2014                            Normal       

                                                      [facility]                    

 

                CHEMISTRY                            CO2             29                            24 - 32     

                          2014                            Normal                  

                                                      [facility]                    

 

                CHEMISTRY                            Chloride Lvl    100                            95

 - 109                            2014                            Normal       

                                                      [facility]                    

 

                CHEMISTRY                            AGAP            14.8                            10.0 - 20.0

                            2014                            Normal             

                                                      [facility]                    

 

                CHEMISTRY                            Ca Norm WB      1.00                            1.05

 - 1.25                            2014                            LOW         

                                                      [facility]                    

 

                CHEMISTRY                            Ca Ion WB       1.03                            1.05

 - 1.25                            2014                            LOW         

                                                      [facility]                    

 

                CHEMISTRY                            Magnesium Lvl    1.9                            1.8

 - 2.4                            2014                            Normal       

                                                      [facility]                    

 

                    CHEMISTRY                            ALANINE AMINOTRANSFERASE    31                 

                    0 - 65                            2014                            Normal

                                                        [facility]                 

   

 

                CHEMISTRY                            Bili Total      2.5                            0.2 

- 1.3                            2014                            HI            

                                                      [facility]                    

 

                CHEMISTRY                            Albumin Lvl     3.4                            3.5

 - 5.0                            2014                            LOW          

                                                      [facility]                    

 

                CHEMISTRY                            Bili Direct     1.3                            0.0

 - 0.3                            2014                            [facility]                    

 

                CHEMISTRY                            Alk Phos        148                            39 - 136

                            2014                            [facility]                    

 

                CHEMISTRY                            Total Protein    6.0                            6.4

 - 8.4                            2014                            LOW          

                                                      [facility]                    

 

                    CHEMISTRY                            ASPARTATE TRANSAMINASE    38                   

                    0 - 37                            2014                            [facility]                    

 

                CHEMISTRY                            A/G Ratio       1.3                            0.7 -

 1.6                            2014                            Normal         

                                                      [facility]                    

 

                CHEMISTRY                            Globulin        2.6                            2.0 - 

4.0                            2014                            Normal          

                                                      [facility]                    

 

                CHEMISTRY                            Bili Indirect    1.2                            0.0

 - 1.0                            2014                            [facility]                    

 

                HEMATOLOGY                            INR             1.77                            0.85 - 1.17

                            2014                            [facility]                 

                                        <sup>15</sup>Interpretive Data: RECOMMENDED RANGES FOR PROTIME INR:<br/>

   2.0-3.0 for most medical and surgical thromboembolic states.<br/>   2.5-3.5 
for artificial heart valves and recurrent embolism.<br/><br/>INR SHOULD BE USED 
ONLY FOR PATIENTS ON STABLE ANTICOAGULANT THERAPY.                            [facility]

                    

 

                HEMATOLOGY                            PROTIME         20.3                            12.0 

- 14.7                            2014                            [facility]                    

 

                HEMATOLOGY                            WBC X 10x3      7.7                            3.7

 - 10.4                            2014                            Normal      

                                                      [facility]                    

 

                HEMATOLOGY                            MPV             9.3                            7.4 - 10.4

                            2014                            Normal             

                                                      [facility]                    

 

                HEMATOLOGY                            Platelet        254                            133 -

 450                            2014                            Normal         

                                                      [facility]                    

 

                HEMATOLOGY                            MCHC            31.7                            32.0 - 36.0

                            2014                            LOW                

                                                      [facility]                    

 

                HEMATOLOGY                            MCH             25.4                            27.0 - 31.0

                            2014                            LOW                

                                                      [facility]                    

 

                HEMATOLOGY                            RDW             19.2                            11.5 - 14.5

                            2014                            HI                 

                                                      [facility]                    

 

                HEMATOLOGY                            MCV             80.1                            80.0 - 94.0

                            2014                            Normal             

                                                      [facility]                    

 

                HEMATOLOGY                            RBC X 10x6      5.40                            4.70

 - 6.10                            2014                            Normal      

                                                      [facility]                    

 

                HEMATOLOGY                            Hgb             13.7                            14.0 - 18.0

                            2014                            LOW                

                                                      [facility]                    

 

                HEMATOLOGY                            Hct             43.3                            42.0 - 54.0

                            2014                            Normal             

                                                      [facility]                    

 

                    HEMATOLOGY                            Anisocyte           1+ 

*ABN*

                          (01/10/2014 05:30:58)                              None Seen                      

                    01/10/2014                            ABN                                        

                                        [facility]                    

 

                    HEMATOLOGY                            Target Cell         Slight 

*ABN*

                          (01/10/2014 05:30:58)                              None Seen                      

                    01/10/2014                            ABN                                        

                                        [facility]                    

 

                    HEMATOLOGY                            Plt Morph           Normal 

                    (01/10/2014 05:30:58)                                                          01/10/2014

                            Normal                                                   

                                        [facility]                    

 

                HEMATOLOGY                            Monocytes #     0.9                            0.0

 - 0.8                            01/10/2014                            [facility]                    

 

                HEMATOLOGY                            Lymphocytes #    1.0                            

1.0 - 5.5                            01/10/2014                            Normal    

                                                      [facility]                    

 

                HEMATOLOGY                            Eosinophils #    0.1                            

0.0 - 0.5                            01/10/2014                            Normal    

                                                      [facility]                    

 

                HEMATOLOGY                            Basophils #     0.0                            0.0

 - 0.2                            01/10/2014                            Normal       

                                                      [facility]                    

 

                HEMATOLOGY                            Basophils       0.3                            0.0 

- 1.0                            01/10/2014                            Normal        

                                                      [facility]                    

 

                HEMATOLOGY                            Segs-Bands #    6.2                            1.5

 - 8.1                            01/10/2014                            Normal       

                                                      [facility]                    

 

                HEMATOLOGY                            Eosinophils     1.3                            0.0

 - 4.0                            01/10/2014                            Normal       

                                                      [facility]                    

 

                HEMATOLOGY                            Monocytes       10.6                            2.0

 - 12.0                            01/10/2014                            Normal      

                                                      [facility]                    

 

                HEMATOLOGY                            Lymphocytes     12.7                            20.0

 - 40.0                            01/10/2014                            LOW         

                                                      [facility]                    

 

                HEMATOLOGY                            Segs            75.1                            45.0 - 75.0

                            01/10/2014                            [facility]                    

 

                    URINALYSIS                            UA Nitrite          Negative 

                          (2014 12:50:32)                              Negative                       

                    2014                            Normal                                      

                                        [facility]                    

 

                    URINALYSIS                            UA Blood            Negative 

                          (2014 12:50:32)                              Negative                       

                    2014                            Normal                                      

                                        [facility]                    

 

                    URINALYSIS                            UA Urobilinogen     2.0                        

                    0.1 - 1.0                            2014                            HI      

                                                      [facility]                    

 

                    URINALYSIS                            UA Leuk Est         Negative 

                          (2014 12:50:32)                              Negative                       

                    2014                            Normal                                      

                                        [facility]                    

 

                    URINALYSIS                            Micro?              Performed 

*NA*

                    (2014 12:50:32)                                                          2014

                                                                                    [facility]                    

 

                URINALYSIS                            UA Mucus        Few /LPF                            

None Seen                            2014                                      

                                                      [facility]                    

 

                URINALYSIS                            UA RBC          8                            0 - 2    

                          2014                            HI                     

                                                      [facility]                    

 

                URINALYSIS                            UA WBC          <1                            0 - 5   

                          2014                            Normal                

                                                      [facility]                    

 

                    URINALYSIS                            UA Sq Epi           Occasional /LPF                  

                    Few                            2014                                    

                                                      [facility]                    

 

                URINALYSIS                            UA pH           7.5                            5.0 - 8.0

                            2014                            Normal             

                                                      [facility]                    

 

                    URINALYSIS                            UA Protein          10 mg/dL                        

                    Negative                            2014                            ABN      

                                                      [facility]                    

 

                    URINALYSIS                            UA Spec Grav        1.008                         

                    <=1.030                            2014                            Normal     

                                                      [facility]                    

 

                    URINALYSIS                            UA Turbidity        Clear 

                    (2014 12:50:32)                              Clear                            

2014                            Normal                                         

                                        [facility]                    

 

                    URINALYSIS                            UA Color            Yellow 

*NA*

                          (2014 12:50:32)                              Yellow                         

                    2014                                                                          

                                        [facility]                    

 

                    URINALYSIS                            UA Glucose          Negative mg/dL                  

                    Negative                            2014                               

                                                      [facility]                    



 

                    URINALYSIS                            UA Bili             Negative 

*NA*

                          (2014 12:50:32)                              Negative                       

                    2014                                                                        

                                        [facility]                    

 

                    URINALYSIS                            UA Ketones          Negative mg/dL                  

                    Negative                            2014                               

                                                      [facility]                    



 

                CHEMISTRY                            CK MB           1.4                            0.5 - 3.6

                            2014                            Normal             

                                                      [facility]                    

 

                CHEMISTRY                            CK-MB INDEX     0.4                            0.0

 - 2.5                            2014                            Normal       

                                                      [facility]                    

 

                CHEMISTRY                            TSH             2.300                            0.360 - 3.740

                            2014                            Normal             

                                                      [facility]                    

 

                    CHEMISTRY                            LDL (Calculated)    60                         

                    <=99                            2014                            Normal        

                                                      [facility]                    

 

                CHEMISTRY                            CHD Risk        6.69                            4.00 

- 7.30                            2014                            Normal       

                                                      [facility]                    

 

                CHEMISTRY                            Trig            70                            <=149      

                          2014                            Normal                   

                                                      [facility]                    

 

                CHEMISTRY                            Chol            87                            <=199      

                          2014                            Normal                   

                                                      [facility]                    

 

                CHEMISTRY                            HDL             13                            >=61        

                          2014                            LOW                        

                                                      [facility]                    

 

                CHEMISTRY                            Troponin-I      0.05                            0.00

 - 0.40                            2014                            Normal      

                                                      [facility]                    

 

                CHEMISTRY                            Total CK        396                            12 - 191

                            2014                            HI                 

                                                      [facility]                    

 

                HEMATOLOGY                            Segs            74.8                            45.0 - 75.0

                            2014                            Normal             

                                                      [facility]                    

 

                HEMATOLOGY                            Lymphocytes     13.8                            20.0

 - 40.0                            2014                            LOW         

                                                      [facility]                    

 

                HEMATOLOGY                            Eosinophils     0.7                            0.0

 - 4.0                            2014                            Normal       

                                                      [facility]                    

 

                HEMATOLOGY                            Basophils       0.4                            0.0 

- 1.0                            2014                            Normal        

                                                      [facility]                    

 

                HEMATOLOGY                            Monocytes       10.3                            2.0

 - 12.0                            2014                            Normal      

                                                      [facility]                    

 

                HEMATOLOGY                            Lymphocytes #    1.3                            

1.0 - 5.5                            2014                            Normal    

                                                      [facility]                    

 

                HEMATOLOGY                            Monocytes #     1.0                            0.0

 - 0.8                            2014                            HI           

                                                      [facility]                    

 

                HEMATOLOGY                            Basophils #     0.0                            0.0

 - 0.2                            2014                            Normal       

                                                      [facility]                    

 

                HEMATOLOGY                            Segs-Bands #    7.0                            1.5

 - 8.1                            2014                            Normal       

                                                      [facility]                    

 

                HEMATOLOGY                            Eosinophils #    0.1                            

0.0 - 0.5                            2014                            Normal    

                                                      [facility]                    

 

                    BACTERIAL - SEROLOGY                            MRSA by PCR         Negative 1

                    (2014 18:50:51)                                                          2014

                            Normal                            <sup>1</sup>Interpretive

 Data: Interpretive Data: The Roche LightCycler MRSA assay is a qualitative test
 for the direct detection of nasal colonization with methicillin-resistant 
Staphylococcus aureus (MRSA) to aid in the prevention and control of MRSA 
infections in healthcare settings. A positive result does not indicate an 
infection or require treatment. A negative result does not exclude colonization 
or infection.<br/><br/>The polymerase chain reaction (PCR) assay detects a 
proprietary sequence indicative of the integration of the SCCmec cassette into 
the Staphylococcus aureus chromosome, indicating the presence of MRSA DNA.  The 
assay utilizes FDA cleared IVD reagents. Performance characteristics have been 
verified by the Molecular Diagnostic Laboratory within the [facility]. The Molecular Diagnostic Laboratory is authorized under the Clinical 
Laboratory Improvement Amendment of 1988 (CLIA-88) to perform high complexity 
testing.                                [facility]                    

 

                CHEMISTRY                            CK-MB INDEX     0.3                            0.0

 - 2.5                            2014                            Normal       

                                                      [facility]                    

 

                CHEMISTRY                            CK MB           1.6                            0.5 - 3.6

                            2014                            Normal             

                                                      [facility]                    

 

                CHEMISTRY                            Hgb A1C         8.6                            <=5.6  

                          2014                            HI                   

                                                      [facility]                    

 

                CHEMISTRY                            Troponin-I      0.04                            0.00

 - 0.40                            2014                            Normal      

                                                      [facility]                    

 

                CHEMISTRY                            Total CK        491                            12 - 191

                            2014                            [facility]                    

 

                    HEMATOLOGY                            Hex Phos N          Negative 

                          (2014 18:50:00)                              Negative                       

                    2014                            Normal                                      

                                        [facility]                            dRVV            0.99                            <=1.20  

                          2014                            Normal               

                                                      [facility]                    

 

                    HEMATOLOGY                            Lup Interp          Negative for lupus anticoagulant

 by DRVV screen and hexagonal phospholipid neutralization test.  If there is a 
strong clinical suspicion of lupus anticoagulant,   additional testing, to 
include repeat studies at a clinically appropriate interval and anticardiolipin 
antibody assays, is recommended.                                                
            Interpretation performed at [facility].    
                                                      2014                       

                                                                            [facility]                            F5 Leiden Intrp     FACTOR V LEIDEN: Negative  

                                                                                
                                                   INTERPRETATION:              
                                                                                
                                                 Molecular analysis for the 
Factor V Leiden, [id number] mutation was negative.       Other causes of activated 
protein C resistance and hereditary forms of venous  thrombosis are not ruled 
out. Final diagnosis requires correlation with        clinical history and other
 pertinent laboratory findings.                                                 
                                                    Where appropriate, medical 
consultation and/or genetic counseling should be    offered to inform and 
explain the risk implications and genetic implications   of these test results. 
                                                                                
                                          ASSAY LIMITATIONS:                    
                                         The assay uses the FDA-cleared Roche 
Factor V Leiden IVD(Poymerase chain reaction/FRET detection), Roche MagNA Pure L
C Instrument as well as Roche  LightCycler 1.2 Instrument. A 222-bp fragment of 
Factor V gene (FV) containing the Factor V Leiden sequence is amplified in the 
assay. The assay is designed to detect the G [id number] mutation only. Other causes 
of activated protein C resistance and hereditary forms of venous thrombosis are 
to ruled out. However,the melting curve analysis may implicate the presence of 
possible rare mutations at position. 1689, 1692 and 1696 (further testing will 
be recommended in the report). A minimum detection level is 202 copies of Factor
 V Leiden per reaction. The level of agreement between the Factor V Leiden Kit 
and sequence analysis was 99.4%. The test result must be interpreted along with 
the patient's clinical history and other pertinent laboratory data.This assay 
has been validated by [facility].         
                                                2014                              

                                                      [facility]                            F5 Leiden PCR       Negative 

                    (2014 18:50:00)                                                          2014

                            Normal                                                   

                                        [facility]                    

 

                    HEMATOLOGY                            F2 Mut Interp       FACTOR II PT:   Negative     

                                                                                
                                                 INTERPRETATION:                
                                                                                
                                               Molecular analysis for the Factor
 II (Prothrombin) [id number] mutation was       negative.  Other causes of 
elevated prothrombin levels and hereditary forms    of venous thrombosis are not
 ruled out.  Final diagnosis requires correlation  with clinical history and 
other pertinent laboratory findings.                                            
                                                    Where appropriate, medical 
consultation and genetic counseling should be       offered to inform and 
explain the risk implications and genetic implications   of these test results. 
                                                                                
                                                       ASSAY LIMITATIONS:       
                                                                                
                                                        The assay uses the FDA-
cleared Roche Factor II (Prothrombin) [id number] IVD       (Polymerase chain 
reaction/FRET detection), Roche MagNA Pure LC Instrument as  well as Roche 
LightCycler 1.2 Instrument. A 165-bp fragment of Factor II       gene(FII) 
containing the Factor II [id number] sequence is amplified in the        assay. The 
assay is designed to detect the [id number] mutation only. Other        causes of 
elevated prothrombin levels and hereditary forms of venous           thrombosis 
are not ruled out. However, the melting curve analysis may          implicate 
the presence of a possible rare mutation at position 19036 (Further  testing 
will be recommended in the report). A minimum detection level is 198   copies of
 Factor II per reaction. The level of agreement between the Factor    
II(Prothrombin) [id number] Kit and sequence analysis was 98.9%. The test result   
must be interpreted along with the patient's clinical history and other        
pertinent laboratory data. This assay has been validated by [facility] 
[facility].                                                   

                    2014                                                                        

                                        [facility]                            F2 Mutation PCR     Negative 

                    (2014 18:50:00)                                                          2014

                            Normal                                                   

                                        [facility]                            Protein C Func    59                            

72 - 147                            2014                            LOW        

                                                      [facility]                    

 

                HEMATOLOGY                            Protein S Func    94                            

54 - 137                            2014                            Normal     

                                                      [facility]                    

 

                HEMATOLOGY                            AT III Func     65                            77 

- 140                            2014                            LOW           

                                                      [facility]                    

 

                    IMMUNOLOGY                            Beta2-Glycoprotein IgG    <9                  

                    < OR=20                            2014                                

                                        <sup>21</sup>Result Comment: Test Performed at:<br/>[facility]<br/>[id number] [facility]<br/>[place]  [address]     MYRTLE Lovett MD, PhD                            [facility]              

      

 

                    IMMUNOLOGY                            Beta2-Glycoprotein IgM    <9                  

                    < OR=20                            2014                                

                                        <sup>20</sup>Result Comment: Test Performed at:<br/>[facility]<br/>[address] [place] [address]<br/>[place]  [address]     MYRTLE Lovett MD, PhD                            [facility]                            Beta2-Glycoprotein IgA    <9                  

                    < OR=20                            2014                                

                                        <sup>22</sup>Result Comment: Clinical Significance:<br/>The

 Antiphospholipid Antibody Syndrome (APS) is a<br/>clinical pathologic 
correlation that includes a<br/>clinical event (e.g. thrombosis, pregnancy 
loss,<br/>thrombocytopenia) and persistent positive<br/>Antiphospholipid 
Antibodies (IgM or IgG LEXX >40<br/>MPL/GPL, IgM or IgG anti-B2GP1 antibodies, or
 a<br/>Lupus Anticoagulant). The IgA isotype has been<br/>implicated in smaller 
studies, but have not yet<br/>been incorporated into the APS 
criteria.<br/>International consensus guidelines suggest waiting<br/>at least 12
 weeks before retesting to confirm<br/>antibody persistence. Reference J Thromb 
Haemost<br/>2006: 4; 295.<br/>Test Performed at:<br/>[facility]<br/>[address] [place] [address]<br/>[place]  [address]     MYRTLE Lovett MD, PhD                            [facility]                            Cardiolipin IgG     16                         

                                                2014                                              

                                        <sup>18</sup>Interpretive Data: Reference Ranges for IgG:<br/>0-14  GPL -------------------------

 Negative<br/>15-20 GPL ------------------------- Inconclusive<br/>21-80 GPL 
------------------------- Low-Med Positive<br/>> 80  GPL 
------------------------- Strong Positive                            [facility]                            Cardiolipin IgM     4.0                        

                                                2014                                             

                                        <sup>19</sup>Interpretive Data: Reference Ranges for IgM:<br/>0-12.4  MPL

 ------------------------ Negative<br/>12.5-20 MPL ------------------------ 
Inconclusive<br/>21-80   MPL ------------------------ Low-Med Positive<br/>> 80 
   MPL ------------------------ Strong Positive                            [facility]

                    

 

                IMMUNOLOGY                            Cardiolipin IgA    1                            

                            2014                                               

                                        <sup>17</sup>Interpretive Data: Reference Ranges for IgA:<br/>0-11  APL -------------------------

 Negative<br/>12-20 APL ------------------------- Inconclusive<br/>21-80 APL 
------------------------- Low-Med Positive<br/>> 80  APL 
------------------------- Strong Positive                            [facility]  

                  

 

                IMMUNOLOGY                            Homocyst Tot    11.0                            

3.7 - 13.9                            2014                            Normal   

                                                      [facility]                    



 

                    IMMUNOLOGY                            ISRA                 Negative 

                          (2014 18:50:00)                              Negative                       

                    2014                            Normal                                      

                                        [facility]                    

 

                CHEMISTRY                            Total CK        447                            12 - 191

                            2014                            HI                 

                                                      [facility]                    

 

                CHEMISTRY                            Troponin-I      0.04                            0.00

 - 0.40                            2014                            Normal      

                                                      [facility]                    

 

                CHEMISTRY                            CHD Risk        6.40                            4.00 

- 7.30                            2014                            Normal       

                                                      [facility]                    

 

                CHEMISTRY                            Trig            76                            <=149      

                          2014                            Normal                   

                                                      [facility]                    

 

                CHEMISTRY                            Chol            96                            <=199      

                          2014                            Normal                   

                                                      [facility]                    

 

                CHEMISTRY                            HDL             15                            >=61        

                          2014                            LOW                        

                                                      [facility]                    

 

                    CHEMISTRY                            LDL (Calculated)    66                         

                    <=99                            2014                            Normal        

                                                      [facility]                    

 

                CHEMISTRY                            CK MB           1.6                            0.5 - 3.6

                            2014                            Normal             

                                                      [facility]                    

 

                CHEMISTRY                            CK-MB INDEX     0.4                            0.0

 - 2.5                            2014                            Normal       

                                                      [facility]                            U Cannab Scr        Negative 

*NA*

                          (2014 07:25:00)                              Negative                       

                    2014                                                                        

                                        [facility]                            U Opiate Scr        Negative 

*NA*

                          (2014 07:25:00)                              Negative                       

                    2014                                                                        

                                        [facility]                            U Annie Scr          Negative 

*NA*

                          (2014 07:25:00)                              Negative                       

                    2014                                                                        

                                        [facility]                            U Amph Scr          Negative 

*NA*

                          (2014 07:25:00)                              Negative                       

                    2014                                                                        

                                        [facility]                            UDS Note            See Note 12

                    (2014 07:25:00)                                                          2014

                            Normal                            <sup>12</sup>Interpretive

 Data: Drugs reported as positive have not been confirmed by a second 
<br/>method and should be used for medical purposes only. To 
order<br/>confirmation, contact laboratory. <br/><br/>**note: Below are cut-off 
concentrations for all urine drugs of <br/>abuse performed in the laboratory. 
Some drugs listed in the table <br/>may not be included in this 
panel.<br/><br/>Description               Cut-off 
concentration<br/>--------------------------------------------------<br/>Amphetamine
                  1000 ng/mL<br/>Barbiturates                  200 
ng/mL<br/>Benzodiazepines               300 ng/mL<br/>Cocaine metabolites       
    300 ng/mL<br/>Opiates                       300 ng/mL<br/>Phencyclidine     
             25 ng/mL<br/>Propoxyphene                  300 ng/mL<br/>Marijuana 
metabolites          50 ng/mL<br/>Methadone                     300 ng/
mL<br/>Urine alcohol                  20 mg/dL                            [facility]                            U Phencyc Scr       Negative 

*NA*

                          (2014 07:25:00)                              Negative                       

                    2014                                                                        

                                        [facility]                            U Benzodia Scr      Positive 

*ABN*

                          (2014 07:25:00)                              Negative                       

                    2014                            ABN                                         

                                        [facility]                            U Cocaine Scr       Negative 

*NA*

                          (2014 07:25:00)                              Negative                       

                    2014                                                                        

                                        [facility]                    

 

                    URINALYSIS                            UA Glucose          Negative 

                          (2014 07:25:00)                              Negative                       

                    2014                            Normal                                      

                                        [facility]                    

 

                    URINALYSIS                            UA Blood            Negative 

                          (2014 07:25:00)                              Negative                       

                    2014                            Normal                                      

                                        [facility]                    

 

                    URINALYSIS                            UA Bili             Moderate 

*ABN*

                          (2014 07:25:00)                              Negative                       

                    2014                            ABN                                         

                                        [facility]                    

 

                    URINALYSIS                            UA Ketones          Negative 

*NA*

                          (2014 07:25:00)                              Negative                       

                    2014                                                                        

                                        [facility]                    

 

                    URINALYSIS                            UA Turbidity        Clear 

                    (2014 07:25:00)                              Clear                            

2014                            Normal                                         

                                        [facility]                    

 

                    URINALYSIS                            UA Protein          100 mg/dL                       

                    Negative                            2014                            ABN     

                                                      [facility]                    

 

                    URINALYSIS                            UA Spec Grav        >=1.030 

*ABN*

                          (2014 07:25:00)                              <=1.030                        

                    2014                            ABN                                          

                                        [facility]                    

 

                URINALYSIS                            UA pH           6.0                            5.0 - 8.0

                            2014                            Normal             

                                                      [facility]                    

 

                    URINALYSIS                            UA Color            Yellow 

*NA*

                          (2014 07:25:00)                              Yellow                         

                    2014                                                                          

                                        [facility]                    

 

                    URINALYSIS                            UA Leuk Est         Negative 

                          (2014 07:25:00)                              Negative                       

                    2014                            Normal                                      

                                        [facility]                    

 

                    URINALYSIS                            UA Nitrite          Negative 

                          (2014 07:25:00)                              Negative                       

                    2014                            Normal                                      

                                        [facility]                    

 

                    URINALYSIS                            UA Urobilinogen     1.0                        

                    0.1 - 1.0                            2014                            Normal  

                                                      [facility]                   

 

 

                    URINALYSIS                            UA Bacteria         Occasional /HPF                

                    None Seen                            2014                            

Normal                                                        [facility]           

         

 

                    URINALYSIS                            UA Mucus            Moderate /LPF                     

                    None Seen                            2014                            ABN  

                                                      [facility]                   

 

 

                URINALYSIS                            UA RBC          0-2 /HPF                            0 

- 2                            2014                            Normal          

                                                      [facility]                    

 

                URINALYSIS                            UA WBC          0-2 /HPF                            None

 Seen                            2014                            Normal        

                                                      [facility]                    

 

                    URINALYSIS                            Micro?              Performed 

                    (2014 07:25:00)                                                          2014

                            Normal                                                   

                                        [facility]                    

 

                    URINALYSIS                            UA Sq Epi           Occasional /LPF                  

                    Few                            2014                            Normal  

                                                      [facility]                   

 

 

                CHEMISTRY                            B/C Ratio       16                            6 - 25

                            2014                            Normal             

                                                      [facility]                    

 

                CHEMISTRY                            BNP             2033                            <=100     

                          2014                            [facility]                      

                                        <sup>11</sup>Interpretive Data: Elevated results are in line with increasing 

severity of <br/>congestive heart failure. Minor elevations between 100 and 300 
<br/>may be seen with Myocardial Ischemia, Sodium retaining drugs, <br/>and 
compensated/treated heart failure.                            [facility]         

           

 

                HEMATOLOGY                            aPTT            32.6                            22.9 - 35.8

                            2014                            Normal             

                                        <sup>16</sup>Interpretive Data: Heparin Therapeutic Range:  57 - 92 

Seconds                                 [facility]                    

 

                HEMATOLOGY                            Basophils #     0.1                            0.0

 - 0.2                            2014                            Normal       

                                                      [facility]                    

 

                HEMATOLOGY                            Eosinophils #    0.1                            

0.0 - 0.5                            2014                            Normal    

                                                      [facility]                    

 

                HEMATOLOGY                            Segs-Bands #    7.4                            1.5

 - 8.1                            2014                            Normal       

                                                      [facility]                    

 

                HEMATOLOGY                            Monocytes #     0.8                            0.0

 - 0.8                            2014                            Normal       

                                                      [facility]                    

 

                HEMATOLOGY                            Lymphocytes #    0.9                            

1.0 - 5.5                            2014                            LOW       

                                                      [facility]                    

 

                HEMATOLOGY                            Lymphocytes     9.7                            20.0

 - 40.0                            2014                            LOW         

                                                      [facility]                    

 

                HEMATOLOGY                            Monocytes       9.0                            2.0 

- 12.0                            2014                            Normal       

                                                      [facility]                    

 

                HEMATOLOGY                            Basophils       0.8                            0.0 

- 1.0                            2014                            Normal        

                                                      [facility]                    

 

                HEMATOLOGY                            Eosinophils     0.6                            0.0

 - 4.0                            2014                            Normal       

                                                      [facility]                    

 

                HEMATOLOGY                            Segs            79.9                            45.0 - 75.0

                            2014                            HI                 

                                                      [facility]                    

 

                URINALYSIS                            UA Mucus        Few /LPF                            

None Seen                            12/15/2013                            Normal    

                                                      [facility]                    

 

                    URINALYSIS                            UA Hyal Cast        0-2 

                    (12/15/2013 03:15:22)                              0 - 2                            

12/15/2013                            Normal                                         

                                        [facility]                    

 

                    URINALYSIS                            UA Amorph Cathryn      Occasional /HPF             

                          None Seen                            12/15/2013                       

                    ABN                                                        [facility]           

         

 

                    URINALYSIS                            UA Sq Epi           Occasional /LPF                  

                    Few                            12/15/2013                            Normal  

                                                      [facility]                   

 

 

                    URINALYSIS                            Micro?              Performed 

                    (12/15/2013 03:15:22)                                                          12/15/2013

                            Normal                                                   

                                        [facility]                    

 

                    URINALYSIS                            UA Leuk Est         Negative 

                          (12/15/2013 03:15:22)                              Negative                       

                    12/15/2013                            Normal                                      

                                        [facility]                    

 

                URINALYSIS                            UA WBC          0-2 /HPF                            None

 Seen                            12/15/2013                            Normal        

                                                      [facility]                    

 

                    URINALYSIS                            UA RBC              None Seen 

                    (12/15/2013 03:15:22)                              0 - 2                            

12/15/2013                            Normal                                         

                                        [facility]                    

 

                    URINALYSIS                            UA Bacteria         Occasional /HPF                

                    None Seen                            12/15/2013                            

Normal                                                        [facility]           

         

 

                    URINALYSIS                            UA Bili             Small 1

*ABN*

                          (12/15/2013 03:15:22)                              Negative                       

                    12/15/2013                            ABN                            <sup>1</sup>Result

 Comment: Interpret positive bilirubin results with caution. Confirmatory 
testing<br/>not possible due to the unavailability of reagent.  Correlation 
with<br/>serum chemistry results recommended.                            [facility]

                    

 

                    URINALYSIS                            UA Nitrite          Negative 

                          (12/15/2013 03:15:22)                              Negative                       

                    12/15/2013                            Normal                                      

                                        [facility]                    

 

                    URINALYSIS                            UA Urobilinogen     4.0                        

                    0.1 - 1.0                            12/15/2013                            HI      

                                                      [facility]                    

 

                    URINALYSIS                            UA Blood            Negative 

                          (12/15/2013 03:15:22)                              Negative                       

                    12/15/2013                            Normal                                      

                                        [facility]                    

 

                    URINALYSIS                            UA Spec Grav        >=1.030 

*ABN*

                          (12/15/2013 03:15:22)                              <=1.030                        

                    12/15/2013                            ABN                                          

                                        [facility]                    

 

                    URINALYSIS                            UA Turbidity        Clear 

                    (12/15/2013 03:15:22)                              Clear                            

12/15/2013                            Normal                                         

                                        [facility]                    

 

                    URINALYSIS                            UA Color            Yellow 

*NA*

                          (12/15/2013 03:15:22)                              Yellow                         

                    12/15/2013                                                                          

                                        [facility]                    

 

                    URINALYSIS                            UA Protein          >=300 mg/dL                     

                    Negative                            12/15/2013                            ABN   

                                                      [facility]                    



 

                URINALYSIS                            UA pH           6.0                            5.0 - 8.0

                            12/15/2013                            Normal             

                                                      [facility]                    

 

                    URINALYSIS                            UA Ketones          Negative 

*NA*

                          (12/15/2013 03:15:22)                              Negative                       

                    12/15/2013                                                                        

                                        [facility]                    

 

                    URINALYSIS                            UA Glucose          Negative 

                          (12/15/2013 03:15:22)                              Negative                       

                    12/15/2013                            Normal                                      

                                        [facility]                    

 

                CHEMISTRY                            eGFR            71                                       

                    12/15/2013                                                        <sup>2</sup>Result

 Comment: The eGFR is calculated using the CKD-EPI formula. In most young, 
healthy individuals the eGFR will be >90 mL/min/1.73m2. The eGFR declines with 
age. An eGFR of 60-89 may be normal in some populations, particularly the 
elderly, for whom the CKD-EPI formula has not been extensively validated. Use of
 the eGFR is not recommended in the following populations:&
lt;br/><br/>Individuals with unstable creatinine concentrations, including 
pregnant patients and those with serious co-morbid conditions.<br/><br/>Patients
 with extremes in muscle mass or diet. <br/><br/>The data above are obtained 
from the National Kid[employer] Disease Education Program (NKDEP) which additionally 
recommends that when the eGFR is used in patients with extremes of body mass 
index for purposes of drug dosing, the eGFR should be multiplied by the 
estimated BMI.                            [facility]                    

 

                CHEMISTRY                            CO2             25                            24 - 32     

                          12/15/2013                            Normal                  

                                                      [facility]                    

 

                CHEMISTRY                            Chloride Lvl    105                            95

 - 109                            12/15/2013                            Normal       

                                                      [facility]                    

 

                CHEMISTRY                            Potassium Lvl    4.1                            3.5

 - 5.1                            12/15/2013                            Normal       

                                                      [facility]                    

 

                CHEMISTRY                            Sodium Lvl      140                            135 

- 145                            12/15/2013                            Normal        

                                                      [facility]                    

 

                CHEMISTRY                            Calcium Lvl     8.6                            8.5

 - 10.5                            12/15/2013                            Normal      

                                                      [facility]                    

 

                CHEMISTRY                            Glucose Lvl     150                            70 

- 99                            12/15/2013                            [facility]             

                                        <sup>3</sup>Interpretive Data: Adult reference range values reflect 

the clinical guidelines<br/>of the [employer].               
                                        [facility]                    

 

                CHEMISTRY                            Creatinine Lvl    1.2                            

0.5 - 1.4                            12/15/2013                            Normal    

                                                      [facility]                    

 

                CHEMISTRY                            BUN             20                            7 - 22      

                          12/15/2013                            Normal                   

                                                      [facility]                    

 

                CHEMISTRY                            AGAP            14.1                            10.0 - 20.0

                            12/15/2013                            Normal             

                                                      [facility]                    

 

                CHEMISTRY                            Alk Phos        140                            39 - 136

                            12/15/2013                            HI                 

                                                      [facility]                    

 

                    CHEMISTRY                            ALANINE AMINOTRANSFERASE    22                 

                    0 - 65                            12/15/2013                            Normal

                                                        [facility]                 

   

 

                CHEMISTRY                            Albumin Lvl     3.6                            3.5

 - 5.0                            12/15/2013                            Normal       

                                                      [facility]                    

 

                CHEMISTRY                            Total Protein    7.1                            6.4

 - 8.4                            12/15/2013                            Normal       

                                                      [facility]                    

 

                CHEMISTRY                            Bili Direct     0.7                            0.0

 - 0.3                            12/15/2013                            HI           

                                                      [facility]                    

 

                CHEMISTRY                            Bili Total      1.6                            0.2 

- 1.3                            12/15/2013                            HI            

                                                      [facility]                    

 

                    CHEMISTRY                            ASPARTATE TRANSAMINASE    16                   

                    0 - 37                            12/15/2013                            Normal

                                                        [facility]                 

   

 

                CHEMISTRY                            Bili Indirect    0.9                            0.0

 - 1.0                            12/15/2013                            Normal       

                                                      [facility]                    

 

                CHEMISTRY                            A/G Ratio       1.0                            0.7 -

 1.6                            12/15/2013                            Normal         

                                                      [facility]                    

 

                CHEMISTRY                            Globulin        3.5                            2.0 - 

4.0                            12/15/2013                            Normal          

                                                      [facility]                    

 

                CHEMISTRY                            Lipase Lvl      108                            73 -

 393                            12/15/2013                            Normal         

                                                      [facility]                    

 

                HEMATOLOGY                            Platelet        225                            133 -

 450                            12/15/2013                            Normal         

                                                      [facility]                    

 

                HEMATOLOGY                            MCV             84.3                            80.0 - 94.0

                            12/15/2013                            Normal             

                                                      [facility]                    

 

                HEMATOLOGY                            MPV             9.5                            7.4 - 10.4

                            12/15/2013                            Normal             

                                                      [facility]                    

 

                HEMATOLOGY                            RDW             17.4                            11.5 - 14.5

                            12/15/2013                            HI                 

                                                      [facility]                    

 

                HEMATOLOGY                            MCH             26.8                            27.0 - 31.0

                            12/15/2013                            LOW                

                                                      [facility]                    

 

                HEMATOLOGY                            MCHC            31.8                            32.0 - 36.0

                            12/15/2013                            LOW                

                                                      [facility]                    

 

                HEMATOLOGY                            RBC X 10x6      5.47                            4.70

 - 6.10                            12/15/2013                            Normal      

                                                      [facility]                    

 

                HEMATOLOGY                            Hgb             14.7                            14.0 - 18.0

                            12/15/2013                            Normal             

                                                      [facility]                    

 

                HEMATOLOGY                            WBC X 10x3      7.4                            3.7

 - 10.4                            12/15/2013                            Normal      

                                                      [facility]                    

 

                HEMATOLOGY                            Hct             46.1                            42.0 - 54.0

                            12/15/2013                            Normal             

                                                      [facility]                    

 

                HEMATOLOGY                            Lymphocytes     15.7                            20.0

 - 40.0                            12/15/2013                            LOW         

                                                      [facility]                    

 

                HEMATOLOGY                            Monocytes       9.1                            2.0 

- 12.0                            12/15/2013                            Normal       

                                                      [facility]                    

 

                HEMATOLOGY                            Eosinophils     1.3                            0.0

 - 4.0                            12/15/2013                            Normal       

                                                      [facility]                    

 

                HEMATOLOGY                            Basophils #     0.0                            0.0

 - 0.2                            12/15/2013                            Normal       

                                                      [facility]                    

 

                HEMATOLOGY                            Basophils       0.5                            0.0 

- 1.0                            12/15/2013                            Normal        

                                                      [facility]                    

 

                HEMATOLOGY                            Segs-Bands #    5.4                            1.5

 - 8.1                            12/15/2013                            Normal       

                                                      [facility]                    

 

                HEMATOLOGY                            Segs            73.4                            45.0 - 75.0

                            12/15/2013                            Normal             

                                                      [facility]                    

 

                HEMATOLOGY                            Eosinophils #    0.1                            

0.0 - 0.5                            12/15/2013                            Normal    

                                                      [facility]                    

 

                HEMATOLOGY                            Lymphocytes #    1.2                            

1.0 - 5.5                            12/15/2013                            Normal    

                                                      [facility]                    

 

                HEMATOLOGY                            Monocytes #     0.7                            0.0

 - 0.8                            12/15/2013                            Normal       

                                                      [facility]                    

 

                    HEMATOLOGY                            Elliptocyte         Slight 

*ABN*

                          (12/15/2013 02:34:00)                              None Seen                      

                    12/15/2013                            ABN                                        

                                        [facility]                    

 

                    HEMATOLOGY                            Polychrom           Slight 

                          (12/15/2013 02:34:00)                              None Seen                      

                    12/15/2013                            Normal                                     

                                        [facility]                    

 

                    HEMATOLOGY                            Large Plt           Slight 

*ABN*

                          (12/15/2013 02:34:00)                              None Seen                      

                    12/15/2013                            ABN                                        

                                        [facility]                    

 

                    HEMATOLOGY                            [place] Cell           Slight 

*ABN*

                          (12/15/2013 02:34:00)                              None Seen                      

                    12/15/2013                            ABN                                        

                                        [facility]                    

 

                    BEDSIDE GLUCOSE TESTING                            Gluc POC Comment 1    Notified RN/MD

                                                        2013                   

                                                                            [facility]          

          

 

                    BEDSIDE GLUCOSE TESTING                            Glucose POC         233               

                    70 - 99                            2013                            [facility]

                                        <sup>1</sup>Interpretive Data:  Upper Reportable

 Limit: 200 mg/dL.                            [facility]                    

 

                    BEDSIDE GLUCOSE TESTING                            Gluc POC Comment 1    Notified RN/MD

                                                        2013                   

                                                                            [facility]          

          

 

                    BEDSIDE GLUCOSE TESTING                            Glucose POC         145               

                    70 - 99                            2013                            [facility]

                                        <sup>2</sup>Interpretive Data:  Upper Reportable

 Limit: 200 mg/dL.                            [facility]                    

 

                    BEDSIDE GLUCOSE TESTING                            Glucose POC         152               

                    70 - 99                            2013                            [facility]

                                        <sup>3</sup>Interpretive Data:  Upper Reportable

 Limit: 200 mg/dL.                            [facility]                    

 

                    BEDSIDE GLUCOSE TESTING                            Gluc POC Comment 1    Notified RN/MD

                                                        2013                   

                                                                            [facility]          

          

 

                CHEMISTRY                            Acetaminoph Lvl                                 10

 - 20                            2013                            Normal        

                                                      [facility]                    

 

                CHEMISTRY                            TSH             1.410                            0.360 - 3.740

                            2013                            Normal             

                                                      [facility]                    

 

                CHEMISTRY                            Ethanol Lvl     <3                                

                          2013                                                   

                                        <sup>9</sup>Interpretive Data: Negative Range:   <3 mg/dL<br/>Toxic Range:    

>250 mg/dL                              [facility]                    

 

                CHEMISTRY                            Etoh (%)        <0.003                               

                          2013                                                  

                                        <sup>8</sup>Interpretive Data: Negative Range: <0.003%<br/>Toxic Range:     >0.25%

                                        [facility]                            Magnesium Lvl    1.7                            1.8

 - 2.4                            2013                            LOW          

                                                      [facility]                    

 

                CHEMISTRY                            Phosphorus      4.2                            2.5 

- 4.5                            2013                            Normal        

                                                      [facility]                    

 

                CHEMISTRY                            eGFR            71                                       

                    2013                                                        <sup>5</sup>Result

 Comment: The eGFR is calculated using the CKD-EPI formula. In most young, 
healthy individuals the eGFR will be >90 mL/min/1.73m2. The eGFR declines with 
age. An eGFR of 60-89 may be normal in some populations, particularly the 
elderly, for whom the CKD-EPI formula has not been extensively validated. Use of
 the eGFR is not recommended in the following populations:&
lt;br/><br/>Individuals with unstable creatinine concentrations, including 
pregnant patients and those with serious co-morbid conditions.<br/><br/>Patients
 with extremes in muscle mass or diet. <br/><br/>The data above are obtained 
from the National Kidney Disease Education Program (NKDEP) which additionally 
recommends that when the eGFR is used in patients with extremes of body mass 
index for purposes of drug dosing, the eGFR should be multiplied by the 
estimated BMI.                            [facility]                    

 

                CHEMISTRY                            Calcium Lvl     8.7                            8.5

 - 10.5                            2013                            Normal      

                                                      [facility]                    

 

                CHEMISTRY                            AGAP            14.0                            10.0 - 20.0

                            2013                            Normal             

                                                      [facility]                    

 

                CHEMISTRY                            CO2             25                            24 - 32     

                          2013                            Normal                  

                                                      [facility]                    

 

                CHEMISTRY                            Chloride Lvl    102                            95

 - 109                            2013                            Normal       

                                                      [facility]                    

 

                CHEMISTRY                            Potassium Lvl    4.0                            3.5

 - 5.1                            2013                            Normal       

                                                      [facility]                    

 

                CHEMISTRY                            Glucose Lvl     155                            70 

- 99                            2013                            [facility]             

                                        <sup>6</sup>Interpretive Data: Adult reference range values reflect 

the clinical guidelines<br/>of the [employer] Diabetes [employer].               
                                        [facility]                    

 

                CHEMISTRY                            Sodium Lvl      137                            135 

- 145                            2013                            Normal        

                                                      [facility]                    

 

                CHEMISTRY                            Creatinine Lvl    1.2                            

0.5 - 1.4                            2013                            Normal    

                                                      [facility]                    

 

                CHEMISTRY                            BUN             22                            7 - 22      

                          2013                            Normal                   

                                                      [facility]                    

 

                HEMATOLOGY                            Basophils #     0.0                            0.0

 - 0.2                            2013                            Normal       

                                                      [facility]                    

 

                HEMATOLOGY                            Basophils       0.3                            0.0 

- 1.0                            2013                            Normal        

                                                      [facility]                    

 

                HEMATOLOGY                            Segs-Bands #    6.0                            1.5

 - 8.1                            2013                            Normal       

                                                      [facility]                    

 

                HEMATOLOGY                            Lymphocytes #    1.1                            

1.0 - 5.5                            2013                            Normal    

                                                      [facility]                    

 

                HEMATOLOGY                            Monocytes #     0.6                            0.0

 - 0.8                            2013                            Normal       

                                                      [facility]                    

 

                HEMATOLOGY                            Eosinophils #    0.1                            

0.0 - 0.5                            2013                            Normal    

                                                      [facility]                    

 

                HEMATOLOGY                            Segs            77.6                            45.0 - 75.0

                            2013                            HI                 

                                                      [facility]                    

 

                HEMATOLOGY                            Lymphocytes     13.6                            20.0

 - 40.0                            2013                            LOW         

                                                      [facility]                    

 

                HEMATOLOGY                            Monocytes       7.9                            2.0 

- 12.0                            2013                            Normal       

                                                      [facility]                    

 

                HEMATOLOGY                            Eosinophils     0.6                            0.0

 - 4.0                            2013                            Normal       

                                                      [facility]                    

 

                HEMATOLOGY                            MCV             83.5                            80.0 - 94.0

                            2013                            Normal             

                                                      [facility]                    

 

                HEMATOLOGY                            MCH             27.4                            27.0 - 31.0

                            2013                            Normal             

                                                      [facility]                    

 

                HEMATOLOGY                            MCHC            32.8                            32.0 - 36.0

                            2013                            Normal             

                                                      [facility]                    

 

                HEMATOLOGY                            RDW             16.0                            11.5 - 14.5

                            2013                            HI                 

                                                      [facility]                    

 

                HEMATOLOGY                            MPV             10.0                            7.4 - 10.4

                            2013                            Normal             

                                                      [facility]                    

 

                HEMATOLOGY                            Platelet        237                            133 -

 450                            2013                            Normal         

                                                      [facility]                            Hgb             15.6                            14.0 - 18.0

                            2013                            Normal             

                                                      [facility]                    

 

                HEMATOLOGY                            Hct             47.5                            42.0 - 54.0

                            2013                            Normal             

                                                      [facility]                    

 

                HEMATOLOGY                            WBC X 10x3      7.8                            3.7

 - 10.4                            2013                            Normal      

                                                      [facility]                    

 

                HEMATOLOGY                            RBC X 10x6      5.69                            4.70

 - 6.10                            2013                            Normal      

                                                      [facility]                    

 

                    CHEMISTRY                            U Cocaine Scr       Negative 

*NA*

                          (2013 05:00:00)                              Negative                       

                    2013                                                                        

                                        [facility]                    

 

                    CHEMISTRY                            U Annie Scr          Negative 

*NA*

                          (2013 05:00:00)                              Negative                       

                    2013                                                                        

                                        [facility]                    

 

                    CHEMISTRY                            U Benzodia Scr      Positive 

*ABN*

                          (2013 05:00:00)                              Negative                       

                    2013                            ABN                                         

                                        [facility]                    

 

                    CHEMISTRY                            U Amph Scr          Negative 

*NA*

                          (2013 05:00:00)                              Negative                       

                    2013                                                                        

                                        [facility]                    

 

                    CHEMISTRY                            U Cannab Scr        Negative 

*NA*

                          (2013 05:00:00)                              Negative                       

                    2013                                                                        

                                        [facility]                    

 

                    CHEMISTRY                            UDS Note            See Note 7

                    (2013 05:00:00)                                                          2013

                            Normal                            <sup>7</sup>Interpretive

 Data: Drugs reported as positive have not been confirmed by a second 
<br/>method and should be used for medical purposes only. To 
order<br/>confirmation, contact laboratory. <br/><br/>**note: Below are cut-off 
concentrations for all urine drugs of <br/>abuse performed in the laboratory. 
Some drugs listed in the table <br/>may not be included in this 
panel.<br/><br/>Description               Cut-off 
concentration<br/>--------------------------------------------------<br/>Amphetamine
                  1000 ng/mL<br/>Barbiturates                  200 
ng/mL<br/>Benzodiazepines               300 ng/mL<br/>Cocaine metabolites       
    300 ng/mL<br/>Opiates                       300 ng/mL<br/>Phencyclidine     
             25 ng/mL<br/>Propoxyphene                  300 ng/mL<br/>Marijuana 
metabolites          50 ng/mL<br/>Methadone                     300 ng/m
L<br/>Urine alcohol                  20 mg/dL                            [facility]

                    

 

                    CHEMISTRY                            U Phencyc Scr       Negative 

*NA*

                          (2013 05:00:00)                              Negative                       

                    2013                                                                        

                                        [facility]                    

 

                    CHEMISTRY                            U Opiate Scr        Negative 

*NA*

                          (2013 05:00:00)                              Negative                       

                    2013                                                                        

                                        [facility]                    

 

                    URINALYSIS                            UA Leuk Est         Negative 

                          (2013 05:00:00)                              Negative                       

                    2013                            Normal                                      

                                        [facility]                    

 

                    URINALYSIS                            UA Urobilinogen     1.0                        

                    0.1 - 1.0                            2013                            Normal  

                                                      [facility] [facility]                   

 

 

                    URINALYSIS                            UA Nitrite          Positive 

*ABN*

                          (2013 05:00:00)                              Negative                       

                    2013                            ABN                                         

                                        [facility]                    

 

                    URINALYSIS                            UA Glucose          Negative 

                          (2013 05:00:00)                              Negative                       

                    2013                            Normal                                      

                                        [facility]                    

 

                    URINALYSIS                            UA Protein          >=300 mg/dL                     

                    Negative                            2013                            ABN   

                                                      [facility]                    



 

                    URINALYSIS                            UA Ketones          Trace 

*ABN*

                          (2013 05:00:00)                              Negative                       

                    2013                            ABN                                         

                                        [facility]                    

 

                    URINALYSIS                            UA Blood            Trace 

*ABN*

                          (2013 05:00:00)                              Negative                       

                    2013                            ABN                                         

                                        [facility]                    

 

                    URINALYSIS                            UA Bili             Large 4

*ABN*

                          (2013 05:00:00)                              Negative                       

                    2013                            ABN                            <sup>4</sup>Result

 Comment: Interpret positive bilirubin results with caution. Confirmatory 
testing<br/>not possible due to the unavailability of reagent.  Correlation 
with<br/>serum chemistry results recommended.                            [facility]

                    

 

                URINALYSIS                            UA RBC          3-5 /HPF                            0 

- 2                            2013                            ABN             

                                                      [facility]                    

 

                URINALYSIS                            UA WBC          6-10 /HPF                            None

 Seen                            2013                            ABN           

                                                      [facility]                    

 

                    URINALYSIS                            UA Hyal Cast        3-5 

                    (2013 05:00:00)                              0 - 2                            

2013                            Normal                                         

                                        [facility]                    

 

                    URINALYSIS                            UA Amorph Cathryn      Many /HPF                   

                    None Seen                            2013                            ABN

                                                        [facility]                 

   

 

                URINALYSIS                            UA Mucus        Few /LPF                            

None Seen                            2013                            Normal    

                                                      [facility]                    

 

                    URINALYSIS                            UA Sq Epi           Occasional /LPF                  

                    Few                            2013                            Normal  

                                                      [facility]                   

 

 

                    URINALYSIS                            Micro?              Performed 

                    (2013 05:00:00)                                                          2013

                            Normal                                                   

                                        [facility]                    

 

                    URINALYSIS                            UA Spec Grav        >=1.030 

*ABN*

                          (2013 05:00:00)                              <=1.030                        

                    2013                            ABN                                          

                                        [facility]                    

 

                URINALYSIS                            UA pH           6.0                            5.0 - 8.0

                            2013                            Normal             

                                                      [facility]                    

 

                    URINALYSIS                            UA Color            Yellow 

*NA*

                          (2013 05:00:00)                              Yellow                         

                    2013                                                                          

                                        [facility]                    

 

                    URINALYSIS                            UA Turbidity        Cloudy 

*ABN*

                    (2013 05:00:00)                              Clear                            

2013                            ABN                                            

                                        [facility]                    

 

                    URINALYSIS                            UA Bacteria         Moderate /HPF                  

                    None Seen                            2013                            Normal

                                                        [facility]                 

   

 

                CHEMISTRY                            CK-MB INDEX     1.2                            0.0

 - 2.5                            2013                            Normal       

                                                      [facility]                    

 

                CHEMISTRY                            CK MB           1.3                            0.5 - 3.6

                            2013                            Normal             

                                                      [facility]                    

 

                CHEMISTRY                            Magnesium Lvl    1.6                            1.8

 - 2.4                            2013                            LOW          

                                                      [facility]                    

 

                CHEMISTRY                            Phosphorus      3.6                            2.5 

- 4.5                            2013                            Normal        

                                                      [facility]                    

 

                CHEMISTRY                            eGFR            89                                       

                    2013                                                        <sup>1</sup>Result

 Comment: The eGFR is calculated using the CKD-EPI formula. In most young, 
healthy individuals the eGFR will be >90 mL/min/1.73m2. The eGFR declines with 
age. An eGFR of 60-89 may be normal in some populations, particularly the 
elderly, for whom the CKD-EPI formula has not been extensively validated. Use of
 the eGFR is not recommended in the following populations:&
lt;br/><br/>Individuals with unstable creatinine concentrations, including 
pregnant patients and those with serious co-morbid conditions.<br/><br/>Patients
 with extremes in muscle mass or diet. <br/><br/>The data above are obtained 
from the National Kidney Disease Education Program (NKDEP) which additionally 
recommends that when the eGFR is used in patients with extremes of body mass 
index for purposes of drug dosing, the eGFR should be multiplied by the 
estimated BMI.                            [facility]                    

 

                CHEMISTRY                            Calcium Lvl     8.5                            8.5

 - 10.5                            2013                            Normal      

                                                      [facility]                    

 

                CHEMISTRY                            CO2             26                            24 - 32     

                          2013                            Normal                  

                                                      [facility]                    

 

                CHEMISTRY                            Chloride Lvl    103                            95

 - 109                            2013                            Normal       

                                                      [facility]                    

 

                CHEMISTRY                            Creatinine Lvl    1.0                            

0.5 - 1.4                            2013                            Normal    

                                                      [facility]                    

 

                CHEMISTRY                            Sodium Lvl      136                            135 

- 145                            2013                            Normal        

                                                      [facility]                    

 

                CHEMISTRY                            BUN             20                            7 - 22      

                          2013                            Normal                   

                                                      [facility]                    

 

                CHEMISTRY                            Potassium Lvl    4.0                            3.5

 - 5.1                            2013                            Normal       

                                                      [facility]                    

 

                CHEMISTRY                            Glucose Lvl     222                            70 

- 99                            2013                            [facility]             

                                        <sup>2</sup>Interpretive Data: Adult reference range values reflect 

the clinical guidelines<br/>of the [employer] Diabetes [employer].               
                                        [facility]                    

 

                CHEMISTRY                            AGAP            11.0                            10.0 - 20.0

                            2013                            Normal             

                                                      [facility]                    

 

                CHEMISTRY                            Myoglobin       39                            25 - 72

                            2013                            Normal             

                                                      [facility]                    

 

                CHEMISTRY                            Total CK        110                            12 - 191

                            2013                            Normal             

                                                      [facility]                    

 

                CHEMISTRY                            Troponin-I      0.03                            0.00

 - 0.40                            2013                            Normal      

                                                      [facility]                    

 

                CHEMISTRY                            Lipase Lvl      134                            73 -

 393                            2013                            Normal         

                                                      [facility]                    

 

                CHEMISTRY                            Bili Indirect    0.5                            0.0

 - 1.0                            2013                            Normal       

                                                      [facility]                    

 

                CHEMISTRY                            A/G Ratio       1.0                            0.7 -

 1.6                            2013                            Normal         

                                                      [facility]                    

 

                CHEMISTRY                            Globulin        3.5                            2.0 - 

4.0                            2013                            Normal          

                                                      [facility]                    

 

                    CHEMISTRY                            ASPARTATE TRANSAMINASE    15                   

                    0 - 37                            2013                            Normal

                                                        [facility]                 

   

 

                CHEMISTRY                            Bili Direct     0.4                            0.0

 - 0.3                            2013                            HI           

                                                      [facility]                    

 

                CHEMISTRY                            Bili Total      0.9                            0.2 

- 1.3                            2013                            Normal        

                                                      [facility]                    

 

                CHEMISTRY                            Alk Phos        147                            39 - 136

                            2013                            HI                 

                                                      [facility]                    

 

                    CHEMISTRY                            ALANINE AMINOTRANSFERASE    31                 

                    0 - 65                            2013                            Normal

                                                        [facility]                 

   

 

                CHEMISTRY                            Albumin Lvl     3.6                            3.5

 - 5.0                            2013                            Normal       

                                                      [facility]                    

 

                CHEMISTRY                            Total Protein    7.1                            6.4

 - 8.4                            2013                            Normal       

                                                      [facility]                    

 

                HEMATOLOGY                            RDW             16.4                            11.5 - 14.5

                            2013                            HI                 

                                                      [facility]                    

 

                HEMATOLOGY                            MCHC            32.5                            32.0 - 36.0

                            2013                            Normal             

                                                      [facility]                    

 

                HEMATOLOGY                            MCH             27.4                            27.0 - 31.0

                            2013                            Normal             

                                                      [facility]                    

 

                HEMATOLOGY                            Platelet        213                            133 -

 450                            2013                            Normal         

                                                      [facility]                    

 

                HEMATOLOGY                            MPV             9.8                            7.4 - 10.4

                            2013                            Normal             

                                                      [facility]                            RBC X 10x6      5.39                            4.70

 - 6.10                            2013                            Normal      

                                                      [facility]                    

 

                HEMATOLOGY                            MCV             84.5                            80.0 - 94.0

                            2013                            Normal             

                                                      [facility]                    

 

                HEMATOLOGY                            Hct             45.6                            42.0 - 54.0

                            2013                            Normal             

                                                      [facility]                            WBC X 10x3      9.1                            3.7

 - 10.4                            2013                            Normal      

                                                      [facility]                            Hgb             14.8                            14.0 - 18.0

                            2013                            Normal             

                                                      [facility]                            aPTT            29.6                            22.9 - 35.8

                            2013                            Normal             

                                        <sup>4</sup>Interpretive Data: Heparin Therapeutic Range:  57 - 92 Seconds

                                        [facility]                    

 

                HEMATOLOGY                            PROTIME         17.2                            12.0 

- 14.7                            2013                            HI           

                                                      [facility]                    

 

                HEMATOLOGY                            INR             1.42                            0.85 - 1.17

                            2013                            [facility]                 

                                        <sup>3</sup>Interpretive Data: RECOMMENDED RANGES FOR PROTIME INR:<br/> 

  2.0-3.0 for most medical and surgical thromboembolic states.<br/>   2.5-3.5 
for artificial heart valves and recurrent embolism.<br/><br/>INR SHOULD BE USED 
ONLY FOR PATIENTS ON STABLE ANTICOAGULANT THERAPY.                            [facility]

                    

 

                HEMATOLOGY                            Eosinophils #    0.1                            

0.0 - 0.5                            2013                            Normal    

                                                      [facility]                    

 

                HEMATOLOGY                            Basophils #     0.0                            0.0

 - 0.2                            2013                            Normal       

                                                      [facility]                    

 

                HEMATOLOGY                            Lymphocytes     13.0                            20.0

 - 40.0                            2013                            LOW         

                                                      [facility]                    

 

                HEMATOLOGY                            Segs            77.3                            45.0 - 75.0

                            2013                            HI                 

                                                      [facility]                    

 

                HEMATOLOGY                            Monocytes       8.1                            2.0 

- 12.0                            2013                            Normal       

                                                      [facility]                    

 

                HEMATOLOGY                            Segs-Bands #    7.0                            1.5

 - 8.1                            2013                            Normal       

                                                      [facility]                    

 

                HEMATOLOGY                            Basophils       0.4                            0.0 

- 1.0                            2013                            Normal        

                                                      [facility]                    

 

                HEMATOLOGY                            Eosinophils     1.2                            0.0

 - 4.0                            2013                            Normal       

                                                      [facility]                    

 

                HEMATOLOGY                            Monocytes #     0.7                            0.0

 - 0.8                            2013                            Normal       

                                                      [facility]                    

 

                HEMATOLOGY                            Lymphocytes #    1.2                            

1.0 - 5.5                            2013                            Normal    

                                                      [facility]                    

 

                    BEDSIDE GLUCOSE TESTING                            Gluc POC Comment 2    Assess Patient

                                                        2013                   

                                                                            [facility]          

          

 

                    BEDSIDE GLUCOSE TESTING                            Glucose POC         138               

                    70 - 99                            2013                            [facility]

                                        <sup>1</sup>Interpretive Data:  Upper Reportable

 Limit: 200 mg/dL.                            [facility]                    

 

                    BEDSIDE GLUCOSE TESTING                            Gluc POC Comment 1    Notify RN/MD

                                                        2013                   

                                                                            [facility]          

          

 

                    BEDSIDE GLUCOSE TESTING                            Gluc POC Comment 1    Notify RN/MD

                                                        2013                   

                                                                            [facility]          

          

 

                    BEDSIDE GLUCOSE TESTING                            Gluc POC Comment 2    Assess Patient

                                                        2013                   

                                                                            [facility]          

          

 

                    BEDSIDE GLUCOSE TESTING                            Glucose POC         240               

                    70 - 99                            2013                            [facility]

                                        <sup>2</sup>Interpretive Data:  Upper Reportable

 Limit: 200 mg/dL.                            [facility]                    

 

                CHEMISTRY                            Troponin-I      0.04                            0.00

 - 0.40                            2013                            Normal      

                                                      [facility]                    

 

                CHEMISTRY                            Total CK        240                            12 - 191

                            2013                            HI                 

                                                      [facility]                    

 

                CHEMISTRY                            CK-MB INDEX     0.7                            0.0

 - 2.5                            2013                            Normal       

                                                      [facility]                    

 

                CHEMISTRY                            CK MB           1.7                            0.5 - 3.6

                            2013                            Normal             

                                                      [facility]                    

 

                    BEDSIDE GLUCOSE TESTING                            Glucose POC         158               

                    70 - 99                            2013                            [facility]

                                        <sup>3</sup>Interpretive Data:  Upper Reportable

 Limit: 200 mg/dL.                            [facility]                    

 

                    BEDSIDE GLUCOSE TESTING                            Gluc POC Comment 1    Notify RN/MD

                                                        2013                   

                                                                            [facility]          

          

 

                    BEDSIDE GLUCOSE TESTING                            Gluc POC Comment 2    Assess Patient

                                                        2013                   

                                                                            [facility]          

          

 

                CHEMISTRY                            Troponin-I      0.03                            0.00

 - 0.40                            2013                            Normal      

                                                      [facility]                    

 

                CHEMISTRY                            Total CK        253                            12 - 191

                            2013                            HI                 

                                                      [facility]                    

 

                CHEMISTRY                            CK-MB INDEX     0.8                            0.0

 - 2.5                            2013                            Normal       

                                                      [facility]                    

 

                CHEMISTRY                            CK MB           1.9                            0.5 - 3.6

                            2013                            Normal             

                                                      [facility]                    

 

                    URINALYSIS                            UA Bacteria         Few /HPF                       

                    None Seen                            2013                            Normal 

                                                       [facility]                  

  

 

                URINALYSIS                            UA RBC          0-2 /HPF                            0 

- 2                            2013                            Normal          

                                                      [facility]                    

 

                    URINALYSIS                            UA Mucus            Many /LPF                         

                    None Seen                            2013                            ABN      

                                                      [facility]                    

 

                    URINALYSIS                            UA Hyal Cast        0-2 

                    (2013 22:49:49)                              0 - 2                            

2013                            Normal                                         

                                        [facility]                    

 

                URINALYSIS                            UA WBC          0-2 /HPF                            None

 Seen                            2013                            Normal        

                                                      [facility]                    

 

                    URINALYSIS                            UA Sq Epi           Occasional /LPF                  

                    Few                            2013                            Normal  

                                                      [facility]                   

 

 

                    URINALYSIS                            Micro?              Performed 

                    (2013 22:49:49)                                                          2013

                            Normal                                                   

                                        [facility]                    

 

                    URINALYSIS                            UA Spec Grav        >=1.030 

*ABN*

                          (2013 22:49:49)                              <=1.030                        

                    2013                            ABN                                          

                                        [facility]                    

 

                    URINALYSIS                            UA Protein          >=300 mg/dL                     

                    Negative                            2013                            ABN   

                                                      [facility]                    



 

                URINALYSIS                            UA pH           6.0                            5.0 - 8.0

                            2013                            Normal             

                                                      [facility]                    

 

                    URINALYSIS                            UA Color            Hannah 

*ABN*

                          (2013 22:49:49)                              Yellow                         

                    2013                            ABN                                           

                                        [facility]                    

 

                    URINALYSIS                            UA Turbidity        Slight Cloudy 

                    (2013 22:49:49)                              Clear                            

2013                            Normal                                         

                                        [facility]                    

 

                    URINALYSIS                            UA Blood            Trace 

*ABN*

                          (2013 22:49:49)                              Negative                       

                    2013                            ABN                                         

                                        [facility]                    

 

                    URINALYSIS                            UA Urobilinogen     0.2                        

                    0.1 - 1.0                            2013                            Normal  

                                                      [facility]                   

 

 

                    URINALYSIS                            UA Nitrite          Negative 

                          (2013 22:49:49)                              Negative                       

                    2013                            Normal                                      

                                        [facility]                    

 

                    URINALYSIS                            UA Leuk Est         Negative 

                          (2013 22:49:49)                              Negative                       

                    2013                            Normal                                      

                                        [facility]                    

 

                    URINALYSIS                            UA Ketones          Negative 

*NA*

                          (2013 22:49:49)                              Negative                       

                    2013                                                                        

                                        [facility]                    

 

                    URINALYSIS                            UA Glucose          Negative 

                          (2013 22:49:49)                              Negative                       

                    2013                            Normal                                      

                                        [facility]                    

 

                    URINALYSIS                            UA Bili             Moderate 4

*ABN*

                          (2013 22:49:49)                              Negative                       

                    2013                            ABN                            <sup>4</sup>Result

 Comment: Interpret positive bilirubin results with caution. Confirmatory 
testing<br/>not possible due to the unavailability of reagent.  Correlation 
with<br/>serum chemistry results recommended.                            [facility]

                    

 

                CHEMISTRY                            CK MB           1.8                            0.5 - 3.6

                            2013                            Normal             

                                                      [facility]                    

 

                CHEMISTRY                            Troponin-I      0.04                            0.00

 - 0.40                            2013                            Normal      

                                                      [facility]                    

 

                CHEMISTRY                            Total CK        238                            12 - 191

                            2013                            HI                 

                                                      [facility]                    

 

                CHEMISTRY                            Lipase Lvl      72                            73 - 

393                            2013                            LOW             

                                                      [facility]                    

 

                CHEMISTRY                            Magnesium Lvl    1.7                            1.8

 - 2.4                            2013                            LOW          

                                                      [facility]                    

 

                CHEMISTRY                            BNP             1615                            <=100     

                          2013                            [facility]                      

                                        <sup>7</sup>Interpretive Data: Elevated results are in line with increasing severity

 of <br/>congestive heart failure. Minor elevations between 100 and 300 <br/>may
 be seen with Myocardial Ischemia, Sodium retaining drugs, <br/>and 
compensated/treated heart failure.                            [facility]         

           

 

                CHEMISTRY                            eGFR            89                                       

                    2013                                                        <sup>5</sup>Result

 Comment: The eGFR is calculated using the CKD-EPI formula. In most young, 
healthy individuals the eGFR will be >90 mL/min/1.73m2. The eGFR declines with 
age. An eGFR of 60-89 may be normal in some populations, particularly the 
elderly, for whom the CKD-EPI formula has not been extensively validated. Use of
 the eGFR is not recommended in the following populations:&
lt;br/><br/>Individuals with unstable creatinine concentrations, including 
pregnant patients and those with serious co-morbid conditions.<br/><br/>Patients
 with extremes in muscle mass or diet. <br/><br/>The data above are obtained 
from the National Kidney Disease Education Program (NKDEP) which additionally 
recommends that when the eGFR is used in patients with extremes of body mass 
index for purposes of drug dosing, the eGFR should be multiplied by the 
estimated BMI.                            [facility]                    

 

                CHEMISTRY                            Bili Total      1.1                            0.2 

- 1.3                            2013                            Normal        

                                                      [facility]                    

 

                CHEMISTRY                            AGAP            12.6                            10.0 - 20.0

                            2013                            Normal             

                                                      [facility]                    

 

                    CHEMISTRY                            ASPARTATE TRANSAMINASE    17                   

                    0 - 37                            2013                            Normal

                                                        [facility]                 

   

 

                CHEMISTRY                            Globulin        3.2                            2.0 - 

4.0                            2013                            Normal          

                                                      [facility]                    

 

                CHEMISTRY                            B/C Ratio       9                            6 - 25 

                           2013                            Normal              

                                                      [facility]                    

 

                CHEMISTRY                            A/G Ratio       1.1                            0.7 -

 1.6                            2013                            Normal         

                                                      [facility]                    

 

                CHEMISTRY                            Total Protein    6.7                            6.4

 - 8.4                            2013                            Normal       

                                                      [facility]                    

 

                    CHEMISTRY                            ALANINE AMINOTRANSFERASE    21                 

                    0 - 65                            2013                            Normal

                                                        [facility]                 

   

 

                CHEMISTRY                            Alk Phos        162                            39 - 136

                            2013                            HI                 

                                                      [facility]                    

 

                CHEMISTRY                            Albumin Lvl     3.5                            3.5

 - 5.0                            2013                            Normal       

                                                      [facility]                    

 

                CHEMISTRY                            BUN             9                            7 - 22       

                          2013                            Normal                    

                                                      [facility]                    

 

                CHEMISTRY                            Glucose Lvl     134                            70 

- 99                            2013                            [facility]             

                                        <sup>6</sup>Interpretive Data: Adult reference range values reflect 

the clinical guidelines<br/>of the [employer].               
                                        [facility]                    

 

                CHEMISTRY                            Potassium Lvl    3.6                            3.5

 - 5.1                            2013                            Normal       

                                                      [facility]                    

 

                CHEMISTRY                            Sodium Lvl      139                            135 

- 145                            2013                            Normal        

                                                      [facility]                    

 

                CHEMISTRY                            Creatinine Lvl    1.0                            

0.5 - 1.4                            2013                            Normal    

                                                      [facility]                    

 

                CHEMISTRY                            Chloride Lvl    106                            95

 - 109                            2013                            Normal       

                                                      [facility]                    

 

                CHEMISTRY                            CO2             24                            24 - 32     

                          2013                            Normal                  

                                                      [facility]                    

 

                CHEMISTRY                            Calcium Lvl     8.1                            8.5

 - 10.5                            2013                            LOW         

                                                      [facility]                    

 

                CHEMISTRY                            CK-MB INDEX     0.8                            0.0

 - 2.5                            2013                            Normal       

                                                      [facility]                    

 

                HEMATOLOGY                            RBC X 10x6      5.32                            4.70

 - 6.10                            2013                            Normal      

                                                      [facility]                    

 

                HEMATOLOGY                            Hgb             15.0                            14.0 - 18.0

                            2013                            Normal             

                                                      [facility]                    

 

                HEMATOLOGY                            MPV             8.6                            7.4 - 10.4

                            2013                            Normal             

                                                      [facility]                    

 

                HEMATOLOGY                            Platelet        203                            133 -

 450                            2013                            Normal         

                                                      [facility]                    

 

                HEMATOLOGY                            Hct             46.8                            42.0 - 54.0

                            2013                            Normal             

                                                      [facility]                    

 

                HEMATOLOGY                            RDW             15.7                            11.5 - 14.5

                            2013                            HI                 

                                                      [facility]                    

 

                HEMATOLOGY                            MCH             28.1                            27.0 - 31.0

                            2013                            Normal             

                                                      [facility]                    

 

                HEMATOLOGY                            MCHC            32.0                            32.0 - 36.0

                            2013                            Normal             

                                                      [facility]                    

 

                HEMATOLOGY                            MCV             87.9                            80.0 - 94.0

                            2013                            Normal             

                                                      [facility]                    

 

                HEMATOLOGY                            WBC X 10x3      6.6                            3.7

 - 10.4                            2013                            Normal      

                                                      [facility]                    

 

                HEMATOLOGY                            INR             1.39                            0.85 - 1.17

                            2013                            [facility]                 

                                        <sup>8</sup>Interpretive Data: RECOMMENDED RANGES FOR PROTIME INR:<br/> 

  2.0-3.0 for most medical and surgical thromboembolic states.<br/>   2.5-3.5 
for artificial heart valves and recurrent embolism.<br/><br/>INR SHOULD BE USED 
ONLY FOR PATIENTS ON STABLE ANTICOAGULANT THERAPY.                            [facility]

                    

 

                HEMATOLOGY                            PROTIME         16.9                            12.0 

- 14.7                            2013                            [facility]                    

 

                HEMATOLOGY                            aPTT            30.3                            22.9 - 35.8

                            2013                            Normal             

                                        <sup>9</sup>Interpretive Data: Heparin Therapeutic Range:  57 - 92 Seconds

                                        [facility]                    

 

                HEMATOLOGY                            Basophils #     0.0                            0.0

 - 0.2                            2013                            Normal       

                                                      [facility]                    

 

                HEMATOLOGY                            Lymphocytes     12.8                            20.0

 - 40.0                            2013                            LOW         

                                                      [facility]                    

 

                HEMATOLOGY                            Segs            79.0                            45.0 - 75.0

                            2013                            HI                 

                                                      [facility]                    

 

                HEMATOLOGY                            Monocytes       7.1                            2.0 

- 12.0                            2013                            Normal       

                                                      [facility] [facility]                            Segs-Bands #    5.2                            1.5

 - 8.1                            2013                            Normal       

                                                      [facility]                            Basophils       0.4                            0.0 

- 1.0                            2013                            Normal        

                                                      [facility]                            Monocytes #     0.5                            0.0

 - 0.8                            2013                            Normal       

                                                      [facility]                            Lymphocytes #    0.8                            

1.0 - 5.5                            2013                            LOW       

                                                      [facility]                            Eosinophils #    0.0                            

0.0 - 0.5                            2013                            Normal    

                                                      [facility]                    

 

                HEMATOLOGY                            Eosinophils     0.7                            0.0

 - 4.0                            2013                            Normal       

                                                      [facility]                    

 

                    BEDSIDE GLUCOSE TESTING                            Gluc POC Comment 1    Notified RN/MD

                                                        10/31/2013                   

                                                                            [facility]      

              

 

                    BEDSIDE GLUCOSE TESTING                            Glucose POC         194               

                    70 - 99                            10/31/2013                            HI

                                        <sup>2</sup>Interpretive Data:  Upper Reportable

 Limit: 200 mg/dL.                            [facility]                    

 

                CHEMISTRY                            eGFR            65                                       

                    10/31/2013                                                        <sup>5</sup>Result

 Comment: The eGFR is calculated using the CKD-EPI formula. In most young, 
healthy individuals the eGFR will be >90 mL/min/1.73m2. The eGFR declines with 
age. An eGFR of 60-89 may be normal in some populations, particularly the 
elderly, for whom the CKD-EPI formula has not been extensively validated. Use of
 the eGFR is not recommended in the following populations:&
lt;br/><br/>Individuals with unstable creatinine concentrations, including 
pregnant patients and those with serious co-morbid conditions.<br/><br/>Patients
 with extremes in muscle mass or diet. <br/><br/>The data above are obtained 
from the National Kidney Disease Education Program (NKDEP) which additionally 
recommends that when the eGFR is used in patients with extremes of body mass 
index for purposes of drug dosing, the eGFR should be multiplied by the 
estimated BMI.                            [facility]                    

 

                CHEMISTRY                            Calcium Lvl     8.3                            8.5

 - 10.5                            10/31/2013                            LOW         

                                                      [facility]                    

 

                CHEMISTRY                            CO2             27                            24 - 32     

                          10/31/2013                            Normal                  

                                                      [facility]                    

 

                CHEMISTRY                            BUN             18                            7 - 22      

                          10/31/2013                            Normal                   

                                                      [facility]                    

 

                CHEMISTRY                            AGAP            17.7                            10.0 - 20.0

                            10/31/2013                            Normal             

                                                      [facility]                    

 

                CHEMISTRY                            Chloride Lvl    103                            95

 - 109                            10/31/2013                            Normal       

                                                      [facility]                    



 

                CHEMISTRY                            Creatinine Lvl    1.3                            

0.5 - 1.4                            10/31/2013                            Normal    

                                                      [facility]                 

   

 

                CHEMISTRY                            Sodium Lvl      144                            135 

- 145                            10/31/2013                            Normal        

                                                      [facility]                    

 

                CHEMISTRY                            Potassium Lvl    3.7                            3.5

 - 5.1                            10/31/2013                            Normal       

                                                      [facility]                    



 

                CHEMISTRY                            Glucose Lvl     94                            70 -

 99                            10/31/2013                            Normal          

                                        <sup>8</sup>Interpretive Data: Adult reference range values reflect

 the clinical guidelines<br/>of the American Diabetes Association.              
                                        [facility]                    

 

                CHEMISTRY                            Phosphorus      4.7                            2.5 

- 4.5                            10/30/2013                            HI            

                                                      [facility]                    

 

                CHEMISTRY                            eGFR            65                                       

                    10/30/2013                                                        <sup>6</sup>Result

 Comment: The eGFR is calculated using the CKD-EPI formula. In most young, 
healthy individuals the eGFR will be >90 mL/min/1.73m2. The eGFR declines with 
age. An eGFR of 60-89 may be normal in some populations, particularly the 
elderly, for whom the CKD-EPI formula has not been extensively validated. Use of
 the eGFR is not recommended in the following populations:&
lt;br/><br/>Individuals with unstable creatinine concentrations, including 
pregnant patients and those with serious co-morbid conditions.<br/><br/>Patients
 with extremes in muscle mass or diet. <br/><br/>The data above are obtained 
from the National Kidney Disease Education Program (NKDEP) which additionally 
recommends that when the eGFR is used in patients with extremes of body mass 
index for purposes of drug dosing, the eGFR should be multiplied by the 
estimated BMI.                            [facility]                    

 

                CHEMISTRY                            Calcium Lvl     8.8                            8.5

 - 10.5                            10/30/2013                            Normal      

                                                      [facility]                   

 

 

                CHEMISTRY                            Sodium Lvl      143                            135 

- 145                            10/30/2013                            Normal        

                                                      [facility]                    

 

                CHEMISTRY                            Potassium Lvl    3.6                            3.5

 - 5.1                            10/30/2013                            Normal       

                                                      [facility]                    



 

                CHEMISTRY                            Chloride Lvl    104                            95

 - 109                            10/30/2013                            Normal       

                                                      [facility]                    



 

                CHEMISTRY                            CO2             26                            24 - 32     

                          10/30/2013                            Normal                  

                                                      [facility]                    

 

                CHEMISTRY                            AGAP            16.6                            10.0 - 20.0

                            10/30/2013                            Normal             

                                                      [facility]                    

 

                CHEMISTRY                            Creatinine Lvl    1.3                            

0.5 - 1.4                            10/30/2013                            Normal    

                                        <sup>4</sup>Result Comment: reviewed 10/30/2013 06:40  gr  

                                        [facility]                    

 

                CHEMISTRY                            Glucose Lvl     131                            70 

- 99                            10/30/2013                            HI             

                                        <sup>9</sup>Interpretive Data: Adult reference range values reflect 

the clinical guidelines<br/>of the American Diabetes Association.               
                                        [facility]                    

 

                CHEMISTRY                            BUN             15                            7 - 22      

                          10/30/2013                            Normal                   

                                                      [facility]                    

 

                CHEMISTRY                            Magnesium Lvl    1.7                            1.8

 - 2.4                            10/30/2013                            LOW          

                                                      [facility]                    

 

                CHEMISTRY                            CK-MB INDEX     1.1                            0.0

 - 2.5                            10/30/2013                            Normal       

                                                      [facility]                    



 

                CHEMISTRY                            CK MB           1.6                            0.5 - 3.6

                            10/30/2013                            Normal             

                                                      [facility]                    

 

                CHEMISTRY                            Troponin-I      0.04                            0.00

 - 0.40                            10/30/2013                            Normal      

                                                      [facility]                   

 

 

                CHEMISTRY                            Total CK        144                            12 - 191

                            10/30/2013                            Normal             

                                                      [facility]                    

 

                HEMATOLOGY                            Platelet        205                            133 -

 450                            10/30/2013                            Normal         

                                                      [facility]                    

 

                HEMATOLOGY                            MPV             9.4                            7.4 - 10.4

                            10/30/2013                            Normal             

                                                      [facility]                    

 

                HEMATOLOGY                            MCH             28.8                            27.0 - 31.0

                            10/30/2013                            Normal             

                                                      [facility]                    

 

                HEMATOLOGY                            MCV             88.3                            80.0 - 94.0

                            10/30/2013                            Normal             

                                                      [facility]                    

 

                HEMATOLOGY                            MCHC            32.6                            32.0 - 36.0

                            10/30/2013                            Normal             

                                                      [facility]                    

 

                HEMATOLOGY                            Hct             44.5                            42.0 - 54.0

                            10/30/2013                            Normal             

                                                      [facility]                    

 

                HEMATOLOGY                            RDW             15.3                            11.5 - 14.5

                            10/30/2013                            HI                 

                                                      [facility]                    

 

                HEMATOLOGY                            Hgb             14.5                            14.0 - 18.0

                            10/30/2013                            Normal             

                                                      [facility]                    

 

                HEMATOLOGY                            RBC X 10x6      5.05                            4.70

 - 6.10                            10/30/2013                            Normal      

                                                      [facility]                   

 

 

                HEMATOLOGY                            WBC X 10x3      6.8                            3.7

 - 10.4                            10/30/2013                            Normal      

                                                      [facility]                   

 

 

                    INFECTIOUS DISEASES                            C difficile DNA     Negative 1

                          (10/29/2013 21:30:00)                              Negative                       

                    10/30/2013                            Normal                            <sup>1</sup>Interpretive

 Data: [facility] [employer] difficile assay utilizes loop-mediated 
isothermal DNA amplification (LAMP) technology to detect a 204 bp region of the 
tcdA gene within the PaLoc gene segment present in all known toxigenic C. 
difficile strains.<br/><br/>The assay utilizes FDA cleared IVD reagents. 
Performance characteristics have been verified by the [facility] 
[facility]atory within the [facility]. The Molecular Diagnostic 
Laboratory is authorized under the Clinical Laboratory Improvement Amendment of 
1988 (CLIA-88) to perform high complexity testing.                            [facility]                    

 

                    STOOL TESTS                            Fecal Leukocyte     None Seen 3

                    (10/29/2013 21:30:00)                                                          10/30/2013

                            Normal                            <sup>3</sup>Interpretive

 Data: A Value of None Seen, Rare, or Few is Normal.                            [facility]                    

 

                CHEMISTRY                            CK-MB INDEX     1.0                            0.0

 - 2.5                            10/30/2013                            Normal       

                                                      [facility]                    



 

                CHEMISTRY                            CK MB           1.7                            0.5 - 3.6

                            10/30/2013                            Normal             

                                                      [facility]                    

 

                CHEMISTRY                            Troponin-I      0.02                            0.00

 - 0.40                            10/30/2013                            Normal      

                                                      [facility]                   

 

 

                CHEMISTRY                            Total CK        171                            12 - 191

                            10/30/2013                            Normal             

                                                      [facility]                    

 

                CHEMISTRY                            CK-MB INDEX     1.0                            0.0

 - 2.5                            10/29/2013                            Normal       

                                                      [facility]                    



 

                CHEMISTRY                            CK MB           1.6                            0.5 - 3.6

                            10/29/2013                            Normal             

                                                      [facility]                    

 

                CHEMISTRY                            eGFR            89                                       

                    10/29/2013                                                        <sup>7</sup>Result

 Comment: The eGFR is calculated using the CKD-EPI formula. In most young, 
healthy individuals the eGFR will be >90 mL/min/1.73m2. The eGFR declines with 
age. An eGFR of 60-89 may be normal in some populations, particularly the 
elderly, for whom the CKD-EPI formula has not been extensively validated. Use of
 the eGFR is not recommended in the following populations:&
lt;br/><br/>Individuals with unstable creatinine concentrations, including 
pregnant patients and those with serious co-morbid conditions.<br/><br/>Patients
 with extremes in muscle mass or diet. <br/><br/>The data above are obtained 
from the National Kidney Disease Education Program (NKDEP) which additionally 
recommends that when the eGFR is used in patients with extremes of body mass 
index for purposes of drug dosing, the eGFR should be multiplied by the 
estimated BMI.                            [facility]                    

 

                CHEMISTRY                            Creatinine Lvl    1.0                            

0.5 - 1.4                            10/29/2013                            Normal    

                                                      [facility]                 

   

 

                CHEMISTRY                            Total CK        168                            12 - 191

                            10/29/2013                            Normal             

                                                      [facility]                    

 

                CHEMISTRY                            Troponin-I      0.03                            0.00

 - 0.40                            10/29/2013                            Normal      

                                                      [facility]                   

 

 

                HEMATOLOGY                            aPTT            29.5                            22.9 - 35.8

                            10/29/2013                            Normal             

                                        <sup>13</sup>Interpretive Data: Heparin Therapeutic Range:  57 - 92 

Seconds                                 [facility]                    

 

                HEMATOLOGY                            Platelet        189                            133 -

 450                            10/29/2013                            Normal         

                                                      [facility]                    

 

                CHEMISTRY                            BNP             1829                            <=100     

                          10/29/2013                            HI                      

                                        <sup>11</sup>Interpretive Data: Elevated results are in line with increasing 

severity of <br/>congestive heart failure. Minor elevations between 100 and 300 
<br/>may be seen with Myocardial Ischemia, Sodium retaining drugs, <br/>and 
compensated/treated heart failure.                            [facility]     

               

 

                CHEMISTRY                            Lipase Lvl      89                            73 - 

393                            10/29/2013                            Normal          

                                                      [facility]                    

 

                CHEMISTRY                            B/C Ratio       8                            6 - 25 

                           10/29/2013                            Normal              

                                                      [facility]                    

 

                CHEMISTRY                            Globulin        3.2                            2.0 - 

4.0                            10/29/2013                            Normal          

                                                      [facility]                    

 

                CHEMISTRY                            Bili Total      1.2                            0.2 

- 1.3                            10/29/2013                            Normal        

                                                      [facility]                    

 

                CHEMISTRY                            A/G Ratio       1.2                            0.7 -

 1.6                            10/29/2013                            Normal         

                                                      [facility]                    

 

                CHEMISTRY                            AGAP            15.4                            10.0 - 20.0

                            10/29/2013                            Normal             

                                                      [facility]                    

 

                    CHEMISTRY                            ASPARTATE TRANSAMINASE    24                   

                    0 - 37                            10/29/2013                            Normal

                                                        [facility]             

       

 

                CHEMISTRY                            Alk Phos        178                            39 - 136

                            10/29/2013                            HI                 

                                                      [facility]                    

 

                    CHEMISTRY                            ALANINE AMINOTRANSFERASE    31                 

                    0 - 65                            10/29/2013                            Normal

                                                        [facility]             

       

 

                CHEMISTRY                            Total Protein    6.9                            6.4

 - 8.4                            10/29/2013                            Normal       

                                                      [facility]                    



 

                CHEMISTRY                            Albumin Lvl     3.7                            3.5

 - 5.0                            10/29/2013                            Normal       

                                                      [facility]                    



 

                CHEMISTRY                            Calcium Lvl     8.6                            8.5

 - 10.5                            10/29/2013                            Normal      

                                                      [facility]                   

 

 

                CHEMISTRY                            CO2             28                            24 - 32     

                          10/29/2013                            Normal                  

                                                      [facility]                    

 

                CHEMISTRY                            Potassium Lvl    3.4                            3.5

 - 5.1                            10/29/2013                            LOW          

                                                      [facility]                    

 

                CHEMISTRY                            Sodium Lvl      141                            135 

- 145                            10/29/2013                            Normal        

                                                      [facility]                    

 

                CHEMISTRY                            Chloride Lvl    101                            95

 - 109                            10/29/2013                            Normal       

                                                      [facility]                    



 

                CHEMISTRY                            BUN             10                            7 - 22      

                          10/29/2013                            Normal                   

                                                      [facility]                    

 

                CHEMISTRY                            Glucose Lvl     114                            70 

- 99                            10/29/2013                            HI             

                                        <sup>10</sup>Interpretive Data: Adult reference range values reflect

 the clinical guidelines<br/>of the American Diabetes Association.              
                                        [facility]                    

 

                HEMATOLOGY                            PROTIME         17.5                            12.0 

- 14.7                            10/29/2013                            HI           

                                                      [facility]                    

 

                HEMATOLOGY                            INR             1.46                            0.85 - 1.17

                            10/29/2013                            HI                 

                                        <sup>12</sup>Interpretive Data: RECOMMENDED RANGES FOR PROTIME INR:<br/>

   2.0-3.0 for most medical and surgical thromboembolic states.<br/>   2.5-3.5 
for artificial heart valves and recurrent embolism.<br/><br/>INR SHOULD BE USED 
ONLY FOR PATIENTS ON STABLE ANTICOAGULANT THERAPY.                            [facility]                    

 

                HEMATOLOGY                            aPTT            29.5                            22.9 - 35.8

                            10/29/2013                            Normal             

                                        <sup>14</sup>Interpretive Data: Heparin Therapeutic Range:  57 - 92 

Seconds                                 [facility]                    

 

                HEMATOLOGY                            MPV             9.5                            7.4 - 10.4

                            10/29/2013                            Normal             

                                                      [facility]                    

 

                HEMATOLOGY                            Platelet        190                            133 -

 450                            10/29/2013                            Normal         

                                                      [facility]                    

 

                HEMATOLOGY                            WBC X 10x3      7.1                            3.7

 - 10.4                            10/29/2013                            Normal      

                                                      [facility]                   

 

 

                HEMATOLOGY                            RDW             15.7                            11.5 - 14.5

                            10/29/2013                            HI                 

                                                      [facility]                    

 

                HEMATOLOGY                            MCHC            32.6                            32.0 - 36.0

                            10/29/2013                            Normal             

                                                      [facility]                    

 

                HEMATOLOGY                            Hct             46.2                            42.0 - 54.0

                            10/29/2013                            Normal             

                                                      [facility]                    

 

                HEMATOLOGY                            Hgb             15.1                            14.0 - 18.0

                            10/29/2013                            Normal             

                                                      [facility]                    

 

                HEMATOLOGY                            MCH             29.1                            27.0 - 31.0

                            10/29/2013                            Normal             

                                                      [facility]                    

 

                HEMATOLOGY                            MCV             89.4                            80.0 - 94.0

                            10/29/2013                            Normal             

                                                      [facility]                    

 

                HEMATOLOGY                            RBC X 10x6      5.17                            4.70

 - 6.10                            10/29/2013                            Normal      

                                                      [facility]                   

 

 

                HEMATOLOGY                            Basophils #     0.0                            0.0

 - 0.2                            10/29/2013                            Normal       

                                                      [facility]                    



 

                HEMATOLOGY                            Eosinophils #    0.0                            

0.0 - 0.5                            10/29/2013                            Normal    

                                                      [facility]                 

   

 

                HEMATOLOGY                            Basophils       0.4                            0.0 

- 1.0                            10/29/2013                            Normal        

                                                      [facility]                    

 

                HEMATOLOGY                            Eosinophils     0.5                            0.0

 - 4.0                            10/29/2013                            [facility]                    



 

                HEMATOLOGY                            Monocytes #     0.6                            0.0

 - 0.8                            10/29/2013                            Normal       

                                                      [facility]                    



 

                HEMATOLOGY                            Segs-Bands #    5.6                            1.5

 - 8.1                            10/29/2013                            [facility]                    



 

                HEMATOLOGY                            Lymphocytes #    0.9                            

1.0 - 5.5                            10/29/2013                            LOW       

                                                      [facility]                    



 

                HEMATOLOGY                            Monocytes       7.9                            2.0 

- 12.0                            10/29/2013                            Normal       

                                                      [facility]                    



 

                HEMATOLOGY                            Segs            78.0                            45.0 - 75.0

                            10/29/2013                            HI                 

                                                      [facility]                    

 

                HEMATOLOGY                            Lymphocytes     13.2                            20.0

 - 40.0                            10/29/2013                            LOW         

                                                      [facility]                    

 

                    BEDSIDE GLUCOSE TESTING                            Glucose POC         111               

                    70 - 99                            10/22/2013                            HI

                                        <sup>1</sup>Interpretive Data:  Upper Reportable

 Limit: 200 mg/dL.                            [facility]                    

 

                    BEDSIDE GLUCOSE TESTING                            Glucose POC         202               

                    70 - 99                            10/22/2013                            HI

                                        <sup>2</sup>Interpretive Data:  Upper Reportable

 Limit: 200 mg/dL.                            [facility]                    

 

                    BEDSIDE GLUCOSE TESTING                            Glucose POC         103               

                    70 - 99                            10/22/2013                            HI

                                        <sup>3</sup>Interpretive Data:  Upper Reportable

 Limit: 200 mg/dL.                            [facility]                    

 

                CHEMISTRY                            eGFR            79                                       

                    10/22/2013                                                        <sup>5</sup>Result

 Comment: The eGFR is calculated using the CKD-EPI formula. In most young, 
healthy individuals the eGFR will be >90 mL/min/1.73m2. The eGFR declines with 
age. An eGFR of 60-89 may be normal in some populations, particularly the 
elderly, for whom the CKD-EPI formula has not been extensively validated. Use of
 the eGFR is not recommended in the following populations:&
lt;br/><br/>Individuals with unstable creatinine concentrations, including 
pregnant patients and those with serious co-morbid conditions.<br/><br/>Patients
 with extremes in muscle mass or diet. <br/><br/>The data above are obtained 
from the National Kidney Disease Education Program (NKDEP) which additionally 
recommends that when the eGFR is used in patients with extremes of body mass 
index for purposes of drug dosing, the eGFR should be multiplied by the 
estimated BMI.                            [facility]                    

 

                CHEMISTRY                            CO2             24                            24 - 32     

                          10/22/2013                            Normal                  

                                                      [facility]                    

 

                CHEMISTRY                            Calcium Lvl     8.0                            8.5

 - 10.5                            10/22/2013                            LOW         

                                                      [facility]                    

 

                CHEMISTRY                            Glucose Lvl     124                            70 

- 99                            10/22/2013                            [facility]             

                                        <sup>7</sup>Interpretive Data: Adult reference range values reflect 

the clinical guidelines<br/>of the American Diabetes Association.               
                                        [facility]                    

 

                CHEMISTRY                            Creatinine Lvl    1.1                            

0.5 - 1.4                            10/22/2013                            Normal    

                                                      [facility]                 

   

 

                CHEMISTRY                            BUN             25                            7 - 22      

                          10/22/2013                            HI                       

                                                      [facility]                    

 

                CHEMISTRY                            Sodium Lvl      139                            135 

- 145                            10/22/2013                            Normal        

                                                      [facility]                    

 

                CHEMISTRY                            Potassium Lvl    3.9                            3.5

 - 5.1                            10/22/2013                            Normal       

                                                      [facility]                    



 

                CHEMISTRY                            Chloride Lvl    105                            95

 - 109                            10/22/2013                            Normal       

                                                      [facility]                    



 

                CHEMISTRY                            AGAP            13.9                            10.0 - 20.0

                            10/22/2013                            Normal             

                                                      [facility]                    

 

                HEMATOLOGY                            RDW             15.6                            11.5 - 14.5

                            10/22/2013                            HI                 

                                                      [facility]                    

 

                HEMATOLOGY                            Hct             44.1                            42.0 - 54.0

                            10/22/2013                            Normal             

                                                      [facility]                    

 

                HEMATOLOGY                            Hgb             14.3                            14.0 - 18.0

                            10/22/2013                            Normal             

                                                      [facility]                    

 

                HEMATOLOGY                            RBC X 10x6      4.90                            4.70

 - 6.10                            10/22/2013                            Normal      

                                                      [facility]                   

 

 

                HEMATOLOGY                            WBC X 10x3      6.0                            3.7

 - 10.4                            10/22/2013                            Normal      

                                                      [facility]                   

 

 

                HEMATOLOGY                            MCV             89.9                            80.0 - 94.0

                            10/22/2013                            Normal             

                                                      [facility]                    

 

                HEMATOLOGY                            MCH             29.1                            27.0 - 31.0

                            10/22/2013                            Normal             

                                                      [facility]                    

 

                HEMATOLOGY                            MPV             9.2                            7.4 - 10.4

                            10/22/2013                            Normal             

                                                      [facility]                    

 

                HEMATOLOGY                            Platelet        198                            133 -

 450                            10/22/2013                            Normal         

                                                      [facility]                    

 

                HEMATOLOGY                            MCHC            32.4                            32.0 - 36.0

                            10/22/2013                            Normal             

                                                      [facility]                    

 

                HEMATOLOGY                            Basophils #     0.0                            0.0

 - 0.2                            10/22/2013                            Normal       

                                                      [facility]                    



 

                HEMATOLOGY                            Eosinophils #    0.1                            

0.0 - 0.5                            10/22/2013                            Normal    

                                                      [facility]                 

   

 

                HEMATOLOGY                            Monocytes #     0.6                            0.0

 - 0.8                            10/22/2013                            Normal       

                                                      [facility]                    



 

                HEMATOLOGY                            Lymphocytes     17.7                            20.0

 - 40.0                            10/22/2013                            LOW         

                                                      [facility]                    

 

                HEMATOLOGY                            Lymphocytes #    1.1                            

1.0 - 5.5                            10/22/2013                            Normal    

                                                      [facility]                 

   

 

                HEMATOLOGY                            Monocytes       9.5                            2.0 

- 12.0                            10/22/2013                            Normal       

                                                      [facility]                    



 

                HEMATOLOGY                            Segs            70.6                            45.0 - 75.0

                            10/22/2013                            Normal             

                                                      [facility]                    

 

                HEMATOLOGY                            Segs-Bands #    4.3                            1.5

 - 8.1                            10/22/2013                            Normal       

                                                      [facility]                    



 

                HEMATOLOGY                            Basophils       0.6                            0.0 

- 1.0                            10/22/2013                            Normal        

                                                      [facility]                    

 

                HEMATOLOGY                            Eosinophils     1.6                            0.0

 - 4.0                            10/22/2013                            Normal       

                                                      [facility]                    



 

                CHEMISTRY                            CK MB           1.5                            0.5 - 3.6

                            10/22/2013                            Normal             

                                                      [facility]                    

 

                CHEMISTRY                            CK-MB INDEX     1.3                            0.0

 - 2.5                            10/22/2013                            Normal       

                                                      [facility]                    



 

                CHEMISTRY                            Total CK        118                            12 - 191

                            10/22/2013                            Normal             

                                                      [facility]                    

 

                CHEMISTRY                            Troponin-I      0.03                            0.00

 - 0.40                            10/22/2013                            Normal      

                                                      [facility]                   

 

 

                    BEDSIDE GLUCOSE TESTING                            Gluc POC Comment 1    Notified RN/MD

                                                        10/22/2013                   

                                                                            [facility]      

              

 

                CHEMISTRY                            Total CK        110                            12 - 191

                            10/22/2013                            Normal             

                                                      [facility]                    

 

                CHEMISTRY                            Troponin-I      0.03                            0.00

 - 0.40                            10/22/2013                            Normal      

                                                      [facility]                   

 

 

                CHEMISTRY                            CK-MB INDEX     1.1                            0.0

 - 2.5                            10/22/2013                            Normal       

                                                      [facility]                    



 

                CHEMISTRY                            CK MB           1.2                            0.5 - 3.6

                            10/22/2013                            Normal             

                                                      [facility]                    

 

                    VIRAL - SEROLOGY                            Influ B             Negative 4

                          (10/21/2013 15:55:00)                              Negative                       

                    10/21/2013                            Normal                            <sup>4</sup>Interpretive

 Data:   Influenza A&B Antigen: <br/>Due to the low sensitivity of this test a 
negative result does not exclude influenza virus infection. A diagnosis of 
influenza should be considered based on a patient's clinical presentation and 
empiric antiviral treatment should be considered, if indicated. If more 
conclusive testing is desired, follow-up confirmatory testing with either viral 
culture or PCR is warranted.                            [facility]           

         

 

                    VIRAL - SEROLOGY                            Influ A             Negative 

                          (10/21/2013 15:55:00)                              Negative                       

                    10/21/2013                            Normal                                      

                                        [facility]                    

 

                CHEMISTRY                            BNP             1962                            <=100     

                          10/21/2013                            [place]                      

                                        <sup>9</sup>Interpretive Data: Elevated results are in line with increasing severity

 of <br/>congestive heart failure. Minor elevations between 100 and 300 <br/>may
 be seen with Myocardial Ischemia, Sodium retaining drugs, <br/>and 
compensated/treated heart failure.                            [facility]     

               

 

                CHEMISTRY                            Troponin-I      0.04                            0.00

 - 0.40                            10/21/2013                            Normal      

                                                      [facility]                   

 

 

                CHEMISTRY                            eGFR            71                                       

                    10/21/2013                                                        <sup>6</sup>Result

 Comment: The eGFR is calculated using the CKD-EPI formula. In most young, 
healthy individuals the eGFR will be >90 mL/min/1.73m2. The eGFR declines with 
age. An eGFR of 60-89 may be normal in some populations, particularly the 
elderly, for whom the CKD-EPI formula has not been extensively validated. Use of
 the eGFR is not recommended in the following populations:&
lt;br/><br/>Individuals with unstable creatinine concentrations, including 
pregnant patients and those with serious co-morbid conditions.<br/><br/>Patients
 with extremes in muscle mass or diet. <br/><br/>The data above are obtained 
from the National Kidney Disease Education Program (NKDEP) which additionally 
recommends that when the eGFR is used in patients with extremes of body mass 
index for purposes of drug dosing, the eGFR should be multiplied by the 
estimated BMI.                            [facility]                    

 

                CHEMISTRY                            A/G Ratio       1.2                            0.7 -

 1.6                            10/21/2013                            Normal         

                                                      [facility]                    

 

                CHEMISTRY                            Globulin        3.0                            2.0 - 

4.0                            10/21/2013                            Normal          

                                                      [facility]                    

 

                CHEMISTRY                            B/C Ratio       17                            6 - 25

                            10/21/2013                            Normal             

                                                      [facility]                    

 

                    CHEMISTRY                            ASPARTATE TRANSAMINASE    26                   

                    0 - 37                            10/21/2013                            Normal

                                                        [facility]             

       

 

                CHEMISTRY                            AGAP            10.9                            10.0 - 20.0

                            10/21/2013                            Normal             

                                                      [facility]                    

 

                CHEMISTRY                            Alk Phos        131                            39 - 136

                            10/21/2013                            Normal             

                                                      [facility]                    

 

                CHEMISTRY                            Bili Total      1.1                            0.2 

- 1.3                            10/21/2013                            Normal        

                                                      [facility]                    

 

                    CHEMISTRY                            ALANINE AMINOTRANSFERASE    45                 

                    0 - 65                            10/21/2013                            Normal

                                                        [facility]             

       

 

                CHEMISTRY                            Total Protein    6.5                            6.4

 - 8.4                            10/21/2013                            Normal       

                                                      [facility]                    



 

                CHEMISTRY                            Albumin Lvl     3.5                            3.5

 - 5.0                            10/21/2013                            Normal       

                                                      [facility]                    



 

                CHEMISTRY                            Calcium Lvl     8.6                            8.5

 - 10.5                            10/21/2013                            Normal      

                                                      [facility]                   

 

 

                CHEMISTRY                            CO2             26                            24 - 32     

                          10/21/2013                            Normal                  

                                                      [facility]                    

 

                CHEMISTRY                            Potassium Lvl    3.9                            3.5

 - 5.1                            10/21/2013                            Normal       

                                                      [facility]                    



 

                CHEMISTRY                            Chloride Lvl    106                            95

 - 109                            10/21/2013                            Normal       

                                                      [facility]                    



 

                CHEMISTRY                            Sodium Lvl      139                            135 

- 145                            10/21/2013                            Normal        

                                                      [facility]                    

 

                CHEMISTRY                            Creatinine Lvl    1.2                            

0.5 - 1.4                            10/21/2013                            Normal    

                                                      [facility]                 

   

 

                CHEMISTRY                            Glucose Lvl     122                            70 

- 99                            10/21/2013                            [facility]             

                                        <sup>8</sup>Interpretive Data: Adult reference range values reflect 

the clinical guidelines<br/>of the American Diabetes Association.               
                                        [facility]                    

 

                CHEMISTRY                            BUN             20                            7 - 22      

                          10/21/2013                            Normal                   

                                                      [facility]                    

 

                CHEMISTRY                            Total CK        101                            12 - 191

                            10/21/2013                            Normal             

                                                      [facility]                    

 

                CHEMISTRY                            CK MB           1.1                            0.5 - 3.6

                            10/21/2013                            Normal             

                                                      [facility]                    

 

                CHEMISTRY                            CK-MB INDEX     1.1                            0.0

 - 2.5                            10/21/2013                            Normal       

                                                      [facility]                    



 

                HEMATOLOGY                            aPTT            30.2                            22.9 - 35.8

                            10/21/2013                            Normal             

                                        <sup>11</sup>Interpretive Data: Heparin Therapeutic Range:  57 - 92 

Seconds                                 [facility]                    

 

                HEMATOLOGY                            INR             1.57                            0.85 - 1.17

                            10/21/2013                            [facility]                 

                                        <sup>10</sup>Interpretive Data: RECOMMENDED RANGES FOR PROTIME INR:<br/>

   2.0-3.0 for most medical and surgical thromboembolic states.<br/>   2.5-3.5 
for artificial heart valves and recurrent embolism.<br/><br/>INR SHOULD BE USED 
ONLY FOR PATIENTS ON STABLE ANTICOAGULANT THERAPY.                            [facility]                    

 

                HEMATOLOGY                            PROTIME         18.5                            12.0 

- 14.7                            10/21/2013                            HI           

                                                      [facility]                    

 

                HEMATOLOGY                            Hgb             15.2                            14.0 - 18.0

                            10/21/2013                            Normal             

                                                      [facility]                    

 

                HEMATOLOGY                            RBC X 10x6      5.22                            4.70

 - 6.10                            10/21/2013                            Normal      

                                                      [facility]                   

 

 

                HEMATOLOGY                            WBC X 10x3      6.6                            3.7

 - 10.4                            10/21/2013                            Normal      

                                                      [facility]                   

 

 

                HEMATOLOGY                            Hct             47.2                            42.0 - 54.0

                            10/21/2013                            Normal             

                                                      [facility]                    

 

                HEMATOLOGY                            MCH             29.2                            27.0 - 31.0

                            10/21/2013                            Normal             

                                                      [facility]                    

 

                HEMATOLOGY                            MCV             90.5                            80.0 - 94.0

                            10/21/2013                            Normal             

                                                      [facility]                    

 

                HEMATOLOGY                            RDW             15.4                            11.5 - 14.5

                            10/21/2013                            [facility]                    

 

                HEMATOLOGY                            MCHC            32.3                            32.0 - 36.0

                            10/21/2013                            Normal             

                                                      [facility]                    

 

                HEMATOLOGY                            Platelet        227                            133 -

 450                            10/21/2013                            Normal         

                                                      [facility]                    

 

                HEMATOLOGY                            MPV             8.9                            7.4 - 10.4

                            10/21/2013                            Normal             

                                                      [facility]                    

 

                HEMATOLOGY                            Basophils #     0.0                            0.0

 - 0.2                            10/21/2013                            Normal       

                                                      [facility]                    



 

                HEMATOLOGY                            Monocytes       9.4                            2.0 

- 12.0                            10/21/2013                            Normal       

                                                      [facility]                    



 

                HEMATOLOGY                            Segs            73.0                            45.0 - 75.0

                            10/21/2013                            Normal             

                                                      [facility]                    

 

                HEMATOLOGY                            Eosinophils     0.7                            0.0

 - 4.0                            10/21/2013                            Normal       

                                                      [facility]                    



 

                HEMATOLOGY                            Basophils       0.2                            0.0 

- 1.0                            10/21/2013                            Normal        

                                                      [facility]                    

 

                HEMATOLOGY                            Lymphocytes     16.7                            20.0

 - 40.0                            10/21/2013                            LOW         

                                                      [facility]                    

 

                HEMATOLOGY                            Monocytes #     0.6                            0.0

 - 0.8                            10/21/2013                            Normal       

                                                      [facility]                    



 

                HEMATOLOGY                            Eosinophils #    0.0                            

0.0 - 0.5                            10/21/2013                            Normal    

                                                      [facility]                 

   

 

                HEMATOLOGY                            Segs-Bands #    4.8                            1.5

 - 8.1                            10/21/2013                            Normal       

                                                      [facility]                    



 

                HEMATOLOGY                            Lymphocytes #    1.1                            

1.0 - 5.5                            10/21/2013                            Normal    

                                                      [facility]                 

   

 

                    BEDSIDE GLUCOSE TESTING                            Glucose POC         132               

                    70 - 99                            10/13/2013                            [facility]

                                        <sup>2</sup>Interpretive Data:  Upper Reportable

 Limit: 200 mg/dL.                            [facility]                    

 

                    BEDSIDE GLUCOSE TESTING                            Gluc POC Comment 1    Notify RN/MD

                                                        10/13/2013                   

                                                                            [facility]      

              

 

                    INFECTIOUS DISEASES                            C difficile DNA     Negative 1

                          (10/13/2013 07:00:00)                              Negative                       

                    10/13/2013                            Normal                            <sup>1</sup>Interpretive

 Data: [employer] Clostrid[employer] difficile assay utilizes loop-mediated 
isothermal DNA amplification (LAMP) technology to detect a 204 bp region of the 
tcdA gene within the PaLoc gene segment present in all known toxigenic C. 
difficile strains.<br/><br/>The assay utilizes FDA cleared IVD reagents. 
Performance characteristics have been verified by the [facility] 
[facility] within the [facility]. The [facility] 
Laboratory is authorized under the Clinical Laboratory Improvement Amendment of 
1988 (CLIA-88) to perform high complexity testing.                            [facility]                    

 

                    BEDSIDE GLUCOSE TESTING                            Gluc POC Comment 1    Notify RN/MD

                                                        10/13/2013                   

                                                                            [facility]      

              

 

                    BEDSIDE GLUCOSE TESTING                            Glucose POC         93                

                    70 - 99                            10/13/2013                            Normal

                                        <sup>3</sup>Interpretive Data:  Upper Reportable

 Limit: 200 mg/dL.                            [facility]                    

 

                    BEDSIDE GLUCOSE TESTING                            Gluc POC Comment 1    Notify RN/MD

                                                        10/13/2013                   

                                                                            [facility]      

              

 

                    BEDSIDE GLUCOSE TESTING                            Glucose POC         142               

                    70 - 99                            10/13/2013                            [facility]

                                        <sup>4</sup>Interpretive Data:  Upper Reportable

 Limit: 200 mg/dL.                            [facility]                    

 

                CHEMISTRY                            eGFR            71                                       

                    10/12/2013                                                        <sup>5</sup>Result

 Comment: The eGFR is calculated using the CKD-EPI formula. In most young, 
healthy individuals the eGFR will be >90 mL/min/1.73m2. The eGFR declines with 
age. An eGFR of 60-89 may be normal in some populations, particularly the 
elderly, for whom the CKD-EPI formula has not been extensively validated. Use of
 the eGFR is not recommended in the following populations:&
lt;br/><br/>Individuals with unstable creatinine concentrations, including 
pregnant patients and those with serious co-morbid conditions.<br/><br/>Patients
 with extremes in muscle mass or diet. <br/><br/>The data above are obtained 
from the National Kidney Disease Education Program (NKDEP) which additionally 
recommends that when the eGFR is used in patients with extremes of body mass 
index for purposes of drug dosing, the eGFR should be multiplied by the 
estimated BMI.                            [facility]                    

 

                CHEMISTRY                            Calcium Lvl     8.0                            8.5

 - 10.5                            10/12/2013                            LOW         

                                                      [facility]                    

 

                CHEMISTRY                            CO2             29                            24 - 32     

                          10/12/2013                            Normal                  

                                                      [facility]                    

 

                CHEMISTRY                            Chloride Lvl    100                            95

 - 109                            10/12/2013                            Normal       

                                                      [facility]                    



 

                CHEMISTRY                            Sodium Lvl      141                            135 

- 145                            10/12/2013                            Normal        

                                                      [facility]                    

 

                CHEMISTRY                            Potassium Lvl    3.6                            3.5

 - 5.1                            10/12/2013                            Normal       

                                                      [facility]                    



 

                CHEMISTRY                            Creatinine Lvl    1.2                            

0.5 - 1.4                            10/12/2013                            Normal    

                                                      [facility]                 

   

 

                CHEMISTRY                            BUN             17                            7 - 22      

                          10/12/2013                            Normal                   

                                                      [facility]                    

 

                CHEMISTRY                            Glucose Lvl     95                            70 -

 99                            10/12/2013                            Normal          

                                        <sup>7</sup>Interpretive Data: Adult reference range values reflect

 the clinical guidelines<br/>of the American Diabetes Association.              
                                        [facility]                    

 

                CHEMISTRY                            AGAP            15.6                            10.0 - 20.0

                            10/12/2013                            Normal             

                                                      [facility]                    

 

                HEMATOLOGY                            MCH             30.0                            27.0 - 31.0

                            10/12/2013                            Normal             

                                                      [facility]                    

 

                HEMATOLOGY                            MPV             8.8                            7.4 - 10.4

                            10/12/2013                            Normal             

                                                      [facility]                    

 

                HEMATOLOGY                            Platelet        241                            133 -

 450                            10/12/2013                            Normal         

                                                      [facility]                    

 

                HEMATOLOGY                            MCHC            33.1                            32.0 - 36.0

                            10/12/2013                            [facility]                    

 

                HEMATOLOGY                            RDW             15.4                            11.5 - 14.5

                            10/12/2013                            HI                 

                                                      [facility]                    

 

                HEMATOLOGY                            RBC X 10x6      4.61                            4.70

 - 6.10                            10/12/2013                            LOW         

                                                      [facility]                    

 

                HEMATOLOGY                            MCV             90.6                            80.0 - 94.0

                            10/12/2013                            [facility]                    

 

                HEMATOLOGY                            Hct             41.8                            42.0 - 54.0

                            10/12/2013                            [facility]                    

 

                HEMATOLOGY                            Hgb             13.8                            14.0 - 18.0

                            10/12/2013                            [facility]                    

 

                HEMATOLOGY                            WBC X 10x3      7.9                            3.7

 - 10.4                            10/12/2013                            [facility]                   

 

 

                HEMATOLOGY                            Basophils       0.4                            0.0 

- 1.0                            10/12/2013                            Normal        

                                                      [facility]                    

 

                HEMATOLOGY                            Eosinophils     0.1                            0.0

 - 4.0                            10/12/2013                            Normal       

                                                      [facility]                    



 

                HEMATOLOGY                            Segs-Bands #    5.8                            1.5

 - 8.1                            10/12/2013                            Normal       

                                                      [facility]                    



 

                HEMATOLOGY                            Lymphocytes #    1.1                            

1.0 - 5.5                            10/12/2013                            [facility]                 

   

 

                HEMATOLOGY                            Basophils #     0.0                            0.0

 - 0.2                            10/12/2013                            [facility]                    



 

                HEMATOLOGY                            Eosinophils #    0.0                            

0.0 - 0.5                            10/12/2013                            Normal    

                                                      [facility]                 

   

 

                HEMATOLOGY                            Monocytes #     1.0                            0.0

 - 0.8                            10/12/2013                            [facility]                    

 

                HEMATOLOGY                            Monocytes       12.1                            2.0

 - 12.0                            10/12/2013                            [facility]                    

 

                HEMATOLOGY                            Segs            73.8                            45.0 - 75.0

                            10/12/2013                            Normal             

                                                      [facility]                    

 

                HEMATOLOGY                            Lymphocytes     13.6                            20.0

 - 40.0                            10/12/2013                            LOW         

                                                      [facility]                    

 

                CHEMISTRY                            CK-MB INDEX     0.6                            0.0

 - 2.5                            10/11/2013                            Normal       

                                                      [facility]                    



 

                CHEMISTRY                            Troponin-I      0.03                            0.00

 - 0.40                            10/11/2013                            Normal      

                                                      [facility]                   

 

 

                CHEMISTRY                            CK MB           1.8                            0.5 - 3.6

                            10/11/2013                            [facility]                    

 

                CHEMISTRY                            Total CK        293                            12 - 191

                            10/11/2013                            [facility]                    

 

                CHEMISTRY                            Magnesium Lvl    2.0                            1.8

 - 2.4                            10/11/2013                            Normal       

                                                      [facility]                    



 

                CHEMISTRY                            eGFR            65                                       

                    10/11/2013                                                        <sup>6</sup>Result

 Comment: The eGFR is calculated using the CKD-EPI formula. In most young, 
healthy individuals the eGFR will be >90 mL/min/1.73m2. The eGFR declines with 
age. An eGFR of 60-89 may be normal in some populations, particularly the 
elderly, for whom the CKD-EPI formula has not been extensively validated. Use of
 the eGFR is not recommended in the following populations:&
lt;br/><br/>Individuals with unstable creatinine concentrations, including 
pregnant patients and those with serious co-morbid conditions.<br/><br/>Patients
 with extremes in muscle mass or diet. <br/><br/>The data above are obtained 
from the National Kidney Disease Education Program (NKDEP) which additionally 
recommends that when the eGFR is used in patients with extremes of body mass 
index for purposes of drug dosing, the eGFR should be multiplied by the 
estimated BMI.                            [facility]                    

 

                CHEMISTRY                            Globulin        3.1                            2.0 - 

4.0                            10/11/2013                            Normal          

                                                      [facility]                    

 

                CHEMISTRY                            A/G Ratio       1.2                            0.7 -

 1.6                            10/11/2013                            Normal         

                                                      [facility]                    

 

                    CHEMISTRY                            ASPARTATE TRANSAMINASE    153                  

                    0 - 37                            10/11/2013                            [facility]                

    

 

                CHEMISTRY                            AGAP            16.2                            10.0 - 20.0

                            10/11/2013                            Normal             

                                                      [facility]                    

 

                CHEMISTRY                            Chloride Lvl    104                            95

 - 109                            10/11/2013                            Normal       

                                                      [facility]                    



 

                CHEMISTRY                            B/C Ratio       15                            6 - 25

                            10/11/2013                            Normal             

                                                      [facility]                    

 

                CHEMISTRY                            Bili Total      1.7                            0.2 

- 1.3                            10/11/2013                            HI            

                                                      [facility]                    

 

                    CHEMISTRY                            ALANINE AMINOTRANSFERASE    136                

                    0 - 65                            10/11/2013                            HI 

                                                       [facility]              

      

 

                CHEMISTRY                            Alk Phos        126                            39 - 136

                            10/11/2013                            Normal             

                                                      [facility]                    

 

                CHEMISTRY                            CO2             24                            24 - 32     

                          10/11/2013                            Normal                  

                                                      [facility]                    

 

                CHEMISTRY                            Total Protein    6.9                            6.4

 - 8.4                            10/11/2013                            Normal       

                                                      [facility]                    



 

                CHEMISTRY                            Calcium Lvl     8.4                            8.5

 - 10.5                            10/11/2013                            LOW         

                                                      [facility]                    

 

                CHEMISTRY                            Albumin Lvl     3.8                            3.5

 - 5.0                            10/11/2013                            Normal       

                                                      [facility]                    



 

                CHEMISTRY                            BUN             20                            7 - 22      

                          10/11/2013                            Normal                   

                                                      [facility]                    

 

                CHEMISTRY                            Sodium Lvl      140                            135 

- 145                            10/11/2013                            Normal        

                                                      [facility]                    

 

                CHEMISTRY                            Creatinine Lvl    1.3                            

0.5 - 1.4                            10/11/2013                            Normal    

                                                      [facility]                 

   

 

                CHEMISTRY                            Potassium Lvl    4.2                            3.5

 - 5.1                            10/11/2013                            Normal       

                                                      [facility]                    



 

                CHEMISTRY                            Glucose Lvl     115                            70 

- 99                            10/11/2013                            [facility]             

                                        <sup>8</sup>Interpretive Data: Adult reference range values reflect 

the clinical guidelines<br/>of the American Diabetes Association.               
                                        [facility]                    

 

                CHEMISTRY                            Digoxin Lvl     <0.1                            0.8

 - 2.0                            10/11/2013                            LOW          

                                                      [facility]                    

 

                CHEMISTRY                            BNP             2290                            <=100     

                          10/11/2013                            [facility]                      

                                        <sup>9</sup>Interpretive Data: Elevated results are in line with increasing severity

 of <br/>congestive heart failure. Minor elevations between 100 and 300 <br/>may
 be seen with Myocardial Ischemia, Sodium retaining drugs, <br/>and 
compensated/treated heart failure.                            [facility]     

               

 

                HEMATOLOGY                            Eosinophils #    0.0                            

0.0 - 0.5                            10/11/2013                            Normal    

                                                      [facility]                 

   

 

                HEMATOLOGY                            Segs-Bands #    7.2                            1.5

 - 8.1                            10/11/2013                            Normal       

                                                      [facility]                    



 

                HEMATOLOGY                            Monocytes #     0.8                            0.0

 - 0.8                            10/11/2013                            Normal       

                                                      [facility]                    



 

                HEMATOLOGY                            Basophils       0.1                            0.0 

- 1.0                            10/11/2013                            Normal        

                                                      [facility]                    

 

                HEMATOLOGY                            Lymphocytes #    0.8                            

1.0 - 5.5                            10/11/2013                            LOW       

                                                      [facility]                    



 

                HEMATOLOGY                            Monocytes       8.8                            2.0 

- 12.0                            10/11/2013                            Normal       

                                                      [facility]                    



 

                HEMATOLOGY                            Eosinophils     0.1                            0.0

 - 4.0                            10/11/2013                            Normal       

                                                      [facility]                    



 

                HEMATOLOGY                            Lymphocytes     9.1                            20.0

 - 40.0                            10/11/2013                            LOW         

                                                      [facility]                    

 

                HEMATOLOGY                            Segs            81.9                            45.0 - 75.0

                            10/11/2013                            [facility]                    

 

                HEMATOLOGY                            RBC X 10x6      5.04                            4.70

 - 6.10                            10/11/2013                            Normal      

                                                      [facility]                   

 

 

                HEMATOLOGY                            WBC X 10x3      8.8                            3.7

 - 10.4                            10/11/2013                            Normal      

                                                      [facility]                   

 

 

                HEMATOLOGY                            Hgb             14.7                            14.0 - 18.0

                            10/11/2013                            Normal             

                                                      [facility]                    

 

                HEMATOLOGY                            Hct             45.5                            42.0 - 54.0

                            10/11/2013                            Normal             

                                                      [facility]                    

 

                HEMATOLOGY                            MCV             90.3                            80.0 - 94.0

                            10/11/2013                            Normal             

                                                      [facility]                    

 

                HEMATOLOGY                            MCH             29.1                            27.0 - 31.0

                            10/11/2013                            Normal             

                                                      [facility]                    

 

                HEMATOLOGY                            MCHC            32.2                            32.0 - 36.0

                            10/11/2013                            Normal             

                                                      [facility]                    

 

                HEMATOLOGY                            RDW             15.2                            11.5 - 14.5

                            10/11/2013                            [facility]                    

 

                HEMATOLOGY                            MPV             8.7                            7.4 - 10.4

                            10/11/2013                            Normal             

                                                      [facility]                    

 

                HEMATOLOGY                            Platelet        266                            133 -

 450                            10/11/2013                            Normal         

                                                      [facility]                    

 

                    BEDSIDE GLUCOSE TESTING                            Gluc POC Comment 1    Notified RN/MD

                                                        10/02/2013                   

                    NA                                                        [facility]    

                

 

                    BEDSIDE GLUCOSE TESTING                            Glucose POC         99                

                    70 - 99                            10/02/2013                            Normal

                                        <sup>2</sup>Interpretive Data:  Upper Reportable

 Limit: 200 mg/dL.                            [facility]                    

 

                    INFECTIOUS DISEASES                            C difficile DNA     Negative 1

                          (10/02/2013 09:50:00)                              Negative                       

                    10/02/2013                            Normal                            <sup>1</sup>Interpretive

 Data: [employer] Clostrid[employer] difficile assay utilizes loop-mediated 
isothermal DNA amplification (LAMP) technology to detect a 204 bp region of the 
tcdA gene within the PaLoc gene segment present in all known toxigenic C. 
difficile strains.<br/><br/>The assay utilizes FDA cleared IVD reagents. 
Performance characteristics have been verified by the [facility] 
Laboratory within the [facility]. The Molecular Diagnostic 
Laboratory is authorized under the Clinical Laboratory Improvement Amendment of 
1988 (CLIA-88) to perform high complexity testing.                            [facility]                    

 

                    PARASITOLOGY - SEROLOGY                            Cryptospor Ag       Negative 

                          (10/02/2013 09:50:00)                              Negative                       

                    10/02/2013                            Normal                                      

                                        [facility]                    

 

                    Microbiology                            Culture: Stool                                

                                                10/02/2013                                              

                                                      [facility]                    

 

                    BEDSIDE GLUCOSE TESTING                            Glucose POC         98                

                    70 - 99                            10/02/2013                            Normal

                                        <sup>3</sup>Interpretive Data:  Upper Reportable

 Limit: 200 mg/dL.                            [facility]                    

 

                    BEDSIDE GLUCOSE TESTING                            Glucose POC         96                

                    70 - 99                            10/02/2013                            Normal

                                        <sup>4</sup>Interpretive Data:  Upper Reportable

 Limit: 200 mg/dL.                            [facility]                    

 

                    BEDSIDE GLUCOSE TESTING                            Gluc POC Comment 1    Notified RN/MD

                                                        10/01/2013                   

                    NA                                                        [facility]    

                

 

                HEMATOLOGY                            RDW             15.5                            11.5 - 14.5

                            10/01/2013                            [facility]                    

 

                HEMATOLOGY                            MCV             90.9                            80.0 - 94.0

                            10/01/2013                            Normal             

                                                      [facility]                    

 

                HEMATOLOGY                            Hct             40.5                            42.0 - 54.0

                            10/01/2013                            LOW                

                                                      [facility]                    

 

                HEMATOLOGY                            MCHC            33.4                            32.0 - 36.0

                            10/01/2013                            Normal             

                                                      [facility]                    

 

                HEMATOLOGY                            MCH             30.3                            27.0 - 31.0

                            10/01/2013                            Normal             

                                                      [facility]                    

 

                HEMATOLOGY                            MPV             8.5                            7.4 - 10.4

                            10/01/2013                            Normal             

                                                      [facility]                    

 

                HEMATOLOGY                            Platelet        156                            133 -

 450                            10/01/2013                            Normal         

                                                      [facility]                    

 

                HEMATOLOGY                            Hgb             13.5                            14.0 - 18.0

                            10/01/2013                            LOW                

                                                      [facility]                    

 

                HEMATOLOGY                            RBC             4.46                            4.70 - 6.10

                            10/01/2013                            LOW                

                                                      [facility]                    

 

                HEMATOLOGY                            WBC             7.1                            3.7 - 10.4

                            10/01/2013                            Normal             

                                                      [facility]                    

 

                HEMATOLOGY                            Segs            79.7                            45.0 - 75.0

                            10/01/2013                            [facility]                    

 

                HEMATOLOGY                            Lymphocytes     11.9                            20.0

 - 40.0                            10/01/2013                            LOW         

                                                      [facility]                    

 

                HEMATOLOGY                            Monocytes       6.8                            2.0 

- 12.0                            10/01/2013                            Normal       

                                                      [facility]                    



 

                HEMATOLOGY                            Segs-Bands #    5.6                            1.5

 - 8.1                            10/01/2013                            Normal       

                                                      [facility]                    



 

                HEMATOLOGY                            Lymphocytes #    0.8                            

1.0 - 5.5                            10/01/2013                            LOW       

                                                      [facility]                    



 

                HEMATOLOGY                            Eosinophils #    0.1                            

0.0 - 0.5                            10/01/2013                            Normal    

                                                      [facility]                 

   

 

                HEMATOLOGY                            Monocytes #     0.5                            0.0

 - 0.8                            10/01/2013                            Normal       

                                                      [facility]                    



 

                HEMATOLOGY                            Eosinophils     1.2                            0.0

 - 4.0                            10/01/2013                            Normal       

                                                      [facility]                    



 

                HEMATOLOGY                            Basophils       0.4                            0.0 

- 1.0                            10/01/2013                            [facility]                    

 

                HEMATOLOGY                            Basophils #     0.0                            0.0

 - 0.2                            10/01/2013                            Normal       

                                                      [facility]                    



 

                CHEMISTRY                            A/G Ratio       1.5                            0.7 -

 1.6                            10/01/2013                            Normal         

                                                      [facility]                    

 

                CHEMISTRY                            AST             33                            0 - 37      

                          10/01/2013                            [facility]                    

 

                CHEMISTRY                            ALT             63                            0 - 65      

                          10/01/2013                            Normal                   

                                                      [facility]                    

 

                CHEMISTRY                            Alk Phos        114                            39 - 136

                            10/01/2013                            Normal             

                                                      [facility]                    

 

                CHEMISTRY                            Bili Total      1.2                            0.2 

- 1.3                            10/01/2013                            [facility]                    

 

                CHEMISTRY                            Globulin        2.1                            2.0 - 

4.0                            10/01/2013                            Normal          

                                                      [facility]                    

 

                CHEMISTRY                            Total Protein    5.2                            6.4

 - 8.4                            10/01/2013                            LOW          

                                                      [facility]                    

 

                CHEMISTRY                            eGFR            71                                       

                    10/01/2013                            NA                            <sup>6</sup>Result

 Comment: The eGFR is calculated using the CKD-EPI formula. In most young, 
healthy individuals the eGFR will be >90 mL/min/1.73m2. The eGFR declines with 
age. An eGFR of 60-89 may be normal in some populations, particularly the 
elderly, for whom the CKD-EPI formula has not been extensively validated. Use of
 the eGFR is not recommended in the following populations:&
lt;br/><br/>Individuals with unstable creatinine concentrations, including 
pregnant patients and those with serious co-morbid conditions.<br/><br/>Patients
 with extremes in muscle mass or diet. <br/><br/>The data above are obtained 
from the National Kidney Disease Education Program (NKDEP) which additionally 
recommends that when the eGFR is used in patients with extremes of body mass 
index for purposes of drug dosing, the eGFR should be multiplied by the 
estimated BMI.                            [facility]                    

 

                CHEMISTRY                            Chloride Lvl    106                            95

 - 109                            10/01/2013                            Normal       

                                                      [facility]                    



 

                CHEMISTRY                            Potassium Lvl    4.0                            3.5

 - 5.1                            10/01/2013                            Normal       

                                                      [facility]                    



 

                CHEMISTRY                            Calcium Lvl     7.6                            8.5

 - 10.5                            10/01/2013                            LOW         

                                                      [facility]                    

 

                CHEMISTRY                            CO2             28                            24 - 32     

                          10/01/2013                            Normal                  

                                                      [facility]                    

 

                CHEMISTRY                            Glucose Lvl     149                            70 

- 99                            10/01/2013                            [facility]             

                                        <sup>8</sup>Interpretive Data: Adult reference range values reflect 

the clinical guidelines<br/>of the [employer] Diabetes [employer].               
                                        [facility]                    

 

                CHEMISTRY                            BUN             17                            7 - 22      

                          10/01/2013                            Normal                   

                                                      [facility]                    

 

                CHEMISTRY                            Sodium Lvl      142                            135 

- 145                            10/01/2013                            Normal        

                                                      [facility]                    

 

                CHEMISTRY                            Creatinine Lvl    1.2                            

0.5 - 1.4                            10/01/2013                            Normal    

                                                      [facility]                 

   

 

                CHEMISTRY                            B/C Ratio       14                            6 - 25

                            10/01/2013                            Normal             

                                                      [facility]                    

 

                CHEMISTRY                            Albumin Lvl     3.1                            3.5

 - 5.0                            10/01/2013                            LOW          

                                                      [facility]                    

 

                CHEMISTRY                            AGAP            12.0                            10.0 - 20.0

                            10/01/2013                            Normal             

                                                      [facility]                    

 

                CHEMISTRY                            CK MB           1.1                            0.5 - 3.6

                            10/01/2013                            Normal             

                                                      [facility]                    

 

                CHEMISTRY                            Troponin-I      0.02                            0.00

 - 0.40                            10/01/2013                            Normal      

                                                      [facility]                   

 

 

                CHEMISTRY                            Total CK        100                            12 - 191

                            10/01/2013                            Normal             

                                                      [facility]                    

 

                CHEMISTRY                            CK MB Index     1.1                            0.0

 - 2.5                            10/01/2013                            Normal       

                                                      [facility]                    



 

                CHEMISTRY                            Troponin-I      0.03                            0.00

 - 0.40                            10/01/2013                            Normal      

                                                      [facility]                   

 

 

                CHEMISTRY                            Total CK        223                            12 - 191

                            10/01/2013                            HI                 

                                                      [facility]                    

 

                CHEMISTRY                            CK MB           2.1                            0.5 - 3.6

                            10/01/2013                            Normal             

                                                      [facility]                    

 

                CHEMISTRY                            CK MB Index     0.9                            0.0

 - 2.5                            10/01/2013                            Normal       

                                                      [facility]                    



 

                CHEMISTRY                            CK MB Index     0.8                            0.0

 - 2.5                            2013                            Normal       

                                                      [facility]                    



 

                CHEMISTRY                            CK MB           1.1                            0.5 - 3.6

                            2013                            Normal             

                                                      [facility]                    

 

                CHEMISTRY                            Troponin-I      0.04                            0.00

 - 0.40                            2013                            Normal      

                                                      [facility]                   

 

 

                CHEMISTRY                            Total CK        131                            12 - 191

                            2013                            Normal             

                                                      [facility]                    

 

                    URINALYSIS                            UA Urobilinogen     2.0                        

                    0.1 - 1.0                            2013                            HI      

                                                      [facility]                   

 

 

                    URINALYSIS                            UA Nitrite          Negative 

                          (2013 10:45:02)                              Negative                       

                    2013                            Normal                                      

                                        [facility]                    

 

                    URINALYSIS                            UA Protein          >=300 mg/dL 

*ABN*

                          (2013 10:45:02)                              Negative                       

                    2013                            ABN                                         

                                        [facility]                    

 

                    URINALYSIS                            UA Ketones          Trace 

*ABN*

                          (2013 10:45:02)                              Negative                       

                    2013                            ABN                                         

                                        [facility]                    

 

                    URINALYSIS                            UA Glucose          Negative 

                          (2013 10:45:02)                              Negative                       

                    2013                            Normal                                      

                                        [facility]                    

 

                    URINALYSIS                            UA Leuk Est         Negative 

                          (2013 10:45:02)                              Negative                       

                    2013                            Normal                                      

                                        [facility]                    

 

                    URINALYSIS                            UA Bili             Moderate 5

*ABN*

                          (2013 10:45:02)                              Negative                       

                    2013                            ABN                            <sup>5</sup>Result

 Comment: Interpret positive bilirubin results with caution. Confirmatory 
testing not possible due to the unavailability of reagent.  Correlation with 
serum chemistry results recommended.                            [facility]   

                 

 

                    URINALYSIS                            UA Blood            Trace 

*ABN*

                          (2013 10:45:02)                              Negative                       

                    2013                            ABN                                         

                                        [facility]                    

 

                URINALYSIS                            UA pH           6.0                            5.0 - 8.0

                            2013                            Normal             

                                                      [facility]                    

 

                    URINALYSIS                            UA Turbidity        Slight Cloudy 

                    (2013 10:45:02)                              Clear                            

2013                            Normal                                         

                                        [facility]                    

 

                    URINALYSIS                            UA Color            Orange 

*ABN*

                          (2013 10:45:02)                              Yellow                         

                    2013                            ABN                                           

                                        [facility]                    

 

                    URINALYSIS                            UA Spec Grav        >=1.030 

*ABN*

                          (2013 10:45:02)                              <=1.030                        

                    2013                            ABN                                          

                                        [facility]                    

 

                    URINALYSIS                            UA Hyal Cast        0-2 

                    (2013 10:45:02)                              0 - 2                            

2013                            Normal                                         

                                        [facility]                    

 

                    URINALYSIS                            UA Amorph Cathryn      Occasional /HPF 

*ABN*

                          (2013 10:45:02)                              None Seen                      

                    2013                            ABN                                        

                                        [facility]                    

 

                    URINALYSIS                            UA Mucus            Many /LPF 

*ABN*

                          (2013 10:45:02)                              None Seen                      

                    2013                            ABN                                        

                                        [facility]                    

 

                    URINALYSIS                            UA RBC              0-2 /HPF 

                    (2013 10:45:02)                              0 - 2                            

2013                            Normal                                         

                                        [facility]                    

 

                    URINALYSIS                            UA WBC              0-2 /HPF 

                          (2013 10:45:02)                              None Seen                      

                    2013                            Normal                                     

                                        [facility]                    

 

                    URINALYSIS                            UA Bacteria         Few /HPF 

                          (2013 10:45:02)                              None Seen                      

                    2013                            Normal                                     

                                        [facility]                    

 

                    URINALYSIS                            UA Sq Epi           Occasional /LPF 

                    (2013 10:45:02)                              Few                            2013

                            Normal                                                   

                                        [facility]                    

 

                    URINALYSIS                            UA Fine Gran        0-2 /LPF 

*ABN*

                          (2013 10:45:02)                              None Seen                      

                    2013                            ABN                                        

                                        [facility]                    

 

                CHEMISTRY                            Lipase Lvl      76                            73 - 

393                            2013                            Normal          

                                                      [facility]                    

 

                CHEMISTRY                            Amylase Lvl     11                            25 -

 115                            2013                            LOW            

                                                      [facility]                    

 

                CHEMISTRY                            A/G Ratio       1.2                            0.7 -

 1.6                            2013                            Normal         

                                                      [facility]                    

 

                CHEMISTRY                            Globulin        3.0                            2.0 - 

4.0                            2013                            Normal          

                                                      [facility]                    

 

                CHEMISTRY                            B/C Ratio       13                            6 - 25

                            2013                            Normal             

                                                      [facility]                    

 

                CHEMISTRY                            AGAP            14.2                            10.0 - 20.0

                            2013                            Normal             

                                                      [facility]                    

 

                CHEMISTRY                            Calcium Lvl     8.3                            8.5

 - 10.5                            2013                            LOW         

                                                      [facility]                    

 

                CHEMISTRY                            eGFR            65                                       

                    2013                            NA                            <sup>7</sup>Result

 Comment: The eGFR is calculated using the CKD-EPI formula. In most young, 
healthy individuals the eGFR will be >90 mL/min/1.73m2. The eGFR declines with 
age. An eGFR of 60-89 may be normal in some populations, particularly the 
elderly, for whom the CKD-EPI formula has not been extensively validated. Use of
 the eGFR is not recommended in the following populations:&
lt;br/><br/>Individuals with unstable creatinine concentrations, including 
pregnant patients and those with serious co-morbid conditions.<br/><br/>Patients
 with extremes in muscle mass or diet. <br/><br/>The data above are obtained 
from the National Kidney Disease Education Program (NKDEP) which additionally 
recommends that when the eGFR is used in patients with extremes of body mass 
index for purposes of drug dosing, the eGFR should be multiplied by the 
estimated BMI.                            [facility]                    

 

                CHEMISTRY                            Total Protein    6.7                            6.4

 - 8.4                            2013                            Normal       

                                                      [facility]                    



 

                CHEMISTRY                            Alk Phos        137                            39 - 136

                            2013                            [facility]                    

 

                CHEMISTRY                            ALT             78                            0 - 65      

                          2013                            [facility]                    

 

                CHEMISTRY                            Albumin Lvl     3.7                            3.5

 - 5.0                            2013                            Normal       

                                                      [facility]                    



 

                CHEMISTRY                            Bili Total      1.9                            0.2 

- 1.3                            2013                            [facility]                    

 

                CHEMISTRY                            AST             69                            0 - 37      

                          2013                            [facility]                    

 

                CHEMISTRY                            Sodium Lvl      141                            135 

- 145                            2013                            Normal        

                                                      [facility]                    

 

                CHEMISTRY                            Potassium Lvl    4.2                            3.5

 - 5.1                            2013                            Normal       

                                                      [facility]                    



 

                CHEMISTRY                            CO2             27                            24 - 32     

                          2013                            Normal                  

                                                      [facility]                    

 

                CHEMISTRY                            Chloride Lvl    104                            95

 - 109                            2013                            Normal       

                                                      [facility]                    



 

                CHEMISTRY                            BUN             17                            7 - 22      

                          2013                            Normal                   

                                                      [facility]                    

 

                CHEMISTRY                            Creatinine Lvl    1.3                            

0.5 - 1.4                            2013                            Normal    

                                                      [facility]                 

   

 

                CHEMISTRY                            Glucose Lvl     137                            70 

- 99                            2013                            [place]             

                                        <sup>9</sup>Interpretive Data: Adult reference range values reflect 

the clinical guidelines<br/>of the American Diabetes Association.               
                                        [facility]                    

 

                HEMATOLOGY                            MPV             8.8                            7.4 - 10.4

                            2013                            Normal             

                                                      [facility]                    

 

                HEMATOLOGY                            RDW             15.6                            11.5 - 14.5

                            2013                            [facility]                    

 

                HEMATOLOGY                            MCHC            33.3                            32.0 - 36.0

                            2013                            Normal             

                                                      [facility]                    

 

                HEMATOLOGY                            MCH             30.5                            27.0 - 31.0

                            2013                            Normal             

                                                      [facility]                    

 

                HEMATOLOGY                            Platelet        215                            133 -

 450                            2013                            Normal         

                                                      [facility]                    

 

                HEMATOLOGY                            MCV             91.7                            80.0 - 94.0

                            2013                            Normal             

                                                      [facility]                    

 

                HEMATOLOGY                            RBC             4.80                            4.70 - 6.10

                            2013                            Normal             

                                                      [facility]                    

 

                HEMATOLOGY                            Hct             44.0                            42.0 - 54.0

                            2013                            Normal             

                                                      [facility]                    

 

                HEMATOLOGY                            Hgb             14.6                            14.0 - 18.0

                            2013                            Normal             

                                                      [facility]                    

 

                HEMATOLOGY                            WBC             8.9                            3.7 - 10.4

                            2013                            Normal             

                                                      [facility]                    

 

                HEMATOLOGY                            Eosinophils #    0.0                            

0.0 - 0.5                            2013                            Normal    

                                                      [facility]                 

   

 

                HEMATOLOGY                            Monocytes #     0.7                            0.0

 - 0.8                            2013                            Normal       

                                                      [facility]                    



 

                HEMATOLOGY                            Basophils #     0.0                            0.0

 - 0.2                            2013                            Normal       

                                                      [facility]                    



 

                HEMATOLOGY                            Lymphocytes #    1.0                            

1.0 - 5.5                            2013                            Normal    

                                                      [facility]                 

   

 

                HEMATOLOGY                            Segs-Bands #    7.2                            1.5

 - 8.1                            2013                            Normal       

                                                      [facility]                    



 

                HEMATOLOGY                            Segs            81.1                            45.0 - 75.0

                            2013                            HI                 

                                                      [facility]                    

 

                HEMATOLOGY                            Lymphocytes     10.8                            20.0

 - 40.0                            2013                            LOW         

                                                      [facility]                    

 

                HEMATOLOGY                            Eosinophils     0.2                            0.0

 - 4.0                            2013                            Normal       

                                                      [facility]                    



 

                HEMATOLOGY                            Monocytes       7.8                            2.0 

- 12.0                            2013                            Normal       

                                                      [facility]                    



 

                HEMATOLOGY                            Basophils       0.1                            0.0 

- 1.0                            2013                            Normal        

                                                      [facility]                    

 

                    BEDSIDE GLUCOSE TESTING                            Gluc POC Lifscn     186           

                          70 - 99                            2013                       

                          [facility]                            <sup>1</sup>Interpretive Data:  Upper Reportable

 Limit: 200 mg/dL.                            [facility]                    

 

                    BEDSIDE GLUCOSE TESTING                            Comment1            Notify RN/MD         

                                                2013                            NA

                                                        [facility]             

       

 

                    BEDSIDE GLUCOSE TESTING                            Comment1            Notify RN/MD         

                                                2013                            NA

                                                        [facility]             

       

 

                    BEDSIDE GLUCOSE TESTING                            Gluc POC Lifscn     229           

                          70 - 99                            2013                       

                          [facility]                            <sup>2</sup>Interpretive Data:  Upper Reportable

 Limit: 200 mg/dL.                            [facility]                    

 

                    BEDSIDE GLUCOSE TESTING                            Comment1            Verify w/Lab         

                                                2013                            NA

                                                        [facility]             

       

 

                    BEDSIDE GLUCOSE TESTING                            Gluc POC Lifscn     163           

                          70 - 99                            2013                       

                          [facility]                            <sup>3</sup>Interpretive Data:  Upper Reportable

 Limit: 200 mg/dL.                            [facility]                    

 

                CHEMISTRY                            LDL             93                            <=99        

                          2013                            Normal                     

                                                      [facility]                    

 

                CHEMISTRY                            HDL             21                            >=61        

                          2013                            LOW                        

                                                      [facility]                    

 

                CHEMISTRY                            Trig            210                            <=149     

                          2013                            HI                      

                                                      [facility]                    

 

                CHEMISTRY                            Chol            156                            <=199     

                          2013                            Normal                  

                                                      [facility]                    

 

                CHEMISTRY                            CHD Risk        7.43                            4.00 

- 7.30                            2013                            [place]           

                                                      [facility]                    

 

                CHEMISTRY                            BNP             466                            <=100      

                          2013                            [facility]                       

                                        <sup>10</sup>Interpretive Data: Elevated results are in line with increasing severity

 of <br/>congestive heart failure. Minor elevations between 100 and 300 <br/>may
 be seen with Myocardial Ischemia, Sodium retaining drugs, <br/>and 
compensated/treated heart failure.                            [facility]     

               

 

                CHEMISTRY                            Digoxin Lvl     0.2                            0.8

 - 2.0                            2013                            LOW          

                                                      [facility]                    

 

                CHEMISTRY                            eGFR            65                                       

                    2013                            NA                            <sup>4</sup>Result

 Comment: The eGFR is calculated using the CKD-EPI formula. In most young, 
healthy individuals the eGFR will be >90 mL/min/1.73m2. The eGFR declines with 
age. An eGFR of 60-89 may be normal in some populations, particularly the 
elderly, for whom the CKD-EPI formula has not been extensively validated. Use of
 the eGFR is not recommended in the following populations:&
lt;br/><br/>Individuals with unstable creatinine concentrations, including 
pregnant patients and those with serious co-morbid conditions.<br/><br/>Patients
 with extremes in muscle mass or diet. <br/><br/>The data above are obtained 
from the National Kidney Disease Education Program (NKDEP) which additionally 
recommends that when the eGFR is used in patients with extremes of body mass 
index for purposes of drug dosing, the eGFR should be multiplied by the 
estimated BMI.                            [facility]                    

 

                CHEMISTRY                            Glucose Lvl     153                            70 

- 99                            2013                            [place]             

                                        <sup>7</sup>Interpretive Data: Adult reference range values reflect 

the clinical guidelines<br/>of the American Diabetes Association.               
                                        [facility]                    

 

                CHEMISTRY                            Potassium Lvl    3.4                            3.5

 - 5.1                            2013                            LOW          

                                                      [facility]                    

 

                CHEMISTRY                            Sodium Lvl      136                            135 

- 145                            2013                            Normal        

                                                      [facility]                    

 

                CHEMISTRY                            Creatinine Lvl    1.3                            

0.5 - 1.4                            2013                            Normal    

                                                      [facility]                 

   

 

                CHEMISTRY                            BUN             22                            7 - 22      

                          2013                            Normal                   

                                                      [facility]                    

 

                CHEMISTRY                            CO2             32                            24 - 32     

                          2013                            Normal                  

                                                      [facility]                    

 

                CHEMISTRY                            Chloride Lvl    93                            95 

- 109                            2013                            LOW           

                                                      [facility]                    

 

                CHEMISTRY                            Calcium Lvl     8.6                            8.5

 - 10.5                            2013                            Normal      

                                                      [facility]                   

 

 

                CHEMISTRY                            AGAP            14.4                            10.0 - 20.0

                            2013                            Normal             

                                                      [facility]                    

 

                CHEMISTRY                            Total CK        81                            12 - 191

                            2013                            Normal             

                                                      [facility]                    

 

                HEMATOLOGY                            Basophils #     0.0                            0.0

 - 0.2                            2013                            Normal       

                                                      [facility]                    



 

                HEMATOLOGY                            Segs            74.8                            45.0 - 75.0

                            2013                            Normal             

                                                      [facility]                    

 

                HEMATOLOGY                            Basophils       0.2                            0.0 

- 1.0                            2013                            Normal        

                                                      [facility]                    

 

                HEMATOLOGY                            Segs-Bands #    5.5                            1.5

 - 8.1                            2013                            Normal       

                                                      [facility]                    



 

                HEMATOLOGY                            Eosinophils #    0.2                            

0.0 - 0.5                            2013                            Normal    

                                                      [facility]                 

   

 

                HEMATOLOGY                            Monocytes #     0.5                            0.0

 - 0.8                            2013                            Normal       

                                                      [facility]                    



 

                HEMATOLOGY                            Lymphocytes #    1.2                            

1.0 - 5.5                            2013                            Normal    

                                                      [facility]                 

   

 

                HEMATOLOGY                            Monocytes       6.1                            2.0 

- 12.0                            2013                            Normal       

                                                      [facility]                    



 

                HEMATOLOGY                            Lymphocytes     16.5                            20.0

 - 40.0                            2013                            LOW         

                                                      [facility]                    

 

                HEMATOLOGY                            Eosinophils     2.4                            0.0

 - 4.0                            2013                            Normal       

                                                      [facility]                    



 

                HEMATOLOGY                            MPV             8.8                            7.4 - 10.4

                            2013                            Normal             

                                                      [facility]                    

 

                HEMATOLOGY                            Platelet        176                            133 -

 450                            2013                            Normal         

                                                      [facility]                    

 

                HEMATOLOGY                            RDW             14.5                            11.5 - 14.5

                            2013                            Normal             

                                                      [facility]                    

 

                HEMATOLOGY                            MCHC            34.2                            32.0 - 36.0

                            2013                            Normal             

                                                      [facility]                    

 

                HEMATOLOGY                            RBC             5.15                            4.70 - 6.10

                            2013                            Normal             

                                                      [facility]                    

 

                HEMATOLOGY                            Hgb             15.6                            14.0 - 18.0

                            2013                            Normal             

                                                      [facility]                    

 

                HEMATOLOGY                            WBC             7.3                            3.7 - 10.4

                            2013                            Normal             

                                                      [facility]                    

 

                HEMATOLOGY                            MCV             88.7                            80.0 - 94.0

                            2013                            Normal             

                                                      [facility]                    

 

                HEMATOLOGY                            MCH             30.3                            27.0 - 31.0

                            2013                            Normal             

                                                      [facility]                    

 

                HEMATOLOGY                            Hct             45.7                            42.0 - 54.0

                            2013                            Normal             

                                                      [facility]                    

 

                CHEMISTRY                            Hgb A1C         9.8                            <=5.6  

                          2013                            HI                   

                                                      [facility]                    

 

                CHEMISTRY                            BNP             696                            <=100      

                          2013                            [place]                       

                                        <sup>11</sup>Interpretive Data: Elevated results are in line with increasing severity

 of <br/>congestive heart failure. Minor elevations between 100 and 300 <br/>may
 be seen with Myocardial Ischemia, Sodium retaining drugs, <br/>and 
compensated/treated heart failure.                            [facility]     

               

 

                CHEMISTRY                            eGFR            71                                       

                    2013                            NA                            <sup>5</sup>Result

 Comment: The eGFR is calculated using the CKD-EPI formula. In most young, 
healthy individuals the eGFR will be >90 mL/min/1.73m2. The eGFR declines with 
age. An eGFR of 60-89 may be normal in some populations, particularly the 
elderly, for whom the CKD-EPI formula has not been extensively validated. Use of
 the eGFR is not recommended in the following populations:&
lt;br/><br/>Individuals with unstable creatinine concentrations, including 
pregnant patients and those with serious co-morbid conditions.<br/><br/>Patients
 with extremes in muscle mass or diet. <br/><br/>The data above are obtained 
from the National Kidney Disease Education Program (NKDEP) which additionally 
recommends that when the eGFR is used in patients with extremes of body mass 
index for purposes of drug dosing, the eGFR should be multiplied by the 
estimated BMI.                            [facility]                    

 

                CHEMISTRY                            AGAP            14.7                            10.0 - 20.0

                            2013                            Normal             

                                                      [facility]                    

 

                CHEMISTRY                            CO2             28                            24 - 32     

                          2013                            Normal                  

                                                      [facility]                    

 

                CHEMISTRY                            Calcium Lvl     8.2                            8.5

 - 10.5                            2013                            LOW         

                                                      [facility]                    

 

                CHEMISTRY                            BUN             23                            7 - 22      

                          2013                            HI                       

                                                      [facility]                    

 

                CHEMISTRY                            Glucose Lvl     283                            70 

- 99                            2013                            [place]             

                                        <sup>8</sup>Interpretive Data: Adult reference range values reflect 

the clinical guidelines<br/>of the American Diabetes Association.               
                                        [facility]                    

 

                CHEMISTRY                            Chloride Lvl    98                            95 

- 109                            2013                            Normal        

                                                      [facility]                    

 

                CHEMISTRY                            Sodium Lvl      137                            135 

- 145                            2013                            Normal        

                                                      [facility]                    

 

                CHEMISTRY                            Potassium Lvl    3.7                            3.5

 - 5.1                            2013                            Normal       

                                                      [facility]                    



 

                CHEMISTRY                            Creatinine Lvl    1.2                            

0.5 - 1.4                            2013                            Normal    

                                                      [facility]                 

   

 

                CHEMISTRY                            eGFR            71                                       

                    2013                            NA                            <sup>6</sup>Result

 Comment: The eGFR is calculated using the CKD-EPI formula. In most young, 
healthy individuals the eGFR will be >90 mL/min/1.73m2. The eGFR declines with 
age. An eGFR of 60-89 may be normal in some populations, particularly the 
elderly, for whom the CKD-EPI formula has not been extensively validated. Use of
 the eGFR is not recommended in the following populations:&
lt;br/><br/>Individuals with unstable creatinine concentrations, including 
pregnant patients and those with serious co-morbid conditions.<br/><br/>Patients
 with extremes in muscle mass or diet. <br/><br/>The data above are obtained 
from the National Kidney Disease Education Program (NKDEP) which additionally 
recommends that when the eGFR is used in patients with extremes of body mass 
index for purposes of drug dosing, the eGFR should be multiplied by the 
estimated BMI.                            [facility]                    

 

                CHEMISTRY                            Potassium Lvl    3.4                            3.5

 - 5.1                            2013                            LOW          

                                                      [facility]                    

 

                CHEMISTRY                            Sodium Lvl      139                            135 

- 145                            2013                            Normal        

                                                      [facility]                    

 

                CHEMISTRY                            Chloride Lvl    100                            95

 - 109                            2013                            Normal       

                                                      [facility]                    



 

                CHEMISTRY                            Calcium Lvl     7.9                            8.5

 - 10.5                            2013                            LOW         

                                                      [facility]                    

 

                CHEMISTRY                            CO2             31                            24 - 32     

                          2013                            Normal                  

                                                      [facility]                    

 

                CHEMISTRY                            Glucose Lvl     160                            70 

- 99                            2013                            [place]             

                                        <sup>9</sup>Interpretive Data: Adult reference range values reflect 

the clinical guidelines<br/>of the American Diabetes Association.               
                                        [facility]                    

 

                CHEMISTRY                            Creatinine Lvl    1.2                            

0.5 - 1.4                            2013                            Normal    

                                                      [facility]                 

   

 

                CHEMISTRY                            BUN             23                            7 - 22      

                          2013                            HI                       

                                                      [facility]                    

 

                CHEMISTRY                            AGAP            11.4                            10.0 - 20.0

                            2013                            Normal             

                                                      [facility]                    

 

                CHEMISTRY                            BNP             889                            <=100      

                          2013                            [place]                       

                                        <sup>12</sup>Interpretive Data: Elevated results are in line with increasing severity

 of <br/>congestive heart failure. Minor elevations between 100 and 300 <br/>may
 be seen with Myocardial Ischemia, Sodium retaining drugs, <br/>and 
compensated/treated heart failure.                            [facility]     

               

 

                HEMATOLOGY                            Eosinophils #    0.1                            

0.0 - 0.5                            2013                            Normal    

                                                      [facility]                 

   

 

                HEMATOLOGY                            Basophils #     0.0                            0.0

 - 0.2                            2013                            Normal       

                                                      [facility]                    



 

                HEMATOLOGY                            Lymphocytes #    1.3                            

1.0 - 5.5                            2013                            Normal    

                                                      [facility]                 

   

 

                HEMATOLOGY                            Monocytes #     0.3                            0.0

 - 0.8                            2013                            Normal       

                                                      [facility]                    



 

                HEMATOLOGY                            Basophils       0.4                            0.0 

- 1.0                            2013                            Normal        

                                                      [facility]                    

 

                HEMATOLOGY                            Eosinophils     2.3                            0.0

 - 4.0                            2013                            Normal       

                                                      [facility]                    



 

                HEMATOLOGY                            Lymphocytes     20.6                            20.0

 - 40.0                            2013                            Normal      

                                                      [facility]                   

 

 

                HEMATOLOGY                            Segs            72.2                            45.0 - 75.0

                            2013                            Normal             

                                                      [facility]                    

 

                HEMATOLOGY                            Segs-Bands #    4.6                            1.5

 - 8.1                            2013                            Normal       

                                                      [facility]                    



 

                HEMATOLOGY                            Monocytes       4.5                            2.0 

- 12.0                            2013                            Normal       

                                                      [facility]                    



 

                HEMATOLOGY                            RDW             14.1                            11.5 - 14.5

                            2013                            Normal             

                                                      [facility]                    

 

                HEMATOLOGY                            MCHC            33.8                            32.0 - 36.0

                            2013                            Normal             

                                                      [facility]                    

 

                HEMATOLOGY                            MCV             89.3                            80.0 - 94.0

                            2013                            Normal             

                                                      [facility]                    

 

                HEMATOLOGY                            MCH             30.2                            27.0 - 31.0

                            2013                            Normal             

                                                      [facility]                    

 

                HEMATOLOGY                            MPV             8.5                            7.4 - 10.4

                            2013                            Normal             

                                                      [facility]                    

 

                HEMATOLOGY                            Platelet        169                            133 -

 450                            2013                            Normal         

                                                      [facility]                    

 

                HEMATOLOGY                            RBC             4.69                            4.70 - 6.10

                            2013                            LOW                

                                                      [facility]                    

 

                HEMATOLOGY                            WBC             6.3                            3.7 - 10.4

                            2013                            Normal             

                                                      [facility]                    

 

                HEMATOLOGY                            Hgb             14.2                            14.0 - 18.0

                            2013                            Normal             

                                                      [facility]                    

 

                HEMATOLOGY                            Hct             41.9                            42.0 - 54.0

                            2013                            LOW                

                                                      [facility]                    

 

                CHEMISTRY                            CK MB           1.0                            0.5 - 3.6

                            2013                            Normal             

                                                      [facility]                    

 

                CHEMISTRY                            CK MB Index     1.0                            0.0

 - 2.5                            2013                            Normal       

                                                      [facility]                    



 

                CHEMISTRY                            Troponin-I      0.03                            0.00

 - 0.40                            2013                            Normal      

                                                      [facility]                   

 

 

                CHEMISTRY                            Total CK        102                            12 - 191

                            2013                            Normal             

                                                      [facility]                    

 

                HEMATOLOGY                            Basophils #     0.0                            0.0

 - 0.2                            2013                            Normal       

                                                      [facility]                    



 

                HEMATOLOGY                            Eosinophils #    0.0                            

0.0 - 0.5                            2013                            Normal    

                                                      [facility]                 

   

 

                HEMATOLOGY                            Monocytes #     0.3                            0.0

 - 0.8                            2013                            Normal       

                                                      [facility]                    



 

                HEMATOLOGY                            Lymphocytes     17.5                            20.0

 - 40.0                            2013                            LOW         

                                                      [facility]                    

 

                HEMATOLOGY                            Segs            77.1                            45.0 - 75.0

                            2013                            HI                 

                                                      [facility]                    

 

                HEMATOLOGY                            Lymphocytes #    1.1                            

1.0 - 5.5                            2013                            Normal    

                                                      [facility]                 

   

 

                HEMATOLOGY                            Segs-Bands #    4.9                            1.5

 - 8.1                            2013                            Normal       

                                                      [facility]                    



 

                HEMATOLOGY                            Basophils       0.4                            0.0 

- 1.0                            2013                            Normal        

                                                      [facility]                    

 

                HEMATOLOGY                            Eosinophils     0.5                            0.0

 - 4.0                            2013                            Normal       

                                                      [facility]                    



 

                HEMATOLOGY                            Monocytes       4.5                            2.0 

- 12.0                            2013                            Normal       

                                                      [facility]                    



 

                HEMATOLOGY                            MPV             8.9                            7.4 - 10.4

                            2013                            Normal             

                                                      [facility]                    

 

                HEMATOLOGY                            Platelet        189                            133 -

 450                            2013                            Normal         

                                                      [facility]                    

 

                HEMATOLOGY                            MCV             90.0                            80.0 - 94.0

                            2013                            Normal             

                                                      [facility]                    

 

                HEMATOLOGY                            Hct             42.5                            42.0 - 54.0

                            2013                            Normal             

                                                      [facility]                    

 

                HEMATOLOGY                            Hgb             14.3                            14.0 - 18.0

                            2013                            Normal             

                                                      [facility]                    

 

                HEMATOLOGY                            RBC             4.73                            4.70 - 6.10

                            2013                            Normal             

                                                      [facility]                    

 

                HEMATOLOGY                            RDW             14.0                            11.5 - 14.5

                            2013                            Normal             

                                                      [facility]                    

 

                HEMATOLOGY                            MCHC            33.5                            32.0 - 36.0

                            2013                            Normal             

                                                      [facility]                    

 

                HEMATOLOGY                            MCH             30.2                            27.0 - 31.0

                            2013                            Normal             

                                                      [facility]                    

 

                HEMATOLOGY                            WBC             6.4                            3.7 - 10.4

                            2013                            Normal             

                                                      [facility]                    

 

                CHEMISTRY                            Lipase Lvl      134                            73 -

 393                            2013                            Normal         

                                                      [facility]                    

 

                CHEMISTRY                            A/G Ratio       1.2                            0.7 -

 1.6                            2013                            Normal         

                                                      [facility]                    

 

                CHEMISTRY                            Globulin        3.1                            2.0 - 

4.0                            2013                            Normal          

                                                      [facility]                    

 

                CHEMISTRY                            B/C Ratio       18                            6 - 25

                            2013                            Normal             

                                                      [facility]                    

 

                CHEMISTRY                            ALT             27                            0 - 65      

                          2013                            Normal                   

                                                      [facility]                    

 

                CHEMISTRY                            Albumin Lvl     3.8                            3.5

 - 5.0                            2013                            Normal       

                                                      [facility]                    



 

                CHEMISTRY                            AST             11                            0 - 37      

                          2013                            Normal                   

                                                      [facility]                    

 

                CHEMISTRY                            Bili Total      0.9                            0.2 

- 1.3                            2013                            Normal        

                                                      [facility]                    

 

                CHEMISTRY                            Alk Phos        128                            39 - 136

                            2013                            Normal             

                                                      [facility]                    

 

                CHEMISTRY                            Total Protein    6.9                            6.4

 - 8.4                            2013                            Normal       

                                                      [facility]                    



 

                CHEMISTRY                            Troponin-I      0.03                            0.00

 - 0.40                            2013                            Normal      

                                                      [facility]                   

 

 

                HEMATOLOGY                            PT              15.5                            12.0 - 14.7

                            2013                            HI                 

                                                      [facility]                    

 

                HEMATOLOGY                            INR             1.21                            0.85 - 1.17

                            2013                            [facility]                 

                                        <sup>13</sup>Interpretive Data: RECOMMENDED RANGES FOR PROTIME INR:<br/>

   2.0-3.0 for most medical and surgical thromboembolic states.<br/>   2.5-3.5 
for artificial heart valves and recurrent embolism.<br/><br/>INR SHOULD BE USED 
ONLY FOR PATIENTS ON STABLE ANTICOAGULANT THERAPY.                            [facility]                    

 

                HEMATOLOGY                            PTT             28.5                            22.9 - 35.8

                            2013                            Normal             

                                        <sup>14</sup>Interpretive Data: Heparin Therapeutic Range:  57 - 92 

Seconds                                 [facility]                    

 

                CHEMISTRY                            A/G Ratio       1.0                            0.7 -

 1.6                            2012                            Normal         

                                                      [facility]                    

 

                CHEMISTRY                            Globulin        3.9                            2.0 - 

4.0                            2012                            Normal          

                                                      [facility]                    

 

                CHEMISTRY                            B/C Ratio       16                            6 - 25

                            2012                            Normal             

                                                      [facility]                    

 

                CHEMISTRY                            AGAP            14.3                            10.0 - 20.0

                            2012                            Normal             

                                                      [facility]                    

 

                CHEMISTRY                            Alk Phos        198                            39 - 136

                            2012                            [facility]                    

 

                CHEMISTRY                            AST             38                            0 - 37      

                          2012                            [facility]                    

 

                CHEMISTRY                            Bili Total      1.5                            0.2 

- 1.3                            2012                            [facility]                    

 

                CHEMISTRY                            ALT             128                            0 - 65     

                          2012                            [facility]                    

 

                CHEMISTRY                            Calcium Lvl     8.9                            8.5

 - 10.5                            2012                            Normal      

                                                      [facility]                   

 

 

                CHEMISTRY                            CO2             29                            24 - 32     

                          2012                            Normal                  

                                                      [facility]                    

 

                CHEMISTRY                            Albumin Lvl     4.0                            3.5

 - 5.0                            2012                            Normal       

                                                      [facility]                    



 

                CHEMISTRY                            Total Protein    7.9                            6.4

 - 8.4                            2012                            Normal       

                                                      [facility]                    



 

                CHEMISTRY                            Chloride Lvl    96                            95 

- 109                            2012                            Normal        

                                                      [facility]                    

 

                CHEMISTRY                            Creatinine Lvl    1.4                            

0.5 - 1.4                            2012                            Normal    

                                                      [facility]                 

   

 

                CHEMISTRY                            BUN             23                            7 - 22      

                          2012                            [facility]                    

 

                CHEMISTRY                            Potassium Lvl    4.3                            3.5

 - 5.1                            2012                            Normal       

                                                      [facility]                    



 

                CHEMISTRY                            Sodium Lvl      135                            135 

- 145                            2012                            Normal        

                                                      [facility]                    

 

                CHEMISTRY                            Glucose Lvl     128                            70 

- 99                            2012                            [facility]             

                                        <sup>4</sup>Interpretive Data: Adult reference range values reflect 

the clinical guidelines<br/>of the American Diabetes [employer].               
                                        [facility]                    

 

                CHEMISTRY                            Magnesium Lvl    2.2                            1.8

 - 2.4                            09/15/2012                            Normal       

                                                      [facility]                    



 

                CHEMISTRY                            eGFR            >60                                      

                    09/15/2012                            NA                            <sup>3</sup>Result

 Comment: Expected eGFR for >20 yr. age group: >=60 ml/min/1.73 sq m 
<br/><br/>The eGFR calculation is not valid in pregnancy or for<br/>&lt;br/> 
persons < 18 years of age.<br/><br/> From National Kidney Disease Education 
Program (NKDEP)                            [facility]                    

 

                CHEMISTRY                            AST             43                            0 - 37      

                          09/15/2012                            [facility]                    

 

                CHEMISTRY                            Alk Phos        146                            39 - 136

                            09/15/2012                            [facility]                    

 

                CHEMISTRY                            Bili Total      1.1                            0.2 

- 1.3                            09/15/2012                            Normal        

                                                      [facility]                    

 

                CHEMISTRY                            ALT             136                            0 - 65     

                          09/15/2012                            [facility]                    

 

                CHEMISTRY                            A/G Ratio       0.9                            0.7 -

 1.6                            09/15/2012                            Normal         

                                                      [facility]                    

 

                CHEMISTRY                            Total Protein    6.7                            6.4

 - 8.4                            09/15/2012                            Normal       

                                                      [facility]                    



 

                CHEMISTRY                            B/C Ratio       16                            6 - 25

                            09/15/2012                            Normal             

                                                      [facility]                    

 

                CHEMISTRY                            Calcium Lvl     7.8                            8.5

 - 10.5                            09/15/2012                            LOW         

                                                      [facility]                    

 

                CHEMISTRY                            Globulin        3.5                            2.0 - 

4.0                            09/15/2012                            Normal          

                                                      [facility]                    

 

                CHEMISTRY                            Albumin Lvl     3.2                            3.5

 - 5.0                            09/15/2012                            LOW          

                                                      [facility]                    

 

                CHEMISTRY                            Sodium Lvl      139                            135 

- 145                            09/15/2012                            Normal        

                                                      [facility]                    

 

                CHEMISTRY                            BUN             19                            7 - 22      

                          09/15/2012                            Normal                   

                                                      [facility]                    

 

                CHEMISTRY                            Glucose Lvl     109                            70 

- 99                            09/15/2012                            [facility]             

                                        <sup>5</sup>Interpretive Data: Adult reference range values reflect 

the clinical guidelines<br/>of the American Diabetes Association.               
                                        [facility]                    

 

                CHEMISTRY                            Creatinine Lvl    1.2                            

0.5 - 1.4                            09/15/2012                            Normal    

                                                      [facility]                 

   

 

                CHEMISTRY                            Potassium Lvl    3.7                            3.5

 - 5.1                            09/15/2012                            Normal       

                                                      [facility]                    



 

                CHEMISTRY                            Chloride Lvl    99                            95 

- 109                            09/15/2012                            Normal        

                                                      [facility]                    

 

                CHEMISTRY                            AGAP            9.7                            10.0 - 20.0

                            09/15/2012                            LOW                

                                                      [facility]                    

 

                CHEMISTRY                            CO2             34                            24 - 32     

                          09/15/2012                            [facility]                    

 

                HEMATOLOGY                            Monocytes       14.0                            2.0

 - 12.0                            09/15/2012                            [facility]                    

 

                HEMATOLOGY                            Lymphocytes #    1.0                            

1.0 - 5.5                            09/15/2012                            Normal    

                                                      [facility]                 

   

 

                HEMATOLOGY                            Segs-Bands #    5.3                            1.5

 - 8.1                            09/15/2012                            Normal       

                                                      [facility]                    



 

                HEMATOLOGY                            Basophils       0.2                            0.0 

- 1.0                            09/15/2012                            Normal        

                                                      [facility]                    

 

                HEMATOLOGY                            Eosinophils     2.9                            0.0

 - 4.0                            09/15/2012                            Normal       

                                                      [facility]                    



 

                HEMATOLOGY                            Basophils #     0.0                            0.0

 - 0.2                            09/15/2012                            Normal       

                                                      [facility]                    



 

                HEMATOLOGY                            Eosinophils #    0.2                            

0.0 - 0.5                            09/15/2012                            Normal    

                                                      [facility]                 

   

 

                HEMATOLOGY                            Monocytes #     1.1                            0.0

 - 0.8                            09/15/2012                            HI           

                                                      [facility]                    

 

                HEMATOLOGY                            Lymphocytes     12.6                            20.0

 - 40.0                            09/15/2012                            LOW         

                                                      [facility]                    

 

                HEMATOLOGY                            Segs            70.3                            45.0 - 75.0

                            09/15/2012                            Normal             

                                                      [facility]                    

 

                HEMATOLOGY                            MCV             90.6                            80.0 - 94.0

                            09/15/2012                            Normal             

                                                      [facility]                    

 

                HEMATOLOGY                            Hct             45.9                            42.0 - 54.0

                            09/15/2012                            Normal             

                                                      [facility]                    

 

                HEMATOLOGY                            MCHC            33.6                            32.0 - 36.0

                            09/15/2012                            Normal             

                                                      [facility]                    

 

                HEMATOLOGY                            MCH             30.5                            27.0 - 31.0

                            09/15/2012                            Normal             

                                                      [facility]                    

 

                HEMATOLOGY                            RBC             5.07                            4.70 - 6.10

                            09/15/2012                            Normal             

                                                      [facility]                    

 

                HEMATOLOGY                            Hgb             15.4                            14.0 - 18.0

                            09/15/2012                            Normal             

                                                      [facility]                    

 

                HEMATOLOGY                            MPV             9.6                            7.4 - 10.4

                            09/15/2012                            Normal             

                                                      [facility]                    

 

                HEMATOLOGY                            Platelet        200                            133 -

 450                            09/15/2012                            Normal         

                                                      [facility]                    

 

                HEMATOLOGY                            RDW             14.7                            11.5 - 14.5

                            09/15/2012                            [facility]                    

 

                HEMATOLOGY                            WBC             7.6                            3.7 - 10.4

                            09/15/2012                            Normal             

                                                      [facility]                    

 

                CHEMISTRY                            AGAP            12.4                            10.0 - 20.0

                            2012                            Normal             

                                                      [facility]                    

 

                CHEMISTRY                            Calcium Lvl     7.3                            8.5

 - 10.5                            2012                            LOW         

                                                      [facility]                    

 

                CHEMISTRY                            Sodium Lvl      139                            135 

- 145                            2012                            Normal        

                                                      [facility]                    

 

                CHEMISTRY                            Creatinine Lvl    1.2                            

0.5 - 1.4                            2012                            Normal    

                                                      [facility]                 

   

 

                CHEMISTRY                            CO2             30                            24 - 32     

                          2012                            Normal                  

                                                      [facility]                    

 

                CHEMISTRY                            Potassium Lvl    3.4                            3.5

 - 5.1                            2012                            LOW          

                                                      [facility]                    

 

                CHEMISTRY                            Chloride Lvl    100                            95

 - 109                            2012                            Normal       

                                                      [facility]                    



 

                CHEMISTRY                            Glucose Lvl     157                            70 

- 99                            2012                            [place]             

                                        <sup>6</sup>Interpretive Data: Adult reference range values reflect 

the clinical guidelines<br/>of the [employer].               
                                        [facility]                    

 

                CHEMISTRY                            BUN             17                            7 - 22      

                          2012                            Normal                   

                                                      [facility]                    

 

                CHEMISTRY                            A/G Ratio       1.3                            0.7 -

 1.6                            2012                            Normal         

                                                      [facility]                    

 

                CHEMISTRY                            Bili Indirect    0.6                            0.0

 - 1.0                            2012                            Normal       

                                                      [facility]                    



 

                CHEMISTRY                            Globulin        2.7                            2.0 - 

4.0                            2012                            Normal          

                                                      [facility]                    

 

                CHEMISTRY                            Bili Direct     0.5                            0.0

 - 0.3                            2012                            [facility]                    

 

                CHEMISTRY                            AST             48                            0 - 37      

                          2012                            [facility]                    

 

                CHEMISTRY                            ALT             184                            0 - 65     

                          2012                            [facility]                    

 

                CHEMISTRY                            Alk Phos        150                            39 - 136

                            2012                            [facility]                    

 

                CHEMISTRY                            Bili Total      1.1                            0.2 

- 1.3                            2012                            Normal        

                                                      [facility]                    

 

                CHEMISTRY                            Total Protein    6.1                            6.4

 - 8.4                            2012                            LOW          

                                                      [facility]                    

 

                CHEMISTRY                            Albumin Lvl     3.4                            3.5

 - 5.0                            2012                            LOW          

                                                      [facility]                    

 

                CHEMISTRY                            Hgb A1C         6.6                                   

                          2012                            NA                        

                                        <sup>8</sup>Interpretive Data: HbA1C%       eAG(mg/dL)            Interpretation<br/>

 6.0           126               Very good control<br/> 6.5           140       
        Very good control<br/> 7.0           154                  Good 
Control<br/> 7.5           169                  Good Control<br/> 8.0           
183         Marginal Control, take action to lower<br/> 8.5           197       
  Marginal Control, take action to lower<br/> 9.0           212           Poor 
Control, take action to lower<br/> 9.5           226           Poor Control, 
take action to lower<br/>10.0           240           Poor Control, take action 
to lower                                [facility]                    

 

                HEMATOLOGY                            Eosinophils     2.0                            0.0

 - 4.0                            2012                            Normal       

                                                      [facility]                    



 

                HEMATOLOGY                            Segs-Bands #    6.2                            1.5

 - 8.1                            2012                            Normal       

                                                      [facility]                    



 

                HEMATOLOGY                            Eosinophils #    0.2                            

0.0 - 0.5                            2012                            Normal    

                                                      [facility]                 

   

 

                HEMATOLOGY                            Monocytes       9.9                            2.0 

- 12.0                            2012                            Normal       

                                                      [facility]                    



 

                HEMATOLOGY                            Basophils       0.2                            0.0 

- 1.0                            2012                            Normal        

                                                      [facility]                    

 

                HEMATOLOGY                            Lymphocytes     13.9                            20.0

 - 40.0                            2012                            LOW         

                                                      [facility]                    

 

                HEMATOLOGY                            Monocytes #     0.8                            0.0

 - 0.8                            2012                            Normal       

                                                      [facility]                    



 

                HEMATOLOGY                            Basophils #     0.0                            0.0

 - 0.2                            2012                            Normal       

                                                      [facility]                    



 

                HEMATOLOGY                            Lymphocytes #    1.2                            

1.0 - 5.5                            2012                            Normal    

                                                      [facility]                 

   

 

                HEMATOLOGY                            Segs            74.0                            45.0 - 75.0

                            2012                            Normal             

                                                      [facility]                    

 

                HEMATOLOGY                            MCHC            32.5                            32.0 - 36.0

                            2012                            Normal             

                                                      [facility]                    

 

                HEMATOLOGY                            MPV             9.1                            7.4 - 10.4

                            2012                            Normal             

                                                      [facility]                    

 

                HEMATOLOGY                            Platelet        201                            133 -

 450                            2012                            Normal         

                                                      [facility]                    

 

                HEMATOLOGY                            RDW             15.0                            11.5 - 14.5

                            2012                            HI                 

                                                      [facility]                    

 

                HEMATOLOGY                            WBC             8.4                            3.7 - 10.4

                            2012                            Normal             

                                                      [facility]                    

 

                HEMATOLOGY                            MCH             29.9                            27.0 - 31.0

                            2012                            Normal             

                                                      [facility]                    

 

                HEMATOLOGY                            RBC             4.83                            4.70 - 6.10

                            2012                            Normal             

                                                      [facility]                    

 

                HEMATOLOGY                            MCV             92.1                            80.0 - 94.0

                            2012                            Normal             

                                                      [facility]                    

 

                HEMATOLOGY                            Hct             44.5                            42.0 - 54.0

                            2012                            Normal             

                                                      [facility]                    

 

                HEMATOLOGY                            Hgb             14.4                            14.0 - 18.0

                            2012                            Normal             

                                                      [facility]                    

 

                    BEDSIDE GLUCOSE TESTING                            Gluc POC Lifscn     159           

                          70 - 99                            2012                       

                          [facility]                            <sup>1</sup>Interpretive Data:  Upper Reportable

 Limit: 200 mg/dL.                            [facility]                    

 

                    BEDSIDE GLUCOSE TESTING                            Comment1            Notify RN/MD         

                                                2012                            NA

                                                        [facility]             

       

 

                    BEDSIDE GLUCOSE TESTING                            Gluc POC Lifscn     131           

                          70 - 99                            2012                       

                          [facility]                            <sup>2</sup>Interpretive Data:  Upper Reportable

 Limit: 200 mg/dL.                            [facility]                    

 

                    BEDSIDE GLUCOSE TESTING                            Comment1            Torsten RN/MD         

                                                2012                            NA

                                                        [facility]             

       

 

                CHEMISTRY                            Total CK        433                            12 - 191

                            2012                            [facility]                    

 

                CHEMISTRY                            Troponin-I      0.03                            0.00

 - 0.40                            2012                            Normal      

                                                      [facility]                   

 

 

                HEMATOLOGY                            Platelet        206                            133 -

 450                            2012                            Normal         

                                                      [facility]                    

 

                HEMATOLOGY                            MCHC            32.4                            32.0 - 36.0

                            2012                            Normal             

                                                      [facility]                    

 

                HEMATOLOGY                            MCH             30.1                            27.0 - 31.0

                            2012                            Normal             

                                                      [facility]                    

 

                HEMATOLOGY                            MPV             9.1                            7.4 - 10.4

                            2012                            Normal             

                                                      [facility]                    

 

                HEMATOLOGY                            MCV             93.1                            80.0 - 94.0

                            2012                            Normal             

                                                      [facility]                    

 

                HEMATOLOGY                            RDW             15.2                            11.5 - 14.5

                            2012                            [facility]                    

 

                HEMATOLOGY                            WBC             9.2                            3.7 - 10.4

                            2012                            Normal             

                                                      [facility]                    

 

                HEMATOLOGY                            RBC             4.74                            4.70 - 6.10

                            2012                            Normal             

                                                      [facility]                    

 

                HEMATOLOGY                            Hct             44.1                            42.0 - 54.0

                            2012                            Normal             

                                                      [facility]                    

 

                HEMATOLOGY                            Hgb             14.3                            14.0 - 18.0

                            2012                            Normal             

                                                      [facility]                    

 

                HEMATOLOGY                            Eosinophils #    0.1                            

0.0 - 0.5                            2012                            Normal    

                                                      [facility]                 

   

 

                HEMATOLOGY                            Monocytes #     0.8                            0.0

 - 0.8                            2012                            Normal       

                                                      [facility]                    



 

                HEMATOLOGY                            Lymphocytes #    1.2                            

1.0 - 5.5                            2012                            Normal    

                                                      [facility]                 

   

 

                HEMATOLOGY                            Monocytes       8.4                            2.0 

- 12.0                            2012                            Normal       

                                                      [facility]                    



 

                HEMATOLOGY                            Lymphocytes     12.7                            20.0

 - 40.0                            2012                            LOW         

                                                      [facility]                    

 

                HEMATOLOGY                            Segs-Bands #    7.0                            1.5

 - 8.1                            2012                            Normal       

                                                      [facility]                    



 

                HEMATOLOGY                            Eosinophils     1.1                            0.0

 - 4.0                            2012                            Normal       

                                                      [facility]                    



 

                HEMATOLOGY                            Basophils       1.1                            0.0 

- 1.0                            2012                            HI            

                                                      [facility]                    

 

                HEMATOLOGY                            Segs            76.7                            45.0 - 75.0

                            2012                            [facility]                    

 

                HEMATOLOGY                            Basophils #     0.1                            0.0

 - 0.2                            2012                            Normal       

                                                      [facility]                    



 

                CHEMISTRY                            LDL             75                            0 - 129     

                          2012                            Normal                  

                                                      [facility]                    

 

                CHEMISTRY                            Trig            88                            0 - 200    

                          2012                            Normal                 

                                                      [facility]                    

 

                CHEMISTRY                            CHD Risk        12.62                            4.00

 - 7.30                            2012                            [facility]                    

 

                CHEMISTRY                            HDL             8                            >=35         

                          2012                            LOW                         

                                                      [facility]                    

 

                CHEMISTRY                            Chol            101                            120 - 200 

                           2012                            LOW                 

                                                      [facility]                    

 

                CHEMISTRY                            Total CK        458                            12 - 191

                            2012                            [facility]                    

 

                CHEMISTRY                            Troponin-I      0.03                            0.00

 - 0.40                            2012                            Normal      

                                                      [facility]                   

 

 

                CHEMISTRY                            CK MB Index     0.4                            0.0

 - 2.5                            2012                            Normal       

                                                      [facility]                    



 

                CHEMISTRY                            CK MB           1.7                            0.5 - 3.6

                            2012                            Normal             

                                                      [facility]                    

 

                    URINALYSIS                            UA Bacteria         Occasional /HPF 

                          (2012 23:45:00)                              None Seen                      

                    2012                            Normal                                     

                                        [facility]                    

 

                    URINALYSIS                            UA Amorph Cathryn      Occasional /HPF 

*ABN*

                          (2012 23:45:00)                              None Seen                      

                    2012                            ABN                                        

                                        [facility]                    

 

                    URINALYSIS                            UA Mucus            Few /LPF 

                          (2012 23:45:00)                              None Seen                      

                    2012                            Normal                                     

                                        [facility]                    

 

                    URINALYSIS                            UA Sq Epi           Rare /LPF 

                    (2012 23:45:00)                              Few                            2012

                            Normal                                                   

                                        [facility]                    

 

                    URINALYSIS                            UA WBC              0-2 /HPF 

                          (2012 23:45:00)                              None Seen                      

                    2012                            Normal                                     

                                        [facility]                    

 

                    URINALYSIS                            UA RBC              0-2 /HPF 

                    (2012 23:45:00)                              0 - 2                            

2012                            Normal                                         

                                        [facility]                    

 

                    URINALYSIS                            UA Nitrite          Negative 

                          (2012 23:45:00)                              Negative                       

                    2012                            Normal                                      

                                        [facility]                    

 

                    URINALYSIS                            UA Urobilinogen     4.0                        

                    0.1 - 1.0                            2012                            [facility]                   

 

 

                    URINALYSIS                            UA Leuk Est         Negative 

                          (2012 23:45:00)                              Negative                       

                    2012                            Normal                                      

                                        [facility]                    

 

                    URINALYSIS                            UA Blood            Small 

*ABN*

                          (2012 23:45:00)                              Negative                       

                    2012                            ABN                                         

                                        [facility]                    

 

                    URINALYSIS                            UA Bili             Negative 

*NA*

                          (2012 23:45:00)                              Negative                       

                    2012                            NA                                          

                                        [facility]                    

 

                    URINALYSIS                            UA Protein          100 mg/dL 

*ABN*

                          (2012 23:45:00)                              Negative                       

                    2012                            ABN                                         

                                        [facility]                    

 

                    URINALYSIS                            UA Glucose          Negative 

                          (2012 23:45:00)                              Negative                       

                    2012                            Normal                                      

                                        [facility]                    

 

                    URINALYSIS                            UA Ketones          Negative 

*NA*

                          (2012 23:45:00)                              Negative                       

                    2012                            NA                                          

                                        [facility]                    

 

                URINALYSIS                            UA pH           6.0                            5.0 - 8.0

                            2012                            Normal             

                                                      [facility]                    

 

                    URINALYSIS                            UA Turbidity        Clear 

                    (2012 23:45:00)                              Clear                            

2012                            Normal                                         

                                        [facility]                    

 

                    URINALYSIS                            UA Color            Yellow 

*NA*

                          (2012 23:45:00)                              Yellow                         

                    2012                            NA                                            

                                        [facility]                    

 

                    URINALYSIS                            UA Spec Grav        >=1.030 

*ABN*

                          (2012 23:45:00)                              <=1.030                        

                    2012                            ABN                                          

                                        [facility]                    

 

                CHEMISTRY                            Total CK        574                            12 - 191

                            2012                            HI                 

                                                      [facility]                    

 

                CHEMISTRY                            Troponin-I      0.03                            0.00

 - 0.40                            2012                            Normal      

                                                      [facility]                   

 

 

                CHEMISTRY                            Magnesium Lvl    2.3                            1.8

 - 2.4                            2012                            Normal       

                                                      [facility]                    



 

                CHEMISTRY                            B/C Ratio       15                            6 - 25

                            2012                            Normal             

                                                      [facility]                    

 

                CHEMISTRY                            BNP             1404                            <=100     

                          2012                            [facility]                      

                                        <sup>7</sup>Interpretive Data: Elevated results are in line with increasing severity

 of <br/>congestive heart failure. Minor elevations between 100 and 300 <br/>may
 be seen with Myocardial Ischemia, Sodium retaining drugs, <br/>and 
compensated/treated heart failure.                            [facility]     

               

 

                CHEMISTRY                            CK MB Index     0.3                            0.0

 - 2.5                            2012                            Normal       

                                                      [facility]                    



 

                CHEMISTRY                            CK MB           1.9                            0.5 - 3.6

                            2012                            Normal             

                                                      [facility]                    

 

                HEMATOLOGY                            PTT             36.5                            22.9 - 35.8

                            2012                            [facility]                 

                                        <sup>11</sup>Interpretive Data: Heparin Therapeutic Range:  57 - 92 Seconds

                                        [facility]                    

 

                HEMATOLOGY                            INR             1.61                            0.85 - 1.17

                            2012                            [facility]                 

                                        <sup>9</sup>Interpretive Data: RECOMMENDED RANGES FOR PROTIME INR:<br/> 

  2.0-3.0 for most medical and surgical thromboembolic states.<br/>   2.5-3.5 
for artificial heart valves and recurrent embolism.<br/><br/>INR SHOULD BE USED 
ONLY FOR PATIENTS ON STABLE ANTICOAGULANT THERAPY.                            [facility]                    

 

                HEMATOLOGY                            PT              19.3                            12.0 - 14.7

                            2012                            [facility]                    

 

                HEMATOLOGY                            D-Dimer         2.45                                 

                          2012                            NA                      

                                        <sup>10</sup>Interpretive Data: In DIC, quantitative D-Dimer is generally greater

 than <br/>0.66 ug/mL FEU.  Values of quantitative D-Dimer less than <br/>0.40 
ug/mL FEU have been reported to be associated with a low <br/>probability of 
deep vein thrombosis/pulmonary embolism. <br/>This test alone should not be used
 to rule out DVT/PE.                            [facility]                   

 



                                                                                
                                                                                
                                                                                
                                                                                
                                                                                
                                                                                
                                                                                
                                                                                
                                                                                
                                                                                
                                                                                
                                                                                
                                                                                
                                                                                
                                                                                
                                                                                
                                                                                
                                                                                
                                                                                
                                                                                
                                                                                
                                                                                
                                                                                
                                                                                
                                                                                
                                                                                
                                                                                
                                                                                
                                                                                
                                                                                
                                                                                
                                                                                
                                                                                
                                                                                
                                                                                
                                                                                
                                                                                
                                                                                
                                                                                
                                                                                
                                                                                
                                                                                
                                                                                
                                                                                
                                                                                
                                                                                
                                                                                
                                                                                
                                                                                
                                                                                
                                                                                
                                                                                
                                                                                
                                                                                
                                                                                
                                                                                
                                                                                
                                                                                
                                                                                
                                                                                
                                                                                
                                                                                
                                                                                
                                                                                
                                                                                
                                                                                
                                                                                
                                                                                
                                                                                
                                                                                
                                                                                
                                                                                
                                                                                
                                                                                
                                                                                
                                                                                
                                                                                
                                                                                
                                                                                
                                                                                
                                                                                
                                                                                
                                                                                
                                                                                
                                                                                
                                                                                
                                                                                
                                                                                
                                                                                
                                                                                
                                                                                
                                                                                
                                                                                
                                                                                
                                                                                
                                                                                
                                                                                
                                                                                
                                                                                
                                                                                
                                                                                
                                                                                
                                                                                
                                                                                
                                                                                
                                                                                
                                                                                
                                                                                
                                                                                
                                                                                
                                                                                
                                                                                
                                                                                
                                                                                
                                                                                
                                                                                
                                                                                
                                                                                
                                                                                
                                                                                
                                                                                
                                                                                
                                                                                
                                                                                
                                                                                
                                                                                
                                                                                
                                                                                
                                                                                
                                                                                
                                                                                
                                                                                
                                                                                
                                                                                
                                                                                
                                                                                
                                                                                
                                                                                
                                                                                
                                                                                
                                                                                
                                                                                
                                                                                
                                                                                
                                                                                
                                                                                
                                                                                
                                                                                
                                                                                
                                                                                
                                                                                
                                                                                
                                                                                
                                                                                
                                                                                
                                                                                
                                                                                
                                                                                
                                                                                
                                                                                
                                                                                
                                                                                
                                                                                
                                                                                
                                                                                
                                                                                
                                                                                
                                                                                
                                                                                
                                                                                
                                                                                
                                                                                
                                                                                
                                                                                
                                                                                
                                                                                
                                                                                
                                                                                
                                                                                
                                                                                
                                                                                
                                                                                
                                                                                
                                                                                
                                                                                
                                                                                
                                                                                
                                                                                
                                                                                
                                                                                
                                                                                
                                                                                
                                                                                
                                                                                
                                                                                
                                                                                
                                                                                
                                                                                
                                                                                
                                                                                
                                                                                
                                                                                
                                                                                
                                                                                
                                                                                
                                                                                
                                                                                
                                                                                
                                                                                
                                                                                
                                                                                
                                                                                
                                                                                
                                                                                
                                                                                
                                                                                
                                                                                
                                                                                
                                                                                
                                                                                
                                                                                
                                                                                
                                                                                
                                                                                
                                                                                
                                                                                
                                                                                
                                                                                
                                                                                
                                                                                
                                                                                
                                                                                
                                                                                
                                                                                
                                                                                
                                                                                
                                                                                
                                                                                
                                                                                
                                                                                
                                                                                
                                                                                
                                                                                
                                                                                
                                                                                
                                                                                
                                                                                
                                                                                
                                                                                
                                                                                
                                                                                
                                                                                
                                                                                
                                                                                
                                                                                
                                                                                
                                                                                
                                                                                
                                                                                
                                                                                
                                                                                
                                                                                
                                                                                
                                                                                
                                                                                
                                                                                
                                                              



Pathology Reports







                                        No Data Provided for This Section                    



                                                



Diagnostic Reports

                    





                    Report                            Value                            Date             

                                        Source                    

 

                          Chest 1view DX                            Chest 1view DX 

                                        53 years old Male

Clinical Indication:  - CP eval chf, ppm placement; Diabetis, CP, Pacemaker 
firing off today, nausea, vomiting, general feelings of malaise, and thirst. 

Comparison: 2018 at 07:44

FINDINGS:

Tubes, lines, hardware: A single lead left subclavian pacemaker extends into the
 right heart.

LUNGS: 

The volume of the lungs is diminished by shallow inspiration. 

There is some minor atelectasis or scarring suspected in the left base, similar 
to previous exam. The lungs are otherwise clear. 

 No pleural effusion is noted.  

 The pulmonary vasculature is within normal limits.

MEDIASTINUM: The cardiac silhouette is stable.     

CHEST WALL: Unremarkable.

SKELETON: The visualized osseous structures are unremarkable.

IMPRESSION: 

                                        1. No significant change from previous exam.

                                        2. Pacemaker.



SL:  FELI

                            2018                            [facility]       

             

 

                          Abdomen 2 views DX                            Clinical Indication: Chest pain, weakness,

 and dizziness earlier today.; 

Comparison: None

FINDINGS: 

The supine and upright views of the abdomen shows a non-obstructive bowel gas 
pattern. There is no abnormal dilatation of bowel loops. No significant air 
fluid levels. There is no pneumoperitoneum. There are no radiopaque densities 
noted. There are no clinically significant osseous abnormalities noted. 

IMPRESSION:

                                        1. Unremarkable abdomen series.

SL:  [id number]

                            2018                            [facility] 2 views DX                            Clinical Indication:  Chest pain

Comparison: 2017

FINDINGS: 

The PA and lateral chest radiographs shows decreased lung volumes without 
interstitial or airspace opacities, pleural effusions or pneumothorax. 

The cardiomediastinal contours are normal for the age of the patient with aortic
 tortuosity. The left subclavian cardiac pacer lead terminates within the right 
ventricle.

There are degenerative changes in the spine and shoulders. 

IMPRESSION:

No chest radiographic evidence of acute cardiopulmonary disease.

SL:  [id number]

                            2018                            [facility] 1view DX                            Chest, single view dated 2018.

HISTORY: Chest pain.

Comparison is made to a prior study dated 2017.

The positioning of the left subclavian transvenous pacemaker/defibrillator has 
not significantly changed in the interim. The heart appears upper normal in 
size. The mediastinum is otherwise unremarkable. The lungs appear clear. The 
pulmonary vasculature is normal in caliber. No acute pleural space abnormalities
 are identified.

IMPRESSION:

                                        1. No radiographic evidence of acute cardiopulmonary disease.

SL: 131

                            2018                            [facility] 1view DX                            EXAM: XR CHEST 1 VIEW

DATE: 2017 8:47 PM CST

INDICATION: Chest pain.

COMPARISON: 2017.

TECHNIQUE: A single AP view of the chest was obtained.

FINDINGS:

A left subclavian defibrillator device is unchanged in position. No focal 
consolidation or pneumothorax is identified. The cardiomediastinal silhouette is
 within normal limits. The costophrenic recesses are sharp and without effusion.
 No acute osseous abnormality is noted.

IMPRESSION:  

No acute cardiopulmonary abnormality.



SL: [id number]

                            2017                            [facility] 1view DX                            Chest 1view DX 2017 4:50 PM CST

Ordering Physician: Jose Bojorquez MD

CLINICAL HISTORY: Shortness of Breath - cp, SOB; 

TECHNIQUE: A single AP view of the chest was obtained.

COMPARISON: 10/27/2017.

FINDINGS: 

No focal consolidation or pleural effusion is seen.  No radiographically 
detectable pneumothorax is present.  

The cardiomediastinal silhouette and its contours are normal. Left subclavian 
pacing device remains in place. No acute osseous abnormality is evident.



IMPRESSION:

No acute radiographic abnormality of the chest.



SL:  [id number]

                            2017                            [facility]   

                 

 

                          Chest 1view DX                            Clinical Indication:52 years Male with chest

 tightness - chest tightness

Comparison: Chest x-ray 2017  

FINDINGS: 

Lines: Unchanged left implantable cardiac defibrillator

The single frontal chest radiograph shows normal lung volumes.

No interstitial or airspace opacities. 

No pleural effusion.

No pneumothorax.

Cardiac silhouette is stable. Pulmonary vasculature is normal. The trachea is 
midline. There are no acute osseous abnormalities noted. 

IMPRESSION:

No acute cardiopulmonary abnormality.

                            10/27/2017                            [facility] 1view DX                            Study: Frontal chest x-ray compared to 2017.



History: Chest pain.

Comments:

The trachea is midline. The cardiomediastinal silhouette is top normal in size. 
Left chest wall pacemaker in place

No pneumonia. No congestive heart failure

No pleural effusions or pneumothorax.

Impression:

No acute cardiopulmonary disease.

                            2017                            [facility] 2 views DX                            Two-view chest Patient Name: JOANNA VASQUEZ

: 1965; Age: 52 years Male

MR: [id number]

Study: Chest 2 views DX  Order Time: 2017 1:32 PM CDT

Clinical Indication: chest pain - chest pain.    

COMPARISON: 2017 x-rays. 2017. 2017.

FINDINGS: 

Views: 2

LUNGS:  Cardiac device wire is intact. There is increased lung volume. There are
 no suspicious interstitial/airspace opacities. Question small right pleural 
effusion. There is no pneumothorax.  The pulmonary vasculature is normal. 

MEDIASTINUM:  The cardiac silhouette is enlarged. The trachea is midline. 

BONES:  There are no clinically significant osseous abnormalities noted.



IMPRESSION:

Hyperinflation. No radiographic evidence of acute pulmonary disease.



SL:  [id number]

                            2017                            [facility] 1view DX                            EXAM: XR CHEST 1 VIEW

DATE: 2017 8:38 PM CDT

INDICATION: Chest pain.

COMPARISON: 2017.

TECHNIQUE: A single AP view of the chest was obtained.

FINDINGS:

A left subclavian defibrillator device is unchanged in position. There is a 
scarring noted within the left lung base. No focal consolidation is visualized. 
The cardiac silhouette is enlarged. The costophrenic recesses are sharp and 
without effusion. No acute osseous abnormality is noted.

IMPRESSION:  

No acute cardiopulmonary abnormality.



SL: [id number]

                            2017                            [facility]       

             

 

                          Chest 1view DX                            Patient Name: JOANNA VASQUEZ

: 1965; Age: 52 years  y/o Male

MR: [id number]

Study: Chest 1view DX 2017 9:58 PM CDT

Ordering Physician:    

Clinical Indication: Chief Complaint:states he has afib and feels 
palpitationsReason For Visit:chest pain - painChief Complaint:states he has afib
 and feels palpitationsReason For Visit:chest pain;    

Comparison: 2017



                                        1 view chest

Lungs are clear. Mild cardiomegaly without overt congestive heart failure or 
pulmonary vascular congestion. No pleural effusion or pneumothorax.

Automatic implantable cardiac defibrillator generator left chest wall with lead 
extending into the right ventricle as before.

IMPRESSION: No new or acute finding.





SL:  ANGELIA

                            2017                            [facility]       

             

 

                          Retroperitoneal Complete US                            Patient Name: JOANNA VASQUEZ



: 1965; Age: 51 years  y/o Male

MR: [id number]

Study: Retroperitoneal Complete US



Clinical Indication: Renal insufficiency - Acute renal failure;    

Comparison: None

TECHNIQUE:

Multiple longitudinal and transverse real time sonographic images of the kidneys
 and urinary bladder are obtained.

FINDINGS: 

KIDNEY:

The right kidney measures 11 x 5 x 5.5cm.  

The left kidney measures 11.5 x 5 x 5 cm.

No pelvocaliectasis, nephrolithiasis or renal mass lesion identified 
bilaterally.

BLADDER:

Bladder is sonographically unremarkable. Left ureteral jet is visualized during 
the exam.

IMPRESSION:

No significant abnormality.



SL:  ANGELIA

                            2017                            [facility]       

             

 

                          Chest 1view DX                            Study: Chest 1view DX 2017 9:15 PM 

CDT

Patient Name: JOANNA VASQUEZ MR: [id number]

: 1965; Age: 51 years  y/o Male

Ordering Physician: Loki Barron MD

Clinical Indication: Chest pain    

Comparison: 2017.

FINDINGS

LUNGS: The lungs are clear of consolidation, pleural effusion, and pneumothorax.
 Mildly basilar subsegmental atelectasis and scarring.    

HEART AND MEDIASTINUM: Mildly enlarged heart with a stable single lead left 
pacemaker/automatic implantable cardiac defibrillator.    

LINES: None.

OSSEOUS STRUCTURES: No fracture, dislocation, or suspicious focal osseous 
lesion.    

OTHER: None.



IMPRESSION:

                                        1.  No important interval change.



SL: TPAINTER-PC

                            2017                            [facility] 1view DX                            Study: Frontal chest x-ray compared to 2016.



History: Dyspnea

Comments:

The trachea is midline. The cardiomediastinal silhouette is enlarged. Left chest
 wall pacemaker in place.

Right costophrenic angle is not included in the field-of-view. Mild pulmonary 
edema present.

Impression:

Cardiomegaly with mild pulmonary edema.

                            2017                            [facility] 1view DX                            CHEST RADIOGRAPH SINGLE VIEW

INDICATION: Chest pain

COMPARISON: Chest radiograph 2015

IMPRESSION:

The lungs are underinflated.

The intracardiac pacer device is unchanged. The cardiac silhouette appears 
prominent, grossly without radiographic evidence of acute congestive failure.

No consolidation, pleural effusion, or pneumothorax are visible.



SL:16

                            2016                            [facility]  Pulmonary Embolism CTA                            CT Angiogram of Chest:

 

TECHNIQUE: Contiguous transaxial images of the pulmonary arteries were performed
 at 2.5 mm intervals. 3-D volume rendering of the pulmonary vessels was 
performed on a dedicated workstation.

 

COMPARISON: 2014

 

CLINICAL HX: Chest pain

 

PULMONARY VESSELS: The main pulmonary trunk, right and left main pulmonary 
arteries, and the lobar branches are well opacified with IV contrast. No 
intraluminal filling defects are evident to suggest pulmonary emboli on the 
axial and 3D volume reformation images.

 

CT CHEST:

 

Lower Neck and Thyroid:  No significant lymphadenopathy is evident at base of 
neck.

 

Lungs and Airways: The lungs and pleural spaces are clear. The trachea and the 
proximal bronchi are patent. 

 

Cardiovascular and Mediastinum: The cardiac size is normal.  The aorta 
demonstrates normal morphology, no evidence for aneurysm or dissection. No 
significant lymphadenopathy is noted in the mediastinum or hilar regions.

 

Bone and Soft tissue: No significant bony abnormality is noted. 

 

Esophagus and Upper Abdomen: The esophagus demonstrates normal morphology. 
Visualized portion of the upper abdomen is grossly unremarkable. 

 

IMPRESSION: 

 

No evidence for pulmonary emboli.

 

Mild scarring is noted in the lingula.

 

Interval resolution of small right basilar effusion and ascites.

 

 

SL:13

                            2015                            [facility] 1view DX                            PORTABLE CHEST 2015 AND 2207 HOURS.



 

INDICATION: Chest pain.

 

COMPARISON: Chest 2014.

 

Cardiac size is normal. Intracardiac device terminates in the right heart.

 

There is slight elevation of the right hemidiaphragm.

 

No pneumonia, vascular congestion or pleural effusion is seen.

 

IMPRESSION: No acute abnormality.

 

SL: 12

                            2015                            [facility]       

             

 

                          Abdomen/Pelvis w IV contrast CT                            EXAM: Abdomen/Pelvis w 

contrast CT

HISTORY:  Abdominal distension

COMPARISON: 2013.

 

Axial imaging of the abdomen and pelvis was obtained after the administration of
 oral and intravenous contrast. Sagittal and coronal reformats were reviewed.

 

FINDINGS: There are dependent changes of the lung bases the small right pleural 
effusion.

 

There is a moderate amount of perihepatic fluid with a small to moderate amount 
of fluid layering in the dependent pelvis, tracking down the paracolic gutters 
bilaterally. The spleen is normal in size and unremarkable in appearance with 
moderate perisplenic free fluid noted as well.  These findings are slightly 
worsened compared to the prior study. No focal hepatic lesion is seen. The 
gallbladder is contracted. 

 

There are numerous colonic diverticula. No focal evidence of diverticulitis is 
seen. There is no abscess. There is no evidence of bowel obstruction. The 
bladder is unremarkable. There is a small fat containing umbilical hernia.

 

Kidneys are unremarkable.

 

No free air is seen and there are no enlarged lymph nodes.

 

There is no concerning osseous lesion.

 

IMPRESSION:

Moderate perihepatic and perisplenic free fluid tracking down the paracolic 
gutters with an associated mild to moderate amount of free fluid in the pelvis. 
 These findings are slightly worsened compared to prior study. There is a small 
right effusion which is favored to be associated with hepatocellular disease. No
 focal liver lesion is seen

                            2014                            [facility]   

                 

 

                          Chest 1view                            Compared to 2014, moderate cardiomegaly

 and mild vascular congestion are present. No pulmonary edema is seen. Left-
sided cardiac pacing device is stable.

 

IMPRESSION:

                                        1. Stable cardiomegaly and vascular congestion.

                            2014                            [facility]   

                 

 

                          Chest 2 views                            Chest x-ray 2 views

 

INDICATION: Chest pain

 

COMPARISON: 2014

 

FINDINGS: 

Stable moderate cardiomegaly. Left cardiac pacemaker is unchanged in position. 
There is no effusion or focal pneumonia. No pneumothorax. No acute osseous 
pathology. 

 

IMPRESSION:

Stable cardiomegaly.

                            2014                            [facility]   

                 

 

                          Chest 1view                            Chest one view:

 

COMPARISON: Chest x-ray dated 2014.

 

FINDINGS: A left subclavian transvenous cardiac pacemaker is in place. The heart
 is enlarged. The lungs are poorly inflated. There are no acute infiltrates or 
edema within either lung. The costophrenic sulci are clear.

 

IMPRESSION: Cardiomegaly.

 

 

 

 

SL: 14

                            2014                            [facility]       

             

 

                          Chest 1view                            PORTABLE CHEST (chest 1 view)

 

 

HISTORY: Chest pain 

 

Comparison is made to 2014. There studies of 2013 and 2013 
were reviewed. A chest CT scan of 2014 was also reviewed.

 

FINDINGS:

The lungs are clear. There is mild cardiomegaly. There is no overt failure.  
There are no pleural effusions. The regional skeleton is unremarkable.  

 

There is a left subclavian single lead transvenous pacemaker/AICD. 

 

CONCLUSION:

                                        1. No active disease.  

                                        2. Mild cardiomegaly. 

                                        3. There is a left subclavian single lead transvenous pacemaker/AICD. 

 

 

Coding:

Chest 1view 

CPT code: 83155

SL: 12

 

Nishant Villafana M.D.

                            2014                            [facility]       

             

 

                          Brain wo contrast CT                            CT head without IV contrast, 2014 11:30:00 AM

 

CLINICAL HISTORY: Loss of consciousness ; See Clinic Indication

 

TECHNIQUE: Routine 5 mm thick axial images of the head were obtained without IV 
contrast. 

 

COMPARISON: CT head 2013

 

FINDINGS:

 

No acute intracranial hemorrhage or secondary signs of increased intracranial 
pressure are noted.  No evidence of territorial infarction is suggested.  
Ventricles and cisterns are prominent in size and contour.  Gray-white matter 
junction is normal.  No focal mass or midline shift is suggested. 

 

Orbits are normal. Sinuses and air cells are clear. Calvarium is intact.

 

IMPRESSION:

 

No acute abnormality of the brain.   Mild diffuse volume loss.

 

 

 

SL: 14

                            2014                            [facility]       

             

 

                          Chest  Pulmonary Embolism CTA                            CHEST CT WITH CONTRAST

 

HISTORY:   Shortness of Breath 

 

COMPARISON:  CT of the chest from 2013. Chest x-ray performed earlier
 the same day.

 

DLP: 1555.98

 

TECHNIQUE: Acquisition of axial images from the base of the neck the upper 
abdomen following the administration of intravenous contrast, utilizing a 
multidetector CT. MPR postprocessing.

 

Findings:  Pulmonary arteries are well opacified with contrast. Multiple 
scattered ill-defined filling defects throughout the right upper, right middle, 
and right lower lobe segmental pulmonary arteries are seen, compatible with 
multiple pulmonary thromboemboli. The heart is mildly enlarged. Small 
pericardial effusion is seen. No pathologic mediastinal, hilar, or axillary 
adenopathy is seen. No endobronchial lesions are identified within the central 
airways. Small right pleural effusion is seen. There are no suspicious osseous 
lesions.

 

Limited views of the upper abdomen show a moderate amount of ascites.

 

Impression:

                                        1. Multiple pulmonary thromboemboli throughout the right upper, right middle, and

 right lower lobes. 

                                        2. Small right pleural effusion.

                                        3. Moderate amount of ascites.

 

Dr. Cortes was notified on 2014 at 1:12 p.m. 

 

SL:  14

                            2014                            [facility]       

             

 

                          Chest 1view                            PORTABLE CHEST (chest 1 view)

 

 

HISTORY: Chest pain 

 

Comparison is made to 2013 pretty study of 10/29/2013 was also reviewed.

 

FINDINGS:

The lungs are clear. There is mild cardiomegaly. There is no overt failure.  
There are no pleural effusions. The regional skeleton is unremarkable.  

 

There is a left subclavian single lead transvenous pacemaker/AICD. 

 

CONCLUSION:

                                        1. No active disease.  

                                        2. Mild cardiomegaly.

                                        3. There is a left subclavian single lead transvenous pacemaker/AICD. 

 

 

Coding:

Chest 1view 

CPT code: 35748

SL: 13

 

Nishant Villafana M.D.

                            2014                            [facility]       

             

 

                          Abdomen/Pelvis w IV contrast CT                            CT abdomen and pelvis with

 IV contrast, Dec 01, 2013 10:10:00 AM

 

CLINICAL HISTORY: Acute abdominal pain ; Oral Contrast

 

TECHNIQUE: Routine 5 mm thick axial images of the abdomen and pelvis are 
obtained with oral and IV contrast. Routine 5 minute delayed images were 
obtained. Coronal and sagittal reformations were created.

 

COMPARISON: None

 

FINDINGS:

 

Visualized lung bases are clear. Cardiomegaly is present.

 

Minimal perihepatic perisplenic free fluid is present. Mild diffuse fatty 
infiltration of liver is present. Gallbladder is contracted, but with mild 
circumferential mural edema suggested. Kidneys demonstrate mild perinephric 
stranding, but without hydroureteronephrosis or calculus. Mild free fluid is 
noted in the pelvis. Bladder is contracted. Spleen, pancreas, and adrenal glands
 are normal.

 

Few sigmoid colonic diverticula present. Small hiatal hernia is present. 
Otherwise, the stomach, small intestine, and colon are normal. Appendix is not 
identified.

 

No free air is visualized within the abdominal cavity. No mesenteric or 
retroperitoneal lymphadenopathy is present.

 

Bones are unremarkable.

 

 

IMPRESSION:

 

 

                                        1. Cardiomegaly.

                                        2. Mild diffuse fatty infiltration of liver. Three minimal perihepatic and perisplenic

 free fluid and mild free fluid in the pelvis. Mild circumferential mural 
thickening of the partially contracted gallbladder. Possible changes secondary 
to underlying hepatocellular disease and/or hypoalbuminemia.

                                        3. Mild bilateral perinephric stranding, nonspecific.

                                        4. Small hiatal hernia.

                                        5. Minimal sigmoid colonic diverticulosis.

 

 

 

SL: 14

                            2013                            [facility] 2 views                            Chest 2 views:

 

COMPARISON: Chest x-ray dated 2013.

 

FINDINGS: The left subclavian transvenous cardiac pacemaker remains in place. 
The heart is enlarged. The lungs are clear with no acute infiltrates or edema. 
The costophrenic sulci are clear.

 

IMPRESSION: Cardiomegaly.

 

 

 

 

SL: 12

                            2013                            [facility] 1view                            NAME:  JOANNA VASQUEZ

:  May 05, 1965     SEX: M     MRN:    [id number] 

Ordering Physician: Bola Banks

 

 

Chest 1view : 2013 07:38:00 AM.

 

CLINICAL INDICATION:  Heart failure. 

 

Comparison Examination: 2013.

 

FINDINGS:

 

The left lung base is not fully evaluated on this study. Stable position of left
 transvenous pacer.

 

Enlarged cardiac silhouette and mediastinal structures are stable.

 

Mild hazy patchy opacity over the right lung is similar to prior study and may 
represent component of asymmetric edema. No focal infiltrate within the left 
lung. No pneumothorax.

 

 

 

 

SL: 14

                            2013                            [facility] 1view                            PROCEDURE:  Chest 1view  

REASON FOR EXAM:   See Clinic Indication  

CLINICAL INDICATION:  Chest pain  

 

COMPARISON: 10/29/2013.

 

FINDINGS: Stable mild cardiomegaly. Probably persistent mild vascular congestion
 and pulmonary edema. No significant pleural effusion or pneumothorax. Left 
cardiac device is unchanged. Overall, no significant interval change. 

 

 

 

SL: 12

                            2013                            [facility] 1view                            Exam:  Chest X-ray, 1 view

 

History: chest pain 

 

Comparison: 2013

 

Findings:  Single frontal portable view of the chest. An implanted AICD device 
is again noted placed via left subclavian approach and connected to a battery 
pack.  

 

The heart size is enlarged.The mediastinum is normal . Mild vascular prominence 
is noted. .The lungs are clear without consolidation or effusion .No acute bony 
abnormality . .

 

Impression: 

 Cardiomegaly and mild perihilar vascular prominence.

 

 

 

                            10/29/2013                            [facility]   

                 

 

                          Chest 2 views                            HISTORY: Chest pain, shortness of breath.



 

Comparison is made to previous chest radiograph 10/11/2013.

 

The cardiac silhouette is enlarged. A left subclavian pacemaker is noted. No 
focal consolidation is identified. Degenerative change involves the lower 
thoracic spine.

 

IMPRESSION: No active disease. 

 

SL: 3

                            10/21/2013                            [facility]   

                 

 

                          Chest 1view                            One view chest 10/11/2013.

 

INDICATION: Chest pain.

 

COMMENT: Frontal chest radiograph is reviewed. Cardiomegaly and vascular 
congestion is unchanged compared to earlier chest radiograph 2013. Left 
transvenous AICD again noted.

 

IMPRESSION: Cardiomegaly and vascular congestion.

                            10/11/2013                            [facility]   

                 

 

                          Chest 2 views                            Chest x-ray 2 views

 

Indication: Chest pain

 

Comparison: 2013

 

Findings: Evaluation is suboptimal secondary to poor inspiratory effort. Stable 
cardiomegaly with mild congestion of the pulmonary vasculature. Left chest 
defibrillator device is unchanged.  No infiltrate or effusion. Suggestion of 
small amount of air seen under the right hemidiaphragm, left lateral decubitus 
views advised. No acute osseous abnormality.

 

Impression:

 

                                        1. Stable cardiomegaly with vascular congestion.

                                        2. Suggestion of small amount of air under the right hemidiaphragm, left lateral

 decubitus view advised.

 

Findings discussed with Dr. Nielson of the ED at the time of dictation.

                            10/11/2013                            [facility]   

                 

 

                          Abdomen/Pelvis w contrast CT                            CT abdomen and pelvis with

 contrast: 

 

INDICATION: Abdominal pain

 

ADMINISTERED CONTRAST:

IV contrast given: 100 cc of Omnipaque-300. 

Oral contrast given: The patient unable to tolerate.

 

FINDINGS: Helical axial images were obtained from lung base through the level of
 the pubic symphysis at 5 mm collimation. Coronal reconstructions of the abdomen
 were obtained. Comparison is made prior CT scan of the abdomen from 2013.
 A small right pleural effusion has developed. The lung bases are otherwise 
clear. The heart is normal size. There is no pericardial effusion. 

 

There are low-density changes of the liver diffusely. The liver is mildly 
prominent size but stable. The gallbladder is partially distended. No definite 
gallstone, bladder wall thickening or pericholecystic fluid on today's exam. 
There is a small volume of ascites within the upper abdomen, tracking along the 
paracolic gutters. Volume of ascites has mildly increased. The spleen, kidneys, 
adrenal glands and pancreas are stable.

 

The small and large bowel loops are normal caliber. Mild colonic wall thickening
 involving the cecum and ascending colon appears new from the previous exam. 
There is diverticulosis of the distal descending and sigmoid colon without 
secondary signs of acute diverticulitis. No pericecal abscess organized fluid 
collection. No pneumoperitoneum. The abdominal aorta is normal in caliber.  No 
free fluid is seen within the abdomen or pelvis. Fat-containing periumbilical 
hernia is incidentally noted. Surrounding osseous structures and soft tissues 
are stable.

 

IMPRESSION:

                                        1. Mild colonic wall thickening involving the cecum and ascending colon are noted,

 likely new when compared to the previous exam. The findings are concerning for 
nonspecific colitis which may be infectious or inflammatory in etiology.

                                        2. Low-density changes of the liver with small volume ascites. The volume of ascites

 has slightly increased.

                                        3. Small right pleural effusion 

                                        4. Fat-containing periumbilical hernia.

 

 

SL:2013                            [facility]   

                 

 

                          Brain wo contrast CT                            CT Head Noncontrast

 

HISTORY: Syncope. 

 

COMPARISON: 2009. 

 

TECHNIQUE: Axial images obtained from the skull base to the vertex. No contrast 
administered.

 

FINDINGS: The brain parenchyma appears within normal limits. The gray-white 
matter differentiation appears intact. No mass, mass effect, midline shift, 
parenchymal hemorrhage, or evidence for acute ischemia appreciated. The 
ventricles appear symmetric. There is no intra- or extra-axial fluid collection.
 The calvarium is intact. Visualized paranasal sinuses and mastoid air cells are
 clear. 

 

IMPRESSION:  

                                        1. No acute intracranial abnormality. 

 

 

 

 

SL: 2013                            [facility]   

                 

 

                          Chest 1view                            AP portable chest:

 

INDICATION: Chest pain

 

FINDINGS: Comparison is made to prior study from 2013.  As seen on the 
previous exam, margin mild interstitial and perihilar infiltrates, not 
significantly changed. No definite confluent infiltrates, significant pleural 
effusion or pneumothorax. A defibrillator device is implanted over the left 
chest. The mediastinum is unchanged. Surrounding osseous structures and soft 
tissues are within normal limits.

 

IMPRESSION: 

                                        1.  Cardiomegaly with findings suggestive of vascular congestion, not significantly

 changed.

 

 

SL:2013                            [facility]   

                 

 

                          Gallbladder scan HIDA with meds (NM)                            NUCLEAR MEDICINE HEPATOBILIARY

 SCAN WITH ASSESSMENT OF GALLBLADDER EMPTYING KINETICS

 

HISTORY: Acute abdominal pain.

 

COMPARISON: No relevant priors available.

 

Following the IV administration of 7 mCi 99 M technetium Choletec right upper 
quadrant was imaged demonstrating activity within the gallbladder by 15 minutes 
and within small bowel by 10 minutes. Following CCK administration gallbladder 
emptying kinetics were assessed. The ejection fraction is 59% normal is 30% or 
greater.

 

IMPRESSION:

 

                                        1. Normal exam.

 

 

SL: 02

                            2013                            [facility]   

                 

 

                          Abdomen RUQ US                            LIMITED ABDOMINAL ULTRASOUND WITH ATTENTION

 TO THE HEPATOBILIARY SYSTEM.

 

HISTORY: Right upper quadrant pain.

 

COMPARISON: Today's CT abdomen pelvis. 

 

There is coarse echogenicity of the liver parenchyma no focal liver lesions. 
Liver size normal. Right kidney and visualized portions of the pancreas, IVC and
 abdominal aorta are normal although portions of each of these structures is 
obscured by bowel gas.. No fluid collections. Gallbladder wall thickening and 
some areas gallbladder wall is 7 mm thick normal is 3 mm or less. No stones or 
sludge. No pericholecystic fluid collections.. The common bile duct is 3 mm in 
diameter.

 

IMPRESSION:

 

 

                                        1. Gallbladder wall thickening this can be seen in cholecystitis. No stones or sludge

 identified. No bile duct dilatation. If there is clinical concern for 
cholecystitis nuclear medicine hepatobiliary scan can be performed to assess for
 patency of the cystic duct.

 

 

SL: 02

                            2013                            [facility]   

                 

 

                          Chest CTA  pulm emb                            HISTORY: Shortness of breath.

 

Comparison is made to previous chest radiograph 2013.

 

Thin section axial CT imaging through the chest with coronal MIP reconstructions
 was performed post 100 cc Omnipaque 300 intravenous contrast (CT pulmonary 
angiogram).

 

The heart is enlarged and a left subclavian pacemaker is noted. The great 
vessels appear intact and no acute pulmonary emboli are identified. There are 
multiple subcentimeter prevascular, right paratracheal, pretracheal and 
precarinal lymph nodes. There is minimal vascular congestion with subsegmental 
atelectasis at the lung bases. No focal consolidation is identified and no 
pleural effusions are observed. The visualized portions of the liver and spleen 
are unremarkable. Degenerative change involves the lower thoracic spine.

 

IMPRESSION: 

                                        1. Cardiomegaly with findings suggesting minimal vascular congestion.

                                        2. No acute pulmonary emboli identified. 

 

SL: 3

                            2013                            [facility]   

                 

 

                          Abdomen/Pelvis w contrast CT                            HISTORY: Abdominal pain.

 

Comparison is made to previous abdominal ultrasound 2012.

 

CT imaging through the abdomen and pelvis from the diaphragm through the 
symphysis pubis was performed post 100 cc Omnipaque 300 intravenous contrast, 
oral contrast was not administered secondary to ER physician order.

 

The heart is enlarged and a left subclavian pacemaker is noted. There is minimal
 subsegmental atelectasis at the posterior lung bases. The liver demonstrates 
diminished attenuation consistent with fatty infiltration. The gallbladder is 
somewhat contracted and appears to demonstrate minimal wall thickening however 
no cholelithiasis is observed. The spleen, pancreas, adrenal glands and left 
kidney appear grossly normal. A 9 mm hypodense lesion is noted in the lateral 
midpole cortex of the right kidney possibly representing a cyst. There is a 
small fat-containing umbilical hernia. There is trace free fluid adjacent to the
 spleen and within the deep central pelvis. What is believed to be the appendix 
is unremarkable. There are multiple scattered diverticula involving the 
descending and sigmoid colon without acute diverticulitis.

 

CT imaging through the pelvis reveals a normal-appearing bladder, seminal 
vesicles and prostate gland. No pelvic mass is observed. Degenerative change 
involves the lower lumbosacral spine.

 

IMPRESSION:

                                        1. Fatty infiltration of the liver.

                                        2. Findings suggesting gallbladder wall thickening, acalculous cholecystitis cannot

 be entirely excluded.

                                        3. Right renal cyst.

                                        4. Minimal free fluid in the abdomen and pelvis.

                                        5. Small fat-containing umbilical hernia.

                                        6. Colonic diverticulosis. 

 

SL: 3

                            2013                            [facility]   

                 

 

                          Chest 1view                            HISTORY: Dyspnea, shortness of breath.

 

Comparison is made to previous chest radiograph 2013.

 

The cardiac silhouette appears slightly enlarged. There is minimal vascular 
congestion. A left subclavian pacemaker is noted.

 

IMPRESSION: Findings suggesting minimal vascular congestion.

                            2013                            [facility]   

                 

 

                          Chest 1view                            1 VIEW PORTABLE CHEST X-RAY 5:30 a.m.

 

HISTORY:  Chest pain heart failure.

 

COMPARISON:  Yesterday's chest x-ray.

 

The heart is prominent. Mediastinum normal. There is pulmonary venous congestion
 and edema. Pacemaker appears stable.

 

IMPRESSION:

 

Allowing for differences in technique and degree of inspiration there is no 
significant change in the cardiomegaly or changes of CHF.

 

 

SL: 02

                            2013                            [facility]   

                 

 

                          Chest 1view                            AP portable chest:

 

INDICATION: Chest pain

 

FINDINGS: Comparison is made to prior study from 2012.  The heart is 
enlarged and there are mild interstitial infiltrates. Atelectasis suggested in 
the left lung base. No definite confluent infiltrates or pneumothorax. A 
defibrillator device is implanted over the left chest. The cardiac silhouette 
and mediastinum are stable. Surrounding osseous structures and soft tissues are 
within normal limits.

 

IMPRESSION: 

                                        1.  Cardiomegaly with mild interstitial infiltrate suggestive of vascular congestion.



                                        2. Left basilar atelectasis.

 

 

SL:03

                            2013                            [facility]   

                 



                                                                                
                                                                                
                                                                                
                                                                                
                                                                                
                                                                                
                                                                                
                                                                                
                                                                                
                    



Consultation Notes

                    





                                        No Data Provided for This Section                    



                                                            



Discharge Summaries

                    





                                        No Data Provided for This Section                    



                                                            



History and Physicals

                    





                                        No Data Provided for This Section                    



                                                                



Vital Signs

                     





                    Vital Sign                            Value                            Date         

                          Comments                            Source                    

 

                    Heart Rate                            85                             2018     

                                                      [facility]                    

 

                    Respitory Rate                            20                             2018 

                                                       [facility]                  

  

 

                    Systolic (mm Hg)                            149                             2018

                                                        [facility]                 

   

 

                    Diastolic (mm Hg)                            105                             2018

                                                        [facility]                 

   

 

                    Heart Rate                            80                             2018     

                                                      [facility]                    

 

                    Systolic (mm Hg)                            136                             2018

                                                        [facility]                 

   

 

                    Diastolic (mm Hg)                            108                             2018

                                                        [facility]                 

   

 

                    Temperature Oral (F)                            97.8 F                            2018

                                                        [facility]                 

   

 

                    Respitory Rate                            20                             2018 

                                                       [facility]                  

  

 

                    Weight                            109.091                             2018    

                                                      [facility]                    

 

                    Respitory Rate                            20                             2018 

                                                       [facility]                  

  

 

                    Systolic (mm Hg)                            130                             2018

                                                        [facility]                 

   

 

                    Diastolic (mm Hg)                            93                             2018

                                                        [facility]                 

   

 

                    Heart Rate                            79                             2018     

                                                      [facility]                    

 

                    Temperature Oral (F)                            98.4 F                            2018

                                                        [facility]                 

   

 

                    Systolic (mm Hg)                            133                             2018

                                                        [facility]                 

   

 

                    Diastolic (mm Hg)                            87                             2018

                                                        [facility]                 

   

 

                    Respitory Rate                            20                             2018 

                                                       [facility]                  

  

 

                    Heart Rate                            62                             2018     

                                                      [facility]                    

 

                    Temperature Oral (F)                            97.8 F                            2018

                                                        [facility]                 

   

 

                    Systolic (mm Hg)                            108                             2018

                                                        [facility]                 

   

 

                    Diastolic (mm Hg)                            76                             2018

                                                        [facility]                 

   

 

                    Heart Rate                            73                             2018     

                                                      [facility]                    

 

                    Temperature Oral (F)                            98.7 F                            2018

                                                        [facility]                 

   

 

                    Respitory Rate                            18                             2018 

                                                       [facility]                  

  

 

                    BMI Calculated                            35.65                             2018

                                                        [facility]                 

   

 

                    Weight                            106.364                             2018    

                                                      [facility]                    

 

                    Height                            172.72 cm                            2018   

                                                      [facility]                    



 

                    Weight                            86.364                             2018     

                                                      [facility]                    

 

                    Systolic (mm Hg)                            126                             2018

                                                        [facility]                 

   

 

                    Diastolic (mm Hg)                            84                             2018

                                                        [facility]                 

   

 

                    Respitory Rate                            18                             2018 

                                                       [facility]                  

  

 

                    Heart Rate                            64                             2018     

                                                      [facility]                    

 

                    Temperature Oral (F)                            98 F                            2018

                                                        [facility]                 

   

 

                    Temperature Oral (F)                            97.3 F                            2018

                                                        [facility]                 

   

 

                    Systolic (mm Hg)                            121                             2018

                                                        [facility]                 

   

 

                    Diastolic (mm Hg)                            87                             2018

                                                        [facility]                 

   

 

                    Heart Rate                            61                             2018     

                                                      [facility]                    

 

                    Respitory Rate                            18                             2018 

                                                       [facility]                  

  

 

                    Heart Rate                            79                             2018     

                                                      [facility]                    

 

                    Respitory Rate                            18                             2018 

                                                       [facility]                  

  

 

                    Systolic (mm Hg)                            108                             2018

                                                        [facility]                 

   

 

                    Diastolic (mm Hg)                            75                             2018

                                                        [facility]                 

   

 

                    Temperature Oral (F)                            97.7 F                            2018

                                                        [facility]                 

   

 

                    BMI Calculated                            25.11                             2018

                                                        [facility]                 

   

 

                    Weight                            72.727                             2018     

                                                      [facility]                    

 

                    Height                            170.18 cm                            2018   

                                                      [facility]                    



 

                    Weight                            90.909                             2018     

                                                      [facility]                    

 

                    BMI Calculated                            29.6                             2018

                                                        [facility]                 

   

 

                    Height                            175.26 cm                            2018   

                                                      [facility]                    



 

                    Respitory Rate                            20                             2017 

                                                       [facility]                  

  

 

                    Systolic (mm Hg)                            105                             2017

                                                        [facility]                 

   

 

                    Diastolic (mm Hg)                            74                             2017

                                                        [facility]                 

   

 

                    Heart Rate                            76                             2017     

                                                      [facility]                    

 

                    Heart Rate                            67                             2017     

                                                      [facility]                    

 

                    Systolic (mm Hg)                            105                             2017

                                                        [facility]                 

   

 

                    Diastolic (mm Hg)                            73                             2017

                                                        [facility]                 

   

 

                    Respitory Rate                            20                             2017 

                                                       [facility]                  

  

 

                    Systolic (mm Hg)                            99                             2017

                                                        [facility]                 

   

 

                    Diastolic (mm Hg)                            61                             2017

                                                        [facility]                 

   

 

                    Respitory Rate                            18                             2017 

                                                       [facility]                  

  

 

                    Heart Rate                            55                             2017     

                                                      [facility]                    

 

                    Weight                            99.6                             2017       

                                                      [facility]                    

 

                    BMI Calculated                            34.39                             2017

                                                        [facility]                 

   

 

                    Height                            170.18 cm                            2017   

                                                      [facility]                    



 

                    Height                            170.18 cm                            2017   

                                                      [facility]                    



 

                    Temperature Oral (F)                            97.7 F                            2017

                                                        [facility]                 

   

 

                    Temperature Oral (F)                            97.8 F                            2017

                                                        [facility]                 

   

 

                    Systolic (mm Hg)                            97                             2017

                                                        [facility]             

       

 

                    Diastolic (mm Hg)                            69                             2017

                                                        [facility]             

       

 

                    Respitory Rate                            18                             2017 

                                                       [facility]              

      

 

                    Temperature Oral (F)                            97.8 F                            2017

                                                        [facility]             

       

 

                    Heart Rate                            64                             2017     

                                                      [facility]                  

  

 

                    Respitory Rate                            18                             2017 

                                                       [facility]              

      

 

                    Heart Rate                            54                             2017     

                                                      [facility]                  

  

 

                    Systolic (mm Hg)                            111                             2017

                                                        [facility]             

       

 

                    Diastolic (mm Hg)                            65                             2017

                                                        [facility]             

       

 

                    Temperature Oral (F)                            97.8 F                            2017

                                                        [facility]             

       

 

                    Systolic (mm Hg)                            107                             2017

                                                        [facility]             

       

 

                    Diastolic (mm Hg)                            65                             2017

                                                        [facility]             

       

 

                    Temperature Oral (F)                            97.6 F                            2017

                                                        [facility]             

       

 

                    Heart Rate                            69                             2017     

                                                      [facility]                  

  

 

                    Respitory Rate                            18                             2017 

                                                       [facility]              

      

 

                    Height                            170.18 cm                            2017   

                                                      [facility]                

    

 

                    BMI Calculated                            35                             2017 

                                                       [facility]              

      

 

                    Weight                            101.364                             2017    

                                                      [facility]                 

   

 

                    Height                            170.18 cm                            2017   

                                                      [facility]                

    

 

                    BMI Calculated                            35.47                             2017

                                                        [facility]             

       

 

                    Weight                            102.727                             2017    

                                                      [facility]                 

   

 

                    Respitory Rate                            20                             10/28/2017 

                                                       [facility]                  

  

 

                    Systolic (mm Hg)                            126                             10/28/2017

                                                        [facility]                 

   

 

                    Diastolic (mm Hg)                            89                             10/28/2017

                                                        [facility]                 

   

 

                    Heart Rate                            57                             10/28/2017     

                                                      [facility]                    

 

                    Temperature Oral (F)                            97.9 F                            10/28/2017

                                                        [facility]                 

   

 

                    Systolic (mm Hg)                            104                             10/28/2017

                                                        [facility]                 

   

 

                    Diastolic (mm Hg)                            83                             10/28/2017

                                                        [facility]                 

   

 

                    Respitory Rate                            18                             10/28/2017 

                                                       [facility]                  

  

 

                    Heart Rate                            78                             10/28/2017     

                                                      [facility]                    

 

                    Temperature Oral (F)                            97.5 F                            10/28/2017

                                                        [facility]                 

   

 

                    Heart Rate                            76                             10/28/2017     

                                                      [facility]                    

 

                    Temperature Oral (F)                            97.9 F                            10/28/2017

                                                        [facility]                 

   

 

                    Respitory Rate                            18                             10/28/2017 

                                                       [facility]                  

  

 

                    Systolic (mm Hg)                            99                             10/28/2017

                                                        [facility]                 

   

 

                    Diastolic (mm Hg)                            68                             10/28/2017

                                                        [facility]                 

   

 

                    Height                            172.72 cm                            10/28/2017   

                                                      [facility]                    



 

                    Weight                            101.449                             10/28/2017    

                                                      [facility]                    

 

                    BMI Calculated                            34.01                             10/28/2017

                                                        [facility]                 

   

 

                    Weight                            102.273                             10/28/2017    

                                                      [facility]                    

 

                    Heart Rate                            85                             2017     

                                                      [facility]                    

 

                    Temperature Oral (F)                            98 F                            2017

                                                        [facility]                 

   

 

                    Systolic (mm Hg)                            124                             2017

                                                        [facility]                 

   

 

                    Diastolic (mm Hg)                            75                             2017

                                                        [facility]                 

   

 

                    Respitory Rate                            20                             2017 

                                                       [facility]                  

  

 

                    Systolic (mm Hg)                            113                             2017

                                                        [facility]                 

   

 

                    Diastolic (mm Hg)                            101                             2017

                                                        [facility]                 

   

 

                    Heart Rate                            105                             2017    

                                                      [facility]                    

 

                    Respitory Rate                            24                             2017 

                                                       [facility]                  

  

 

                    Weight                            107.273                             2017    

                                                      [facility]                    

 

                    Heart Rate                            79                             2017     

                                                      [facility]                    

 

                    Systolic (mm Hg)                            129                             2017

                                                        [facility]                 

   

 

                    Diastolic (mm Hg)                            91                             2017

                                                        [facility]                 

   

 

                    Respitory Rate                            20                             2017 

                                                       [facility]                  

  

 

                    Temperature Oral (F)                            98.9 F                            2017

                                                        [facility]                 

   

 

                    Systolic (mm Hg)                            125                             2017

                                                        [facility]                 

   

 

                    Diastolic (mm Hg)                            92                             2017

                                                        [facility]                 

   

 

                    Heart Rate                            80                             2017     

                                                      [facility]                    

 

                    Respitory Rate                            17                             2017 

                                                       [facility]                  

  

 

                    Temperature Oral (F)                            98.8 F                            2017

                                                        [facility]                 

   

 

                    Respitory Rate                            18                             2017 

                                                       [facility]                  

  

 

                    Heart Rate                            100                             2017    

                                                      [facility]                    

 

                    Systolic (mm Hg)                            153                             2017

                                                        [facility]                 

   

 

                    Diastolic (mm Hg)                            98                             2017

                                                        [facility]                 

   

 

                    Height                            170.18 cm                            2017   

                                                      [facility]                    



 

                    Weight                            106.818                             2017    

                                                      [facility]                    

 

                    BMI Calculated                            36.88                             2017

                                                        [facility]                 

   

 

                    Systolic (mm Hg)                            133                             2017

                                                        [facility]                 

   

 

                    Diastolic (mm Hg)                            93                             2017

                                                        [facility]                 

   

 

                    Heart Rate                            70                             2017     

                                                      [facility]                    

 

                    Respitory Rate                            18                             2017 

                                                       [facility]                  

  

 

                    Temperature Oral (F)                            97.9 F                            2017

                                                        [facility]                 

   

 

                    Respitory Rate                            18                             2017 

                                                       [facility]                  

  

 

                    Systolic (mm Hg)                            112                             2017

                                                        [facility]                 

   

 

                    Diastolic (mm Hg)                            79                             2017

                                                        [facility]                 

   

 

                    Heart Rate                            62                             2017     

                                                      [facility]                    

 

                    Temperature Oral (F)                            98.0 F                            2017

                                                        [facility]                 

   

 

                    Heart Rate                            91                             2017     

                                                      [facility]                    

 

                    Respitory Rate                            18                             2017 

                                                       [facility]                  

  

 

                    Systolic (mm Hg)                            115                             2017

                                                        [facility]                 

   

 

                    Diastolic (mm Hg)                            81                             2017

                                                        [facility]                 

   

 

                    Temperature Oral (F)                            98.0 F                            2017

                                                        [facility]                 

   

 

                    Systolic (mm Hg)                            124                             2017

                                                        [facility]                 

   

 

                    Diastolic (mm Hg)                            86                             2017

                                                        [facility]                 

   

 

                    Respitory Rate                            20                             2017 

                                                       [facility]                  

  

 

                    Heart Rate                            76                             2017     

                                                      [facility]                    

 

                    Temperature Oral (F)                            98.3 F                            2017

                                                        [facility]                 

   

 

                    Weight                            101.364                             2017    

                                                      [facility]                    

 

                    Weight                            101.364                             2017    

                                                      [facility]                    

 

                    Systolic (mm Hg)                            105                             2017

                                                        [facility]                 

   

 

                    Diastolic (mm Hg)                            70                             2017

                                                        [facility]                 

   

 

                    Temperature Oral (F)                            97.4 F                            2017

                                                        [facility]                 

   

 

                    Respitory Rate                            18                             2017 

                                                       [facility]                  

  

 

                    Heart Rate                            80                             2017     

                                                      [facility]                    

 

                    Respitory Rate                            18                             2017 

                                                       [facility]                  

  

 

                    Temperature Oral (F)                            97.8 F                            2017

                                                        [facility]                 

   

 

                    Systolic (mm Hg)                            115                             2017

                                                        [facility]                 

   

 

                    Diastolic (mm Hg)                            84                             2017

                                                        [facility]                 

   

 

                    Heart Rate                            86                             2017     

                                                      [facility]                    

 

                    Temperature Oral (F)                            97.7 F                            2017

                                                        [facility]                 

   

 

                    Respitory Rate                            18                             2017 

                                                       [facility]                  

  

 

                    Systolic (mm Hg)                            100                             2017

                                                        [facility]                 

   

 

                    Diastolic (mm Hg)                            69                             2017

                                                        [facility]                 

   

 

                    Heart Rate                            60                             2017     

                                                      [facility]                    

 

                    BMI Calculated                            33.51                             2017

                                                        [facility]                 

   

 

                    Weight                            102.926                             2017    

                                                      [facility]                    

 

                    Height                            175.26 cm                            2017   

                                                      [facility]                    



 

                    Weight                            90.909                             2017     

                                                      [facility]                    

 

                    Respitory Rate                            18                             2017 

                                                       [facility]                  

  

 

                    Heart Rate                            62                             2017     

                                                      [facility]                    

 

                    Temperature Oral (F)                            98.2 F                            2017

                                                        [facility]                 

   

 

                    Systolic (mm Hg)                            111                             2017

                                                        [facility]                 

   

 

                    Diastolic (mm Hg)                            85                             2017

                                                        [facility]                 

   

 

                    Temperature Oral (F)                            97.5 F                            2017

                                                        [facility]                 

   

 

                    Respitory Rate                            18                             2017 

                                                       [facility]                  

  

 

                    Systolic (mm Hg)                            105                             2017

                                                        [facility]                 

   

 

                    Diastolic (mm Hg)                            62                             2017

                                                        [facility]                 

   

 

                    Heart Rate                            61                             2017     

                                                      [facility]                    

 

                    Heart Rate                            75                             2017     

                                                      [facility]                    

 

                    Respitory Rate                            18                             2017 

                                                       [facility]                  

  

 

                    Systolic (mm Hg)                            102                             2017

                                                        [facility]                 

   

 

                    Diastolic (mm Hg)                            71                             2017

                                                        [facility]                 

   

 

                    Temperature Oral (F)                            97.5 F                            2017

                                                        [facility]                 

   

 

                    Height                            172.72 cm                            2017   

                                                      [facility]                    



 

                    Weight                            99                             2017         

                                                      [facility]                    

 

                    BMI Calculated                            33.19                             2017

                                                        [facility]                 

   

 

                    Weight                            104.545                             2017    

                                                      [facility]                    

 

                    Systolic (mm Hg)                            123                             2017

                                                        [facility]                 

   

 

                    Diastolic (mm Hg)                            86                             2017

                                                        [facility]                 

   

 

                    Respitory Rate                            18                             2017 

                                                       [facility]                  

  

 

                    Heart Rate                            92                             2017     

                                                      [facility]                    

 

                    Temperature Oral (F)                            97.9 F                            2017

                                                        [facility]                 

   

 

                    Weight                            104.545                             2017    

                                                      [facility]                    

 

                    BMI Calculated                            35.04                             2017

                                                        [facility]                 

   

 

                    Height                            172.72 cm                            2017   

                                                      [facility]                    



 

                    Temperature Oral (F)                            97.7 F                            2017

                                                        [facility]                 

   

 

                    Heart Rate                            115                             2017    

                                                      [facility]                    

 

                    Respitory Rate                            18                             2017 

                                                       [facility]                  

  

 

                    Systolic (mm Hg)                            138                             2017

                                                        [facility]                 

   

 

                    Diastolic (mm Hg)                            89                             2017

                                                        [facility]                 

   

 

                    Height                            175.26 cm                            10/26/2016   

                                                      [facility]                    



 

                    BMI Calculated                            34.78                             10/26/2016

                                                        [facility]                 

   

 

                    Weight                            106.818                             10/26/2016    

                                                      [facility]                    

 

                    Weight                            106.364                             2016    

                                                      [facility]                    

 

                    BMI Calculated                            34.63                             2016

                                                        [facility]                 

   

 

                    Height                            175.26 cm                            2016   

                                                      [facility]                    



 

                    Temperature Oral (F)                            97.4 F                            2016

                                                        [facility]                 

   

 

                    Respitory Rate                            18                             2016 

                                                       [facility]                  

  

 

                    Heart Rate                            77                             2016     

                                                      [facility]                    

 

                    Systolic (mm Hg)                            132                             2016

                                                        [facility]                 

   

 

                    Diastolic (mm Hg)                            91                             2016

                                                        [facility]                 

   

 

                    Systolic (mm Hg)                            117                             2016

                                                        [facility]                 

   

 

                    Diastolic (mm Hg)                            87                             2016

                                                        [facility]                 

   

 

                    Respitory Rate                            12                             2016 

                                                       [facility]                  

  

 

                    Heart Rate                            83                             2016     

                                                      [facility]                    

 

                    Temperature Oral (F)                            97.9 F                            2016

                                                        [facility]                 

   

 

                    Respitory Rate                            16                             2016 

                                                       [facility]                  

  

 

                    Systolic (mm Hg)                            110                             2016

                                                        [facility]                 

   

 

                    Diastolic (mm Hg)                            70                             2016

                                                        [facility]                 

   

 

                    Temperature Oral (F)                            97.9 F                            2016

                                                        [facility]                 

   

 

                    Heart Rate                            85                             2016     

                                                      [facility]                    

 

                    Height                            170.18 cm                            2016   

                                                      [facility]                    



 

                    Weight                            95.455                             2016     

                                                      [facility]                    

 

                    BMI Calculated                            32.96                             2016

                                                        [facility]                 

   

 

                    Diastolic (mm Hg)                            101                             2014

                                                        [facility]             

       

 

                    Systolic (mm Hg)                            141                             2014

                                                        [facility]             

       

 

                    Respitory Rate                            16                             2014 

                                                       [facility]              

      

 

                    Temperature Oral (F)                            97.7 F                            2014

                                                        [facility]             

       

 

                    Heart Rate                            84                             2014     

                                                      [facility]                  

  

 

                    Diastolic (mm Hg)                            108                             2014

                                                        [facility]             

       

 

                    Systolic (mm Hg)                            145                             2014

                                                        [facility]             

       

 

                    Temperature Oral (F)                            97.7 F                            2014

                                                        [facility]             

       

 

                    Respitory Rate                            16                             2014 

                                                       [facility]              

      

 

                    Heart Rate                            88                             2014     

                                                      [facility]                  

  

 

                    Systolic (mm Hg)                            135                             2014

                                                        [facility]             

       

 

                    Diastolic (mm Hg)                            105                             2014

                                                        [facility]             

       

 

                    Heart Rate                            89                             2014     

                                                      [facility]                  

  

 

                    Respitory Rate                            16                             2014 

                                                       [facility]              

      

 

                    Weight                            106.818                             2014    

                                                      [facility]                 

   

 

                    Height                            172.72 cm                            2014   

                                                      [facility]                

    

 

                    Temperature Oral (F)                            97.6 F                            2014

                                                        [facility]             

       

 

                    Heart Rate                            87                             2014     

                                                      [facility]                  

  

 

                    Respitory Rate                            20                             2014 

                                                       [facility]              

      

 

                    Systolic (mm Hg)                            145                             2014

                                                        [facility]             

       

 

                    Diastolic (mm Hg)                            84                             2014

                                                        [facility]             

       

 

                    Heart Rate                            91                             2014     

                                                      [facility]                  

  

 

                    Respitory Rate                            18                             2014 

                                                       [facility]              

      

 

                    Systolic (mm Hg)                            135                             2014

                                                        [facility]             

       

 

                    Diastolic (mm Hg)                            88                             2014

                                                        [facility]             

       

 

                    Respitory Rate                            22                             2014 

                                                       [facility]              

      

 

                    Heart Rate                            95                             2014     

                                                      [facility]                  

  

 

                    Diastolic (mm Hg)                            116                             2014

                                                        [facility]             

       

 

                    Systolic (mm Hg)                            147                             2014

                                                        [facility]             

       

 

                    Weight                            104.545                             2014    

                                                      [facility]                 

   

 

                    Height                            177.8 cm                            2014    

                                                      [facility]                 

   

 

                    Heart Rate                            97                             2014     

                                                      [facility]                    

 

                    Respitory Rate                            20                             2014 

                                                       [facility]                  

  

 

                    Systolic (mm Hg)                            156                             2014

                                                        [facility]                 

   

 

                    Diastolic (mm Hg)                            96                             2014

                                                        [facility]                 

   

 

                    Temperature Oral (F)                            98.2 F                            2014

                                                        [facility]                 

   

 

                    Diastolic (mm Hg)                            94                             2014

                                                        [facility]                 

   

 

                    Temperature Oral (F)                            97.9 F                            2014

                                                        [facility]                 

   

 

                    Respitory Rate                            20                             2014 

                                                       [facility]                  

  

 

                    Heart Rate                            88                             2014     

                                                      [facility]                    

 

                    Systolic (mm Hg)                            127                             2014

                                                        [facility]                 

   

 

                    Systolic (mm Hg)                            125                             2014

                                                        [facility]                 

   

 

                    Diastolic (mm Hg)                            98                             2014

                                                        [facility]                 

   

 

                    Heart Rate                            75                             2014     

                                                      [facility]                    

 

                    Respitory Rate                            18                             2014 

                                                       [facility]                  

  

 

                    Weight                            111.364                             2014    

                                                      [facility]                    

 

                    Height                            170.18 cm                            2014   

                                                      [facility]                    



 

                    Temperature Oral (F)                            98.9 F                            2014

                                                        [facility]                 

   

 

                    Heart Rate                            72                             01/15/2014     

                                                      [facility]                    

 

                    Temperature Oral (F)                            97.8 F                            01/15/2014

                                                        [facility]                 

   

 

                    Respitory Rate                            20                             01/15/2014 

                                                       [facility]                  

  

 

                    Diastolic (mm Hg)                            81                             01/15/2014

                                                        [facility]                 

   

 

                    Systolic (mm Hg)                            117                             01/15/2014

                                                        [facility]                 

   

 

                    Diastolic (mm Hg)                            74                             01/15/2014

                                                        [facility]                 

   

 

                    Systolic (mm Hg)                            108                             01/15/2014

                                                        [facility]                 

   

 

                    Temperature Oral (F)                            97.4 F                            01/15/2014

                                                        [facility]                 

   

 

                    Heart Rate                            69                             01/15/2014     

                                                      [facility]                    

 

                    Diastolic (mm Hg)                            60                             01/15/2014

                                                        [facility]                 

   

 

                    Respitory Rate                            20                             01/15/2014 

                                                       [facility]                  

  

 

                    Systolic (mm Hg)                            92                             01/15/2014

                                                        [facility]                 

   

 

                    Temperature Oral (F)                            98.3 F                            01/15/2014

                                                        [facility]                 

   

 

                    Respitory Rate                            20                             01/15/2014 

                                                       [facility]                  

  

 

                    Heart Rate                            76                             01/15/2014     

                                                      [facility]                    

 

                    Weight                            98.295                             2014     

                                                      [facility]                    

 

                    Weight                            99.545                             2014     

                                                      [facility]                    

 

                    Height                            172.72 cm                            2014   

                                                      [facility]                    



 

                    Weight                            101.2                             2014      

                                                      [facility]                    

 

                    Height                            180.34 cm                            2014   

                                                      [facility]                    



 

                    Systolic (mm Hg)                            138                             12/15/2013

                                                        [facility]                 

   

 

                    Diastolic (mm Hg)                            96                             12/15/2013

                                                        [facility]                 

   

 

                    Respitory Rate                            18                             12/15/2013 

                                                       [facility]                  

  

 

                    Heart Rate                            96                             12/15/2013     

                                                      [facility]                    

 

                    Temperature Oral (F)                            98.2 F                            12/15/2013

                                                        [facility]                 

   

 

                    Diastolic (mm Hg)                            104                             12/15/2013

                                                        [facility]                 

   

 

                    Respitory Rate                            18                             12/15/2013 

                                                       [facility]                  

  

 

                    Systolic (mm Hg)                            144                             12/15/2013

                                                        [facility]                 

   

 

                    Heart Rate                            101                             12/15/2013    

                                                      [facility]                    

 

                    Temperature Oral (F)                            98.3 F                            12/15/2013

                                                        [facility]                 

   

 

                    Weight                            129.545                             12/15/2013    

                                                      [facility]                    

 

                    Height                            172.72 cm                            12/15/2013   

                                                      [facility]                    



 

                    Respitory Rate                            18                             12/15/2013 

                                                       [facility]                  

  

 

                    Temperature Oral (F)                            98.0 F                            12/15/2013

                                                        [facility]                 

   

 

                    Systolic (mm Hg)                            138                             12/15/2013

                                                        [facility]                 

   

 

                    Diastolic (mm Hg)                            108                             12/15/2013

                                                        [facility]                 

   

 

                    Heart Rate                            75                             12/15/2013     

                                                      [facility]                    

 

                    Diastolic (mm Hg)                            83                             2013

                                                        [facility]                 

   

 

                    Temperature Oral (F)                            97.8 F                            2013

                                                        [facility]                 

   

 

                    Respitory Rate                            18                             2013 

                                                       [facility]                  

  

 

                    Systolic (mm Hg)                            127                             2013

                                                        [facility]                 

   

 

                    Heart Rate                            89                             2013     

                                                      [facility]                    

 

                    Systolic (mm Hg)                            141                             2013

                                                        [facility]                 

   

 

                    Diastolic (mm Hg)                            104                             2013

                                                        [facility]                 

   

 

                    Respitory Rate                            18                             2013 

                                                       [facility]                  

  

 

                    Heart Rate                            92                             2013     

                                                      [facility]                    

 

                    Temperature Oral (F)                            98.3 F                            2013

                                                        [facility]                 

   

 

                    Temperature Oral (F)                            97.9 F                            2013

                                                        [facility]                 

   

 

                    Heart Rate                            81                             2013     

                                                      [facility]                    

 

                    Diastolic (mm Hg)                            92                             2013

                                                        [facility]                 

   

 

                    Systolic (mm Hg)                            127                             2013

                                                        [facility]                 

   

 

                    Respitory Rate                            18                             2013 

                                                       [facility]                  

  

 

                    Height                            167.64 cm                            2013   

                                                      [facility]                    



 

                    Weight                            129.545                             2013    

                                                      [facility]                    

 

                    Height                            170.18 cm                            2013   

                                                      [facility]                    



 

                    Weight                            129.545                             2013    

                                                      [facility]                    

 

                    Temperature Oral (F)                            98.2 F                            2013

                                                        [facility]                 

   

 

                    Heart Rate                            89                             2013     

                                                      [facility]                    

 

                    Respitory Rate                            18                             2013 

                                                       [facility]                  

  

 

                    Systolic (mm Hg)                            135                             2013

                                                        [facility]                 

   

 

                    Diastolic (mm Hg)                            98                             2013

                                                        [facility]                 

   

 

                    Temperature Oral (F)                            98.2 F                            2013

                                                        [facility]                 

   

 

                    Respitory Rate                            18                             2013 

                                                       [facility]                  

  

 

                    Heart Rate                            99                             2013     

                                                      [facility]                    

 

                    Systolic (mm Hg)                            142                             2013

                                                        [facility]                 

   

 

                    Diastolic (mm Hg)                            104                             2013

                                                        [facility]                 

   

 

                    Diastolic (mm Hg)                            101                             2013

                                                        [facility]                 

   

 

                    Heart Rate                            86                             2013     

                                                      [facility]                    

 

                    Systolic (mm Hg)                            131                             2013

                                                        [facility]                 

   

 

                    Respitory Rate                            20                             2013 

                                                       [facility]                  

  

 

                    Height                            172.72 cm                            2013   

                                                      [facility]                    



 

                    Weight                            102.273                             2013    

                                                      [facility]                    

 

                    Temperature Oral (F)                            97.7 F                            2013

                                                        [facility]                 

   

 

                    Respitory Rate                            20                             2013 

                                                       [facility]                  

  

 

                    Systolic (mm Hg)                            132                             2013

                                                        [facility]                 

   

 

                    Diastolic (mm Hg)                            102                             2013

                                                        [facility]                 

   

 

                    Temperature Oral (F)                            97.5 F                            2013

                                                        [facility]                 

   

 

                    Heart Rate                            87                             2013     

                                                      [facility]                    

 

                    Respitory Rate                            19                             2013 

                                                       [facility]                  

  

 

                    Heart Rate                            87                             2013     

                                                      [facility]                    

 

                    Temperature Oral (F)                            97.5 F                            2013

                                                        [facility]                 

   

 

                    Diastolic (mm Hg)                            93                             2013

                                                        [facility]                 

   

 

                    Systolic (mm Hg)                            120                             2013

                                                        [facility]                 

   

 

                    Heart Rate                            100                             2013    

                                                      [facility]                    

 

                    Diastolic (mm Hg)                            97                             2013

                                                        [facility]                 

   

 

                    Systolic (mm Hg)                            131                             2013

                                                        [facility]                 

   

 

                    Respitory Rate                            22                             2013 

                                                       [facility]                  

  

 

                    Temperature Oral (F)                            97.3 F                            2013

                                                        [facility]                 

   

 

                    Height                            170.1 cm                            2013    

                                                      [facility]                    

 

                    Weight                            91.5                             2013       

                                                      [facility]                    

 

                    Weight                            129.545                             2013    

                                                      [facility]                    

 

                    Height                            170.18 cm                            2013   

                                                      [facility]                    



 

                    Diastolic (mm Hg)                            61                             10/31/2013

                                                        [facility]             

       

 

                    Systolic (mm Hg)                            127                             10/31/2013

                                                        [facility]             

       

 

                    Respitory Rate                            18                             10/31/2013 

                                                       [facility]              

      

 

                    Temperature Oral (F)                            97.7 F                            10/31/2013

                                                        [facility]             

       

 

                    Heart Rate                            50                             10/31/2013     

                                                      [facility]                  

  

 

                    Heart Rate                            88                             10/31/2013     

                                                      [facility]                  

  

 

                    Diastolic (mm Hg)                            86                             10/31/2013

                                                        [facility]             

       

 

                    Systolic (mm Hg)                            156                             10/31/2013

                                                        [facility]             

       

 

                    Respitory Rate                            18                             10/31/2013 

                                                       [facility]              

      

 

                    Temperature Oral (F)                            97.8 F                            10/31/2013

                                                        [facility]             

       

 

                    Heart Rate                            79                             10/31/2013     

                                                      [facility]                  

  

 

                    Respitory Rate                            16                             10/31/2013 

                                                       [facility]              

      

 

                    Systolic (mm Hg)                            117                             10/31/2013

                                                        [facility]             

       

 

                    Diastolic (mm Hg)                            86                             10/31/2013

                                                        [facility]             

       

 

                    Temperature Oral (F)                            97.5 F                            10/31/2013

                                                        [facility]             

       

 

                    Height                            172.72 cm                            10/29/2013   

                                                      [facility]                

    

 

                    Weight                            127.273                             10/29/2013    

                                                      [facility]                 

   

 

                    Systolic (mm Hg)                            117                             10/22/2013

                                                        [facility]             

       

 

                    Temperature Oral (F)                            96.0 F                            10/22/2013

                                                        [facility]             

       

 

                    Respitory Rate                            20                             10/22/2013 

                                                       [facility]              

      

 

                    Heart Rate                            64                             10/22/2013     

                                                      [facility]                  

  

 

                    Diastolic (mm Hg)                            92                             10/22/2013

                                                        [facility]             

       

 

                    Heart Rate                            55                             10/22/2013     

                                                      [facility]                  

  

 

                    Respitory Rate                            18                             10/22/2013 

                                                       [facility]              

      

 

                    Temperature Oral (F)                            96.0 F                            10/22/2013

                                                        [facility]             

       

 

                    Diastolic (mm Hg)                            82                             10/22/2013

                                                        [facility]             

       

 

                    Systolic (mm Hg)                            108                             10/22/2013

                                                        [facility]             

       

 

                    Diastolic (mm Hg)                            76                             10/22/2013

                                                        [facility]             

       

 

                    Systolic (mm Hg)                            107                             10/22/2013

                                                        [facility]             

       

 

                    Respitory Rate                            18                             10/22/2013 

                                                       [facility]              

      

 

                    Heart Rate                            65                             10/22/2013     

                                                      [facility]                  

  

 

                    Temperature Oral (F)                            96.8 F                            10/22/2013

                                                        [facility]             

       

 

                    Weight                            105.085                             10/22/2013    

                                                      [facility]                 

   

 

                    Height                            172.72 cm                            10/22/2013   

                                                      [facility]                

    

 

                    Weight                            90.909                             10/21/2013     

                                                      [facility]                  

  

 

                    Height                            172.72 cm                            10/21/2013   

                                                      [facility]                

    

 

                    Diastolic (mm Hg)                            89                             10/13/2013

                                                        [facility]             

       

 

                    Systolic (mm Hg)                            125                             10/13/2013

                                                        [facility]             

       

 

                    Temperature Oral (F)                            98.0 F                            10/13/2013

                                                        [facility]             

       

 

                    Heart Rate                            68                             10/13/2013     

                                                      [facility]                  

  

 

                    Respitory Rate                            18                             10/13/2013 

                                                       [facility]              

      

 

                    Temperature Oral (F)                            98.1 F                            10/13/2013

                                                        [facility]             

       

 

                    Heart Rate                            81                             10/13/2013     

                                                      [facility]                  

  

 

                    Systolic (mm Hg)                            125                             10/13/2013

                                                        [facility]             

       

 

                    Respitory Rate                            18                             10/13/2013 

                                                       [facility]              

      

 

                    Diastolic (mm Hg)                            91                             10/13/2013

                                                        [facility]             

       

 

                    Heart Rate                            70                             10/13/2013     

                                                      [facility]                  

  

 

                    Temperature Oral (F)                            97.9 F                            10/13/2013

                                                        [facility]             

       

 

                    Diastolic (mm Hg)                            81                             10/13/2013

                                                        [facility]             

       

 

                    Systolic (mm Hg)                            110                             10/13/2013

                                                        [facility]             

       

 

                    Respitory Rate                            18                             10/13/2013 

                                                       [facility]              

      

 

                    Weight                            108.004                             10/11/2013    

                                                      [facility]                 

   

 

                    Height                            172.72 cm                            10/11/2013   

                                                      [facility]                

    

 

                    Weight                            129.545                             10/11/2013    

                                                      [facility]                 

   

 

                    Height                            172.72 cm                            10/11/2013   

                                                      [facility]                

    

 

                    Respitory Rate                            17                             10/02/2013 

                                                       [facility]              

      

 

                    Systolic (mm Hg)                            132                             10/02/2013

                                                        [facility]             

       

 

                    Diastolic (mm Hg)                            85                             10/02/2013

                                                        [facility]             

       

 

                    Respitory Rate                            18                             10/02/2013 

                                                       [facility]              

      

 

                    Systolic (mm Hg)                            111                             10/02/2013

                                                        [facility]             

       

 

                    Diastolic (mm Hg)                            81                             10/02/2013

                                                        [facility]             

       

 

                    Systolic (mm Hg)                            112                             10/02/2013

                                                        [facility]             

       

 

                    Diastolic (mm Hg)                            73                             10/02/2013

                                                        [facility]             

       

 

                    Respitory Rate                            16                             10/02/2013 

                                                       [facility]              

      

 

                    Heart Rate                            70                             10/02/2013     

                                                      [facility]                  

  

 

                    Heart Rate                            65                             10/02/2013     

                                                      [facility]                  

  

 

                    Temperature Oral (F)                            96.3 F                            10/02/2013

                                                        [facility]             

       

 

                    Temperature Oral (F)                            96.2 F                            10/02/2013

                                                        [facility]             

       

 

                    Heart Rate                            69                             10/02/2013     

                                                      [facility]                  

  

 

                    Temperature Oral (F)                            97.3 F                            10/01/2013

                                                        [facility]             

       

 

                    Weight                            111.364                             2013    

                                                      [facility]                 

   

 

                    Height                            170.18 cm                            2013   

                                                      [facility]                

    

 

                    Weight                            129.545                             2013    

                                                      [facility]                 

   

 

                    Height                            170.18 cm                            2013   

                                                      [facility]                

    

 

                    Heart Rate                            65                             2013     

                                                      [facility]                  

  

 

                    Temperature Oral (F)                            97.0 F                            2013

                                                        [facility]             

       

 

                    Systolic (mm Hg)                            117                             2013

                                                        [facility]             

       

 

                    Diastolic (mm Hg)                            71                             2013

                                                        [facility]             

       

 

                    Respitory Rate                            18                             2013 

                                                       [facility]              

      

 

                    Heart Rate                            70                             2013     

                                                      [facility]                  

  

 

                    Diastolic (mm Hg)                            57                             2013

                                                        [facility]             

       

 

                    Respitory Rate                            18                             2013 

                                                       [facility]              

      

 

                    Systolic (mm Hg)                            92                             2013

                                                        [facility]             

       

 

                    Temperature Oral (F)                            96.9 F                            2013

                                                        [facility]             

       

 

                    Heart Rate                            62                             2013     

                                                      [facility]                  

  

 

                    Temperature Oral (F)                            95.8 F                            2013

                                                        [facility]             

       

 

                    Respitory Rate                            18                             2013 

                                                       [facility]              

      

 

                    Systolic (mm Hg)                            97                             2013

                                                        [facility]             

       

 

                    Diastolic (mm Hg)                            64                             2013

                                                        [facility]             

       

 

                    Weight                            103.21                             2013     

                                                      [facility]                  

  

 

                    Weight                            106.676                             2013    

                                                      [facility]                 

   

 

                    Height                            172.72 cm                            2013   

                                                      [facility]                

    

 

                    Weight                            102.727                             2013    

                                                      [facility]                 

   

 

                    Height                            167.64 cm                            2013   

                                                      [facility]                

    

 

                    Diastolic (mm Hg)                            63                             2012

                                                        [facility]             

       

 

                    Systolic (mm Hg)                            93                             2012

                                                        [facility]             

       

 

                    Respitory Rate                            20                             2012 

                                                       [facility]              

      

 

                    Heart Rate                            83                             2012     

                                                      [facility]                  

  

 

                    Temperature Oral (F)                            98 F                            2012

                                                        [facility]             

       

 

                    Heart Rate                            86                             2012     

                                                      [facility]                  

  

 

                    Diastolic (mm Hg)                            71                             2012

                                                        [facility]             

       

 

                    Respitory Rate                            18                             2012 

                                                       [facility]              

      

 

                    Systolic (mm Hg)                            110                             2012

                                                        [facility]             

       

 

                    Temperature Oral (F)                            99.2 F                            2012

                                                        [facility]             

       

 

                    Temperature Oral (F)                            100.5 F                            2012

                                                        [facility]             

       

 

                    Diastolic (mm Hg)                            73                             2012

                                                        [facility]             

       

 

                    Systolic (mm Hg)                            106                             2012

                                                        [facility]             

       

 

                    Heart Rate                            82                             2012     

                                                      [facility]                  

  

 

                    Respitory Rate                            18                             2012 

                                                       [facility]              

      

 

                    Weight                            105.909                             2012    

                                                      [facility]                 

   

 

                    Height                            167.64 cm                            2012   

                                                      [facility]                

    

 

                    Weight                            131.364                             2012    

                                                      [facility]                 

   

 

                    Height                            170.18 cm                            2012   

                                                      [facility]                

    



                                                                                
                                                                                
                                                                                
                                                                                
                                                                                
                                                                                
                                                                                
                                                                                
                                                                                
                                                                                
                                                                                
                                                                                
                                                                                
                                                                                
                                                                                
                                                                                
                                                                                
                                                                                
                                                                                
                                                                                
                                                                                
                                                                                
                                                                                
                                                                                
                                                                                
                                                                                
                                                                                
                                                                                
                                                                                
                                                                                
                                                                                
                                                                                
                                                                                
                                                                                
                                                                                
                                                                                
                                                                                
                                                                                
                                                                                
                                                                                
                                                                                
                                                                                
                                                                                
                                                                                
                                                                                
                                                                                
                                                                                
                                                                                
                                                                                
                                                                                
                                                                                
                                                                                
                                                                                
                                                                                
                                                                                
                                                                                
                                                                                
                                                                                
                                                                                
                                                                                
                                                                                
                                                                                
                                                                                
                                                                                
                                                                                
                                                                                
                                                                                
                                                                                
                                                                                
                                                                                
                                                                                
                                                                                
                                                                                
                                                                                
                                                                                
                                                                                
                                                                                
                                                                                
                                                                                
                                                                                
                                                                                
                                                                                
                                                                                
                                                                                
                                                                                
                                                                                
                                                                                
                                                                                
                                                                                
                                                                                
                                                                                
                                                                                
                                                                                
                                                                                
                                                                                
                                                                                
                                                                                
                                                                        



Encounters

                    





                    Location                            Location Details                            Encounter

 Type                            Encounter Number                            Reason For

 Visit                            Attending Provider                            ADM Date

                            DC Date                            Status                

                                        Source                    

 

                    [facility]                                                        Emergency            

                    [id number]                                                        ABE DEUTSCH                             2012

                            Discharged                            [facility]                                                        Inpatient            

                          [id number]                            FEELING ANXIOUS AND CAN'T SLEEP

                            DIEGO SOLORZANO                             2012                            Active                

                                        [facility]                                                        Inpatient            

                          [id number]                            CHEST PAIN, CHF EXACERBATION 

                           RUSSEL WOODY                             2013                            Active                      

                                        [facility]                                                        Inpatient            

                    [id number]                                                        RUSSEL WOODY                             2013   

                          Discharged                            [facility]                                                        Inpatient            

                    [id number]                                                        ÁNGEL

 JAUN                             2013                            10/02/2013  

                          Discharged                            [facility]                                                        OU          

                    [id number]                                                        VALDO STINSON                             10/11/2013                            10/13/2013

                            Discharged                            [facility]                                                        Inpatient            

                          [id number]                            CHEST PAIN                   

                    ÁNGEL MADNI                             10/21/2013                            10/22/2013

                            Active                            [facility]                                                        Inpatient   

                          [id number]                                                

                          DONA GOPATHI                             10/29/2013                        

                    10/31/2013                            Discharged                            [facility]                                                        OU              

                    [id number]                                                        LAURA ANDERSHADRI                             2013

                            Discharged                            [facility]                                                        Emergency       

                          [id number]                                                    

                    ELKE BURLESON                             2013

                            Discharged                            [facility]                                                        OU              

                          [id number]                            SUICIDAL IDEATION              

                          CLAUS ACOSTA                             2013                            Active                            [facility]                                                        Emergency       

                          [id number]                                                    

                    ROSIBEL VILLALBA                             12/15/2013                            12/15/2013

                            Discharged                            [facility]                                                        Inpatient       

                          [id number]                            CHEST PAIN              

                          YAMILA ALEJANDRO                             2014                      

                    01/15/2014                            Active                            [facility]                                                        Emergency       

                          [id number]                                                    

                    ATZEE LINDSEY                             2014

                            Discharged                            [facility]                                                        Emergency   

                          [id number]                                                

                    JOJO ALTMAN                             2014

                            Discharged                            [facility]                                                        Emergency   

                          [id number]                            CHEST PAIN          

                          ALYSE NEAL                             2014                            Active                            [facility]                                               

                          OBS Observation Patient                            [id number]             

                                                Chucky Edwards                             2016                                               

                                        [facility]                                               

                    Recurring                            [id number]                             

                           Kya Feliz                             2016          

                    10/13/2016                                                        [facility]                                               

                    Recurring                            [id number]                             

                           Kya Feliz                             10/26/2016          

                    2016                                                        [facility]                                               

                    Emergency                            [id number]                             

                           Ze Conway                             2017                                                     

                                        [facility]                                               

                    Inpatient                            [id number]                             

                           James Bethea                             2017                                                        [facility]                                               

                          Observation                            [id number]                         

                                                Jessica Neal                             2017                                                   

                                        [facility]                                               

                          Observation                            [id number]                         

                                                Nnamdi Walls                             2017                                                 

                                        [facility]                                               

                          Observation                            [id number]                         

                                                Bernardo Oakleyhid                             2017                                                

                                        [facility]                                               

                    Emergency                            [id number]                             

                           Jose Banks                             2017                                                        [facility]                                               

                          Observation                            [id number]                         

                                                Le Castro                             10/28/2017

                            10/28/2017                                               

                                        [facility]                                                    

                    Observation                            [id number]                                

                          Ángel Moosavi                             2017                                                        [facility]                                               

                          Observation                            [id number]                         

                                                [facility] [place]                             2017                                               

                                        [facility]                                               

                          Observation                            [id number]                         

                                                Marvin Willingham                             2018                                                   

                                        [facility]                                               

                          Observation                            [id number]                         

                                                Laura Ray                             2018                                                

                                        [facility]                                               

                    Emergency                            [id number]                             

                           Jose Banks                             2018                                                        [facility]                    



                                                                                
                                                                                
                                                                                
                                                                                
                                                                                
                                



Procedures

                    





                    Procedure                            Code                            Date           

                          Perfomer                            Comments                        

                                        Source                    

 

                                        Emergency department visit for the evaluation and management of a patient, which

 requires these 3 key components within the constraints imposed by the urgency 
of the patient's clinical condition and/or mental status: A comprehensive 
history; A comprehensi                            48858                            10/11/2013

                                                                                    [facility]                    

 

                          Repair of ligament of knee joint <sup>1</sup>                            840022846

                                                                                    1left

 knee ligament surgery                            [facility]                 

   

 

                                        ICD - Internal cardiac defibrillator procedure<sup>1</sup>                      

                    126667996                                                                        

                          placed 2.5years ago                            [facility]

                    

 

                          torn ligament<sup>2</sup>                            [id number]                    

                                                                            left knee ligament surgery

                                        [facility]                    



                                                                                
                                



Assessment and Plan

                    





                    Assessment and Plan                            Date                            Source

                    

 

                                        Extracted from:Title: Discharge summary

Author: Jessica Neal My Sujatha DO

Date: 17

  Impression and Plan

                            2017                            [facility]       

             

 

                                        Extracted from:Title: Discharge Summary *

Author: Natalie Mattson NP NP

Date: 10/28/17



Patient:   JOANNA VASQUEZ            MRN: [id number]            FIN: 
[id number]

Age:   52 years     Sex:  Male     :  1965

Associated Diagnoses:   None

Author:   Natalie Mattson NP NP

Discharge Information

date of admission - 10/27/2017

date of discharge 10/28/2017

Consults:Dr Nolan cardiology

DX

                                        1 .  Acute chest pain to rule out acute coronary syndrome: Cardiac enzymes are negative,

 ACS ruled out. cardiology consulted, Dr. Nolan patient cleared by cardiology for
 discharge.

                                        2.  History of chronic atrial fibrillation on Xarelto:  At present, his heart rate

 is stable.  We will continue his home medications including heart rate 
controlling medications and also anticoagulation with Xarelto.

                                        3.  History of advanced congestive heart failure, systolic and diastolic, status

 post pacemaker and defibrillator placement

                                        4.  History of chronic kidney disease stage 3: stable.

                                        5.  History of diabetes mellitus type 2:

                                        6.  History of schizophrenia: stable.

                                        7. hypokalemia - potassium replaced

Physical Examination

General:  Alert and oriented.

Eye:  Pupils are equal, round and reactive to light.

HENT:  Normocephalic.

Neck:  Supple, Non-tender.

Respiratory:  Lungs are clear to auscultation, Respirations are non-labored.

Cardiovascular:  Normal rate, Regular rhythm.

Gastrointestinal:  Soft, Non-tender, Non-distended, Obese.

Integumentary:  Warm, Dry.

Neurologic:  Alert, Oriented.

Psychiatric:  Cooperative, Appropriate mood and affect.

Hospital Course

This is a 52-year-old gentleman with a past medical history of severe dilated 
cardiomyopathy, nonischemic, AICD placement, history of substance abuse, 
hypertension, chronic atrial fibrillation who came into the ER for evaluation of
 left-sided chest pain.  In the ER patient received lorazepam as well as 
Nitropaste chest pain improved patient seen by cardiologist Dr. nolan.  According
 to cardiology chest pain may be likely due to severe dilated cardiomyopathy.  
Digoxin decreased to 0.125 mg once a day patient is to continue Xarelto for 
anticoagulation.  Patient cleared by cardiology and myocardial infarction ruled 
out.  Therefore will discharge patient home today and he is to follow-up with 
his cardiologist as well as PCP.

Discharge Plan

Discharge patient home

Follow up with PCP as well as cardiology in 1 week

Activities as tolerated

AHA diet

Patient condition stable

Addendum by Bhargavi Aquino MD on 10/28/2017 22:08



pt. seen and examined with NP

agree with above note

                                        1. chest pain- ekg showed no acute ischemic changes. ce neg. evaluated by cardiology

 and ok for outpatient follow up

                            10/28/2017                            STACI Carr       

             

 

                                        Extracted from:Title: Discharge Summary *

Author: Bernardo Pitts MD

Date: 17

 Discharge Information

Date of Admission:

      

Date of Discharge:

       

Active Diagnosis During this Hospitalization:



Chest pain

Acute rhabdomyolysis

History of chronic atrial fibrillation

History of advanced congestive heart failure combined systolic and diastolic 
status post pacemaker and defibrillator placement stable

History of for chronic kidney disease stage III

History of diabetes mellitus type 2 uncontrolled

History of schizophrenia 

Chronic Diagnosis : Please see the Past Medical History



 Operations and procedures :



Discharge Condition:

Stable

Complications:

None

Discharge Meds: Please see the list of Discharge Medications:

Discharge Follow Up:

F/U Primary Care Doctor in 1 week.

F/U

Discharge Instructions:

Please take the medications as prescribed and F/U with your doctor as 
instructed.

Please call your doctor or Return to the ER if symptoms worsen or return.

Diet : Heart healthy

Activity: No restrictions

Restrictions: No restrictions.



Total Time Spent in discharge: 35 Minutes

 Discharge Plan

Discharge Summary Plan

Discharge Status: stable.

Discharge instructions given: to patient.

Discharge disposition: discharge to home.

Prescriptions: reviewed.

Course

Improving.

Education and Follow-up

Counseled: patient, regarding diagnosis, regarding treatment, regarding 
medications.



Extracted from:Title: Clinical Document

Author: Bernardo Pitts MD

Date: 17

Disussed with Dr Nolan

The cardiologist and lillian madrigal for the patient to be discharged tonight.

Will d/c the patient



Extracted from:Title: Clinical Document

Author: Sohail Nolan MD

Date: 17



PLAN and TREATMENT

chest pain: likely due to s/p substance, cocaine abuse, (monday)

   he has mild chest tender

   had a very good dinner, feels fine now,

   Okay to d/c home,

   case d/w Dr. Pitts

Atrial Fibrillation: Rate currently controlled, continue amiodarone and ASA.

Severe Nonischemic LV Systolic DCMP: [facility]  revealed no CAD, current 
exacerbation due to tachycardia and illicit drug use.

Acute on CKD: Likely due to illicit drug use.

DM Type 2: Continue Rx.

HTN: BP Stable.

Tele: atrial with HR controlled well

Less soB

okay to d/c home

advise to be off cocaine again

___________________________________________________,

 
_____________________________________________________________________________________________

Subjective:

Exp. Problem Focused History: (1-3 HPI elements, 1 ROS)

#

ROS: no fever, no chills

________________________________________________________________________________

Objective:

Expanded Problem Focused PE



Vitals and Temp:

Vitals    Tmp(F)    Pulse    BP    RR    SpO2    FIO2

                                         17:13    98.8    80    125/92    17    98    ---

                                         15:42    ----    100    153/98    18    99    ---

                                         12:56    97.9    70    133/93    18    98    ---

                                        24 Hr Tmax: 98.8F (37.11c) at  17:13        Vital Signs are the last 5 in the

 past 48 hours.

General:  No acute distress.

Respiratory:  Lungs are clear to auscultation, Respirations are non-labored.

Cardiovascular:  Normal rate, Regular rhythm, No murmur, No gallop, Normal 
peripheral perfusion, No edema.

Gastrointestinal:  Soft, Non-tender, Non-distended, Normal bowel sounds, No 
organomegaly.

Integumentary:  Clean, Dry, Intact, Warm, Moist, No rash.

    24hr Labs

                                         1826

Glucose POC    75  

                                         1533

Lipase Lvl    275  

Sodium Lvl    138  

Potassium Lvl    3.9  

Chloride Lvl    102  

CO2    31  

AGAP    8.9      L

Glucose Lvl    143      H

Creatinine Lvl    1.64      H

BUN    16  

B/C Ratio    10  

Total Protein    8.3  

Albumin Lvl    4.0  

Globulin    4.3      H

A/G Ratio    0.9  

Calcium Lvl    8.4      L

ALT    38  

AST    81      H

Alk Phos    118  

Bili Total    0.5  

eGFR    47  

Troponin-I    0.03  

CK MB    2.8  

BNP    252      H

Total CK    2494  

CK MB Index    See Note  

WBC    7.8  

RBC    5.00  

Hgb    15.6  

Hct    46.1  

MCV    92.2  

MCH    31.2      H

MCHC    33.8  

RDW    15.3      H

Platelet    206  

MPV    8.0  

Segs    73.9  

Monocytes    9.8  

Lymphocytes    14.3      L

Eosinophils    1.9  

Basophils    0.1  

Segs-Bands #    5.8  

Lymphocytes #    1.1  

Monocytes #    0.8  

Eosinophils #    0.1  

Basophils #    0.0  



Extracted from:Title: General Admission H&P *

Author: Bernardo Pitts MD

Date: 17



Patient:   JOANNA VASQUEZ            MRN: [id number]            FIN: 
[id number]

Age:   52 years     Sex:  Male     :  1965

Associated Diagnoses:   None

Author:   Bernardo Pitts MD

Chief Complaint

Acute chest pain shortness of breath palpitations

History of Present Illness

PCP/Cardiologist: Dr. Tony Moran

                                        53 y/o M with PMHx of DM, Afib on Xarelto, HTN, combined systolic and diastolic 

CHF CKD stage III and schizophrenia presents to the ED for evaluation of 
generalized chest pain that started 1 hour PTA. Pain is described as constant, 
moderate, "tightness", does not radiate, and has no exacerbating or alleviating 
factors. Additionally reports mild SOB and palpitations. Denies N/V, swelling, 
or any other symptoms. Denies LOC.  Denies association of the chest pain with 
exertion food intake and deep breathing .  Chest pain almost resolved in the ER

patient has a SHx of pacemaker and defibrillator placement. Patient is currently
 on xarelto. Of note, patient is a smoker.  He is also says he used cocaine 2 
days ago he has been having a history of polysubstance abuse in the past found 
to have acute rhabdomyolysis

We will admit the patient for chest pain rule out acute coronary syndrome at 
this time EKG and cardiac enzymes negative and the ER will do observation



Review of Systems

Constitutional:  Negative except as documented in history of present illness.

Eye

Ear/Nose/Mouth/Throat

Respiratory:  Negative except as documented in history of present illness.

Cardiovascular:  Negative except as documented in history of present illness.

Breast

Gastrointestinal:  Negative except as documented in history of present illness.

Immunologic:  Negative except as documented in history of present illness.

Musculoskeletal:  Negative except as documented in history of present illness.

Integumentary:  Negative except as documented in history of present illness.

Neurologic:  Negative except as documented in history of present illness.

Psychiatric:  Negative except as documented in history of present illness.

All other systems are negative

All other review of systems were obtained and pertinent positives and negatives 
were discussed in the history of presenting illness

Health Status

Allergies:

Allergic Reactions (Selected)

Severity Not Documented

NKDA- No reactions were documented.,

Allergies (1) Active        Reaction

NKDA        None Documented

Current medications:  (Selected)

Inpatient Medications

Ordered

Dextrose 50% Syringe: 25 mL, IVP, PRN, PRN: Blood Glucose Results

Dextrose 50% Syringe: 50 mL, IVP, PRN, PRN: Blood Glucose Results

DuoNeb inhalation solution: 3 ml, INHALATION, Q6H, PRN: Respiratory Protocol

Milk of Magnesia: 30 ml, PO, Q6H, PRN: Heartburn

MiraLax: 17 gm, PO, Daily, PRN: Constipation

Saline Flush 0.9%: 10 mL, IVP, PRN, PRN: Line Flush

Saline Flush 0.9%: 10 ml, IVP, PRN, PRN: Line Flush

Saline Flush 0.9%: 10 ml, IVP, Q12H

Sodium Chloride 0.9% IV 1000 mL: 150 ml/hr, IV, Stop: 17 0:42:00 CDT

Xarelto: 20 mg, PO, QPM

acetaminophen-hydrocodone 325 mg-5 mg oral tablet: 1 tab, PO, Q4H, PRN: Pain 
Score 4-6

acetaminophen: 650 mg, PO, Q4H, PRN: Pain 1-3/Temp > 100.4 F

aspirin 325 mg tablet, enteric coated: 325 mg, PO, Daily

atorvastatin: 20 mg, PO, Bedtime

codeine-[id number] mg-300 mg/5 mL oral liquid: 5 ml, PO, Q4H, PRN: 
Cough/Congestion

famotidine: 20 mg, IVP, Q12H

glucagon: 1 mg, IM, PRN, PRN: Blood Glucose Results

hydrALAZINE: 10 mg, IV, Q4H, PRN: HTN

insulin lispro: 1 unit, SUB-Q, Bedtime, PRN: Blood Glucose Results

insulin lispro: 10 unit, SUB-Q, TID-Before Meals, PRN: Blood Glucose Results

insulin lispro: 2 unit, SUB-Q, Bedtime, PRN: Blood Glucose Results

insulin lispro: 2 unit, SUB-Q, TID-Before Meals, PRN: Blood Glucose Results

insulin lispro: 3 unit, SUB-Q, Bedtime, PRN: Blood Glucose Results

insulin lispro: 4 unit, SUB-Q, Bedtime, PRN: Blood Glucose Results

insulin lispro: 4 unit, SUB-Q, TID-Before Meals, PRN: Blood Glucose Results

insulin lispro: 6 unit, SUB-Q, TID-Before Meals, PRN: Blood Glucose Results

insulin lispro: 8 unit, SUB-Q, TID-Before Meals, PRN: Blood Glucose Results

lisinopril: 5 mg, PO, Daily

morphine Sulfate: 1 mg, IVP, Q2H, PRN: Pain Score 4-6

nitroglycerin SL Tab: 0.4 mg, SL, Q5Min, PRN: Chest Pain

ondansetron: 4 mg, IVP, Q4H, PRN: Nausea and Vomiting

Prescriptions

Prescribed

carvedilol 3.125 mg oral tablet: 3.125 mg, 1 tab, PO, Q12H, 60 tab, 0 Refill(s)

lisinopril 5 mg oral tablet: 5 mg, 1 tab, PO, Daily, 30 tab, 0 Refill(s)

rivaroxaban 20 mg oral tablet: 20 mg, 1 tab, PO, Daily, 30 tab, 0 Refill(s)

torsemide 20 mg oral tablet: 20 mg, 1 tab, PO, Daily, 30 tab, 0 Refill(s)

Documented Medications

Documented

ARIPiprazole: 5 mg, PO, Daily, 0 Refill(s)

FLUoxetine: 40 mg, PO, Daily, 0 Refill(s)

Vitamin D3 50,000 intl units oral capsule: 50,000 IntlUnit, 1 cap, PO, qWeek, 
for 12 week, 12 cap, 0 Refill(s)

glipiZIDE: 10 mg, PO, Daily, 0 Refill(s)

metFORMIN: 500 mg, PO, Daily, 0 Refill(s),

Medications (31) Active

Scheduled: (6)

aspirin  325 mg, PO, Daily

atorvastatin  20 mg, PO, Bedtime

famotidine  20 mg, IVP, Q12H

lisinopril  5 mg, PO, Daily

rivaroxaban  20 mg, PO, QPM

sodium chloride  10 ml, IVP, Q12H

Continuous: (1)

Sodium Chloride 0.9% IV 1000 mL  1,000 mL, IV, 150 ml/hr

PRN: (24)

acetaminophen  650 mg, PO, Q4H

acetaminophen-hydrocodone  1 tab, PO, Q4H

albuterol-ipratropium  3 ml, INHALATION, Q6H

codeine-guaiFENesin  5 ml, PO, Q4H

Dextrose 50% in Water IV  25 mL, IVP, PRN

Dextrose 50% in Water IV  50 mL, IVP, PRN

glucagon  1 mg, IM, PRN

hydrALAZINE  10 mg, IV, Q4H

insulin lispro  2 unit, SUB-Q, TID-Before Meals

insulin lispro  4 unit, SUB-Q, TID-Before Meals

insulin lispro  6 unit, SUB-Q, TID-Before Meals

insulin lispro  8 unit, SUB-Q, TID-Before Meals

insulin lispro  10 unit, SUB-Q, TID-Before Meals

insulin lispro  1 unit, SUB-Q, Bedtime

insulin lispro  2 unit, SUB-Q, Bedtime

insulin lispro  3 unit, SUB-Q, Bedtime

insulin lispro  4 unit, SUB-Q, Bedtime

magnesium hydroxide  30 ml, PO, Q6H

morphine Sulfate  1 mg, IVP, Q2H

nitroglycerin  0.4 mg, SL, Q5Min

ondansetron  4 mg, IVP, Q4H

polyethylene glycol 3350  17 gm, PO, Daily

sodium chloride  10 ml, IVP, PRN

sodium chloride 0.9% 10 ml flush syr BD  10 mL, IVP, PRN

Problem list:

All Problems

[D]Anterior chest wall pain / 663189437 / Confirmed

BP+ - Hypertension / 018449839 / Confirmed

CHF - Congestive heart failure / 926656731 / Confirmed

CHF - Congestive heart failure / 535487534 / Confirmed

Depression / 326915721 / Confirmed

dm / 354047600 / Confirmed

Down syndrome / 49473797 / Confirmed

GERD - Gastro-esophageal reflux disease / 6621085775 / Confirmed

H/O: schizophrenia / 315104352 / Confirmed

HTN / 401.9 / Confirmed

ICD (implantable cardiac defibrillator) battery depletion / V53.32 / Confirmed

NIDDM / 153625137 / Confirmed

Obese build / 989831948 / Confirmed

Schizophrenia / 47826510 / Confirmed

Short of breath on exertion / 8491828592 / Confirmed

Sleep apnea / 458050189 / Confirmed,

Active Problems (16)

BP+ - Hypertension 

CHF - Congestive heart failure 

CHF - Congestive heart failure 

Depression 

dm 

Down syndrome 

GERD - Gastro-esophageal reflux disease 

H/O: schizophrenia 

HTN 

ICD (implantable cardiac defibrillator) battery depletion 

NIDDM 

Obese build 

Schizophrenia 

Short of breath on exertion 

Sleep apnea 

[D]Anterior chest wall pain 



Histories

Past Medical History:

Active

BP+ - Hypertension (180936070)

CHF - Congestive heart failure (772131897)

NIDDM (841371413)

Obese build (419636602)

Sleep apnea (965269472)

GERD - Gastro-esophageal reflux disease (1148314952)

ICD (implantable cardiac defibrillator) battery depletion (V53.32)

Depression (061971669)

HTN (401.9)

dm (055660505)

Schizophrenia (79914749)

Resolved

Cardiomyopathy (246056926):  Resolved.

Anxiety (31503251):  Resolved.

Family History:

Type 2 diabetes mellitus

Father

Procedure history:

torn ligament (SNOMED CT 326110505).

Comments:

                                        2013 04:51 - Maya Barnard RN

left knee ligament surgery

ICD - Internal cardiac defibrillator procedure (SNOMED CT 290113146).

Comments:

                                        10/21/2013 19:28 - Catie Mejia 2.5years ago

Social History

Social and Psychosocial Habits



Alcohol

                                        2017  Use: Never

Substance Abuse

                                        2017  Use: Current

  Type: Cocaine

  Route Inhaled

  Amount uses 5x a year

Tobacco

                                        2017  Use: Former smoker

  Type: Cigarettes

  Tobacco use per day: 0

  Number of years: 2

  Total pack years: 2

  Started at age: 22.0 Years

  Stopped at age: 24 Years

  Previous treatment: None

  Ready to change: No

  Concerns about tobacco use in household: No

  Exposure to Tobacco Smoke None

  Cigarette Smoking Last 365 Days No

  Reg Smoking Cessation Counseling No

                                        .

Physical Examination

VS/Measurements

Measurements from flowsheet : Measurements

                                        2017 12:59

Heparin Dosing Weight (kg)

                                        82.39

                                        2017 12:56

Height

                                        170.18 cm

Height Collection Method

Stated

Weight

                                        106.818 kg

Dosing Weight Difference Percent

                                        5.381 %

Dosing Weight Collection Method

Estimated

Body Surface Area

                                        2.2471 m2

Body Mass Index

                                        36.88 m2

,

Vital Signs (last 24 hrs)_____        Last Charted___________

Temp Oral        98.8 DegF  (AUG 16 17:)

Heart Rate Peripheral        80 bpm  (AUG 16 17:)

Resp Rate            17 BRMIN  (AUG 16 17:)

SBP        125 mmHg  (AUG 16 17:)

DBP        H 92mmHg  (AUG 16 17:)

SpO2        98 %  (AUG 16 17:)

Weight        106.81 kg  (AUG 16 12:56)

Height        170.18 cm  (AUG 16 12:56)

BMI        36.88  (AUG 16 12:56)

General:  Alert and oriented, No acute distress.

Neck:  Supple.

Respiratory:  Lungs are clear to auscultation, Respirations are non-labored, 
Symmetrical chest wall expansion, No chest wall tenderness.

Cardiovascular:  Normal rate, Normal peripheral perfusion, No edema.

Gastrointestinal:  Soft, Non-tender, Non-distended.

Musculoskeletal

Normal range of motion.

Integumentary:  Warm, Pink.

Neurologic:  Alert, Oriented.

Psychiatric:  Cooperative.

Review / Management

Results review:

Labs (Last four charted values)

WBC                     7.8    (AUG 16)

Hgb                     15.6    (AUG 16)

Hct                     46.1    (AUG 16)

Plt                     206    (AUG 16)

Na                      138    (AUG 16)

K                       3.9    (AUG 16)

CO2                     31    (AUG 16)

Cl                      102    (AUG 16)

Cr                      H 1.64    (AUG 16)

BUN                     16    (AUG 16)

Glucose Random          H 143    (AUG 16)

Ca                      L 8.4    (AUG 16)

Troponin                0.03    (AUG 16)

CK MB                   2.8    (AUG 16)

Total CK                2494    (AUG 16)    .

Impression and Plan

Chest pain rule out acute coronary syndrome at this time EKG cardiac enzymes are
 negative we will do serial cardiac enzymes will continue daily aspirin and 
statin as needed morphine oxygen Nitrostat sublingual since the patient has a 
history of advanced congestive heart failure and multiple risk factors we will 
consult Dr. Nolan the cardiologist for further evaluation and management

Acute rhabdomyolysis we will continue IV fluids recheck CK level in a.m. 1 L 
bolus has been given in  the ER

History of chronic atrial fibrillation on Xarelto at this time heart rate is a 
stable we will continue home medications once available and continue  Xarelto

History of advanced congestive heart failure combined systolic and diastolic 
status post pacemaker and defibrillator placement we will continue 
anticoagulation with Xarelto will resume home medications at this time 
congestive heart failure is a stable without any decompensation

History of for chronic kidney disease stage III at this time stable we will 
monitor continue with IV fluid hydration recheck BMP in a.m.

History of diabetes mellitus type 2 uncontrolled with hemoglobin A1c in  was
 more than 10 we will continue sliding scale insulin and resume insulin home 
dose once available

History of schizophrenia at this time stable we will resume home meds once 
available

Anticoagulation with Xarelto no need of further anticoagulation prophylaxis

Addendum by Bernardo Pitts MD on 2017 18:15

History of cocaine abuse and he used it 2 days ago will avoid beta blockers will
 get stat urine drug screen

                            2017                            [facility]       

             

 

                                        Extracted from:Title: Discharge Summary *

Author: Deshaun Barr MD

Date: 17

 Discharge Plan

Discharge Summary

Patient Name: Joanna Vasquez

: 1965

MRN: [id number]

Date of Admission: 17

Date of Discharge: 17

Attending Physician: Dr. Hendricks

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 
placement, A-fib, systolic CHF, cocaine abuse in the past, schizoaffective d/o, 
anxiety, who presented to ED for chest discomfort. Pt reports that he started 
experiencing cosntant tightness in the left upper chest area around 6:30pm just 
after eating dinner and while he was resting and listening to the radio. Pt 
thinks that his defibrillator went off and he felt prickly sensations. No 
radiation of pain. He also endorsed palpitations and diaphoresis, no nausea or 
vomiting. Pt tried some relaxation techniques which temporarily improved his 
chest discomfort. Pt has presented with similar events in the past, most recent 
visit was last week. Dr. Nolan was consulted last time and diagnosed pain to be 
due to costochondritis. Pt reports feeling more anxious recently because he has 
not been getting his clonazepam for the past week. 

Discharge Diagnoses: Atypical Chest Pain; Suicidal Ideation; Cocaine Abuse

Hospital Course: Patient was admitted to observation and ACS protocol was 
initiated and Dr. Nolan, cardiology was consulted for possible AICD 
interrogation.  Dr. Nolan has seen patient before for similar episodes and r/o 
cardiacc etiology and recent AICD interrogation also on file, cardiology signed 
off.  Cardiac enzymes and EKGs were trended and were negative.  At home psych 
medications were restarted. Patient's drug screen was positive for cocaine and 
opioids.  Patient admitted to recent crack cocaine use.  He endorsed desire to 
be transferred to inpatient psych, psych response was consulted and assessed 
patient, they recommended outpatient facility and provided resources to the 
patient.  On , patient reported no suicidal ideation, homocidal ideation, 
visual hallucinations, auditory hallucinations.  Patient was discharged back to 
group home in stable condition.  

Consults: Psych Response

Pertinent Studies:

* Final Report *

Reason For Exam

Chief Complaint:chest pain that started like an hour ago with palpitations. [facility]

Radiology Report

EXAM: XR CHEST 1 VIEW

DATE: 2017 8:38 PM CDT

INDICATION: Chest pain.

COMPARISON: 2017.

TECHNIQUE: A single AP view of the chest was obtained.

FINDINGS:

A left subclavian defibrillator device is unchanged in position. There is a 
scarring noted within the left lung base. No focal consolidation is visualized. 
The cardiac silhouette is enlarged. The costophrenic recesses are sharp and 
without effusion. No acute osseous abnormality is noted.

IMPRESSION:

No acute cardiopulmonary abnormality.

Condition: stable

Diet: regular balanced, healthy diet

Activity: as tolerated

Meds: Please see home medication reconciliation

Follow-up:  with outpatient Psych resources.

Patient Instructions: Please follow-up as above and take all medications as 
prescribed

Recommendations for Primary Care physician: If you need any additional 
information regarding your patients hospital stay please call the page 
 at [facility] [phone], and ask him/her to page me.

Patient discussed with attending physician, Dr. Ruthie Barr MD

PGY-1 MSO# [id number]



Extracted from:Title: Brief Progress Note

Author: Adolfo Nevarez MD

Date: 17

Patient:   JOANNA VASQUEZ            MRN: [id number]            FIN: 
[id number]

Age:   52 years     Sex:  Male     :  1965

Associated Diagnoses:   None

Saw patient this morning; patient reports chest discomfort is improved.  Patient
 acknowledges he used cocaine and that this most likely the cause for his chest 
pain.  Spoke with cardiology, acute chest discomfort most likely secondary to 
AICD firing secondary to cocaine use.  No device interrogation needed at this 
time, as this was recently done and is most likely preventing a dangerous 
arrythmia.

During discharge planning, the patient requested to be transferred to a drug 
rehab facility.  At this point, he also began stating that if he goes home he 
will hurt himself by ingesting cocaine.  He states he has a cocaine addiction 
and needs rehab.

Patient's home psych meds restarted, abilify 5 mg qd and fluoxetine 40 mg qd. 
Psych response consulted. SW consulted for rehab facilities/resources.  Patient 
sitter ordered.

Keep in observation pending psych response evaluation.

Patient discussed with attending, Dr. Hendricks.

ROGERIO Nevarez M.D.

PGY1, MSO [id number]



Extracted from:Title: General Admission H&P *

Author: Matilda Evans MD

Date: 17

 Impression and Plan

                                        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

 A-fib, systolic CHF, cocaine abuse in the past, schizoaffective d/o, anxiety, 
who presented to ED for nonexertional chest discomfort

Nonexertional chest discomfort

                                        -ACS r/o

                                        -no significant changes on EKG. 1st set of cardiac enzymes negative.

                                        -pain worsened with palpation of area overlaying AICD

                                        -trend troponins

                                        -Prior cardiac catheterization did not show evidence of CAD 

                                        -Consult pts cardiologist: Dr. Nolan for AICD interrogation

                                        -ASA, Lisinopril, carvedilol, high intensity statin, Xarelto

NBA

                                        -Cr 1.56, was 1.2 on discharge last week

                                        -could be due to drug use

                                        -cont monitor

Atrial fibrillation

                                        -rate is currently controlled.

                                        -restart home meds Xarelto, carvedilol

Severe dilated cardiomyopathy, systolic CHF with EF 20%

                                        -Most recent echo in April showed EF 20%, severe global hypokinesis

                                        -continue home meds torsemide 20 qd

DM2

                                        -A1c 10.3%

                                        -glucose 188 on admission

                                        -start levemir 10 qhs

                                        -Low dose sliding scale insulin

Hypertension

                                        -continue home medications Lisinopril and carvedilol

Cocaine abuse

                                        -Pt denies drug use, but UDS positive for cocaine

                                        -social work consult

FEN: heart healthy diet

DVT: xarelto

Dispo: observation for ACS r/o

Pt seen and discussed with senior Dr. Zarina Evans MD PGY1

MSO# [id number]

Senior Addendum:

Patient examined. Agree with above. 53 yo M with nonischemic CM (EF 20%), AICD 
placement, chronic atrial fibrillation, cocaine abuse with last use on , DM,
 HTN and schizoaffective disorder presents with atypical chest pain. Patient 
with extensive cardiac risk factors. Admit to observation for ACS rule out. 
Patient currently in AFib, rate controlled with no ischemia on EKG and cardiac 
enzymes are negative. Start medical management with ASA, ACE, BB, statin, 
Xarelto. Consult patient's cardiologist Dr. Nolan. Counseled on avoiding cocaine 
use. We will give levemir and SSI for uncontrolled diabetes.

Patient discussed with attending, Dr. Arriaga, who agrees with the plan.

Kriss Srinivasan MD

PGY2

MSO [id number]







































                            2017                            [facility]       

             

 

                                        Extracted from:Title: Medicine H&P *

Author: PrietoNnamdi white 

Date: 17

 Impression and Plan

                                        51-year-old man with hypertension, type 2 diabetes, chronic systolic CHF, EF of 

20%, dilated cardiomyopathy, AICD status who presented to ER today due to chest 
pain and palpitations were started at 6 PM.

Chest pain rule out ACStrend troponins.  Consult patient's outpatient 
cardiologist: Dr. alston in a.m.  Recent echocardiogramApril 30 2 and 17EF 
20%, severe global hypokinesis.

Atrial fibrillationrate is currently controlled.  Continue home medications 
including anticoagulation: Xarelto

Severe dilated cardiomyopathy, systolic CHF with EF 20%continue home 
medications

Diabetes mellitus type 2check hemoglobin A1c, start insulin sliding scale and 
Accu-Cheks 4 times daily

Hypertensionstable, continue home medications once reconciled

History of cocaine abuse

Patient is full code

DVT prophylaxis: SCDs, ambulation

                            2017                            [facility]       

             

 

                                        Extracted from:Title: Discharge Summary *

Author: PrietoNnamdi white 

Date: 17

 Discharge Information

Admit date: 2017

Discharge date: May 2, 2017 at 10:35

Total time spent on discharge: 31 minutes

Discharge diagnoses:

Chest Pain

Defibrillator charge

Afib with RVR

Acute renal failure

AGMA - resolved

Hypokalemia

Hypophosphatemia

H/O chronic systolic CHF / ICMP EF 20% s/p AICD placement

Rhabdomyolysis -resolved

Transaminitis - likely 2/2 rhabdomyolysis, monitor LFT's

H/O cocaine abuse - pt has been counseled to refrain from drug abuse

H/O HTN - currently hypotensive, hold BP meds for now

Hypotension- resolved

H/O DMII - improving, hold metformin for now due to ARF, c/w glipizide, c/w 
accucheks and SSI coverage

H/O HLD

Consulting physicians: Cardiology and Nephrology

Procedures: none

History and Hospital Course:

                                        51-year-old man with high blood pressure, type 2 diabetes, chronic systolic CHF,

 EF of 20%, dilated cardiomyopathy who was brought by EMS to the emergency room 
for evaluation after he had called with complaint about his AICD discharging and
 shocking him.  Patient reports that he had been in an argument with his family 
member and that was when he started having the chest pain, and the next thing he
 remembered was his defibrillator going off multiple times.  The patient also 
reports palpitations with irregular heartbeat.  Patient reports chest pain, 
which is dull, widespread all over the chest.  He describes shortness of breath 
as well.  He denies any diaphoresis, denies any nausea or vomiting.  The patient
 reports that he had been smoking cocaine pretty much over the weekend and that 
was the source of disagreement with his family members.  In any case, on arrival
 here, his initial diagnostics did not reveal any changes in his EKG different 
from before.  He did have atrial fibrillation with rapid ventricular rate.  He 
did not have any acute ST-T wave changes.  His troponins were pretty much 
undetectable.  His chest x-ray did not show any pulmonary edema.  However, the 
patient was in acute renal failure with a creatinine of over 3; from about a 
month ago, his creatinine was within normal limits.  He also had a total CK of 
14,000 with a BNP of 881.  His cocaine was positive again in the urine drug 
screen.  He did have a leukocytosis of 16.2 with marginal bandemia but I think 
is likely die to intravasuclar contraction.  The patient was given Cardizem 
intravenously secondary to the atrial fibrillation.  His rate gradually came 
down and plan was for the patient to be admitted to inpatient status to allow 
for additional diagnostic evaluation and management to be completed.  An 
emergent call to his AICD company was done and his AICD was interrogated in the 
emergency room.  No defibrillator discharges were actually noted.  The patient 
just had atrial fibrillation and the device did try to override the patient's 
atrial fibrillation.

Patient seen and evaluated by cardiology Dr. Nolan, device interrogated. Medical 
management continued, heart rate controlled. Pt also seen by nephrology service,
 rhabdo resolved with Aggressive IVF, CK downtrended, kidney injury resolved. Pt
 cleared for discharged.

Condition: Stable

Disposition: Home

Medications: Refer to the discharge med rec: arirprazole, atorvastatin, 
benztropine, corge, clonazeapm, glipizide, lisinopril, metformin, metolazone, 
pantoprazole, rivaroxaban, torsemide, ziprasidone, zolpidem

Instructions: Activity: No restrictions.

Follow-up: PCP in 1 week. Cardiology in 1 week



Extracted from:Title: Clinical Document

Author: Sohail Nolan MD

Date: 17



PLAN and TREATMENT

Atrial Fibrillation: Rate currently controlled, continue amiodarone and ASA.

Severe Nonischemic LV Systolic DCMP: [facility]  revealed no CAD, current 
exacerbation due to tachycardia and illicit drug use.

Rhabdomyolysis: Due to cocaine use.

Acute on CKD: Likely due to illicit drug use.

Cocaine Use: Pt (+) cor cocaine on admission, continue supportive Rx. Evidence 
of significant hemodynamic impact with tachycardia on admission, ARF, and 
rhabdomyolysis.

DM Type 2: Continue Rx.

HTN: BP Stable.

Tele: atrial with HR controlled well

Less soB

okay to d/c home, patient can drink fluid at home,

 
_____________________________________________________________________________________________

Subjective:

Exp. Problem Focused History: (1-3 HPI elements, 1 ROS)

#

ROS: no fever, no chills

________________________________________________________________________________

Objective:

Expanded Problem Focused PE



Vitals and Temp:

Vitals    Tmp(F)    Pulse    BP    RR    SpO2    FIO2

                                         07:12    97.5    61    105/62    18    ---    ---

                                         04:38    97.5    75    102/71    18    ---    ---

                                         00:25    97.6    94    107/78    18    ---    ---

                                         20:52    ----    ---    -----    --    97    ---

                                         15:52    97.5    77    120/84    16    ---    ---

                                        24 Hr Tmax: 97.6F (36.44c) at  00:25        Vital Signs are the last 5 in the

 past 48 hours.

General:  No acute distress.

Respiratory:  Lungs are clear to auscultation, Respirations are non-labored.

Cardiovascular:  Normal rate, Regular rhythm, No murmur, No gallop, Normal 
peripheral perfusion, No edema.

Gastrointestinal:  Soft, Non-tender, Non-distended, Normal bowel sounds, No 
organomegaly.

Integumentary:  Clean, Dry, Intact, Warm, Moist, No rash.

    24hr Labs

                                         0722

Glucose POC    155      H

                                         0545

Sodium Lvl    140  

Potassium Lvl    4.5  

Chloride Lvl    110      H

CO2    23      L

AGAP    11.5  

Glucose Lvl    131      H

Creatinine Lvl    1.10  

BUN    21  

B/C Ratio    19  

Total Protein    6.4  

Albumin Lvl    3.3      L

Globulin    3.1  

A/G Ratio    1.1  

Calcium Lvl    8.2      L

ALT    109      H

AST    182      H

Alk Phos    109  

Bili Total    0.4  

eGFR    77  

Magnesium Lvl    2.2  

CK MB    12.1      H

Total CK    3802      H

WBC    7.3  

RBC    4.89  

Hgb    14.9  

Hct    44.5  

MCV    90.9  

MCH    30.4  

MCHC    33.5  

RDW    13.2  

Platelet    139  

MPV    9.0  

Segs    68.5  

Monocytes    7.9  

Lymphocytes    21.0  

Eosinophils    2.3  

Basophils    0.3  

Segs-Bands #    5.0  

Lymphocytes #    1.5  

Monocytes #    0.6  

Eosinophils #    0.2  

Basophils #    0.0  

PT    14.6  

INR    1.12  

                                         0510

Glucose POC    138      H

                                         1955

Glucose POC    327      H

                                         1613

Glucose POC    279      H

                                         1227

Glucose POC    335      H

                                         0741

Glucose POC    207      H

                            2017                            [facility]       

             

 

                                        Extracted from:Title: Discharge Summary *

Author: Jazzy Bonner NP

Date: 16



Patient:   JOANNA VASQUEZ            MRN: [id number]            FIN: 
[id number]

Age:   51 years     Sex:  Male     :  1965

Associated Diagnoses:   None

Author:   Jazzy Bonner NP

Attending Physician: Chucky Taylor

Date of admission: 2016

Date of discharge to home: 2016

Admitting diagnosis:

                                        1.  Atypical chest pain possible secondary to cocaine abuse

                                        2.  Systolic congestive heart failure-stable

                                        3.  Chronic renal failure-stable

                                        4.  Hypokalemia

                                        5.  Hypertension-stable

                                        6.  Type 2 diabetes mellitus-stable

                                        7.  History of schizophrenia-stable

                                        8.  History of GERD-stable

                                        9.  Obstructive sleep apnea-stable

Discharge diagnosis:

                                        1.  Atypical chest pain secondary to cocaine abuse-resolved

                                        2.  Systolic congestive heart failure stable

                                        3.  Chronic renal failure-stable

                                        4.  Hypokalemia-resolved

                                        5.  Hypertension-stable

                                        6.  Type 2 diabetes mellitus-stable

                                        7.  History of schizophrenia-stable

                                        8.  History of GERD-stable

                                        9.  Obstructive sleep apnea-stable

                                        10. Substance abuse- Counseling provided

Discharge condition: Stable

Consults: Cardiology consult with Dr. Nolan

Procedures: None

Results Review

General results

Labs (Last four charted values)

WBC                     6.4    ()    H 11.5    ()

Hgb                     14.5    ()    15.2    ()

Hct                     42.3    ()    45.3    ()

Plt                     L 131    ()    181    ()

Na                      141    ()    H 146    ()

K                       4.1    ()    C 2.9    ()

CO2                     29    ()    25    ()

Cl                      106    ()    H 114    ()

Cr                      1.40    ()    1.30    ()

BUN                     H 30    ()    H 25    ()

Glucose Random          H 206    ()    H 121    ()

Mg                      2.1    ()

Ca                      8.9    ()    C 6.5    ()

PT                      H 23.0    ()

INR                     H 2.00    ()

PTT                     H 36.4    ()

Troponin                0.04    ()    0.04    ()

CK MB                   2.3    ()    2.2    ()    1.8    ()

Total CK                H 499    ()    H 578    ()    H 549    ()



Physical Examination

VS/Measurements

Vital Signs (last 24 hrs)_____        Last Charted___________

Temp Oral            97.4 DegF  ( 08:10)

Heart Rate Peripheral        77 bpm  ( 08:10)

Resp Rate            18 BRMIN  ( 08:10)

SBP        132 mmHg  ( 08:10)

DBP        H 91mmHg  ( 08:10)

SpO2        99 %  ( 08:10)

Weight        95.455 kg  ( 17:51)

Height        170.18 cm  ( 17:51)

BMI        32.96  ( 17:51)



General:  Alert and oriented, No acute distress.

Eye:  Pupils are equal, round and reactive to light, Extraocular movements are 
intact.

HENT:  Normocephalic, Normal hearing, No pharyngeal erythema.

Neck:  Supple, No jugular venous distention.

Respiratory:  Lungs are clear to auscultation, Respirations are non-labored, 
Breath sounds are equal, Symmetrical chest wall expansion.

Cardiovascular:  Normal rate, Regular rhythm, No murmur, No gallop, Good pulses 
equal in all extremities, Normal peripheral perfusion, No edema.

Gastrointestinal:  Soft, Non-tender, Non-distended, Normal bowel sounds.

Musculoskeletal

Normal range of motion.

Normal strength.

No tenderness.

No swelling.

No deformity.

Normal gait.

Integumentary:  Warm.

Neurologic:  Alert, Oriented, Normal sensory, Normal motor function, No focal 
deficits.

Psychiatric:  Cooperative, Appropriate mood and affect, Normal judgment, 
Non-suicidal.

Hospital Course

The patient is a 51-year-old male with a history of systolic CHF, 
cardiomyopathy, pacemaker and defibrillator, hypertension, diabetes type 2, and 
schizoaffective disorder who presented to the emergency room for midsternal 
chest pain with the dizziness.  Patient also reported using cocaine the night 
before starting the symptoms. During the short hospital stay EKG and serial 
cardiac enzymes and an echocardiogram were obtained . Ekg showed atrial 
fibillation and non-specific ST/T wave changes and cardiac enzymes were normal 
except elevated ck. Interrogation of AICD/pacemaker was also done during the st
ay. A cardiology consult with  was also requested and seen and cleared 
for discharge. Patient had a left heart catheterization in  and showed 
normal coronal arterial structures and no stress test is needed at this time per
 cardiology. Patient also found to have hypokalemia in the emergency room and 
was treated with oral potassium. Substance abuse counseling was provided during 
the stay. Patient needs to follow-up with pcp as needed.

Discharge Plan

Discharge Summary Plan

Prescriptions: Please see home medication reconciliation list.

Discharge time spent: 30 min

Addendum by Bhargavi Aquino MD on 2016 11:13

pt. seen and examined agree with NP

Agree with above note.



Extracted from:Title: Clinical Document

Author: Sohail Nolan MD

Date: 16

IMPRESSION:

                                        1.  Atrial fibrillation.

                                        2.  Severe nonischemic dilated cardiomyopathy, AICD placement.

                                        3.  Acute-on-chronic systolic congestive heart failure.

                                        4.  Cocaine abuse.

                                        5.  Acute renal failure.

TREATMENT PLAN:

chest pain could be due to multifactorial.  I did a left heart cardiac 
catheterization on him in .  It showed normal coronary arterial structures. 
 I think his chest pain and shortness of breath could be related to recent 
cocaine abuse, in addition to the baseline severe dilated cardiomyopathy.  I 
would like to have an echocardiogram to evaluate left ventricular systolic 
function.  No stress test at this moment.

Overnight, feels better, had a good breakfast,

mild epigastric discomfort

Okay to d/c home to Cardiac standpoint,

 
_____________________________________________________________________________________________

Subjective:

Exp. Problem Focused History: (1-3 HPI elements, 1 ROS)

#

ROS: no fever, no chills

________________________________________________________________________________

Objective:

Expanded Problem Focused PE



Vitals and Temp:

Vitals    Tmp(F)    Pulse    BP    RR    SpO2    FIO2

                                         04:34    97.9    83    117/87    12    100     3.0L/m

                                         00:08    97.9    85    110/70    16    98    ---

                                         21:44    97.4    73    121/85    13    100     3.0L/m

                                         21:21    97.6    92    119/94    18    98     3.0L/m

                                         20:10    ----    94    125/90    21    96     3.0L/m

                                        24 Hr Tmax: 98.4F (36.89c) at  19:31        Vital Signs are the last 5 in the

 past 48 hours.

General:  No acute distress.

Respiratory:  Lungs are clear to auscultation, Respirations are non-labored.

Cardiovascular:  Normal rate, Regular rhythm, No murmur, No gallop, Normal 
peripheral perfusion, No edema.

Gastrointestinal:  Soft, Non-tender, Non-distended, Normal bowel sounds, No 
organomegaly.

Integumentary:  Clean, Dry, Intact, Warm, Moist, No rash.

    24hr Labs

                                         0800

Glucose POC    184      H

                                         0552

Chol    106  

Trig    170      H

HDL    30      L

LDL (Calculated)    42  

VLDL    34  

CHD Risk    3.53      L

Magnesium Lvl    2.1  

Glucose Lvl    206      H

BUN    30      H

Creatinine Lvl    1.40  

Sodium Lvl    141  

Potassium Lvl    4.1  

Chloride Lvl    106  

CO2    29  

AGAP    10.1  

Calcium Lvl    8.9  

eGFR    58  

Total CK    499      H

CK MB    2.3  

WBC    6.4  

RBC    4.73  

Hgb    14.5  

Hct    42.3  

MCV    89.4  

MCH    30.6  

MCHC    34.2  

RDW    13.5  

Platelet    131      L

MPV    8.5  

Segs    67.0  

Monocytes    11.9  

Lymphocytes    18.0      L

Eosinophils    2.6  

Basophils    0.5  

Segs-Bands #    4.3  

Lymphocytes #    1.2  

Monocytes #    0.8  

Eosinophils #    0.2  

                                         0052

Total CK    578      H

Troponin-I    0.04  

CK MB    2.2  

                                         2201

Glucose POC    146      H

                                         1825

Total CK    549      H

Troponin-I    0.04  

CK MB    1.8  

CK MB Index    0.3  

Sodium Lvl    146      H

Potassium Lvl    2.9      C

Chloride Lvl    114      H

CO2    25  

AGAP    9.9      L

Glucose Lvl    121      H

Creatinine Lvl    1.30  

BUN    25      H

B/C Ratio    19  

Total Protein    5.6      L

Albumin Lvl    3.2      L

Globulin    2.4  

A/G Ratio    1.3  

Calcium Lvl    6.5      C

ALT    22  

AST    17  

Alk Phos    80  

Bili Total    0.7  

eGFR    63  

BNP    439      H

WBC    11.5      H

RBC    5.06  

Hgb    15.2  

Hct    45.3  

MCV    89.5  

MCH    29.9  

MCHC    33.5  

RDW    13.4  

Platelet    181  

MPV    8.3  

Segs    74.0  

Monocytes    10.8  

Lymphocytes    14.5      L

Eosinophils    0.6  

Basophils    0.1  

Segs-Bands #    8.5      H

Lymphocytes #    1.7  

Monocytes #    1.2      H

Eosinophils #    0.1  

Basophils #    0.0  

PT    23.0      H

INR    2.00      H

PTT    36.4      H



Extracted from:Title: Clinical Document

Author: Caprice Barnhart NP

Date: 16

History and Physical



Chief Complaint: Chest pain

History of Present Illness

The patient is a 51 year old with history of systolic CHF, cardiomyopathy, 
pacemaker and defibrillator, hypertension, diabetes type 2 manage with po 
medications, and schizoaffective controlled with medications. Patient was 
brought to the emergency department at [facility] for 
midsternal chest pain that is described as a chest pressure that is aggravated 
by a exertion and relieved with rest.  According to patient he has a pacemaker 
and he had a feeling that the pacemaker was squeezing his chest out, which made 
it very difficult to breathe. Patient also reports using coccaine last night, 
which was the first time in 10 years since he has quitted to use of illicits 
drugs. States, he didn't have any chest pain at the time he was using it until 
later this evening. Patient verbalizes experiencing dizziness with chest pain, 
but dinies, diaphoressis, palpitations, nausea, and vomiting; however, he felt 
his stomach getting distended like he is accumulated fluid in the abdomen. 
Fearing he is having a heart attack, he called EMS that he can be transferred to
 the hospital for further evaluation, and treatment. States that he had some 
releif with nitroglycerin given by EMS during transprotation. He was seen and 
evaluated by the ED MD before he was transferred to this unit. At this time, he 
rate his chest pain as 2 out 10. He will be admitted into observation unit for 
further evaluation and monitoring. Condition stable.

Past Medical History

                                        1.  Hypertension.

                                        2.  Type 2 diabetes mellitus.

                                        3.  Schizophrenia.

                                        4.  GERD.

                                        5.  Obstructive sleep apnea.

                                        6.  dilated cardiomyopathy.

                                        7.  Pulmonary emboli.

                                        8.  Systolic congestive heart failure.

Past Surgical History

                                        1.  AICD placements.

                                        2.  Cardiac catheterization.

Family History

Father had coronary artery disease as well as diabetes mellitus.

Social History

Patient denies tobacco use, denies alcohol use, but reports using coccainelast 
night first time after 10 years recession. Lives in group home.

Review Of Systems

Gen. awake alert and oriented deny fever, no chills

HEENT: No headaches no trauma to the head, bilateral eye clear no injection, 
Denies earache, and discharge, nasal passages are clear, no swelling, no 
drainage. No sore, throat, no neck pain

Respiratory system: No wheezing no cough no sputum production no hemoptysis

Cardiovascular system:Chest pressure aggravated by exertion, relief with rest. 
No radiation.

Gastrointestinal system: Patient denies abdominal pain nausea vomiting diarrhea

Musculoskeletal: Denies weakness, no joint pain no deformities

Psychiatric:No signs of depression, no suicidal ideation, positive mood. 
Schizoaffective disorder.

Neurological: Patient denies numbness and tingling no peripheral pain

Endocrine: Patient denying intolerance to cool any heat temperature no diabetic 
thyroid problem

Physical Exam

Vitals    Tmp(F)    Pulse    BP    RR    SpO2    FIO2

                                         21:44    97.4    73    121/85    13    100     3.0L/m

                                        07/18 21:21    97.6    92    119/94    18    98     3.0L/m

                                         20:10    ----    94    125/90    21    96     3.0L/m

                                         19:31    98.4    88    132/98    17    98     3.0L/m

                                         17:51    98.1    66    123/72    18    96    ---

General: The patient is a 51 year old male, well groomed, well developed for age
 group. She's able to participate in her plan of treatment.

HEENT: Head is atraumatic normocephalic.  Eyes: conjunctivae clear, no icterus, 
no injection.  Ears: No earaches, no discharge. Neck: supple, no 
thyroidomegally, no JVD. Throat: No swellen tonsils, no exudates.

Skin: Intact, no rash, no itc, no lumps.

Cardiovascular: Regular, rate, and rhythms. No murmurs, no gallops.

Respiratory: Normal respiratory effort, no wheezing. CTAB.

Abdomen: Distended, non- tender, firm. Positive BS x 4 quadrants.

Extremities: No joints pain, normal ROM to all extremities.

Phychiatric: AAO x 3, positive mood, no signs of depression

Neurologic: Normal strenght, normal gait, CN 5/5, no numbness, no tingling

Labs

[facility]

A/G Ratio: 1.3 16

AGAP: 9.9 Low 16

Albumin Lvl: 3.2 Low 16

Alk Phos: 80 16

ALT: 22 16

AST: 17 16

B/C Ratio: 19 16

Basophils: 0.1 16

Basophils #: 0.0 16

Bili Total: 0.7 16

BNP: 439 High 16

BUN: 25 High 16

Calcium Lvl: 6.5 Critical 16

Chloride Lvl: 114 High 16

CK MB: 1.8 16

CK MB Index: 0.3 16

CO2: 25 16

Creatinine Lvl: 1.30 16

eGFR: 63 16

Eosinophils: 0.6 16

Eosinophils #: 0.1 16

Globulin: 2.4 16

Glucose Lvl: 121 High 16

Glucose POC: 146 High 16

Hct: 45.3 16

Hgb: 15.2 16

INR: 2.00 High 16

Lymphocytes: 14.5 Low 16

Lymphocytes #: 1.7 16

MCH: 29.9 16

MCHC: 33.5 16

MCV: 89.5 16

Monocytes: 10.8 16

Monocytes #: 1.2 High 16

MPV: 8.3 16

Platelet: 181 16

Potassium Lvl: 2.9 Critical 16

PT: 23.0 High 16

PTT: 36.4 High 16

RBC: 5.06 16

RDW: 13.4 16

Segs: 74.0 16

Segs-Bands #: 8.5 High 16

Sodium Lvl: 146 High 16

Total CK: 549 High 16

Total Protein: 5.6 Low 16

Troponin-I: 0.04 16

WBC: 11.5 High 16

Radiology Report

Chest 1view DX

                                        16 19:05:37

IMPRESSION:

The lungs are underinflated.

The intracardiac pacer device is unchanged. The cardiac silhouette appears 
prominent, grossly without radiographic evidence of acute congestive failure.

No consolidation, pleural effusion, or pneumothorax are visible.



ASSESSMENT AND PLAN:

                                        1.  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  We will admit patient

 on observation unit for evaluation. Interrogation of AIDC/pacemaker completed. 
Will continue serial cardiac emzymes. Will continue home coreg. p.r.n 
nitroglycerine SL. p.r.n. oxygen, and morphine for pain. Low dose ASA, ACEI. and
 continue coreg. Will consult cardiologist Dr. Nolan and will await further 
recommendations from cardiology as well.

                                        2.  Systolic congestive heart failure. Stable. Last TTE was done in March, 2015 

shown ejection fraction less than 55%-60%.  We will continue with home 
medications.  The patient has an AICD in place.

                                        3.  Chronic renal failure. Stable. Will monitor with labs.

                                        4.  Hypokalemia. Will replaced electrolytes, and repeat potassium level in am.

                                        5.  Hypertension. Stable. Continue home medications.

                                        6.  Type 2 diabetes mellitus. Stable.  Will start the patient on a sliding scale

 insulin.

                                        7.  History of schizophrenia, stable. Continue home medication

                                        8.  History of gastroesophageal reflux disease, stable. Continue home medication.



                                        9.  Obstructive sleep apnea. Stable. Will monitor for respiratory distress. Follow

 up outpatient.

                                        10. DVT prophylaxis. SCDs. Ambulation. Lovenox 40 mg Subq every 24 hours.

HERNÁN score of 3 which correlates to 13% risk for severe or recurrent ischemic 
requiring urgent revascularization.  Further recommendations per Cardiology 
evaluation in AM.

Patient seen and examined.  Agree with the above assessment and plan.

                            2016                            [facility]       

             



                                                                                
                                                             



Plan of Care







                                        No Data Provided for This Section                    



                                                                



Social History

                    





                    Social History                            Date                            Source    

                

 

                                        Social History TypeResponse

Substance Abuse

Use: Past.  Type: Cocaine.  Recreational Drug Route: Inhaled.

Alcohol

Never

Smoking Status

Former smoker; Type: Cigarettes; Previous treatment: None; Ready to change: No; 
Concerns about tobacco use in household: No; Exposure to Tobacco Smoke None; 
Cigarette Smoking Last 365 Days No; Reg Smoking Cessation Counseling No; Tobacco
 use per day: 0; Number of years: 2; Total pack years: 2; Started at age: 22.0; 
Stopped at age: 24; 

entered on: 7/7/18

                            10/28/2017                            [facility]       

             

 

                                        Social History TypeResponse

Substance Abuse

Use: Past.  Type: Cocaine.  Recreational Drug Route: Inhaled.

Alcohol

Never

Smoking Status

Former smoker; Exposure to Tobacco Smoke None; Cigarette Smoking Last 365 Days 
No; Reg Smoking Cessation Counseling No

                            10/28/2017                            [facility]   

                 



                                                                                
    



Family History

                    





                                        No Data Provided for This Section                    



                                                            



Advance Directives

                    





                                        No Data Provided for This Section                    



                                                            



Functional Status

                    





                                        No Data Provided for This Section

## 2019-09-25 NOTE — XMS REPORT
Summary of Care: 17 - 17

                             Created on: 2129



JOANNA CABERRA

External Reference #: [id number]

: 1965

Sex: Male



Demographics







                          Address                   [address] [place] [address]

[place]  [address]

 

                          Home Phone                [phone]

 

                          Preferred Language        English

 

                          Marital Status            Single

 

                          Anabaptism Affiliation     Buddhism

 

                          Race                      White

 

                          Additional Race(s)        White/



 

                          Ethnic Group              Non-





Author







                          Author                    [facility]

 

                          Organization              [facility]

 

                          Address                   Unknown

 

                          Phone                     Unavailable







Encounter





ANGEL LUIS Boothe(LISA) [id number] Date(s): 17 - 17

[facility] [address] [place] [address]- [phone]


Discharge Disposition: Home or Self Care

Attending Physician: Sp Arriaga MD





Vital Signs







                    1                   2                   3



                                         Most recent to   



                                         oldest [Reference   



                                         Range]:   

 

                                        170.18 cm 

                          (17 1:09 AM)        170.18 cm 

                          (17 12:30 AM)        



                                         Height   

 

                                        97.7 DegF 

                          (17 12:04 AM)       97.8 DegF 

                          (17 8:34 PM)         



                                         Temperature Oral   



                                         [96.4-99.1 DegF]   

 

                                        105/74 mmHg 

                          (17 12:00 PM)       105/73 mmHg 

                          (17 8:36 AM)        99/61 mmHg 

                                        (17 4:00 AM)



                                         Blood Pressure   



                                         [/60-90 mmHg]   

 

                                        20 BRMIN 

                          (17 12:00 PM)       20 BRMIN 

                          (17 8:36 AM)        18 BRMIN 

                                        (17 4:00 AM)



                                         Respiratory Rate   



                                         [14-20 BRMIN]   

 

                                        76 bpm 

                          (17 12:00 PM)       67 bpm 

                          (17 8:36 AM)        55 bpm 

*LOW*

                                        (17 4:00 AM)



                                         Peripheral Pulse   



                                         Rate [ bpm]   

 

                                        99.6 kg 

                    (17 1:09 AM)                         



                                         Weight   

 

                                        34.39 m2 

                    (17 1:09 AM)                         



                                         Body Mass Index   







Problem List







    



              Condition     Effective Dates     Status       Health Status     Informant

 

    



                     [D]Anterior chest     12             Active  



                                         wall pain(Confirmed)    

 

    



                           Anxiety(Confirmed)        Resolved  

 

    



                           BP+ -                     Active  



                                         Hypertension(Confirm    



                                         ed)    

 

    



                           Pacemaker(Confirmed)      Active  

 

    



                           Cardiomyopathy(Confi      Resolved  



                                         rmed)    

 

    



                           CHF - Congestive          Active  



                                         heart    



                                         failure(Confirmed)    

 

    



                           CHF - Congestive          Active  



                                         heart    



                                         failure(Confirmed)    

 

    



                           Depression(Confirmed      Active  



                                         )    

 

    



                           dm(Confirmed)             Active  

 

    



                           Down                      Active  



                                         syndrome(Confirmed)    

 

    



                           GERD -                    Active  



                                         Gastro-esophageal    



                                         reflux    



                                         disease(Confirmed)    

 

    



                           H/O:                      Active  



                                         schizophrenia(Confir    



                                         med)    

 

    



                           HTN(Confirmed)            Active  

 

    



                           ICD (implantable          Active  



                                         cardiac    



                                         defibrillator)    



                                         battery    



                                         depletion(Confirmed)    

 

    



                           NIDDM(Confirmed)          Active  

 

    



                           Obese                     Active  



                                         build(Confirmed)    

 

    



                           Schizophrenia(Confir      Active  



                                         med)    

 

    



                           Short of breath on        Active  



                                         exertion(Confirmed)    

 

    



                           Sleep                     Active  



                                         apnea(Confirmed)    

 

    



                           Sleep                     Active  



                                         apnea(Confirmed)    







Allergies, Adverse Reactions, Alerts







   



                 Substance       Reaction        Severity        Status

 

   



                           NKDA                      Active







Medications





acetaminophen

650 mg, 2 tab, Route: PO, Drug form: TAB, Q4H, Dosing Weight 99.6, kg, PRN Pain 
1-3/Temp > 100.4 F, Start date: 17 4:38:00 CST, Duration: 30 day, Stop 
date: 18 4:37:00 CST

Notes: Do not exceed 4 gm/day.  (Same as: Tylenol)

Start Date: 17

Stop Date: 17

Status: Discontinued



Ambien 5 mg oral tablet

5 mg=1 tab, PO, Bedtime, PRN for sleep, 0 Refill(s)

Start Date: 17

Status: Ordered



aspirin 81 mg tablet, chewable

324 mg, 4 tab, Route: PO, Drug form: CHEWTAB, ONCE, Dosing Weight 101.364, kg, P
riority: STAT, Start date: 17 20:53:00 CST, Stop date: 17 20:53:00 C
ST

Notes: Take with food.

Start Date: 17

Stop Date: 17

Status: Completed



aspirin 81 mg tablet, enteric coated

81 mg=1 tab, PO, Daily, # 90 tab, 3 Refill(s)

Start Date: 17

Status: Ordered



Combivent inhalation aerosol with adapter

2 puff, INHALATION, QID, # 29 gm, 0 Refill(s)

Start Date: 17

Status: Ordered



Coreg

12.5 mg, 1 tab, Route: PO, Drug form: TAB, Q12H, Dosing Weight 99.6, kg, Start d
ate: 17 9:00:00 CST, Duration: 30 day, Stop date: 18 21:00:00 CST

Notes: Give with food. (Same As: Coreg)

Start Date: 17

Stop Date: 17

Status: Discontinued



Dextrose 50% Syringe

50 mL, Route: IVP, Dosing Weight 99.6, kg, PRN, PRN Blood Glucose Results, Start
 date: 17 6:10:00 CST, Duration: 30 day, Stop date: 18 6:09:00 CST

Start Date: 17

Stop Date: 17

Status: Discontinued



Dextrose 50% Syringe

25 mL, Route: IVP, Dosing Weight 99.6, kg, PRN, PRN Blood Glucose Results, Start
 date: 17 6:10:00 CST, Duration: 30 day, Stop date: 18 6:09:00 CST

Start Date: 17

Stop Date: 17

Status: Discontinued



Dextrose 50% Syringe

25 gm, 50 mL, Route: IVP, Drug Form: INJ, Dosing Weight 99.6, kg, PRN, PRN Blood
 Glucose Results, Start date: 17 6:09:00 CST, Duration: 30 day, Stop date:
 18 6:08:00 CST

Start Date: 17

Stop Date: 17

Status: Discontinued



Dextrose 50% Syringe

12.5 gm, 25 mL, Route: IVP, Drug Form: INJ, Dosing Weight 99.6, kg, PRN, PRN Blo
od Glucose Results, Start date: 17 6:09:00 CST, Duration: 30 day, Stop josefa
e: 18 6:08:00 CST

Start Date: 17

Stop Date: 17

Status: Discontinued



digoxin 250 mcg (0.25 mg) oral tablet

0.25 mg, PO, Daily, 0 Refill(s)

Start Date: 17

Status: Ordered



Dulcolax Laxative 5 mg oral enteric coated tablet

10 mg=2 tab, PO, Daily, PRN Constipation, 0 Refill(s)

Start Date: 17

Status: Ordered



DuoNeb inhalation solution

3 mL, NEB, Q6H, PRN as needed for shortness of breath or wheezing, 0 Refill(s)

Start Date: 17

Status: Ordered



glimepiride 4 mg oral tablet

4 mg=1 tab, PO, BID, 0 Refill(s)

Start Date: 17

Status: Ordered



glucagon

1 mg, Route: IM, PRN, Dosing Weight 99.6, kg, PRN Blood Glucose Results, Start d
ate: 17 6:10:00 CST, Duration: 30 day, Stop date: 18 6:09:00 CST

Start Date: 17

Stop Date: 17

Status: Discontinued



glucagon

1 mg, Route: IM, Drug form: PDR/INJ, PRN, Dosing Weight 99.6, kg, PRN Blood Gluc
ose Results, Start date: 17 6:09:00 CST, Duration: 30 day, Stop date:  6:08:00 CST

Start Date: 17

Stop Date: 17

Status: Discontinued



insulin lispro

1 unit, 0.01 mL, Route: SUB-Q, Drug form: SOLN, Bedtime, Dosing Weight 99.6, kg,
 PRN Blood Glucose Results, Start date: 17 6:10:00 CST, Duration: 30 day, 
Stop date: 18 6:09:00 CST

Notes: Roll in palms of hands gently;  Do not shake `vigorously. (Same as: Brittnee min )"Single Patient Use Only "WASTE: F/P - Black; E - [employer] Trash Bin  Stabl
e for 28 days at room temperature.Expires in _____ days from ______________Date

Start Date: 17

Stop Date: 17

Status: Discontinued



insulin lispro

2 unit, 0.02 mL, Route: SUB-Q, Drug form: SOLN, Bedtime, Dosing Weight 99.6, kg,
 PRN Blood Glucose Results, Start date: 17 6:10:00 CST, Duration: 30 day, 
Stop date: 18 6:09:00 CST

Notes: Roll in palms of hands gently;  Do not shake `vigorously. ([facility]: [facility]
 )"Single Patient Use Only "WASTE: F/P - Black; E - [facility] Trash Bin  Stabl
e for 28 days at room temperature.Expires in _____ days from ______________Date

Start Date: 17

Stop Date: 17

Status: Discontinued



insulin lispro

4 unit, 0.04 mL, Route: SUB-Q, Drug form: SOLN, Bedtime, Dosing Weight 99.6, kg,
 PRN Blood Glucose Results, Start date: 17 6:10:00 CST, Duration: 30 day, 
Stop date: 18 6:09:00 CST

Notes: Roll in palms of hands gently;  Do not shake `vigorously. (Same as: [facility]
 )"Single Patient Use Only "WASTE: F/P - Black; E - [facility] Trash Bin  Stabl
e for 28 days at room temperature.Expires in _____ days from ______________Date

Start Date: 17

Stop Date: 17

Status: Discontinued



insulin lispro

3 unit, 0.03 mL, Route: SUB-Q, Drug form: SOLN, Bedtime, Dosing Weight 99.6, kg,
 PRN Blood Glucose Results, Start date: 17 6:10:00 CST, Duration: 30 day, 
Stop date: 18 6:09:00 CST

Notes: Roll in palms of hands gently;  Do not shake `vigorously. (Same as: [facility] )"Single Patient Use Only "WASTE: F/P - Black; E - [facility] Trash Bin  Stabl
e for 28 days at room temperature.Expires in _____ days from ______________Date

Start Date: 17

Stop Date: 17

Status: Discontinued



insulin lispro

5 unit, 0.05 mL, Route: SUB-Q, Drug form: SOLN, TID-Before Meals, Dosing Weight 
99.6, kg, PRN Blood Glucose Results, Start date: 17 6:09:00 CST, Duration:
 30 day, Stop date: 18 6:08:00 CST

Notes: Roll in palms of hands gently;  Do not shake `vigorously. (Same as: Brittnee
og )"Single Patient Use Only "WASTE: F/P - Black; E - [facility] Trash Bin  Stabl
e for 28 days at room temperature.Expires in _____ days from ______________Date

Start Date: 17

Stop Date: 17

Status: Discontinued



insulin lispro

1 unit, 0.01 mL, Route: SUB-Q, Drug form: SOLN, TID-Before Meals, Dosing Weight 
99.6, kg, PRN Blood Glucose Results, Start date: 17 6:09:00 CST, Duration:
 30 day, Stop date: 18 6:08:00 CST

Notes: Roll in palms of hands gently;  Do not shake `vigorously. (Same as: Brittnee
og )"Single Patient Use Only "WASTE: F/P - Black; E - [facility] Trash Bin  Stabl
e for 28 days at room temperature.Expires in _____ days from ______________Date

Start Date: 17

Stop Date: 17

Status: Discontinued



insulin lispro

3 unit, 0.03 mL, Route: SUB-Q, Drug form: SOLN, TID-Before Meals, Dosing Weight 
99.6, kg, PRN Blood Glucose Results, Start date: 17 6:09:00 CST, Duration:
 30 day, Stop date: 18 6:08:00 CST

Notes: Roll in palms of hands gently;  Do not shake `vigorously. (Same as: [facility]
og )"Single Patient Use Only "WASTE: F/P - Black; E - [facility] Trash Bin  Stabl
e for 28 days at room temperature.Expires in _____ days from ______________Date

Start Date: 17

Stop Date: 17

Status: Discontinued



insulin lispro

2 unit, 0.02 mL, Route: SUB-Q, Drug form: SOLN, TID-Before Meals, Dosing Weight 
99.6, kg, PRN Blood Glucose Results, Start date: 17 6:09:00 CST, Duration:
 30 day, Stop date: 18 6:08:00 CST

Notes: Roll in palms of hands gently;  Do not shake `vigorously. (Same as: Brittnee
og )"Single Patient Use Only "WASTE: F/P - Black; E - [facility] Trash Bin  Stabl
e for 28 days at room temperature.Expires in _____ days from ______________Date

Start Date: 17

Stop Date: 17

Status: Discontinued



insulin lispro

4 unit, 0.04 mL, Route: SUB-Q, Drug form: SOLN, TID-Before Meals, Dosing Weight 
99.6, kg, PRN Blood Glucose Results, Start date: 17 6:09:00 CST, Duration:
 30 day, Stop date: 18 6:08:00 CST

Notes: Roll in palms of hands gently;  Do not shake `vigorously. (Same as: Brittnee min )"Single Patient Use Only "WASTE: F/P - Black; E - [facility] Trash Bin  Stabl
e for 28 days at room temperature.Expires in _____ days from ______________Date

Start Date: 17

Stop Date: 17

Status: Discontinued



K-Dur 20

40 mEq, 2 tab, Route: PO, Drug form: ERTAB, ONCE, Start date: 17 10:00:00 
CST, Stop date: 17 10:00:00 CST

Notes: (Same as: K-Dur 20)"Do Not Crush"  With food and full glass of water

Start Date: 17

Stop Date: 17

Status: Completed



Lasix

40 mg, 1 tab, Route: PO, Drug form: TAB, BID, Dosing Weight 99.6, kg, Start date
: 17 9:00:00 CST, Duration: 30 day, Stop date: 18 17:00:00 CST

Notes: (Same as: Lasix)  May cause GI upset.  Give with food or milk.

Start Date: 17

Stop Date: 17

Status: Discontinued



Lasix 40 mg oral tablet

40 mg=1 tab, PO, BID, 0 Refill(s)

Start Date: 17

Status: Ordered



loratadine 10 mg oral tablet

10 mg=1 tab, PO, Daily, 0 Refill(s)

Start Date: 17

Status: Ordered



ondansetron

4 mg, 2 mL, Route: IVP, Drug form: INJ, Q6H, Dosing Weight 99.6, kg, PRN Nausea 
& Vomiting, Start date: 17 4:38:00 CST, Duration: 30 day, Stop date: 
18 4:37:00 CST

Notes: (Same as: Zofran)  *** MEDICATION WASTE ***Product Size:  4 mgProduct Was
helen:  ___ mg

Start Date: 17

Stop Date: 17

Status: Discontinued



pantoprazole 40 mg oral enteric coated tablet

40 mg=1 tab, PO, Daily, 0 Refill(s)

Start Date: 17

Status: Ordered



pneumococcal 23-valent vaccine

0.5 mL, Route: IM, Drug Form: INJ, ONCALL, Start date: 17 6:38:10 CST, Sto
p date: 18 6:33:10 CST

Notes: (Same as: Pneumovax 23)  Refrigerate

Start Date: 17

Stop Date: 17

Status: Discontinued



potassium chloride

40 mEq, 2 tab, Route: PO, Drug form: ERTAB, ONCE, Dosing Weight 101.364, kg, Shanell
ority: STAT, Start date: 17 22:25:00 CST, Stop date: 17 22:25:00 CST

Notes: (Same as: K-Dur 20)"Do Not Crush"  With food and full glass of water

Start Date: 17

Stop Date: 17

Status: Completed



potassium chloride 20 mEq oral tablet, extended release

20 mEq=1 tab, PO, Daily, # 3 tab, 0 Refill(s), [facility]

Start Date: 17

Stop Date: 17

Status: Ordered



potassium chloride 20 mEq oral tablet, extended release

40 mEq, 30 mL, Route: PO, Drug form: LIQ, ONCE, Dosing Weight 99.6, kg, Start da
te: 17 8:36:00 CST, Stop date: 17 8:36:00 CST

Notes: (Same as: Potassium Chloride)

Start Date: 17

Stop Date: 17

Status: Deleted



potassium chloride 20 mEq oral tablet, extended release

20 mEq=1 tab, PO, Daily, # 3 tab, 0 Refill(s), Pharmacy: [id number]
[id number]

Start Date: 17

Stop Date: 17

Status: Discontinued



Ranexa 500 mg oral tablet, extended release

500 mg=1 tab, PO, BID, 0 Refill(s)

Start Date: 17

Status: Ordered



ranolazine

500 mg, 1 tab, Route: PO, Drug form: TAB, BID, Dosing Weight 99.6, kg, Start josefa
e: 17 9:00:00 CST, Duration: 30 day, Stop date: 18 17:00:00 CST

Notes: Same as Ranexa"Do Not Crush"

Start Date: 17

Stop Date: 17

Status: Discontinued



rivaroxaban

20 mg, 1 tab, Route: PO, Drug form: TAB, Dinner, Dosing Weight 99.6, kg, Start d
ate: 17 17:00:00 CST, Duration: 30 day, Stop date: 18 17:00:00 CST

Notes: (Same as: Xarelto)Administer with food

Start Date: 17

Stop Date: 17

Status: Discontinued



Saline Flush 0.9%

10 mL, Route: IVP, Drug Form: INJ, Dosing Weight 101.364, kg, PRN, PRN Line Flus
h, Start date: 17 20:47:00 CST, Duration: 30 day, Stop date: 18 20:4
6:00 CST

Notes: (Same as: BD Posiflush)

Start Date: 17

Stop Date: 17

Status: Discontinued



spironolactone 25 mg oral tablet

25 mg=1 tab, PO, BID, 0 Refill(s)

Start Date: 17

Status: Ordered



tizanidine 2 mg oral capsule

2 mg=1 cap, PO, Q8H, PRN for muscle spasm, # 21 cap, 0 Refill(s), Pharmacy: [facility] [id number]

Start Date: 17

Stop Date: 17

Status: Discontinued



tizanidine 2 mg oral capsule

2 mg=1 cap, PO, Q8H, PRN for muscle spasm, # 21 cap, 0 Refill(s), Pharmacy: [facility] [id number]

Start Date: 17

Stop Date: 17

Status: Ordered



trazodone 50 mg oral tablet

50 mg, 1 tab, Route: PO, Drug form: TAB, Bedtime, Dosing Weight 99.6, kg, Priori
ty: STAT, Start date: 17 1:20:00 CST, Duration: 30 day, Stop date: 
8 21:00:00 CST

Notes: (Same As: Desyrel)

Start Date: 17

Stop Date: 17

Status: Discontinued



Tylenol 325 mg oral tablet

650 mg=2 tab, PO, Q4H, PRN Fever, 0 Refill(s)

Start Date: 17

Status: Ordered



Results





ELECTROLYTES





                    1                   2                   3



                                         Most recent to   



                                         oldest [Reference   



                                         Range]:   

 

                                        139 mEq/L 

                          (17 6:29 AM)        137 mEq/L 

                          (17 9:53 PM)         



                                         Sodium Lvl [135-145   



                                         mEq/L]   

 

                                        3.0 mEq/L 1

*CRIT*

                          (17 6:29 AM)        2.9 mEq/L 2

*CRIT*

                          (17 9:53 PM)         



                                         Potassium Lvl   



                                         [3.5-5.1 mEq/L]   

 

                                        99 mEq/L 

                          (17 6:29 AM)        97 mEq/L 

                          (17 9:53 PM)         



                                         Chloride Lvl [   



                                         mEq/L]   

 

                                        32 mEq/L 

                          (17 6:29 AM)        32 mEq/L 

                          (17 9:53 PM)         



                                         CO2 [24-32 mEq/L]   

 

                                        11.0 mEq/L 

                          (17 6:29 AM)        10.9 mEq/L 

                          (17 9:53 PM)         



                                         AGAP [10.0-20.0   



                                         mEq/L]   







1Result Comment: Critical Result(s) called to gloria knight at 2017 07:31 by 
cz.  Read back OK.



2Result Comment: Critical Result(s) called to Mirian Lee at 2017 22:20
by ln.  Read back OK.



CHEM PANEL





                    1                   2                   3



                                         Most recent to   



                                         oldest [Reference   



                                         Range]:   

 

                                        1.80 mg/dL 

*HI*

                          (17 6:29 AM)        2.00 mg/dL 

*HI*

                          (17 9:53 PM)         



                                         Creatinine Lvl   



                                         [0.50-1.40 mg/dL]   

 

                                        42 mL/min/1.73m2 1

*NA*

                          (17 6:29 AM)        37 mL/min/1.73m2 2

*NA*

                          (17 9:53 PM)         



                                         eGFR   

 

                                        36 mg/dL 

*HI*

                          (17 6:29 AM)        35 mg/dL 

*HI*

                          (17 9:53 PM)         



                                         BUN [7-22 mg/dL]   

 

                                        18 

                    (17 9:53 PM)                          



                                         B/C Ratio [6-25]   

 

                                        66 mg/dL 

*LOW*

                          (17 6:29 AM)        175 mg/dL 

*HI*

                          (17 9:53 PM)         



                                         Glucose Lvl [70-99   



                                         mg/dL]   

 

                                        7.6 g/dL 

                    (17 9:53 PM)                          



                                         Total Protein   



                                         [6.4-8.4 g/dL]   

 

                                        4.0 g/dL 

                    (17 9:53 PM)                          



                                         Albumin Lvl [3.5-5.0   



                                         g/dL]   

 

                                        3.6 g/dL 

                    (17 9:53 PM)                          



                                         Globulin [2.7-4.2   



                                         g/dL]   

 

                                        1.1 

                    (17 9:53 PM)                          



                                         A/G Ratio [0.7-1.6]   

 

                                        9.2 mg/dL 

                          (17 6:29 AM)        8.7 mg/dL 

                          (17 9:53 PM)         



                                         Calcium Lvl   



                                         [8.5-10.5 mg/dL]   

 

                                        24 unit/L 

                    (17 9:53 PM)                          



                                         ALT [0-65 unit/L]   

 

                                        18 unit/L 

                    (17 9:53 PM)                          



                                         AST [0-37 unit/L]   

 

                                        160 unit/L 

*HI*

                    (17 9:53 PM)                          



                                         Alk Phos [   



                                         unit/L]   

 

                                        0.5 mg/dL 

                    (17 9:53 PM)                          



                                         Bili Total [0.2-1.3   



                                         mg/dL]   







1Result Comment: The eGFR is calculated using the CKD-EPI formula. In most 
young, healthy individuals the eGFR will be >90 mL/min/1.73m2. The eGFR declines
with age. An eGFR of 60-89 may be normal in some populations, particularly the 
elderly, for whom the CKD-EPI formula has not been extensively validated. Use of
the eGFR is not recommended in the following populations:



Individuals with unstable creatinine concentrations, including pregnant patients
and those with serious co-morbid conditions.



Patients with extremes in muscle mass or diet. 



The data above are obtained from the [employer] (
NK[employer]) which additionally recommends that when the eGFR is used in patients with
extremes of body mass index for purposes of drug dosing, the eGFR should be mul
tiplied by the estimated BMI.



2Result Comment: The eGFR is calculated using the CKD-EPI formula. In most 
young, healthy individuals the eGFR will be >90 mL/min/1.73m2. The eGFR declines
with age. An eGFR of 60-89 may be normal in some populations, particularly the 
elderly, for whom the CKD-EPI formula has not been extensively validated. Use of
the eGFR is not recommended in the following populations:



Individuals with unstable creatinine concentrations, including pregnant patients
and those with serious co-morbid conditions.



Patients with extremes in muscle mass or diet. 



The data above are obtained from the [employer] Program (
NK[employer]) which additionally recommends that when the eGFR is used in patients with
extremes of body mass index for purposes of drug dosing, the eGFR should be mul
tiplied by the estimated BMI.



CARDIAC ENZYMES





                    1                   2                   3



                                         Most recent to   



                                         oldest [Reference   



                                         Range]:   

 

                                        177 unit/L 

                          (17 9:18 AM)        163 unit/L 

                          (17 5:06 AM)        197 unit/L 

*HI*

                                        (17 9:53 PM) 



                                         Total CK [   



                                         unit/L]   

 

                                        0.9 ng/mL 

                          (17 9:18 AM)        0.9 ng/mL 

                          (17 5:06 AM)        1.5 ng/mL 

                                        (17 9:53 PM) 



                                         CK MB [0.5-3.6   



                                         ng/mL]   

 

                                        0.5 

                          (17 9:18 AM)        0.6 

                          (17 5:06 AM)        0.8 

                                        (17 9:53 PM) 



                                         CK MB Index   



                                         [0.0-2.5]   

 

                                        0.04 ng/mL 

                          (17 9:18 AM)        0.04 ng/mL 

                          (17 5:06 AM)        0.03 ng/mL 

                                        (17 9:53 PM) 



                                         Troponin-I   



                                         [0.00-0.40 ng/mL]   

 

                                        144 pg/mL 

*HI*

                    (17 9:53 PM)                          



                                         BNP [<=100 pg/mL]   







TOXICOLOGY





                    1                   2                   3



                                         Most recent to   



                                         oldest [Reference   



                                         Range]:   

 

                                        0.8 ng/mL 

                    (17 5:06 AM)                          



                                         Digoxin Lvl [0.8-2.0   



                                         ng/mL]   







HEMATOLOGY





                    1                   2                   3



                                         Most recent to   



                                         oldest [Reference   



                                         Range]:   

 

                                        9.2 K/CMM 

                    (17 9:53 PM)                          



                                         WBC [3.7-10.4 K/CMM]   

 

                                        5.30 M/CMM 

                    (17 9:53 PM)                          



                                         RBC [4.70-6.10   



                                         M/CMM]   

 

                                        15.4 g/dL 

                    (17 9:53 PM)                          



                                         Hgb [14.0-18.0 g/dL]   

 

                                        45.4 % 

                    (17 9:53 PM)                          



                                         Hct [42.0-54.0 %]   

 

                                        85.7 fL 

                    (17 9:53 PM)                          



                                         MCV [80.0-94.0 fL]   

 

                                        29.0 pg 

                    (17 9:53 PM)                          



                                         MCH [27.0-31.0 pg]   

 

                                        33.9 g/dL 

                    (17 9:53 PM)                          



                                         MCHC [32.0-36.0   



                                         g/dL]   

 

                                        16.6 % 

*HI*

                    (17 9:53 PM)                          



                                         RDW [11.5-14.5 %]   

 

                                        195 K/CMM 

                    (17 9:53 PM)                          



                                         Platelet [133-450   



                                         K/CMM]   

 

                                        8.1 fL 

                    (17 9:53 PM)                          



                                         MPV [7.4-10.4 fL]   

 

                                        76.9 % 

*HI*

                    (17 9:53 PM)                          



                                         Segs [45.0-75.0 %]   

 

                                        13.0 % 

*LOW*

                    (17 9:53 PM)                          



                                         Lymphocytes   



                                         [20.0-40.0 %]   

 

                                        8.4 % 

                    (17 9:53 PM)                          



                                         Monocytes [2.0-12.0   



                                         %]   

 

                                        1.6 % 

                    (17 9:53 PM)                          



                                         Eosinophils [0.0-4.0   



                                         %]   

 

                                        0.1 % 

                    (17 9:53 PM)                          



                                         Basophils [0.0-1.0   



                                         %]   

 

                                        7.1 K/CMM 

                    (17 9:53 PM)                          



                                         Segs-Bands #   



                                         [1.5-8.1 K/CMM]   

 

                                        1.2 K/CMM 

                    (17 9:53 PM)                          



                                         Lymphocytes #   



                                         [1.0-5.5 K/CMM]   

 

                                        0.8 K/CMM 

                    (17 9:53 PM)                          



                                         Monocytes # [0.0-0.8   



                                         K/CMM]   

 

                                        0.2 K/CMM 

                    (17 9:53 PM)                          



                                         Eosinophils #   



                                         [0.0-0.5 K/CMM]   

 

                                        0.0 K/CMM 

                    (17 9:53 PM)                          



                                         Basophils # [0.0-0.2   



                                         K/CMM]   

 

                                        24.0 seconds 

*HI*

                    (17 9:53 PM)                          



                                         PT [12.0-14.7   



                                         seconds]   

 

                                        2.12 

*HI*

                    (17 9:53 PM)                          



                                         INR [0.85-1.17]   

 

                                        40.9 seconds 

*HI*

                    (17 9:53 PM)                          



                                         PTT [22.9-35.8   



                                         seconds]   







Immunizations





Given and Recorded





   



                 Vaccine         Date            Status          Refusal Reason

 

   



                     influenza virus vaccine, inactivated     10/1/13             Given 









Not Given





   



                 Vaccine         Date            Status          Refusal Reason

 

   



                 pneumococcal 23-valent vaccine     17         Not Given       Patient Refuses







Procedures







   



                 Procedure       Date            Related Diagnosis     Body Site

 

   



                                         ICD - Internal cardiac defibrillator   



                                         procedure1   

 

   



                                         torn ligament2   







1placed 2.5years ago



2left knee ligament surgery



Social History







 



                           Social History Type       Response

 

 



                           Substance Abuse           Use: Past.  Type: Cocaine.  Recreational Drug Route: Inhaled.



 

 



                           Alcohol                   Never

 

 



                           Smoking Status            Never smoker; Exposure to Tobacco Smoke None; Cigarette Smoking

 Last 365



                                         Days No; Reg Smoking Cessation Counseling No







Assessment and Plan

Extracted from:





  



                     Title: Discharge summary     Author: Jessica Arambula DO     Date: 17









                                          Impression and Plan

## 2019-09-25 NOTE — XMS REPORT
Summary of Care: 10/27/17 - 10/28/17

                             Created on: 2063



JOANNA CABRERA

External Reference #: [id number]

: 1965

Sex: Male



Demographics







                          Address                   [address] [place] [address]  [phone]

 

                          Home Phone                [phone]

 

                          Preferred Language        English

 

                          Marital Status            Single

 

                          B[employer] Affiliation     Roman Catholic

 

                          Race                      White/

 

                          Ethnic Group              Non-





Author







                          Author                    [facility]

 

                          Organization              [facility]

 

                          Address                   Unknown

 

                          Phone                     Unavailable







Encounter





ANGEL LUIS Boothe(LISA) [id number] Date(s): 10/27/17 - 10/28/17

[facility] [address] [place] [address]-     [phone]


Discharge Disposition: Home or Self Care

Attending Physician: Le Castro MD

Admitting Physician: Le Castro MD





Vital Signs







                    1                   2                   3



                                         Most recent to   



                                         oldest [Reference   



                                         Range]:   

 

                                        172.72 cm 

                    (10/28/17 1:33 AM)                         



                                         Height   

 

                                        97.9 DegF 

                          (10/28/17 11:21 AM)       97.5 DegF 

                          (10/28/17 8:00 AM)        97.9 DegF 

                                        (10/28/17 4:57 AM)



                                         Temperature Oral   



                                         [96.4-99.1 DegF]   

 

                                        126/89 mmHg 

                          (10/28/17 11:21 AM)       104/83 mmHg 

                          (10/28/17 8:00 AM)        99/68 mmHg 

                                        (10/28/17 4:57 AM)



                                         Blood Pressure   



                                         [/60-90 mmHg]   

 

                                        20 BRMIN 

                          (10/28/17 11:21 AM)       18 BRMIN 

                          (10/28/17 8:00 AM)        18 BRMIN 

                                        (10/28/17 4:57 AM)



                                         Respiratory Rate   



                                         [14-20 BRMIN]   

 

                                        57 bpm 

*LOW*

                          (10/28/17 11:21 AM)       78 bpm 

                          (10/28/17 8:00 AM)        76 bpm 

                                        (10/28/17 4:57 AM)



                                         Peripheral Pulse   



                                         Rate [ bpm]   

 

                                        101.449 kg 

                          (10/28/17 1:33 AM)        102.273 kg 

                          (10/27/17 8:04 PM)         



                                         Weight   

 

                                        34.01 m2 

                    (10/28/17 1:33 AM)                         



                                         Body Mass Index   







Problem List







    



              Condition     Effective Dates     Status       Health Status     Informant

 

    



                     [D]Anterior chest     12             Active  



                                         wall pain(Confirmed)    

 

    



                           Anxiety(Confirmed)        Resolved  

 

    



                           BP+ -                     Active  



                                         Hypertension(Confirm    



                                         ed)    

 

    



                           Cardiomyopathy(Confi      Resolved  



                                         rmed)    

 

    



                           CHF - Congestive          Active  



                                         heart    



                                         failure(Confirmed)    

 

    



                           CHF - Congestive          Active  



                                         heart    



                                         failure(Confirmed)    

 

    



                           Depression(Confirmed      Active  



                                         )    

 

    



                           dm(Confirmed)             Active  

 

    



                           Down                      Active  



                                         syndrome(Confirmed)    

 

    



                           GERD -                    Active  



                                         Gastro-esophageal    



                                         reflux    



                                         disease(Confirmed)    

 

    



                           H/O:                      Active  



                                         schizophrenia(Confir    



                                         med)    

 

    



                           HTN(Confirmed)            Active  

 

    



                           ICD (implantable          Active  



                                         cardiac    



                                         defibrillator)    



                                         battery    



                                         depletion(Confirmed)    

 

    



                           NIDDM(Confirmed)          Active  

 

    



                           Obese                     Active  



                                         build(Confirmed)    

 

    



                           Schizophrenia(Confir      Active  



                                         med)    

 

    



                           Short of breath on        Active  



                                         exertion(Confirmed)    

 

    



                           Sleep                     Active  



                                         apnea(Confirmed)    







Allergies, Adverse Reactions, Alerts







   



                 Substance       Reaction        Severity        Status

 

   



                           NKDA                      Active







Medications





acetaminophen

650 mg, 2 tab, Route: PO, Drug form: TAB, Q4H, Dosing Weight 101.449, kg, PRN Pa
in 1-3/Temp > 100.4 F, Start date: 10/28/17 1:54:00 CDT, Duration: 30 day, Stop 
date: 17 1:53:00 CST

Notes: Do not exceed 4 gm/day.  (Same as: Tylenol)

Start Date: 10/28/17

Stop Date: 10/29/17

Status: Discontinued



Ativan

1 mg, Route: PO, Drug form: TAB, ONCE, Dosing Weight 102.273, kg, Priority: STAT
, Start date: 10/27/17 20:43:00 CDT, Stop date: 10/27/17 20:43:00 CDT

Start Date: 10/27/17

Stop Date: 10/27/17

Status: Completed



carvedilol

18.75 mg, 1.5 tab, Route: PO, Drug form: TAB, Q12H, Dosing Weight 101.449, kg, S
tart date: 10/28/17 9:00:00 CDT, Duration: 30 day, Stop date: 17 21:00:00 
CST

Notes: Give with food. (Same As: Coreg)

Start Date: 10/28/17

Stop Date: 10/29/17

Status: Discontinued



carvedilol 12.5 mg oral tablet

18.75 mg=1.5 tab, PO, Q12H, 0 Refill(s)

Start Date: 10/28/17

Status: Ordered



clonazePAM

1 mg, 1 tab, Route: PO, Drug form: TAB, Bedtime, Dosing Weight 101.449, kg, Star
t date: 10/28/17 21:00:00 CDT, Duration: 30 day, Stop date: 17 21:00:00 CS
T

Notes: (Same As: KlonoPIN)

Start Date: 10/28/17

Stop Date: 10/29/17

Status: Discontinued



Dextrose 50% Syringe

25 gm, 50 mL, Route: IVP, Drug Form: INJ, Dosing Weight 101.449, kg, PRN, PRN Bl
ood Glucose Results, Start date: 10/28/17 1:57:00 CDT, Duration: 30 day, Stop da
te: 17 0:56:00 CST

Start Date: 10/28/17

Stop Date: 10/29/17

Status: Discontinued



Dextrose 50% Syringe

12.5 gm, 25 mL, Route: IVP, Drug Form: INJ, Dosing Weight 101.449, kg, PRN, PRN 
Blood Glucose Results, Start date: 10/28/17 1:57:00 CDT, Duration: 30 day, Stop 
date: 17 0:56:00 CST

Start Date: 10/28/17

Stop Date: 10/29/17

Status: Discontinued



digoxin 125 mcg (0.125 mg) oral tablet

125 microgram=1 tab, PO, Daily, # 30 tab, 0 Refill(s), Pharmacy: [facility] [id number]

Start Date: 10/28/17

Stop Date: 17

Status: Ordered



digoxin 250 mcg (0.25 mg) oral tablet

0.125 mg, 1 tab, Route: PO, Drug form: TAB, Daily, Dosing Weight 101.449, kg, St
art date: 10/28/17 9:00:00 CDT, Stop date: 17 9:00:00 CST

Notes: Take on an Empty Stomach  (Same as: Lanoxin)

Start Date: 10/28/17

Stop Date: 10/29/17

Status: Discontinued



digoxin 250 mcg (0.25 mg) oral tablet

0.125 mg, PO, Daily, # 30 tab, 0 Refill(s)

Start Date: 10/28/17

Stop Date: 10/28/17

Status: Deleted



glipiZIDE 10 mg oral tablet, extended release

10 mg=1 tab, PO, BID, 0 Refill(s)

Start Date: 10/28/17

Status: Ordered



glucagon

1 mg, Route: IM, Drug form: PDR/INJ, PRN, Dosing Weight 101.449, kg, PRN Blood G
lucose Results, Start date: 10/28/17 1:57:00 CDT, Duration: 30 day, Stop date: 1
17 0:56:00 CST

Start Date: 10/28/17

Stop Date: 10/29/17

Status: Discontinued



insulin lispro

4 unit, 0.04 mL, Route: SUB-Q, Drug form: SOLN, Sliding Scale, Dosing Weight 101
.449, kg, PRN Blood Glucose Results, Start date: 10/28/17 1:57:00 CDT, Duration:
 30 day, Stop date: 17 0:56:00 CST

Notes: Roll in palms of hands gently;  Do not shake `vigorously. (Same as: Brittnee min )"Single Patient Use Only "WASTE: F/P - Black; E - Municipal Trash Bin  Stabl
e for 28 days at room temperature.Expires in _____ days from ______________Date

Start Date: 10/28/17

Stop Date: 10/29/17

Status: Discontinued



insulin lispro

5 unit, 0.05 mL, Route: SUB-Q, Drug form: SOLN, Sliding Scale, Dosing Weight 101
.449, kg, PRN Blood Glucose Results, Start date: 10/28/17 1:57:00 CDT, Duration:
 30 day, Stop date: 17 0:56:00 CST

Notes: Roll in palms of hands gently;  Do not shake `vigorously. ([facility]: [facility]
 )"Single Patient Use Only "WASTE: F/P - Black; E - Municipal Trash Bin  Stabl
e for 28 days at room temperature.Expires in _____ days from ______________Date

Start Date: 10/28/17

Stop Date: 10/29/17

Status: Discontinued



insulin lispro

2 unit, 0.02 mL, Route: SUB-Q, Drug form: SOLN, Sliding Scale, Dosing Weight 101
.449, kg, PRN Blood Glucose Results, Start date: 10/28/17 1:57:00 CDT, Duration:
 30 day, Stop date: 17 0:56:00 CST

Notes: Roll in palms of hands gently;  Do not shake `vigorously. (Same as: Humal
og )"Single Patient Use Only "WASTE: F/P - Black; E - Municipal Trash Bin  Stabl
e for 28 days at room temperature.Expires in _____ days from ______________Date

Start Date: 10/28/17

Stop Date: 10/29/17

Status: Discontinued



insulin lispro

1 unit, 0.01 mL, Route: SUB-Q, Drug form: SOLN, Sliding Scale, Dosing Weight 101
.449, kg, PRN Blood Glucose Results, Start date: 10/28/17 1:57:00 CDT, Duration:
 30 day, Stop date: 17 0:56:00 CST

Notes: Roll in palms of hands gently;  Do not shake `vigorously. (Same as: Humal
og )"Single Patient Use Only "WASTE: F/P - Black; E - Municipal Trash Bin  Stabl
e for 28 days at room temperature.Expires in _____ days from ______________Date

Start Date: 10/28/17

Stop Date: 10/29/17

Status: Discontinued



insulin lispro

3 unit, 0.03 mL, Route: SUB-Q, Drug form: SOLN, Sliding Scale, Dosing Weight 101
.449, kg, PRN Blood Glucose Results, Start date: 10/28/17 1:57:00 CDT, Duration:
 30 day, Stop date: 17 0:56:00 CST

Notes: Roll in palms of hands gently;  Do not shake `vigorously. (Same as: Humal
og )"Single Patient Use Only "WASTE: F/P - Black; E - Municipal Trash Bin  Stabl
e for 28 days at room temperature.Expires in _____ days from ______________Date

Start Date: 10/28/17

Stop Date: 10/29/17

Status: Discontinued



lisinopril

2.5 mg, 0.5 tab, Route: PO, Drug form: TAB, Daily, Dosing Weight 101.449, kg, St
art date: 10/28/17 9:00:00 CDT, Duration: 30 day, Stop date: 17 9:00:00 CS
T

Notes: (Same as: Prinivil, Zestril)

Start Date: 10/28/17

Stop Date: 10/29/17

Status: Discontinued



lisinopril 2.5 mg oral tablet

2.5 mg=1 tab, PO, Daily, 0 Refill(s)

Start Date: 10/28/17

Status: Ordered



morphine Sulfate

6 mg, 3 mL, Route: PO, Drug form: SOLN, Q4H, Dosing Weight 101.449, kg, PRN Pain
 Score 7-10, Start date: 10/28/17 1:54:00 CDT, Duration: 30 day, Stop date:  1:53:00 CST

Notes: (Same as:MORPhine Sulfate)

Start Date: 10/28/17

Stop Date: 10/29/17

Status: Discontinued



nitroglycerin 2% ointment

1 inch, Route: TOP, Dosing Weight 102.273, kg, ONCE, STAT, Start date: 10/27/17 
20:32:00 CDT, Stop date: 10/27/17 20:32:00 CDT

Start Date: 10/27/17

Stop Date: 10/27/17

Status: Completed



Norco 10/325 oral tablet

1 tab, Route: PO, Drug Form: TAB, Dosing Weight 102.273, kg, ONCE, STAT, Start d
ate: 10/27/17 20:43:00 CDT, Stop date: 10/27/17 20:43:00 CDT

Start Date: 10/27/17

Stop Date: 10/27/17

Status: Completed



ondansetron

4 mg, 2 mL, Route: IVP, Drug form: INJ, Q6H, Dosing Weight 101.449, kg, PRN Naus
ea & Vomiting, Start date: 10/28/17 1:54:00 CDT, Duration: 30 day, Stop date: 
17 1:53:00 CST

Notes: (Same as: Zofran)  *** MEDICATION WASTE ***Product Size:  4 mgProduct Was
helen:  ___ mg

Start Date: 10/28/17

Stop Date: 10/29/17

Status: Discontinued



ondansetron

4 mg, Route: IVP, ONCE, Dosing Weight 102.273, kg, Priority: STAT, Start date: 1
 20:32:00 CDT, Stop date: 10/27/17 20:32:00 CDT

Start Date: 10/27/17

Stop Date: 10/27/17

Status: Completed



pantoprazole

40 mg, 1 tab, Route: PO, Drug form: ECTAB, Before Breakfast, Dosing Weight 101.4
49, kg, Start date: 10/28/17 7:30:00 CDT, Duration: 30 day, Stop date: 17 
7:30:00 CST

Notes: Tablet should not be chewed or crushed.(Same as: Protonix)

Start Date: 10/28/17

Stop Date: 10/29/17

Status: Discontinued



potassium chloride

40 mEq, 2 tab, Route: PO, Drug form: ERTAB, ONCE, Dosing Weight 101.449, kg, Sta
rt date: 10/28/17 3:48:00 CDT, Stop date: 10/28/17 3:48:00 CDT

Notes: (Same as: K-Dur 20)"Do Not Crush"  With food and full glass of water

Start Date: 10/28/17

Stop Date: 10/28/17

Status: Completed



Ranexa 500 mg oral tablet, extended release

500 mg=1 tab, PO, Q12H, 0 Refill(s)

Start Date: 10/28/17

Status: Ordered



Ranexa 500 mg oral tablet, extended release

500 mg, 1 tab, Route: PO, Drug form: TAB, Q12H, Dosing Weight 101.449, kg, Start
 date: 10/28/17 9:00:00 CDT, Duration: 30 day, Stop date: 17 21:00:00 CST

Notes: Same as Ranexa"Do Not Crush"

Start Date: 10/28/17

Stop Date: 10/29/17

Status: Discontinued



Risperdal

2 mg, 1 tab, Route: PO, Drug form: TAB, Bedtime, Dosing Weight 101.449, kg, Star
t date: 10/28/17 21:00:00 CDT, Duration: 30 day, Stop date: 17 21:00:00 CS
T

Notes: (Same as: Risperdal)

Start Date: 10/28/17

Stop Date: 10/29/17

Status: Discontinued



Risperdal 2 mg oral tablet

2 mg=1 tab, PO, Bedtime, # 60 tab, 0 Refill(s)

Start Date: 10/28/17

Status: Ordered



rivaroxaban

20 mg, 1 tab, Route: PO, Drug form: TAB, Daily, Dosing Weight 101.449, kg, Start
 date: 10/28/17 9:00:00 CDT, Duration: 30 day, Stop date: 17 9:00:00 CST

Notes: (Same as: Xarelto)Administer with food

Start Date: 10/28/17

Stop Date: 10/29/17

Status: Discontinued



Saline Flush 0.9%

10 mL, Route: IVP, Drug Form: INJ, Dosing Weight 102.273, kg, PRN, PRN Line Flus
h, Start date: 10/27/17 20:32:00 CDT, Duration: 30 day, Stop date: 17 19:3
1:00 CST

Notes: (Same as: BD Posiflush)

Start Date: 10/27/17

Stop Date: 10/29/17

Status: Discontinued



Sodium Chloride 0.9% IV

250 mL, Route: IVPB, Start date: 10/28/17 3:54:00 CDT, Duration: 30 day, Stop da
te: 17 2:53:00 CST, PRN Line Flush

Start Date: 10/28/17

Stop Date: 10/29/17

Status: Discontinued



torsemide

20 mg, 1 tab, Route: PO, Drug form: TAB, Daily, Dosing Weight 101.449, kg, Start
 date: 10/28/17 9:00:00 CDT, Duration: 30 day, Stop date: 17 9:00:00 CST

Notes: (Same As: Demadex)

Start Date: 10/28/17

Stop Date: 10/29/17

Status: Discontinued



trazodone 50 mg oral tablet

50 mg, 1 tab, Route: PO, Drug form: TAB, Bedtime, Dosing Weight 101.449, kg, Sta
rt date: 10/28/17 21:00:00 CDT, Duration: 30 day, Stop date: 17 21:00:00 C
ST

Notes: (Same As: Desyrel)

Start Date: 10/28/17

Stop Date: 10/29/17

Status: Discontinued



zolpidem

10 mg, 2 tab, Route: PO, Drug form: TAB, Bedtime, Dosing Weight 101.449, kg, PRN
 Sleep, Start date: 10/28/17 3:49:00 CDT, Duration: 30 day, Stop date: 17 
3:48:00 CST

Notes: (Same As: Ambien)

Start Date: 10/28/17

Stop Date: 10/29/17

Status: Discontinued



Results





ELECTROLYTES





  



                     Most recent to      1                   2



                                         oldest [Reference  



                                         Range]:  

 

  



                     Sodium Lvl [135-145     134 mEq/L           136 mEq/L



                     mEq/L]              *LOW*               (10/27/17 8:37 PM)



                                         (10/28/17 3:51 AM) 

 

  



                     Potassium Lvl       3.1 mEq/L           3.1 mEq/L



                     [3.5-5.1 mEq/L]     *LOW*               *LOW*



                           (10/28/17 3:51 AM)        (10/27/17 8:37 PM)

 

  



                     Chloride Lvl [     92 mEq/L            94 mEq/L



                     mEq/L]              *LOW*               *LOW*



                           (10/28/17 3:51 AM)        (10/27/17 8:37 PM)

 

  



                     CO2 [24-32 mEq/L]     31 mEq/L            34 mEq/L



                           (10/28/17 3:51 AM)        *HI*



                                         (10/27/17 8:37 PM)

 

  



                     AGAP [10.0-20.0     14.1 mEq/L          11.1 mEq/L



                     mEq/L]              (10/28/17 3:51 AM)     (10/27/17 8:37 PM)







CHEM PANEL





  



                     Most recent to      1                   2



                                         oldest [Reference  



                                         Range]:  

 

  



                     Creatinine Lvl      2.00 mg/dL          2.10 mg/dL



                     [0.50-1.40 mg/dL]     *HI*                *HI*



                           (10/28/17 3:51 AM)        (10/27/17 8:37 PM)

 

  



                     eGFR                37 mL/min/1.73m2 1     35 mL/min/1.73m2 2



                           *NA*                      *NA*



                           (10/28/17 3:51 AM)        (10/27/17 8:37 PM)

 

  



                     BUN [7-22 mg/dL]     38 mg/dL            35 mg/dL



                           *HI*                      *HI*



                           (10/28/17 3:51 AM)        (10/27/17 8:37 PM)

 

  



                           B/C Ratio [6-25]          17 



                                         (10/27/17 8:37 PM) 

 

  



                     Glucose Lvl [70-99     159 mg/dL           159 mg/dL



                     mg/dL]              *HI*                *HI*



                           (10/28/17 3:51 AM)        (10/27/17 8:37 PM)

 

  



                           Total Protein             8.5 g/dL 



                           [6.4-8.4 g/dL]            *HI* 



                                         (10/27/17 8:37 PM) 

 

  



                           Albumin Lvl [3.5-5.0      4.2 g/dL 



                           g/dL]                     (10/27/17 8:37 PM) 

 

  



                           Globulin [2.7-4.2         4.3 g/dL 



                           g/dL]                     *HI* 



                                         (10/27/17 8:37 PM) 

 

  



                           A/G Ratio [0.7-1.6]       1.0 



                                         (10/27/17 8:37 PM) 

 

  



                     Calcium Lvl         9.8 mg/dL           9.4 mg/dL



                     [8.5-10.5 mg/dL]     (10/28/17 3:51 AM)     (10/27/17 8:37 PM)

 

  



                           ALT [0-65 unit/L]         34 unit/L 



                                         (10/27/17 8:37 PM) 

 

  



                           AST [0-37 unit/L]         24 unit/L 



                                         (10/27/17 8:37 PM) 

 

  



                           Alk Phos [          140 unit/L 



                           unit/L]                   *HI* 



                                         (10/27/17 8:37 PM) 

 

  



                           Bili Total [0.2-1.3       0.6 mg/dL 



                           mg/dL]                    (10/27/17 8:37 PM) 

 

  



                           Lipase Lvl [        158 unit/L 



                           unit/L]                   (10/27/17 8:37 PM) 







1Result Comment: The eGFR is calculated using the CKD-EPI formula. In most 
young, healthy individuals the eGFR will be >90 mL/min/1.73m2. The eGFR declines
with age. An eGFR of 60-89 may be normal in some populations, particularly the 
elderly, for whom the CKD-EPI formula has not been extensively validated. Use of
the eGFR is not recommended in the following populations:



Individuals with unstable creatinine concentrations, including pregnant patients
and those with serious co-morbid conditions.



Patients with extremes in muscle mass or diet. 



The data above are obtained from the National Kidney Disease Education Program (
NKDEP) which additionally recommends that when the eGFR is used in patients with
extremes of body mass index for purposes of drug dosing, the eGFR should be mul
tiplied by the estimated BMI.



2Result Comment: The eGFR is calculated using the CKD-EPI formula. In most 
young, healthy individuals the eGFR will be >90 mL/min/1.73m2. The eGFR declines
with age. An eGFR of 60-89 may be normal in some populations, particularly the 
elderly, for whom the CKD-EPI formula has not been extensively validated. Use of
the eGFR is not recommended in the following populations:



Individuals with unstable creatinine concentrations, including pregnant patients
and those with serious co-morbid conditions.



Patients with extremes in muscle mass or diet. 



The data above are obtained from the National Kidney Disease Education Program (
NKDEP) which additionally recommends that when the eGFR is used in patients with
extremes of body mass index for purposes of drug dosing, the eGFR should be mul
tiplied by the estimated BMI.



CARDIAC ENZYMES





  



                     Most recent to      1                   2



                                         oldest [Reference  



                                         Range]:  

 

  



                     Total CK [     123 unit/L          139 unit/L



                     unit/L]             (10/28/17 3:51 AM)     (10/27/17 8:37 PM)

 

  



                     CK MB [0.5-3.6      1.2 ng/mL           1.2 ng/mL



                     ng/mL]              (10/28/17 3:51 AM)     (10/27/17 8:37 PM)

 

  



                     CK MB Index         1.0                 0.9



                     [0.0-2.5]           (10/28/17 3:51 AM)     (10/27/17 8:37 PM)

 

  



                     Troponin-I          0.02 ng/mL          0.02 ng/mL



                     [0.00-0.40 ng/mL]     (10/28/17 3:51 AM)     (10/27/17 8:37 PM)

 

  



                           BNP [<=100 pg/mL]         200 pg/mL 



                                         *HI* 



                                         (10/27/17 8:37 PM) 







DRUG SCREEN





  



                     Most recent to      1                   2



                                         oldest [Reference  



                                         Range]:  

 

  



                           U Amph Scr                Negative 



                           [Negative]                *NA* 



                                         (10/27/17 9:45 PM) 

 

  



                           U Annie Scr                Negative 



                           [Negative]                *NA* 



                                         (10/27/17 9:45 PM) 

 

  



                           U Benzodia Scr            Negative 



                           [Negative]                *NA* 



                                         (10/27/17 9:45 PM) 

 

  



                           U Cocaine Scr             Negative 



                           [Negative]                *NA* 



                                         (10/27/17 9:45 PM) 

 

  



                           U Opiate Scr              Negative 



                           [Negative]                *NA* 



                                         (10/27/17 9:45 PM) 

 

  



                           U Phencyc Scr             Negative 



                           [Negative]                *NA* 



                                         (10/27/17 9:45 PM) 

 

  



                           U Cannab Scr              Negative 



                           [Negative]                *NA* 



                                         (10/27/17 9:45 PM) 

 

  



                           UDS Note                  See Note 



                                         (10/27/17 9:45 PM) 







HEMATOLOGY





  



                     Most recent to      1                   2



                                         oldest [Reference  



                                         Range]:  

 

  



                     WBC [3.7-10.4 K/CMM]     10.2 K/CMM          11.2 K/CMM



                           (10/28/17 3:51 AM)        *HI*



                                         (10/27/17 8:37 PM)

 

  



                     RBC [4.70-6.10      5.88 M/CMM          6.17 M/CMM



                     M/CMM]              (10/28/17 3:51 AM)     *HI*



                                         (10/27/17 8:37 PM)

 

  



                     Hgb [14.0-18.0 g/dL]     16.8 g/dL           17.8 g/dL



                           (10/28/17 3:51 AM)        (10/27/17 8:37 PM)

 

  



                     Hct [42.0-54.0 %]     49.8 %              54.1 %



                           (10/28/17 3:51 AM)        *HI*



                                         (10/27/17 8:37 PM)

 

  



                     MCV [80.0-94.0 fL]     84.6 fL             87.7 fL



                           (10/28/17 3:51 AM)        (10/27/17 8:37 PM)

 

  



                     MCH [27.0-31.0 pg]     28.6 pg             28.8 pg



                           (10/28/17 3:51 AM)        (10/27/17 8:37 PM)

 

  



                     MCHC [32.0-36.0     33.8 g/dL           32.8 g/dL



                     g/dL]               (10/28/17 3:51 AM)     (10/27/17 8:37 PM)

 

  



                     RDW [11.5-14.5 %]     16.4 %              16.0 %



                           *HI*                      *HI*



                           (10/28/17 3:51 AM)        (10/27/17 8:37 PM)

 

  



                     Platelet [133-450     187 K/CMM           194 K/CMM



                     K/CMM]              (10/28/17 3:51 AM)     (10/27/17 8:37 PM)

 

  



                     MPV [7.4-10.4 fL]     8.4 fL              8.4 fL



                           (10/28/17 3:51 AM)        (10/27/17 8:37 PM)

 

  



                     Segs [45.0-75.0 %]     66.3 %              81.2 %



                           (10/28/17 3:51 AM)        *HI*



                                         (10/27/17 8:37 PM)

 

  



                     Lymphocytes         18.5 %              11.0 %



                     [20.0-40.0 %]       *LOW*               *LOW*



                           (10/28/17 3:51 AM)        (10/27/17 8:37 PM)

 

  



                     Monocytes [2.0-12.0     13.2 %              7.2 %



                     %]                  *HI*                (10/27/17 8:37 PM)



                                         (10/28/17 3:51 AM) 

 

  



                     Eosinophils [0.0-4.0     1.6 %               0.5 %



                     %]                  (10/28/17 3:51 AM)     (10/27/17 8:37 PM)

 

  



                     Basophils [0.0-1.0     0.4 %               0.1 %



                     %]                  (10/28/17 3:51 AM)     (10/27/17 8:37 PM)

 

  



                     Segs-Bands #        6.7 K/CMM           9.1 K/CMM



                     [1.5-8.1 K/CMM]     (10/28/17 3:51 AM)     *HI*



                                         (10/27/17 8:37 PM)

 

  



                     Lymphocytes #       1.9 K/CMM           1.2 K/CMM



                     [1.0-5.5 K/CMM]     (10/28/17 3:51 AM)     (10/27/17 8:37 PM)

 

  



                     Monocytes # [0.0-0.8     1.3 K/CMM           0.8 K/CMM



                     K/CMM]              *HI*                (10/27/17 8:37 PM)



                                         (10/28/17 3:51 AM) 

 

  



                     Eosinophils #       0.2 K/CMM           0.1 K/CMM



                     [0.0-0.5 K/CMM]     (10/28/17 3:51 AM)     (10/27/17 8:37 PM)

 

  



                           Basophils # [0.0-0.2      0.0 K/CMM 



                           K/CMM]                    (10/27/17 8:37 PM) 

 

  



                           PT [12.0-14.7             26.9 seconds 



                           seconds]                  *HI* 



                                         (10/27/17 8:37 PM) 

 

  



                           INR [0.85-1.17]           2.45 



                                         *HI* 



                                         (10/27/17 8:37 PM) 

 

  



                           PTT [22.9-35.8            42.9 seconds 



                           seconds]                  *HI* 



                                         (10/27/17 8:37 PM) 







Immunizations





Given and Recorded





   



                 Vaccine         Date            Status          Refusal Reason

 

   



                     influenza virus vaccine, inactivated     10/1/13             Given 









Not Given





   



                 Vaccine         Date            Status          Refusal Reason

 

   



                 pneumococcal 23-valent vaccine     17         Not Given       Patient Refuses







Procedures







   



                 Procedure       Date            Related Diagnosis     Body Site

 

   



                                         ICD - Internal cardiac defibrillator   



                                         procedure1   

 

   



                                         torn ligament2   







1placed 2.5years ago



2left knee ligament surgery



Social History







 



                           Social History Type       Response

 

 



                           Substance Abuse           Use: Past.  Type: Cocaine.  Recreational Drug Route: Inhaled.



 

 



                           Alcohol                   Never

 

 



                           Smoking Status            Never smoker; Ready to change: No; Concerns about tobacco use 

in household:



                                         Yes; Exposure to Tobacco Smoke None; Cigarette Smoking Last 365 Days No;



                                         Reg Smoking Cessation Counseling Yes







Assessment and Plan

Extracted from:





  



                     Title: Discharge Summary *     Author: Natalie Mattson NP NP     Date: 10/28/17











Patient:   JOANNA CABRERA            MRN: [id number]            FIN: 
[id number]

Age:   52 years     Sex:  Male     :  1965

Associated Diagnoses:   None

Author:   Natalie Mattson NP NP



Discharge Information

date of admission - 10/27/2017

date of discharge 10/28/2017



Consults:Dr Anderson cardiology



DX

                                        1 .  Acute chest pain to rule out acute coronary syndrome: Cardiac enzymes are negative,

 ACS ruled out. cardiology consulted, Dr. Anderson patient cleared by cardiology for
discharge.

                                        2.  History of chronic atrial fibrillation on Xarelto:  At present, his heart rate

 is stable.  We will continue his home medications including heart rate 
controlling medications and also anticoagulation with Xarelto.

                                        3.  History of advanced congestive heart failure, systolic and diastolic, status

 post pacemaker and defibrillator placement

                                        4.  History of chronic kidney disease stage 3: stable.

                                        5.  History of diabetes mellitus type 2:

                                        6.  History of schizophrenia: stable.

                                        7. hypokalemia - potassium replaced



Physical Examination

General:  Alert and oriented.

Eye:  Pupils are equal, round and reactive to light.

HENT:  Normocephalic.

Neck:  Supple, Non-tender.

Respiratory:  Lungs are clear to auscultation, Respirations are non-labored.

Cardiovascular:  Normal rate, Regular rhythm.

Gastrointestinal:  Soft, Non-tender, Non-distended, Obese.

Integumentary:  Warm, Dry.

Neurologic:  Alert, Oriented.

Psychiatric:  Cooperative, Appropriate mood & affect.



Hospital Course

This is a 52-year-old gentleman with a past medical history of severe dilated 
cardiomyopathy, nonischemic, AICD placement, history of substance abuse, 
hypertension, chronic atrial fibrillation who came into the ER for evaluation of
left-sided chest pain.  In the ER patient received lorazepam as well as 
Nitropaste chest pain improved patient seen by cardiologist Dr. anderson.  According
to cardiology chest pain may be likely due to severe dilated cardiomyopathy.  
Digoxin decreased to 0.125 mg once a day patient is to continue Xarelto for 
anticoagulation.  Patient cleared by cardiology and myocardial infarction ruled 
out.  Therefore will discharge patient home today and he is to follow-up with 
his cardiologist as well as PCP.



Discharge Plan

Discharge patient home

Follow up with PCP as well as cardiology in 1 week

Activities as tolerated

AHA diet

Patient condition stable









 



                           Addendum                  pt. seen and examined with NP





                           by Miles,                 agree with above note





                           Bhargavi JC                  1. chest pain- ekg showed no acute ischemic changes. ce neg. evaluated

 by cardiology and



                           on                        ok for outpatient follow up



                                         10/28/2017 



                                         22:08

## 2019-09-25 NOTE — XMS REPORT
Encounter CCD: 2014 to 2014

                             Created on: 2073



JOANNA CABRERA

External Reference #: [id number]

: 1965

Sex: Male



Demographics







                          Address                   [address] [place] [address]

[place]  [id number]

 

                          Home Phone                [phone]

 

                          Preferred Language        Unknown

 

                          Marital Status            Single

 

                          [employer] Affiliation     [employer]

 

                          Race                      White/

 

                          Ethnic Group              Non-





Author







                          Author                    Auto Generated

 

                          Organization              [facility]

 

                          Address                   Unknown

 

                          Phone                     Unavailable







Care Team Providers







                    Care Team Member Name    Role                Phone

 

                    Julien Denney    CP                  [phone]







Allergies, Adverse Reactions, Alerts







  



                     Substance           Reaction            Status

 

  



                           NKDA                      Active







Problem List







  



                     Condition           Effective Dates     Status

 

  



                     [D]Anterior chest wall pain     2012          Active

 

  



                           Anxiety                   Resolved

 

  



                           BP+ - Hypertension        Active

 

  



                           Cardiomyopathy            Resolved

 

  



                           CHF - Congestive heart failure      Active

 

  



                           CHF - Congestive heart failure      Active

 

  



                           Depression                Active

 

  



                           dm                        Active

 

  



                           Down syndrome             Active

 

  



                           GERD - Gastro-esophageal reflux disease      Active

 

  



                           H/O: schizophrenia        Active

 

  



                           HTN                       Active

 

  



                           ICD (implantable cardiac defibrillator) battery depletion      Active

 

  



                           NIDDM                     Active

 

  



                           Obese build               Active

 

  



                           Schizophrenia             Active

 

  



                           Short of breath on exertion      Active

 

  



                           Sleep apnea               Active







Medications







    



              Medication     Instructions     Start Date     End Date     Status

 

    



              ondansetron     4 mg, 2 mL, Route: IVP, Drug form:     2014     Completed





                                         INJ, ONCE, Dosing Weight 104.545,   



                                         kg, Priority: STAT, Start date:   



                                         14 9:53:00, Stop date:   



                                         14 9:53:00(Same as: Zofran)   

 

    



              aspirin 325 mg     325 mg, 1 tab, Route: PO, Drug     2014     Completed





                           tablet                    form: TAB, ONCE, Dosing Weight   



                                         104.545, kg, Priority: STAT, Start   



                                         date: 14 9:53:00, Stop date:   



                                         14 9:53:00Take with food.   

 

    



                 Sodium Chloride 0.9%     500 mL, Rate: 500 ml/hr, Infuse     2014

                                         Completed



                           (Bolus)  mL         over: 1 hr, Route: IV, Dosing   



                                         Weight 104.545 kg, Total Volume:   



                                         500, Priority: STAT, Start date:   



                                         14 9:53:00, Duration: 1 doses   



                                         or times, Stop date: 14   



                                         10:52:00   

 

    



                 Saline Flush 0.9%     5 ml, Route: IVP, Drug Form: INJ,     2014

                                         Discontinued



                                         Dosing Weight 104.545, kg, PRN, PRN   



                                         Line Flush, Start date: 14   



                                         9:53:00, Duration: 30 day, Stop   



                                         date: 14 10:52:00(Same as: BD   



                                         Posiflush)   

 

    



              Kayexalate     30 gm, 120 mL, Route: PO, Drug     2014     Completed





                                         form: SUSP, ONCE, Dosing Weight   



                                         104.545, kg, Priority: STAT, Start   



                                         date: 14 10:46:00, Stop date:   



                                         14 10:46:00(sodium   



                                         polystyrene sulfonate 15 gm/60 ml   



                                         INA)  Shake well before use.   



                                         (Same as: Kayexalate, SPS)   

 

    



              GI cocktail     30 mL, Route: PO, Drug Form: SUSP,     2014     Completed





                                         Dosing Weight 104.545, kg, ONCE,   



                                         STAT, Start date: 14   



                                         10:50:00, Stop date: 14   



                                         10:50:00G.I. Cocktail=antacid with   



                                         simethicone 22.5 mL - lidocaine   



                                         viscous 7.5 mL   

 

    



                 influenza virus     0.5 ml, Route: IM, Drug Form: SUSP,     10/01/2013      10/01/2013

                                         Completed



                           vaccine, inactivated      Start date: 10/01/13 9:00:00, Stop   



                                         date: 10/01/13 9:00:00   

 

    



                 Maalox Advanced     30 mL, PO, QID-Before Meals, # 500     2014      Ordered



                           Maximum Strength          mL, 0 Refill(s)   



                                         oral suspension    

 

    



                 omeprazole 20 mg     20 mg=1 cap, PO, Daily, # 30 cap, 0     2014

                                         Ordered



                           oral delayed release      Refill(s)   



                                         capsule    

 

    



                 Zofran ODT 4 mg oral     4 mg=1 tab, PO, BID, Nausea and     2014      Ordered



                           tablet,                   Vomiting, Dissolve tab under   



                           disintegrating            tongue, # 10 tab, 0 Refill(s)   



                                         Dissolve tab under tongue   

 

    



              Protonix + sodium     40 mg, Route: IVP, ONCE, Dosing     2014    

 Completed



                           chloride 10 mL            Weight 104.545, kg, Priority: STAT,   



                                         Start date: 14 9:53:00, Stop   



                                         date: 14 9:53:00For IV push   



                                         reconstitute with 10 ml 0.9% sodium   



                                         chloride and push over 2 minutes.   



                                         (Same as: Protonix)   







Immunizations







  



                     Vaccine             Date                Status

 

  



                     influenza virus vaccine, inactivated     10/01/2013          Auth (Verified)







Vital Signs







                Most recent to oldest [Reference Range]:    1               2               3

 

                          Height                    177.8 cm 

                    (2014 09:35:00)                           

 

                          Systolic Blood Pressure [ mmHg]    145 mmHg 

*HI*

                          (2014 12:24:00)      135 mmHg 

                          (2014 10:35:00)      147 mmHg 

*HI*

                                        (2014:35:00)  

 

                          Diastolic Blood Pressure [60-90 mmHg]    84 mmHg 

                          (2014 12:24:00)      88 mmHg 

                          (2014 10:35:00)      116 mmHg 

*HI*

                                        (2014:35:00)  

 

                          Respiratory Rate [14-20 BRMIN]    20 BRMIN 

                          (2014 12:24:00)      18 BRMIN 

                          (2014 10:35:00)      22 BRMIN 

*HI*

                                        (2014:35:00)  

 

                          Peripheral Pulse Rate [ bpm]    87 bpm 

                          (2014 12:24:00)      91 bpm 

                          (2014 10:35:00)      95 bpm 

                                        (2014:35:00)  

 

                          Weight                    104.545 kg 

                    (2014 09:35:00)                           







Results





CHEMISTRY





                          Most recent to oldest [Reference Range]:    1

 

                          Sodium Lvl [135-145 mEq/L]    138 mEq/L 

                                        (2014 09:45:00)  

 

                          Potassium Lvl [3.5-5.1 mEq/L]    5.5 mEq/L 

*HI*

                                        (2014:45:00)  

 

                          Chloride Lvl [ mEq/L]    106 mEq/L 

                                        (2014:45:00)  

 

                          CO2 [24-32 mEq/L]         23 mEq/L 

*LOW*

                                        (2014:45:00)  

 

                          AGAP [10.0-20.0 mEq/L]    14.5 mEq/L 

                                        (2014:45:00)  

 

                          Creatinine Lvl [0.5-1.4 mg/dL]    0.7 mg/dL 

                                        (2014:45:00)  

 

                          eGFR                      112 mL/min/1.73m2 1

*NA*

                                        (2014:45:00)  

 

                          BUN [7-22 mg/dL]          10 mg/dL 

                                        (2014:45:00)  

 

                          B/C Ratio [6-25]          14 

                                        (2014:45:00)  

 

                          Glucose Lvl [70-99 mg/dL]    131 mg/dL 2

*HI*

                                        (2014:45:00)  

 

                          Total Protein [6.4-8.4 g/dL]    6.9 g/dL 

                                        (201445:00)  

 

                          Albumin Lvl [3.5-5.0 g/dL]    3.7 g/dL 

                                        (2014:45:00)  

 

                          Globulin [2.0-4.0 g/dL]    3.2 g/dL 

                                        (2014:45:00)  

 

                          A/G Ratio [0.7-1.6]       1.2 

                                        (2014:45:00)  

 

                          Calcium Lvl [8.5-10.5 mg/dL]    8.3 mg/dL 

*LOW*

                                        (2014:45:00)  

 

                          ALT [0-65 unit/L]         22 unit/L 

                                        (2014:45:00)  

 

                          AST [0-37 unit/L]         36 unit/L 

                                        (2014:45:00)  

 

                          Alk Phos [ unit/L]    158 unit/L 

*HI*

                                        (2014:45:00)  

 

                          Bili Total [0.2-1.3 mg/dL]    2.5 mg/dL 

*HI*

                                        (2014:45:00)  

 

                          Total CK [ unit/L]    162 unit/L 

                                        (2014:45:00)  

 

                          CK MB [0.5-3.6 ng/mL]     1.0 ng/mL 

                                        (2014:45:00)  

 

                          CK MB Index [0.0-2.5]     0.6 

                                        (2014:45:00)  

 

                          Troponin-I [0.00-0.40 ng/mL]    0.02 ng/mL 

                                        (2014:45:00)  

 

                          BNP [<=100 pg/mL]         2487 pg/mL 3

*HI*

                                        (201445:)  







1Result Comment: The eGFR is calculated using the CKD-EPI formula. In most 
young, healthy individuals the eGFR will be >90 mL/min/1.73m2. The eGFR declines
with age. An eGFR of 60-89 may be normal in some populations, particularly the 
elderly, for whom the CKD-EPI formula has not been extensively validated. Use of
the eGFR is not recommended in the following populations:



Individuals with unstable creatinine concentrations, including pregnant patients
and those with serious co-morbid conditions.



Patients with extremes in muscle mass or diet. 



The data above are obtained from the National Kidney Disease Education Program (
NKDEP) which additionally recommends that when the eGFR is used in patients with
extremes of body mass index for purposes of drug dosing, the eGFR should be mul
tiplied by the estimated BMI.



2Interpretive Data: Adult reference range values reflect the clinical guidelines

of the American Diabetes Association.



3Interpretive Data: Elevated results are in line with increasing severity of 

congestive heart failure. Minor elevations between 100 and 300 

may be seen with Myocardial Ischemia, Sodium retaining drugs, 

and compensated/treated heart failure.



HEMATOLOGY





                          Most recent to oldest [Reference Range]:    1

 

                          WBC [3.7-10.4 K/CMM]      9.4 K/CMM 

                                        (201445:)  

 

                          RBC [4.70-6.10 M/CMM]     5.32 M/CMM 

                                        (2014:)  

 

                          Hgb [14.0-18.0 g/dL]      14.0 g/dL 

                                        (2014:)  

 

                          Hct [42.0-54.0 %]         43.3 % 

                                        (2014:)  

 

                          MCV [80.0-94.0 fL]        81.4 fL 

                                        (2014:)  

 

                          MCH [27.0-31.0 pg]        26.3 pg 

*LOW*

                                        (201445:)  

 

                          MCHC [32.0-36.0 g/dL]     32.3 g/dL 

                                        (2014 09:45:00)  

 

                          RDW [11.5-14.5 %]         21.4 % 

*HI*

                                        (2014:45:00)  

 

                          Platelet [133-450 K/CMM]    217 K/CMM 

                                        (2014 09:45:00)  

 

                          MPV [7.4-10.4 fL]         9.2 fL 

                                        (2014:45:00)  

 

                          Segs [45.0-75.0 %]        79.5 % 

*HI*

                                        (2014:45:00)  

 

                          Lymphocytes [20.0-40.0 %]    11.5 % 

*LOW*

                                        (2014:45:00)  

 

                          Monocytes [2.0-12.0 %]    8.4 % 

                                        (2014:45:00)  

 

                          Eosinophils [0.0-4.0 %]    0.4 % 

                                        (2014:45:00)  

 

                          Basophils [0.0-1.0 %]     0.2 % 

                                        (2014:45:00)  

 

                          Segs-Bands # [1.5-8.1 K/CMM]    7.5 K/CMM 

                                        (2014:45:00)  

 

                          Lymphocytes # [1.0-5.5 K/CMM]    1.1 K/CMM 

                                        (2014:45:00)  

 

                          Monocytes # [0.0-0.8 K/CMM]    0.8 K/CMM 

                                        (2014:45:00)  

 

                          Eosinophils # [0.0-0.5 K/CMM]    0.0 K/CMM 

                                        (2014:45:00)  

 

                          Basophils # [0.0-0.2 K/CMM]    0.0 K/CMM 

                                        (2014 09:45:00)

## 2019-09-25 NOTE — DIAGNOSTIC IMAGING REPORT
EXAM: CT Abdomen and Pelvis WITHOUT contrast  

INDICATION:      

^abd pain

^[id number]

^2344

^Y 

COMPARISON: None.

TECHNIQUE: Abdomen and pelvis were scanned utilizing a multidetector helical

scanner from the lung base to the pubic symphysis without administration of IV

contrast. Absence of intravenous contrast decreases sensitivity for detection

of focal lesions and vascular pathology. Coronal and sagittal reformations were

obtained. Routine protocol was performed. 

     IV CONTRAST: None

     ORAL CONTRAST: None

            

COMPLICATIONS: None



RADIATION DOSE:

     Total DLP: 826.88 mGy*cm

     Estimated effective dose: (DLP x 0.015 x size factor) mSv

     CTDIvol has been reviewed. It is below the limits set by the Radiation

Protocol Committee (RPC).



FINDINGS:



LINES and TUBES: None.



LOWER THORAX:  Small right and trace left pleural effusions. Trace pericardial

effusion. Partially seen distal lead of cardiac device, terminating in right

ventricle. Lingular atelectasis/scarring.



HEPATOBILIARY:      Unenhanced liver is unremarkable. No biliary ductal

dilation. 



GALLBLADDER: No radio-opaque stones or sludge.  No wall thickening.



SPLEEN: No splenomegaly. 



PANCREAS: No focal masses or ductal dilatation.  



ADRENALS: No adrenal nodules    



KIDNEYS/URETERS:  No hydronephrosis. Limited for evaluation of renal parenchyma

without intravenous contrast. Nonspecific bilateral perinephric fat stranding. 

No stones.



GI TRACT: No abnormal distention, wall thickening, or evidence of bowel

obstruction.  There are diverticula within the colon without evidence of

diverticulitis.  Appendix is normal.



PELVIC ORGANS/BLADDER: Unremarkable.



LYMPH NODES: No lymphadenopathy.



VESSELS: Unremarkable.



PERITONEUM / RETROPERITONEUM: No free air or fluid.



BONES: Unremarkable.



SOFT TISSUES: Fat-containing bilateral inguinal and a small fat-containing

umbilical hernias.            



IMPRESSION: 

1.  No definite evidence of acute inflammatory process in the abdomen/pelvis,

considering limitations of unenhanced study.

2.  Small right and trace left pleural effusions.

3.  Mild colonic diverticulosis without evidence of diverticulitis.



Signed by: Dr. Nakul Britton MD on 9/25/2019 12:21 AM

## 2019-09-25 NOTE — XMS REPORT
Encounter CCD: 2012 to 2012

                             Created on: 2051



JOANNA CABRERA

External Reference #: [id number]

: 1965

Sex: Male



Demographics







                          Address                   [address] [place] [address]

[place]  [id number]

 

                          Home Phone                [phone]

 

                          Preferred Language        Unknown

 

                          Marital Status            Single

 

                          P[employer] Affiliation     [employer]

 

                          Race                      White/

 

                          Ethnic Group              Non-





Author







                          Author                    Auto Generated

 

                          Organization              [facility]

 

                          Address                   Unknown

 

                          Phone                     Unavailable







Care Team Providers







                    Care Team Member Name    Role                Phone

 

                    Kishan Pink    CP                  [phone]







Allergies, Adverse Reactions, Alerts







  



                     Substance           Reaction            Status

 

  



                           NKDA                      Active







Medications







    



              Medication     Instructions     Start Date     End Date     Status

 

    



                 A/B Otic solution     2 drp, OTIC, Q4H, PRN for pain, 15     2012      Ordered



                                         ml, Substitute Allowed, SOLN   

 

    



                 ibuprofen 800 mg     800 mg, PO, TID, PRN, 10 tab, Pain,     2012

                                         Ordered



                           oral tablet               Substitution Allowed, Take with   



                                         food   



                                         Take with food   

 

    



                 Cortisporin Otic     2 drp, OTIC, QID, 10 ml, Substitute     2012

                                         Ordered



                           solution                  Allowed   

 

    



                 amoxicillin 500 mg     500 mg, 1 tab, PO, TID, 30 tab,     2012 

                                         Ordered



                           oral tablet               Substitution Allowed, TAB   







Vital Signs







                          Most recent to oldest [Reference Range]:    1

 

                          Height                    170.18 cm 

                                        (2012 18:50:00)  

 

                          Weight                    131.364 kg 

                                        (2012 18:50:00)

## 2019-09-25 NOTE — XMS REPORT
Encounter CCD: 2013 to 2013

                             Created on: 2037



JOANNA CABRERA

External Reference #: [id number]

: 1965

Sex: Male



Demographics







                          Address                   [address] [place] [address]

[place]  [id number]

 

                          Home Phone                [phone]

 

                          Preferred Language        Unknown

 

                          Marital Status            Single

 

                          Synagogue Affiliation     Restorationist

 

                          Race                      White/

 

                          Ethnic Group              Non-





Author







                          Author                    Auto Generated

 

                          Organization              [facility]

 

                          Address                   Unknown

 

                          Phone                     Unavailable







Care Team Providers







                    Care Team Member Name    Role                Phone

 

                    LaminzullyAll Ashvin    CP                  [phone]







Allergies, Adverse Reactions, Alerts







  



                     Substance           Reaction            Status

 

  



                           NKDA                      Active







Problem List







  



                     Condition           Effective Dates     Status

 

  



                     [D]Anterior chest wall pain     2012          Active

 

  



                           Anxiety                   Resolved

 

  



                           BP+ - Hypertension        Active

 

  



                           Cardiomyopathy            Resolved

 

  



                           CHF - Congestive heart failure      Active

 

  



                           CHF - Congestive heart failure      Active

 

  



                           Depression                Active

 

  



                           dm                        Active

 

  



                           Down syndrome             Active

 

  



                           GERD - Gastro-esophageal reflux disease      Active

 

  



                           H/O: schizophrenia        Active

 

  



                           HTN                       Active

 

  



                           ICD (implantable cardiac defibrillator) battery depletion      Active

 

  



                           NIDDM                     Active

 

  



                           Obese build               Active

 

  



                           Sleep apnea               Active







Medications







    



              Medication     Instructions     Start Date     End Date     Status

 

    



              Norco 5/325 oral     1-2 tab, PO, Q4-6H, PRN, 15 tab,     2013    

 Suspended



                           tablet                    Pain, Substitution Allowed,   



                                         Maintenance   

 

    



                 influenza virus     0.5 ml, Route: IM, Drug Form: SUSP,     10/01/2013      10/01/2013

                                         Completed



                           vaccine, inactivated      Start date: 10/01/13 9:00:00, Stop   



                                         date: 10/01/13 9:00:00   







Immunizations







  



                     Vaccine             Date                Status

 

  



                     influenza virus vaccine, inactivated     10/01/2013          Auth (Verified)







Vital Signs







                Most recent to oldest [Reference Range]:    1               2               3

 

                          Height                    172.72 cm 

                    (2013 05:46:00)                           

 

                          Temperature Oral [96.4-99.1 DegF]    98.2 DegF 

                          (2013 11:54:00)      98.2 DegF 

                          (2013 09:00:00)      97.7 DegF 

                                        (2013 05:46:00)  

 

                          Systolic Blood Pressure [ mmHg]    135 mmHg 

                          (2013 11:54:00)      142 mmHg 

*HI*

                          (2013 09:00:00)      131 mmHg 

                                        (2013 06:30:00)  

 

                          Diastolic Blood Pressure [60-90 mmHg]    98 mmHg 

*HI*

                          (2013 11:54:00)      104 mmHg 

*HI*

                          (2013 09:00:00)      101 mmHg 

*HI*

                                        (2013 06:30:00)  

 

                          Respiratory Rate [14-20 BRMIN]    18 BRMIN 

                          (2013 11:54:00)      18 BRMIN 

                          (2013 09:00:00)      20 BRMIN 

                                        (2013 06:30:00)  

 

                          Peripheral Pulse Rate [ bpm]    89 bpm 

                          (2013 11:54:00)      99 bpm 

                          (2013 09:00:00)      86 bpm 

                                        (2013 06:30:00)  

 

                          Weight                    102.273 kg 

                    (2013 05:46:00)                           







Results





CHEMISTRY





                          Most recent to oldest [Reference Range]:    1

 

                          Sodium Lvl [135-145 mEq/L]    136 mEq/L 

                                        (2013 06:20:00)  

 

                          Potassium Lvl [3.5-5.1 mEq/L]    4.0 mEq/L 

                                        (2013 06:20:00)  

 

                          Chloride Lvl [ mEq/L]    103 mEq/L 

                                        (2013 06:20:00)  

 

                          CO2 [24-32 mEq/L]         26 mEq/L 

                                        (2013 06:20:00)  

 

                          AGAP [10.0-20.0 mEq/L]    11.0 mEq/L 

                                        (2013 06:20:00)  

 

                          Creatinine Lvl [0.5-1.4 mg/dL]    1.0 mg/dL 

                                        (2013 06:20:00)  

 

                          eGFR                      89 mL/min/1.73m2 1

*NA*

                                        (2013 06:20:00)  

 

                          BUN [7-22 mg/dL]          20 mg/dL 

                                        (2013 06:20:00)  

 

                          Glucose Lvl [70-99 mg/dL]    222 mg/dL 2

*HI*

                                        (2013 06:20:00)  

 

                          Total Protein [6.4-8.4 g/dL]    7.1 g/dL 

                                        (2013 06:20:00)  

 

                          Albumin Lvl [3.5-5.0 g/dL]    3.6 g/dL 

                                        (2013 06:20:00)  

 

                          Globulin [2.0-4.0 g/dL]    3.5 g/dL 

                                        (2013 06:20:00)  

 

                          A/G Ratio [0.7-1.6]       1.0 

                                        (2013 06:20:00)  

 

                          Calcium Lvl [8.5-10.5 mg/dL]    8.5 mg/dL 

                                        (2013 06:20:00)  

 

                          Phosphorus [2.5-4.5 mg/dL]    3.6 mg/dL 

                                        (2013 06:20:00)  

 

                          Magnesium Lvl [1.8-2.4 mg/dL]    1.6 mg/dL 

*LOW*

                                        (2013 06:20:00)  

 

                          ALT [0-65 unit/L]         31 unit/L 

                                        (2013 06:20:00)  

 

                          AST [0-37 unit/L]         15 unit/L 

                                        (2013 06:20:00)  

 

                          Alk Phos [ unit/L]    147 unit/L 

*HI*

                                        (2013 06:20:00)  

 

                          Bili Total [0.2-1.3 mg/dL]    0.9 mg/dL 

                                        (2013 06:20:00)  

 

                          Bili Direct [0.0-0.3 mg/dL]    0.4 mg/dL 

*HI*

                                        (2013 06:20:00)  

 

                          Bili Indirect [0.0-1.0 mg/dL]    0.5 mg/dL 

                                        (2013 06:20:00)  

 

                          Lipase Lvl [ unit/L]    134 unit/L 

                                        (2013 06:20:00)  

 

                          Total CK [ unit/L]    110 unit/L 

                                        (2013 06:20:00)  

 

                          CK MB [0.5-3.6 ng/mL]     1.3 ng/mL 

                                        (2013 06:20:00)  

 

                          CK MB Index [0.0-2.5]     1.2 

                                        (2013 06:20:00)  

 

                          Troponin-I [0.00-0.40 ng/mL]    0.03 ng/mL 

                                        (2013 06:20:00)  

 

                          Myoglobin [25-72 ng/mL]    39 ng/mL 

                                        (2013 06:20:00)  







1Result Comment: The eGFR is calculated using the CKD-EPI formula. In most 
young, healthy individuals the eGFR will be >90 mL/min/1.73m2. The eGFR declines
with age. An eGFR of 60-89 may be normal in some populations, particularly the 
elderly, for whom the CKD-EPI formula has not been extensively validated. Use of
the eGFR is not recommended in the following populations:



Individuals with unstable creatinine concentrations, including pregnant patients
and those with serious co-morbid conditions.



Patients with extremes in muscle mass or diet. 



The data above are obtained from the National Kidney Disease Education Program (
NKDEP) which additionally recommends that when the eGFR is used in patients with
extremes of body mass index for purposes of drug dosing, the eGFR should be mul
tiplied by the estimated BMI.



2Interpretive Data: Adult reference range values reflect the clinical guidelines

of the American Diabetes Association.



HEMATOLOGY





                          Most recent to oldest [Reference Range]:    1

 

                          WBC [3.7-10.4 K/CMM]      9.1 K/CMM 

                                        (2013 06:20:00)  

 

                          RBC [4.70-6.10 M/CMM]     5.39 M/CMM 

                                        (2013:20:00)  

 

                          Hgb [14.0-18.0 g/dL]      14.8 g/dL 

                                        (2013 06:20:00)  

 

                          Hct [42.0-54.0 %]         45.6 % 

                                        (2013:20:00)  

 

                          MCV [80.0-94.0 fL]        84.5 fL 

                                        (2013:20:00)  

 

                          MCH [27.0-31.0 pg]        27.4 pg 

                                        (2013:20:00)  

 

                          MCHC [32.0-36.0 g/dL]     32.5 g/dL 

                                        (2013:20:00)  

 

                          RDW [11.5-14.5 %]         16.4 % 

*HI*

                                        (2013 06:20:00)  

 

                          Platelet [133-450 K/CMM]    213 K/CMM 

                                        (2013 06:20:00)  

 

                          MPV [7.4-10.4 fL]         9.8 fL 

                                        (2013 06:20:00)  

 

                          Segs [45.0-75.0 %]        77.3 % 

*HI*

                                        (2013 06:20:00)  

 

                          Lymphocytes [20.0-40.0 %]    13.0 % 

*LOW*

                                        (2013 06:20:00)  

 

                          Monocytes [2.0-12.0 %]    8.1 % 

                                        (2013 06:20:00)  

 

                          Eosinophils [0.0-4.0 %]    1.2 % 

                                        (2013 06:20:00)  

 

                          Basophils [0.0-1.0 %]     0.4 % 

                                        (2013 06:20:00)  

 

                          Segs-Bands # [1.5-8.1 K/CMM]    7.0 K/CMM 

                                        (2013 06:20:00)  

 

                          Lymphocytes # [1.0-5.5 K/CMM]    1.2 K/CMM 

                                        (2013 06:20:00)  

 

                          Monocytes # [0.0-0.8 K/CMM]    0.7 K/CMM 

                                        (2013 06:20:00)  

 

                          Eosinophils # [0.0-0.5 K/CMM]    0.1 K/CMM 

                                        (2013 06:20:00)  

 

                          Basophils # [0.0-0.2 K/CMM]    0.0 K/CMM 

                                        (2013 06:20:00)  

 

                          PT [12.0-14.7 seconds]    17.2 seconds 

*HI*

                                        (2013 06:20:00)  

 

                          INR [0.85-1.17]           1.42 3

*HI*

                                        (2013 06:20:00)  

 

                          PTT [22.9-35.8 seconds]    29.6 seconds 4

                                        (2013 06:20:00)  







3Interpretive Data: RECOMMENDED RANGES FOR PROTIME INR:

   2.0-3.0 for most medical and surgical thromboembolic states.

   2.5-3.5 for artificial heart valves and recurrent embolism.



INR SHOULD BE USED ONLY FOR PATIENTS ON STABLE ANTICOAGULANT THERAPY.



4Interpretive Data: Heparin Therapeutic Range:  57 - 92 Seconds

## 2019-09-25 NOTE — XMS REPORT
Encounter CCD: 10/21/2013 to 10/22/2013

                             Created on: 2034



JOANNA CABRERA

External Reference #: [id number]

: 1965

Sex: Male



Demographics







                          Address                   [address] [place] [address]

[place]  [id number]

 

                          Home Phone                [phone]

 

                          Preferred Language        Unknown

 

                          Marital Status            Single

 

                          Nondenominational Affiliation     [employer]

 

                          Race                      White/

 

                          Ethnic Group              Non-





Author







                          Author                    Auto Generated

 

                          Organization              [facility]

 

                          Address                   Unknown

 

                          Phone                     Unavailable







Care Team Providers







                    Care Team Member Name    Role                Phone

 

                    Kalpesh Murillo     CP                  [phone]







Allergies, Adverse Reactions, Alerts







  



                     Substance           Reaction            Status

 

  



                           NKDA                      Active







Problem List







  



                     Condition           Effective Dates     Status

 

  



                     [D]Anterior chest wall pain     2012          Active

 

  



                           BP+ - Hypertension        Active

 

  



                           CHF - Congestive heart failure      Active

 

  



                           CHF - Congestive heart failure      Active

 

  



                           Depression                Active

 

  



                           dm                        Active

 

  



                           Down syndrome             Active

 

  



                           GERD - Gastro-esophageal reflux disease      Active

 

  



                           H/O: schizophrenia        Active

 

  



                           HTN                       Active

 

  



                           ICD (implantable cardiac defibrillator) battery depletion      Active

 

  



                           NIDDM                     Active

 

  



                           Obese build               Active

 

  



                           Sleep apnea               Active







Medications







    



              Medication     Instructions     Start Date     End Date     Status

 

    



              aspirin      325 mg, 1 tab, Route: PO, Drug     10/21/2013     10/21/2013     Deleted



                                         form: TAB, ONCE, Dosing Weight   



                                         90.909, kg, Priority: STAT, Start   



                                         date: 10/21/13 16:48:00, Stop date:   



                                         10/21/13 16:48:00Take with food.   

 

    



                 Dextrose 50% Syringe     12.5 gm, 25 mL, Route: IVP, Drug     10/21/2013      10/22/2013

                                         Discontinued



                                         Form: INJ, Dosing Weight 90.909,   



                                         kg, PRN, PRN Blood Glucose Results,   



                                         Start date: 10/21/13 16:42:00,   



                                         Duration: 30 day, Stop date:   



                                         13 15:41:00   

 

    



              glucagon     1 mg, Route: IM, Drug form:     10/21/2013     10/22/2013     Discontinued





                                         PDR/INJ, PRN, Dosing Weight 90.909,   



                                         kg, PRN Blood Glucose Results,   



                                         Start date: 10/21/13 16:42:00,   



                                         Duration: 30 day, Stop date:   



                                         13 15:41:00   

 

    



                 Dextrose 50% Syringe     25 gm, 50 mL, Route: IVP, Drug     10/21/2013      10/22/2013

                                         Discontinued



                                         Form: INJ, Dosing Weight 90.909,   



                                         kg, PRN, PRN Blood Glucose Results,   



                                         Start date: 10/21/13 16:42:00,   



                                         Duration: 30 day, Stop date:   



                                         13 15:41:00   

 

    



                 insulin aspart     4 unit, 0.04 mL, Route: SUB-Q, Drug     10/21/2013      10/22/2013 

                                         Discontinued



                                         form: SOLN, TID-Before Meals,   



                                         Dosing Weight 90.909, kg, PRN Blood   



                                         Glucose Results, Start date:   



                                         10/21/13 16:42:00, Duration: 30   



                                         day, Stop date: 13   



                                         16:41:00Roll in palms of hands   



                                         gently;  Do not shake vigorously.   



                                         (Same as: NovoLog)"single patient   



                                         use only"  Stable for 28 days at   



                                         room temperature.Expires in _____   



                                         days from ______________Date   

 

    



                 insulin aspart     5 unit, 0.05 mL, Route: SUB-Q, Drug     10/21/2013      10/22/2013 

                                         Discontinued



                                         form: SOLN, TID-Before Meals,   



                                         Dosing Weight 90.909, kg, PRN Blood   



                                         Glucose Results, Start date:   



                                         10/21/13 16:42:00, Duration: 30   



                                         day, Stop date: 13   



                                         16:41:00Roll in palms of hands   



                                         gently;  Do not shake vigorously.   



                                         (Same as: NovoLog)"single patient   



                                         use only"  Stable for 28 days at   



                                         room temperature.Expires in _____   



                                         days from ______________Date   

 

    



                 insulin aspart     1 unit, 0.01 mL, Route: SUB-Q, Drug     10/21/2013      10/22/2013 

                                         Discontinued



                                         form: SOLN, TID-Before Meals,   



                                         Dosing Weight 90.909, kg, PRN Blood   



                                         Glucose Results, Start date:   



                                         10/21/13 16:42:00, Duration: 30   



                                         day, Stop date: 13   



                                         16:41:00Roll in palms of hands   



                                         gently;  Do not shake vigorously.   



                                         (Same as: NovoLog)"single patient   



                                         use only"  Stable for 28 days at   



                                         room temperature.Expires in _____   



                                         days from ______________Date   

 

    



                 insulin aspart     3 unit, 0.03 mL, Route: SUB-Q, Drug     10/21/2013      10/22/2013 

                                         Discontinued



                                         form: SOLN, TID-Before Meals,   



                                         Dosing Weight 90.909, kg, PRN Blood   



                                         Glucose Results, Start date:   



                                         10/21/13 16:42:00, Duration: 30   



                                         day, Stop date: 13   



                                         16:41:00Roll in palms of hands   



                                         gently;  Do not shake vigorously.   



                                         (Same as: NovoLog)"single patient   



                                         use only"  Stable for 28 days at   



                                         room temperature.Expires in _____   



                                         days from ______________Date   

 

    



                 insulin aspart     2 unit, 0.02 mL, Route: SUB-Q, Drug     10/21/2013      10/22/2013 

                                         Discontinued



                                         form: SOLN, TID-Before Meals,   



                                         Dosing Weight 90.909, kg, PRN Blood   



                                         Glucose Results, Start date:   



                                         10/21/13 16:42:00, Duration: 30   



                                         day, Stop date: 13   



                                         16:41:00Roll in palms of hands   



                                         gently;  Do not shake vigorously.   



                                         (Same as: NovoLog)"single patient   



                                         use only"  Stable for 28 days at   



                                         room temperature.Expires in _____   



                                         days from ______________Date   

 

    



              Zocor        20 mg, 1 tab, Route: PO, Drug form:     10/21/2013     10/22/2013     Discontinued





                                         TAB, Bedtime, Start date: 10/21/13   



                                         21:00:00, Duration: 30 day, Stop   



                                         date: 13 21:00:00(Same as:   



                                         Zocor)   

 

    



              Lasix        40 mg, 4 mL, Route: IVP, Drug form:     10/21/2013     10/21/2013     Completed





                                         INJ, ONCE, Dosing Weight 90.909,   



                                         kg, Priority: STAT, Start date:   



                                         10/21/13 16:46:00, Stop date:   



                                         10/21/13 16:46:00(Same as: Lasix)   

 

    



              aspirin 81 mg     81 mg, Route: PO, Drug form:     10/22/2013     10/21/2013     Deleted





                           tablet, chewable          CHEWTAB, Daily, Dosing Weight   



                                         90.909, kg, Start date: 10/22/13   



                                         9:00:00, Duration: 30 day, Stop   



                                         date: 13 9:00:00   

 

    



                 nitroglycerin SL Tab     0.4 mg, 1 tab, Route: SL, Drug     10/21/2013      10/22/2013

                                         Discontinued



                                         form: TAB, Q5Min, Dosing Weight   



                                         90.909, kg, PRN Chest Pain, Start   



                                         date: 10/21/13 18:25:00, Duration:   



                                         3 doses or times, Stop date:   



                                         Limited # of times(Same   



                                         as:Nitroquick, Nitrostat)"Do Not   



                                         Crush"  Sublingual tablet   

 

    



                 Saline Flush 0.9%     5 ml, Route: IVP, Drug Form: INJ,     10/21/2013      10/22/2013

                                         Discontinued



                                         Dosing Weight 90.909, kg, PRN, PRN   



                                         Line Flush, Start date: 10/21/13   



                                         18:25:00, Duration: 30 day, Stop   



                                         date: 13 17:24:00(Same as: BD   



                                         Posiflush)   

 

    



                 Saline Flush 0.9%     5 ml, Route: IVP, Drug Form: INJ,     10/21/2013      10/22/2013

                                         Discontinued



                                         Dosing Weight 90.909, kg, Q12H,   



                                         Start date: 10/21/13 21:00:00,   



                                         Duration: 30 day, Stop date:   



                                         13 9:00:00(Same as: BD   



                                         Posiflush)   

 

    



                 metFORmin 500 mg     500 mg, 1 tab, PO, BID-Meals, 30     10/21/2013      Ordered



                           oral tablet               tab, Substitution Allowed   

 

    



              temazepam 30 mg oral     30 mg, 1 cap, PO, Bedtime,     10/21/2013     10/22/2013     Discontinued





                           capsule                   Substitution Allowed, CAP   

 

    



                 Saline Flush 0.9%     5 mL, Route: IVP, Drug Form: INJ,     10/21/2013      10/22/2013

                                         Discontinued



                                         Dosing Weight 90.909, kg, Q8H, PRN   



                                         Line Flush, Start date: 10/21/13   



                                         14:13:00, Duration: 30 day, Stop   



                                         date: 13 14:12:00, Administer   



                                         at least once every 8 hours   



                                         Administer at least once every 8   



                                         hours(Same as: BD Posiflush)   

 

    



              nitroglycerin 2%     0.5 inch, Route: TOP, Drug Form:     10/21/2013     10/21/2013    

 Completed



                           topical ointment          OINT, Dosing Weight 90.909, kg,   



                                         ONCE, STAT, Start date: 10/21/13   



                                         15:24:00, Stop date: 10/21/13   



                                         15:24:001 gram is approximately 1   



                                         inch of nitroglycerin ointment   



                                         (20 mg NTG per gram) (Same   



                                         as:Nitro-Bid)   

 

    



              Protonix     40 mg, 1 tab, Route: PO, Drug form:     10/21/2013     10/22/2013     Discontinued





                                         ECTAB, Before Dinner, Dosing Weight   



                                         90.909, kg, Priority: STAT, Start   



                                         date: 10/21/13 16:41:00, Duration:   



                                         30 day, Stop date: 13   



                                         16:30:00Tablet should not be chewed   



                                         or crushed.(Same as: Protonix)   

 

    



              aspirin      325 mg, 1 tab, Route: PO, Drug     10/21/2013     10/21/2013     Completed



                                         form: TAB, ONCE, Dosing Weight   



                                         90.909, kg, Priority: STAT, Start   



                                         date: 10/21/13 15:24:00, Stop date:   



                                         10/21/13 15:24:00Take with food.   

 

    



              Zofran       4 mg, 2 mL, Route: IVP, Drug form:     10/21/2013     10/22/2013     Discontinued





                                         INJ, Q8H, Dosing Weight 90.909, kg,   



                                         PRN as needed for nausea/vomiting,   



                                         Priority: STAT, Start date:   



                                         10/21/13 16:41:00, Duration: 30   



                                         day, Stop date: 13   



                                         16:40:00(Same as: Zofran)   

 

    



                 influenza virus     0.5 ml, Route: IM, Drug Form: SUSP,     10/01/2013      10/01/2013

                                         Completed



                           vaccine, inactivated      Start date: 10/01/13 9:00:00, Stop   



                                         date: 10/01/13 9:00:00   

 

    



              Geodon       20 mg, 1 cap, Route: PO, Drug form:     10/21/2013     10/22/2013     Discontinued





                                         CAP, Bedtime, Dosing Weight 90.909,   



                                         kg, Start date: 10/21/13 21:00:00,   



                                         Duration: 30 day, Stop date:   



                                         13 21:00:00(Same As:   



                                         Geodon)Give with Food   

 

    



                 Sodium Chloride 0.9%     25 mL, Route: IV, Start date:     10/21/2013      10/22/2013 

                                         Discontinued



                           IV                        10/21/13 14:20:00, Duration: 30   



                                         day, Stop date: 13 13:19:00,   



                                         PRN Line Flush   

 

    



              Remeron      15 mg, 1 tab, Route: PO, Drug form:     10/21/2013     10/22/2013     Discontinued





                                         TAB, Bedtime, Dosing Weight 90.909,   



                                         kg, Start date: 10/21/13 21:00:00,   



                                         Duration: 30 day, Stop date:   



                                         13 21:00:00(Same as:Remeron)   

 

    



              lisinopril     40 mg, 2 tab, Route: PO, Drug form:     10/22/2013     10/22/2013     Discontinued





                                         TAB, Daily, Dosing Weight 90.909,   



                                         kg, Start date: 10/22/13 9:00:00,   



                                         Duration: 30 day, Stop date:   



                                         13 9:00:00(Same as: Prinivil,   



                                         Zestril)   

 

    



                 furosemide 40 mg     40 mg, 1 tab, Route: PO, Drug form:     10/22/2013      10/22/2013

                                         Discontinued



                           oral tablet               TAB, Daily, Dosing Weight 90.909,   



                                         kg, Start date: 10/22/13 9:00:00,   



                                         Duration: 30 day, Stop date:   



                                         13 9:00:00(Same as: Lasix)   



                                         May cause GI upset.  Give with food   



                                         or milk.   

 

    



              clonazepam     2 mg, 4 tab, Route: PO, Drug form:     10/21/2013     10/22/2013     Discontinued





                                         TAB, Bedtime, Dosing Weight 90.909,   



                                         kg, Start date: 10/21/13 21:00:00,   



                                         Duration: 30 day, Stop date:   



                                         13 21:00:00(Same As:   



                                         Klonopin)   

 

    



                 BD Normal Saline     10 mL, Route: IV, Drug Form: INJ,     10/21/2013      10/22/2013 

                                         Discontinued



                           Flush                     PRN, PRN Line Flush, Start date:   



                                         10/21/13 14:20:00, Duration: 30   



                                         day, Stop date: 13   



                                         13:19:00(Same as: BD Posiflush)   

 

    



              carvedilol     3.125 mg, 1 tab, Route: PO, Drug     10/21/2013     10/22/2013     Discontinued





                                         form: TAB, Q12H, Dosing Weight   



                                         90.909, kg, Start date: 10/21/13   



                                         21:00:00, Duration: 30 day, Stop   



                                         date: 13 9:00:00Give with   



                                         food. (Same As: Coreg)   

 

    



                 simvastatin 20 mg     20 mg, 1 tab, PO, Bedtime, 30 tab,     10/22/2013      Ordered



                           oral tablet               Substitution Allowed, TAB   

 

    



                 pantoprazole 40 mg     40 mg, 1 tab, PO, Before Dinner, 30     10/22/2013      Ordered





                           oral enteric coated       tab, Substitution Allowed, ECTAB   



                                         tablet    

 

    



              aspirin      81 mg, 1 tab, Route: PO, Drug form:     10/22/2013     10/22/2013     Discontinued





                                         CHEWTAB, Daily, Dosing Weight   



                                         90.909, kg, Start date: 10/22/13   



                                         9:00:00, Duration: 30 day, Stop   



                                         date: 13 9:00:00Take with   



                                         food.   

 

    



                 acetaminophen 325 mg     650 mg, 2 tab, Route: PO, Drug     10/21/2013      10/22/2013

                                         Discontinued



                           oral tablet               form: TAB, Q4H, Dosing Weight   



                                         90.909, kg, PRN as needed for   



                                         fever, Start date: 10/21/13   



                                         16:38:00, Duration: 30 day, Stop   



                                         date: 13 16:37:00Do not   



                                         exceed 4 gm/day.  (Same as:   



                                         Tylenol)   

 

    



              Lipitor      10 mg, Route: PO, Bedtime, Dosing     10/21/2013     10/21/2013     Deleted





                                         Weight 90.909, kg, Start date:   



                                         10/21/13 21:00:00, Duration: 30   



                                         day, Stop date: 13 21:00:00   







Immunizations







  



                     Vaccine             Date                Status

 

  



                     influenza virus vaccine, inactivated     10/01/2013          Auth (Verified)







Vital Signs







                Most recent to oldest [Reference Range]:    1               2               3

 

                          Height                    172.72 cm 

                          (10/21/2013 19:16:00)      172.72 cm 

                          (10/21/2013 13:59:00)       

 

                          Temperature Oral [96.4-99.1 DegF]    96.0 DegF 

*LOW*

                          (10/22/2013 16:25:00)      96.0 DegF 

*LOW*

                          (10/22/2013 12:41:00)      96.8 DegF 

                                        (10/22/2013 07:48:00)  

 

                          Systolic Blood Pressure [ mmHg]    117 mmHg 

                          (10/22/2013 16:25:00)      108 mmHg 

                          (10/22/2013 12:41:00)      107 mmHg 

                                        (10/22/2013 07:48:00)  

 

                          Diastolic Blood Pressure [60-90 mmHg]    92 mmHg 

*HI*

                          (10/22/2013 16:25:00)      82 mmHg 

                          (10/22/2013 12:41:00)      76 mmHg 

                                        (10/22/2013 07:48:00)  

 

                          Respiratory Rate [14-20 BRMIN]    20 BRMIN 

                          (10/22/2013 16:25:00)      18 BRMIN 

                          (10/22/2013 12:41:00)      18 BRMIN 

                                        (10/22/2013 07:48:00)  

 

                          Peripheral Pulse Rate [ bpm]    64 bpm 

                          (10/22/2013 16:25:00)      55 bpm 

*LOW*

                          (10/22/2013 12:41:00)      65 bpm 

                                        (10/22/2013 07:48:00)  

 

                          Weight                    105.085 kg 

                          (10/21/2013 19:16:00)      90.909 kg 

                          (10/21/2013 13:59:00)       







Results





BEDSIDE GLUCOSE TESTING





                Most recent to  [Reference Range]:    1               2               3

 

                          Glucose POC [70-99 mg/dL]    111 mg/dL 1

*HI*

                          (10/22/2013 17:32:00)      202 mg/dL 2

*HI*

                          (10/22/2013 11:47:00)      103 mg/dL 3

*HI*

                                        (10/22/2013 07:17:00)  

 

                          Gluc POC Comment 1        Notified RN/MD 

*NA*

                    (10/21/2013 21:17:00)                           







1Interpretive Data:  Upper Reportable Limit: 200 mg/dL.



2Interpretive Data:  Upper Reportable Limit: 200 mg/dL.



3Interpretive Data:  Upper Reportable Limit: 200 mg/dL.



VIRAL - SEROLOGY





                Most recent to oldest [Reference Range]:    1               2               3

 

                          Influ A [Negative]        Negative 

                    (10/21/2013 15:55:00)                           

 

                          Influ B [Negative]        Negative 4

                    (10/21/2013 15:55:00)                           







4Interpretive Data:   Influenza A&B Antigen: 

Due to the low sensitivity of this test a negative result does not exclude influ
brendon virus infection. A diagnosis of influenza should be considered based on a p
atient's clinical presentation and empiric antiviral treatment should be conside
red, if indicated. If more conclusive testing is desired, follow-up confirmatory
 testing with either viral culture or PCR is warranted.



CHEMISTRY





                Most recent to oldest [Reference Range]:    1               2               3

 

                          Sodium Lvl [135-145 mEq/L]    139 mEq/L 

                          (10/22/2013 05:25:00)      139 mEq/L 

                          (10/21/2013 14:36:00)       

 

                          Potassium Lvl [3.5-5.1 mEq/L]    3.9 mEq/L 

                          (10/22/2013 05:25:00)      3.9 mEq/L 

                          (10/21/2013 14:36:00)       

 

                          Chloride Lvl [ mEq/L]    105 mEq/L 

                          (10/22/2013 05:25:00)      106 mEq/L 

                          (10/21/2013 14:36:00)       

 

                          CO2 [24-32 mEq/L]         24 mEq/L 

                          (10/22/2013 05:25:00)      26 mEq/L 

                          (10/21/2013 14:36:00)       

 

                          AGAP [10.0-20.0 mEq/L]    13.9 mEq/L 

                          (10/22/2013 05:25:00)      10.9 mEq/L 

                          (10/21/2013 14:36:00)       

 

                          Creatinine Lvl [0.5-1.4 mg/dL]    1.1 mg/dL 

                          (10/22/2013 05:25:00)      1.2 mg/dL 

                          (10/21/2013 14:36:00)       

 

                          eGFR                      79 mL/min/1.73m2 5

*NA*

                          (10/22/2013 05:25:00)      71 mL/min/1.73m2 6

*NA*

                          (10/21/2013 14:36:00)       

 

                          BUN [7-22 mg/dL]          25 mg/dL 

*HI*

                          (10/22/2013 05:25:00)      20 mg/dL 

                          (10/21/2013 14:36:00)       

 

                          B/C Ratio [6-25]          17 

                    (10/21/2013 14:36:00)                           

 

                          Glucose Lvl [70-99 mg/dL]    124 mg/dL 7

*HI*

                          (10/22/2013 05:25:00)      122 mg/dL 8

*HI*

                          (10/21/2013 14:36:00)       

 

                          Total Protein [6.4-8.4 g/dL]    6.5 g/dL 

                    (10/21/2013 14:36:00)                           

 

                          Albumin Lvl [3.5-5.0 g/dL]    3.5 g/dL 

                    (10/21/2013 14:36:00)                           

 

                          Globulin [2.0-4.0 g/dL]    3.0 g/dL 

                    (10/21/2013 14:36:00)                           

 

                          A/G Ratio [0.7-1.6]       1.2 

                    (10/21/2013 14:36:00)                           

 

                          Calcium Lvl [8.5-10.5 mg/dL]    8.0 mg/dL 

*LOW*

                          (10/22/2013 05:25:00)      8.6 mg/dL 

                          (10/21/2013 14:36:00)       

 

                          ALT [0-65 unit/L]         45 unit/L 

                    (10/21/2013 14:36:00)                           

 

                          AST [0-37 unit/L]         26 unit/L 

                    (10/21/2013 14:36:00)                           

 

                          Alk Phos [ unit/L]    131 unit/L 

                    (10/21/2013 14:36:00)                           

 

                          Bili Total [0.2-1.3 mg/dL]    1.1 mg/dL 

                    (10/21/2013 14:36:00)                           

 

                          Total CK [ unit/L]    118 unit/L 

                          (10/21/2013 23:10:00)      110 unit/L 

                          (10/21/2013 19:00:00)      101 unit/L 

                                        (10/21/2013 14:36:00)  

 

                          CK MB [0.5-3.6 ng/mL]     1.5 ng/mL 

                          (10/21/2013 23:10:00)      1.2 ng/mL 

                          (10/21/2013 19:00:00)      1.1 ng/mL 

                                        (10/21/2013 14:36:00)  

 

                          CK MB Index [0.0-2.5]     1.3 

                          (10/21/2013 23:10:00)      1.1 

                          (10/21/2013 19:00:00)      1.1 

                                        (10/21/2013 14:36:00)  

 

                          Troponin-I [0.00-0.40 ng/mL]    0.03 ng/mL 

                          (10/21/2013 23:10:00)      0.03 ng/mL 

                          (10/21/2013 19:00:00)      0.04 ng/mL 

                                        (10/21/2013 14:36:00)  

 

                          BNP [<=100 pg/mL]         1962 pg/mL 9

*HI*

                    (10/21/2013 14:36:00)                           







5Result Comment: The eGFR is calculated using the CKD-EPI formula. In most 
young, healthy individuals the eGFR will be >90 mL/min/1.73m2. The eGFR declines
with age. An eGFR of 60-89 may be normal in some populations, particularly the 
elderly, for whom the CKD-EPI formula has not been extensively validated. Use of
the eGFR is not recommended in the following populations:



Individuals with unstable creatinine concentrations, including pregnant patients
and those with serious co-morbid conditions.



Patients with extremes in muscle mass or diet. 



The data above are obtained from the National Kidney Disease Education Program (
NKDEP) which additionally recommends that when the eGFR is used in patients with
extremes of body mass index for purposes of drug dosing, the eGFR should be mul
tiplied by the estimated BMI.



6Result Comment: The eGFR is calculated using the CKD-EPI formula. In most 
young, healthy individuals the eGFR will be >90 mL/min/1.73m2. The eGFR declines
with age. An eGFR of 60-89 may be normal in some populations, particularly the 
elderly, for whom the CKD-EPI formula has not been extensively validated. Use of
the eGFR is not recommended in the following populations:



Individuals with unstable creatinine concentrations, including pregnant patients
and those with serious co-morbid conditions.



Patients with extremes in muscle mass or diet. 



The data above are obtained from the National Kidney Disease Education Program (
NKDEP) which additionally recommends that when the eGFR is used in patients with
extremes of body mass index for purposes of drug dosing, the eGFR should be mul
tiplied by the estimated BMI.



7Interpretive Data: Adult reference range values reflect the clinical guidelines

of the [employer] Diabetes Association.



8Interpretive Data: Adult reference range values reflect the clinical guidelines

of the American Diabetes Association.



9Interpretive Data: Elevated results are in line with increasing severity of 

congestive heart failure. Minor elevations between 100 and 300 

may be seen with Myocardial Ischemia, Sodium retaining drugs, 

and compensated/treated heart failure.



HEMATOLOGY





                Most recent to oldest [Reference Range]:    1               2               3

 

                          WBC [3.7-10.4 K/CMM]      6.0 K/CMM 

                          (10/22/2013 05:25:00)      6.6 K/CMM 

                          (10/21/2013 14:36:00)       

 

                          RBC [4.70-6.10 M/CMM]     4.90 M/CMM 

                          (10/22/2013 05:25:00)      5.22 M/CMM 

                          (10/21/2013 14:36:00)       

 

                          Hgb [14.0-18.0 g/dL]      14.3 g/dL 

                          (10/22/2013 05:25:00)      15.2 g/dL 

                          (10/21/2013 14:36:00)       

 

                          Hct [42.0-54.0 %]         44.1 % 

                          (10/22/2013 05:25:00)      47.2 % 

                          (10/21/2013 14:36:00)       

 

                          MCV [80.0-94.0 fL]        89.9 fL 

                          (10/22/2013 05:25:00)      90.5 fL 

                          (10/21/2013 14:36:00)       

 

                          MCH [27.0-31.0 pg]        29.1 pg 

                          (10/22/2013 05:25:00)      29.2 pg 

                          (10/21/2013 14:36:00)       

 

                          MCHC [32.0-36.0 g/dL]     32.4 g/dL 

                          (10/22/2013 05:25:00)      32.3 g/dL 

                          (10/21/2013 14:36:00)       

 

                          RDW [11.5-14.5 %]         15.6 % 

*HI*

                          (10/22/2013 05:25:00)      15.4 % 

*HI*

                          (10/21/2013 14:36:00)       

 

                          Platelet [133-450 K/CMM]    198 K/CMM 

                          (10/22/2013 05:25:00)      227 K/CMM 

                          (10/21/2013 14:36:00)       

 

                          MPV [7.4-10.4 fL]         9.2 fL 

                          (10/22/2013 05:25:00)      8.9 fL 

                          (10/21/2013 14:36:00)       

 

                          Segs [45.0-75.0 %]        70.6 % 

                          (10/22/2013 05:25:00)      73.0 % 

                          (10/21/2013 14:36:00)       

 

                          Lymphocytes [20.0-40.0 %]    17.7 % 

*LOW*

                          (10/22/2013 05:25:00)      16.7 % 

*LOW*

                          (10/21/2013 14:36:00)       

 

                          Monocytes [2.0-12.0 %]    9.5 % 

                          (10/22/2013 05:25:00)      9.4 % 

                          (10/21/2013 14:36:00)       

 

                          Eosinophils [0.0-4.0 %]    1.6 % 

                          (10/22/2013 05:25:00)      0.7 % 

                          (10/21/2013 14:36:00)       

 

                          Basophils [0.0-1.0 %]     0.6 % 

                          (10/22/2013 05:25:00)      0.2 % 

                          (10/21/2013 14:36:00)       

 

                          Segs-Bands # [1.5-8.1 K/CMM]    4.3 K/CMM 

                          (10/22/2013 05:25:00)      4.8 K/CMM 

                          (10/21/2013 14:36:00)       

 

                          Lymphocytes # [1.0-5.5 K/CMM]    1.1 K/CMM 

                          (10/22/2013 05:25:00)      1.1 K/CMM 

                          (10/21/2013 14:36:00)       

 

                          Monocytes # [0.0-0.8 K/CMM]    0.6 K/CMM 

                          (10/22/2013 05:25:00)      0.6 K/CMM 

                          (10/21/2013 14:36:00)       

 

                          Eosinophils # [0.0-0.5 K/CMM]    0.1 K/CMM 

                          (10/22/2013 05:25:00)      0.0 K/CMM 

                          (10/21/2013 14:36:00)       

 

                          Basophils # [0.0-0.2 K/CMM]    0.0 K/CMM 

                          (10/22/2013 05:25:00)      0.0 K/CMM 

                          (10/21/2013 14:36:00)       

 

                          PT [12.0-14.7 seconds]    18.5 seconds 

*HI*

                    (10/21/2013 14:36:00)                           

 

                          INR [0.85-1.17]           1.57 10

*HI*

                    (10/21/2013 14:36:00)                           

 

                          PTT [22.9-35.8 seconds]    30.2 seconds 11

                    (10/21/2013 14:36:00)                           







10Interpretive Data: RECOMMENDED RANGES FOR PROTIME INR:

   2.0-3.0 for most medical and surgical thromboembolic states.

   2.5-3.5 for artificial heart valves and recurrent embolism.



INR SHOULD BE USED ONLY FOR PATIENTS ON STABLE ANTICOAGULANT THERAPY.



11Interpretive Data: Heparin Therapeutic Range:  57 - 92 Seconds

## 2019-09-25 NOTE — CONSULTATION
DATE OF CONSULTATION:  09/25/2019

 

Cardiology Consultation 

 

CONSULTING PHYSICIAN:  Dr. Devin Burnette, Interventional Cardiology.

 

REASON FOR CONSULTATION:  Atrial fibrillation, shortness of breath, atypical chest pain.

 

HISTORY OF PRESENT ILLNESS:  Mr. Vasquez is a pleasant 54-year-old man with a history of

morbid obesity, schizophrenia, chronic kidney disease, diverticulosis, chronic heart

failure, reported as systolic with reportedly no significant obstructive CAD requiring

interventions per the patient's report, follows us for this and atrial fibrillation with

Dr. Moran in [facility].  He presents with complaints of watery stools,

yellow to green in color, increased in frequency and associated with abdominal

distention and discomfort.  He also reported some episodes of mild shortness of breath

and short-lived episodes of fleeting chest pain, unaffected by exertion occurring in the

setting of atrial fibrillation.  He denies any syncope or lightheadedness, but does

complain of palpitations associated with it.  He is currently lying down on the floor as

he states he feels better like this and he repetitively requests amiodarone IV drip that

was initiated in the ER, they discontinued as he only wants to take p.o. medications.

__________ repetitive request by the patient.  We explained to him the plan of care

including the potential benefits of amiodarone therapy.  Understanding this, he still

would like to have this discontinued. 

 

REVIEW OF SYSTEMS:

A 12-system review negative except for as noted above.

 

PAST MEDICAL HISTORY:  Atrial fibrillation, chronic heart failure, reported as systolic,

pending confirmation, dyslipidemia, hypertension, schizophrenia, morbid obesity, and

diverticulosis. 

 

SOCIAL HISTORY:  Denies active smoking, alcohol, or drugs.

 

FAMILY HISTORY:  Noncontributory.

 

PHYSICAL EXAMINATION:

VITAL SIGNS:  Temperature 96.8, heart rate 112, blood pressure 125/99, respiratory rate

28, O2 saturation 97%, BMI __________. 

GENERAL:  In no acute distress, alert, active. 

NECK:  No JVD or carotid bruit. 

CHEST:  Clear to auscultation bilaterally. 

CARDIOVASCULAR:  Irregularly irregular rate and rhythm, normal S1 and S2, no S3 or S4.

Systolic ejection murmur 1/6. 

ABDOMEN:  Soft, nontender, distended.  No rebound, no guarding.  Bowel sounds positive. 

EXTREMITIES:  No cyanosis, clubbing, or edema.  Warm distal extremities.

MEDICATIONS:  Reviewed.  On IV amiodarone drip.  Home medications evaluated, include

Xarelto, beta-blocker, ACE inhibitor. 

 

Telemetry; noted to have atrial fibrillation with rapid ventricular response.  EKG

reviewed, atrial fibrillation with rapid ventricular response and poor R-wave

progression in precordial leads. 

 

Studies reviewed.  .  Troponin I negative x2.  Sodium 134, potassium 4.8,

chloride 99, bicarbonate 25, BUN 21, creatinine 1.44, glucose 273.  White blood cells

12.5, hemoglobin 14.7, platelets 272.  INR 1.1, PTT 15.1, PTT 31.2.  AST 29, ALT 17,

alkaline phosphatase 137, total bilirubin 1.4. 

 

Point of contact, Swathi, phone #[phone].

 

ASSESSMENT:  A 54-year-old man with schizophrenia, known atrial fibrillation, known

morbid obesity, chronic heart failure reported as systolic, presents with episodes of

fleeting chest pain, shortness of breath in the setting of atrial fibrillation and

associated diarrhea and abdominal distention. 

 

RECOMMENDATIONS:  

1. Further workup for abdominal discomfort.

2. If no plans for procedures, please resume Xarelto at home dose 20 mg daily.

3. Discontinue amiodarone at the patient's request.

4. Resume beta-blocker therapy and ACE inhibitor.

5. Given pulmonary congestion concerning for acute decompensation of chronic heart

failure, initiate Lasix 40 mg IV b.i.d. and assess response.  The patient had elevated

BNP. 

6. Echocardiogram has been ordered. 

I thank Dr. Arambula for the opportunity to participate in the care of Mr. Vasquez.  Please

feel free to call with any questions. 

 

 

 

______________________________

Devin Burnette MD

 

AFV/MODL

D:  09/25/2019 09:53:17

T:  09/25/2019 16:26:13

Job #:  [id number]

## 2019-09-25 NOTE — XMS REPORT
Encounter CCD: 10/21/2013 to 10/22/2013

                             Created on: 2072



JOANNA CABRERA

External Reference #: [id number]

: 1965

Sex: Male



Demographics







                          Address                   [address] [place] [address]

[place]  [id number]

 

                          Home Phone                [phone]

 

                          Preferred Language        Unknown

 

                          Marital Status            Single

 

                          Restorationism Affiliation     Adventism

 

                          Race                      White/

 

                          Ethnic Group              Non-





Author







                          Author                    Auto Generated

 

                          Organization              [facility]

 

                          Address                   Unknown

 

                          Phone                     Unavailable







Care Team Providers







                    Care Team Member Name    Role                Phone

 

                    Kalpesh Murillo     CP                  [phone]







Allergies, Adverse Reactions, Alerts







  



                     Substance           Reaction            Status

 

  



                           NKDA                      Active







Problem List







  



                     Condition           Effective Dates     Status

 

  



                     [D]Anterior chest wall pain     2012          Active

 

  



                           BP+ - Hypertension        Active

 

  



                           CHF - Congestive heart failure      Active

 

  



                           CHF - Congestive heart failure      Active

 

  



                           Depression                Active

 

  



                           dm                        Active

 

  



                           Down syndrome             Active

 

  



                           GERD - Gastro-esophageal reflux disease      Active

 

  



                           H/O: schizophrenia        Active

 

  



                           HTN                       Active

 

  



                           ICD (implantable cardiac defibrillator) battery depletion      Active

 

  



                           NIDDM                     Active

 

  



                           Obese build               Active

 

  



                           Sleep apnea               Active







Medications







    



              Medication     Instructions     Start Date     End Date     Status

 

    



              aspirin      325 mg, 1 tab, Route: PO, Drug     10/21/2013     10/21/2013     Deleted



                                         form: TAB, ONCE, Dosing Weight   



                                         90.909, kg, Priority: STAT, Start   



                                         date: 10/21/13 16:48:00, Stop date:   



                                         10/21/13 16:48:00Take with food.   

 

    



                 Dextrose 50% Syringe     12.5 gm, 25 mL, Route: IVP, Drug     10/21/2013      10/22/2013

                                         Discontinued



                                         Form: INJ, Dosing Weight 90.909,   



                                         kg, PRN, PRN Blood Glucose Results,   



                                         Start date: 10/21/13 16:42:00,   



                                         Duration: 30 day, Stop date:   



                                         13 15:41:00   

 

    



              glucagon     1 mg, Route: IM, Drug form:     10/21/2013     10/22/2013     Discontinued





                                         PDR/INJ, PRN, Dosing Weight 90.909,   



                                         kg, PRN Blood Glucose Results,   



                                         Start date: 10/21/13 16:42:00,   



                                         Duration: 30 day, Stop date:   



                                         13 15:41:00   

 

    



                 Dextrose 50% Syringe     25 gm, 50 mL, Route: IVP, Drug     10/21/2013      10/22/2013

                                         Discontinued



                                         Form: INJ, Dosing Weight 90.909,   



                                         kg, PRN, PRN Blood Glucose Results,   



                                         Start date: 10/21/13 16:42:00,   



                                         Duration: 30 day, Stop date:   



                                         13 15:41:00   

 

    



                 insulin aspart     4 unit, 0.04 mL, Route: SUB-Q, Drug     10/21/2013      10/22/2013 

                                         Discontinued



                                         form: SOLN, TID-Before Meals,   



                                         Dosing Weight 90.909, kg, PRN Blood   



                                         Glucose Results, Start date:   



                                         10/21/13 16:42:00, Duration: 30   



                                         day, Stop date: 13   



                                         16:41:00Roll in palms of hands   



                                         gently;  Do not shake vigorously.   



                                         (Same as: NovoLog)"single patient   



                                         use only"  Stable for 28 days at   



                                         room temperature.Expires in _____   



                                         days from ______________Date   

 

    



                 insulin aspart     5 unit, 0.05 mL, Route: SUB-Q, Drug     10/21/2013      10/22/2013 

                                         Discontinued



                                         form: SOLN, TID-Before Meals,   



                                         Dosing Weight 90.909, kg, PRN Blood   



                                         Glucose Results, Start date:   



                                         10/21/13 16:42:00, Duration: 30   



                                         day, Stop date: 13   



                                         16:41:00Roll in palms of hands   



                                         gently;  Do not shake vigorously.   



                                         (Same as: NovoLog)"single patient   



                                         use only"  Stable for 28 days at   



                                         room temperature.Expires in _____   



                                         days from ______________Date   

 

    



                 insulin aspart     1 unit, 0.01 mL, Route: SUB-Q, Drug     10/21/2013      10/22/2013 

                                         Discontinued



                                         form: SOLN, TID-Before Meals,   



                                         Dosing Weight 90.909, kg, PRN Blood   



                                         Glucose Results, Start date:   



                                         10/21/13 16:42:00, Duration: 30   



                                         day, Stop date: 13   



                                         16:41:00Roll in palms of hands   



                                         gently;  Do not shake vigorously.   



                                         (Same as: NovoLog)"single patient   



                                         use only"  Stable for 28 days at   



                                         room temperature.Expires in _____   



                                         days from ______________Date   

 

    



                 insulin aspart     3 unit, 0.03 mL, Route: SUB-Q, Drug     10/21/2013      10/22/2013 

                                         Discontinued



                                         form: SOLN, TID-Before Meals,   



                                         Dosing Weight 90.909, kg, PRN Blood   



                                         Glucose Results, Start date:   



                                         10/21/13 16:42:00, Duration: 30   



                                         day, Stop date: 13   



                                         16:41:00Roll in palms of hands   



                                         gently;  Do not shake vigorously.   



                                         (Same as: NovoLog)"single patient   



                                         use only"  Stable for 28 days at   



                                         room temperature.Expires in _____   



                                         days from ______________Date   

 

    



                 insulin aspart     2 unit, 0.02 mL, Route: SUB-Q, Drug     10/21/2013      10/22/2013 

                                         Discontinued



                                         form: SOLN, TID-Before Meals,   



                                         Dosing Weight 90.909, kg, PRN Blood   



                                         Glucose Results, Start date:   



                                         10/21/13 16:42:00, Duration: 30   



                                         day, Stop date: 13   



                                         16:41:00Roll in palms of hands   



                                         gently;  Do not shake vigorously.   



                                         (Same as: NovoLog)"single patient   



                                         use only"  Stable for 28 days at   



                                         room temperature.Expires in _____   



                                         days from ______________Date   

 

    



              Zocor        20 mg, 1 tab, Route: PO, Drug form:     10/21/2013     10/22/2013     Discontinued





                                         TAB, Bedtime, Start date: 10/21/13   



                                         21:00:00, Duration: 30 day, Stop   



                                         date: 13 21:00:00(Same as:   



                                         Zocor)   

 

    



              Lasix        40 mg, 4 mL, Route: IVP, Drug form:     10/21/2013     10/21/2013     Completed





                                         INJ, ONCE, Dosing Weight 90.909,   



                                         kg, Priority: STAT, Start date:   



                                         10/21/13 16:46:00, Stop date:   



                                         10/21/13 16:46:00(Same as: Lasix)   

 

    



              aspirin 81 mg     81 mg, Route: PO, Drug form:     10/22/2013     10/21/2013     Deleted





                           tablet, chewable          CHEWTAB, Daily, Dosing Weight   



                                         90.909, kg, Start date: 10/22/13   



                                         9:00:00, Duration: 30 day, Stop   



                                         date: 13 9:00:00   

 

    



                 nitroglycerin SL Tab     0.4 mg, 1 tab, Route: SL, Drug     10/21/2013      10/22/2013

                                         Discontinued



                                         form: TAB, Q5Min, Dosing Weight   



                                         90.909, kg, PRN Chest Pain, Start   



                                         date: 10/21/13 18:25:00, Duration:   



                                         3 doses or times, Stop date:   



                                         Limited # of times(Same   



                                         as:Nitroquick, Nitrostat)"Do Not   



                                         Crush"  Sublingual tablet   

 

    



                 Saline Flush 0.9%     5 ml, Route: IVP, Drug Form: INJ,     10/21/2013      10/22/2013

                                         Discontinued



                                         Dosing Weight 90.909, kg, PRN, PRN   



                                         Line Flush, Start date: 10/21/13   



                                         18:25:00, Duration: 30 day, Stop   



                                         date: 13 17:24:00(Same as: BD   



                                         Posiflush)   

 

    



                 Saline Flush 0.9%     5 ml, Route: IVP, Drug Form: INJ,     10/21/2013      10/22/2013

                                         Discontinued



                                         Dosing Weight 90.909, kg, Q12H,   



                                         Start date: 10/21/13 21:00:00,   



                                         Duration: 30 day, Stop date:   



                                         13 9:00:00(Same as: BD   



                                         Posiflush)   

 

    



                 metFORmin 500 mg     500 mg, 1 tab, PO, BID-Meals, 30     10/21/2013      Ordered



                           oral tablet               tab, Substitution Allowed   

 

    



              temazepam 30 mg oral     30 mg, 1 cap, PO, Bedtime,     10/21/2013     10/22/2013     Discontinued





                           capsule                   Substitution Allowed, CAP   

 

    



                 Saline Flush 0.9%     5 mL, Route: IVP, Drug Form: INJ,     10/21/2013      10/22/2013

                                         Discontinued



                                         Dosing Weight 90.909, kg, Q8H, PRN   



                                         Line Flush, Start date: 10/21/13   



                                         14:13:00, Duration: 30 day, Stop   



                                         date: 13 14:12:00, Administer   



                                         at least once every 8 hours   



                                         Administer at least once every 8   



                                         hours(Same as: BD Posiflush)   

 

    



              nitroglycerin 2%     0.5 inch, Route: TOP, Drug Form:     10/21/2013     10/21/2013    

 Completed



                           topical ointment          OINT, Dosing Weight 90.909, kg,   



                                         ONCE, STAT, Start date: 10/21/13   



                                         15:24:00, Stop date: 10/21/13   



                                         15:24:001 gram is approximately 1   



                                         inch of nitroglycerin ointment   



                                         (20 mg NTG per gram) (Same   



                                         as:Nitro-Bid)   

 

    



              Protonix     40 mg, 1 tab, Route: PO, Drug form:     10/21/2013     10/22/2013     Discontinued





                                         ECTAB, Before Dinner, Dosing Weight   



                                         90.909, kg, Priority: STAT, Start   



                                         date: 10/21/13 16:41:00, Duration:   



                                         30 day, Stop date: 13   



                                         16:30:00Tablet should not be chewed   



                                         or crushed.(Same as: Protonix)   

 

    



              aspirin      325 mg, 1 tab, Route: PO, Drug     10/21/2013     10/21/2013     Completed



                                         form: TAB, ONCE, Dosing Weight   



                                         90.909, kg, Priority: STAT, Start   



                                         date: 10/21/13 15:24:00, Stop date:   



                                         10/21/13 15:24:00Take with food.   

 

    



              Zofran       4 mg, 2 mL, Route: IVP, Drug form:     10/21/2013     10/22/2013     Discontinued





                                         INJ, Q8H, Dosing Weight 90.909, kg,   



                                         PRN as needed for nausea/vomiting,   



                                         Priority: STAT, Start date:   



                                         10/21/13 16:41:00, Duration: 30   



                                         day, Stop date: 13   



                                         16:40:00(Same as: Zofran)   

 

    



                 influenza virus     0.5 ml, Route: IM, Drug Form: SUSP,     10/01/2013      10/01/2013

                                         Completed



                           vaccine, inactivated      Start date: 10/01/13 9:00:00, Stop   



                                         date: 10/01/13 9:00:00   

 

    



              Geodon       20 mg, 1 cap, Route: PO, Drug form:     10/21/2013     10/22/2013     Discontinued





                                         CAP, Bedtime, Dosing Weight 90.909,   



                                         kg, Start date: 10/21/13 21:00:00,   



                                         Duration: 30 day, Stop date:   



                                         13 21:00:00(Same As:   



                                         Geodon)Give with Food   

 

    



                 Sodium Chloride 0.9%     25 mL, Route: IV, Start date:     10/21/2013      10/22/2013 

                                         Discontinued



                           IV                        10/21/13 14:20:00, Duration: 30   



                                         day, Stop date: 13 13:19:00,   



                                         PRN Line Flush   

 

    



              Remeron      15 mg, 1 tab, Route: PO, Drug form:     10/21/2013     10/22/2013     Discontinued





                                         TAB, Bedtime, Dosing Weight 90.909,   



                                         kg, Start date: 10/21/13 21:00:00,   



                                         Duration: 30 day, Stop date:   



                                         13 21:00:00(Same as:Remeron)   

 

    



              lisinopril     40 mg, 2 tab, Route: PO, Drug form:     10/22/2013     10/22/2013     Discontinued





                                         TAB, Daily, Dosing Weight 90.909,   



                                         kg, Start date: 10/22/13 9:00:00,   



                                         Duration: 30 day, Stop date:   



                                         13 9:00:00(Same as: Prinivil,   



                                         Zestril)   

 

    



                 furosemide 40 mg     40 mg, 1 tab, Route: PO, Drug form:     10/22/2013      10/22/2013

                                         Discontinued



                           oral tablet               TAB, Daily, Dosing Weight 90.909,   



                                         kg, Start date: 10/22/13 9:00:00,   



                                         Duration: 30 day, Stop date:   



                                         13 9:00:00(Same as: Lasix)   



                                         May cause GI upset.  Give with food   



                                         or milk.   

 

    



              clonazepam     2 mg, 4 tab, Route: PO, Drug form:     10/21/2013     10/22/2013     Discontinued





                                         TAB, Bedtime, Dosing Weight 90.909,   



                                         kg, Start date: 10/21/13 21:00:00,   



                                         Duration: 30 day, Stop date:   



                                         13 21:00:00(Same As:   



                                         Klonopin)   

 

    



                 BD Normal Saline     10 mL, Route: IV, Drug Form: INJ,     10/21/2013      10/22/2013 

                                         Discontinued



                           Flush                     PRN, PRN Line Flush, Start date:   



                                         10/21/13 14:20:00, Duration: 30   



                                         day, Stop date: 13   



                                         13:19:00(Same as: BD Posiflush)   

 

    



              carvedilol     3.125 mg, 1 tab, Route: PO, Drug     10/21/2013     10/22/2013     Discontinued





                                         form: TAB, Q12H, Dosing Weight   



                                         90.909, kg, Start date: 10/21/13   



                                         21:00:00, Duration: 30 day, Stop   



                                         date: 13 9:00:00Give with   



                                         food. (Same As: Coreg)   

 

    



                 simvastatin 20 mg     20 mg, 1 tab, PO, Bedtime, 30 tab,     10/22/2013      Ordered



                           oral tablet               Substitution Allowed, TAB   

 

    



                 pantoprazole 40 mg     40 mg, 1 tab, PO, Before Dinner, 30     10/22/2013      Ordered





                           oral enteric coated       tab, Substitution Allowed, ECTAB   



                                         tablet    

 

    



              aspirin      81 mg, 1 tab, Route: PO, Drug form:     10/22/2013     10/22/2013     Discontinued





                                         CHEWTAB, Daily, Dosing Weight   



                                         90.909, kg, Start date: 10/22/13   



                                         9:00:00, Duration: 30 day, Stop   



                                         date: 13 9:00:00Take with   



                                         food.   

 

    



                 acetaminophen 325 mg     650 mg, 2 tab, Route: PO, Drug     10/21/2013      10/22/2013

                                         Discontinued



                           oral tablet               form: TAB, Q4H, Dosing Weight   



                                         90.909, kg, PRN as needed for   



                                         fever, Start date: 10/21/13   



                                         16:38:00, Duration: 30 day, Stop   



                                         date: 13 16:37:00Do not   



                                         exceed 4 gm/day.  (Same as:   



                                         Tylenol)   

 

    



              Lipitor      10 mg, Route: PO, Bedtime, Dosing     10/21/2013     10/21/2013     Deleted





                                         Weight 90.909, kg, Start date:   



                                         10/21/13 21:00:00, Duration: 30   



                                         day, Stop date: 13 21:00:00   







Immunizations







  



                     Vaccine             Date                Status

 

  



                     influenza virus vaccine, inactivated     10/01/2013          Auth (Verified)







Vital Signs







                Most recent to oldest [Reference Range]:    1               2               3

 

                          Height                    172.72 cm 

                          (10/21/2013 19:16:00)      172.72 cm 

                          (10/21/2013 13:59:00)       

 

                          Temperature Oral [96.4-99.1 DegF]    96.0 DegF 

*LOW*

                          (10/22/2013 16:25:00)      96.0 DegF 

*LOW*

                          (10/22/2013 12:41:00)      96.8 DegF 

                                        (10/22/2013 07:48:00)  

 

                          Systolic Blood Pressure [ mmHg]    117 mmHg 

                          (10/22/2013 16:25:00)      108 mmHg 

                          (10/22/2013 12:41:00)      107 mmHg 

                                        (10/22/2013 07:48:00)  

 

                          Diastolic Blood Pressure [60-90 mmHg]    92 mmHg 

*HI*

                          (10/22/2013 16:25:00)      82 mmHg 

                          (10/22/2013 12:41:00)      76 mmHg 

                                        (10/22/2013 07:48:00)  

 

                          Respiratory Rate [14-20 BRMIN]    20 BRMIN 

                          (10/22/2013 16:25:00)      18 BRMIN 

                          (10/22/2013 12:41:00)      18 BRMIN 

                                        (10/22/2013 07:48:00)  

 

                          Peripheral Pulse Rate [ bpm]    64 bpm 

                          (10/22/2013 16:25:00)      55 bpm 

*LOW*

                          (10/22/2013 12:41:00)      65 bpm 

                                        (10/22/2013 07:48:00)  

 

                          Weight                    105.085 kg 

                          (10/21/2013 19:16:00)      90.909 kg 

                          (10/21/2013 13:59:00)       







Results





BEDSIDE GLUCOSE TESTING





                Most recent to  [Reference Range]:    1               2               3

 

                          Glucose POC [70-99 mg/dL]    111 mg/dL 1

*HI*

                          (10/22/2013 17:32:00)      202 mg/dL 2

*HI*

                          (10/22/2013 11:47:00)      103 mg/dL 3

*HI*

                                        (10/22/2013 07:17:00)  

 

                          Gluc POC Comment 1        Notified RN/MD 

*NA*

                    (10/21/2013 21:17:00)                           







1Interpretive Data:  Upper Reportable Limit: 200 mg/dL.



2Interpretive Data:  Upper Reportable Limit: 200 mg/dL.



3Interpretive Data:  Upper Reportable Limit: 200 mg/dL.



VIRAL - SEROLOGY





                Most recent to oldest [Reference Range]:    1               2               3

 

                          Influ A [Negative]        Negative 

                    (10/21/2013 15:55:00)                           

 

                          Influ B [Negative]        Negative 4

                    (10/21/2013 15:55:00)                           







4Interpretive Data:   Influenza A&B Antigen: 

Due to the low sensitivity of this test a negative result does not exclude influ
brendon virus infection. A diagnosis of influenza should be considered based on a p
atient's clinical presentation and empiric antiviral treatment should be conside
red, if indicated. If more conclusive testing is desired, follow-up confirmatory
 testing with either viral culture or PCR is warranted.



CHEMISTRY





                Most recent to oldest [Reference Range]:    1               2               3

 

                          Sodium Lvl [135-145 mEq/L]    139 mEq/L 

                          (10/22/2013 05:25:00)      139 mEq/L 

                          (10/21/2013 14:36:00)       

 

                          Potassium Lvl [3.5-5.1 mEq/L]    3.9 mEq/L 

                          (10/22/2013 05:25:00)      3.9 mEq/L 

                          (10/21/2013 14:36:00)       

 

                          Chloride Lvl [ mEq/L]    105 mEq/L 

                          (10/22/2013 05:25:00)      106 mEq/L 

                          (10/21/2013 14:36:00)       

 

                          CO2 [24-32 mEq/L]         24 mEq/L 

                          (10/22/2013 05:25:00)      26 mEq/L 

                          (10/21/2013 14:36:00)       

 

                          AGAP [10.0-20.0 mEq/L]    13.9 mEq/L 

                          (10/22/2013 05:25:00)      10.9 mEq/L 

                          (10/21/2013 14:36:00)       

 

                          Creatinine Lvl [0.5-1.4 mg/dL]    1.1 mg/dL 

                          (10/22/2013 05:25:00)      1.2 mg/dL 

                          (10/21/2013 14:36:00)       

 

                          eGFR                      79 mL/min/1.73m2 5

*NA*

                          (10/22/2013 05:25:00)      71 mL/min/1.73m2 6

*NA*

                          (10/21/2013 14:36:00)       

 

                          BUN [7-22 mg/dL]          25 mg/dL 

*HI*

                          (10/22/2013 05:25:00)      20 mg/dL 

                          (10/21/2013 14:36:00)       

 

                          B/C Ratio [6-25]          17 

                    (10/21/2013 14:36:00)                           

 

                          Glucose Lvl [70-99 mg/dL]    124 mg/dL 7

*HI*

                          (10/22/2013 05:25:00)      122 mg/dL 8

*HI*

                          (10/21/2013 14:36:00)       

 

                          Total Protein [6.4-8.4 g/dL]    6.5 g/dL 

                    (10/21/2013 14:36:00)                           

 

                          Albumin Lvl [3.5-5.0 g/dL]    3.5 g/dL 

                    (10/21/2013 14:36:00)                           

 

                          Globulin [2.0-4.0 g/dL]    3.0 g/dL 

                    (10/21/2013 14:36:00)                           

 

                          A/G Ratio [0.7-1.6]       1.2 

                    (10/21/2013 14:36:00)                           

 

                          Calcium Lvl [8.5-10.5 mg/dL]    8.0 mg/dL 

*LOW*

                          (10/22/2013 05:25:00)      8.6 mg/dL 

                          (10/21/2013 14:36:00)       

 

                          ALT [0-65 unit/L]         45 unit/L 

                    (10/21/2013 14:36:00)                           

 

                          AST [0-37 unit/L]         26 unit/L 

                    (10/21/2013 14:36:00)                           

 

                          Alk Phos [ unit/L]    131 unit/L 

                    (10/21/2013 14:36:00)                           

 

                          Bili Total [0.2-1.3 mg/dL]    1.1 mg/dL 

                    (10/21/2013 14:36:00)                           

 

                          Total CK [ unit/L]    118 unit/L 

                          (10/21/2013 23:10:00)      110 unit/L 

                          (10/21/2013 19:00:00)      101 unit/L 

                                        (10/21/2013 14:36:00)  

 

                          CK MB [0.5-3.6 ng/mL]     1.5 ng/mL 

                          (10/21/2013 23:10:00)      1.2 ng/mL 

                          (10/21/2013 19:00:00)      1.1 ng/mL 

                                        (10/21/2013 14:36:00)  

 

                          CK MB Index [0.0-2.5]     1.3 

                          (10/21/2013 23:10:00)      1.1 

                          (10/21/2013 19:00:00)      1.1 

                                        (10/21/2013 14:36:00)  

 

                          Troponin-I [0.00-0.40 ng/mL]    0.03 ng/mL 

                          (10/21/2013 23:10:00)      0.03 ng/mL 

                          (10/21/2013 19:00:00)      0.04 ng/mL 

                                        (10/21/2013 14:36:00)  

 

                          BNP [<=100 pg/mL]         1962 pg/mL 9

*HI*

                    (10/21/2013 14:36:00)                           







5Result Comment: The eGFR is calculated using the CKD-EPI formula. In most 
young, healthy individuals the eGFR will be >90 mL/min/1.73m2. The eGFR declines
with age. An eGFR of 60-89 may be normal in some populations, particularly the 
elderly, for whom the CKD-EPI formula has not been extensively validated. Use of
the eGFR is not recommended in the following populations:



Individuals with unstable creatinine concentrations, including pregnant patients
and those with serious co-morbid conditions.



Patients with extremes in muscle mass or diet. 



The data above are obtained from the National Kid[employer] Education Program (
NK[employer]) which additionally recommends that when the eGFR is used in patients with
extremes of body mass index for purposes of drug dosing, the eGFR should be mul
tiplied by the estimated BMI.



6Result Comment: The eGFR is calculated using the CKD-EPI formula. In most 
young, healthy individuals the eGFR will be >90 mL/min/1.73m2. The eGFR declines
with age. An eGFR of 60-89 may be normal in some populations, particularly the 
elderly, for whom the CKD-EPI formula has not been extensively validated. Use of
the eGFR is not recommended in the following populations:



Individuals with unstable creatinine concentrations, including pregnant patients
and those with serious co-morbid conditions.



Patients with extremes in muscle mass or diet. 



The data above are obtained from the National Kid[employer] Education Program (
NK[employer]) which additionally recommends that when the eGFR is used in patients with
extremes of body mass index for purposes of drug dosing, the eGFR should be mul
tiplied by the estimated BMI.



7Interpretive Data: Adult reference range values reflect the clinical guidelines

of the [employer].



8Interpretive Data: Adult reference range values reflect the clinical guidelines

of the [employer].



9Interpretive Data: Elevated results are in line with increasing severity of 

congestive heart failure. Minor elevations between 100 and 300 

may be seen with Myocardial Ischemia, Sodium retaining drugs, 

and compensated/treated heart failure.



HEMATOLOGY





                Most recent to oldest [Reference Range]:    1               2               3

 

                          WBC [3.7-10.4 K/CMM]      6.0 K/CMM 

                          (10/22/2013 05:25:00)      6.6 K/CMM 

                          (10/21/2013 14:36:00)       

 

                          RBC [4.70-6.10 M/CMM]     4.90 M/CMM 

                          (10/22/2013 05:25:00)      5.22 M/CMM 

                          (10/21/2013 14:36:00)       

 

                          Hgb [14.0-18.0 g/dL]      14.3 g/dL 

                          (10/22/2013 05:25:00)      15.2 g/dL 

                          (10/21/2013 14:36:00)       

 

                          Hct [42.0-54.0 %]         44.1 % 

                          (10/22/2013 05:25:00)      47.2 % 

                          (10/21/2013 14:36:00)       

 

                          MCV [80.0-94.0 fL]        89.9 fL 

                          (10/22/2013 05:25:00)      90.5 fL 

                          (10/21/2013 14:36:00)       

 

                          MCH [27.0-31.0 pg]        29.1 pg 

                          (10/22/2013 05:25:00)      29.2 pg 

                          (10/21/2013 14:36:00)       

 

                          MCHC [32.0-36.0 g/dL]     32.4 g/dL 

                          (10/22/2013 05:25:00)      32.3 g/dL 

                          (10/21/2013 14:36:00)       

 

                          RDW [11.5-14.5 %]         15.6 % 

*HI*

                          (10/22/2013 05:25:00)      15.4 % 

*HI*

                          (10/21/2013 14:36:00)       

 

                          Platelet [133-450 K/CMM]    198 K/CMM 

                          (10/22/2013 05:25:00)      227 K/CMM 

                          (10/21/2013 14:36:00)       

 

                          MPV [7.4-10.4 fL]         9.2 fL 

                          (10/22/2013 05:25:00)      8.9 fL 

                          (10/21/2013 14:36:00)       

 

                          Segs [45.0-75.0 %]        70.6 % 

                          (10/22/2013 05:25:00)      73.0 % 

                          (10/21/2013 14:36:00)       

 

                          Lymphocytes [20.0-40.0 %]    17.7 % 

*LOW*

                          (10/22/2013 05:25:00)      16.7 % 

*LOW*

                          (10/21/2013 14:36:00)       

 

                          Monocytes [2.0-12.0 %]    9.5 % 

                          (10/22/2013 05:25:00)      9.4 % 

                          (10/21/2013 14:36:00)       

 

                          Eosinophils [0.0-4.0 %]    1.6 % 

                          (10/22/2013 05:25:00)      0.7 % 

                          (10/21/2013 14:36:00)       

 

                          Basophils [0.0-1.0 %]     0.6 % 

                          (10/22/2013 05:25:00)      0.2 % 

                          (10/21/2013 14:36:00)       

 

                          Segs-Bands # [1.5-8.1 K/CMM]    4.3 K/CMM 

                          (10/22/2013 05:25:00)      4.8 K/CMM 

                          (10/21/2013 14:36:00)       

 

                          Lymphocytes # [1.0-5.5 K/CMM]    1.1 K/CMM 

                          (10/22/2013 05:25:00)      1.1 K/CMM 

                          (10/21/2013 14:36:00)       

 

                          Monocytes # [0.0-0.8 K/CMM]    0.6 K/CMM 

                          (10/22/2013 05:25:00)      0.6 K/CMM 

                          (10/21/2013 14:36:00)       

 

                          Eosinophils # [0.0-0.5 K/CMM]    0.1 K/CMM 

                          (10/22/2013 05:25:00)      0.0 K/CMM 

                          (10/21/2013 14:36:00)       

 

                          Basophils # [0.0-0.2 K/CMM]    0.0 K/CMM 

                          (10/22/2013 05:25:00)      0.0 K/CMM 

                          (10/21/2013 14:36:00)       

 

                          PT [12.0-14.7 seconds]    18.5 seconds 

*HI*

                    (10/21/2013 14:36:00)                           

 

                          INR [0.85-1.17]           1.57 10

*HI*

                    (10/21/2013 14:36:00)                           

 

                          PTT [22.9-35.8 seconds]    30.2 seconds 11

                    (10/21/2013 14:36:00)                           







10Interpretive Data: RECOMMENDED RANGES FOR PROTIME INR:

   2.0-3.0 for most medical and surgical thromboembolic states.

   2.5-3.5 for artificial heart valves and recurrent embolism.



INR SHOULD BE USED ONLY FOR PATIENTS ON STABLE ANTICOAGULANT THERAPY.



11Interpretive Data: Heparin Therapeutic Range:  57 - 92 Seconds

## 2019-09-25 NOTE — XMS REPORT
Encounter CCD: 10/29/2013 to 10/31/2013

                             Created on: 2048



JOANNA CABRERA

External Reference #: [id number]

: 1965

Sex: Male



Demographics







                          Address                   [address] [place] [address]

[place]  [id number]

 

                          Home Phone                [phone]

 

                          Preferred Language        Unknown

 

                          Marital Status            Single

 

                          Yazdanism Affiliation     Jain

 

                          Race                      White/

 

                          Ethnic Group              Non-





Author







                          Author                    Auto Generated

 

                          Organization              [facility]

 

                          Address                   Unknown

 

                          Phone                     Unavailable







Care Team Providers







                    Care Team Member Name    Role                Phone

 

                    Lashon Puckett    CP                  [phone]







Allergies, Adverse Reactions, Alerts







  



                     Substance           Reaction            Status

 

  



                           NKDA                      Active







Problem List







  



                     Condition           Effective Dates     Status

 

  



                     [D]Anterior chest wall pain     2012          Active

 

  



                           BP+ - Hypertension        Active

 

  



                           Cardiomyopathy            Resolved

 

  



                           CHF - Congestive heart failure      Active

 

  



                           CHF - Congestive heart failure      Active

 

  



                           Depression                Active

 

  



                           dm                        Active

 

  



                           Down syndrome             Active

 

  



                           GERD - Gastro-esophageal reflux disease      Active

 

  



                           H/O: schizophrenia        Active

 

  



                           HTN                       Active

 

  



                           ICD (implantable cardiac defibrillator) battery depletion      Active

 

  



                           NIDDM                     Active

 

  



                           Obese build               Active

 

  



                           Sleep apnea               Active







Medications







    



              Medication     Instructions     Start Date     End Date     Status

 

    



              Geodon       20 mg, 1 cap, Route: PO, Drug form:     10/30/2013     10/31/2013     Discontinued





                                         CAP, Bedtime, Dosing Weight   



                                         127.273, kg, Start date: 10/30/13   



                                         21:00:00, Duration: 30 day, Stop   



                                         date: 13 21:00:00(Same As:   



                                         Geodon)Give with Food   

 

    



              simvastatin     20 mg, 1 tab, Route: PO, Drug form:     10/30/2013     10/31/2013     Discontinued





                                         TAB, Bedtime, Dosing Weight   



                                         127.273, kg, Start date: 10/30/13   



                                         21:00:00, Duration: 30 day, Stop   



                                         date: 13 21:00:00(Same as:   



                                         Zocor)   

 

    



              pantoprazole     40 mg, 1 tab, Route: PO, Drug form:     10/30/2013     10/31/2013     

Discontinued



                                         ECTAB, Before Dinner, Dosing Weight   



                                         127.273, kg, Start date: 10/30/13   



                                         16:30:00, Duration: 30 day, Stop   



                                         date: 13 16:30:00Tablet   



                                         should not be chewed or   



                                         crushed.(Same as: Protonix)   

 

    



              Remeron      15 mg, 1 tab, Route: PO, Drug form:     10/30/2013     10/31/2013     Discontinued





                                         TAB, Bedtime, Dosing Weight   



                                         127.273, kg, Start date: 10/30/13   



                                         21:00:00, Duration: 30 day, Stop   



                                         date: 13 21:00:00(Same   



                                         as:Remeron)   

 

    



              metFORmin 500 mg     500 mg, 1 tab, Route: PO, Drug     10/30/2013     10/31/2013     Discontinued





                           oral tablet               form: TAB, BID-Meals, Dosing Weight   



                                         127.273, kg, Start date: 10/30/13   



                                         17:00:00, Duration: 30 day, Stop   



                                         date: 13 8:00:00(Same as:   



                                         Glucophage)  Take with meal   

 

    



              clonazepam     2 mg, 2 tab, Route: PO, Drug form:     10/30/2013     10/31/2013     Discontinued





                                         TAB, Bedtime, Dosing Weight   



                                         127.273, kg, Start date: 10/30/13   



                                         21:00:00, Duration: 30 day, Stop   



                                         date: 13 21:00:00(Same As:   



                                         Klonopin)   

 

    



              aspirin      81 mg, 1 tab, Route: PO, Drug form:     10/30/2013     10/31/2013     Discontinued





                                         CHEWTAB, Daily, Dosing Weight   



                                         127.273, kg, Start date: 10/30/13   



                                         12:57:00, Duration: 30 day, Stop   



                                         date: 13 9:00:00Take with   



                                         food.   

 

    



                 Saline Flush 0.9%     5 mL, Route: IVP, Drug Form: INJ,     10/29/2013      10/30/2013

                                         Discontinued



                                         Dosing Weight 127.273, kg, Q8H, PRN   



                                         Line Flush, Start date: 10/29/13   



                                         10:02:00, Duration: 30 day, Stop   



                                         date: 13 10:01:00, Administer   



                                         at least once every 8 hours   



                                         Administer at least once every 8   



                                         hours(Same as: BD Posiflush)   

 

    



              aspirin      324 mg, 4 tab, Route: PO, Drug     10/29/2013     10/29/2013     Completed



                                         form: CHEWTAB, ONCE, Dosing Weight   



                                         127.273, kg, Priority: STAT, Start   



                                         date: 10/29/13 10:02:00, Stop date:   



                                         10/29/13 10:02:00Take with food.   

 

    



              potassium chloride     40 mEq, 2 tab, Route: PO, Drug     10/29/2013     10/29/2013    

 Completed



                                         form: ERTAB, ONCE, Dosing Weight   



                                         127.273, kg, Start date: 10/29/13   



                                         15:28:00, Stop date: 10/29/13   



                                         15:28:00(Same as: K-Dur 20)"Do Not   



                                         Crush"  With food and full glass of   



                                         water   

 

    



                 pantoprazole 40 mg     40 mg, 1 tab, PO, Before Dinner, 30     10/31/2013      Ordered





                           oral enteric coated       tab, Substitution Allowed, ECTAB   



                                         tablet    

 

    



                 simvastatin 20 mg     20 mg, 1 tab, PO, Bedtime, 30 tab,     10/31/2013      Ordered



                           oral tablet               Substitution Allowed, TAB   

 

    



                 furosemide 40 mg     40 mg, 1 tab, PO, Daily, 30 tab,     10/31/2013      Ordered



                           oral tablet               Substitution Allowed, TAB   

 

    



              metFORmin 500 mg     500 mg, 1 tab, PO, BID-Meals, 60     10/31/2013     2013    

 Ordered



                           oral tablet               tab, Substitution Allowed, TAB   

 

    



                 lisinopril 40 mg     40 mg, 1 tab, PO, Daily, 14 tab,     10/31/2013      Ordered



                           oral tablet               Substitution Allowed, TAB   

 

    



                 carvedilol 6.25 mg     6.25 mg, 1 tab, PO, Q12H, 60 tab,     10/31/2013      2013

                                         Ordered



                           oral tablet               Substitution Allowed, TAB   

 

    



                 Aspirin Low Dose 81     81 mg, 1 tab, PO, Daily, 30 tab,     10/31/2013      Ordered



                           mg oral tablet            Substitution Allowed, TAB   

 

    



              enoxaparin     40 mg, 0.4 mL, Route: SUB-Q, Drug     10/29/2013     10/31/2013     Discontinued





                                         form: INJ, [id number], Dosing Weight   



                                         127.273, kg, Start date: 10/29/13   



                                         15:00:00, Duration: 30 day, Stop   



                                         date: 13 15:00:00(Same as:   



                                         Lovenox)   

 

    



              temazepam     15 mg, 1 cap, Route: PO, Drug form:     10/29/2013     10/31/2013     Discontinued





                                         CAP, Bedtime, Dosing Weight   



                                         127.273, kg, PRN Sleep, Start date:   



                                         10/29/13 15:31:00, Duration: 30   



                                         day, Stop date: 13   



                                         15:30:00(Same As: Restoril)   

 

    



                 Norco 5/325 oral     1 tab, Route: PO, Drug Form: TAB,     10/29/2013      10/31/2013 

                                         Discontinued



                           tablet                    Dosing Weight 127.273, kg, Q6H, PRN   



                                         Pain, Start date: 10/29/13   



                                         15:31:00, Duration: 30 day, Stop   



                                         date: 13 15:30:00(Same as:   



                                         Norco 325/5)  Do not exceed 4gm/day   



                                         of acetaminophen.   

 

    



                 Saline Flush 0.9%     3 ml, Route: IVP, Drug Form: INJ,     10/30/2013      10/31/2013

                                         Discontinued



                                         Dosing Weight 127.273, kg, Q8H,   



                                         Start date: 10/30/13 0:00:00,   



                                         Duration: 30 day, Stop date:   



                                         13 16:00:00(Same as: BD   



                                         Posiflush)   

 

    



                 Saline Flush 0.9%     5 ml, Route: IVP, Drug Form: INJ,     10/29/2013      10/31/2013

                                         Discontinued



                                         Dosing Weight 127.273, kg, PRN, PRN   



                                         Line Flush, Start date: 10/29/13   



                                         15:16:00, Duration: 30 day, Stop   



                                         date: 13 14:15:00(Same as: BD   



                                         Posiflush)   

 

    



              Tylenol      650 mg, 2 tab, Route: PO, Drug     10/29/2013     10/31/2013     Discontinued





                                         form: TAB, Q6H, Dosing Weight   



                                         127.273, kg, PRN Fever, Start date:   



                                         10/29/13 15:30:00, Duration: 30   



                                         day, Stop date: 13 15:29:00Do   



                                         not exceed 4 gm/day.  (Same as:   



                                         Tylenol)   

 

    



              Zofran       4 mg, 2 mL, Route: IV, Drug form:     10/29/2013     10/31/2013     Discontinued





                                         INJ, Q8H, Dosing Weight 127.273,   



                                         kg, PRN Nausea, Start date:   



                                         10/29/13 15:30:00, Duration: 30   



                                         day, Stop date: 13   



                                         14:29:00(Same as: Zofran)   

 

    



                 spironolactone     25 mg, 1 tab, Route: PO, Drug form:     10/30/2013      10/31/2013 

                                         Discontinued



                                         TAB, Daily, Dosing Weight 127.273,   



                                         kg, Start date: 10/30/13 9:00:00,   



                                         Duration: 30 day, Stop date:   



                                         13 9:00:00(Same As:   



                                         Aldactone)   

 

    



                 influenza virus     0.5 ml, Route: IM, Drug Form: SUSP,     10/01/2013      10/01/2013

                                         Completed



                           vaccine, inactivated      Start date: 10/01/13 9:00:00, Stop   



                                         date: 10/01/13 9:00:00   

 

    



              Lasix        20 mg, 2 mL, Route: IV, Drug form:     10/29/2013     10/31/2013     Discontinued





                                         INJ, BID, Dosing Weight 127.273,   



                                         kg, Start date: 10/29/13 17:00:00,   



                                         Duration: 30 day, Stop date:   



                                         13 9:00:00(Same as: Lasix)   

 

    



              Lasix        40 mg, Route: IVP, Drug form: INJ,     10/30/2013     10/29/2013     Canceled





                                         Daily, Dosing Weight 127.273, kg,   



                                         Start date: 10/30/13 9:00:00,   



                                         Duration: 30 day, Stop date:   



                                         13 9:00:00   

 

    



              lisinopril     40 mg, 2 tab, Route: PO, Drug form:     10/30/2013     10/31/2013     Discontinued





                                         TAB, Daily, Dosing Weight 127.273,   



                                         kg, Start date: 10/30/13 9:00:00,   



                                         Duration: 30 day, Stop date:   



                                         13 9:00:00(Same as: Prinivil,   



                                         Zestril)   

 

    



                 Lasix 40 mg oral     40 mg, 1 tab, Route: PO, Drug form:     10/31/2013      10/31/2013

                                         Canceled



                           tablet                    TAB, BID, Dosing Weight 127.273,   



                                         kg, Start date: 10/31/13 17:00:00,   



                                         Duration: 30 day, Stop date:   



                                         13 9:00:00(Same as: Lasix)   



                                         May cause GI upset.  Give with food   



                                         or milk.   

 

    



              Coreg        6.25 mg, 1 tab, Route: PO, Drug     10/29/2013     10/31/2013     Discontinued





                                         form: TAB, Q12H, Dosing Weight   



                                         127.273, kg, Start date: 10/29/13   



                                         21:00:00, Duration: 30 day, Stop   



                                         date: 13 9:00:00Give with   



                                         food. (Same As: Coreg)   

 

    



                 spironolactone 25 mg     25 mg, 1 tab, PO, Daily, 30 tab,     10/31/2013      2013

                                         Ordered



                           oral tablet               Substitution Allowed, TAB   

 

    



              Lasix        40 mg, 4 mL, Route: IVP, Drug form:     10/29/2013     10/29/2013     Completed





                                         INJ, ONCE, Dosing Weight 127.273,   



                                         kg, Priority: STAT, Start date:   



                                         10/29/13 13:02:00, Stop date:   



                                         10/29/13 13:02:00(Same as: Lasix)   







Immunizations







  



                     Vaccine             Date                Status

 

  



                     influenza virus vaccine, inactivated     10/01/2013          Auth (Verified)







Vital Signs







                Most recent to oldest [Reference Range]:    1               2               3

 

                          Height                    172.72 cm 

                    (10/29/2013 09:50:00)                           

 

                          Current Weight            105 kg 

                          (10/31/2013 00:00:00)      107.007 kg 

                          (10/30/2013 01:21:00)       

 

                          Temperature Oral [96.4-99.1 DegF]    97.7 DegF 

                          (10/31/2013 12:00:00)      97.8 DegF 

                          (10/31/2013 08:18:00)      97.5 DegF 

                                        (10/31/2013 04:00:00)  

 

                          Systolic Blood Pressure [ mmHg]    127 mmHg 

                          (10/31/2013 12:00:00)      156 mmHg 

*HI*

                          (10/31/2013 08:18:00)      117 mmHg 

                                        (10/31/2013 04:00:00)  

 

                          Diastolic Blood Pressure [60-90 mmHg]    61 mmHg 

                          (10/31/2013 12:00:00)      86 mmHg 

                          (10/31/2013 08:18:00)      86 mmHg 

                                        (10/31/2013 04:00:00)  

 

                          Respiratory Rate [14-20 BRMIN]    18 BRMIN 

                          (10/31/2013 12:00:00)      18 BRMIN 

                          (10/31/2013 08:18:00)      16 BRMIN 

                                        (10/31/2013 04:00:00)  

 

                          Peripheral Pulse Rate [ bpm]    50 bpm 

*LOW*

                          (10/31/2013 12:00:00)      88 bpm 

                          (10/31/2013 08:18:00)      79 bpm 

                                        (10/31/2013 04:00:00)  

 

                          Weight                    127.273 kg 

                    (10/29/2013 09:50:00)                           







Results





INFECTIOUS DISEASES





                Most recent to oldest [Reference Range]:    1               2               3

 

                          C difficile DNA [Negative]    Negative 1

                    (10/29/2013 21:30:00)                           







1Interpretive Data: [employer] Clostridium difficile assay utilizes 
loop-mediated isothermal DNA amplification (LAMP) technology to detect a 204 bp 
region of the tcdA gene within the PaLoc gene segment present in all known 
toxigenic C. difficile strains.



The assay utilizes FDA cleared IVD reagents. Performance characteristics have be
en verified by the [facility] within the [facility]. The [facility] is authorized under the Clinical Labo
ratory Improvement Amendment of 1988 (CLIA-88) to perform high complexity testin
g.



BEDSIDE GLUCOSE TESTING





                Most recent to oldest [Reference Range]:    1               2               3

 

                          Glucose POC [70-99 mg/dL]    194 mg/dL 2

*HI*

                    (10/31/2013 11:32:00)                           

 

                          Gluc POC Comment 1        Notified RN/MD 

*NA*

                    (10/31/2013 11:32:00)                           







2Interpretive Data:  Upper Reportable Limit: 200 mg/dL.



STOOL TESTS





                Most recent to oldest [Reference Range]:    1               2               3

 

                          Fecal Leukocyte           None Seen 3

                    (10/29/2013 21:30:00)                           







3Interpretive Data: A Value of None Seen, Rare, or Few is Normal.



CHEMISTRY





                Most recent to oldest [Reference Range]:    1               2               3

 

                          Sodium Lvl [135-145 mEq/L]    144 mEq/L 

                          (10/31/2013 05:34:00)      143 mEq/L 

                          (10/30/2013 04:07:00)      141 mEq/L 

                                        (10/29/2013 09:55:00)  

 

                          Potassium Lvl [3.5-5.1 mEq/L]    3.7 mEq/L 

                          (10/31/2013 05:34:00)      3.6 mEq/L 

                          (10/30/2013 04:07:00)      3.4 mEq/L 

*LOW*

                                        (10/29/2013 09:55:00)  

 

                          Chloride Lvl [ mEq/L]    103 mEq/L 

                          (10/31/2013 05:34:00)      104 mEq/L 

                          (10/30/2013 04:07:00)      101 mEq/L 

                                        (10/29/2013 09:55:00)  

 

                          CO2 [24-32 mEq/L]         27 mEq/L 

                          (10/31/2013 05:34:00)      26 mEq/L 

                          (10/30/2013 04:07:00)      28 mEq/L 

                                        (10/29/2013 09:55:00)  

 

                          AGAP [10.0-20.0 mEq/L]    17.7 mEq/L 

                          (10/31/2013 05:34:00)      16.6 mEq/L 

                          (10/30/2013 04:07:00)      15.4 mEq/L 

                                        (10/29/2013 09:55:00)  

 

                          Creatinine Lvl [0.5-1.4 mg/dL]    1.3 mg/dL 

                          (10/31/2013 05:34:00)      1.3 mg/dL 4

                          (10/30/2013 04:07:00)      1.0 mg/dL 

                                        (10/29/2013 15:44:00)  

 

                          eGFR                      65 mL/min/1.73m2 5

*NA*

                          (10/31/2013 05:34:00)      65 mL/min/1.73m2 6

*NA*

                          (10/30/2013 04:07:00)      89 mL/min/1.73m2 7

*NA*

                                        (10/29/2013 15:44:00)  

 

                          BUN [7-22 mg/dL]          18 mg/dL 

                          (10/31/2013 05:34:00)      15 mg/dL 

                          (10/30/2013 04:07:00)      10 mg/dL 

                                        (10/29/2013 09:55:00)  

 

                          B/C Ratio [6-25]          8 

                    (10/29/2013 09:55:00)                           

 

                          Glucose Lvl [70-99 mg/dL]    94 mg/dL 8

                          (10/31/2013 05:34:00)      131 mg/dL 9

*HI*

                          (10/30/2013 04:07:00)      114 mg/dL 10

*HI*

                                        (10/29/2013 09:55:00)  

 

                          Total Protein [6.4-8.4 g/dL]    6.9 g/dL 

                    (10/29/2013 09:55:00)                           

 

                          Albumin Lvl [3.5-5.0 g/dL]    3.7 g/dL 

                    (10/29/2013 09:55:00)                           

 

                          Globulin [2.0-4.0 g/dL]    3.2 g/dL 

                    (10/29/2013 09:55:00)                           

 

                          A/G Ratio [0.7-1.6]       1.2 

                    (10/29/2013 09:55:00)                           

 

                          Calcium Lvl [8.5-10.5 mg/dL]    8.3 mg/dL 

*LOW*

                          (10/31/2013 05:34:00)      8.8 mg/dL 

                          (10/30/2013 04:07:00)      8.6 mg/dL 

                                        (10/29/2013 09:55:00)  

 

                          Phosphorus [2.5-4.5 mg/dL]    4.7 mg/dL 

*HI*

                    (10/30/2013 04:07:00)                           

 

                          Magnesium Lvl [1.8-2.4 mg/dL]    1.7 mg/dL 

*LOW*

                    (10/30/2013 04:07:00)                           

 

                          ALT [0-65 unit/L]         31 unit/L 

                    (10/29/2013 09:55:00)                           

 

                          AST [0-37 unit/L]         24 unit/L 

                    (10/29/2013 09:55:00)                           

 

                          Alk Phos [ unit/L]    178 unit/L 

*HI*

                    (10/29/2013 09:55:00)                           

 

                          Bili Total [0.2-1.3 mg/dL]    1.2 mg/dL 

                    (10/29/2013 09:55:00)                           

 

                          Lipase Lvl [ unit/L]    89 unit/L 

                    (10/29/2013 09:55:00)                           

 

                          Total CK [ unit/L]    144 unit/L 

                          (10/30/2013 04:07:00)      171 unit/L 

                          (10/29/2013 21:22:00)      168 unit/L 

                                        (10/29/2013 15:44:00)  

 

                          CK MB [0.5-3.6 ng/mL]     1.6 ng/mL 

                          (10/30/2013 04:07:00)      1.7 ng/mL 

                          (10/29/2013 21:22:00)      1.6 ng/mL 

                                        (10/29/2013 15:44:00)  

 

                          CK MB Index [0.0-2.5]     1.1 

                          (10/30/2013 04:07:00)      1.0 

                          (10/29/2013 21:22:00)      1.0 

                                        (10/29/2013 15:44:00)  

 

                          Troponin-I [0.00-0.40 ng/mL]    0.04 ng/mL 

                          (10/30/2013 04:07:00)      0.02 ng/mL 

                          (10/29/2013 21:22:00)      0.03 ng/mL 

                                        (10/29/2013 15:44:00)  

 

                          BNP [<=100 pg/mL]         1829 pg/mL 11

*HI*

                    (10/29/2013 09:55:00)                           







4Result Comment: reviewed 10/30/2013 06:40  gr



5Result Comment: The eGFR is calculated using the CKD-EPI formula. In most 
young, healthy individuals the eGFR will be >90 mL/min/1.73m2. The eGFR declines
with age. An eGFR of 60-89 may be normal in some populations, particularly the 
elderly, for whom the CKD-EPI formula has not been extensively validated. Use of
the eGFR is not recommended in the following populations:



Individuals with unstable creatinine concentrations, including pregnant patients
and those with serious co-morbid conditions.



Patients with extremes in muscle mass or diet. 



The data above are obtained from the National Kid[employer] Education Program (
NK[employer]) which additionally recommends that when the eGFR is used in patients with
extremes of body mass index for purposes of drug dosing, the eGFR should be mul
tiplied by the estimated BMI.



6Result Comment: The eGFR is calculated using the CKD-EPI formula. In most 
young, healthy individuals the eGFR will be >90 mL/min/1.73m2. The eGFR declines
with age. An eGFR of 60-89 may be normal in some populations, particularly the 
elderly, for whom the CKD-EPI formula has not been extensively validated. Use of
the eGFR is not recommended in the following populations:



Individuals with unstable creatinine concentrations, including pregnant patients
and those with serious co-morbid conditions.



Patients with extremes in muscle mass or diet. 



The data above are obtained from the National Kid[employer] Education Program (
NK[employer]) which additionally recommends that when the eGFR is used in patients with
extremes of body mass index for purposes of drug dosing, the eGFR should be mul
tiplied by the estimated BMI.



7Result Comment: The eGFR is calculated using the CKD-EPI formula. In most 
young, healthy individuals the eGFR will be >90 mL/min/1.73m2. The eGFR declines
with age. An eGFR of 60-89 may be normal in some populations, particularly the 
elderly, for whom the CKD-EPI formula has not been extensively validated. Use of
the eGFR is not recommended in the following populations:



Individuals with unstable creatinine concentrations, including pregnant patients
and those with serious co-morbid conditions.



Patients with extremes in muscle mass or diet. 



The data above are obtained from the National Kid[employer] Education Program (
NK[employer]) which additionally recommends that when the eGFR is used in patients with
extremes of body mass index for purposes of drug dosing, the eGFR should be mul
tiplied by the estimated BMI.



8Interpretive Data: Adult reference range values reflect the clinical guidelines

of the [employer].



9Interpretive Data: Adult reference range values reflect the clinical guidelines

of the [employer].



10Interpretive Data: Adult reference range values reflect the clinical 
guidelines

of the [employer].



11Interpretive Data: Elevated results are in line with increasing severity of 

congestive heart failure. Minor elevations between 100 and 300 

may be seen with Myocardial Ischemia, Sodium retaining drugs, 

and compensated/treated heart failure.



HEMATOLOGY





                Most recent to oldest [Reference Range]:    1               2               3

 

                          WBC [3.7-10.4 K/CMM]      6.8 K/CMM 

                          (10/30/2013 04:07:00)      7.1 K/CMM 

                          (10/29/2013 09:55:00)       

 

                          RBC [4.70-6.10 M/CMM]     5.05 M/CMM 

                          (10/30/2013 04:07:00)      5.17 M/CMM 

                          (10/29/2013 09:55:00)       

 

                          Hgb [14.0-18.0 g/dL]      14.5 g/dL 

                          (10/30/2013 04:07:00)      15.1 g/dL 

                          (10/29/2013 09:55:00)       

 

                          Hct [42.0-54.0 %]         44.5 % 

                          (10/30/2013 04:07:00)      46.2 % 

                          (10/29/2013 09:55:00)       

 

                          MCV [80.0-94.0 fL]        88.3 fL 

                          (10/30/2013 04:07:00)      89.4 fL 

                          (10/29/2013 09:55:00)       

 

                          MCH [27.0-31.0 pg]        28.8 pg 

                          (10/30/2013 04:07:00)      29.1 pg 

                          (10/29/2013 09:55:00)       

 

                          MCHC [32.0-36.0 g/dL]     32.6 g/dL 

                          (10/30/2013 04:07:00)      32.6 g/dL 

                          (10/29/2013 09:55:00)       

 

                          RDW [11.5-14.5 %]         15.3 % 

*HI*

                          (10/30/2013 04:07:00)      15.7 % 

*HI*

                          (10/29/2013 09:55:00)       

 

                          Platelet [133-450 K/CMM]    205 K/CMM 

                          (10/30/2013 04:07:00)      189 K/CMM 

                          (10/29/2013 15:44:00)      190 K/CMM 

                                        (10/29/2013 09:55:00)  

 

                          MPV [7.4-10.4 fL]         9.4 fL 

                          (10/30/2013 04:07:00)      9.5 fL 

                          (10/29/2013 09:55:00)       

 

                          Segs [45.0-75.0 %]        78.0 % 

*HI*

                    (10/29/2013 09:55:00)                           

 

                          Lymphocytes [20.0-40.0 %]    13.2 % 

*LOW*

                    (10/29/2013 09:55:00)                           

 

                          Monocytes [2.0-12.0 %]    7.9 % 

                    (10/29/2013 09:55:00)                           

 

                          Eosinophils [0.0-4.0 %]    0.5 % 

                    (10/29/2013 09:55:00)                           

 

                          Basophils [0.0-1.0 %]     0.4 % 

                    (10/29/2013 09:55:00)                           

 

                          Segs-Bands # [1.5-8.1 K/CMM]    5.6 K/CMM 

                    (10/29/2013 09:55:00)                           

 

                          Lymphocytes # [1.0-5.5 K/CMM]    0.9 K/CMM 

*LOW*

                    (10/29/2013 09:55:00)                           

 

                          Monocytes # [0.0-0.8 K/CMM]    0.6 K/CMM 

                    (10/29/2013 09:55:00)                           

 

                          Eosinophils # [0.0-0.5 K/CMM]    0.0 K/CMM 

                    (10/29/2013 09:55:00)                           

 

                          Basophils # [0.0-0.2 K/CMM]    0.0 K/CMM 

                    (10/29/2013 09:55:00)                           

 

                          PT [12.0-14.7 seconds]    17.5 seconds 

*HI*

                    (10/29/2013 09:55:00)                           

 

                          INR [0.85-1.17]           1.46 12

*HI*

                    (10/29/2013 09:55:00)                           

 

                          PTT [22.9-35.8 seconds]    29.5 seconds 13

                          (10/29/2013 15:44:00)      29.5 seconds 14

                          (10/29/2013 09:55:00)       







12Interpretive Data: RECOMMENDED RANGES FOR PROTIME INR:

   2.0-3.0 for most medical and surgical thromboembolic states.

   2.5-3.5 for artificial heart valves and recurrent embolism.



INR SHOULD BE USED ONLY FOR PATIENTS ON STABLE ANTICOAGULANT THERAPY.



13Interpretive Data: Heparin Therapeutic Range:  57 - 92 Seconds



14Interpretive Data: Heparin Therapeutic Range:  57 - 92 Seconds



Microbiology Reports







PROCEDURE:Culture: Stool

                  STATUS:                  Auth (Verified)





                BODY SITE:                      COLLECTED DATE/TIME:    10/29/2013 21:30:00

 

                SOURCE:         Stool           FREE TEXT SOURCE:     







***FINAL REPORTS***



Final Report                               



Normal Enteric Neena Isolated 

No Salmonella, Shigella, Or Campylobacter Isolated



***PRELIMINARY REPORTS***



Preliminary Report                               



Normal Enteric Neena Isolated



Preliminary Report                               



Normal Enteric Neena Isolated 

No Salmonella Or Shigella Isolated

## 2019-09-25 NOTE — XMS REPORT
Encounter CCD: 10/11/2013 to 10/13/2013

                             Created on: 2053



JOANNA CABRERA

External Reference #: [id number]

: 1965

Sex: Male



Demographics







                          Address                   [address] [place] [address]

[place]  [id number]

 

                          Home Phone                [phone]

 

                          Preferred Language        Unknown

 

                          Marital Status            Single

 

                          Faith Affiliation     [employer]

 

                          Race                      White/

 

                          Ethnic Group              Non-





Author







                          Author                    Auto Generated

 

                          Organization              [facility]

 

                          Address                   Unknown

 

                          Phone                     Unavailable







Care Team Providers







                    Care Team Member Name    Role                Phone

 

                    Jose Stinson    CP                  [phone]







Allergies, Adverse Reactions, Alerts







  



                     Substance           Reaction            Status

 

  



                           NKDA                      Active







Problem List







  



                     Condition           Effective Dates     Status

 

  



                     [D]Anterior chest wall pain     2012          Active

 

  



                           BP+ - Hypertension        Resolved

 

  



                           CHF - Congestive heart failure      Active

 

  



                           CHF - Congestive heart failure      Resolved

 

  



                           Depression                Resolved

 

  



                           dm                        Active

 

  



                           Down syndrome             Active

 

  



                           GERD - Gastro-esophageal reflux disease      Resolved

 

  



                           H/O: schizophrenia        Active

 

  



                           HTN                       Active

 

  



                           ICD (implantable cardiac defibrillator) battery depletion      Active

 

  



                           NIDDM                     Resolved

 

  



                           Obese build               Resolved

 

  



                           Sleep apnea               Active







Medications







    



              Medication     Instructions     Start Date     End Date     Status

 

    



                 metoprolol 5 mg/5 ml     5 mg, 5 mL, Route: IVP, Drug form:     10/11/2013      10/11/2013

                                         Completed



                           INJ                       INJ, ONCE, Dosing Weight 129.545,   



                                         kg, Priority: STAT, Start date:   



                                         10/11/13 16:31:00, Stop date:   



                                         10/11/13 16:31:00(Same as:   



                                         Lopressor)   

 

    



              ondansetron     4 mg, 2 mL, Route: IVP, Drug form:     10/11/2013     10/13/2013     Discontinued





                                         INJ, Q8H, Dosing Weight 129.545,   



                                         kg, PRN Nausea & Vomiting, Start   



                                         date: 10/11/13 17:32:00, Duration:   



                                         30 day, Stop date: 11/10/13   



                                         17:31:00(Same as: Zofran)   

 

    



              docusate     100 mg, 1 cap, Route: PO, Drug     10/11/2013     10/13/2013     Discontinued





                                         form: CAP, BID, Dosing Weight   



                                         129.545, kg, PRN Constipation,   



                                         Start date: 10/11/13 17:32:00,   



                                         Duration: 30 day, Stop date:   



                                         11/10/13 17:31:00(Same as: Colace)   



                                         (Do Not Crush)   

 

    



              acetaminophen     650 mg, 20.3 mL, Route: PO, Drug     10/11/2013     10/13/2013     Discontinued





                                         form: LIQ, Q4H, Dosing Weight   



                                         129.545, kg, PRN Pain 1-3/Temp >   



                                         100.4 F, Start date: 10/11/13   



                                         17:32:00, Duration: 30 day, Stop   



                                         date: 11/10/13 17:31:00Max   



                                         acetaminophen=4000mg/day (4   



                                         gm/day).  (Same as: Tylenol)   

 

    



              enalapril     1.25 mg, 1 mL, Route: IVP, Drug     10/11/2013     10/11/2013     Completed





                                         form: INJ, ONCE, Dosing Weight   



                                         129.545, kg, Priority: STAT, Start   



                                         date: 10/11/13 16:30:00, Stop date:   



                                         10/11/13 16:30:00(Same as:   



                                         Vasotec-IV)   

 

    



                 Remeron 15 mg oral     15 mg, 1 tab, PO, Bedtime, 30 tab,     10/11/2013      10/13/2013

                                         Discontinued



                           tablet                    Substitution Allowed, TAB   

 

    



              cyanocobalamin     5 microgram, Route: PO, Drug form:     10/12/2013     10/11/2013    

 Deleted



                                         TAB, Daily, Dosing Weight 108.004,   



                                         kg, Start date: 10/12/13 9:00:00,   



                                         Duration: 30 day, Stop date:   



                                         11/10/13 9:00:00   

 

    



              clonazepam     2 mg, 2 tab, Route: PO, Drug form:     10/11/2013     10/13/2013     Discontinued





                                         TAB, Bedtime, Dosing Weight   



                                         108.004, kg, Start date: 10/11/13   



                                         21:00:00, Duration: 30 day, Stop   



                                         date: 13 21:00:00(Same As:   



                                         Klonopin)   

 

    



              lisinopril     40 mg, 2 tab, Route: PO, Drug form:     10/12/2013     10/13/2013     Discontinued





                                         TAB, Daily, Dosing Weight 108.004,   



                                         kg, Start date: 10/12/13 9:00:00,   



                                         Duration: 30 day, Stop date:   



                                         11/10/13 9:00:00(Same as: Prinivil,   



                                         Zestril)   

 

    



              Geodon       20 mg, 1 cap, Route: PO, Drug form:     10/11/2013     10/13/2013     Discontinued





                                         CAP, Bedtime, Dosing Weight   



                                         108.004, kg, Start date: 10/11/13   



                                         21:00:00, Duration: 30 day, Stop   



                                         date: 13 21:00:00(Same As:   



                                         Geodon)Give with Food   

 

    



              Remeron      15 mg, 1 tab, Route: PO, Drug form:     10/11/2013     10/13/2013     Discontinued





                                         TAB, Bedtime, Dosing Weight   



                                         108.004, kg, Start date: 10/11/13   



                                         21:00:00, Duration: 30 day, Stop   



                                         date: 13 21:00:00(Same   



                                         as:Remeron)   

 

    



              Geodon 20 mg oral     20 mg, 1 cap, PO, Bedtime, with     10/11/2013     10/13/2013    

 Discontinued



                           capsule                   food, 180 cap, Substitution   



                                         Allowed, CAP   



                                         with food   

 

    



              Vitamin B12     1,000 microgram, 1 tab, Route: PO,     10/12/2013     10/13/2013     Discontinued





                                         Drug form: TAB, Daily, Start date:   



                                         10/12/13 9:00:00, Duration: 30 day,   



                                         Stop date: 11/10/13 9:00:00(Same   



                                         As: Vitamin B-12)   

 

    



                 insulin regular 100     5 unit, 0.05 mL, Route: SUB-Q, Drug     10/12/2013      10/13/2013

                                         Discontinued



                           units/mL human            form: SOLN, TID-Before Meals, PRN   



                           recombinant               Blood Glucose Results, Start date:   



                                         10/12/13 20:10:00, Duration: 30   



                                         day, Stop date: 13   



                                         20:09:00(Same as: Humulin R) Roll   



                                         in palms of hands gently;  Do not   



                                         shake vigorously. "single patient   



                                         use only"(Restricted to patients   



                                         requiring a dose >  60 units)   



                                         Stable for 28 days at room   



                                         temperatureExpires in ______ days   



                                         from ______________Date   

 

    



                 insulin regular 100     4 unit, 0.04 mL, Route: SUB-Q, Drug     10/12/2013      10/13/2013

                                         Discontinued



                           units/mL human            form: SOLN, TID-Before Meals, PRN   



                           recombinant               Blood Glucose Results, Start date:   



                                         10/12/13 20:10:00, Duration: 30   



                                         day, Stop date: 13   



                                         20:09:00(Same as: Humulin R) Roll   



                                         in palms of hands gently;  Do not   



                                         shake vigorously. "single patient   



                                         use only"(Restricted to patients   



                                         requiring a dose >  60 units)   



                                         Stable for 28 days at room   



                                         temperatureExpires in ______ days   



                                         from ______________Date   

 

    



                 insulin regular 100     3 unit, 0.03 mL, Route: SUB-Q, Drug     10/12/2013      10/13/2013

                                         Discontinued



                           units/mL human            form: SOLN, TID-Before Meals, PRN   



                           recombinant               Blood Glucose Results, Start date:   



                                         10/12/13 20:10:00, Duration: 30   



                                         day, Stop date: 13   



                                         20:09:00(Same as: Humulin R) Roll   



                                         in palms of hands gently;  Do not   



                                         shake vigorously. "single patient   



                                         use only"(Restricted to patients   



                                         requiring a dose >  60 units)   



                                         Stable for 28 days at room   



                                         temperatureExpires in ______ days   



                                         from ______________Date   

 

    



              furosemide     40 mg, 4 mL, Route: IVP, Drug form:     10/11/2013     10/13/2013     Discontinued





                                         INJ, Q8H, Dosing Weight 129.545,   



                                         kg, Priority: Routine, Start date:   



                                         10/11/13 23:00:00, Duration: 30   



                                         day, Stop date: 11/10/13   



                                         15:00:00(Same as: Lasix)   

 

    



                 insulin regular 100     2 unit, 0.02 mL, Route: SUB-Q, Drug     10/12/2013      10/13/2013

                                         Discontinued



                           units/mL human            form: SOLN, TID-Before Meals, PRN   



                           recombinant               Blood Glucose Results, Start date:   



                                         10/12/13 20:10:00, Duration: 30   



                                         day, Stop date: 13   



                                         20:09:00(Same as: Humulin R) Roll   



                                         in palms of hands gently;  Do not   



                                         shake vigorously. "single patient   



                                         use only"(Restricted to patients   



                                         requiring a dose >  60 units)   



                                         Stable for 28 days at room   



                                         temperatureExpires in ______ days   



                                         from ______________Date   

 

    



                 insulin regular 100     1 unit, 0.01 mL, Route: SUB-Q, Drug     10/12/2013      10/13/2013

                                         Discontinued



                           units/mL human            form: SOLN, TID-Before Meals, PRN   



                           recombinant               Blood Glucose Results, Start date:   



                                         10/12/13 20:10:00, Duration: 30   



                                         day, Stop date: 13   



                                         20:09:00(Same as: Humulin R) Roll   



                                         in palms of hands gently;  Do not   



                                         shake vigorously. "single patient   



                                         use only"(Restricted to patients   



                                         requiring a dose >  60 units)   



                                         Stable for 28 days at room   



                                         temperatureExpires in ______ days   



                                         from ______________Date   

 

    



                 furosemide 40 mg     40 mg, 1 tab, PO, Daily, 30 tab,     10/13/2013      Ordered



                           oral tablet               Substitution Allowed, TAB   

 

    



              aspirin      81 mg, 1 tab, Route: PO, Drug form:     10/12/2013     10/13/2013     Discontinued





                                         ECTAB, Daily, Dosing Weight   



                                         108.004, kg, Start date: 10/12/13   



                                         9:00:00, Duration: 30 day, Stop   



                                         date: 11/10/13 9:00:00Do not crush   



                                         or chew.(Same As: Ecotrin)   

 

    



              furosemide     40 mg, 4 mL, Route: IVP, Drug form:     10/11/2013     10/11/2013     Completed





                                         INJ, ONCE, Dosing Weight 129.545,   



                                         kg, Priority: STAT, Start date:   



                                         10/11/13 14:35:00, Stop date:   



                                         10/11/13 14:35:00(Same as: Lasix)   

 

    



              simethicone     40 mg, 0.5 tab, Route: PO, Drug     10/13/2013     10/13/2013     Discontinued





                                         form: CHEWTAB, Q6H, Dosing Weight   



                                         108.004, kg, PRN Gas, Start date:   



                                         10/13/13 13:23:00, Duration: 30   



                                         day, Stop date: 13   



                                         13:22:00(Same as: Mylicon)   

 

    



              hydromorphone     1 mg, 1 mL, Route: IVP, Drug form:     10/11/2013     10/11/2013     

Completed



                                         INJ, ONCE, Dosing Weight 129.545,   



                                         kg, Priority: STAT, Start date:   



                                         10/11/13 13:00:00, Stop date:   



                                         10/11/13 13:00:00Same as: Dilaudid   

 

    



                 influenza virus     0.5 ml, Route: IM, Drug Form: SUSP,     10/01/2013      10/01/2013

                                         Completed



                           vaccine, inactivated      Start date: 10/01/13 9:00:00, Stop   



                                         date: 10/01/13 9:00:00   

 

    



                 carvedilol 3.125 mg     3.125 mg, 1 tab, PO, Q12H, 60 tab,     10/13/2013      Ordered





                           oral tablet               Substitution Allowed, TAB   

 

    



                 Aspirin Low Dose 81     81 mg, 1 tab, PO, Daily, 30 tab,     10/13/2013      Ordered



                           mg oral tablet            Substitution Allowed, TAB   

 

    



                 Remeron 15 mg oral     15 mg, 1 tab, PO, Bedtime, 14 tab,     10/13/2013      Ordered





                           tablet                    Substitution Allowed, TAB   

 

    



              Dextrose 50% in     25 mL, Route: IVP, Start date:     10/12/2013     10/13/2013     Discontinued





                           Water IV                  10/12/13 20:10:00, Duration: 30   



                                         day, Stop date: 13 19:09:00,   



                                         PRN Blood Glucose Results   

 

    



              Dextrose 50% in     50 mL, Route: IVP, Start date:     10/12/2013     10/13/2013     Discontinued





                           Water IV                  10/12/13 20:09:00, Duration: 30   



                                         day, Stop date: 13 19:08:00,   



                                         PRN Blood Glucose Results   

 

    



                 Geodon 20 mg oral     20 mg, 1 cap, PO, Bedtime, with     10/13/2013      Ordered



                           capsule                   food, 14 cap, Substitution Allowed,   



                                         CAP   



                                         with food   

 

    



                 lisinopril 40 mg     40 mg, 1 tab, PO, Daily, 14 tab,     10/13/2013      Ordered



                           oral tablet               Substitution Allowed, TAB   

 

    



                 clonazepam 2 mg oral     2 mg, 1 tab, PO, Bedtime, 14 tab,     10/13/2013      Ordered





                           tablet                    Substitution Allowed, TAB   

 

    



                 Saline Flush 0.9%     5 mL, Route: IVP, Drug Form: INJ,     10/11/2013      10/13/2013

                                         Discontinued



                                         Dosing Weight 129.545, kg, Q8H, PRN   



                                         Line Flush, Start date: 10/11/13   



                                         13:00:00, Duration: 30 day, Stop   



                                         date: 11/10/13 12:59:00, Administer   



                                         at least once every 8 hours   



                                         Administer at least once every 8   



                                         hours(Same as: BD Posiflush)   

 

    



              hydrALAZINE     20 mg, 1 mL, Route: IV, Drug form:     10/11/2013     10/13/2013     Discontinued





                                         INJ, Q4H, PRN Elevated BP, Start   



                                         date: 10/11/13 19:42:00, Duration:   



                                         30 day, Stop date: 11/10/13   



                                         19:41:00(Same as: Apresoline)   

 

    



              Coreg        3.125 mg, 1 tab, Route: PO, Drug     10/13/2013     10/13/2013     Canceled



                                         form: TAB, Q12H, Dosing Weight   



                                         108.004, kg, Start date: 10/13/13   



                                         21:00:00, Duration: 30 day, Stop   



                                         date: 13 20:59:00Give with   



                                         food. (Same As: Coreg)   

 

    



                 BD Normal Saline     10 mL, Route: IV, Drug Form: INJ,     10/12/2013      10/13/2013 

                                         Discontinued



                           Flush                     Q8H, Start date: 10/12/13 0:00:00,   



                                         Duration: 30 day, Stop date:   



                                         11/10/13 16:00:00(Same as: BD   



                                         Posiflush)   

 

    



                 cyanocobalamin     5 microgram, PO, Daily,     10/11/2013      Ordered



                                         Substitution Allowed, TAB   







Immunizations







  



                     Vaccine             Date                Status

 

  



                     influenza virus vaccine, inactivated     10/01/2013          Auth (Verified)







Vital Signs







                Most recent to oldest [Reference Range]:    1               2               3

 

                          Height                    172.72 cm 

                          (10/11/2013 17:51:00)      172.72 cm 

                          (10/11/2013 12:38:00)       

 

                          Current Weight            97.005 kg 

                          (10/13/2013 00:00:00)      106.364 kg 

                          (10/12/2013 05:00:00)       

 

                          Temperature Oral [96.4-99.1 DegF]    98.0 DegF 

                          (10/13/2013 12:00:00)      98.1 DegF 

                          (10/13/2013 07:51:00)      97.9 DegF 

                                        (10/13/2013 04:00:00)  

 

                          Systolic Blood Pressure [ mmHg]    125 mmHg 

                          (10/13/2013 12:00:00)      125 mmHg 

                          (10/13/2013 07:51:00)      110 mmHg 

                                        (10/13/2013 04:00:00)  

 

                          Diastolic Blood Pressure [60-90 mmHg]    89 mmHg 

                          (10/13/2013 12:00:00)      91 mmHg 

*HI*

                          (10/13/2013 07:51:00)      81 mmHg 

                                        (10/13/2013 04:00:00)  

 

                          Respiratory Rate [14-20 BRMIN]    18 BRMIN 

                          (10/13/2013 12:00:00)      18 BRMIN 

                          (10/13/2013 07:51:00)      18 BRMIN 

                                        (10/13/2013 04:00:00)  

 

                          Peripheral Pulse Rate [ bpm]    68 bpm 

                          (10/13/2013 12:00:00)      81 bpm 

                          (10/13/2013 07:51:00)      70 bpm 

                                        (10/13/2013 04:00:00)  

 

                          Weight                    108.004 kg 

                          (10/11/2013 17:51:00)      129.545 kg 

                          (10/11/2013 12:38:00)       







Results





INFECTIOUS DISEASES





                Most recent to oldest [Reference Range]:    1               2               3

 

                          C difficile DNA [Negative]    Negative 1

                    (10/13/2013 07:00:00)                           







1Interpretive Data: [employer] Clostridium difficile assay utilizes 
loop-mediated isothermal DNA amplification (LAMP) technology to detect a 204 bp 
region of the tcdA gene within the PaLoc gene segment present in all known 
toxigenic C. difficile strains.



The assay utilizes FDA cleared IVD reagents. Performance characteristics have be
en verified by the [facility] within the [facility]. The [facility] is authorized under the Clinical Labo
ratory Improvement Amendment of 1988 (CLIA-88) to perform high complexity testin
g.



BEDSIDE GLUCOSE TESTING





                Most recent to  [Reference Range]:    1               2               3

 

                          Glucose POC [70-99 mg/dL]    132 mg/dL 2

*HI*

                          (10/13/2013 11:46:00)      93 mg/dL 3

                          (10/13/2013 06:24:00)      142 mg/dL 4

*HI*

                                        (10/12/2013 21:25:00)  

 

                          Gluc POC Comment 1        Notify RN/MD 

*NA*

                          (10/13/2013 11:46:00)      Notify RN/MD 

*NA*

                          (10/13/2013 06:24:00)      Notify RN/MD 

*NA*

                                        (10/12/2013 21:25:00)  







2Interpretive Data:  Upper Reportable Limit: 200 mg/dL.



3Interpretive Data:  Upper Reportable Limit: 200 mg/dL.



4Interpretive Data:  Upper Reportable Limit: 200 mg/dL.



CHEMISTRY





                Most recent to oldest [Reference Range]:    1               2               3

 

                          Sodium Lvl [135-145 mEq/L]    141 mEq/L 

                          (10/12/2013 05:29:00)      140 mEq/L 

                          (10/11/2013 13:05:00)       

 

                          Potassium Lvl [3.5-5.1 mEq/L]    3.6 mEq/L 

                          (10/12/2013 05:29:00)      4.2 mEq/L 

                          (10/11/2013 13:05:00)       

 

                          Chloride Lvl [ mEq/L]    100 mEq/L 

                          (10/12/2013 05:29:00)      104 mEq/L 

                          (10/11/2013 13:05:00)       

 

                          CO2 [24-32 mEq/L]         29 mEq/L 

                          (10/12/2013 05:29:00)      24 mEq/L 

                          (10/11/2013 13:05:00)       

 

                          AGAP [10.0-20.0 mEq/L]    15.6 mEq/L 

                          (10/12/2013 05:29:00)      16.2 mEq/L 

                          (10/11/2013 13:05:00)       

 

                          Creatinine Lvl [0.5-1.4 mg/dL]    1.2 mg/dL 

                          (10/12/2013 05:29:00)      1.3 mg/dL 

                          (10/11/2013 13:05:00)       

 

                          eGFR                      71 mL/min/1.73m2 5

*NA*

                          (10/12/2013 05:29:00)      65 mL/min/1.73m2 6

*NA*

                          (10/11/2013 13:05:00)       

 

                          BUN [7-22 mg/dL]          17 mg/dL 

                          (10/12/2013 05:29:00)      20 mg/dL 

                          (10/11/2013 13:05:00)       

 

                          B/C Ratio [6-25]          15 

                    (10/11/2013 13:05:00)                           

 

                          Glucose Lvl [70-99 mg/dL]    95 mg/dL 7

                          (10/12/2013 05:29:00)      115 mg/dL 8

*HI*

                          (10/11/2013 13:05:00)       

 

                          Total Protein [6.4-8.4 g/dL]    6.9 g/dL 

                    (10/11/2013 13:05:00)                           

 

                          Albumin Lvl [3.5-5.0 g/dL]    3.8 g/dL 

                    (10/11/2013 13:05:00)                           

 

                          Globulin [2.0-4.0 g/dL]    3.1 g/dL 

                    (10/11/2013 13:05:00)                           

 

                          A/G Ratio [0.7-1.6]       1.2 

                    (10/11/2013 13:05:00)                           

 

                          Calcium Lvl [8.5-10.5 mg/dL]    8.0 mg/dL 

*LOW*

                          (10/12/2013 05:29:00)      8.4 mg/dL 

*LOW*

                          (10/11/2013 13:05:00)       

 

                          Magnesium Lvl [1.8-2.4 mg/dL]    2.0 mg/dL 

                    (10/11/2013 13:05:00)                           

 

                          ALT [0-65 unit/L]         136 unit/L 

*HI*

                    (10/11/2013 13:05:00)                           

 

                          AST [0-37 unit/L]         153 unit/L 

*HI*

                    (10/11/2013 13::00)                           

 

                          Alk Phos [ unit/L]    126 unit/L 

                    (10/11/2013 13::00)                           

 

                          Bili Total [0.2-1.3 mg/dL]    1.7 mg/dL 

*HI*

                    (10/11/2013 13:05:00)                           

 

                          Total CK [ unit/L]    293 unit/L 

*HI*

                    (10/11/2013 13:05:00)                           

 

                          CK MB [0.5-3.6 ng/mL]     1.8 ng/mL 

                    (10/11/2013 13:05:00)                           

 

                          CK MB Index [0.0-2.5]     0.6 

                    (10/11/2013 13::00)                           

 

                          Troponin-I [0.00-0.40 ng/mL]    0.03 ng/mL 

                    (10/11/2013 13:05:00)                           

 

                          BNP [<=100 pg/mL]         2290 pg/mL 9

*HI*

                    (10/11/2013 13:05:00)                           

 

                          Digoxin Lvl [0.8-2.0 ng/mL]    <0.1 ng/mL 

*LOW*

                    (10/11/2013 13:05:00)                           







5Result Comment: The eGFR is calculated using the CKD-EPI formula. In most 
young, healthy individuals the eGFR will be >90 mL/min/1.73m2. The eGFR declines
with age. An eGFR of 60-89 may be normal in some populations, particularly the 
elderly, for whom the CKD-EPI formula has not been extensively validated. Use of
the eGFR is not recommended in the following populations:



Individuals with unstable creatinine concentrations, including pregnant patients
and those with serious co-morbid conditions.



Patients with extremes in muscle mass or diet. 



The data above are obtained from the National Kid[employer] Disease Education Program (
NKDEP) which additionally recommends that when the eGFR is used in patients with
extremes of body mass index for purposes of drug dosing, the eGFR should be mul
tiplied by the estimated BMI.



6Result Comment: The eGFR is calculated using the CKD-EPI formula. In most 
young, healthy individuals the eGFR will be >90 mL/min/1.73m2. The eGFR declines
with age. An eGFR of 60-89 may be normal in some populations, particularly the 
elderly, for whom the CKD-EPI formula has not been extensively validated. Use of
the eGFR is not recommended in the following populations:



Individuals with unstable creatinine concentrations, including pregnant patients
and those with serious co-morbid conditions.



Patients with extremes in muscle mass or diet. 



The data above are obtained from the National Kid[employer] Disease Education Program (
NKDEP) which additionally recommends that when the eGFR is used in patients with
extremes of body mass index for purposes of drug dosing, the eGFR should be mul
tiplied by the estimated BMI.



7Interpretive Data: Adult reference range values reflect the clinical guidelines

of the American Diabetes Association.



8Interpretive Data: Adult reference range values reflect the clinical guidelines

of the American Diabetes Association.



9Interpretive Data: Elevated results are in line with increasing severity of 

congestive heart failure. Minor elevations between 100 and 300 

may be seen with Myocardial Ischemia, Sodium retaining drugs, 

and compensated/treated heart failure.



HEMATOLOGY





                Most recent to oldest [Reference Range]:    1               2               3

 

                          WBC [3.7-10.4 K/CMM]      7.9 K/CMM 

                          (10/12/2013 05:29:00)      8.8 K/CMM 

                          (10/11/2013 13:05:00)       

 

                          RBC [4.70-6.10 M/CMM]     4.61 M/CMM 

*LOW*

                          (10/12/2013 05:29:00)      5.04 M/CMM 

                          (10/11/2013 13:05:00)       

 

                          Hgb [14.0-18.0 g/dL]      13.8 g/dL 

*LOW*

                          (10/12/2013 05:29:00)      14.7 g/dL 

                          (10/11/2013 13:05:00)       

 

                          Hct [42.0-54.0 %]         41.8 % 

*LOW*

                          (10/12/2013 05:29:00)      45.5 % 

                          (10/11/2013 13:05:00)       

 

                          MCV [80.0-94.0 fL]        90.6 fL 

                          (10/12/2013 05:29:00)      90.3 fL 

                          (10/11/2013 13:05:00)       

 

                          MCH [27.0-31.0 pg]        30.0 pg 

                          (10/12/2013 05:29:00)      29.1 pg 

                          (10/11/2013 13:05:00)       

 

                          MCHC [32.0-36.0 g/dL]     33.1 g/dL 

                          (10/12/2013 05:29:00)      32.2 g/dL 

                          (10/11/2013 13:05:00)       

 

                          RDW [11.5-14.5 %]         15.4 % 

*HI*

                          (10/12/2013 05:29:00)      15.2 % 

*HI*

                          (10/11/2013 13:05:00)       

 

                          Platelet [133-450 K/CMM]    241 K/CMM 

                          (10/12/2013 05:29:00)      266 K/CMM 

                          (10/11/2013 13:05:00)       

 

                          MPV [7.4-10.4 fL]         8.8 fL 

                          (10/12/2013 05:29:00)      8.7 fL 

                          (10/11/2013 13:05:00)       

 

                          Segs [45.0-75.0 %]        73.8 % 

                          (10/12/2013 05:29:00)      81.9 % 

*HI*

                          (10/11/2013 13:05:00)       

 

                          Lymphocytes [20.0-40.0 %]    13.6 % 

*LOW*

                          (10/12/2013 05:29:00)      9.1 % 

*LOW*

                          (10/11/2013 13:05:00)       

 

                          Monocytes [2.0-12.0 %]    12.1 % 

*HI*

                          (10/12/2013 05:29:00)      8.8 % 

                          (10/11/2013 13:05:00)       

 

                          Eosinophils [0.0-4.0 %]    0.1 % 

                          (10/12/2013 05:29:00)      0.1 % 

                          (10/11/2013 13:05:00)       

 

                          Basophils [0.0-1.0 %]     0.4 % 

                          (10/12/2013 05:29:00)      0.1 % 

                          (10/11/2013 13:05:00)       

 

                          Segs-Bands # [1.5-8.1 K/CMM]    5.8 K/CMM 

                          (10/12/2013 05:29:00)      7.2 K/CMM 

                          (10/11/2013 13:05:00)       

 

                          Lymphocytes # [1.0-5.5 K/CMM]    1.1 K/CMM 

                          (10/12/2013 05:29:00)      0.8 K/CMM 

*LOW*

                          (10/11/2013 13:05:00)       

 

                          Monocytes # [0.0-0.8 K/CMM]    1.0 K/CMM 

*HI*

                          (10/12/2013 05:29:00)      0.8 K/CMM 

                          (10/11/2013 13:05:00)       

 

                          Eosinophils # [0.0-0.5 K/CMM]    0.0 K/CMM 

                          (10/12/2013 05:29:00)      0.0 K/CMM 

                          (10/11/2013 13:05:00)       

 

                          Basophils # [0.0-0.2 K/CMM]    0.0 K/CMM 

                    (10/12/2013 05:29:00)                           







Procedures







  



                     Procedures          Date                Related Diagnosis

 

  



                           Emergency department visit for the evaluation and management     10/11/2013 00:00:00

 



                                         of a patient, which requires these 3 key components within  



                                         the constraints imposed by the urgency of the patient's  



                                         clinical condition and/or mental status: A comprehensive  



                                         history; A comprehensi

## 2019-09-25 NOTE — NUR
Called Dr. Porter Arambula made, aware patient's glucose 442 and that I had covered patient with 
sliding scale. I also made aware patient c/o of abdominal pain received orders to give 
Tylenol 650mg PO.

## 2019-09-25 NOTE — XMS REPORT
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 circulatory di
sorders, not stated as uncontrolled(250.70) 2014 



Past Surgical History:

Past Surgical History: 

Procedure Laterality Date 

 HX KNEE SURGERY   



Social History:

Social History 



Tobacco Use 

 Smoking status: Never Smoker 

 Smokeless tobacco: Never Used 

 Tobacco comment: Trying to quit 2011. 

Substance Use Topics 

 Alcohol use: No 

  Alcohol/week: 0.0 oz 

  Comment: Alcoholic Drinks/day: no 

 



Family History:

Family History 

Problem Relation Name Age of Onset 

 Diabetes Father   

 Cancer Father   

     liver diagnosis,  9 month after Diagnosis, 7 years ago, 

 High Blood Pressure Mother   

 Elevated Lipids Mother   

 Heart  Disease Maternal Grandfather   



Review of Systems:

Constitutional: no fever, no chills, no night sweats, no unintentional weight ch
anges, no fatigue

Skin: no rashes, no lesions, no pruritis, no petechiae

ENT: no blurry vision, no double vision, no hearing changes, no epistaxis, no or
al ulcers

Respiratory: no shortness of breath, no cough, no wheezing, no hemoptysis

Cardiovascular: no chest pain, no palpitations, no dyspnea of exertion, no parox
symal nocturnal dyspnea, no orthopnea

Gastrointestinal: no nausea, no vomiting, no diarrhea, no constipation, no abd p
ain, no melena/hematochezia

Genitourinary: no dysuria, no hematuria, no frequency, no urgency

Musculoskeletal: no myalgias, no arthralgias, no stiffness, no tenderness

Neurologic: no headache, no numbness/tingling, no weakness, no dizziness, no lig
htheadedness

Psychiatric: no depression, no insomnia, no excessive drowsiness, no SI

Endocrine: no polyuria, no polydipsia, no temp intolerance, no polyphagia

Hematologic: no easy bleeding/brusing, no lymphadenopathy, no recurrent infectio
ns



Examination: 



There were no vitals taken for this visit.

General appearance - alert, well appearing, and in no distress



Mental Status - alert, oriented to person, place, and time



Eyes - sclera anicteric 



Throat - mucous membranes moist, pharynx normal without lesions 



Neck - supple, no significant adenopathy 



Thyroid - no nodules felt  



Chest - clear to auscultation, no wheezes, rales or rhonchi, symmetric air entry




Heart - normal rate, regular rhythm, normal S1, S2, no murmurs, rubs, clicks or 
gallops 



Abdomen - soft, nontender, ++distended, no masses or organomegaly 



Back exam - not examined 



Neurological - alert, oriented, normal speech, no focal findings or movement dis
order noted 



Musculoskeletal - not examined 



Extremities - peripheral pulses normal, no pedal edema, no clubbing or cyanosis 



Skin - normal coloration and turgor, no rashes, no suspicious skin lesions noted



Data: 



Laboratory Testing:

Reviewed in [facility]



Radiology Studies:

Reviewed in Epic



Impression: 



Valeriy Vasquez is a 54 y.o. male with:



Patient Active Problem List 

Diagnosis 

 HYPERLIPIDEMIA, MIXED 

 RHINITIS, ALLERGIC, DUE TO POLLEN 

 Major depressive disorder, single episode, severe with psychotic features 

 Schizoaffective disorder 

 Blunt head trauma 

 SLEEP APNEA, OBSTRUCTIVE 

 NEOPLASM, MALIGNANT, LIVER, FAMILY HX 

 Pulmonary nodule 

 Elevated LFT's, ALT 66 on 2011 

 Hepatomegaly 

 Fatty liver 

 Active smoker 

 HTN (hypertension) 

 TERRENCE (obstructive sleep apnea) on auto CPAP 15-20 cmH2O 

 CHF (congestive heart failure) 

 Obesity 

 Type II or unspecified type diabetes mellitus with peripheral circulatory di
sorders, not stated as uncontrolled(250.70) 

 Type 2 diabetes mellitus without retinopathy 



 



Plan: 



1. CKD-Patient has had CKD Stage III since . Seems likely 2/2 DM II + possib
le ?CRS, however no significant proteinuria noted. HIV, Hepatitis panel, RPR and
autoimmune panel negative. Patient recently hospitalized with stable renal func
tion. We will recheck BMP today. Continue diabetes control

-normal sized kidneys on ultrasound

2. Electrolytes: acceptable on last check, recheck today

3. Acid-base: acceptable on last check, recheck today

4. Volume: increase torsemide back to 60mg in the morning and 20mg in the evenin
g. Advised on taking extra 20mg tablet in the evening if weight up by 2lbs in on
e day or 5lbs in one week. Daily weights. Will also check BNP

5. Heme: acceptable on last check

6. CKD-MBD: check Pi, vit D deficiency in 2019-will start ergocalciferol 51782
units qweekly for 12 weeks. 

7. CVS: previously on ace-I and spironolactone, now off. Only on Coreg with cont
rolled HTN. Will monitor

8. Diabetes: Poorly controlled on insulin

9. Nutrition: low salt diet

10. Education: Pt was educated on low salt diet



The plan was discussed with the patient who verbalized understanding.



F/U in 3 months



Thank you for referring this patient for consultation and allowing me to partici
cordero in his care. Please do not hesitate to call with any questions.



Alem Schulz DO



Division of Nephrology

[facility]

Office number: [phone]



Electronically signed by Alem Schulz DO at 2019 10:37 AM CDT

documented in this encounter



Plan of Treatment







                          Care Team                 Description



                     Date                Type                Specialty  

 

                                        



Miguel Ángel Bryan NP



[facility] [place] [address]



[phone] (Fax)                       



                     2019          Office Visit        [facility]  

 

                                        



Ralf Pereira MD



[address] [address]



[phone] (Fax)                       



                     10/17/2019          Office Visit        Endocrinology  









                                        Order Schedule



                 Name            Type            Priority        Associated Diagnoses 

 

                                        Ordered: 2019



                 BASIC METABOLIC PANEL     Lab             Routine         CKD (chronic kidney 



                                         disease), stage III 



                                         Essential hypertension 



                                         Uncontrolled type 2 



                                         diabetes mellitus with 



                                         hyperglycemia 

 

                                        Ordered: 2019



                 RANDOM URINE     Lab             Routine         CKD (chronic kidney 



                           PROTEIN/CREATININE        disease), stage III 



                                         Essential hypertension 



                                         Uncontrolled type 2 



                                         diabetes mellitus with 



                                         hyperglycemia 

 

                                        Ordered: 2019



                 PHOSPHORUS      Lab             Routine         CKD (chronic kidney 



                                         disease), stage III 



                                         Essential hypertension 



                                         Uncontrolled type 2 



                                         diabetes mellitus with 



                                         hyperglycemia 



                                         Vitamin D deficiency 

 

                                        Ordered: 2019



                 PTH INTACT      Lab             Routine         CKD (chronic kidney 



                                         disease), stage III 



                                         Essential hypertension 



                                         Uncontrolled type 2 



                                         diabetes mellitus with 



                                         hyperglycemia 



                                         Vitamin D deficiency 

 

                                        Ordered: 2019



                 VITAMIN D 25 HYDROXY     Lab             Routine         CKD (chronic kidney 



                                         disease), stage III 



                                         Essential hypertension 



                                         Uncontrolled type 2 



                                         diabetes mellitus with 



                                         hyperglycemia 



                                         Vitamin D deficiency 

 

                                        Ordered: 2019



                 URINALYSIS AUTO W/SCOPE     Lab             Routine         CKD (chronic kidney 



                                         disease), stage III 



                                         Essential hypertension 



                                         Uncontrolled type 2 



                                         diabetes mellitus with 



                                         hyperglycemia 

 

                                        Ordered: 2019



                 BRAIN NATRIURETIC PEPTIDE     Lab             Routine         CKD (chronic kidney 



                                         disease), stage III 



                                         Congestive heart failure, 



                                         unspecified HF 



                                         chronicity, unspecified 



                                         heart failure type 









   



                 Health Maintenance     Due Date        Last Done       Comments

 

   



                           COLON CANCER SCREENIN1965  



                                         COLONOSCOPY   

 

   



                           MEDICARE AWV              1965  

 

   



                           TETANUS SHOT (ADULT)      1980  

 

   



                           ANNUAL DIABETIC           1983  



                                         RETINOPATHY SCREENING   

 

   



                           BMI FOLLOW UP PLAN        1983  

 

   



                     FLU VACCINE > 6 MONTHS     2019, 2001 

 

   



                     A1C TESTING EVERY 6     2019, 2019, 12/10/2018, 



                           MONTHS                    Additional history exists 

 

   



                     ANNUAL DIABETIC FOOT EXAM     2020, 2019, 2017, 



                                         Additional history exists 

 

   



                     HEPATITIS C SCREENING     Completed           04/15/2019 

 

   



                     HIV SCREENING       Completed           04/15/2019 



documented as of this encounter



Results

Not on filedocumented in this encounter



Visit Diagnoses











                                         Diagnosis

 





                                         CKD (chronic kidney disease), stage III - Primary



                                         Chronic kidney disease, Stage III (moderate)

 





                                         Essential hypertension



                                         Unspecified essential hypertension

 





                                         Uncontrolled type 2 diabetes mellitus with hyperglycemia

 





                                         Vitamin D deficiency



                                         Unspecified vitamin D deficiency

 





                                         Congestive heart failure, unspecified HF chronicity, unspecified heart failure 

type



documented in this encounter



Insurance







                                        Type



            Payer      Benefit     Subscriber ID     Effective     Phone      Address 



                           Plan /                    Dates   



                                         Group     

 

                                        Medicare [employer]     [employer] - [employer]     xxxxxxxxx     3/1/2018-P      [address] 



                     [address]                [address]              [id number] 



                           [place] 



                           [place]                      [id number] 









     



            Guarantor Name     Account     Relation to     Date of     Phone      Billing Address



                     Type                Patient             Birth  

 

     



            Valeriy Vasquez     Personal/F     Self       1965     [phone]     [address] 

\[facility]\""



                     [address]               (Home)              [place] [id number] [phone] 



                                         (Work) 



documented as of this encounter



Advance Directives







                          Patient Representative    Explanation



                           Type                      Date Recorded  

 

                                                     



                                         Advance Directives   



                                         and Living Will   

 

                                                     



                                         Power of

## 2019-09-25 NOTE — XMS REPORT
Clinical Summary

                             Created on: 2019



Valeriy Vasquez

External Reference #: [id number]

: 1965

Sex: Male



Demographics







                          Address                   [address]

[place]  [address]

 

                          Home Phone                [phone]

 

                          Preferred Language        English

 

                          Marital Status            Unknown

 

                          Restorationist Affiliation     Mormonism

 

                          Race                      White

 

                          Ethnic Group              /Latin





Author







                          Author                    [facility]

 

                          Organization              [facility]

 

                          Address                   Unknown

 

                          Phone                     Unavailable







Support







                Name            Relationship    Address         Phone

 

                    Naomi Vasquez    ECON                [address]

[place]  [address]                         [phone]

 

                Leida Alex    ECON            Unknown         [phone]







Care Team Providers







                    Care Team Member Name    Role                Phone

 

                    Justus Kelly MD    3                   [phone]

 

                    Tate Arenas MD    Unavailable         [phone]

 

                    Dl Dillard MD    3                   Unavailable

 

                    Jonh Sales MD    PCP                 [phone]

 

                    Porter Arenas     31                  Unavailable







Allergies







                                        Comments



                 Active Allergy     Reactions       Severity        Noted Date 

 

                                        



Pt. Reports, bloating/distention and gas



                     Bumetanide          Other (See          2019 



                                         Comments)   

 

                                        



k dur tablets make patient sick. Pt says tabs are "too big" so he has to break 
them and drink lots of water



                 Potassium       Nausea And      Low             2013 



                                         Vomiting   

 

                                         



                                         Sodium Chloride    







Medications







                          End Date                  Status



              Medication     Sig          Dispensed     Refills      Start  



                                         Date  

 

                                                    Active



                     acetaminophen (TYLENOL)     Take 650 mg         0   



                           650 MG CR tablet          by mouth     



                                         every 4     



                                         (four) hours     



                                         as needed for     



                                         Pain.     

 

                                                    Active



                     clonazePAM (KLONOPIN) 1     Take 1 mg by        0   



                           MG tablet                 mouth     



                                         nightly.     

 

                                                    Active



              metFORMIN (GLUCOPHAGE)     Take 1 tablet     60 tablet     5              



                     500 MG tablet       (500 mg             8  



                                         total) by     



                                         mouth 2 (two)     



                                         times daily     



                                         with     



                                         breakfast and     



                                         dinner.     

 

                                                    Active



                     zolpidem (AMBIEN) 10 mg     Take 10 mg by       0   



                           tablet                    mouth every     



                                         night as     



                                         needed for     



                                         Insomnia.     

 

                          10/05/2019                Active



              pantoprazole (PROTONIX)     Take 1 tablet     30 tablet     11           10/05/201  



                     40 MG tablet        (40 mg total)       8  



                                         by mouth     



                                         daily.     

 

                                                    Active



                     OXcarbazepine (OXTELLAR     Take 600 mg         0   



                           XR) 600 mg Tb24           by mouth     



                                         nightly .     

 

                                                    Active



                     traZODone (DESYREL) 50 MG     Take 50 mg by       0   



                           tablet                    mouth     



                                         nightly.     

 

                                                    Active



              atorvastatin (LIPITOR) 40     Take 1 tablet     30 tablet     11             



                     MG tablet           (40 mg total)       9  



                                         by mouth     



                                         every     



                                         evening.     

 

                                                    Active



              rivaroxaban (XARELTO) 20     Take 1 tablet     30 tablet     11             



                     mg Tab tablet       (20 mg total)       9  



                                         by mouth     



                                         daily with     



                                         dinner.     

 

                                                    Active



              torsemide (DEMADEX) 20 MG     Take 60 mg in     120 tablet     11             



                     tablet              am and 20 mg        9  



                                         in pm.     

 

                          2020                Active



              albuterol HFA (PROAIR     Inhale 1 puff     1 Inhaler     0              



                     HFA) 90 mcg/actuation     by mouth via        9  



                           inhalerIndications:       inhaler every     



                           Bronchitis, Cough,        6 (six) hours     



                           Wheezing, Chest           as needed for     



                           congestion                Wheezing.     

 

                          2020                Active



              lisinopril     Take 1 tablet     90 tablet     3              



                     (PRINIVIL,ZESTRIL) 2.5 MG     (2.5 mg             9  



                           tablet                    total) by     



                                         mouth daily.     

 

                                                    Active



                     cariprazine (VRAYLAR) 1.5     Take 3 mg by        0   



                           mg CapIndications:        mouth daily.     



                                         Obtained from patient's      



                                         mother      

 

                          2020                Active



              aspirin 81 MG EC tablet     Take 1 tablet     30 tablet     11             



                           (81 mg total)             9  



                                         by mouth     



                                         daily.     

 

                          2020                Active



              ketoconazole (NIZORAL) 2     Apply        30 g         11             



                     % creamIndications: Tinea     topically           9  



                           pedis of both feet        daily To feet     



                                         for foot     



                                         fungus for     



                                         2weeks then     



                                         as needed.     

 

                                                    Active



                 NUEDEXTA 20-10 mg Cap     Take 1          2                 



                           capsule by                9  



                                         mouth 2 (two)     



                                         times daily.     

 

                                                    Active



                 ONETOUCH VERIO Strp     Apply 1 strip      9                 



                           topically 4               9  



                                         (four) times     



                                         daily.     

 

                          2020                Active



              insulin lispro (HUMALOG)     Inject 15     10 mL        0              



                     100 unit/mL injection     Units               9  



                                         subcutaneousl     



                                         y 3 (three)     



                                         times daily     



                                         with meals.     

 

                          2020                Active



              insulin detemir U-100     Inject 30     10 mL        0              



                     (LEVEMIR U-100 INSULIN)     Units               9  



                           100 unit/mL injection     subcutaneousl     



                                         y 2 (two)     



                                         times daily.     

 

                          2019                Active



                 ergocalciferol     Take 1 tablet      0                 



                     (ERGOCALCIFEROL) 50,000     by mouth.           9  



                                         unit capsule      

 

                          2020                Active



              carvedilol (COREG) 25 MG     Take 1.5     90 tablet     5              



                     tablet              tablets (37.5       9  



                                         mg total) by     



                                         mouth 2 (two)     



                                         times daily     



                                         with     



                                         breakfast and     



                                         dinner.     

 

                          10/05/2018                Discontinued



                 pantoprazole (PROTONIX)     Take 40 mg by      0                 



                     40 MG tablet        mouth daily.        4  

 

                          2019                Discontinued



              digoxin (LANOXIN) 0.125     Take 1 tablet     90 tablet     3              



                     MG tablet           (125 mcg            8  



                                         total) by     



                                         mouth daily.     

 

                          2019                Discontinued



              glipiZIDE (GLUCOTROL) 10     Take 1 tablet     30 tablet     5              



                     MG tablet           (10 mg total)       8  



                                         by mouth 2     



                                         (two) times     



                                         daily before     



                                         meals.     

 

                          2019                Discontinued



                     asenapine (SAPHRIS) 5 mg     Place 5 mg          0   



                           Subl                      under the     



                                         tongue     



                                         nightly .     

 

                          10/26/2018                Discontinued



                     OXcarbazepine (TRILEPTAL)     Take 600 mg         0   



                           600 MG tablet             by mouth     



                                         nightly.     

 

                          10/08/2018                Discontinued



              torsemide (DEMADEX) 20 MG     Take 60 mg in     120 tablet     5              



                     tablet              am and 20 mg        8  



                                         in pm.     

 

                          2019                Discontinued



              carvedilol (COREG) 12.5     Take 3       180 tablet     5              



                     MG tablet           tablets (37.5       8  



                                         mg total) by     



                                         mouth 2 (two)     



                                         times daily     



                                         with     



                                         breakfast and     



                                         dinner.     

 

                          2019                Discontinued



              aspirin 81 MG EC tablet     Take 1 tablet     30 tablet     5              



                           (81 mg total)             8  



                                         by mouth     



                                         daily.     

 

                          2019                Discontinued



              atorvastatin (LIPITOR) 40     Take 1 tablet     30 tablet     5              



                     MG tablet           (40 mg total)       8  



                                         by mouth     



                                         every     



                                         evening.     

 

                          2019                Discontinued



              rivaroxaban (XARELTO) 20     Take 1 tablet     30 tablet     11             



                     mg Tab tablet       (20 mg total)       8  



                                         by mouth     



                                         daily with     



                                         dinner.     

 

                          10/08/2018                Discontinued



                     torsemide (DEMADEX) 20 MG     Take 60 mg by       0   



                           tablet                    mouth every     



                                         morning.     

 

                          10/08/2018                Discontinued



                     torsemide (DEMADEX) 20 MG     Take 20 mg by       0   



                           tablet                    mouth every     



                                         evening.     

 

                          2019                Discontinued



              insulin lispro (HUMALOG)     Inject 8     10 mL        0              



                     100 unit/mL injection     Units               8  



                                         subcutaneousl     



                                         y 3 (three)     



                                         times daily     



                                         with meals.     

 

                          2019                Discontinued



              insulin detemir U-100     Inject 20     10 mL        0              



                     (LEVEMIR U-100 INSULIN)     Units               8  



                           100 unit/mL injection     subcutaneousl     



                                         y 2 (two)     



                                         times daily.     

 

                          2019                Discontinued



              torsemide (DEMADEX) 20 MG     Take 60 mg in     120 tablet     5            10/08/201  



                     tablet              am and 20 mg        8  



                                         in pm.     

 

                          2019                Discontinued



              torsemide (DEMADEX) 20 MG     Take 60 mg in     120 tablet     5              



                     tablet              am and 20 mg        9  



                                         in pm.     

 

                          2019                Discontinued



              senna (SENOKOT) 8.6 mg     Take 1 tablet     60 tablet     0              



                     tablet              (8.6 mg             9  



                                         total) by     



                                         mouth every     



                                         night as     



                                         needed for     



                                         Constipation.     

 

                          2019                Discontinued



              digoxin (LANOXIN) 0.125     Take 1 tablet     90 tablet     3              



                     MG tablet           (125 mcg            9  



                                         total) by     



                                         mouth daily.     

 

                          2019                Discontinued



              lidocaine (LIDODERM) 5 %     Place 1 patch     10 patch     0              



                     patchIndications: Acute     onto the skin       9  



                           left-sided thoracic back     every 12     



                           pain, Spasm of thoracic     (twelve)     



                           back muscle               hours Remove     



                                         & Discard     



                                         patch within     



                                         12 hours or     



                                         as directed     



                                         by MD.     

 

                          2019                Discontinued



              metaxalone (SKELAXIN) 800     Take 1 tablet     30 tablet     0              



                     MG tabletIndications:     (800 mg             9  



                           Acute left-sided thoracic     total) by     



                           back pain, Spasm of       mouth 3     



                           thoracic back muscle      (three) times     



                                         daily as     



                                         needed for     



                                         Muscle spasms     



                                         or Muscle     



                                         pain for up     



                                         to 10 days.     

 

                          2019                Discontinued



              tiZANidine (ZANAFLEX) 4     Take 1 tablet     30 tablet     0              



                     MG tabletIndications:     (4 mg total)        9  



                           Acute left-sided thoracic     by mouth 3     



                           back pain, Spasm of       (three) times     



                           thoracic back muscle      daily as     



                                         needed     



                                         (muscle     



                                         spasms) for     



                                         up to 28     



                                         days.     

 

                          2019                Discontinued



              aspirin 81 MG EC tablet     Take 1 tablet     30 tablet     11             



                           (81 mg total)             9  



                                         by mouth     



                                         daily.     

 

                          2019                Discontinued



              carvedilol (COREG) 12.5     Take 3       180 tablet     11             



                     MG tablet           tablets (37.5       9  



                                         mg total) by     



                                         mouth 2 (two)     



                                         times daily     



                                         with     



                                         breakfast and     



                                         dinner.     

 

                          2019                Discontinued



              digoxin (LANOXIN) 0.125     Take 1 tablet     90 tablet     3              



                     MG tablet           (125 mcg            9  



                                         total) by     



                                         mouth daily.     

 

                          03/10/2019                



              benzonatate (TESSALON     Take 1       30 capsule     0              



                     PERLES) 100 MG      capsule (100        9  



                           capsuleIndications: Cough     mg total) by     



                                         mouth 3     



                                         (three) times     



                                         daily as     



                                         needed for     



                                         Cough for up     



                                         to 10 days.     

 

                          2019                Discontinued



              methylPREDNISolone     follow       1 Package     0              



                     (MEDROL DOSEPACK) 4 mg     package             9  



                           tabletIndications:        directions.     



                                         Bronchitis, Cough,      



                                         Wheezing, Chest      



                                         congestion      

 

                          03/10/2019                



              cefUROXime (CEFTIN) 500     Take 1 tablet     20 tablet     0              



                     MG tabletIndications:     (500 mg             9  



                           Bronchitis, Upper         total) by     



                           respiratory tract         mouth 2 (two)     



                           infection, unspecified     times daily     



                           type, Cough, Wheezing,     for 10 days.     



                                         Chest congestion      

 

                          2019                Discontinued



                     cariprazine 1.5 mg (1)- 3     Take 1.5 mg         0   



                           mg (6) CpPk               by mouth     



                                         daily.     

 

                          2019                Discontinued



              lactulose (CHRONULAC) 10     Take 30 mLs     240 mL       0              



                     gram/15 mL solution     (20 g total)        9  



                                         by mouth 3     



                                         (three) times     



                                         daily for 10     



                                         days.     

 

                          2019                Discontinued



              metoprolol (TOPROL-XL)     Take 1 tablet     30 tablet     11             



                     100 MG 24 hr tablet     (100 mg             9  



                                         total) by     



                                         mouth daily.     







Active Problems







                                        Patient Care Coordination Note

 

                                        



Mellisa, care provider at [facility], [phone]

                                        -----------------------------------------------------------------------------



                                        2D Echo, 19

Summary:

                                        1. Moderately impaired LV systolic function, LVEF 30-34%.

                                        2. Mild mitral regurgitations.

                                        3. Left atrial enlargement with elevated left atrial pressure.

                                        4. IVC: right atrial pressure is 6-10 mmHg.



                                        2D Echo, 18

Summary:

                                        1. Moderately impaired LV systolic function, LVEF 30-34%

                                        2. Mild mitral regurgitation

                                        3. Impaired LV relaxation suggestive of diastolic dysfunction.

                                        4. Left atrial enlargement with elevated left atrial pressure.

                                        5. LVIDd 5.6 cm.



Drug Screen 2017

No Barbiturates, benzodiazepines, cannabinoids, cocaine, Opiates or muscle 
relaxers detected

Positive for nicotine



Pertinent Tests:

                                        2DEcho, 

The left ventricle is chamber size (by vol index) is mildly enlarged (male

- LVED 75-89ml/m2). LVIDd 5.6 cm. No evidence of LV hypertrophy. All of the LV
segments

are severely hypokinetic . Global LV systolic function severely reduced .

LVEF by quantitative assessment is severely reduced (<20%) .

RV pacing wire is visualized .

RV chamber size is normal .

Global RV systolic function is depressed .

Mild-to-moderate mitral regurgitation.

Mild tricuspid regurgitation.

Estimated peak systolic PA pressure is 35-40 mmHg .

A small pericardial effusion is present .

The estimated RA pressure by IVC dynamics 11-15mmHg .

In comparison with the prior exam 2014 the following changes are

noted: PAP lower .



                                        2DEcho, 17 ([facility])

Summary:

                                        1.  Moderately dilated left ventricle. Severe global hypokinesis of left ventricle.

 Estimated LVEF 20-25%. Restrictive filing pattern (grade II diastolic 
dysfunction). LV is moderately dilated. The anterior wall is akinetic. The 
remainder of the segment of the left ventricle is moderately dilated. The 
anterior wall is akinetic. The remainder of the segment of the left ventricle 
are moderate to severly hypokinetic. 

                                        2.  The right ventricle is normal in size. Has mild global hypokinesis.

                                        3.  Aortic valve sclerosis. There is no aortic stenosis.

                                        4.  Mild MR

                                        5.  Mild TR

                                        6.  Estimated right ventricular systolic pressure is 40-45 mmHg.

                                        7.  No pulmonary insufficiency.

                                        8.  Severely enlarged left atrium. Mildly enlarged right atrium.

                                        9.  No pericardial effusion.



Lexiscan Nuclear Stress Report, 17

Impression:

                                        1.  Large sized defect MI/scar.

                                        2.  Ischemic cardiomyopathy with LVEF 33%



                                        2D Echo, 16 ([facility])

Summary:

                                        1.  Moderately impaired LV systolic function, LVEF 30-34%. LVIDd 5.5 cm

                                        2.  Mild mitral regurgitation

                                        3.  Impaired LV relaxation suggestive of diastolic dysfunction.

                                        4.  Left atrial enlargement with elevated left atrial pressure.

                                        5.  Compared to study on 16, no significant change.

                                        -----------------------------------------------------------------------------------------------------------------------



                                        2D Echo

                                        16 ([facility], [place], operated [facility])

Summary:

                                        1.  Moderately impaired LV systolic function, LVEF 30-34%

                                        2.  Mild mitral regurgitation

                                        3.  Impaired LV relaxation suggestive of diastolic dysfunction

                                        4.  Left atrial enlargement with elevated left atrial pressure.

                                        5.  LVIDd 5.5 cm

                                        2 Decho ( [facility] )

                                        3/7/2015

LV size normal

LVEF 55-60%, LVIDd 5.17 cm

Abnormal LV diastolic function

LA size normal

RV size and systolic function normal

Trivial pericardial effusion



Cardiac catheterization ( [facility] )

                                        3/9/2015 

Normal coronary arteries

LVEF 30-35%

LVedp 17 mmHg



Nuclear stress test ( [facility] )

                                        3/7/2015

Abnormal . Small mild reversible ischemia in the inferior wall. Normal LV size. 
Global LV systolic function was mildly reduced, with EF 46%.



CTA chest ( [facility] )

                                        3/6/2015 

No evidence of pulmonary embolus



ICD interrogation

                                        16 ([facility])

Summary:

Normal ICD function

Battery status is not at replacement time

Lead impedance within normal limits

Device detected 15 NS-VT episodes on 7/26/15, coinciding with hospital admission

 
______________________________________________________________________________________________________________________________________

      HPI : with severely depressed LVEF of < 20% by echo in 2014 secondary to
nonischemic cardiomyopathy, s/p single chamber AICD. He has had multiple 
hospitalizations for acute on chronic systolic heart failure in -. He was 
[facility] 2014 by Dr. Julien Carlson for 
management of advanced heart failure.



Past medical history:

NICMP

Chronic systolic HF

HTN

HLD

TR, mild/mod by echo 2014

MR, mild/mod by echo 2014

Pulmonary HTN with SPAP 45-50 mmHg by echo 2014

NBA and suspected CKD

Anxiety/depression 

Helicobateri positive gastritis 

DM type 2 (diabetes mellitus, type 2)  

Abnormal LFTs (liver function tests) 

AICD (automatic cardioverter/defibrillator) present 

Obesity 

TERRENCE (obstructive sleep apnea) 

Gout 

Schizo-affective schizophrenia             

No significant coronary artery disease by cardiac cath 11



Past Surgical History:

AICD, single lead (Medtronic) implanted 2012



Social History:

Single, former smoker, no alcohol or illicit drug use.



Family History:

Father had cancer



Allergies/intolerances: KCL elixir causes nausea/vomiting



Pertinent Tests:



                                        2 Decho (14) 

LVEF , 20%, LVIDd 5.8 cm

RV systolic function depressed

Severe bi-atrial enlargement 

TR, mild/ moderate

MR, mild/moderate

Est SPAP 45-50 mmHg



                                        2 Decho 13

LVEF 20-24%, LVIDd 4.98 cm

Depressed RV systolic function, mild/moderate

Severely enlarged LA

Mild LA, TR

Impaired LV relaxation

SPAP 50- 55 mmHg



                                        2 Decho 13

LVEF < 20%, LVIDd 6.2 cm

RV systolic function depressed

LA severely enlarged

MR, mild/mod

SPAP 40-45 mmHg

Small pericardial effusion



Cardiac Cath 11

Non-critical CAD

LVEDp 34-36 mmHg



CT chest 13

No evidence PE

Cardiomegaly and trace pericardial effusion

Small right pleural effusion



CT abd/pelvis 13

Moderate right pleural effusion

                                        6 mm subpleural nodule in the right lower lobe

Cardiomegaly

Small pericardial effusion

Colonic diverticulosis

Moderate ascites



Abd US 13

Liver homogenous in texture and free of mass

Non specific diffuse gallbladder wall thickening

Assessment/Plan:



Chronic Systolic Heart failure

Non ischemic CMP, LVEF < 20%, LVIDd 5.8 cm by echo 14

NYHA Class     , Stage C

Assessment:



Hypertension

Assessment:



Hyperlipidemia

Assessment: 



Possible chronic kidney disease

Assessment: 



Diabetes Mellitus, type 2

Assessment:



AICD, single chamber (medtronic)

Assessment:



TERRENCE 

Assessment:



Obesity

Assessment:



Gout 

Assessment:



Schizo-affective schizophrenia  

Assessment: 



H/o abnormal LFT's

Assessment: hepatology evaluation 2013 negative for autoimmune disease and 
hepatitis 









 



                           Problem                   Noted Date

 

 



                           CHF (congestive heart failure)     2019

 

 



                           Tinea pedis of both feet     2019

 

 



                           Ingrown toenail           2019

 

 



                           Callus of foot            2019

 

 



                           Screening for prostate cancer     2019

 

 



                           Urine frequency           2019

 

 



                           Urinary incontinence, unspecified type     2019

 

 



                           Hospital discharge follow-up     2019

 

 



                           Congestive heart failure     2019

 

 



                           Non compliance w medication regimen     2019

 

 



                           Diastolic CHF             2018

 

 



                           Trigger little finger of right hand     10/26/2018

 

 



                           Congestive heart failure, unspecified HF chronicity, unspecified heart     10/26/2018





                                         failure type 

 

 



                           Type 2 diabetes mellitus with complication, with long-term current use of     10/26/2018





                                         insulin 

 

 



                           Needs flu shot            10/26/2018

 

 



                           Atrial fibrillation, unspecified type     10/26/2018

 

 



                           Encounter to establish care with new doctor     10/26/2018

 

 



                           Hyperglycemia             2018

 

 



                           Hypercalcemia             09/10/2018

 

 



                           Chronic kidney disease, unspecified CKD stage     2017

 

 



                           Tobacco abuse             2017

 

 



                           Cocaine abuse             2017

 

 



                           Atrial fibrillation, chronic     2017

 

 



                           COPD (chronic obstructive pulmonary disease)     2017

 

 



                           Acute on chronic combined systolic and diastolic ACC/AHA stage C congestive     08/15/2016





                                         heart failure 

 

 



                           Chronic anticoagulation     2016

 

 



                           Chronic kidney disease (CKD) stage G2/A2, mildly decreased glomerular     2016





                                         filtration rate (GFR) between 60-89 mL/min/1.73 square meter and 



                                         albuminuria creatinine ratio between  mg/g 

 

 



                           Hypertension              2014

 

 



                           Hyperlipidemia            2014

 

 



                           Gout                      2014

 

 



                           Anxiety                   2013

 

 



                           Gastritis                 2013

 

 



                                         Last Assessment & Plan:



                                         He has not had EGD, but tested positive for H.pylori, and was treated.  He



                                         states that his symptoms have not improved.  He should follow up with his



                                         PCP ([facility]).  His gastritis is a secondary consideration



                                         in the setting of severe cardiomyopathy.

 

 



                           AICD (automatic cardioverter/defibrillator) present - medtronic     2013

 

 



                                         Last Assessment & Plan:



                                         Implanted in  - see NICM above.

 

 



                           Obesity                   2013

 

 



                                         Last Assessment & Plan:



                                         He has significant truncal obesity and hyperlipidemia (on atorvastatin),



                                         but denies hypertension and diabetes. He is losing weight with diet and



                                         exercise - he is encouraged to continue with this regimen.

 

 



                           TERRENCE (obstructive sleep apnea)     2013

 

 



                           Schizo-affective schizophrenia, chronic condition     2013

 

 



                                         Last Assessment & Plan:



                                         Has flight of ideas - follows with Dr. Merchant (on [employer]).  Lives



                                         independently - on disability.

 

 



                           Pulmonary nodule          2011

 

 



                                         Overview:



                                         Overview:



                                         Seen on CT chest - 

 

 



                           Major depressive disorder, single episode, severe with psychotic features     2001









Resolved Problems







  



                     Problem             Noted Date          Resolved Date

 

  



                     Bronchitis          2019

 

  



                     Upper respiratory tract infection, unspecified type     2019

 

  



                     Cough               2019

 

  



                     Wheezing            2019

 

  



                     Chest congestion     2019

 

  



                     Acute left-sided thoracic back pain     2019

 

  



                     Spasm of thoracic back muscle     2019

 

  



                     Type 2 diabetes mellitus without complication     2013







Encounters







                          Care Team                 Description



                     Date                Type                Specialty  

 

                                        



Raffaele Moran MD                Chronic combined systolic and diastolic CHF (congestive

 heart failure) (HCC)



                     2019          Office Visit        Transplant  

 

                                        



Moncho Villanueva MD                       



                     2019          Hospital            Cardiology  



                           -                         Encounter   



                                         2019    

 

                                        



Raffaele Moran MD Ahmed, Shamoon, MD                      Hyperlipidemia, unspecified hyperlipidemia type; 

Chronic combined systolic and diastolic CHF (congestive heart failure) (HCC); 

Type 2 diabetes mellitus without complication, unspecified whether long term 
insulin use (HCC)



                     2019          Office Visit        Transplant  

 

                                                     



                           2019                Concetta Rose RN                      



                     2019          Orders Only         Transplant  

 

                                        



Raffaele Moran MD                Chronic combined systolic and diastolic CHF (congestive

 heart failure) ([facility]); 

AICD (automatic cardioverter/defibrillator) present



                     2019          Office Visit        Transplant  

 

                                                     



                     2019          Orders Only         General Internal Medicine  

 

                                        



Jonh Sales Jr., MD        Chronic combined systolic and diastolic CHF (congestive

 heart failure) (HCC) (Primary Dx); 

Atrial fibrillation, chronic (HCC); 

Essential hypertension; 

Type 2 diabetes mellitus with complication, with long-term current use of 
insulin (HCC); 

Chronic kidney disease, unspecified CKD stage; 

AICD (automatic cardioverter/defibrillator) present - medtronic; 

Schizo-affective schizophrenia, chronic condition (HCC); 

Urine frequency; 

Urinary incontinence, unspecified type; 

Screening for prostate cancer; 

Callus of foot; 

Ingrown toenail; 

Tinea pedis of both feet; 

Hospital discharge follow-up



                     2019          Office Visit        Internal Medicine  

 

                                        



Malia Carrillo                           



                     2019          Documentation       Transplant  

 

                                                     



                           2019                Travel   

 

                                        



Moncho Villanueva MD Siddiqui, Imran Alam, MD                 



                     2019          Hospital            Cardiology  



                           -                         Encounter   



                                         2019    

 

                                        



Raffaele Moran MD Ahmed, Shamoon, MD                      Chronic combined systolic and diastolic CHF (congestive heart

 failure) (HCC)



                     2019          Office Visit        Transplant  

 

                                                     



                           2019                Travel   

 

                                        



Lola Lynne RN                       Follow-up



                     2019          Telephone           Transplant  

 

                                        



Raffaele Moran MD                Chronic combined systolic and diastolic CHF (congestive

 heart failure) (HCC); 

Type 2 diabetes mellitus without complication, unspecified whether long term 
insulin use (HCC); 

AICD (automatic cardioverter/defibrillator) present



                     2019          Office Visit        Transplant  

 

                                        



Jessica Green                     Medication Management



                     2019          Telephone           Transplant  

 

                                        



Coleen Delaney RN                    Chronic combined systolic and diastolic CHF (congestive heart

 failure) (HCC) (Primary Dx); 

Type 2 diabetes mellitus without complication, unspecified whether long term 
insulin use (HCC); 

AICD (automatic cardioverter/defibrillator) present



                     2019          Orders Only         Transplant  

 

                                        



Jonh Sales Jr., MD        Bronchitis; 

Cough; 

Wheezing; 

Chest congestion



                     2019          Refill              Internal Medicine  

 

                                        



Raffaele Moran MD                 



                           2019                Hospital   



                                         Encounter   

 

                                        



Raffaele Moran MD                Chronic combined systolic and diastolic CHF (congestive

 heart failure) (HCC); 

Hyperlipidemia, unspecified hyperlipidemia type; 

Atrial fibrillation, chronic (HCC)



                     2019          Office Visit        Transplant  

 

                                        



Coleen Delaney RN                    Hyperlipidemia, unspecified hyperlipidemia type (Primary Dx)





                     2019          Orders Only         Transplant  

 

                                        



Raffaele Moran MD                Chronic combined systolic and diastolic CHF (congestive

 heart failure) (HCC)



                     2019          Office Visit        Transplant  

 

                                        



Raffaele Moran MD                Chronic systolic heart failure (HCC) (Primary Dx)



                     2019          Outside Orders      Central Scheduling  

 

                                        



Carrillo Amy                          Follow-up



                     2019          Telephone           Transplant  

 

                                        



Jonh Sales Jr., MD        Bronchitis (Primary Dx); 

Upper respiratory tract infection, unspecified type; 

Cough; 

Wheezing; 

Chest congestion



                     2019          Office Visit        Internal Medicine  

 

                                        



Jonh Sales Jr., MD        Medication Problem



                     2019          Telephone           Internal Medicine  

 

                                        



Raffaele Moran MD                Chronic combined systolic and diastolic CHF (congestive

 heart failure) (HCC); 

AICD (automatic cardioverter/defibrillator) present - medtronic



                     2019          Office Visit        Transplant  

 

                                        



Jonh Sales Jr., MD        Acute left-sided thoracic back pain; 

Spasm of thoracic back muscle



                     02/15/2019          Refill              Internal Medicine  

 

                                        



Lola Lynne RN                        



                     2019          Orders Only         Transplant  

 

                                        



Jonh Sales Jr., MD        Acute left-sided thoracic back pain; 

Spasm of thoracic back muscle



                     2019          Refill              Internal Medicine  

 

                                        



Jonh Sales Jr., MD        Medication Refill



                     2019          Telephone           Internal Medicine  

 

                                        



Lola Lynne, USMAN                       Follow-up



                     2019          Telephone           Transplant  

 

                                        



Jonh Sales Jr., MD        Acute left-sided thoracic back pain (Primary Dx);

 

Spasm of thoracic back muscle



                     2019          Office Visit        Internal Medicine  

 

                                        



Lola Lynne, USMAN                        



                     2019          Orders Only         Transplant  

 

                                        



Raffaele Moran MD                Chronic combined systolic and diastolic CHF (congestive

 heart failure) (HCC); 

Atrial fibrillation, chronic (HCC)



                     2019          Office Visit        Transplant  

 

                                                     



                           2019                Travel   

 

                                        



Raffaele Moran MD Ahmed, Shamoon, MD                      AICD (automatic cardioverter/defibrillator) present



                     2019          Hospital            Cardiology  



                           -                         Encounter   



                                         2019    

 

                                        



Raffaele Moran MD Ahmed, Shamoon, MD                      Chronic combined systolic and diastolic CHF (congestive heart

 failure) (HCC); 

Atrial fibrillation, chronic (HCC); 

AICD (automatic cardioverter/defibrillator) present - medtronic



                     2019          Office Visit        Transplant  

 

                                                     



                     2019          Orders Only         General Internal Medicine  

 

                                        



Pcp, No                                  



                     2018          Telephone           Cardiology  

 

                                        



Moncho Villanueva MD                      AICD (automatic cardioverter/defibrillator) present



                     2018          Hospital            Cardiology  



                           -                         Encounter   



                                         2018    

 

                                        



Raffaele Moran MD Ahmed, Shamoon, MD                      AICD (automatic cardioverter/defibrillator) present (Primary 

Dx); 

Chronic combined systolic and diastolic CHF (congestive heart failure) (HCC); 

AICD (automatic cardioverter/defibrillator) present - medtronic



                     2018          Office Visit        Transplant  

 

                                                     



                           2018                Travel   

 

                                                     



                     2018          Orders Only         General Internal Medicine  

 

                                        



Raffaele Moran MD                Chronic combined systolic and diastolic CHF (congestive

 heart failure) (HCC); 

Hyperlipidemia, unspecified hyperlipidemia type; 

AICD (automatic cardioverter/defibrillator) present - medtronic



                     2018          Office Visit        Transplant  

 

                                        



Malia Carrillo                           



                     2018          Documentation       Transplant  

 

                                        



Jonh Sales Jr., MD        Trigger little finger of right hand (Primary Dx);

 

Numbness of finger; 

AICD (automatic cardioverter/defibrillator) present - medtronic; 

Congestive heart failure, unspecified HF chronicity, unspecified heart failure 
type (HCC); 

Atrial fibrillation, unspecified type (HCC); 

Chronic kidney disease, unspecified CKD stage; 

Type 2 diabetes mellitus with complication, with long-term current use of 
insulin (HCC); 

Needs flu shot; 

Encounter to establish care with new doctor



                     10/26/2018          Office Visit        Internal Medicine  

 

                                        



Lola Lynne, USMAN                        



                     10/08/2018          Orders Only         Transplant  

 

                                        



Lola Lynne RN                        



                     10/05/2018          Orders Only         Transplant  

 

                                        



Malia Carrillo                           



                     10/05/2018          Telephone           Transplant  

 

                                        



Malia Carrillo                           



                     10/04/2018          Telephone           Central Scheduling  



after 2018



Immunizations







  



                     Name                Dates Previously Given     Next Due

 

  



                           Influenza Antibiotic Free     10/26/2018 



                                         Non PF IM  

 

  



                           Influenza TIV (IM)        2015, 10/13/2014 

 

  



                           Influenza Three-TIV PF 5+     10/02/2017, 10/02/2017 (Deferred:  - previously 



                           YR                        given se documentation), 10/25/2016 

 

  



                           Influenza Three-TIV PF 5+     2015 



                                         YRS   

 

  



                           Pneumococcal              2018 



                                         Polysaccharide  



                                         (Pneumovax)  







Family History







   



                 Medical History     Relation        Name            Comments

 

   



                           Cancer                    Father  

 

   



                           Hypertension              Father  

 

   



                           Hypertension              Mother  









   



                 Relation        Name            Status          Comments

 

   



                           Father                     

 

   



                           Mother                    Alive 







Social History







                                        Date



                 Tobacco Use     Types           Packs/Day       Years Used 

 

                                        Quit: 2017



                     Former Smoker       1                   10 

 

    



                           Smokeless Tobacco: Former       Quit: 2017



                                         User   









                                        Tobacco Cessation: Counseling Given: Yes

Comments: i am not ready to quit









   



                 Alcohol Use     Drinks/Week     oz/Week         Comments

 

   



                                         No   









  



                     Alcohol Habits      Answer              Date Recorded

 

  



                     How often do you have a drink containing alcohol?     Never               2019

 

  



                           How many drinks containing alcohol do you have on     Not asked 



                                         a typical day when you are drinking?  

 

  



                           How often do you have six or more drinks on one     Not asked 



                                         occasion?  









 



                           Sex Assigned at Birth     Date Recorded

 

 



                                         Not on file 









                                        Industry



                           Job Start Date            Occupation 

 

                                        Not on file



                           Not on file               Not on file 









                                        Travel End



                           Travel History            Travel Start 

 





                                         No recent travel history available.







Last Filed Vital Signs







                                        Time Taken



                           Vital Sign                Reading 

 

                                        2019 10:31 AM CDT



                           Blood Pressure            114/70 

 

                                        2019 10:31 AM CDT



                           Pulse                     58 

 

                                        2019 10:31 AM CDT



                           Temperature               36.4 C (97.5 F) 

 

                                        2019 10:31 AM CDT



                           Respiratory Rate          18 

 

                                        2019 10:31 AM CDT



                           Oxygen Saturation         99% 

 

                                        -



                           Inhaled Oxygen            - 



                                         Concentration  

 

                                        2019 10:31 AM CDT



                           Weight                    106.9 kg (235 lb 9.6 oz) 

 

                                        2019 10:31 AM CDT



                           Height                    172.7 cm (5' 8") 

 

                                        2019 10:31 AM CDT



                           Body Mass Index           35.82 







Plan of Treatment







                          Care Team                 Description



                     Date                Type                Specialty  

 

                                        



Raffaele Moran MD



[address] [address]



[phone] (Fax)                       



                     2019          Office Visit        Transplant  









   



                 Health Maintenance     Due Date        Last Done       Comments

 

   



                     HEMOGLOBIN A1C      2019, 2019, 2019, 



                                         Additional history exists 







Procedures







                                        Comments



                 Procedure Name     Priority        Date/Time       Associated Diagnosis 

 

                                        



Results for this procedure are in the results section.



                 CBC W/PLT COUNT & AUTO     STAT            2019      Chronic combined systolic 



                     DIFFERENTIAL        10:33 AM CDT        and diastolic CHF 



                                         (congestive heart 



                                         failure) (HCC) 

 

                                        



Results for this procedure are in the results section.



                 B-TYPE NATRIURETIC FACTOR     STAT            2019      Chronic combined systolic 



                     (BNP)               10:33 AM CDT        and diastolic CHF 



                                         (congestive heart 



                                         failure) ([facility]) 

 

                                        



Results for this procedure are in the results section.



                 BASIC METABOLIC PANEL (7)     STAT            2019      Chronic combined systolic 



                           10:33 AM CDT              and diastolic CHF 



                                         (congestive heart 



                                         failure) ([facility]) 

 

                                        



Results for this procedure are in the results section.



                 CBC W/PLT COUNT & AUTO     STAT            2019      Chronic combined systolic 



                     DIFFERENTIAL        10:33 AM CDT        and diastolic CHF 



                                         (congestive heart 



                                         failure) ([facility]) 

 

                                         



                     ECG 12-LEAD         Routine             2019  



                                         7:38 AM CDT  

 

  



                                         Procedure



                                         Note -



                                         Interface,



                                         External



                                         Ris In -



                                         2019



                                         3:44 PM



                                         CDT



                                         Ventricula



                                         r Rate 78



                                         BPM



                                         Atrial



                                         Rate 79



                                         BPM



                                         QRS



                                         Duration



                                         100 ms



                                         Q-T



                                         Interval



                                         360 ms



                                         QTC



                                         Calculatio



                                         n(Bazett)



                                         410 ms



                                         R Axis 1



                                         degrees



                                         T Axis 162



                                         degrees





                                         Atrial



                                         fibrillati



                                         on



                                         Low



                                         voltage



                                         QRS



                                         ST & T



                                         wave



                                         abnormalit



                                         y,



                                         consider



                                         lateral



                                         ischemia



                                         or



                                         digitalis



                                         effect



                                         Abnormal



                                         ECG



                                         When



                                         compared



                                         with ECG



                                         of



                                         



                                         9 07:34,



                                         No



                                         significan



                                         t change



                                         was found

 

                                        



Results for this procedure are in the results section.



                 ECG 12-LEAD     Routine         2019      Chronic combined systolic 



                           7:38 AM CDT               and diastolic CHF 



                                         (congestive heart 



                                         failure) ([facility]) 

 

                                         



                           RHYTHM STRIP - SCAN       2019  



                                         2:11 PM CDT  

 

                                        



Results for this procedure are in the results section.



                     POCT-GLUCOSE METER     Routine             2019  



                                         9:24 AM CDT  

 

                                        



Results for this procedure are in the results section.



                     CBC W/PLT COUNT & AUTO     Routine             2019  



                           DIFFERENTIAL              3:43 AM CDT  

 

                                        



Results for this procedure are in the results section.



                     CBC W/PLT COUNT & AUTO     Routine             2019  



                           DIFFERENTIAL              3:43 AM CDT  

 

                                        



Results for this procedure are in the results section.



                     BASIC METABOLIC PANEL (7)     Routine             2019  



                                         3:43 AM CDT  

 

                                        



Results for this procedure are in the results section.



                     POCT-GLUCOSE METER     Routine             2019  



                                         9:31 PM CDT  

 

                                        



Results for this procedure are in the results section.



                     POCT-GLUCOSE METER     Routine             2019  



                                         6:36 PM CDT  

 

                                        



Results for this procedure are in the results section.



                     POCT-GLUCOSE METER     Routine             2019  



                                         2:32 PM CDT  

 

                                        



Results for this procedure are in the results section.



                     POCT-GLUCOSE METER     Routine             2019  



                                         10:04 AM CDT  

 

                                        



Results for this procedure are in the results section.



                     CBC W/PLT COUNT & AUTO     Routine             2019  



                           DIFFERENTIAL              4:20 AM CDT  

 

                                        



Results for this procedure are in the results section.



                     CBC W/PLT COUNT & AUTO     Routine             2019  



                           DIFFERENTIAL              4:20 AM CDT  

 

                                        



Results for this procedure are in the results section.



                     BASIC METABOLIC PANEL (7)     Routine             2019  



                                         4:20 AM CDT  

 

                                        



Results for this procedure are in the results section.



                     POCT-GLUCOSE METER     Routine             08/15/2019  



                                         11:00 PM CDT  

 

                                        



Results for this procedure are in the results section.



                     POCT-GLUCOSE METER     Routine             08/15/2019  



                                         5:30 PM CDT  

 

                                        



Results for this procedure are in the results section.



                     POCT-GLUCOSE METER     Routine             08/15/2019  



                                         1:45 PM CDT  

 

                                        



Results for this procedure are in the results section.



                     POCT-GLUCOSE METER     Routine             08/15/2019  



                                         10:40 AM CDT  

 

                                        



Results for this procedure are in the results section.



                     POCT-GLUCOSE METER     Routine             2019  



                                         6:01 PM CDT  

 

                                        



Results for this procedure are in the results section.



                     CBC W/PLT COUNT & AUTO     Routine             2019  



                           DIFFERENTIAL              6:27 AM CDT  

 

                                        



Results for this procedure are in the results section.



                     CBC W/PLT COUNT & AUTO     Routine             2019  



                           DIFFERENTIAL              6:27 AM CDT  

 

                                        



Results for this procedure are in the results section.



                     BASIC METABOLIC PANEL (7)     Routine             2019  



                                         6:27 AM CDT  

 

                                        



Results for this procedure are in the results section.



                     DIGOXIN LEVEL       Routine             2019  



                                         10:08 AM CDT  

 

                                        



Results for this procedure are in the results section.



                     CBC W/PLT COUNT & AUTO     Routine             2019  



                           DIFFERENTIAL              5:33 AM CDT  

 

                                        



Results for this procedure are in the results section.



                     CBC W/PLT COUNT & AUTO     Routine             2019  



                           DIFFERENTIAL              5:33 AM CDT  

 

                                        



Results for this procedure are in the results section.



                     BASIC METABOLIC PANEL (7)     Routine             2019  



                                         5:33 AM CDT  

 

                                        



Results for this procedure are in the results section.



                     POCT-GLUCOSE METER     Routine             2019  



                                         10:13 PM CDT  

 

                                        



Results for this procedure are in the results section.



                     POCT-GLUCOSE METER     Routine             2019  



                                         6:47 PM CDT  

 

                                        



Results for this procedure are in the results section.



                     BLOOD CULTURE       Routine             2019  



                                         5:38 PM CDT  

 

                                        



Results for this procedure are in the results section.



                     HEMOGLOBIN A1C      Routine             2019  



                                         5:18 PM CDT  

 

                                        



Results for this procedure are in the results section.



                     BLOOD CULTURE       Routine             2019  



                                         5:18 PM CDT  

 

                                        



Results for this procedure are in the results section.



                     URINALYSIS W/ REFLEX     Routine             2019  



                           URINE CULTURE             4:57 PM CDT  

 

                                        



Results for this procedure are in the results section.



                 CBC W/PLT COUNT & AUTO     STAT            2019      Chronic combined systolic 



                     DIFFERENTIAL        10:29 AM CDT        and diastolic CHF 



                                         (congestive heart 



                                         failure) (HCC) 

 

                                        



Results for this procedure are in the results section.



                 HEMOGLOBIN A1C     STAT            2019      Type 2 diabetes mellitus 



                           10:29 AM CDT              without complication, 



                                         unspecified whether long 



                                         term insulin use (HCC) 

 

                                        



Results for this procedure are in the results section.



                 B-TYPE NATRIURETIC FACTOR     STAT            2019      Chronic combined systolic 



                     (BNP)               10:29 AM CDT        and diastolic CHF 



                                         (congestive heart 



                                         failure) (HCC) 

 

                                        



Results for this procedure are in the results section.



                 BASIC METABOLIC PANEL (7)     STAT            2019      Chronic combined systolic 



                           10:29 AM CDT              and diastolic CHF 



                                         (congestive heart 



                                         failure) (HCC) 

 

                                        



Results for this procedure are in the results section.



                 CBC W/PLT COUNT & AUTO     STAT            2019      Chronic combined systolic 



                     DIFFERENTIAL        10:29 AM CDT        and diastolic CHF 



                                         (congestive heart 



                                         failure) (HCC) 

 

                                        



Results for this procedure are in the results section.



                 LIPID PANEL     STAT            2019      Hyperlipidemia, 



                           10:29 AM CDT              unspecified 



                                         hyperlipidemia type 

 

                                        



Results for this procedure are in the results section.



                 HEPATIC FUNCTION PANEL     STAT            2019      Hyperlipidemia, 



                           10:29 AM CDT              unspecified 



                                         hyperlipidemia type 

 

                                        



Results for this procedure are in the results section.



                 (CELLAVISION MANUAL DIFF)     STAT            2019      Chronic combined systolic 



                           10:21 AM CDT              and diastolic CHF 



                                         (congestive heart 



                                         failure) (HCC) 

 

                                        



Results for this procedure are in the results section.



                 CBC W/PLT COUNT & AUTO     STAT            2019      Chronic combined systolic 



                     DIFFERENTIAL        10:21 AM CDT        and diastolic CHF 



                                         (congestive heart 



                                         failure) (HCC) 

 

                                        



Results for this procedure are in the results section.



                 B-TYPE NATRIURETIC FACTOR     STAT            2019      Chronic combined systolic 



                     (BNP)               10:21 AM CDT        and diastolic CHF 



                                         (congestive heart 



                                         failure) (HCC) 

 

                                        



Results for this procedure are in the results section.



                 BASIC METABOLIC PANEL (7)     STAT            2019      Chronic combined systolic 



                           10:21 AM CDT              and diastolic CHF 



                                         (congestive heart 



                                         failure) (HCC) 

 

                                        



Results for this procedure are in the results section.



                 CBC W/PLT COUNT & AUTO     STAT            2019      Chronic combined systolic 



                     DIFFERENTIAL        10:21 AM CDT        and diastolic CHF 



                                         (congestive heart 



                                         failure) (HCC) 

 

                                        



Results for this procedure are in the results section.



                     ECG 12-LEAD         Routine             2019  



                                         7:34 AM CDT  

 

                                         



                           RHYTHM STRIP - SCAN       2019  



                                         11:10 AM CDT  

 

                                        



Results for this procedure are in the results section.



                     POCT-GLUCOSE METER     Routine             2019  



                                         9:30 PM CDT  

 

                                        



Results for this procedure are in the results section.



                     POCT-GLUCOSE METER     Routine             2019  



                                         6:26 PM CDT  

 

                                        



Results for this procedure are in the results section.



                     POCT-GLUCOSE METER     Routine             2019  



                                         1:24 PM CDT  

 

                                        



Results for this procedure are in the results section.



                     POCT-GLUCOSE METER     Routine             2019  



                                         8:34 AM CDT  

 

                                        



Results for this procedure are in the results section.



                     POCT-GLUCOSE METER     Routine             2019  



                                         10:10 PM CDT  

 

                                        



Results for this procedure are in the results section.



                     POCT-GLUCOSE METER     Routine             2019  



                                         10:03 AM CDT  

 

                                        



Results for this procedure are in the results section.



                     POCT-GLUCOSE METER     Routine             2019  



                                         11:12 PM CDT  

 

                                        



Results for this procedure are in the results section.



                     POCT-GLUCOSE METER     Routine             2019  



                                         10:14 PM CDT  

 

                                        



Results for this procedure are in the results section.



                     POCT-GLUCOSE METER     Routine             2019  



                                         2:30 PM CDT  

 

                                        



Results for this procedure are in the results section.



                     CBC W/PLT COUNT & AUTO     STAT                2019  



                           DIFFERENTIAL              11:33 AM CDT  

 

                                        



Results for this procedure are in the results section.



                     BASIC METABOLIC PANEL (7)     STAT                2019  



                                         11:33 AM CDT  

 

                                        



Results for this procedure are in the results section.



                     CBC W/PLT COUNT & AUTO     STAT                2019  



                           DIFFERENTIAL              11:33 AM CDT  

 

                                        



Results for this procedure are in the results section.



                     POCT-GLUCOSE METER     Routine             2019  



                                         9:42 AM CDT  

 

                                        



Results for this procedure are in the results section.



                     POCT-GLUCOSE METER     Routine             2019  



                                         9:40 PM CDT  

 

                                        



Results for this procedure are in the results section.



                     XR CHEST 2 VIEWS     Routine             2019  



                                         8:57 PM CDT  

 

                                        



Results for this procedure are in the results section.



                     POCT-GLUCOSE METER     Routine             2019  



                                         6:12 PM CDT  

 

                                        



Results for this procedure are in the results section.



                     POCT-GLUCOSE METER     Routine             2019  



                                         8:38 AM CDT  

 

                                        



Results for this procedure are in the results section.



                     CBC W/PLT COUNT & AUTO     Routine             2019  



                           DIFFERENTIAL              5:11 AM CDT  

 

                                        



Results for this procedure are in the results section.



                     CREATINE KINASE (CK)     Routine             2019  



                                         5:11 AM CDT  

 

                                        



Results for this procedure are in the results section.



                     TROPONIN I          Routine             2019  



                                         5:11 AM CDT  

 

                                        



Results for this procedure are in the results section.



                     BASIC METABOLIC PANEL (7)     Routine             2019  



                                         5:11 AM CDT  

 

                                        



Results for this procedure are in the results section.



                     CBC W/PLT COUNT & AUTO     Routine             2019  



                           DIFFERENTIAL              5:11 AM CDT  

 

                                        



Results for this procedure are in the results section.



                     POCT-GLUCOSE METER     Routine             2019  



                                         11:26 PM CDT  

 

                                        



Results for this procedure are in the results section.



                     CBC W/PLT COUNT & AUTO     STAT                2019  



                           DIFFERENTIAL              10:46 PM CDT  

 

                                        



Results for this procedure are in the results section.



                     CREATINE KINASE (CK)     Routine             2019  



                                         10:46 PM CDT  

 

                                        



Results for this procedure are in the results section.



                     TROPONIN I          Routine             2019  



                                         10:46 PM CDT  

 

                                        



Results for this procedure are in the results section.



                     URINALYSIS W/ MICROSCOPIC     Routine             2019  



                                         10:46 PM CDT  

 

                                        



Results for this procedure are in the results section.



                     TSH/FREE T4 IF INDICATED     Routine             2019  



                                         10:46 PM CDT  

 

                                        



Results for this procedure are in the results section.



                     BASIC METABOLIC PANEL (7)     ASAP                2019  



                                         10:46 PM CDT  

 

                                        



Results for this procedure are in the results section.



                     CBC W/PLT COUNT & AUTO     STAT                2019  



                           DIFFERENTIAL              10:46 PM CDT  

 

                                        



Results for this procedure are in the results section.



                 CBC W/PLT COUNT & AUTO     STAT            2019      Chronic combined systolic 



                     DIFFERENTIAL        10:53 AM CDT        and diastolic CHF 



                                         (congestive heart 



                                         failure) (HCC) 

 

                                        



Results for this procedure are in the results section.



                 B-TYPE NATRIURETIC FACTOR     STAT            2019      Chronic combined systolic 



                     (BNP)               10:53 AM CDT        and diastolic CHF 



                                         (congestive heart 



                                         failure) (HCC) 

 

                                        



Results for this procedure are in the results section.



                 BASIC METABOLIC PANEL (7)     STAT            2019      Chronic combined systolic 



                           10:53 AM CDT              and diastolic CHF 



                                         (congestive heart 



                                         failure) (HCC) 

 

                                        



Results for this procedure are in the results section.



                 CBC W/PLT COUNT & AUTO     STAT            2019      Chronic combined systolic 



                     DIFFERENTIAL        10:53 AM CDT        and diastolic CHF 



                                         (congestive heart 



                                         failure) (HCC) 

 

                                        



Results for this procedure are in the results section.



                 CBC W/PLT COUNT & AUTO     STAT            2019      Chronic combined systolic 



                     DIFFERENTIAL        10:43 AM CDT        and diastolic CHF 



                                         (congestive heart 



                                         failure) (HCC) 

 

                                        



Results for this procedure are in the results section.



                 HEMOGLOBIN A1C     STAT            2019      Type 2 diabetes mellitus 



                           10:43 AM CDT              without complication, 



                                         unspecified whether long 



                                         term insulin use ([facility]) 

 

                                        



Results for this procedure are in the results section.



                 B-TYPE NATRIURETIC FACTOR     STAT            2019      Chronic combined systolic 



                     (BNP)               10:43 AM CDT        and diastolic CHF 



                                         (congestive heart 



                                         failure) (HCC) 

 

                                        



Results for this procedure are in the results section.



                 BASIC METABOLIC PANEL (7)     STAT            2019      Chronic combined systolic 



                           10:43 AM CDT              and diastolic CHF 



                                         (congestive heart 



                                         failure) (HCC) 

 

                                        



Results for this procedure are in the results section.



                 CBC W/PLT COUNT & AUTO     STAT            2019      Chronic combined systolic 



                     DIFFERENTIAL        10:43 AM CDT        and diastolic CHF 



                                         (congestive heart 



                                         failure) (HCC) 

 

                                         



                     ECG 12-LEAD         Routine             2019  



                                         7:48 AM CDT  

 

  



                                         Procedure



                                         Note -



                                         Interface,



                                         External



                                         Ris In -



                                         2019



                                         3:14 PM



                                         CDT



                                         Ventricula



                                         r Rate 61



                                         BPM



                                         Atrial



                                         Rate 131



                                         BPM



                                         QRS



                                         Duration



                                         212 ms



                                         Q-T



                                         Interval



                                         428 ms



                                         QTC



                                         Calculatio



                                         n(Bazett)



                                         430 ms



                                         R Axis 50



                                         degrees



                                         T Axis 144



                                         degrees





                                         ** Poor



                                         data



                                         quality,



                                         interpreta



                                         tion may



                                         be



                                         adversely



                                         affected



                                         Atrial



                                         fibrillati



                                         on



                                         Right



                                         bundle



                                         branch



                                         block



                                         T wave



                                         abnormalit



                                         y,



                                         consider



                                         lateral



                                         ischemia



                                         or



                                         digitalis



                                         effect



                                         Abnormal



                                         ECG



                                         When



                                         compared



                                         with ECG



                                         of



                                         



                                         9 12:14,



                                         Right



                                         bundle



                                         branch



                                         block is



                                         now



                                         Present

 

                                         



                           RHYTHM STRIP - SCAN       2019  



                                         1:51 PM CDT  

 

                                         



                           RHYTHM STRIP - SCAN       2019  



                                         12:03 PM CDT  

 

                                         



                           RHYTHM STRIP - SCAN       2019  



                                         3:02 PM CDT  

 

                                        



Results for this procedure are in the results section.



                 CBC W/PLT COUNT & AUTO     STAT            2019      Chronic combined systolic 



                     DIFFERENTIAL        11:33 AM CDT        and diastolic CHF 



                                         (congestive heart 



                                         failure) (HCC) 

 

                                        



Results for this procedure are in the results section.



                 LIPID PANEL     STAT            2019      Hyperlipidemia, 



                           11:33 AM CDT              unspecified 



                                         hyperlipidemia type 

 

                                        



Results for this procedure are in the results section.



                 HEPATIC FUNCTION PANEL     STAT            2019      Hyperlipidemia, 



                           11:33 AM CDT              unspecified 



                                         hyperlipidemia type 

 

                                        



Results for this procedure are in the results section.



                 B-TYPE NATRIURETIC FACTOR     STAT            2019      Chronic combined systolic 



                     (BNP)               11:33 AM CDT        and diastolic CHF 



                                         (congestive heart 



                                         failure) (HCC) 

 

                                        



Results for this procedure are in the results section.



                 BASIC METABOLIC PANEL (7)     STAT            2019      Chronic combined systolic 



                           11:33 AM CDT              and diastolic CHF 



                                         (congestive heart 



                                         failure) (HCC) 

 

                                        



Results for this procedure are in the results section.



                 CBC W/PLT COUNT & AUTO     STAT            2019      Chronic combined systolic 



                     DIFFERENTIAL        11:33 AM CDT        and diastolic CHF 



                                         (congestive heart 



                                         failure) (HCC) 

 

                                         



                     ECG 12-LEAD         Routine             2019  



                                         8:20 AM CDT  

 

  



                                         Procedure



                                         Note -



                                         Interface,



                                         External



                                         Ris In -



                                         2019



                                         4:02 PM



                                         CDT



                                         Ventricula



                                         r Rate 0



                                         BPM



                                         Atrial



                                         Rate 0 BPM



                                         QRS



                                         Duration 0



                                         ms



                                         Q-T



                                         Interval 0



                                         ms



                                         QTC



                                         Calculatio



                                         n(Bazett)



                                         0 ms



                                         R Axis 0



                                         degrees



                                         T Axis 0



                                         degrees





                                         ** No QRS



                                         complexes



                                         found, no



                                         ECG



                                         analysis



                                         possible



                                         When



                                         compared



                                         with ECG



                                         of



                                         



                                         9 12:14,



                                         Current



                                         undetermin



                                         ed rhythm



                                         precludes



                                         rhythm



                                         comparison



                                         , needs



                                         review

 

                                         



                           [facility] IMPLANT REPORT      2019  



                           - SCAN                    12:13 PM CDT  

 

                                        



Results for this procedure are in the results section.



                 CBC W/PLT COUNT & AUTO     STAT            2019      Chronic combined systolic 



                     DIFFERENTIAL        11:36 AM CDT        and diastolic CHF 



                                         (congestive heart 



                                         failure) (HCC) 

 

                                        



Results for this procedure are in the results section.



                 B-TYPE NATRIURETIC FACTOR     STAT            2019      Chronic combined systolic 



                     (BNP)               11:36 AM CDT        and diastolic CHF 



                                         (congestive heart 



                                         failure) (HCC) 

 

                                        



Results for this procedure are in the results section.



                 BASIC METABOLIC PANEL (7)     STAT            2019      Chronic combined systolic 



                           11:36 AM CDT              and diastolic CHF 



                                         (congestive heart 



                                         failure) (HCC) 

 

                                        



Results for this procedure are in the results section.



                 CBC W/PLT COUNT & AUTO     STAT            2019      Chronic combined systolic 



                     DIFFERENTIAL        11:36 AM CDT        and diastolic CHF 



                                         (congestive heart 



                                         failure) (HCC) 

 

                                        



Results for this procedure are in the results section.



                 CBC W/PLT COUNT & AUTO     STAT            2019      Chronic combined systolic 



                     DIFFERENTIAL        10:31 AM CST        and diastolic CHF 



                                         (congestive heart 



                                         failure) (HCC) 

 

                                        



Results for this procedure are in the results section.



                 B-TYPE NATRIURETIC FACTOR     STAT            2019      Chronic combined systolic 



                     (BNP)               10:31 AM CST        and diastolic CHF 



                                         (congestive heart 



                                         failure) (HCC) 

 

                                        



Results for this procedure are in the results section.



                 BASIC METABOLIC PANEL (7)     STAT            2019      Chronic combined systolic 



                           10:31 AM CST              and diastolic CHF 



                                         (congestive heart 



                                         failure) (HCC) 

 

                                        



Results for this procedure are in the results section.



                 CBC W/PLT COUNT & AUTO     STAT            2019      Chronic combined systolic 



                     DIFFERENTIAL        10:31 AM CST        and diastolic CHF 



                                         (congestive heart 



                                         failure) (HCC) 

 

                                         



                           [facility] IMPLANT REPORT      2019  



                           - SCAN                    2:30 PM CST  

 

                                         



                           [facility] IMPLANT REPORT      2019  



                           - SCAN                    7:40 AM CST  

 

                                         



                           RHYTHM STRIP - SCAN       2019  



                                         7:40 AM CST  

 

                                         



                           [facility] IMPLANT REPORT      2019  



                           - SCAN                    1:41 PM CST  

 

                                         



                     ECG 12-LEAD         Routine             2019  



                                         12:14 PM CST  

 

  



                                         Procedure



                                         Note -



                                         Interface,



                                         External



                                         Ris In -



                                         2019



                                         3:13 PM



                                         CST



                                         Ventricula



                                         r Rate 66



                                         BPM



                                         Atrial



                                         Rate 267



                                         BPM



                                         QRS



                                         Duration



                                         94 ms



                                         Q-T



                                         Interval



                                         368 ms



                                         QTC



                                         Calculatio



                                         n(Bazett)



                                         385 ms



                                         R Axis 32



                                         degrees



                                         T Axis 127



                                         degrees





                                         Atrial



                                         fibrillati



                                         on



                                         Low



                                         voltage



                                         QRS



                                         ST & T



                                         wave



                                         abnormalit



                                         y,



                                         consider



                                         lateral



                                         ischemia



                                         or



                                         digitalis



                                         effect



                                         Abnormal



                                         ECG



                                         When



                                         compared



                                         with ECG



                                         of



                                         



                                         9 16:03,



                                         Previous



                                         ECG has



                                         undetermin



                                         ed rhythm,



                                         needs



                                         review

 

                                        



Results for this procedure are in the results section.



                 ECG 12-LEAD     Routine         2019      Chronic combined systolic 



                           12:14 PM CST              and diastolic CHF 



                                         (congestive heart 



                                         failure) (HCC) 



                                         Atrial fibrillation, 



                                         chronic (HCC) 

 

                                        



Results for this procedure are in the results section.



                 CBC W/PLT COUNT & AUTO     STAT            2019      Chronic combined systolic 



                     DIFFERENTIAL        10:42 AM CST        and diastolic CHF 



                                         (congestive heart 



                                         failure) (HCC) 

 

                                        



Results for this procedure are in the results section.



                 B-TYPE NATRIURETIC FACTOR     STAT            2019      Chronic combined systolic 



                     (BNP)               10:42 AM CST        and diastolic CHF 



                                         (congestive heart 



                                         failure) (HCC) 

 

                                        



Results for this procedure are in the results section.



                 BASIC METABOLIC PANEL (7)     STAT            2019      Chronic combined systolic 



                           10:42 AM CST              and diastolic CHF 



                                         (congestive heart 



                                         failure) (HCC) 

 

                                        



Results for this procedure are in the results section.



                 CBC W/PLT COUNT & AUTO     STAT            2019      Chronic combined systolic 



                     DIFFERENTIAL        10:42 AM CST        and diastolic CHF 



                                         (congestive heart 



                                         failure) (HCC) 

 

                                        



Results for this procedure are in the results section.



                     POCT-GLUCOSE METER     Routine             2019  



                                         10:05 AM CST  

 

                                        



Results for this procedure are in the results section.



                     BASIC METABOLIC PANEL (7)     Routine             2019  



                                         5:16 AM CST  

 

                                        



Results for this procedure are in the results section.



                     POCT-GLUCOSE METER     Routine             01/10/2019  



                                         10:55 PM CST  

 

                                        



Results for this procedure are in the results section.



                     POCT-GLUCOSE METER     Routine             01/10/2019  



                                         8:01 PM CST  

 

                                        



Results for this procedure are in the results section.



                     POCT-GLUCOSE METER     Routine             01/10/2019  



                                         6:24 PM CST  

 

                                        



Results for this procedure are in the results section.



                     POCT-GLUCOSE METER     Routine             01/10/2019  



                                         1:27 PM CST  

 

                                        



Results for this procedure are in the results section.



                     POCT-GLUCOSE METER     Routine             01/10/2019  



                                         9:56 AM CST  

 

                                        



Results for this procedure are in the results section.



                     POCT-GLUCOSE METER     Routine             01/10/2019  



                                         7:30 AM CST  

 

                                        



Results for this procedure are in the results section.



                     CBC W/PLT COUNT & AUTO     Routine             01/10/2019  



                           DIFFERENTIAL              4:52 AM CST  

 

                                        



Results for this procedure are in the results section.



                     BASIC METABOLIC PANEL (7)     Routine             01/10/2019  



                                         4:52 AM CST  

 

                                        



Results for this procedure are in the results section.



                     CBC W/PLT COUNT & AUTO     Routine             01/10/2019  



                           DIFFERENTIAL              4:52 AM CST  

 

                                        



Results for this procedure are in the results section.



                     POCT-GLUCOSE METER     Routine             2019  



                                         10:43 PM CST  

 

                                        



Results for this procedure are in the results section.



                     POCT-GLUCOSE METER     Routine             2019  



                                         5:34 PM CST  

 

                                        



Results for this procedure are in the results section.



                     POCT-GLUCOSE METER     Routine             2019  



                                         11:57 AM CST  

 

                                        



Results for this procedure are in the results section.



                     POCT-GLUCOSE METER     Routine             2019  



                                         7:39 AM CST  

 

                                        



Results for this procedure are in the results section.



                     CBC W/PLT COUNT & AUTO     Routine             2019  



                           DIFFERENTIAL              5:00 AM CST  

 

                                        



Results for this procedure are in the results section.



                     BASIC METABOLIC PANEL (7)     Routine             2019  



                                         5:00 AM CST  

 

                                        



Results for this procedure are in the results section.



                     CBC W/PLT COUNT & AUTO     Routine             2019  



                           DIFFERENTIAL              5:00 AM CST  

 

                                        



Results for this procedure are in the results section.



                     POCT-GLUCOSE METER     Routine             2019  



                                         9:32 PM CST  

 

                                        



Results for this procedure are in the results section.



                     POCT-GLUCOSE METER     Routine             2019  



                                         8:54 PM CST  

 

                                        



Results for this procedure are in the results section.



                     POCT-GLUCOSE METER     Routine             2019  



                                         6:01 PM CST  

 

                                        



Results for this procedure are in the results section.



                     POCT-GLUCOSE METER     Routine             2019  



                                         1:27 PM CST  

 

                                        



Results for this procedure are in the results section.



                     POCT-GLUCOSE METER     Routine             2019  



                                         9:04 AM CST  

 

                                        



Results for this procedure are in the results section.



                     CBC W/PLT COUNT & AUTO     Routine             2019  



                           DIFFERENTIAL              4:08 AM CST  

 

                                        



Results for this procedure are in the results section.



                     BASIC METABOLIC PANEL (7)     Routine             2019  



                                         4:08 AM CST  

 

                                        



Results for this procedure are in the results section.



                     CBC W/PLT COUNT & AUTO     Routine             2019  



                           DIFFERENTIAL              4:08 AM CST  

 

                                        



Results for this procedure are in the results section.



                     POCT-GLUCOSE METER     Routine             2019  



                                         11:02 PM CST  

 

                                        



Results for this procedure are in the results section.



                     XR CHEST 1 VIEW     Routine             2019  



                           PORTABLE/BEDSIDE          7:46 PM CST  

 

                                        



Results for this procedure are in the results section.



                     HEMOGLOBIN A1C      AP Routine          2019  



                                         4:04 PM CST  

 

                                        



Results for this procedure are in the results section.



                     B-TYPE NATRIURETIC FACTOR     Routine             2019  



                           (BNP)                     4:04 PM CST  

 

                                        



Results for this procedure are in the results section.



                     ECG 12-LEAD         STAT                2019  



                                         4:03 PM CST  

 

                                        



Results for this procedure are in the results section.



                     CBC W/PLT COUNT & AUTO     Routine             2019  



                           DIFFERENTIAL              3:31 PM CST  

 

                                        



Results for this procedure are in the results section.



                     CBC W/PLT COUNT & AUTO     Routine             2019  



                           DIFFERENTIAL              3:31 PM CST  

 

                                        



Results for this procedure are in the results section.



                     COMPREHENSIVE METABOLIC     Routine             2019  



                           PANEL                     3:31 PM CST  

 

                                        



Results for this procedure are in the results section.



                     POCT-GLUCOSE METER     Routine             2019  



                                         3:16 PM CST  

 

                                         



                     ECG 12-LEAD         Routine             2019  



                                         12:42 PM CST  

 

  



                                         Procedure



                                         Note -



                                         Interface,



                                         External



                                         Ris In -



                                         2019



                                         2:52 PM



                                         CST



                                         Ventricula



                                         r Rate 64



                                         BPM



                                         Atrial



                                         Rate 153



                                         BPM



                                         QRS



                                         Duration



                                         98 ms



                                         Q-T



                                         Interval



                                         416 ms



                                         QTC



                                         Calculatio



                                         n(Bazett)



                                         429 ms



                                         R Axis 0



                                         degrees



                                         T Axis 148



                                         degrees





                                         Atrial



                                         fibrillati



                                         on



                                         Low



                                         voltage



                                         QRS



                                         Cannot



                                         rule out



                                         Anterior



                                         infarct ,



                                         age



                                         undetermin



                                         ed



                                         ST & T



                                         wave



                                         abnormalit



                                         y,



                                         consider



                                         lateral



                                         ischemia



                                         or



                                         digitalis



                                         effect



                                         Abnormal



                                         ECG



                                         When



                                         compared



                                         with ECG



                                         of



                                         03-DEC-201



                                         8 11:28,



                                         QT has



                                         lengthened

 

                                        



Results for this procedure are in the results section.



                 ECG 12-LEAD     Routine         2019      Chronic combined systolic 



                           12:42 PM CST              and diastolic CHF 



                                         (congestive heart 



                                         failure) (HCC) 



                                         Atrial fibrillation, 



                                         chronic (HCC) 

 

                                        



Results for this procedure are in the results section.



                 CBC W/PLT COUNT & AUTO     STAT            2019      Chronic combined systolic 



                     DIFFERENTIAL        11:08 AM CST        and diastolic CHF 



                                         (congestive heart 



                                         failure) (HCC) 

 

                                        



Results for this procedure are in the results section.



                 B-TYPE NATRIURETIC FACTOR     STAT            2019      Chronic combined systolic 



                     (BNP)               11:08 AM CST        and diastolic CHF 



                                         (congestive heart 



                                         failure) (HCC) 

 

                                        



Results for this procedure are in the results section.



                 BASIC METABOLIC PANEL (7)     STAT            2019      Chronic combined systolic 



                           11:08 AM CST              and diastolic CHF 



                                         (congestive heart 



                                         failure) (HCC) 

 

                                        



Results for this procedure are in the results section.



                 CBC W/PLT COUNT & AUTO     STAT            2019      Chronic combined systolic 



                     DIFFERENTIAL        11:08 AM CST        and diastolic CHF 



                                         (congestive heart 



                                         failure) (HCC) 

 

                                         



                           ARRYTHMIA IMPLANT REPORT      2018  



                           - SCAN                    9:51 AM CST  

 

                                         



                           RHYTHM STRIP - SCAN       2018  



                                         9:51 AM CST  

 

                                        



Results for this procedure are in the results section.



                     POCT-GLUCOSE METER     Routine             2018  



                                         5:50 PM CST  

 

                                        



Results for this procedure are in the results section.



                     POCT-GLUCOSE METER     Routine             2018  



                                         10:15 AM CST  

 

                                        



Results for this procedure are in the results section.



                     (CELLAVISION MANUAL DIFF)     Routine             2018  



                                         5:20 AM CST  

 

                                        



Results for this procedure are in the results section.



                     CBC W/PLT COUNT & AUTO     Routine             2018  



                           DIFFERENTIAL              5:20 AM CST  

 

                                        



Results for this procedure are in the results section.



                     BASIC METABOLIC PANEL (7)     Routine             2018  



                                         5:20 AM CST  

 

                                        



Results for this procedure are in the results section.



                     CBC W/PLT COUNT & AUTO     Routine             2018  



                           DIFFERENTIAL              5:20 AM CST  

 

                                        



Results for this procedure are in the results section.



                     POCT-GLUCOSE METER     Routine             2018  



                                         8:56 PM CST  

 

                                        



Results for this procedure are in the results section.



                     POCT-GLUCOSE METER     Routine             2018  



                                         5:17 PM CST  

 

                                        



Results for this procedure are in the results section.



                     POCT-GLUCOSE METER     Routine             2018  



                                         9:50 AM CST  

 

                                        



Results for this procedure are in the results section.



                     CBC W/PLT COUNT & AUTO     Routine             2018  



                           DIFFERENTIAL              5:19 AM CST  

 

                                        



Results for this procedure are in the results section.



                     BASIC METABOLIC PANEL (7)     Routine             2018  



                                         5:19 AM CST  

 

                                        



Results for this procedure are in the results section.



                     CBC W/PLT COUNT & AUTO     Routine             2018  



                           DIFFERENTIAL              5:19 AM CST  

 

                                        



Results for this procedure are in the results section.



                     POCT-GLUCOSE METER     Routine             2018  



                                         10:08 PM CST  

 

                                        



Results for this procedure are in the results section.



                     POCT-GLUCOSE METER     Routine             2018  



                                         6:00 PM CST  

 

                                        



Results for this procedure are in the results section.



                     POCT-GLUCOSE METER     Routine             2018  



                                         1:54 PM CST  

 

                                        



Results for this procedure are in the results section.



                     POCT-GLUCOSE METER     Routine             2018  



                                         8:24 AM CST  

 

                                        



Results for this procedure are in the results section.



                     CBC W/PLT COUNT & AUTO     Routine             2018  



                           DIFFERENTIAL              3:45 AM CST  

 

                                        



Results for this procedure are in the results section.



                     BASIC METABOLIC PANEL (7)     Routine             2018  



                                         3:45 AM CST  

 

                                        



Results for this procedure are in the results section.



                     CBC W/PLT COUNT & AUTO     Routine             2018  



                           DIFFERENTIAL              3:45 AM CST  

 

                                        



Results for this procedure are in the results section.



                     POCT-GLUCOSE METER     Routine             2018  



                                         9:21 PM CST  

 

                                        



Results for this procedure are in the results section.



                     POCT-GLUCOSE METER     Routine             2018  



                                         7:02 PM CST  

 

                                        



Results for this procedure are in the results section.



                     POCT-GLUCOSE METER     Routine             2018  



                                         3:41 PM CST  

 

                                        



Results for this procedure are in the results section.



                     POCT-GLUCOSE METER     Routine             2018  



                                         9:13 AM CST  

 

                                        



Results for this procedure are in the results section.



                     TROPONIN I          Routine             2018  



                                         5:39 AM CST  

 

                                        



Results for this procedure are in the results section.



                     TROPONIN I          Routine             2018  



                                         10:08 PM CST  

 

                                        



Results for this procedure are in the results section.



                     COMPREHENSIVE METABOLIC     Routine             2018  



                           PANEL                     10:08 PM CST  

 

                                        



Results for this procedure are in the results section.



                     POCT-GLUCOSE METER     Routine             2018  



                                         9:28 PM CST  

 

                                        



Results for this procedure are in the results section.



                     RAPID DRUG SCREEN, URINE     Routine             2018  



                                         7:31 PM CST  

 

                                        



Results for this procedure are in the results section.



                     POCT-GLUCOSE METER     Routine             2018  



                                         6:31 PM CST  

 

                                        



Results for this procedure are in the results section.



                     HEMOGLOBIN A1C      AP Routine          2018  



                                         4:47 PM CST  

 

                                        



Results for this procedure are in the results section.



                     B-TYPE NATRIURETIC FACTOR     Routine             2018  



                           (BNP)                     4:47 PM CST  

 

                                        



Results for this procedure are in the results section.



                     XR CHEST 1 VIEW     Routine             2018  



                           PORTABLE/BEDSIDE          4:27 PM CST  

 

                                        



Results for this procedure are in the results section.



                     ECG 12-LEAD         Routine             2018  



                                         11:28 AM CST  

 

                                         



                     ECG 12-LEAD         Routine             2018  



                                         11:28 AM CST  

 

  



                                         Procedure



                                         Note -



                                         Interface,



                                         External



                                         Ris In -



                                         2018



                                         2:51 PM



                                         CST



                                         Ventricula



                                         r Rate 57



                                         BPM



                                         Atrial



                                         Rate 340



                                         BPM



                                         QRS



                                         Duration



                                         92 ms



                                         Q-T



                                         Interval



                                         376 ms



                                         QTC



                                         Calculatio



                                         n(Bazett)



                                         365 ms



                                         R Axis 19



                                         degrees



                                         T Axis 176



                                         degrees





                                         Atrial



                                         fibrillati



                                         on with



                                         slow



                                         ventricula



                                         r response



                                         Low



                                         voltage



                                         QRS



                                         ST & T



                                         wave



                                         abnormalit



                                         y,



                                         consider



                                         lateral



                                         ischemia



                                         or



                                         digitalis



                                         effect



                                         Abnormal



                                         ECG



                                         When



                                         compared



                                         with ECG



                                         of



                                         10-JUL-201



                                         8 12:28,



                                         Vent. rate



                                         has



                                         decreased



                                         BY  34 BPM

 

                                        



Results for this procedure are in the results section.



                 CBC W/PLT COUNT & AUTO     STAT            2018      Chronic combined systolic 



                     DIFFERENTIAL        9:50 AM CST         and diastolic CHF 



                                         (congestive heart 



                                         failure) (HCC) 

 

                                        



Results for this procedure are in the results section.



                 B-TYPE NATRIURETIC FACTOR     STAT            2018      Chronic combined systolic 



                     (BNP)               9:50 AM CST         and diastolic CHF 



                                         (congestive heart 



                                         failure) (HCC) 

 

                                        



Results for this procedure are in the results section.



                 BASIC METABOLIC PANEL (7)     STAT            2018      Chronic combined systolic 



                           9:50 AM CST               and diastolic CHF 



                                         (congestive heart 



                                         failure) (HCC) 

 

                                        



Results for this procedure are in the results section.



                 CBC W/PLT COUNT & AUTO     STAT            2018      Chronic combined systolic 



                     DIFFERENTIAL        9:50 AM CST         and diastolic CHF 



                                         (congestive heart 



                                         failure) (HCC) 

 

                                        



Results for this procedure are in the results section.



                 CBC W/PLT COUNT & AUTO     STAT            2018      Chronic combined systolic 



                     DIFFERENTIAL        10:44 AM CST        and diastolic CHF 



                                         (congestive heart 



                                         failure) (HCC) 

 

                                        



Results for this procedure are in the results section.



                 HEPATIC FUNCTION PANEL     STAT            2018      Chronic combined systolic 



                           10:44 AM CST              and diastolic CHF 



                                         (congestive heart 



                                         failure) (HCC) 

 

                                        



Results for this procedure are in the results section.



                 LIPID PANEL     STAT            2018      Hyperlipidemia, 



                           10:44 AM CST              unspecified 



                                         hyperlipidemia type 



                                         Chronic combined systolic 



                                         and diastolic CHF 



                                         (congestive heart 



                                         failure) (HCC) 

 

                                        



Results for this procedure are in the results section.



                 B-TYPE NATRIURETIC FACTOR     STAT            2018      Chronic combined systolic 



                     (BNP)               10:44 AM CST        and diastolic CHF 



                                         (congestive heart 



                                         failure) (HCC) 

 

                                        



Results for this procedure are in the results section.



                 BASIC METABOLIC PANEL (7)     STAT            2018      Chronic combined systolic 



                           10:44 AM CST              and diastolic CHF 



                                         (congestive heart 



                                         failure) (HCC) 

 

                                        



Results for this procedure are in the results section.



                 CBC W/PLT COUNT & AUTO     STAT            2018      Chronic combined systolic 



                     DIFFERENTIAL        10:44 AM CST        and diastolic CHF 



                                         (congestive heart 



                                         failure) (HCC) 

 

                                         



                           ARRYTHMIA IMPLANT REPORT      10/29/2018  



                           - SCAN                    4:33 PM CDT  



after 2018



Results

* CBC with platelet count + automated diff (2019 10:33 AM CDT)



Only the most recent of 26 results within the time period is included.





   



                 Component       Value           Ref Range       Performed At

 

   



                 WBC             7.2             3.5 - 10.5 K/L     [facility]

 

   



                 RBC             5.31            4.63 - 6.08 M/L     [facility]

 

   



                 Hemoglobin      15.6            13.7 - 17.5 GM/DL     [facility]

 

   



                 Hematocrit      45.9            40.1 - 51.0 %     [facility]

 

   



                 MCV             86.4            79.0 - 92.2 fL     [facility]

 

   



                 MCH             29.4            25.7 - 32.2 pg     [facility]

 

   



                 MCHC            34.0            32.3 - 36.5 GM/DL     [facility]

 

   



                 RDW             13.0            11.6 - 14.4 %     [facility]

 

   



                 Platelets       194             150 - 450 K/CU MM     [facility]

 

   



                 MPV             9.7             9.4 - 12.4 fL     [facility]

 

   



                 nRBC            0               0 - 0 /100 WBC     [facility]

 

   



                 % Neutros       65              %               [facility]

 

   



                 % Lymphs        21              %               [facility]

 

   



                 % Monos         11              %               [facility]

 

   



                 % Eos           2               %               [facility]

 

   



                 % Baso          0               %               [facility]

 

   



                 # Neutros       4.66            1.78 - 5.38 K/L     [facility]

 

   



                 # Lymphs        1.50            1.32 - 3.57 K/L     [facility]

 

   



                 # Monos         0.76            0.30 - 0.82 K/L     [facility]

 

   



                 # Eos           0.16            0.04 - 0.54 K/L     [facility]

 

   



                 # Baso          0.03            0.01 - 0.08 K/L     [facility]

 

   



                 Immature        1               0 - 1 %         [facility]



                           Granulocytes-Relative       [facility]













                                         Specimen

 





                                         Blood









   



                 Performing Organization     Address         City/State/Zipcode     Phone Number

 

   



                 [facility]     [address] [place] [address] [phone]





                                         [facility]ICAL CENTER   





* B-type Natriuretic Factor (BNP) (2019 10:33 AM CDT)



Only the most recent of 14 results within the time period is included.





   



                 Component       Value           Ref Range       Performed At

 

   



                 BNP             258 (H)         0 - 100 pg/mL     [facility]













                                         Specimen

 





                                         Blood









   



                 Performing Organization     Address         City[facility]     Phone Number

 

   



                 [facility]     [address] [place] [address] [phone]





                                         MEDICAL CENTER   





* Basic Metabolic Panel (2019 10:33 AM CDT)



Only the most recent of 26 results within the time period is included.





   



                 Component       Value           Ref Range       Performed At

 

   



                 Sodium          134 (L)         136 - 145 meq/L     [facility]

 

   



                 Potassium       3.7             3.5 - 5.1 meq/L     [facility]

 

   



                 Chloride        95 (L)          98 - 107 meq/L     [facility]

 

   



                 CO2             31 (H)          22 - 29 meq/L     [facility]

 

   



                 BUN             24 (H)          7 - 21 mg/dL     [facility]

 

   



                 Creatinine      1.70 (H)        0.57 - 1.25 mg/dL     [facility]

 

   



                 Glucose         313 (H)         70 - 105 mg/dL     [facility]

 

   



                 Calcium         9.0             8.4 - 10.2 mg/dL     [facility]

 

   



                 EGFR            42Comment: ESTIMATED GFR IS     mL/min/1.73 sq m     [facility]



                           NOT AS ACCURATE AS CREATININE      [facility]



                                         CLEARANCE IN PREDICTING  



                                         GLOMERULAR FILTRATION RATE.  



                                         ESTIMATED GFR IS NOT  



                                         APPLICABLE FOR DIALYSIS  



                                         PATIENTS.  













                                         Specimen

 





                                         Blood









   



                 Performing Organization     Address         City/[facility]/Zipcode     Phone Number

 

   



                 [facility]     [address] [place] [address] [phone]





                                         MEDICAL CENTER   





* ECG 12 lead (2019  7:38 AM CDT)



Only the most recent of 6 results within the time period is included.









                                         Specimen

 











 



                           Narrative                 Performed At

 

 



                           Ventricular Rate 78 BPM     [facility]



                                         Atrial Rate 79 BPM 



                                         QRS Duration 100 ms 



                                         Q-T Interval 360 ms 



                                         QTC Calculation(Bazett) 410 ms 



                                         R Axis 1 degrees 



                                         T Axis 162 degrees 



                                         Atrial fibrillation 



                                         Low voltage QRS 



                                         ST & T wave abnormality, consider lateral ischemia or digitalis effect 



                                         Abnormal ECG 



                                         When compared with ECG of 2019 07:34, 



                                         No significant change was found 



                                         Confirmed by MD BLUM JOSEPH P ([id number]) on 2019 6:40:57 AM 









                                        Procedure Note

 

                                        



Interface, External Ris In - 2019  6:41 AM CDT



Ventricular Rate 78 BPM

Atrial Rate 79 BPM

QRS Duration 100 ms

Q-T Interval 360 ms

QTC Calculation(Bazett) 410 ms

R Axis 1 degrees

T Axis 162 degrees



Atrial fibrillation

Low voltage QRS

ST & T wave abnormality, consider lateral ischemia or digitalis effect

Abnormal ECG

When compared with ECG of 2019 07:34,

No significant change was found

Confirmed by MD BLUM JOSEPH P ([id number]) on 2019 6:40:57 AM









   



                 Performing Organization     Address         City/[facility]     Phone Number

 

   



                                         [facility]   





* RHYTHM STRIP - SCAN (2019  2:11 PM CDT)



Only the most recent of 7 results within the time period is included.





 



                           Narrative                 Performed At

 

 



                                         This result has an attachment that is not available. 





* POC-Glucose meter (2019  9:24 AM CDT)



Only the most recent of 59 results within the time period is included.





   



                 Component       Value           Ref Range       Performed At

 

   



                 POC-Glucose Meter     294 (H)Comment: TESTED AT     70 - 110 mg/dL     [address]



                                         [id number]  













                                         Specimen

 





                                         Blood









   



                 Performing Organization     Address         City[facility]     Phone Number

 

   



                 [address] [address] [phone]





                                         MEDICAL CENTER   





* Digoxin level (2019 10:08 AM CDT)





   



                 Component       Value           Ref Range       Performed At

 

   



                 Digoxin Lvl     0.4 (L)         0.8 - 2.0 ng/mL     [facility]













                                         Specimen

 





                                         Blood









   



                 Performing Organization     Address         City[facility]     Phone Number

 

   



                 [address] [address] [phone]





                                         MEDICAL CENTER   





* Blood Culture - Routine (Right Venipuncture) (2019  5:38 PM CDT)



Only the most recent of 2 results within the time period is included.





   



                 Component       Value           Ref Range       Performed At

 

   



                     Result              No growth in 5 days      [facility]













                                         Specimen

 





                                         Blood









   



                 Performing Organization     Address         City[facility]     Phone Number

 

   



                 [facility]     [address] [place] [address] [phone]





                                         MEDICAL CENTER   





* Hemoglobin A1c (2019  5:18 PM CDT)



Only the most recent of 5 results within the time period is included.





   



                 Component       Value           Ref Range       Performed At

 

   



                 Hemoglobin A1C     10.4 (H)        4.3 - 6.1 %     [facility]













                                         Specimen

 





                                         Blood









   



                 Performing Organization     Address         City[facility]     Phone Number

 

   



                 [facility]     [address] [place] [address] [phone]





                                         MEDICAL CENTER   





* Urinalysis w/Microscopic + Reflex to Culture (2019  4:57 PM CDT)





   



                 Component       Value           Ref Range       Performed At

 

   



                     Color, UA           Colorless           [facility]

 

   



                     Clarity, UA         Clear               [facility]

 

   



                 Specific [place], UA     1.007           1.001 - 1.035     [facility]

 

   



                 pH, UA          6.0             5.0 - 8.0       [facility]

 

   



                 Protein, UA     Negative        Negative        [facility]

 

   



                 Glucose, UA     Negative        Negative        [facility]

 

   



                 Ketones, UA     Negative        Negative        [facility]

 

   



                 Bilirubin, UA     Negative        Negative        [facility]

 

   



                 Blood, UA       Negative        Negative        [facility]

 

   



                 Nitrite, UA     Negative        Negative        [facility]

 

   



                 Leukocytes, UA     Negative        Negative        [facility]

 

   



                 Urobilinogen, UA     0.2             0.2 - 1.0 mg/dL     [facility]

 

   



                 RBC, UA         <1              /HPF            [facility]

 

   



                 WBC, UA         <1              /HPF            [facility]

 

   



                 Squam Epithel, UA     <1              /HPF            [facility]

 

   



                           Specimen Source           [facility]













                                         Specimen

 





                                         Urine









   



                 Performing Organization     Address         City/[facility]/Zipcode     Phone Number

 

   



                 [facility]     [address] [place] [address] [address] [place] [address]   





* Hepatic function panel (2019 10:29 AM CDT)



Only the most recent of 3 results within the time period is included.





   



                 Component       Value           Ref Range       Performed At

 

   



                 Protein, Total     7.3Comment: Specimen slightly     6.0 - 8.3 gm/dL     [facility]

 

   



                 Albumin         4.1Comment: Specimen slightly     3.5 - 5.0 g/dL     [facility]

 

   



                 Total Bilirubin     0.4Comment: Specimen slightly     0.2 - 1.2 mg/dL     [facility]

 

   



                 Bilirubin, Direct     0.1Comment: Specimen slightly     0.1 - 0.5 mg/dL     [facility]

 

   



                 Alkaline Phosphatase     153 (H)         40 - 150 U/L     [facility]

 

   



                 AST             17Comment: Specimen slightly     5 - 34 U/L      [facility]

 

   



                 ALT             16Comment: Specimen slightly     6 - 55 U/L      [facility]













                                         Specimen

 





                                         Blood









   



                 Performing Organization     Address         City/State/Zipcode     Phone Number

 

   



                 [place] [facility]     [address] [place] [address]     [phone] [address]   





* Lipid panel (2019 10:29 AM CDT)



Only the most recent of 3 results within the time period is included.





   



                 Component       Value           Ref Range       Performed At

 

   



                 Triglycerides     349Comment: Specimen slightly     mg/dL           [facility]

 

   



                 Cholesterol     131Comment: Specimen slightly     mg/dL           [facility]

 

   



                 HDL             27              mg/dL           [facility]

 

   



                 LDL Calculated     34              mg/dL           [facility]













                                         Specimen

 





                                         Blood









 



                           Narrative                 Performed At

 

 



                           Triglyceride Reference Range:     [facility]



                            Low Risk <150     [facility]



                                          [id number] 



                                          High Risk 200-499 



                                          Very High Risk>=500 



                                         Cholesterol Reference Range: 



                                          Low Risk <200 



                                          Fytdpbzpwt150-093 



                                          High Risk>240 



                                         HDL Cholesterol Reference Range: 



                                          Low Risk >=60 



                                          High Risk <40 



                                         LDL Cholesterol Reference Range: 



                                          Optimal<100 



                                          Near [address] 



                                          Mcbmwftidn801-168 



                                          Hprx162-562 



                                          Very High >=190 









   



                 Performing Organization     Address         City/[facility]/Zipcode     Phone Number

 

   



                 [facility]     [address] [place] [address]     [phone]





                                         [facility]   





* Manual Differential (2019 10:21 AM CDT)



Only the most recent of 2 results within the time period is included.





   



                 Component       Value           Ref Range       Performed At

 

   



                 % Neutros       71              %               [facility]

 

   



                 % Lymphs        17              %               [facility]

 

   



                 % Monos         8               %               [facility]

 

   



                 % Eos           4               %               [facility]

 

   



                 # Neutros       5.89 (H)        1.78 - 5.38 K/ul     [facility]

 

   



                 # Lymphs        1.41            1.32 - 3.57 K/ul     [facility]

 

   



                 # Monos         0.66            0.30 - 0.82 K/uL     [facility]

 

   



                 # Eos           0.33            0.04 - 0.54 K/uL     [facility]

 

   



                     Total Counted       100                 [facility]

 

   



                     WBC Morphology      Normal              [facility]

 

   



                     Giant Platelet      Present             [facility]

 

   



                     Anisocytosis        2+ moderate         [facility]

 

   



                     Microcytes          2+ moderate         [facility]

 

   



                     Poikilocytes        1+ few              [facility]

 

   



                     Ovalocytes          1+ few              [facility]

 

   



                     Artifact            Present             [facility]

 

   



                     Platelet Conc       Adequate            [facility]













                                         Specimen

 





                                         Blood









 



                           Narrative                 Performed At

 

 



                           Received comment:         [facility]



                           User comments:            [facility]



                                         Slide comments: 









   



                 Performing Organization     Address         City/[facility]/Zipcode     Phone Number

 

   



                 [facility]     [address] [place] [address] [phone]





                                         [facility]   





* XR chest 2 views (2019  8:57 PM CDT)









                                         Specimen

 











 



                           Narrative                 Performed At

 

 



                           FINAL REPORT              [facility]



                                         PATIENT ID: [id number] 



                                         EXAMINATION: 2 view chest 



                                         CLINICAL INDICATION: Congestive heart failure 



                                         IMPRESSION: 



                                         Compared with [facility] chest 2019. 



                                         A left subclavian AICD is again noted. The cardiac silhouette is 



                                         mildly enlarged but stable. Mediastinal contours are overall similar 



                                         to previous. Streaky opacities are noted at the lung bases. A 



                                         component of atelectasis or scarring is favored. Mild superimposed 



                                         pulmonary edema should also be considered. Subtle blunting of the 



                                         costophrenic sulci may reflect trace pleural fluid and/or pleural 



                                         thickening. No evidence of an acute osseous abnormality or 



                                         pneumothorax. 



                                         In summary, recommend clinical correlation regarding mild CHF. 



                                         Signed: Rosibel Salgado MD 



                                         Report Verified Date/Time:2019 03:39:05 



                                         Reading Location: [address] Reading Room 



                                         Electronically signed by: ROSIBEL SALGADO M.D. on 2019 03:39 AM

 









                                        Procedure Note

 

                                        



Interface, External Ris In - 2019  3:41 AM CDT



FINAL REPORT

 

PATIENT ID:   [id number]

 

EXAMINATION: 2 view chest

 

CLINICAL INDICATION: Congestive heart failure

 

IMPRESSION:

 

Compared with [facility] chest 2019.

 

A left subclavian AICD is again noted. The cardiac silhouette is 

mildly enlarged but stable. Mediastinal contours are overall similar 

to previous. Streaky opacities are noted at the lung bases. A 

component of atelectasis or scarring is favored. Mild superimposed 

pulmonary edema should also be considered. Subtle blunting of the 

costophrenic sulci may reflect trace pleural fluid and/or pleural 

thickening. No evidence of an acute osseous abnormality or 

pneumothorax.

 

In summary, recommend clinical correlation regarding mild CHF.

 

Signed: Rosibel Salgado MD

Report Verified Date/Time:  2019 03:39:05

 

Reading Location: [address] Reading Room

      Electronically signed by: ROSIBEL SALGADO M.D. on 2019 03:39 AM

 









   



                 Performing Organization     Address         City/State/Zipcode     Phone Number

 

   



                                         [facility]   





* Troponin I (2019  5:11 AM CDT)



Only the most recent of 4 results within the time period is included.





   



                 Component       Value           Ref Range       Performed At

 

   



                 Troponin I      0.03            0.00 - 0.03 ng/mL     [facility]













                                         Specimen

 





                                         Blood









 



                           Narrative                 Performed At

 

 



                           Troponin I (TnI) levels must be interpreted in the context of the presenting     

[facility]



                           symptoms and the clinical findings. Elevated TnI levels indicate myocardial     [facility]



                                         damage, but are not specific for ischemic heart disease. Elevated TnI levels are

 



                                         seen in patients with other cardiac conditions (including myocarditis and 



                                         congestive heart failure), and slight TnI elevations occur in patients with 



                                         other conditions, including sepsis, renal failure, acidosis, acute neurological

 



                                         disease, and persistent tachyarrhythmia. 









   



                 Performing Organization     Address         City/[facility]/Zipcode     Phone Number

 

   



                 [place] [phone]   





* Creatine Kinase (CK) (2019  5:11 AM CDT)



Only the most recent of 2 results within the time period is included.





   



                 Component       Value           Ref Range       Performed At

 

   



                 Total CK        263 (H)         29 - 200 U/L     [facility]













                                         Specimen

 





                                         Blood









   



                 Performing Organization     Address         City[facility]     Phone Number

 

   



                 [place] [phone] [place] [address]   





* TSH/Free T4 If Indicated (2019 10:46 PM CDT)





   



                 Component       Value           Ref Range       Performed At

 

   



                 TSH             0.92            0.35 - 4.94 uIU/mL     [facility]













                                         Specimen

 





                                         Blood









   



                 Performing Organization     Address         City[facility]     Phone Number

 

   



                 [place] [phone] [place] [address]   





* Urinalysis w/Microscopic (2019 10:46 PM CDT)





   



                 Component       Value           Ref Range       Performed At

 

   



                     Color, UA           Light Yellow        [facility]

 

   



                     Clarity, UA         Clear               [facility]

 

   



                 Specific Gravity, UA     1.020           1.001 - 1.035     [facility]

 

   



                 pH, UA          6.0             5.0 - 8.0       [facility]

 

   



                 Protein, UA     10 mg/dL (A)     Negative        [facility]

 

   



                 Glucose, UA     >1000 mg/dL (A)     Negative        [facility]

 

   



                 Ketones, UA     Negative        Negative        [facility]

 

   



                 Bilirubin, UA     Negative        Negative        [facility]

 

   



                 Blood, UA       Negative        Negative        [facility]

 

   



                 Nitrite, UA     Negative        Negative        [facility]

 

   



                 Leukocytes, UA     Negative        Negative        [facility]

 

   



                 Urobilinogen, UA     0.2             0.2 - 1.0 mg/dL     [facility]

 

   



                 RBC, UA         0               /HPF            [facility]

 

   



                 WBC, UA         0               /HPF            [facility]

 

   



                 Squam Epithel, UA     <1              /HPF            [facility]

 

   



                           Specimen Source           [facility]













                                         Specimen

 





                                         Urine









   



                 Performing Organization     Address         City/State/Zipcode     Phone Number

 

   



                 [facility]     [address] [place] [address] [phone]





                                         [facility]   





* ARRYTHMIA IMPLANT REPORT - SCAN (2019 12:13 PM CDT)



Only the most recent of 6 results within the time period is included.





 



                           Narrative                 Performed At

 

 



                                         This result has an attachment that is not available. 





* XR chest 1 view portable / bedside (2019  7:46 PM CST)



Only the most recent of 2 results within the time period is included.









                                         Specimen

 











 



                           Narrative                 Performed At

 

 



                           FINAL REPORT              [facility]



                                         PATIENT ID: [id number] 



                                         Chest, 1 view. 



                                         History: Shortness of breath. 



                                         Comparison: 12/3/2018. 



                                         Discussion: 



                                         Left-sided AICD with single transvenous lead identified in stable 



                                         position. The trachea is midline. The lungs are symmetrically 



                                         expanded without evidence for focal consolidation, pneumothorax, or 



                                         significant pleural effusion. The cardiomediastinal silhouette is 



                                         stable in appearance. No acute osseous abnormalities identified. 



                                         IMPRESSION: 



                                         No acute cardiopulmonary process identified. 



                                         Signed: Regis Cooley MD 



                                         Report Verified Date/Time:2019 20:20:57 



                                         Reading Location: [facility] Consult Reading Room 



                                         Electronically signed by: REGIS COOLEY MD on 2019 08:20 PM

 









                                        Procedure Note

 

                                        



Interface, External Ris In - 2019  8:23 PM CST



FINAL REPORT

 

PATIENT ID:   [id number]

 

Chest, 1 view.

 

History: Shortness of breath.

 

Comparison: 12/3/2018.

 

Discussion:  

Left-sided AICD with single transvenous lead identified in stable 

position. The trachea is midline. The lungs are symmetrically 

expanded without evidence for focal consolidation, pneumothorax, or 

significant pleural effusion. The cardiomediastinal silhouette is 

stable in appearance. No acute osseous abnormalities identified.

 

 

IMPRESSION:

 

No acute cardiopulmonary process identified.

 

Signed: Regis Cooley MD

Report Verified Date/Time:  2019 20:20:57

 

Reading Location: [address] Consult Reading Room

      Electronically signed by: REGIS COOLEY MD on 2019 08:20 PM

 









   



                 Performing Organization     Address         City/State/Zipcode     Phone Number

 

   



                                         [facility]   





* Comprehensive metabolic panel (2019  3:31 PM CST)



Only the most recent of 2 results within the time period is included.





   



                 Component       Value           Ref Range       Performed At

 

   



                 Protein, Total     7.6Comment: Specimen slightly     6.0 - 8.3 gm/dL     [facility]

 

   



                 Albumin         4.2Comment: Specimen slightly     3.5 - 5.0 g/dL     [facility]

 

   



                 Alkaline Phosphatase     123             40 - 150 U/L     [facility]

 

   



                 Total Bilirubin     0.5Comment: Specimen slightly     0.2 - 1.2 mg/dL     [facility]

 

   



                 Sodium          138             136 - 145 meq/L     [facility]

 

   



                 Potassium       3.8Comment: Specimen slightly     3.5 - 5.1 meq/L     [facility]

 

   



                 Chloride        99              98 - 107 meq/L     [facility]

 

   



                 CO2             27              22 - 29 meq/L     [facility]

 

   



                 BUN             30 (H)          7 - 21 mg/dL     [facility]

 

   



                 Creatinine      1.42 (H)Comment: Specimen     0.57 - 1.25 mg/dL     [facility]

 

   



                 Glucose         259 (H)         70 - 105 mg/dL     [facility]

 

   



                 Calcium         9.1             8.4 - 10.2 mg/dL     [facility]

 

   



                 AST             25Comment: Specimen slightly     5 - 34 U/L      [facility]

 

   



                 ALT             19Comment: Specimen slightly     6 - 55 U/L      [facility]



                           hemolyzed                 [facility]

 

   



                 EGFR            52Comment: ESTIMATED GFR IS     mL/min/1.73 sq m     [facility]



                           NOT AS ACCURATE AS CREATININE      [facility]



                                         CLEARANCE IN PREDICTING  



                                         GLOMERULAR FILTRATION RATE.  



                                         ESTIMATED GFR IS NOT  



                                         APPLICABLE FOR DIALYSIS  



                                         PATIENTS.  













                                         Specimen

 





                                         Blood









   



                 Performing Organization     Address         City/[facility]/Zipcode     Phone Number

 

   



                 [facility]     [address] [place] [address] [phone]





                                         [facility]   





* Rapid drug screen, urine (2018  7:31 PM CST)





   



                 Component       Value           Ref Range       Performed At

 

   



                 Barbiturate Screen     Negative        Negative        [facility]

 

   



                 Benzodiazepine Screen     Negative        Negative        [facility]

 

   



                 Cocaine (Metab.) Screen     Negative        Negative        [facility]

 

   



                 Methadone Screen     Negative        Negative        [facility]

 

   



                 Opiate Screen     Negative        Negative        [facility]

 

   



                 Cannabinoid Screen     Negative        Negative        [facility]

 

   



                 Amph/Methamph Screen     Negative        Negative        [facility]

 

   



                 Phencyclidine Screen     Negative        Negative        [facility]

 

   



                 Oxycodone Screen     Negative        Negative        [facility]













                                         Specimen

 





                                         Urine









 



                           Narrative                 Performed At

 

 



                           DRUGCUTOFF CONC.     [facility]



                           Cocaine 300 ng/mL     [facility]



                                         Frssuudzpdz48 ng/mL 



                                         Xhgehvfdmthfrm780 ng/mL 



                                         Barbiturate 200 ng/mL 



                                         Rsdoxxaldkfwt76 ng/mL 



                                         Vlrmmh363 ng/mL 



                                         Methadone 300 ng/mL 



                                         Amphetamine/ 1000 ng/mL 



                                         Methamphetamine 



                                         Oxycodone 300 ng/mL 



                                         This assay provides an unconfirmed qualitative test result for the clinical 



                                         management of patients in emergency situations. Chain of custody not maintained.

 



                                         Some over-the-counter medications, as well as adulterants, may cause inaccurate

 



                                         results. Clinical correlation should be applied. A more comprehensive drug 



                                         screen or confirmation of a detected drug may be performed upon request. 









   



                 Performing Organization     Address         City/State/Zipcode     Phone Number

 

   



                 [facility]     [address] [place] [address] [phone]





                                         [facility]ICAL [facility]ENTER   





after 2018



Insurance







     



            Payer      Benefit     Subscriber ID     Type       Phone      Address



                                         Plan /    



                                         Group    

 

     



                     [facility]              xxxxxxxxx   



                           MEDICARE MGD CARE         MEDICARE    



                                         HMO    

 

     



                 MEDICAID - MEDICAID MGD     [id number]           [id number]       Medicaid  



                     CARE                COMM STAR           Contracted  



                                         PLAN    









     



            Guarantor Name     Account     Relation to     Date of     Phone      Billing Address



                     Type                Patient             Birth  

 

     



            Valeriy Vasquez     Personal/F     Self       1965     [phone]               (Home)              [place] [address]







Advance Directives





For more information, please contact:



[facility]



[address] [place] [address] [phone]









                          Date Inactivated          Comments



                           Code Status               Date Activated  

 

                          2019  1:41 PM         



                           Full Code                 2019  4:11 PM  









  



                           This code status was determined by:     Patient 









                                                     

 

                          2019 12:09 PM         



                           Full Code                 2019  8:40 AM  









  



                           This code status was determined by:     Patient 









                                                     

 

                          2019  8:10 AM         



                           Full Code                 2019  3:39 PM  









  



                           This code status was determined by:     Patient 









                                                     

 

                          2019  1:16 PM          



                           Full Code                 12/3/2018  3:27 PM  









  



                           This code status was determined by:     Patient 









                                                     

 

                          2018  7:14 PM         



                           Full Code                 9/10/2018  2:44 PM  









  



                           This code status was determined by:     Patient

## 2019-09-25 NOTE — XMS REPORT
Summary of Care: 17 - 17

                             Created on: 06/15/2131



RICK JOANNA DUPONT

External Reference #: [id number]

: 1965

Sex: Male



Demographics







                          Address                   [address] [place] [address]  [address]

 

                          Home Phone                [phone]

 

                          Preferred Language        English

 

                          Marital Status            Single

 

                          Buddhism Affiliation     Orthodoxy

 

                          Race                      White/

 

                          Ethnic Group              Non-





Author







                          Author                    [facility]

 

                          Organization              [facility]

 

                          Address                   Unknown

 

                          Phone                     Unavailable







Encounter





ANGEL LUIS Boothe(LISA) [id number] Date(s): 17 - 17

[facility] [address] [place] [address]-     [phone]


Discharge Diagnosis: Cocaine abuse

Discharge Diagnosis: Atypical chest pain

Discharge Disposition: Home or Self Care

Attending Physician: Jose Banks MD





Vital Signs







                    1                   2                   3



                                         Most recent to   



                                         oldest [Reference   



                                         Range]:   

 

                                        98 DegF 

                          (17 4:56 AM)         98.9 DegF 

                          (17 1:07 AM)          



                                         Temperature Oral   



                                         [96.4-99.1 DegF]   

 

                                        124/75 mmHg 

                          (17 4:56 AM)         113/101 mmHg 

                          (17 2:30 AM)         129/91 mmHg 

                                        (17 1:07 AM)



                                         Blood Pressure   



                                         [/60-90 mmHg]   

 

                                        20 BRMIN 

                          (17 4:56 AM)         24 BRMIN 

*HI*

                          (17 2:30 AM)         20 BRMIN 

                                        (17 1:07 AM)



                                         Respiratory Rate   



                                         [14-20 BRMIN]   

 

                                        85 bpm 

                          (17 4:56 AM)         105 bpm 

*HI*

                          (17 2:30 AM)         79 bpm 

                                        (17 1:07 AM)



                                         Peripheral Pulse   



                                         Rate [ bpm]   

 

                                        107.273 kg 

                    (17 1:07 AM)                         



                                         Weight   







Problem List







    



              Condition     Effective Dates     Status       Health Status     Informant

 

    



                     [D]Anterior chest     12             Active  



                                         wall pain(Confirmed)    

 

    



                           Anxiety(Confirmed)        Resolved  

 

    



                           BP+ -                     Active  



                                         Hypertension(Confirm    



                                         ed)    

 

    



                           Cardiomyopathy(Confi      Resolved  



                                         rmed)    

 

    



                           CHF - Congestive          Active  



                                         heart    



                                         failure(Confirmed)    

 

    



                           CHF - Congestive          Active  



                                         heart    



                                         failure(Confirmed)    

 

    



                           Depression(Confirmed      Active  



                                         )    

 

    



                           dm(Confirmed)             Active  

 

    



                           Down                      Active  



                                         syndrome(Confirmed)    

 

    



                           GERD -                    Active  



                                         Gastro-esophageal    



                                         reflux    



                                         disease(Confirmed)    

 

    



                           H/O:                      Active  



                                         schizophrenia(Confir    



                                         med)    

 

    



                           HTN(Confirmed)            Active  

 

    



                           ICD (implantable          Active  



                                         cardiac    



                                         defibrillator)    



                                         battery    



                                         depletion(Confirmed)    

 

    



                           NIDDM(Confirmed)          Active  

 

    



                           Obese                     Active  



                                         build(Confirmed)    

 

    



                           Schizophrenia(Confir      Active  



                                         med)    

 

    



                           Short of breath on        Active  



                                         exertion(Confirmed)    

 

    



                           Sleep                     Active  



                                         apnea(Confirmed)    







Allergies, Adverse Reactions, Alerts







   



                 Substance       Reaction        Severity        Status

 

   



                           NKDA                      Active







Medications





Saline Flush 0.9%

10 mL, Route: IVP, Drug Form: INJ, Dosing Weight 107.273, kg, PRN, PRN Line Flus
h, Start date: 17 1:17:00 CDT, Duration: 30 day, Stop date: 10/29/17 1:16:
00 CDT

Notes: (Same as: BD Posiflush)

Start Date: 17

Stop Date: 17

Status: Discontinued



Results





ELECTROLYTES





  



                     Most recent to      1                   2



                                         oldest [Reference  



                                         Range]:  

 

  



                           Sodium Lvl [135-145       142 mEq/L 



                           mEq/L]                    (17 1:57 AM) 

 

  



                           Potassium Lvl             3.6 mEq/L 



                           [3.5-5.1 mEq/L]           (17 1:57 AM) 

 

  



                           Chloride Lvl [      103 mEq/L 



                           mEq/L]                    (17 1:57 AM) 

 

  



                           CO2 [24-32 mEq/L]         33 mEq/L 



                                         *HI* 



                                         (17 1:57 AM) 

 

  



                           AGAP [10.0-20.0           9.6 mEq/L 



                           mEq/L]                    *LOW* 



                                         (17 1:57 AM) 







CHEM PANEL





  



                     Most recent to      1                   2



                                         oldest [Reference  



                                         Range]:  

 

  



                           Creatinine Lvl            1.70 mg/dL 



                           [0.50-1.40 mg/dL]         *HI* 



                                         (17 1:57 AM) 

 

  



                           eGFR                      45 mL/min/1.73m2 1 



                                         *NA* 



                                         (17 1:57 AM) 

 

  



                           BUN [7-22 mg/dL]          17 mg/dL 



                                         (17 1:57 AM) 

 

  



                           B/C Ratio [6-25]          10 



                                         (17 1:57 AM) 

 

  



                           Glucose Lvl [70-99        61 mg/dL 



                           mg/dL]                    *LOW* 



                                         (17 1:57 AM) 

 

  



                           Total Protein             6.6 g/dL 



                           [6.4-8.4 g/dL]            (17 1:57 AM) 

 

  



                           Albumin Lvl [3.5-5.0      3.1 g/dL 



                           g/dL]                     *LOW* 



                                         (17 1:57 AM) 

 

  



                           Globulin [2.7-4.2         3.5 g/dL 



                           g/dL]                     (17 1:57 AM) 

 

  



                           A/G Ratio [0.7-1.6]       0.9 



                                         (17 1:57 AM) 

 

  



                           Calcium Lvl               8.1 mg/dL 



                           [8.5-10.5 mg/dL]          *LOW* 



                                         (17 1:57 AM) 

 

  



                           ALT [0-65 unit/L]         32 unit/L 



                                         (17 1:57 AM) 

 

  



                           AST [0-37 unit/L]         10 unit/L 



                                         (17 1:57 AM) 

 

  



                           Alk Phos [          135 unit/L 



                           unit/L]                   (17 1:57 AM) 

 

  



                           Bili Total [0.2-1.3       0.5 mg/dL 



                           mg/dL]                    (17 1:57 AM) 







1Result Comment: The eGFR is calculated using the CKD-EPI formula. In most 
young, healthy individuals the eGFR will be >90 mL/min/1.73m2. The eGFR declines
with age. An eGFR of 60-89 may be normal in some populations, particularly the 
elderly, for whom the CKD-EPI formula has not been extensively validated. Use of
the eGFR is not recommended in the following populations:



Individuals with unstable creatinine concentrations, including pregnant patients
and those with serious co-morbid conditions.



Patients with extremes in muscle mass or diet. 



The data above are obtained from the National Kid[employer] Disease Education Program (
NKDEP) which additionally recommends that when the eGFR is used in patients with
extremes of body mass index for purposes of drug dosing, the eGFR should be mul
tiplied by the estimated BMI.



CARDIAC ENZYMES





  



                     Most recent to      1                   2



                                         oldest [Reference  



                                         Range]:  

 

  



                           Total CK [          117 unit/L 



                           unit/L]                   (17 1:57 AM) 

 

  



                           CK MB [0.5-3.6            1.6 ng/mL 



                           ng/mL]                    (17 1:57 AM) 

 

  



                     Troponin-I          0.04 ng/mL          0.04 ng/mL



                     [0.00-0.40 ng/mL]     (17 4:12 AM)     (17 1:57 AM)







DRUG SCREEN





  



                     Most recent to      1                   2



                                         oldest [Reference  



                                         Range]:  

 

  



                           U Amph Scr                Negative 



                           [Negative]                *NA* 



                                         (17 2:30 AM) 

 

  



                           U Annie Scr                Negative 



                           [Negative]                *NA* 



                                         (17 2:30 AM) 

 

  



                           U Benzodia Scr            Negative 



                           [Negative]                *NA* 



                                         (17 2:30 AM) 

 

  



                           U Cocaine Scr             Positive 



                           [Negative]                *ABN* 



                                         (17 2:30 AM) 

 

  



                           U Opiate Scr              Negative 



                           [Negative]                *NA* 



                                         (17 2:30 AM) 

 

  



                           U Phencyc Scr             Negative 



                           [Negative]                *NA* 



                                         (17 2:30 AM) 

 

  



                           U Cannab Scr              Negative 



                           [Negative]                *NA* 



                                         (17 2:30 AM) 

 

  



                           UDS Note                  See Note 



                                         (17 2:30 AM) 







HEMATOLOGY





  



                     Most recent to      1                   2



                                         oldest [Reference  



                                         Range]:  

 

  



                           WBC [3.7-10.4 K/CMM]      9.8 K/CMM 



                                         (17 1:26 AM) 

 

  



                           RBC [4.70-6.10            4.70 M/CMM 



                           M/CMM]                    (17 1:26 AM) 

 

  



                           Hgb [14.0-18.0 g/dL]      13.8 g/dL 



                                         *LOW* 



                                         (17 1:26 AM) 

 

  



                           Hct [42.0-54.0 %]         42.4 % 



                                         (17 1:26 AM) 

 

  



                           MCV [80.0-94.0 fL]        90.2 fL 



                                         (17 1:26 AM) 

 

  



                           MCH [27.0-31.0 pg]        29.3 pg 



                                         (17 1:26 AM) 

 

  



                           MCHC [32.0-36.0           32.5 g/dL 



                           g/dL]                     (17 1:26 AM) 

 

  



                           RDW [11.5-14.5 %]         15.8 % 



                                         *HI* 



                                         (17 1:26 AM) 

 

  



                           Platelet [133-450         210 K/CMM 



                           K/CMM]                    (17 1:26 AM) 

 

  



                           MPV [7.4-10.4 fL]         8.4 fL 



                                         (17 1:26 AM) 

 

  



                           Segs [45.0-75.0 %]        77.0 % 



                                         *HI* 



                                         (17 1:26 AM) 

 

  



                           Lymphocytes               13.3 % 



                           [20.0-40.0 %]             *LOW* 



                                         (17 1:26 AM) 

 

  



                           Monocytes [2.0-12.0       7.6 % 



                           %]                        (17 1:26 AM) 

 

  



                           Eosinophils [0.0-4.0      1.7 % 



                           %]                        (17 1:26 AM) 

 

  



                           Basophils [0.0-1.0        0.4 % 



                           %]                        (17 1:26 AM) 

 

  



                           Segs-Bands #              7.5 K/CMM 



                           [1.5-8.1 K/CMM]           (17 1:26 AM) 

 

  



                           Lymphocytes #             1.3 K/CMM 



                           [1.0-5.5 K/CMM]           (17 1:26 AM) 

 

  



                           Monocytes # [0.0-0.8      0.7 K/CMM 



                           K/CMM]                    (17 1:26 AM) 

 

  



                           Eosinophils #             0.2 K/CMM 



                           [0.0-0.5 K/CMM]           (17 1:26 AM) 

 

  



                           Basophils # [0.0-0.2      0.0 K/CMM 



                           K/CMM]                    (17 1:26 AM) 







Immunizations





Given and Recorded





   



                 Vaccine         Date            Status          Refusal Reason

 

   



                     influenza virus vaccine, inactivated     10/1/13             Given 









Not Given





   



                 Vaccine         Date            Status          Refusal Reason

 

   



                 pneumococcal 23-valent vaccine     17         Not Given       Patient Refuses







Procedures







   



                 Procedure       Date            Related Diagnosis     Body Site

 

   



                                         ICD - Internal cardiac defibrillator   



                                         procedure1   

 

   



                                         torn ligament2   







1placed 2.5years ago



2left knee ligament surgery



Social History







 



                           Social History Type       Response

 

 



                           Substance Abuse           Use: Current.  Type: Cocaine.  Recreational Drug Route: Inhaled.

  Amount:



                                         uses 5x a year.

 

 



                           Alcohol                   Never

 

 



                           Smoking Status            Former smoker; Type: Cigarettes; Tobacco use per day: 0; Number

 of years:



                                         2; Total pack years: 2; Started at age: 22.0; Stopped at age: 24; Previous



                                         treatment: None; Ready to change: No; Concerns about tobacco use in



                                         household: No; Exposure to Tobacco Smoke None; Cigarette Smoking Last 365



                                         Days No; Reg Smoking Cessation Counseling No







Assessment and Plan





No data available for this section

## 2019-09-25 NOTE — XMS REPORT
Encounter CCD: 2013 to 2013

                             Created on: 2071



JOANNA CABRERA

External Reference #: [id number]

: 1965

Sex: Male



Demographics







                          Address                   [address] [place] [address]

[place]  [id number]

 

                          Home Phone                [phone]

 

                          Preferred Language        Unknown

 

                          Marital Status            Single

 

                          Sikh Affiliation     Hoahaoism

 

                          Race                      White/

 

                          Ethnic Group              Non-





Author







                          Author                    Auto Generated

 

                          Organization              [facility]

 

                          Address                   Unknown

 

                          Phone                     Unavailable







Care Team Providers







                    Care Team Member Name    Role                Phone

 

                    Rebekah De La Torre Ampatrick    CP                  [phone]







Allergies, Adverse Reactions, Alerts







  



                     Substance           Reaction            Status

 

  



                           NKDA                      Active







Problem List







  



                     Condition           Effective Dates     Status

 

  



                     [D]Anterior chest wall pain     2012          Active

 

  



                           Anxiety                   Resolved

 

  



                           BP+ - Hypertension        Active

 

  



                           Cardiomyopathy            Resolved

 

  



                           CHF - Congestive heart failure      Active

 

  



                           CHF - Congestive heart failure      Active

 

  



                           Depression                Active

 

  



                           dm                        Active

 

  



                           Down syndrome             Active

 

  



                           GERD - Gastro-esophageal reflux disease      Active

 

  



                           H/O: schizophrenia        Active

 

  



                           HTN                       Active

 

  



                           ICD (implantable cardiac defibrillator) battery depletion      Active

 

  



                           NIDDM                     Active

 

  



                           Obese build               Active

 

  



                           Sleep apnea               Active







Medications







    



              Medication     Instructions     Start Date     End Date     Status

 

    



              Haldol       5 mg, 1 mL, Route: IM, Drug form:     2013     Completed





                                         INJ, ONCE, Start date: 13   



                                         16:26:00, Stop date: 13   



                                         16:26:00(Same as: Haldol)   

 

    



                 ALPRAZOLam 1 mg oral     =2 mg, PO, Bedtime, # 30 tab, 0     2013      Ordered



                           tablet,                   Refill(s)   



                                         disintegrating    

 

    



                 ALPRAZOLam 1 mg oral     1 mg=1 tab, PO, BID, # 60 tab, 0     2013      Ordered





                           tablet,                   Refill(s)   



                                         disintegrating    

 

    



                 thiothixene 5 mg     5 mg=1 cap, PO, TID, # 90 cap, 0     2013      Ordered



                           oral capsule              Refill(s)   

 

    



              Klonopin 2 mg oral     2 mg=1 tab, PO, Bedtime, 0     2013     Discontinued





                           tablet                    Refill(s)   

 

    



                 Ewing 5/325 oral     Route: PO, Drug Form: TAB, Dosing     2013 

                                         Deleted



                           tablet                    Weight 129.545, kg, Q6H, PRN as   



                                         needed for pain, Start date:   



                                         13 20:12:00, Duration: 30   



                                         day, Stop date: 14 20:11:00   

 

    



                 Sodium Chloride 0.9%     250 mL, Route: IVPB, Start date:     2013

                                         Discontinued



                           IV                        13 21:05:00, Duration: 30   



                                         day, Stop date: 14 21:04:00,   



                                         PRN Line Flush   

 

    



                 BD Normal Saline     10 mL, Route: IVP, Drug Form: INJ,     2013

                                         Discontinued



                           Flush                     PRN, PRN Line Flush, Start date:   



                                         13 21:05:00, Duration: 30   



                                         day, Stop date: 14   



                                         21:04:00(Same as: BD Posiflush)   

 

    



              Klonopin     2 mg, 2 tab, Route: PO, Drug form:     2013     Discontinued





                                         TAB, Bedtime, Dosing Weight   



                                         129.545, kg, Start date: 13   



                                         21:00:00, Duration: 30 day, Stop   



                                         date: 13 21:00:00(Same As:   



                                         Klonopin)   

 

    



                 Dextrose 50% Syringe     12.5 gm, 25 mL, Route: IVP, Drug     2013

                                         Discontinued



                                         Form: INJ, Dosing Weight 129.545,   



                                         kg, PRN, PRN Blood Glucose Results,   



                                         Start date: 13 15:33:00,   



                                         Duration: 30 day, Stop date:   



                                         14 15:32:00   

 

    



                 Dextrose 50% Syringe     25 gm, 50 mL, Route: IVP, Drug     2013

                                         Discontinued



                                         Form: INJ, Dosing Weight 129.545,   



                                         kg, PRN, PRN Blood Glucose Results,   



                                         Start date: 13 15:33:00,   



                                         Duration: 30 day, Stop date:   



                                         14 15:32:00   

 

    



              glucagon     1 mg, Route: IM, Drug form:     2013     Discontinued





                                         PDR/INJ, PRN, Dosing Weight   



                                         129.545, kg, PRN Blood Glucose   



                                         Results, Start date: 13   



                                         15:33:00, Duration: 30 day, Stop   



                                         date: 14 15:32:00   

 

    



                 insulin aspart     4 unit, 0.04 mL, Route: SUB-Q, Drug     2013 

                                         Discontinued



                                         form: SOLN, TID-Before Meals,   



                                         Dosing Weight 129.545, kg, PRN   



                                         Blood Glucose Results, Start date:   



                                         13 15:33:00, Duration: 30   



                                         day, Stop date: 14   



                                         15:32:00Roll in palms of hands   



                                         gently;  Do not shake vigorously.   



                                         (Same as: NovoLog)"single patient   



                                         use only"  Stable for 28 days at   



                                         room temperature.Expires in _____   



                                         days from ______________Date   

 

    



                 insulin aspart     5 unit, 0.05 mL, Route: SUB-Q, Drug     2013 

                                         Discontinued



                                         form: SOLN, TID-Before Meals,   



                                         Dosing Weight 129.545, kg, PRN   



                                         Blood Glucose Results, Start date:   



                                         13 15:33:00, Duration: 30   



                                         day, Stop date: 14   



                                         15:32:00Roll in palms of hands   



                                         gently;  Do not shake vigorously.   



                                         (Same as: NovoLog)"single patient   



                                         use only"  Stable for 28 days at   



                                         room temperature.Expires in _____   



                                         days from ______________Date   

 

    



                 insulin aspart     2 unit, 0.02 mL, Route: SUB-Q, Drug     2013 

                                         Discontinued



                                         form: SOLN, TID-Before Meals,   



                                         Dosing Weight 129.545, kg, PRN   



                                         Blood Glucose Results, Start date:   



                                         13 15:33:00, Duration: 30   



                                         day, Stop date: 14   



                                         15:32:00Roll in palms of hands   



                                         gently;  Do not shake vigorously.   



                                         (Same as: NovoLog)"single patient   



                                         use only"  Stable for 28 days at   



                                         room temperature.Expires in _____   



                                         days from ______________Date   

 

    



                 insulin aspart     3 unit, 0.03 mL, Route: SUB-Q, Drug     2013 

                                         Discontinued



                                         form: SOLN, TID-Before Meals,   



                                         Dosing Weight 129.545, kg, PRN   



                                         Blood Glucose Results, Start date:   



                                         13 15:33:00, Duration: 30   



                                         day, Stop date: 14   



                                         15:32:00Roll in palms of hands   



                                         gently;  Do not shake vigorously.   



                                         (Same as: NovoLog)"single patient   



                                         use only"  Stable for 28 days at   



                                         room temperature.Expires in _____   



                                         days from ______________Date   

 

    



                 insulin aspart     1 unit, 0.01 mL, Route: SUB-Q, Drug     2013 

                                         Discontinued



                                         form: SOLN, TID-Before Meals,   



                                         Dosing Weight 129.545, kg, PRN   



                                         Blood Glucose Results, Start date:   



                                         13 15:33:00, Duration: 30   



                                         day, Stop date: 14   



                                         15:32:00Roll in palms of hands   



                                         gently;  Do not shake vigorously.   



                                         (Same as: NovoLog)"single patient   



                                         use only"  Stable for 28 days at   



                                         room temperature.Expires in _____   



                                         days from ______________Date   

 

    



                 albuterol-ipratropiu     3 ml, Route: NEB, Drug Form: SOLN,     2013

                                         Discontinued



                           m 2.5-0.5 mg              Dosing Weight 129.545, kg, PRN, PRN   



                           inhalation solution       Respiratory Protocol, Start date:   



                                         13 10:07:00, Duration: 30   



                                         day, Stop date: 14   



                                         10:06:00(Same as: Duoneb)   

 

    



              Xanax        2.5 mg, PO, 0 Refill(s)     2013     Discontinued

 

    



              Xanax        2 mg, 2 tab, Route: PO, Drug form:     2013     Discontinued





                                         TAB, Bedtime, Start date: 13   



                                         21:00:00, Duration: 30 day, Stop   



                                         date: 13 21:00:00With food or   



                                         milk(Same as: Xanax)   

 

    



              Xanax        1 mg, 1 tab, Route: PO, Drug form:     2013     Discontinued





                                         TAB, BID, Start date: 13   



                                         17:00:00, Duration: 30 day, Stop   



                                         date: 14 9:00:00With food or   



                                         milk(Same as: Xanax)   

 

    



              Cogentin     1 mg, 1 tab, Route: PO, Drug form:     2013     Discontinued





                                         TAB, TID, Start date: 13   



                                         17:00:00, Duration: 30 day, Stop   



                                         date: 14 13:00:00(Same As:   



                                         Cogentin)   

 

    



              Navane       5 mg, 1 cap, Route: PO, Drug form:     2013     Discontinued





                                         CAP, TID, Start date: 13   



                                         17:00:00, Duration: 30 day, Stop   



                                         date: 14 13:00:00(Same As:   



                                         Navane)   

 

    



                 influenza virus     0.5 ml, Route: IM, Drug Form: SUSP,     10/01/2013      10/01/2013

                                         Completed



                           vaccine, inactivated      Start date: 10/01/13 9:00:00, Stop   



                                         date: 10/01/13 9:00:00   

 

    



                 acetaminophen-hydroc     2 tab, Route: PO, Drug Form: TAB,     2013

                                         Discontinued



                           odone 325 mg-5 mg         Q6H, PRN Pain Score 4-6, Start   



                           oral tablet               date: 13 21:04:00, Duration:   



                                         30 day, Stop date: 14   



                                         21:03:00(Same as: Norco 325/5)  Do   



                                         not exceed 4gm/day of   



                                         acetaminophen.   

 

    



                 acetaminophen-hydroc     1 tab, Route: PO, Drug Form: TAB,     2013

                                         Discontinued



                           odone 325 mg-5 mg         Q6H, PRN Pain Score 4-6, Start   



                           oral tablet               date: 13 21:03:00, Duration:   



                                         30 day, Stop date: 14   



                                         21:02:00(Same as: Norco 325/5)  Do   



                                         not exceed 4gm/day of   



                                         acetaminophen.   







Immunizations







  



                     Vaccine             Date                Status

 

  



                     influenza virus vaccine, inactivated     10/01/2013          Auth (Verified)







Vital Signs







                Most recent to oldest [Reference Range]:    1               2               3

 

                          Height                    167.64 cm 

                          (2013 11:30:00)      170.18 cm 

                          (2013 04:34:00)       

 

                          Temperature Oral [96.4-99.1 DegF]    97.8 DegF 

                          (2013 15:00:00)      98.3 DegF 

                          (2013 11:00:00)      97.9 DegF 

                                        (2013 07:37:00)  

 

                          Systolic Blood Pressure [ mmHg]    127 mmHg 

                          (2013 15:00:00)      141 mmHg 

*HI*

                          (2013 11:00:00)      127 mmHg 

                                        (2013 07:37:00)  

 

                          Diastolic Blood Pressure [60-90 mmHg]    83 mmHg 

                          (2013 15:00:00)      104 mmHg 

*HI*

                          (2013 11:00:00)      92 mmHg 

*HI*

                                        (2013 07:37:00)  

 

                          Respiratory Rate [14-20 BRMIN]    18 BRMIN 

                          (2013 15:00:00)      18 BRMIN 

                          (2013 11:00:00)      18 BRMIN 

                                        (2013 07:37:00)  

 

                          Peripheral Pulse Rate [ bpm]    89 bpm 

                          (2013 15:00:00)      92 bpm 

                          (2013 11:00:00)      81 bpm 

                                        (2013 07:37:00)  

 

                          Weight                    129.545 kg 

                          (2013 11:30:00)      129.545 kg 

                          (2013 04:34:00)       







Results





BEDSIDE GLUCOSE TESTING





                Most recent to oldest [Reference Range]:    1               2               3

 

                          Glucose POC [70-99 mg/dL]    233 mg/dL 1

*HI*

                          (2013 16:35:00)      145 mg/dL 2

*HI*

                          (2013 12:03:00)      152 mg/dL 3

*HI*

                                        (2013 06:10:00)  

 

                          Gluc POC Comment 1        Notified RN/MD 

*NA*

                          (2013 16:35:00)      Notified RN/MD 

*NA*

                          (2013 12:03:00)      Notified RN/MD 

*NA*

                                        (2013 17:13:00)  







1Interpretive Data:  Upper Reportable Limit: 200 mg/dL.



2Interpretive Data:  Upper Reportable Limit: 200 mg/dL.



3Interpretive Data:  Upper Reportable Limit: 200 mg/dL.



URINALYSIS





                Most recent to oldest [Reference Range]:    1               2               3

 

                          UA Turbidity [Clear]      Cloudy 

*ABN*

                    (2013 05:00:00)                           

 

                          UA Color [Yellow]         Yellow 

*NA*

                    (2013 05:00:00)                           

 

                          UA pH [5.0-8.0]           6.0 

                    (2013 05:00:00)                           

 

                          UA Spec Grav [<=1.030]    >=1.030 

*ABN*

                    (2013 05:00:00)                           

 

                          UA Glucose [Negative]     Negative 

                    (2013 05:00:00)                           

 

                          UA Blood [Negative]       Trace 

*ABN*

                    (2013 05:00:00)                           

 

                          UA Ketones [Negative]     Trace 

*ABN*

                    (2013 05:00:00)                           

 

                          UA Protein [Negative mg/dL]    >=300 mg/dL 

*ABN*

                    (2013 05:00:00)                           

 

                          UA Urobilinogen [0.1-1.0 EU/dL]    1.0 EU/dL 

                    (2013 05:00:00)                           

 

                          UA Bili [Negative]        Large 4

*ABN*

                    (2013 05:00:00)                           

 

                          UA Leuk Est [Negative]    Negative 

                    (2013 05:00:00)                           

 

                          UA Nitrite [Negative]     Positive 

*ABN*

                    (2013 05:00:00)                           

 

                          UA WBC [None Seen /HPF]    6-10 /HPF 

*ABN*

                    (2013 05:00:00)                           

 

                          UA RBC [0-2 /HPF]         3-5 /HPF 

*ABN*

                    (2013 05:00:00)                           

 

                          UA Bacteria [None Seen /HPF]    Moderate /HPF 

                    (2013 05:00:00)                           

 

                          UA Sq Epi [Few /LPF]      Occasional /LPF 

                    (2013 05:00:00)                           

 

                          UA Hyal Cast [0-2]        3-5 

                    (2013 05:00:00)                           

 

                          UA Amorph Cathryn [None Seen /HPF]    Many /HPF 

*ABN*

                    (2013 05:00:00)                           

 

                          UA Mucus [None Seen /LPF]    Few /LPF 

                    (2013 05:00:00)                           

 

                          Micro?                    Performed 

                    (2013 05:00:00)                           







4Result Comment: Interpret positive bilirubin results with caution. Confirmatory
testing

not possible due to the unavailability of reagent.  Correlation with

serum chemistry results recommended.



CHEMISTRY





                Most recent to oldest [Reference Range]:    1               2               3

 

                          Sodium Lvl [135-145 mEq/L]    137 mEq/L 

                    (2013 05:51:00)                           

 

                          Potassium Lvl [3.5-5.1 mEq/L]    4.0 mEq/L 

                    (2013 05:51:00)                           

 

                          Chloride Lvl [ mEq/L]    102 mEq/L 

                    (2013 05:51:00)                           

 

                          CO2 [24-32 mEq/L]         25 mEq/L 

                    (2013 05:51:00)                           

 

                          AGAP [10.0-20.0 mEq/L]    14.0 mEq/L 

                    (2013 05:51:00)                           

 

                          Creatinine Lvl [0.5-1.4 mg/dL]    1.2 mg/dL 

                    (2013 05:51:00)                           

 

                          eGFR                      71 mL/min/1.73m2 5

*NA*

                    (2013 05:51:00)                           

 

                          BUN [7-22 mg/dL]          22 mg/dL 

                    (2013 05:51:00)                           

 

                          Glucose Lvl [70-99 mg/dL]    155 mg/dL 6

*HI*

                    (2013 05:51:00)                           

 

                          Calcium Lvl [8.5-10.5 mg/dL]    8.7 mg/dL 

                    (2013 05:51:00)                           

 

                          Phosphorus [2.5-4.5 mg/dL]    4.2 mg/dL 

                    (2013 05:51:00)                           

 

                          Magnesium Lvl [1.8-2.4 mg/dL]    1.7 mg/dL 

*LOW*

                    (2013 05:51:00)                           

 

                          TSH [0.360-3.740 uIU/mL]    1.410 uIU/mL 

                    (2013 05:51:00)                           

 

                          U Amph Scr [Negative]     Negative 

*NA*

                    (2013 05:00:00)                           

 

                          U Annie Scr [Negative]     Negative 

*NA*

                    (2013 05:00:00)                           

 

                          U Benzodia Scr [Negative]    Positive 

*ABN*

                    (2013 05:00:00)                           

 

                          U Cocaine Scr [Negative]    Negative 

*NA*

                    (2013 05:00:00)                           

 

                          U Opiate Scr [Negative]    Negative 

*NA*

                    (2013 05:00:00)                           

 

                          U Phencyc Scr [Negative]    Negative 

*NA*

                    (2013 05:00:00)                           

 

                          U Cannab Scr [Negative]    Negative 

*NA*

                    (2013 05:00:00)                           

 

                          UDS Note                  See Note 7

                    (2013 05:00:00)                           

 

                          Acetaminoph Lvl [10-20]    <2 

                    (2013 05:51:00)                           

 

                          Etoh (%)                  <.003 % 8

*NA*

                    (2013 05:51:00)                           

 

                          Ethanol Lvl               <3 mg/dL 9

*NA*

                    (2013 05:51:00)                           







5Result Comment: The eGFR is calculated using the CKD-EPI formula. In most 
young, healthy individuals the eGFR will be >90 mL/min/1.73m2. The eGFR declines
 with age. An eGFR of 60-89 may be normal in some populations, particularly the 
elderly, for whom the CKD-EPI formula has not been extensively validated. Use of
 the eGFR is not recommended in the following populations:



Individuals with unstable creatinine concentrations, including pregnant patients
 and those with serious co-morbid conditions.



Patients with extremes in muscle mass or diet. 



The data above are obtained from the [employer] Education [employer] (
NKDEP) which additionally recommends that when the eGFR is used in patients with
 extremes of body mass index for purposes of drug dosing, the eGFR should be mul
tiplied by the estimated BMI.



6Interpretive Data: Adult reference range values reflect the clinical guidelines

of the [employer].



7Interpretive Data: Drugs reported as positive have not been confirmed by a 
second 

method and should be used for medical purposes only. To order

confirmation, contact laboratory. 



**note: Below are cut-off concentrations for all urine drugs of 

abuse performed in the laboratory. Some drugs listed in the table 

may not be included in this panel.



Description               Cut-off concentration

--------------------------------------------------

Amphetamine                  1000 ng/mL

Barbiturates                  200 ng/mL

Benzodiazepines               300 ng/mL

Cocaine metabolites           300 ng/mL

Opiates                       300 ng/mL

Phencyclidine                  25 ng/mL

Propoxyphene                  300 ng/mL

Marijuana metabolites          50 ng/mL

Methadone                     300 ng/mL

Urine alcohol                  20 mg/dL



8Interpretive Data: Negative Range: <0.003%

Toxic Range:     >0.25%



9Interpretive Data: Negative Range:   <3 mg/dL

Toxic Range:    >250 mg/dL



HEMATOLOGY





                Most recent to oldest [Reference Range]:    1               2               3

 

                          WBC [3.7-10.4 K/CMM]      7.8 K/CMM 

                    (2013 05:51:00)                           

 

                          RBC [4.70-6.10 M/CMM]     5.69 M/CMM 

                    (2013 05:51:00)                           

 

                          Hgb [14.0-18.0 g/dL]      15.6 g/dL 

                    (2013 05:51:00)                           

 

                          Hct [42.0-54.0 %]         47.5 % 

                    (2013 05:51:00)                           

 

                          MCV [80.0-94.0 fL]        83.5 fL 

                    (2013 05:51:00)                           

 

                          MCH [27.0-31.0 pg]        27.4 pg 

                    (2013 05:51:00)                           

 

                          MCHC [32.0-36.0 g/dL]     32.8 g/dL 

                    (2013 05:51:00)                           

 

                          RDW [11.5-14.5 %]         16.0 % 

*HI*

                    (2013 05:51:00)                           

 

                          Platelet [133-450 K/CMM]    237 K/CMM 

                    (2013 05:51:00)                           

 

                          MPV [7.4-10.4 fL]         10.0 fL 

                    (2013 05:51:00)                           

 

                          Segs [45.0-75.0 %]        77.6 % 

*HI*

                    (2013 05:51:00)                           

 

                          Lymphocytes [20.0-40.0 %]    13.6 % 

*LOW*

                    (2013 05:51:00)                           

 

                          Monocytes [2.0-12.0 %]    7.9 % 

                    (2013 05:51:00)                           

 

                          Eosinophils [0.0-4.0 %]    0.6 % 

                    (2013 05:51:00)                           

 

                          Basophils [0.0-1.0 %]     0.3 % 

                    (2013 05:51:00)                           

 

                          Segs-Bands # [1.5-8.1 K/CMM]    6.0 K/CMM 

                    (2013 05:51:00)                           

 

                          Lymphocytes # [1.0-5.5 K/CMM]    1.1 K/CMM 

                    (2013 05:51:00)                           

 

                          Monocytes # [0.0-0.8 K/CMM]    0.6 K/CMM 

                    (2013 05:51:00)                           

 

                          Eosinophils # [0.0-0.5 K/CMM]    0.1 K/CMM 

                    (2013 05:51:00)                           

 

                          Basophils # [0.0-0.2 K/CMM]    0.0 K/CMM 

                    (2013 05:51:00)

## 2019-09-25 NOTE — NUR
Dr. Arambula called regarding  and patient uses C-Pap at home for sleep and order is 
needed. Awaiting call back

## 2019-09-25 NOTE — XMS REPORT
Summary of Care: 3/16/18 - 3/17/18

                             Created on: 2052



RICK JOANNA KIAH

External Reference #: [id number]

: 1965

Sex: Male



Demographics







                          Address                   [address] [place] [address]  [id number]

 

                          Home Phone                [phone]

 

                          Preferred Language        English

 

                          Marital Status            Single

 

                          Worship Affiliation     [employer]

 

                          Race                      White

 

                                        Additional Race(s)  

 

                          Ethnic Group              /Latin





Author







                          Author                    [facility]

 

                          Organization              [facility]

 

                          Address                   Unknown

 

                          Phone                     Unavailable







Encounter





HQ Shyann(FIN) [id number] Date(s): 3/16/18 - 3/17/18

[facility] [address] [place] [address]-     [phone]


Encounter Diagnosis

Precordial pain (Final) - 3/22/18

Morbid (severe) obesity due to excess calories (Final) - 

Patient's other noncompliance with medication regimen (Final) - 

Encounter for immunization (Final) - 

Obstructive sleep apnea (adult) (pediatric) (Final) - 

Type 2 diabetes mellitus without complications (Final) - 

Personal history of nicotine dependence (Final) - 

Unspecified systolic (congestive) heart failure (Final) - 

Long term (current) use of antithrombotics/antiplatelets (Final) - 

Long term (current) use of insulin (Final) - 

Presence of cardiac pacemaker (Final) - 

Body mass index (BMI) 29.0-29.9, adult (Final) - 

Discharge Disposition: Home or Self Care

Attending Physician: Marvin Willingham MD

Admitting Physician: Marvin Willingham MD





Vital Signs







                    1                   2                   3



                                         Most recent to   



                                         oldest [Reference   



                                         Range]:   

 

                                        170.18 cm 

                          (3/16/18 10:04 AM)        175.26 cm 

                          (3/16/18 2:38 AM)          



                                         Height   

 

                                        98 DegF 

                          (3/17/18 11:59 AM)        97.3 DegF 

                          (3/17/18 7:05 AM)         97.7 DegF 

                                        (3/17/18 4:11 AM)



                                         Temperature Oral   



                                         [96.4-99.1 DegF]   

 

                                        126/84 mmHg 

                          (3/17/18 11:59 AM)        121/87 mmHg 

                          (3/17/18 7:05 AM)         108/75 mmHg 

                                        (3/17/18 4:11 AM)



                                         Blood Pressure   



                                         [/60-90 mmHg]   

 

                                        18 BRMIN 

                          (3/17/18 11:59 AM)        18 BRMIN 

                          (3/17/18 7:05 AM)         18 BRMIN 

                                        (3/17/18 4:11 AM)



                                         Respiratory Rate   



                                         [14-20 BRMIN]   

 

                                        64 bpm 

                          (3/17/18 11:59 AM)        61 bpm 

                          (3/17/18 7:05 AM)         79 bpm 

                                        (3/17/18 4:11 AM)



                                         Peripheral Pulse   



                                         Rate [ bpm]   

 

                                        72.727 kg 

                          (3/16/18 10:04 AM)        90.909 kg 

                          (3/16/18 2:38 AM)          



                                         Weight   

 

                                        25.11 m2 

                          (3/16/18 10:04 AM)        29.6 m2 

                          (3/16/18 2:38 AM)          



                                         Body Mass Index   







Problem List







    



              Condition     Effective Dates     Status       Health Status     Informant

 

    



                     [D]Anterior chest     12             Active  



                                         wall pain(Confirmed)    

 

    



                           Anxiety(Confirmed)        Active  

 

    



                           Atrial                    Active  



                                         fibrillation(Confirm    



                                         ed)    

 

    



                           BP+ -                     Active  



                                         Hypertension(Confirm    



                                         ed)    

 

    



                           Pacemaker(Confirmed)      Active  

 

    



                           Cardiomyopathy(Confi      Resolved  



                                         rmed)    

 

    



                           CHF - Congestive          Active  



                                         heart    



                                         failure(Confirmed)    

 

    



                           CHF - Congestive          Active  



                                         heart    



                                         failure(Confirmed)    

 

    



                           Chronic renal             Active  



                                         failure(Confirmed)    

 

    



                           Down                      Active  



                                         syndrome(Confirmed)    

 

    



                           GERD -                    Active  



                                         Gastro-esophageal    



                                         reflux    



                                         disease(Confirmed)    

 

    



                           H/O:                      Active  



                                         schizophrenia(Confir    



                                         med)    

 

    



                           HTN(Confirmed)            Active  

 

    



                           ICD (implantable          Active  



                                         cardiac    



                                         defibrillator)    



                                         battery    



                                         depletion(Confirmed)    

 

    



                           NIDDM(Confirmed)          Active  

 

    



                           Obese                     Active  



                                         build(Confirmed)    

 

    



                           Schizophrenia(Confir      Active  



                                         med)    

 

    



                           Short of breath on        Active  



                                         exertion(Confirmed)    

 

    



                           Sleep                     Active  



                                         apnea(Confirmed)    

 

    



                           Sleep                     Active  



                                         apnea(Confirmed)    







Allergies, Adverse Reactions, Alerts







   



                 Substance       Reaction        Severity        Status

 

   



                           NKDA                      Active







Medications





aspirin

324 mg, 4 tab, Route: PO, Drug form: CHEWTAB, ONCE, Dosing Weight 90.909, kg, Pr
iority: STAT, Start date: 18 3:07:00 CDT, Stop date: 18 3:07:00 CDT

Notes: Take with food.

Start Date: 3/16/18

Stop Date: 3/16/18

Status: Completed



aspirin 81 mg tablet, enteric coated

81 mg, 1 tab, Route: PO, Drug form: ECTAB, Daily, Dosing Weight 90.909, kg, Star
t date: 18 9:00:00 CDT, Duration: 30 day, Stop date: 18 9:00:00 CDT

Notes: Do not crush or chew.(Same As: Ecotrin)

Start Date: 3/16/18

Stop Date: 3/17/18

Status: Discontinued



atorvastatin

40 mg, 1 tab, Route: PO, Drug form: TAB, Bedtime, Dosing Weight 72.727, kg, Star
t date: 18 21:00:00 CDT, Duration: 30 day, Stop date: 18 21:00:00 CD
T

Notes: (Same as: Lipitor)

Start Date: 3/16/18

Stop Date: 3/17/18

Status: Discontinued



atorvastatin

40 mg, PO, Bedtime, 0 Refill(s)

Start Date: 3/16/18

Status: Ordered



carvedilol

18.75 mg, 3 tab, Route: PO, Drug form: TAB, Q12H, Dosing Weight 90.909, kg, Star
t date: 18 9:00:00 CDT, Duration: 30 day, Stop date: 18 21:00:00 CDT

Notes: Give with food. (Same As: Coreg)

Start Date: 3/16/18

Stop Date: 3/16/18

Status: Discontinued



clonazePAM

1 mg, 1 tab, Route: PO, Drug form: TAB, Bedtime, Dosing Weight 72.727, kg, Start
 date: 18 21:00:00 CDT, Duration: 30 day, Stop date: 18 21:00:00 CDT

Notes: (Same As: KlonoPIN)

Start Date: 3/16/18

Stop Date: 3/17/18

Status: Discontinued



Coreg

25 mg, PO, BID, 0 Refill(s)

Start Date: 3/16/18

Status: Ordered



Coreg

25 mg, 1 tab, Route: PO, Drug form: TAB, BID, Dosing Weight 72.727, kg, Start da
te: 18 17:00:00 CDT, Duration: 30 day, Stop date: 04/15/18 9:00:00 CDT

Notes: Give with food. (Same As: Coreg)

Start Date: 3/16/18

Stop Date: 3/17/18

Status: Discontinued



Dextrose 50% Syringe

12.5 gm, 25 mL, Route: IVP, Drug Form: INJ, Dosing Weight 72.727, kg, PRN, PRN B
lood Glucose Results, Start date: 18 16:15:00 CDT, Duration: 30 day, Stop 
date: 04/15/18 16:14:00 CDT

Start Date: 3/16/18

Stop Date: 3/17/18

Status: Discontinued



Dextrose 50% Syringe

25 gm, 50 mL, Route: IVP, Drug Form: INJ, Dosing Weight 72.727, kg, PRN, PRN Blo
od Glucose Results, Start date: 18 16:15:00 CDT, Duration: 30 day, Stop da
te: 04/15/18 16:14:00 CDT

Start Date: 3/16/18

Stop Date: 3/17/18

Status: Discontinued



digoxin

125 microgram, PO, Daily, 0 Refill(s)

Start Date: 3/16/18

Status: Ordered



digoxin

125 microgram, 1 tab, Route: PO, Drug form: TAB, Daily, Dosing Weight 72.727, kg
, Start date: 18 9:00:00 CDT, Duration: 30 day, Stop date: 04/15/18 9:00:0
0 CDT

Notes: Take on an Empty Stomach  (Same as: Lanoxin)

Start Date: 3/17/18

Stop Date: 3/17/18

Status: Discontinued



digoxin 250 mcg (0.25 mg) oral tablet

0.25 mg, 1 tab, Route: PO, Drug form: TAB, Daily, Dosing Weight 90.909, kg, Star
t date: 18 9:00:00 CDT, Duration: 30 day, Stop date: 18 9:00:00 CDT

Notes: Take on an Empty Stomach  (Same as: Lanoxin)

Start Date: 3/16/18

Stop Date: 3/16/18

Status: Discontinued



furosemide

20 mg, PO, BID, 0 Refill(s)

Start Date: 3/16/18

Status: Ordered



furosemide

20 mg, 1 tab, Route: PO, Drug form: TAB, BID, Dosing Weight 72.727, kg, Start da
te: 18 17:00:00 CDT, Duration: 30 day, Stop date: 04/15/18 9:00:00 CDT

Notes: (Same as: Lasix)  May cause GI upset.  Give with food or milk.

Start Date: 3/16/18

Stop Date: 3/17/18

Status: Discontinued



glimepiride

4 mg, Route: PO, Drug form: TAB, BID, Dosing Weight 90.909, kg, Start date:  9:00:00 CDT, Duration: 30 day, Stop date: 18 17:00:00 CDT

Start Date: 3/16/18

Stop Date: 3/16/18

Status: Deleted



Glimepride 2 mg tab

Glimepride 2 mg tab, 4 mg, Drug form: MISC, Route: PO, BID, 18 17:00:00 CD
T, Duration: 30 day, Stop date: 04/15/18 9:00:00 CDT

Start Date: 3/16/18

Stop Date: 3/16/18

Status: Canceled



glipiZIDE

10 mg, PO, BID, 0 Refill(s)

Start Date: 3/16/18

Status: Ordered



glipiZIDE

10 mg, 1 tab, Route: PO, Drug form: TAB, BID, Dosing Weight 72.727, kg, Start da
te: 18 17:00:00 CDT, Duration: 30 day, Stop date: 04/15/18 9:00:00 CDT

Notes: (Same as: Glucotrol)  30 min before meals.

Start Date: 3/16/18

Stop Date: 3/17/18

Status: Discontinued



glucagon

1 mg, Route: IM, Drug form: PDR/INJ, PRN, Dosing Weight 72.727, kg, PRN Blood Gl
ucose Results, Start date: 18 16:15:00 CDT, Duration: 30 day, Stop date: 0
4/15/18 16:14:00 CDT

Start Date: 3/16/18

Stop Date: 3/17/18

Status: Discontinued



Humalog

See Instructions, 15-25 units before meals depending on the blood sugar, 0 Refil
l(s)

Start Date: 3/16/18

Status: Ordered



insulin glargine

22 unit, 0.22 mL, Route: SUB-Q, Drug form: SOLN, Bedtime, Start date: 18 2
1:00:00 CDT, Duration: 30 day, Stop date: 18 21:00:00 CDT

Notes: (Same as: Lantus)Do not hold insulin without contacting prescriberWASTE: 
F/P - Black; E - Municipal Trash Bin  "single patient use only"

Start Date: 3/16/18

Stop Date: 3/17/18

Status: Discontinued



insulin lispro

10 unit, 0.1 mL, Route: SUB-Q, Drug form: SOLN, TID-Before Meals, Dosing Weight 
72.727, kg, PRN Blood Glucose Results, Start date: 18 16:15:00 CDT, Durati
on: 30 day, Stop date: 04/15/18 16:14:00 CDT

Notes: (Same as: Humalog ) Roll in palms of hands gently;  Do not shake `vigorou
sly. "Single Patient Use Only "  WASTE: F/P - Black; E - Municipal Trash Bin  St
able for 28 days at room temperature.Expires in _____ days from ______________Da
te

Start Date: 3/16/18

Stop Date: 3/17/18

Status: Discontinued



insulin lispro

8 unit, 0.08 mL, Route: SUB-Q, Drug form: SOLN, TID-Before Meals, Dosing Weight 
72.727, kg, PRN Blood Glucose Results, Start date: 18 16:15:00 CDT, Durati
on: 30 day, Stop date: 04/15/18 16:14:00 CDT

Notes: (Same as: Humalog ) Roll in palms of hands gently;  Do not shake `vigorou
sly. "Single Patient Use Only "  WASTE: F/P - Black; E - Municipal Trash Bin  St
able for 28 days at room temperature.Expires in _____ days from ______________Da
te

Start Date: 3/16/18

Stop Date: 3/17/18

Status: Discontinued



insulin lispro

2 unit, 0.02 mL, Route: SUB-Q, Drug form: SOLN, TID-Before Meals, Dosing Weight 
72.727, kg, PRN Blood Glucose Results, Start date: 18 16:15:00 CDT, Durati
on: 30 day, Stop date: 04/15/18 16:14:00 CDT

Notes: (Same as: Humalog ) Roll in palms of hands gently;  Do not shake `vigorou
sly. "Single Patient Use Only "  WASTE: F/P - Black; E - Municipal Trash Bin  St
able for 28 days at room temperature.Expires in _____ days from ______________Da
te

Start Date: 3/16/18

Stop Date: 3/17/18

Status: Discontinued



insulin lispro

4 unit, 0.04 mL, Route: SUB-Q, Drug form: SOLN, TID-Before Meals, Dosing Weight 
72.727, kg, PRN Blood Glucose Results, Start date: 18 16:15:00 CDT, Durati
on: 30 day, Stop date: 04/15/18 16:14:00 CDT

Notes: (Same as: Humalog ) Roll in palms of hands gently;  Do not shake `vigorou
sly. "Single Patient Use Only "  WASTE: F/P - Black; E - Municipal Trash Bin  St
able for 28 days at room temperature.Expires in _____ days from ______________Da
te

Start Date: 3/16/18

Stop Date: 3/17/18

Status: Discontinued



insulin lispro

6 unit, 0.06 mL, Route: SUB-Q, Drug form: SOLN, TID-Before Meals, Dosing Weight 
72.727, kg, PRN Blood Glucose Results, Start date: 18 16:15:00 CDT, Durati
on: 30 day, Stop date: 04/15/18 16:14:00 CDT

Notes: (Same as: Humalog ) Roll in palms of hands gently;  Do not shake `vigorou
sly. "Single Patient Use Only "  WASTE: F/P - Black; E - Municipal Trash Bin  St
able for 28 days at room temperature.Expires in _____ days from ______________Da
te

Start Date: 3/16/18

Stop Date: 3/17/18

Status: Discontinued



Lasix 40 mg oral tablet

40 mg, 1 tab, Route: PO, Drug form: TAB, BID, Dosing Weight 90.909, kg, Start da
te: 18 9:00:00 CDT, Duration: 30 day, Stop date: 18 17:00:00 CDT

Notes: (Same as: Lasix)  May cause GI upset.  Give with food or milk.

Start Date: 3/16/18

Stop Date: 3/16/18

Status: Discontinued



Levemir

22units, SUB-Q, Bedtime, 0 Refill(s)

Start Date: 3/16/18

Status: Ordered



Levemir

Route: SUB-Q, Bedtime, Dosing Weight 72.727, kg, Start date: 18 21:00:00 C
DT, Duration: 30 day, Stop date: 18 21:00:00 CDT

Start Date: 3/16/18

Stop Date: 3/16/18

Status: Deleted



lisinopril

2.5 mg, 0.5 tab, Route: PO, Drug form: TAB, Daily, Dosing Weight 90.909, kg, Sta
rt date: 18 9:00:00 CDT, Duration: 30 day, Stop date: 18 9:00:00 CDT

Notes: (Same as: Prinivil, Zestril)

Start Date: 3/16/18

Stop Date: 3/17/18

Status: Discontinued



metFORMIN

850 mg, PO, BID, 0 Refill(s)

Start Date: 3/16/18

Status: Ordered



metFORMIN

1,000 mg, 2 tab, Route: PO, Drug form: TAB, BID, Dosing Weight 90.909, kg, Start
 date: 18 9:00:00 CDT, Duration: 30 day, Stop date: 18 17:00:00 CDT

Notes: (Same as: Glucophage)  Take with meal

Start Date: 3/16/18

Stop Date: 3/16/18

Status: Discontinued



metFORMIN

850 mg, 1 tab, Route: PO, Drug form: TAB, BID, Dosing Weight 72.727, kg, Start d
ate: 18 17:00:00 CDT, Duration: 30 day, Stop date: 04/15/18 9:00:00 CDT

Notes: (Same as: Glucophage)  Take with meal

Start Date: 3/16/18

Stop Date: 3/17/18

Status: Discontinued



morphine Sulfate

2 mg, 0.5 mL, Route: IVP, Drug form: INJ, ONCE, Dosing Weight 90.909, kg, Priori
ty: STAT, Start date: 18 3:07:00 CDT, Stop date: 18 3:07:00 CDT

Notes: (Same as:MORPhine Sulfate)

Start Date: 3/16/18

Stop Date: 3/16/18

Status: Discontinued



ondansetron

4 mg, 2 mL, Route: IVP, Drug form: INJ, ONCE, Dosing Weight 90.909, kg, Priority
: STAT, Start date: 18 3:07:00 CDT, Stop date: 18 3:07:00 CDT

Notes: (Same as: Zofran)  *** MEDICATION WASTE ***Product Size:  4 mgProduct Was
helen:  ___ mg

Start Date: 3/16/18

Stop Date: 3/16/18

Status: Discontinued



OXcarbazepine

600 mg, 1 tab, Route: PO, Drug form: TAB, Bedtime, Dosing Weight 72.727, kg, Sta
rt date: 18 21:00:00 CDT, Duration: 30 day, Stop date: 18 21:00:00 C
DT

Notes: (Same as: Trileptal)

Start Date: 3/16/18

Stop Date: 3/17/18

Status: Discontinued



OXcarbazepine

600 mg=1 tab, PO, Bedtime, # 30 tab, 0 Refill(s)

Start Date: 3/16/18

Status: Ordered



pantoprazole

40 mg, 1 tab, Route: PO, Drug form: ECTAB, Daily, Dosing Weight 72.727, kg, Star
t date: 18 9:00:00 CDT, Duration: 30 day, Stop date: 04/15/18 9:00:00 CDT

Notes: Tablet should not be chewed or crushed.(Same as: Protonix)

Start Date: 3/17/18

Stop Date: 3/17/18

Status: Discontinued



Ranexa 500 mg oral tablet, extended release

500 mg, 1 tab, Route: PO, Drug form: TAB, BID, Dosing Weight 90.909, kg, Start d
ate: 18 9:00:00 CDT, Duration: 30 day, Stop date: 18 17:00:00 CDT

Notes: Same as Ranexa"Do Not Crush"

Start Date: 3/16/18

Stop Date: 3/16/18

Status: Discontinued



Risperdal

2 mg, 1 tab, Route: PO, Drug form: TAB, Bedtime, Dosing Weight 90.909, kg, Start
 date: 18 21:00:00 CDT, Duration: 30 day, Stop date: 18 21:00:00 CDT

Notes: (Same as: Risperdal)

Start Date: 3/16/18

Stop Date: 3/16/18

Status: Canceled



Saline Flush 0.9%

10 mL, Route: IVP, Drug Form: INJ, Dosing Weight 90.909, kg, PRN, PRN Line Flush
, Start date: 18 3:07:00 CDT, Duration: 30 day, Stop date: 04/15/18 3:06:0
0 CDT

Notes: (Same as: BD Posiflush)

Start Date: 3/16/18

Stop Date: 3/17/18

Status: Discontinued



Saphris

5 mg, Route: SL, Drug form: TAB, Daily, Dosing Weight 72.727, kg, Start date:  9:00:00 CDT, Duration: 30 day, Stop date: 04/15/18 9:00:00 CDT

Start Date: 3/17/18

Stop Date: 3/17/18

Status: Discontinued



Saphris 5 mg sublingual tablet

5 mg=1 tab, SL, Daily, 0 Refill(s)

Start Date: 3/16/18

Status: Ordered



spironolactone

25 mg, 1 tab, Route: PO, Drug form: TAB, BID, Dosing Weight 90.909, kg, Start da
te: 18 9:00:00 CDT, Duration: 30 day, Stop date: 18 17:00:00 CDT

Notes: (Same As: Aldactone)

Start Date: 3/16/18

Stop Date: 3/16/18

Status: Discontinued



Tessalon Perles

200 mg, 2 cap, Route: PO, Drug form: CAP, Q6H, Dosing Weight 72.727, kg, PRN Cou
gh/Congestion, Start date: 18 21:26:00 CDT, Duration: 30 day, Stop date: 0
4/15/18 21:25:00 CDT

Notes: (Same As: Tessalon Perles)"Do Not Crush"

Start Date: 3/16/18

Stop Date: 3/17/18

Status: Discontinued



Tylenol

650 mg, 2 tab, Route: PO, Drug form: TAB, Q4H, Dosing Weight 72.727, kg, PRN Graciela
n 1-3/Temp > 100.4 F, Start date: 18 16:08:00 CDT, Duration: 30 day, Stop 
date: 04/15/18 16:07:00 CDT

Notes: Do not exceed 4 gm/day.  (Same as: Tylenol)

Start Date: 3/16/18

Stop Date: 3/17/18

Status: Discontinued



Zofran

4 mg, Route: IVP, Drug form: INJ, ONCE, Dosing Weight 90.909, kg, Priority: STAT
, Start date: 18 3:31:00 CDT, Stop date: 18 3:31:00 CDT

Start Date: 3/16/18

Stop Date: 3/16/18

Status: Completed



zolpidem

Bedtime, 0 Refill(s)

Start Date: 3/16/18

Stop Date: 3/16/18

Status: Completed



zolpidem

10 mg, PO, Bedtime, 0 Refill(s)

Start Date: 3/16/18

Stop Date: 3/17/18

Status: Discontinued



Results





ELECTROLYTES





                    1                   2                   3



                                         Most recent to   



                                         oldest [Reference   



                                         Range]:   

 

                                        137 mEq/L 

                    (3/16/18 3:19 AM)                          



                                         Sodium Lvl [135-145   



                                         mEq/L]   

 

                                        5.3 mEq/L 

*HI*

                    (3/16/18 3:19 AM)                          



                                         Potassium Lvl   



                                         [3.5-5.1 mEq/L]   

 

                                        103 mEq/L 

                    (3/16/18 3:19 AM)                          



                                         Chloride Lvl [   



                                         mEq/L]   

 

                                        29 mEq/L 

                    (3/16/18 3:19 AM)                          



                                         CO2 [24-32 mEq/L]   

 

                                        10.3 mEq/L 

                    (3/16/18 3:19 AM)                          



                                         AGAP [10.0-20.0   



                                         mEq/L]   







CHEM PANEL





                    1                   2                   3



                                         Most recent to   



                                         oldest [Reference   



                                         Range]:   

 

                                        1.50 mg/dL 

*HI*

                    (3/16/18 3:19 AM)                          



                                         Creatinine Lvl   



                                         [0.50-1.40 mg/dL]   

 

                                        53 mL/min/1.73m2 1

*NA*

                    (3/16/18 3:19 AM)                          



                                         eGFR   

 

                                        21 mg/dL 

                    (3/16/18 3:19 AM)                          



                                         BUN [7-22 mg/dL]   

 

                                        140 mg/dL 

*HI*

                    (3/16/18 3: AM)                          



                                         Glucose Lvl [70-99   



                                         mg/dL]   

 

                                        7.3 g/dL 

                    (3/16/18 3:19 AM)                          



                                         Total Protein   



                                         [6.4-8.4 g/dL]   

 

                                        3.8 g/dL 

                    (3/16/18 3:19 AM)                          



                                         Albumin Lvl [3.5-5.0   



                                         g/dL]   

 

                                        3.5 g/dL 

                    (3/16/18 3:19 AM)                          



                                         Globulin [2.7-4.2   



                                         g/dL]   

 

                                        1.1 

                    (3/16/18 3:19 AM)                          



                                         A/G Ratio [0.7-1.6]   

 

                                        8.4 mg/dL 

*LOW*

                    (3/16/18 3:19 AM)                          



                                         Calcium Lvl   



                                         [8.5-10.5 mg/dL]   

 

                                        18 unit/L 

                    (3/16/18 3:19 AM)                          



                                         ALT [0-65 unit/L]   

 

                                        46 unit/L 

*HI*

                    (3/16/18 3:19 AM)                          



                                         AST [0-37 unit/L]   

 

                                        109 unit/L 

                    (3/16/18 3:19 AM)                          



                                         Alk Phos [   



                                         unit/L]   

 

                                        1.0 mg/dL 

                    (3/16/18 3:19 AM)                          



                                         Bili Total [0.2-1.3   



                                         mg/dL]   

 

                                        0.1 mg/dL 

                    (3/16/18 3:19 AM)                          



                                         Bili Direct [0.0-0.3   



                                         mg/dL]   

 

                                        0.9 mg/dL 

                    (3/16/18 3:19 AM)                          



                                         Bili Indirect   



                                         [0.0-1.0 mg/dL]   

 

                                        105 unit/L 

                    (3/16/18 3:19 AM)                          



                                         Lipase Lvl [   



                                         unit/L]   







1Result Comment: The eGFR is calculated using the CKD-EPI formula. In most 
young, healthy individuals the eGFR will be >90 mL/min/1.73m2. The eGFR declines
with age. An eGFR of 60-89 may be normal in some populations, particularly the 
elderly, for whom the CKD-EPI formula has not been extensively validated. Use of
the eGFR is not recommended in the following populations:



Individuals with unstable creatinine concentrations, including pregnant patients
and those with serious co-morbid conditions.



Patients with extremes in muscle mass or diet. 



The data above are obtained from the [employer] (
[employer]) which additionally recommends that when the eGFR is used in patients with
extremes of body mass index for purposes of drug dosing, the eGFR should be mul
tiplied by the estimated BMI.



CARDIAC ENZYMES





                    1                   2                   3



                                         Most recent to   



                                         oldest [Reference   



                                         Range]:   

 

                                        327 unit/L 

*HI*

                    (3/16/18 3:19 AM)                          



                                         Total CK [   



                                         unit/L]   

 

                                        1.8 ng/mL 

                    (3/16/18 3:19 AM)                          



                                         CK MB [0.5-3.6   



                                         ng/mL]   

 

                                        0.6 

                    (3/16/18 3:19 AM)                          



                                         CK MB Index   



                                         [0.0-2.5]   

 

                                        0.03 ng/mL 

                          (3/16/18 4:04 PM)         <0.02 ng/mL 

                          (3/16/18 9:50 AM)         0.03 ng/mL 

                                        (3/16/18 3:19 AM) 



                                         Troponin-I   



                                         [0.00-0.40 ng/mL]   

 

                                        726 pg/mL 

*HI*

                    (3/16/18 3:19 AM)                          



                                         BNP [<=100 pg/mL]   







DRUG SCREEN





                    1                   2                   3



                                         Most recent to   



                                         oldest [Reference   



                                         Range]:   

 

                                        Negative 

*NA*

                    (3/16/18 4:30 AM)                          



                                         U Amph Scr   



                                         [Negative]   

 

                                        Negative 

*NA*

                    (3/16/18 4:30 AM)                          



                                         U Annie Scr   



                                         [Negative]   

 

                                        Negative 

*NA*

                    (3/16/18 4:30 AM)                          



                                         U Benzodia Scr   



                                         [Negative]   

 

                                        Negative 

*NA*

                    (3/16/18 4:30 AM)                          



                                         U Cocaine Scr   



                                         [Negative]   

 

                                        Negative 

*NA*

                    (3/16/18 4:30 AM)                          



                                         U Opiate Scr   



                                         [Negative]   

 

                                        Negative 

*NA*

                    (3/16/18 4:30 AM)                          



                                         U Phencyc Scr   



                                         [Negative]   

 

                                        Negative 

*NA*

                    (3/16/18 4:30 AM)                          



                                         U Cannab Scr   



                                         [Negative]   

 

                                        See Note 

*NA*

                    (3/16/18 4:30 AM)                          



                                         UDS Note   







URINE AND STOOL





                    1                   2                   3



                                         Most recent to   



                                         oldest [Reference   



                                         Range]:   

 

                                        Clear 

                    (3/16/18 4:30 AM)                          



                                         UA Turbidity [Clear]   

 

                                        Yellow 

*NA*

                    (3/16/18 4:30 AM)                          



                                         UA Color   

 

                                        6.0 

                    (3/16/18 4:30 AM)                          



                                         UA pH [5.0-8.0]   

 

                                        1.014 

                    (3/16/18 4:30 AM)                          



                                         UA Spec Grav   



                                         [<=1.030]   

 

                                        Negative mg/dL 

*NA*

                    (3/16/18 4:30 AM)                          



                                         UA Glucose [Negative   



                                         mg/dL]   

 

                                        Negative 

                    (3/16/18 4:30 AM)                          



                                         UA Blood [Negative]   

 

                                        Negative mg/dL 

*NA*

                    (3/16/18 4:30 AM)                          



                                         UA Ketones [Negative   



                                         mg/dL]   

 

                                        30 mg/dL 

*ABN*

                    (3/16/18 4:30 AM)                          



                                         UA Protein [Negative   



                                         mg/dL]   

 

                                        2.0 mg/dL 

*HI*

                    (3/16/18 4:30 AM)                          



                                         UA Urobilinogen   



                                         [0.1-1.0 mg/dL]   

 

                                        Negative 

*NA*

                    (3/16/18 4:30 AM)                          



                                         UA Bili [Negative]   

 

                                        Negative 

                    (3/16/18 4:30 AM)                          



                                         UA Leuk Est   



                                         [Negative]   

 

                                        Negative 

                    (3/16/18 4:30 AM)                          



                                         UA Nitrite   



                                         [Negative]   

 

                                        <1 /HPF 

                    (3/16/18 4:30 AM)                          



                                         UA WBC [0-5 /HPF]   

 

                                        <1 /HPF 

                    (3/16/18 4:30 AM)                          



                                         UA RBC [0-2 /HPF]   

 

                                        Occasional /LPF 

*NA*

                    (3/16/18 4:30 AM)                          



                                         UA Sq Epi [Few /LPF]   

 

                                        Few /LPF 

*NA*

                    (3/16/18 4:30 AM)                          



                                         UA Mucus [None Seen   



                                         /LPF]   







HEMATOLOGY





                    1                   2                   3



                                         Most recent to   



                                         oldest [Reference   



                                         Range]:   

 

                                        8.5 K/CMM 

                    (3/16/18 3:19 AM)                          



                                         WBC [3.7-10.4 K/CMM]   

 

                                        4.29 M/CMM 

*LOW*

                    (3/16/18 3:19 AM)                          



                                         RBC [4.70-6.10   



                                         M/CMM]   

 

                                        13.6 g/dL 

*LOW*

                    (3/16/18 3: AM)                          



                                         Hgb [14.0-18.0 g/dL]   

 

                                        41.1 % 

*LOW*

                    (3/16/18 3: AM)                          



                                         Hct [42.0-54.0 %]   

 

                                        95.7 fL 

*HI*

                    (3/16/18 3: AM)                          



                                         MCV [80.0-94.0 fL]   

 

                                        31.7 pg 

*HI*

                    (3/16/18 3: AM)                          



                                         MCH [27.0-31.0 pg]   

 

                                        33.1 g/dL 

                    (3/16/18 3: AM)                          



                                         MCHC [32.0-36.0   



                                         g/dL]   

 

                                        14.1 % 

                    (3/16/18 3: AM)                          



                                         RDW [11.5-14.5 %]   

 

                                        8.4 fL 

                    (3/16/18 3: AM)                          



                                         MPV [7.4-10.4 fL]   

 

                                        229 K/CMM 

                    (3/16/18 3:19 AM)                          



                                         Platelet [133-450   



                                         K/CMM]   

 

                                        79.2 % 

*HI*

                    (3/16/18 3: AM)                          



                                         Segs [45.0-75.0 %]   

 

                                        11.3 % 

*LOW*

                    (3/16/18 3: AM)                          



                                         Lymphocytes   



                                         [20.0-40.0 %]   

 

                                        7.5 % 

                    (3/16/18 3: AM)                          



                                         Monocytes [2.0-12.0   



                                         %]   

 

                                        1.8 % 

                    (3/16/18 3:19 AM)                          



                                         Eosinophils [0.0-4.0   



                                         %]   

 

                                        0.2 % 

                    (3/16/18 3:19 AM)                          



                                         Basophils [0.0-1.0   



                                         %]   

 

                                        6.8 K/CMM 

                    (3/16/18 3:19 AM)                          



                                         Segs-Bands #   



                                         [1.5-8.1 K/CMM]   

 

                                        1.0 K/CMM 

                    (3/16/18 3:19 AM)                          



                                         Lymphocytes #   



                                         [1.0-5.5 K/CMM]   

 

                                        0.6 K/CMM 

                    (3/16/18 3:19 AM)                          



                                         Monocytes # [0.0-0.8   



                                         K/CMM]   

 

                                        0.2 K/CMM 

                    (3/16/18 3:19 AM)                          



                                         Eosinophils #   



                                         [0.0-0.5 K/CMM]   

 

                                        0.0 K/CMM 

                    (3/16/18 3:19 AM)                          



                                         Basophils # [0.0-0.2   



                                         K/CMM]   

 

                                        32.2 seconds 

*HI*

                    (3/16/18 3:19 AM)                          



                                         PT [12.0-14.7   



                                         seconds]   

 

                                        3.08 

*HI*

                    (3/16/18 3:19 AM)                          



                                         INR [0.85-1.17]   

 

                                        48.6 seconds 

*HI*

                    (3/16/18 3:19 AM)                          



                                         PTT [22.9-35.8   



                                         seconds]   







Immunizations





Given and Recorded





   



                 Vaccine         Date            Status          Refusal Reason

 

   



                     pneumococcal 23-valent vaccine     3/16/18             Given 

 

   



                     influenza virus vaccine, inactivated     3/16/18             Given 

 

   



                     influenza virus vaccine, inactivated     10/1/13             Given 









Not Given





   



                 Vaccine         Date            Status          Refusal Reason

 

   



                 pneumococcal 23-valent vaccine     17         Not Given       Patient Refuses







Procedures







    



              Procedure     Date         Related Diagnosis     Body Site     Status

 

    



                           ICD - Internal cardiac defibrillator        Completed



                                         procedure1    

 

    



                           torn ligament2            Completed







1placed 2.5years ago



2left knee ligament surgery



Social History







 



                           Social History Type       Response

 

 



                           Substance Abuse           Use: Past.  Type: Cocaine.  Recreational Drug Route: Inhaled.



 

 



                           Alcohol                   Never

 

 



                           Smoking Status            Former smoker; Type: Cigarettes; Previous treatment: None; Ready

 to change:



                                         No; Concerns about tobacco use in household: No; Exposure to Tobacco Smoke



                                         None; Cigarette Smoking Last 365 Days No; Reg Smoking Cessation Counseling



                                         No; Tobacco use per day: 0; Number of years: 2; Total pack years: 2;



                                         Started at age: 22.0; Stopped at age: 24;



                                         entered on: 3/27/18







Assessment and Plan





No data available for this section

## 2019-09-25 NOTE — XMS REPORT
Encounter CCD: 10/11/2013 to 10/13/2013

                             Created on: 2048



JOANNA CABRERA

External Reference #: [id number]

: 1965

Sex: Male



Demographics







                          Address                   [address] [place] [address]

[place]  [id number]

 

                          Home Phone                [phone]

 

                          Preferred Language        Unknown

 

                          Marital Status            Single

 

                          Pentecostal Affiliation     Jew

 

                          Race                      White/

 

                          Ethnic Group              Non-





Author







                          Author                    Auto Generated

 

                          Organization              [facility]

 

                          Address                   Unknown

 

                          Phone                     Unavailable







Care Team Providers







                    Care Team Member Name    Role                Phone

 

                    Jose Stinson    CP                  [phone]







Allergies, Adverse Reactions, Alerts







  



                     Substance           Reaction            Status

 

  



                           NKDA                      Active







Problem List







  



                     Condition           Effective Dates     Status

 

  



                     [D]Anterior chest wall pain     2012          Active

 

  



                           BP+ - Hypertension        Resolved

 

  



                           CHF - Congestive heart failure      Active

 

  



                           CHF - Congestive heart failure      Resolved

 

  



                           Depression                Resolved

 

  



                           dm                        Active

 

  



                           Down syndrome             Active

 

  



                           GERD - Gastro-esophageal reflux disease      Resolved

 

  



                           H/O: schizophrenia        Active

 

  



                           HTN                       Active

 

  



                           ICD (implantable cardiac defibrillator) battery depletion      Active

 

  



                           NIDDM                     Resolved

 

  



                           Obese build               Resolved

 

  



                           Sleep apnea               Active







Medications







    



              Medication     Instructions     Start Date     End Date     Status

 

    



                 metoprolol 5 mg/5 ml     5 mg, 5 mL, Route: IVP, Drug form:     10/11/2013      10/11/2013

                                         Completed



                           INJ                       INJ, ONCE, Dosing Weight 129.545,   



                                         kg, Priority: STAT, Start date:   



                                         10/11/13 16:31:00, Stop date:   



                                         10/11/13 16:31:00(Same as:   



                                         Lopressor)   

 

    



              ondansetron     4 mg, 2 mL, Route: IVP, Drug form:     10/11/2013     10/13/2013     Discontinued





                                         INJ, Q8H, Dosing Weight 129.545,   



                                         kg, PRN Nausea & Vomiting, Start   



                                         date: 10/11/13 17:32:00, Duration:   



                                         30 day, Stop date: 11/10/13   



                                         17:31:00(Same as: Zofran)   

 

    



              docusate     100 mg, 1 cap, Route: PO, Drug     10/11/2013     10/13/2013     Discontinued





                                         form: CAP, BID, Dosing Weight   



                                         129.545, kg, PRN Constipation,   



                                         Start date: 10/11/13 17:32:00,   



                                         Duration: 30 day, Stop date:   



                                         11/10/13 17:31:00(Same as: Colace)   



                                         (Do Not Crush)   

 

    



              acetaminophen     650 mg, 20.3 mL, Route: PO, Drug     10/11/2013     10/13/2013     Discontinued





                                         form: LIQ, Q4H, Dosing Weight   



                                         129.545, kg, PRN Pain 1-3/Temp >   



                                         100.4 F, Start date: 10/11/13   



                                         17:32:00, Duration: 30 day, Stop   



                                         date: 11/10/13 17:31:00Max   



                                         acetaminophen=4000mg/day (4   



                                         gm/day).  (Same as: Tylenol)   

 

    



              enalapril     1.25 mg, 1 mL, Route: IVP, Drug     10/11/2013     10/11/2013     Completed





                                         form: INJ, ONCE, Dosing Weight   



                                         129.545, kg, Priority: STAT, Start   



                                         date: 10/11/13 16:30:00, Stop date:   



                                         10/11/13 16:30:00(Same as:   



                                         Vasotec-IV)   

 

    



                 Remeron 15 mg oral     15 mg, 1 tab, PO, Bedtime, 30 tab,     10/11/2013      10/13/2013

                                         Discontinued



                           tablet                    Substitution Allowed, TAB   

 

    



              cyanocobalamin     5 microgram, Route: PO, Drug form:     10/12/2013     10/11/2013    

 Deleted



                                         TAB, Daily, Dosing Weight 108.004,   



                                         kg, Start date: 10/12/13 9:00:00,   



                                         Duration: 30 day, Stop date:   



                                         11/10/13 9:00:00   

 

    



              clonazepam     2 mg, 2 tab, Route: PO, Drug form:     10/11/2013     10/13/2013     Discontinued





                                         TAB, Bedtime, Dosing Weight   



                                         108.004, kg, Start date: 10/11/13   



                                         21:00:00, Duration: 30 day, Stop   



                                         date: 13 21:00:00(Same As:   



                                         Klonopin)   

 

    



              lisinopril     40 mg, 2 tab, Route: PO, Drug form:     10/12/2013     10/13/2013     Discontinued





                                         TAB, Daily, Dosing Weight 108.004,   



                                         kg, Start date: 10/12/13 9:00:00,   



                                         Duration: 30 day, Stop date:   



                                         11/10/13 9:00:00(Same as: Prinivil,   



                                         Zestril)   

 

    



              Geodon       20 mg, 1 cap, Route: PO, Drug form:     10/11/2013     10/13/2013     Discontinued





                                         CAP, Bedtime, Dosing Weight   



                                         108.004, kg, Start date: 10/11/13   



                                         21:00:00, Duration: 30 day, Stop   



                                         date: 13 21:00:00(Same As:   



                                         Geodon)Give with Food   

 

    



              Remeron      15 mg, 1 tab, Route: PO, Drug form:     10/11/2013     10/13/2013     Discontinued





                                         TAB, Bedtime, Dosing Weight   



                                         108.004, kg, Start date: 10/11/13   



                                         21:00:00, Duration: 30 day, Stop   



                                         date: 13 21:00:00(Same   



                                         as:Remeron)   

 

    



              Geodon 20 mg oral     20 mg, 1 cap, PO, Bedtime, with     10/11/2013     10/13/2013    

 Discontinued



                           capsule                   food, 180 cap, Substitution   



                                         Allowed, CAP   



                                         with food   

 

    



              Vitamin B12     1,000 microgram, 1 tab, Route: PO,     10/12/2013     10/13/2013     Discontinued





                                         Drug form: TAB, Daily, Start date:   



                                         10/12/13 9:00:00, Duration: 30 day,   



                                         Stop date: 11/10/13 9:00:00(Same   



                                         As: Vitamin B-12)   

 

    



                 insulin regular 100     5 unit, 0.05 mL, Route: SUB-Q, Drug     10/12/2013      10/13/2013

                                         Discontinued



                           units/mL human            form: SOLN, TID-Before Meals, PRN   



                           recombinant               Blood Glucose Results, Start date:   



                                         10/12/13 20:10:00, Duration: 30   



                                         day, Stop date: 13   



                                         20:09:00(Same as: Humulin R) Roll   



                                         in palms of hands gently;  Do not   



                                         shake vigorously. "single patient   



                                         use only"(Restricted to patients   



                                         requiring a dose >  60 units)   



                                         Stable for 28 days at room   



                                         temperatureExpires in ______ days   



                                         from ______________Date   

 

    



                 insulin regular 100     4 unit, 0.04 mL, Route: SUB-Q, Drug     10/12/2013      10/13/2013

                                         Discontinued



                           units/mL human            form: SOLN, TID-Before Meals, PRN   



                           recombinant               Blood Glucose Results, Start date:   



                                         10/12/13 20:10:00, Duration: 30   



                                         day, Stop date: 13   



                                         20:09:00(Same as: Humulin R) Roll   



                                         in palms of hands gently;  Do not   



                                         shake vigorously. "single patient   



                                         use only"(Restricted to patients   



                                         requiring a dose >  60 units)   



                                         Stable for 28 days at room   



                                         temperatureExpires in ______ days   



                                         from ______________Date   

 

    



                 insulin regular 100     3 unit, 0.03 mL, Route: SUB-Q, Drug     10/12/2013      10/13/2013

                                         Discontinued



                           units/mL human            form: SOLN, TID-Before Meals, PRN   



                           recombinant               Blood Glucose Results, Start date:   



                                         10/12/13 20:10:00, Duration: 30   



                                         day, Stop date: 13   



                                         20:09:00(Same as: Humulin R) Roll   



                                         in palms of hands gently;  Do not   



                                         shake vigorously. "single patient   



                                         use only"(Restricted to patients   



                                         requiring a dose >  60 units)   



                                         Stable for 28 days at room   



                                         temperatureExpires in ______ days   



                                         from ______________Date   

 

    



              furosemide     40 mg, 4 mL, Route: IVP, Drug form:     10/11/2013     10/13/2013     Discontinued





                                         INJ, Q8H, Dosing Weight 129.545,   



                                         kg, Priority: Routine, Start date:   



                                         10/11/13 23:00:00, Duration: 30   



                                         day, Stop date: 11/10/13   



                                         15:00:00(Same as: Lasix)   

 

    



                 insulin regular 100     2 unit, 0.02 mL, Route: SUB-Q, Drug     10/12/2013      10/13/2013

                                         Discontinued



                           units/mL human            form: SOLN, TID-Before Meals, PRN   



                           recombinant               Blood Glucose Results, Start date:   



                                         10/12/13 20:10:00, Duration: 30   



                                         day, Stop date: 13   



                                         20:09:00(Same as: Humulin R) Roll   



                                         in palms of hands gently;  Do not   



                                         shake vigorously. "single patient   



                                         use only"(Restricted to patients   



                                         requiring a dose >  60 units)   



                                         Stable for 28 days at room   



                                         temperatureExpires in ______ days   



                                         from ______________Date   

 

    



                 insulin regular 100     1 unit, 0.01 mL, Route: SUB-Q, Drug     10/12/2013      10/13/2013

                                         Discontinued



                           units/mL human            form: SOLN, TID-Before Meals, PRN   



                           recombinant               Blood Glucose Results, Start date:   



                                         10/12/13 20:10:00, Duration: 30   



                                         day, Stop date: 13   



                                         20:09:00(Same as: Humulin R) Roll   



                                         in palms of hands gently;  Do not   



                                         shake vigorously. "single patient   



                                         use only"(Restricted to patients   



                                         requiring a dose >  60 units)   



                                         Stable for 28 days at room   



                                         temperatureExpires in ______ days   



                                         from ______________Date   

 

    



                 furosemide 40 mg     40 mg, 1 tab, PO, Daily, 30 tab,     10/13/2013      Ordered



                           oral tablet               Substitution Allowed, TAB   

 

    



              aspirin      81 mg, 1 tab, Route: PO, Drug form:     10/12/2013     10/13/2013     Discontinued





                                         ECTAB, Daily, Dosing Weight   



                                         108.004, kg, Start date: 10/12/13   



                                         9:00:00, Duration: 30 day, Stop   



                                         date: 11/10/13 9:00:00Do not crush   



                                         or chew.(Same As: Ecotrin)   

 

    



              furosemide     40 mg, 4 mL, Route: IVP, Drug form:     10/11/2013     10/11/2013     Completed





                                         INJ, ONCE, Dosing Weight 129.545,   



                                         kg, Priority: STAT, Start date:   



                                         10/11/13 14:35:00, Stop date:   



                                         10/11/13 14:35:00(Same as: Lasix)   

 

    



              simethicone     40 mg, 0.5 tab, Route: PO, Drug     10/13/2013     10/13/2013     Discontinued





                                         form: CHEWTAB, Q6H, Dosing Weight   



                                         108.004, kg, PRN Gas, Start date:   



                                         10/13/13 13:23:00, Duration: 30   



                                         day, Stop date: 13   



                                         13:22:00(Same as: Mylicon)   

 

    



              hydromorphone     1 mg, 1 mL, Route: IVP, Drug form:     10/11/2013     10/11/2013     

Completed



                                         INJ, ONCE, Dosing Weight 129.545,   



                                         kg, Priority: STAT, Start date:   



                                         10/11/13 13:00:00, Stop date:   



                                         10/11/13 13:00:00Same as: Dilaudid   

 

    



                 influenza virus     0.5 ml, Route: IM, Drug Form: SUSP,     10/01/2013      10/01/2013

                                         Completed



                           vaccine, inactivated      Start date: 10/01/13 9:00:00, Stop   



                                         date: 10/01/13 9:00:00   

 

    



                 carvedilol 3.125 mg     3.125 mg, 1 tab, PO, Q12H, 60 tab,     10/13/2013      Ordered





                           oral tablet               Substitution Allowed, TAB   

 

    



                 Aspirin Low Dose 81     81 mg, 1 tab, PO, Daily, 30 tab,     10/13/2013      Ordered



                           mg oral tablet            Substitution Allowed, TAB   

 

    



                 Remeron 15 mg oral     15 mg, 1 tab, PO, Bedtime, 14 tab,     10/13/2013      Ordered





                           tablet                    Substitution Allowed, TAB   

 

    



              Dextrose 50% in     25 mL, Route: IVP, Start date:     10/12/2013     10/13/2013     Discontinued





                           Water IV                  10/12/13 20:10:00, Duration: 30   



                                         day, Stop date: 13 19:09:00,   



                                         PRN Blood Glucose Results   

 

    



              Dextrose 50% in     50 mL, Route: IVP, Start date:     10/12/2013     10/13/2013     Discontinued





                           Water IV                  10/12/13 20:09:00, Duration: 30   



                                         day, Stop date: 13 19:08:00,   



                                         PRN Blood Glucose Results   

 

    



                 Geodon 20 mg oral     20 mg, 1 cap, PO, Bedtime, with     10/13/2013      Ordered



                           capsule                   food, 14 cap, Substitution Allowed,   



                                         CAP   



                                         with food   

 

    



                 lisinopril 40 mg     40 mg, 1 tab, PO, Daily, 14 tab,     10/13/2013      Ordered



                           oral tablet               Substitution Allowed, TAB   

 

    



                 clonazepam 2 mg oral     2 mg, 1 tab, PO, Bedtime, 14 tab,     10/13/2013      Ordered





                           tablet                    Substitution Allowed, TAB   

 

    



                 Saline Flush 0.9%     5 mL, Route: IVP, Drug Form: INJ,     10/11/2013      10/13/2013

                                         Discontinued



                                         Dosing Weight 129.545, kg, Q8H, PRN   



                                         Line Flush, Start date: 10/11/13   



                                         13:00:00, Duration: 30 day, Stop   



                                         date: 11/10/13 12:59:00, Administer   



                                         at least once every 8 hours   



                                         Administer at least once every 8   



                                         hours(Same as: BD Posiflush)   

 

    



              hydrALAZINE     20 mg, 1 mL, Route: IV, Drug form:     10/11/2013     10/13/2013     Discontinued





                                         INJ, Q4H, PRN Elevated BP, Start   



                                         date: 10/11/13 19:42:00, Duration:   



                                         30 day, Stop date: 11/10/13   



                                         19:41:00(Same as: Apresoline)   

 

    



              Coreg        3.125 mg, 1 tab, Route: PO, Drug     10/13/2013     10/13/2013     Canceled



                                         form: TAB, Q12H, Dosing Weight   



                                         108.004, kg, Start date: 10/13/13   



                                         21:00:00, Duration: 30 day, Stop   



                                         date: 13 20:59:00Give with   



                                         food. (Same As: Coreg)   

 

    



                 BD Normal Saline     10 mL, Route: IV, Drug Form: INJ,     10/12/2013      10/13/2013 

                                         Discontinued



                           Flush                     Q8H, Start date: 10/12/13 0:00:00,   



                                         Duration: 30 day, Stop date:   



                                         11/10/13 16:00:00(Same as: BD   



                                         Posiflush)   

 

    



                 cyanocobalamin     5 microgram, PO, Daily,     10/11/2013      Ordered



                                         Substitution Allowed, TAB   







Immunizations







  



                     Vaccine             Date                Status

 

  



                     influenza virus vaccine, inactivated     10/01/2013          Auth (Verified)







Vital Signs







                Most recent to oldest [Reference Range]:    1               2               3

 

                          Height                    172.72 cm 

                          (10/11/2013 17:51:00)      172.72 cm 

                          (10/11/2013 12:38:00)       

 

                          Current Weight            97.005 kg 

                          (10/13/2013 00:00:00)      106.364 kg 

                          (10/12/2013 05:00:00)       

 

                          Temperature Oral [96.4-99.1 DegF]    98.0 DegF 

                          (10/13/2013 12:00:00)      98.1 DegF 

                          (10/13/2013 07:51:00)      97.9 DegF 

                                        (10/13/2013 04:00:00)  

 

                          Systolic Blood Pressure [ mmHg]    125 mmHg 

                          (10/13/2013 12:00:00)      125 mmHg 

                          (10/13/2013 07:51:00)      110 mmHg 

                                        (10/13/2013 04:00:00)  

 

                          Diastolic Blood Pressure [60-90 mmHg]    89 mmHg 

                          (10/13/2013 12:00:00)      91 mmHg 

*HI*

                          (10/13/2013 07:51:00)      81 mmHg 

                                        (10/13/2013 04:00:00)  

 

                          Respiratory Rate [14-20 BRMIN]    18 BRMIN 

                          (10/13/2013 12:00:00)      18 BRMIN 

                          (10/13/2013 07:51:00)      18 BRMIN 

                                        (10/13/2013 04:00:00)  

 

                          Peripheral Pulse Rate [ bpm]    68 bpm 

                          (10/13/2013 12:00:00)      81 bpm 

                          (10/13/2013 07:51:00)      70 bpm 

                                        (10/13/2013 04:00:00)  

 

                          Weight                    108.004 kg 

                          (10/11/2013 17:51:00)      129.545 kg 

                          (10/11/2013 12:38:00)       







Results





INFECTIOUS DISEASES





                Most recent to oldest [Reference Range]:    1               2               3

 

                          C difficile DNA [Negative]    Negative 1

                    (10/13/2013 07:00:00)                           







1Interpretive Data: [employer] Clostridium difficile assay utilizes 
loop-mediated isothermal DNA amplification (LAMP) technology to detect a 204 bp 
region of the tcdA gene within the [employer] gene segment present in all known 
toxigenic C. difficile strains.



The assay utilizes FDA cleared IVD reagents. Performance characteristics have be
en verified by the [facility] within the [facility]. The [facility] is authorized under the Clinical Labo
ratory Improvement Amendment of 1988 (CLIA-88) to perform high complexity testin
g.



BEDSIDE GLUCOSE TESTING





                Most recent to  [Reference Range]:    1               2               3

 

                          Glucose POC [70-99 mg/dL]    132 mg/dL 2

*HI*

                          (10/13/2013 11:46:00)      93 mg/dL 3

                          (10/13/2013 06:24:00)      142 mg/dL 4

*HI*

                                        (10/12/2013 21:25:00)  

 

                          Gluc POC Comment 1        Notify RN/MD 

*NA*

                          (10/13/2013 11:46:00)      Notify RN/MD 

*NA*

                          (10/13/2013 06:24:00)      Notify RN/MD 

*NA*

                                        (10/12/2013 21:25:00)  







2Interpretive Data:  Upper Reportable Limit: 200 mg/dL.



3Interpretive Data:  Upper Reportable Limit: 200 mg/dL.



4Interpretive Data:  Upper Reportable Limit: 200 mg/dL.



CHEMISTRY





                Most recent to oldest [Reference Range]:    1               2               3

 

                          Sodium Lvl [135-145 mEq/L]    141 mEq/L 

                          (10/12/2013 05:29:00)      140 mEq/L 

                          (10/11/2013 13:05:00)       

 

                          Potassium Lvl [3.5-5.1 mEq/L]    3.6 mEq/L 

                          (10/12/2013 05:29:00)      4.2 mEq/L 

                          (10/11/2013 13:05:00)       

 

                          Chloride Lvl [ mEq/L]    100 mEq/L 

                          (10/12/2013 05:29:00)      104 mEq/L 

                          (10/11/2013 13:05:00)       

 

                          CO2 [24-32 mEq/L]         29 mEq/L 

                          (10/12/2013 05:29:00)      24 mEq/L 

                          (10/11/2013 13:05:00)       

 

                          AGAP [10.0-20.0 mEq/L]    15.6 mEq/L 

                          (10/12/2013 05:29:00)      16.2 mEq/L 

                          (10/11/2013 13:05:00)       

 

                          Creatinine Lvl [0.5-1.4 mg/dL]    1.2 mg/dL 

                          (10/12/2013 05:29:00)      1.3 mg/dL 

                          (10/11/2013 13:05:00)       

 

                          eGFR                      71 mL/min/1.73m2 5

*NA*

                          (10/12/2013 05:29:00)      65 mL/min/1.73m2 6

*NA*

                          (10/11/2013 13:05:00)       

 

                          BUN [7-22 mg/dL]          17 mg/dL 

                          (10/12/2013 05:29:00)      20 mg/dL 

                          (10/11/2013 13:05:00)       

 

                          B/C Ratio [6-25]          15 

                    (10/11/2013 13:05:00)                           

 

                          Glucose Lvl [70-99 mg/dL]    95 mg/dL 7

                          (10/12/2013 05:29:00)      115 mg/dL 8

*HI*

                          (10/11/2013 13:05:00)       

 

                          Total Protein [6.4-8.4 g/dL]    6.9 g/dL 

                    (10/11/2013 13:05:00)                           

 

                          Albumin Lvl [3.5-5.0 g/dL]    3.8 g/dL 

                    (10/11/2013 13:05:00)                           

 

                          Globulin [2.0-4.0 g/dL]    3.1 g/dL 

                    (10/11/2013 13:05:00)                           

 

                          A/G Ratio [0.7-1.6]       1.2 

                    (10/11/2013 13:05:00)                           

 

                          Calcium Lvl [8.5-10.5 mg/dL]    8.0 mg/dL 

*LOW*

                          (10/12/2013 05:29:00)      8.4 mg/dL 

*LOW*

                          (10/11/2013 13:05:00)       

 

                          Magnesium Lvl [1.8-2.4 mg/dL]    2.0 mg/dL 

                    (10/11/2013 13:05:00)                           

 

                          ALT [0-65 unit/L]         136 unit/L 

*HI*

                    (10/11/2013 13:05:00)                           

 

                          AST [0-37 unit/L]         153 unit/L 

*HI*

                    (10/11/2013 13::00)                           

 

                          Alk Phos [ unit/L]    126 unit/L 

                    (10/11/2013 13::00)                           

 

                          Bili Total [0.2-1.3 mg/dL]    1.7 mg/dL 

*HI*

                    (10/11/2013 13:05:00)                           

 

                          Total CK [ unit/L]    293 unit/L 

*HI*

                    (10/11/2013 13:05:00)                           

 

                          CK MB [0.5-3.6 ng/mL]     1.8 ng/mL 

                    (10/11/2013 13:05:00)                           

 

                          CK MB Index [0.0-2.5]     0.6 

                    (10/11/2013 13::00)                           

 

                          Troponin-I [0.00-0.40 ng/mL]    0.03 ng/mL 

                    (10/11/2013 13:05:00)                           

 

                          BNP [<=100 pg/mL]         2290 pg/mL 9

*HI*

                    (10/11/2013 13:05:00)                           

 

                          Digoxin Lvl [0.8-2.0 ng/mL]    <0.1 ng/mL 

*LOW*

                    (10/11/2013 13:05:00)                           







5Result Comment: The eGFR is calculated using the CKD-EPI formula. In most 
young, healthy individuals the eGFR will be >90 mL/min/1.73m2. The eGFR declines
with age. An eGFR of 60-89 may be normal in some populations, particularly the 
elderly, for whom the CKD-EPI formula has not been extensively validated. Use of
the eGFR is not recommended in the following populations:



Individuals with unstable creatinine concentrations, including pregnant patients
and those with serious co-morbid conditions.



Patients with extremes in muscle mass or diet. 



The data above are obtained from the National Kidney Disease Education Program (
NKDEP) which additionally recommends that when the eGFR is used in patients with
extremes of body mass index for purposes of drug dosing, the eGFR should be mul
tiplied by the estimated BMI.



6Result Comment: The eGFR is calculated using the CKD-EPI formula. In most 
young, healthy individuals the eGFR will be >90 mL/min/1.73m2. The eGFR declines
with age. An eGFR of 60-89 may be normal in some populations, particularly the 
elderly, for whom the CKD-EPI formula has not been extensively validated. Use of
the eGFR is not recommended in the following populations:



Individuals with unstable creatinine concentrations, including pregnant patients
and those with serious co-morbid conditions.



Patients with extremes in muscle mass or diet. 



The data above are obtained from the National Kidney Disease Education Program (
NKDEP) which additionally recommends that when the eGFR is used in patients with
extremes of body mass index for purposes of drug dosing, the eGFR should be mul
tiplied by the estimated BMI.



7Interpretive Data: Adult reference range values reflect the clinical guidelines

of the American Diabetes Association.



8Interpretive Data: Adult reference range values reflect the clinical guidelines

of the American Diabetes Association.



9Interpretive Data: Elevated results are in line with increasing severity of 

congestive heart failure. Minor elevations between 100 and 300 

may be seen with Myocardial Ischemia, Sodium retaining drugs, 

and compensated/treated heart failure.



HEMATOLOGY





                Most recent to oldest [Reference Range]:    1               2               3

 

                          WBC [3.7-10.4 K/CMM]      7.9 K/CMM 

                          (10/12/2013 05:29:00)      8.8 K/CMM 

                          (10/11/2013 13:05:00)       

 

                          RBC [4.70-6.10 M/CMM]     4.61 M/CMM 

*LOW*

                          (10/12/2013 05:29:00)      5.04 M/CMM 

                          (10/11/2013 13:05:00)       

 

                          Hgb [14.0-18.0 g/dL]      13.8 g/dL 

*LOW*

                          (10/12/2013 05:29:00)      14.7 g/dL 

                          (10/11/2013 13:05:00)       

 

                          Hct [42.0-54.0 %]         41.8 % 

*LOW*

                          (10/12/2013 05:29:00)      45.5 % 

                          (10/11/2013 13:05:00)       

 

                          MCV [80.0-94.0 fL]        90.6 fL 

                          (10/12/2013 05:29:00)      90.3 fL 

                          (10/11/2013 13:05:00)       

 

                          MCH [27.0-31.0 pg]        30.0 pg 

                          (10/12/2013 05:29:00)      29.1 pg 

                          (10/11/2013 13:05:00)       

 

                          MCHC [32.0-36.0 g/dL]     33.1 g/dL 

                          (10/12/2013 05:29:00)      32.2 g/dL 

                          (10/11/2013 13:05:00)       

 

                          RDW [11.5-14.5 %]         15.4 % 

*HI*

                          (10/12/2013 05:29:00)      15.2 % 

*HI*

                          (10/11/2013 13:05:00)       

 

                          Platelet [133-450 K/CMM]    241 K/CMM 

                          (10/12/2013 05:29:00)      266 K/CMM 

                          (10/11/2013 13:05:00)       

 

                          MPV [7.4-10.4 fL]         8.8 fL 

                          (10/12/2013 05:29:00)      8.7 fL 

                          (10/11/2013 13:05:00)       

 

                          Segs [45.0-75.0 %]        73.8 % 

                          (10/12/2013 05:29:00)      81.9 % 

*HI*

                          (10/11/2013 13:05:00)       

 

                          Lymphocytes [20.0-40.0 %]    13.6 % 

*LOW*

                          (10/12/2013 05:29:00)      9.1 % 

*LOW*

                          (10/11/2013 13:05:00)       

 

                          Monocytes [2.0-12.0 %]    12.1 % 

*HI*

                          (10/12/2013 05:29:00)      8.8 % 

                          (10/11/2013 13:05:00)       

 

                          Eosinophils [0.0-4.0 %]    0.1 % 

                          (10/12/2013 05:29:00)      0.1 % 

                          (10/11/2013 13:05:00)       

 

                          Basophils [0.0-1.0 %]     0.4 % 

                          (10/12/2013 05:29:00)      0.1 % 

                          (10/11/2013 13:05:00)       

 

                          Segs-Bands # [1.5-8.1 K/CMM]    5.8 K/CMM 

                          (10/12/2013 05:29:00)      7.2 K/CMM 

                          (10/11/2013 13:05:00)       

 

                          Lymphocytes # [1.0-5.5 K/CMM]    1.1 K/CMM 

                          (10/12/2013 05:29:00)      0.8 K/CMM 

*LOW*

                          (10/11/2013 13:05:00)       

 

                          Monocytes # [0.0-0.8 K/CMM]    1.0 K/CMM 

*HI*

                          (10/12/2013 05:29:00)      0.8 K/CMM 

                          (10/11/2013 13:05:00)       

 

                          Eosinophils # [0.0-0.5 K/CMM]    0.0 K/CMM 

                          (10/12/2013 05:29:00)      0.0 K/CMM 

                          (10/11/2013 13:05:00)       

 

                          Basophils # [0.0-0.2 K/CMM]    0.0 K/CMM 

                    (10/12/2013 05:29:00)                           







Procedures







  



                     Procedures          Date                Related Diagnosis

 

  



                           Emergency department visit for the evaluation and management     10/11/2013 00:00:00

 



                                         of a patient, which requires these 3 key components within  



                                         the constraints imposed by the urgency of the patient's  



                                         clinical condition and/or mental status: A comprehensive  



                                         history; A comprehensi

## 2019-09-25 NOTE — NUR
Patient placed himself on the floor, reported he put himself on the floor because his 
abdominal pain was unrelieved with the Tylenol.

## 2019-09-25 NOTE — XMS REPORT
Encounter CCD: 2014 to 2014

                             Created on: 2121



JOANNA CABERRA

External Reference #: [id number]

: 1965

Sex: Male



Demographics







                          Address                   [address] [place] [address]

[place]  [id number]

 

                          Home Phone                [phone]

 

                          Preferred Language        Unknown

 

                          Marital Status            Single

 

                          Sabianism Affiliation     Episcopal

 

                          Race                      White/

 

                          Ethnic Group              Non-





Author







                          Author                    Auto Generated

 

                          Organization              [facility]

 

                          Address                   Unknown

 

                          Phone                     Unavailable







Care Team Providers







                    Care Team Member Name    Role                Phone

 

                    Martin Davenport    CP                  [phone]







Allergies, Adverse Reactions, Alerts







  



                     Substance           Reaction            Status

 

  



                           NKDA                      Active







Problem List







  



                     Condition           Effective Dates     Status

 

  



                     [D]Anterior chest wall pain     2012          Active

 

  



                           Anxiety                   Resolved

 

  



                           BP+ - Hypertension        Active

 

  



                           Cardiomyopathy            Resolved

 

  



                           CHF - Congestive heart failure      Active

 

  



                           CHF - Congestive heart failure      Active

 

  



                           Depression                Active

 

  



                           dm                        Active

 

  



                           Down syndrome             Active

 

  



                           GERD - Gastro-esophageal reflux disease      Active

 

  



                           H/O: schizophrenia        Active

 

  



                           HTN                       Active

 

  



                           ICD (implantable cardiac defibrillator) battery depletion      Active

 

  



                           NIDDM                     Active

 

  



                           Obese build               Active

 

  



                           Short of breath on exertion      Active

 

  



                           Sleep apnea               Active







Medications







    



              Medication     Instructions     Start Date     End Date     Status

 

    



                 ranitidine 150 mg     150 mg=1 tab, PO, BID, # 60 tab, 0     2014      Ordered



                           oral tablet               Refill(s)   

 

    



                 Ultram 50 mg oral     50 mg=1 tab, PO, Q4H, pain, # 20     2014 

                                         Discontinued



                           tablet                    tab, 0 Refill(s)   

 

    



                 Zofran ODT 4 mg oral     4 mg=1 tab, PO, TID, as needed for     2014      Ordered





                           tablet,                   nausea/vomiting, Dissolve tab under   



                           disintegrating            tongue, # 10 tab, 0 Refill(s)   



                                         Dissolve tab under tongue   

 

    



                 Sodium Chloride 0.9%     250 mL, Rate: 1,000 ml/hr, Infuse     2014

                                         Completed



                           (Bolus)  mL         over: 15 minutes, Route: IV, Dosing   



                                         Weight 111.364 kg, Total Volume:   



                                         250, Priority: STAT, Start date:   



                                         14 9:23:00, Duration: 1 doses   



                                         or times, Stop date: 14   



                                         9:37:00   

 

    



                 Saline Flush 0.9%     5 mL, Route: IVP, Drug Form: INJ,     2014

                                         Discontinued



                                         Dosing Weight 111.364, kg, PRN, PRN   



                                         Line Flush, Start date: 14   



                                         9:23:00, Duration: 24 hr, Stop   



                                         date: 14 9:22:00(Same as: BD   



                                         Posiflush)   

 

    



              pantoprazole     40 mg, Route: IVP, Drug form: INJ,     2014     Completed





                                         ONCE, Dosing Weight 111.364, kg,   



                                         For IV push reconstitute with 10 ml   



                                         0.9% sodium chloride and push over   



                                         at least 3 minutes, Priority: STAT,   



                                         Start date: 14 9:23:00, Stop   



                                         date: 14 9:23:00For IV push   



                                         reconstitute with 10 ml 0.9% sodium   



                                         chloride and push over 2 minutes.   



                                         (Same as: Protonix)   

 

    



              ondansetron     4 mg, 2 mL, Route: IVP, Drug form:     2014     Completed





                                         INJ, ONCE, Dosing Weight 111.364,   



                                         kg, Priority: STAT, Start date:   



                                         14 9:23:00, Stop date:   



                                         14 9:23:00(Same as: Zofran)   

 

    



                 morphine Sulfate     2 mg, 1 mL, Route: IVP, Drug form:     2014

                                         Completed



                                         INJ, ONCE, Dosing Weight 111.364,   



                                         kg, Priority: STAT, Start date:   



                                         14 10:45:00, Stop date:   



                                         14 10:45:00(Same as:MORPhine   



                                         Sulfate)   

 

    



                 influenza virus     0.5 ml, Route: IM, Drug Form: SUSP,     10/01/2013      10/01/2013

                                         Completed



                           vaccine, inactivated      Start date: 10/01/13 9:00:00, Stop   



                                         date: 10/01/13 9:00:00   







Immunizations







  



                     Vaccine             Date                Status

 

  



                     influenza virus vaccine, inactivated     10/01/2013          Auth (Verified)







Vital Signs







                Most recent to oldest [Reference Range]:    1               2               3

 

                          Height                    170.18 cm 

                    (2014 09:05:00)                           

 

                          Temperature Oral [96.4-99.1 DegF]    98.2 DegF 

                          (2014 15:07:00)      97.9 DegF 

                          (2014 14:43:00)      98.9 DegF 

                                        (2014 09:05:00)  

 

                          Systolic Blood Pressure [ mmHg]    156 mmHg 

*HI*

                          (2014 15:07:00)      127 mmHg 

                          (2014 14:43:00)      125 mmHg 

                                        (2014 13:21:00)  

 

                          Diastolic Blood Pressure [60-90 mmHg]    96 mmHg 

*HI*

                          (2014 15:07:00)      94 mmHg 

*HI*

                          (2014 14:43:00)      98 mmHg 

*HI*

                                        (2014 13:21:00)  

 

                          Respiratory Rate [14-20 BRMIN]    20 BRMIN 

                          (2014 15:07:00)      20 BRMIN 

                          (2014 14:43:00)      18 BRMIN 

                                        (2014 13:21:00)  

 

                          Peripheral Pulse Rate [ bpm]    97 bpm 

                          (2014 15:07:00)      88 bpm 

                          (2014 14:43:00)      75 bpm 

                                        (2014 13:21:00)  

 

                          Weight                    111.364 kg 

                    (2014 09:05:00)                           







Results





URINALYSIS





                          Most recent to oldest [Reference Range]:    1

 

                          UA Turbidity [Clear]      Clear 

                                        (2014 10:35:18)  

 

                          UA Color [Yellow]         Yellow 

*NA*

                                        (2014 10:35:18)  

 

                          UA pH [5.0-8.0]           6.0 

                                        (2014 10:35:18)  

 

                          UA Spec Grav [<=1.030]    1.020 

                                        (2014 10:35:18)  

 

                          UA Glucose [Negative]     Negative 

                                        (2014 10:35:18)  

 

                          UA Blood [Negative]       Negative 

                                        (2014 10:35:18)  

 

                          UA Ketones [Negative]     Negative 

*NA*

                                        (2014 10:35:18)  

 

                          UA Protein [Negative mg/dL]    30 mg/dL 

*ABN*

                                        (2014 10:35:18)  

 

                          UA Urobilinogen [0.1-1.0 EU/dL]    0.2 EU/dL 

                                        (2014 10:35:18)  

 

                          UA Bili [Negative]        Negative 

*NA*

                                        (2014 10:35:18)  

 

                          UA Leuk Est [Negative]    Negative 

                                        (2014 10:35:18)  

 

                          UA Nitrite [Negative]     Negative 

                                        (2014 10:35:18)  

 

                          UA Sq Epi [Few /LPF]      Rare /LPF 

                                        (2014 10:35:18)  

 

                          UA Hyal Cast [0-2]        0-2 

                                        (2014 10:35:18)  







CHEMISTRY





                          Most recent to oldest [Reference Range]:    1

 

                          Sodium Lvl [135-145 mEq/L]    136 mEq/L 

                                        (2014 09:30:00)  

 

                          Potassium Lvl [3.5-5.1 mEq/L]    5.5 mEq/L 

*HI*

                                        (2014 09:30:00)  

 

                          Chloride Lvl [ mEq/L]    100 mEq/L 

                                        (2014 09:30:00)  

 

                          CO2 [24-32 mEq/L]         28 mEq/L 

                                        (2014 09:30:00)  

 

                          AGAP [10.0-20.0 mEq/L]    13.5 mEq/L 

                                        (2014 09:30:00)  

 

                          Creatinine Lvl [0.5-1.4 mg/dL]    0.7 mg/dL 

                                        (2014 09:30:00)  

 

                          eGFR                      112 mL/min/1.73m2 1

*NA*

                                        (2014 09:30:00)  

 

                          BUN [7-22 mg/dL]          15 mg/dL 

                                        (2014 09:30:00)  

 

                          B/C Ratio [6-25]          21 

                                        (2014 09:30:00)  

 

                          Glucose Lvl [70-99 mg/dL]    214 mg/dL 2

*HI*

                                        (2014 09:30:00)  

 

                          Total Protein [6.4-8.4 g/dL]    7.2 g/dL 

                                        (2014 09:30:00)  

 

                          Albumin Lvl [3.5-5.0 g/dL]    3.7 g/dL 

                                        (2014 09:30:00)  

 

                          Globulin [2.0-4.0 g/dL]    3.5 g/dL 

                                        (2014 09:30:00)  

 

                          A/G Ratio [0.7-1.6]       1.1 

                                        (2014 09:30:00)  

 

                          Calcium Lvl [8.5-10.5 mg/dL]    8.5 mg/dL 

                                        (2014::00)  

 

                          Magnesium Lvl [1.8-2.4 mg/dL]    1.8 mg/dL 

                                        (2014:00)  

 

                          ALT [0-65 unit/L]         33 unit/L 

                                        (2014:00)  

 

                          AST [0-37 unit/L]         40 unit/L 

*HI*

                                        (2014)  

 

                          Alk Phos [ unit/L]    151 unit/L 

*HI*

                                        (201400)  

 

                          Bili Total [0.2-1.3 mg/dL]    1.1 mg/dL 

                                        (2014:00)  

 

                          Lipase Lvl [ unit/L]    129 unit/L 

                                        (201400)  

 

                          Total CK [ unit/L]    169 unit/L 

                                        (2014:00)  

 

                          CK MB [0.5-3.6 ng/mL]     1.1 ng/mL 

                                        (201400)  

 

                          CK MB Index [0.0-2.5]     0.7 

                                        (2014:00)  

 

                          Troponin-I [0.00-0.40 ng/mL]    0.02 ng/mL 

                                        (2014:00)  

 

                          proBNP [0-125 pg/mL]      6468 pg/mL 

*HI*

                                        (2014:00)  







1Result Comment: The eGFR is calculated using the CKD-EPI formula. In most 
young, healthy individuals the eGFR will be >90 mL/min/1.73m2. The eGFR declines
with age. An eGFR of 60-89 may be normal in some populations, particularly the 
elderly, for whom the CKD-EPI formula has not been extensively validated. Use of
the eGFR is not recommended in the following populations:



Individuals with unstable creatinine concentrations, including pregnant patients
and those with serious co-morbid conditions.



Patients with extremes in muscle mass or diet. 



The data above are obtained from the National Kidney Disease Education Program (
NKDEP) which additionally recommends that when the eGFR is used in patients with
extremes of body mass index for purposes of drug dosing, the eGFR should be mul
tiplied by the estimated BMI.



2Interpretive Data: Adult reference range values reflect the clinical guidelines

of the American Diabetes Association.



HEMATOLOGY





                          Most recent to oldest [Reference Range]:    1

 

                          WBC [3.7-10.4 K/CMM]      9.8 K/CMM 

                                        (2014 09:30:00)  

 

                          RBC [4.70-6.10 M/CMM]     5.48 M/CMM 

                                        (2014:30:00)  

 

                          Hgb [14.0-18.0 g/dL]      14.4 g/dL 

                                        (2014:30:00)  

 

                          Hct [42.0-54.0 %]         43.8 % 

                                        (2014::00)  

 

                          MCV [80.0-94.0 fL]        79.8 fL 

*LOW*

                                        (2014::00)  

 

                          MCH [27.0-31.0 pg]        26.4 pg 

*LOW*

                                        (2014::00)  

 

                          MCHC [32.0-36.0 g/dL]     33.0 g/dL 

                                        (2014:30:00)  

 

                          RDW [11.5-14.5 %]         19.1 % 

*HI*

                                        (2014::00)  

 

                          Platelet [133-450 K/CMM]    234 K/CMM 

                                        (2014::00)  

 

                          MPV [7.4-10.4 fL]         10.0 fL 

                                        (2014:30:00)  

 

                          Segs [45.0-75.0 %]        82.5 % 

*HI*

                                        (2014::00)  

 

                          Lymphocytes [20.0-40.0 %]    11.0 % 

*LOW*

                                        (2014:00)  

 

                          Monocytes [2.0-12.0 %]    5.5 % 

                                        (2014::00)  

 

                          Eosinophils [0.0-4.0 %]    0.9 % 

                                        (2014::00)  

 

                          Basophils [0.0-1.0 %]     0.1 % 

                                        (2014::00)  

 

                          Segs-Bands # [1.5-8.1 K/CMM]    8.1 K/CMM 

                                        (2014 09:30:00)  

 

                          Lymphocytes # [1.0-5.5 K/CMM]    1.1 K/CMM 

                                        (2014 09:30:00)  

 

                          Monocytes # [0.0-0.8 K/CMM]    0.5 K/CMM 

                                        (2014 09:30:00)  

 

                          Eosinophils # [0.0-0.5 K/CMM]    0.1 K/CMM 

                                        (2014 09:30:00)  

 

                          Basophils # [0.0-0.2 K/CMM]    0.0 K/CMM 

                                        (2014 09:30:00)  

 

                          Anisocyte [None Seen]     1+ 

*ABN*

                                        (2014 09:30:00)  

 

                          PT [12.0-14.7 seconds]    15.0 seconds 

*HI*

                                        (2014 09:30:00)  

 

                          INR [0.85-1.17]           1.19 3

*HI*

                                        (2014 09:30:00)  

 

                          PTT [22.9-35.8 seconds]    27.5 seconds 4

                                        (2014 09:30:00)  







3Interpretive Data: RECOMMENDED RANGES FOR PROTIME INR:

   2.0-3.0 for most medical and surgical thromboembolic states.

   2.5-3.5 for artificial heart valves and recurrent embolism.



INR SHOULD BE USED ONLY FOR PATIENTS ON STABLE ANTICOAGULANT THERAPY.



4Interpretive Data: Heparin Therapeutic Range:  57 - 92 Seconds



IMMUNOLOGY





                          Most recent to oldest [Reference Range]:    1

 

                          CDC HIV 4th GEN [Negative]    Negative 

                                        (2014 10:05:00)

## 2019-09-25 NOTE — XMS REPORT
Summary of Care: 3/29/17 - 3/30/17

                             Created on: 2054



JOANNA CABRERA

External Reference #: [id number]

: 1965

Sex: Male



Demographics







                          Address                   [address] [place] [address]  [address]

 

                          Home Phone                [phone]

 

                          Preferred Language        English

 

                          Marital Status            Single

 

                          Sikhism Affiliation     Adventism

 

                          Race                      White/

 

                          Ethnic Group              Non-





Author







                          Author                    [facility]

 

                          Organization              [facility]

 

                          Address                   Unknown

 

                          Phone                     Unavailable







Encounter





ANGEL LUIS Boothe(LISA) [id number] Date(s): 3/29/17 - 3/30/17

[facility] [address] [place] [address]-     [phone]


Discharge Diagnosis: Dyspnea

Discharge Diagnosis: Acute pulmonary edema

Discharge Diagnosis: Nasal congestion

Discharge Disposition: Home or Self Care

Attending Physician: Ze Conway MD





Vital Signs







  



                     Most recent to      1                   2



                                         oldest [Reference  



                                         Range]:  

 

  



                           Height                    172.72 cm 



                                         (3/29/17 11:42 PM) 

 

  



                     Temperature Oral     97.9 DegF           97.7 DegF



                     [96.4-99.1 DegF]     (3/30/17 1:16 AM)     (3/29/17 11:42 PM)

 

  



                     Blood Pressure      123/86 mmHg         138/89 mmHg



                     [/60-90 mmHg]     (3/30/17 1:16 AM)     (3/29/17 11:42 PM)

 

  



                     Respiratory Rate     18 BRMIN            18 BRMIN



                     [14-20 BRMIN]       (3/30/17 1:16 AM)     (3/29/17 11:42 PM)

 

  



                     Peripheral Pulse     92 bpm              115 bpm



                     Rate [ bpm]     (3/30/17 1:16 AM)     *HI*



                                         (3/29/17 11:42 PM)

 

  



                           Weight                    104.545 kg 



                                         (3/29/17 11:42 PM) 

 

  



                           Body Mass Index           35.04 m2 



                                         (3/29/17 11:42 PM) 







Problem List







    



              Condition     Effective Dates     Status       Health Status     Informant

 

    



                     [D]Anterior chest     12             Active  



                                         wall pain(Confirmed)    

 

    



                           Anxiety(Confirmed)        Resolved  

 

    



                           BP+ -                     Active  



                                         Hypertension(Confirm    



                                         ed)    

 

    



                           Cardiomyopathy(Confi      Resolved  



                                         rmed)    

 

    



                           CHF - Congestive          Active  



                                         heart    



                                         failure(Confirmed)    

 

    



                           CHF - Congestive          Active  



                                         heart    



                                         failure(Confirmed)    

 

    



                           Depression(Confirmed      Active  



                                         )    

 

    



                           dm(Confirmed)             Active  

 

    



                           Down                      Active  



                                         syndrome(Confirmed)    

 

    



                           GERD -                    Active  



                                         Gastro-esophageal    



                                         reflux    



                                         disease(Confirmed)    

 

    



                           H/O:                      Active  



                                         schizophrenia(Confir    



                                         med)    

 

    



                           HTN(Confirmed)            Active  

 

    



                           ICD (implantable          Active  



                                         cardiac    



                                         defibrillator)    



                                         battery    



                                         depletion(Confirmed)    

 

    



                           NIDDM(Confirmed)          Active  

 

    



                           Obese                     Active  



                                         build(Confirmed)    

 

    



                           Schizophrenia(Confir      Active  



                                         med)    

 

    



                           Short of breath on        Active  



                                         exertion(Confirmed)    

 

    



                           Sleep                     Active  



                                         apnea(Confirmed)    







Allergies, Adverse Reactions, Alerts







   



                 Substance       Reaction        Severity        Status

 

   



                           NKDA                      Active







Medications





Benadryl

12.5 mg, 0.25 mL, Route: IVP, Drug form: INJ, ONCE, Dosing Weight 104.545, kg, P
riority: STAT, Start date: 17 0:09:00 CDT, Stop date: 17 0:09:00 CDT

Notes: (Same as: Benadryl)

Start Date: 3/30/17

Stop Date: 3/30/17

Status: Completed



Claritin 10 mg oral tablet

10 mg=1 tab, PO, Daily, X 7 day, # 7 tab, 0 Refill(s)

Start Date: 3/30/17

Stop Date: 17

Status: Ordered



furosemide

40 mg, 4 mL, Route: IVP, Drug form: INJ, ONCE, Dosing Weight 104.545, kg, Priori
ty: STAT, Start date: 17 1:03:00 CDT, Stop date: 17 1:03:00 CDT

Notes: (Same as: Lasix)  *** MEDICATION WASTE ***Product Size:  40 mgProduct Was
helen:  ___ mg

Start Date: 3/30/17

Stop Date: 3/30/17

Status: Completed



Results





ELECTROLYTES





 



                           Most recent to            1



                                         oldest [Reference 



                                         Range]: 

 

 



                           Sodium Lvl [135-145       141 mEq/L



                           mEq/L]                    (3/30/17 12:18 AM)

 

 



                           Potassium Lvl             4.1 mEq/L



                           [3.5-5.1 mEq/L]           (3/30/17 12:18 AM)

 

 



                           Chloride Lvl [      107 mEq/L



                           mEq/L]                    (3/30/17 12:18 AM)

 

 



                           CO2 [24-32 mEq/L]         27 mEq/L



                                         (3/30/17 12:18 AM)

 

 



                           AGAP [10.0-20.0           11.1 mEq/L



                           mEq/L]                    (3/30/17 12:18 AM)







CHEM PANEL





 



                           Most recent to            1



                                         oldest [Reference 



                                         Range]: 

 

 



                           Creatinine Lvl            1.34 mg/dL



                           [0.50-1.40 mg/dL]         (3/30/17 12:18 AM)

 

 



                           eGFR                      61 mL/min/1.73m2 1



                                         *NA*



                                         (3/30/17 12:18 AM)

 

 



                           BUN [7-22 mg/dL]          21 mg/dL



                                         (3/30/17 12:18 AM)

 

 



                           Glucose Lvl [70-99        154 mg/dL



                           mg/dL]                    *HI*



                                         (3/30/17 12:18 AM)

 

 



                           Calcium Lvl               8.0 mg/dL



                           [8.5-10.5 mg/dL]          *LOW*



                                         (3/30/17 12:18 AM)







1Result Comment: The eGFR is calculated using the CKD-EPI formula. In most 
young, healthy individuals the eGFR will be >90 mL/min/1.73m2. The eGFR declines
with age. An eGFR of 60-89 may be normal in some populations, particularly the 
elderly, for whom the CKD-EPI formula has not been extensively validated. Use of
the eGFR is not recommended in the following populations:



Individuals with unstable creatinine concentrations, including pregnant patients
and those with serious co-morbid conditions.



Patients with extremes in muscle mass or diet. 



The data above are obtained from the National Kid[employer] Disease Education Program (
NKDEP) which additionally recommends that when the eGFR is used in patients with
extremes of body mass index for purposes of drug dosing, the eGFR should be mul
tiplied by the estimated BMI.



CARDIAC ENZYMES





 



                           Most recent to            1



                                         oldest [Reference 



                                         Range]: 

 

 



                           Total CK [          141 unit/L



                           unit/L]                   (3/30/17 12:18 AM)

 

 



                           CK MB [0.5-3.6            2.6 ng/mL



                           ng/mL]                    (3/30/17 12:18 AM)

 

 



                           CK MB Index               1.8



                           [0.0-2.5]                 (3/30/17 12:18 AM)

 

 



                           Troponin-I                0.03 ng/mL



                           [0.00-0.40 ng/mL]         (3/30/17 12:18 AM)







HEMATOLOGY





 



                           Most recent to            1



                                         oldest [Reference 



                                         Range]: 

 

 



                           WBC [3.7-10.4 K/CMM]      8.1 K/CMM



                                         (3/30/17 12:18 AM)

 

 



                           RBC [4.70-6.10            4.58 M/CMM



                           M/CMM]                    *LOW*



                                         (3/30/17 12:18 AM)

 

 



                           Hgb [14.0-18.0 g/dL]      14.5 g/dL



                                         (3/30/17 12:18 AM)

 

 



                           Hct [42.0-54.0 %]         42.3 %



                                         (3/30/17 12:18 AM)

 

 



                           MCV [80.0-94.0 fL]        92.3 fL



                                         (3/30/17 12: AM)

 

 



                           MCH [27.0-31.0 pg]        31.7 pg



                                         *HI*



                                         (3/30/17 12:18 AM)

 

 



                           MCHC [32.0-36.0           34.3 g/dL



                           g/dL]                     (3/30/17 12:18 AM)

 

 



                           RDW [11.5-14.5 %]         14.3 %



                                         (3/30/17 12:18 AM)

 

 



                           Platelet [133-450         193 K/CMM



                           K/CMM]                    (3/30/17 12:18 AM)

 

 



                           MPV [7.4-10.4 fL]         8.5 fL



                                         (3/30/17 12:18 AM)

 

 



                           Segs [45.0-75.0 %]        79.5 %



                                         *HI*



                                         (3/30/17 12:18 AM)

 

 



                           Lymphocytes               13.3 %



                           [20.0-40.0 %]             *LOW*



                                         (3/30/17 12:18 AM)

 

 



                           Monocytes [2.0-12.0       4.6 %



                           %]                        (3/30/17 12:18 AM)

 

 



                           Eosinophils [0.0-4.0      2.4 %



                           %]                        (3/30/17 12:18 AM)

 

 



                           Basophils [0.0-1.0        0.2 %



                           %]                        (3/30/17 12:18 AM)

 

 



                           Segs-Bands #              6.4 K/CMM



                           [1.5-8.1 K/CMM]           (3/30/17 12:18 AM)

 

 



                           Lymphocytes #             1.1 K/CMM



                           [1.0-5.5 K/CMM]           (3/30/17 12:18 AM)

 

 



                           Monocytes # [0.0-0.8      0.4 K/CMM



                           K/CMM]                    (3/30/17 12:18 AM)

 

 



                           Eosinophils #             0.2 K/CMM



                           [0.0-0.5 K/CMM]           (3/30/17 12:18 AM)

 

 



                           Basophils # [0.0-0.2      0.0 K/CMM



                           K/CMM]                    (3/30/17 12:18 AM)

 

 



                           PT [12.0-14.7             15.1 seconds



                           seconds]                  *HI*



                                         (3/30/17 12:18 AM)

 

 



                           INR [0.85-1.17]           1.17



                                         (3/30/17 12:18 AM)

 

 



                           PTT [22.9-35.8            30.3 seconds



                           seconds]                  (3/30/17 12:18 AM)







Immunizations





Given and Recorded





   



                 Vaccine         Date            Status          Refusal Reason

 

   



                     influenza virus vaccine, inactivated     10/1/13             Given 







Procedures







   



                 Procedure       Date            Related Diagnosis     Body Site

 

   



                                         ICD - Internal cardiac defibrillator   



                                         procedure1   

 

   



                                         torn ligament2   







1placed 2.5years ago



2left knee ligament surgery



Social History







 



                           Social History Type       Response

 

 



                           Alcohol                   Never

 

 



                           Smoking Status            Former smoker; Type: Cigarettes; Tobacco use per day: 0; Number

 of years:



                                         2; Total pack years: 2; Started at age: 22.0; Stopped at age: 24; Previous



                                         treatment: None; Ready to change: No; Concerns about tobacco use in



                                         household: No; Exposure to Tobacco Smoke None; Cigarette Smoking Last 365



                                         Days No; Reg Smoking Cessation Counseling No







Assessment and Plan





No data available for this section

## 2019-09-25 NOTE — XMS REPORT
Encounter CCD: 10/29/2013 to 10/31/2013

                             Created on: 2122



JOANNA CABRERA

External Reference #: [id number]

: 1965

Sex: Male



Demographics







                          Address                   [address] [place] [address]

[place]  [id number]

 

                          Home Phone                [phone]

 

                          Preferred Language        Unknown

 

                          Marital Status            Single

 

                          Church Affiliation     [employer]

 

                          Race                      White/

 

                          Ethnic Group              Non-





Author







                          Author                    Auto Generated

 

                          Organization              [facility]

 

                          Address                   Unknown

 

                          Phone                     Unavailable







Care Team Providers







                    Care Team Member Name    Role                Phone

 

                    Lashon Puckett    CP                  [phone]







Allergies, Adverse Reactions, Alerts







  



                     Substance           Reaction            Status

 

  



                           NKDA                      Active







Problem List







  



                     Condition           Effective Dates     Status

 

  



                     [D]Anterior chest wall pain     2012          Active

 

  



                           BP+ - Hypertension        Active

 

  



                           Cardiomyopathy            Resolved

 

  



                           CHF - Congestive heart failure      Active

 

  



                           CHF - Congestive heart failure      Active

 

  



                           Depression                Active

 

  



                           dm                        Active

 

  



                           Down syndrome             Active

 

  



                           GERD - Gastro-esophageal reflux disease      Active

 

  



                           H/O: schizophrenia        Active

 

  



                           HTN                       Active

 

  



                           ICD (implantable cardiac defibrillator) battery depletion      Active

 

  



                           NIDDM                     Active

 

  



                           Obese build               Active

 

  



                           Sleep apnea               Active







Medications







    



              Medication     Instructions     Start Date     End Date     Status

 

    



              Geodon       20 mg, 1 cap, Route: PO, Drug form:     10/30/2013     10/31/2013     Discontinued





                                         CAP, Bedtime, Dosing Weight   



                                         127.273, kg, Start date: 10/30/13   



                                         21:00:00, Duration: 30 day, Stop   



                                         date: 13 21:00:00(Same As:   



                                         Geodon)Give with Food   

 

    



              simvastatin     20 mg, 1 tab, Route: PO, Drug form:     10/30/2013     10/31/2013     Discontinued





                                         TAB, Bedtime, Dosing Weight   



                                         127.273, kg, Start date: 10/30/13   



                                         21:00:00, Duration: 30 day, Stop   



                                         date: 13 21:00:00(Same as:   



                                         Zocor)   

 

    



              pantoprazole     40 mg, 1 tab, Route: PO, Drug form:     10/30/2013     10/31/2013     

Discontinued



                                         ECTAB, Before Dinner, Dosing Weight   



                                         127.273, kg, Start date: 10/30/13   



                                         16:30:00, Duration: 30 day, Stop   



                                         date: 13 16:30:00Tablet   



                                         should not be chewed or   



                                         crushed.(Same as: Protonix)   

 

    



              Remeron      15 mg, 1 tab, Route: PO, Drug form:     10/30/2013     10/31/2013     Discontinued





                                         TAB, Bedtime, Dosing Weight   



                                         127.273, kg, Start date: 10/30/13   



                                         21:00:00, Duration: 30 day, Stop   



                                         date: 13 21:00:00(Same   



                                         as:Remeron)   

 

    



              metFORmin 500 mg     500 mg, 1 tab, Route: PO, Drug     10/30/2013     10/31/2013     Discontinued





                           oral tablet               form: TAB, BID-Meals, Dosing Weight   



                                         127.273, kg, Start date: 10/30/13   



                                         17:00:00, Duration: 30 day, Stop   



                                         date: 13 8:00:00(Same as:   



                                         Glucophage)  Take with meal   

 

    



              clonazepam     2 mg, 2 tab, Route: PO, Drug form:     10/30/2013     10/31/2013     Discontinued





                                         TAB, Bedtime, Dosing Weight   



                                         127.273, kg, Start date: 10/30/13   



                                         21:00:00, Duration: 30 day, Stop   



                                         date: 13 21:00:00(Same As:   



                                         Klonopin)   

 

    



              aspirin      81 mg, 1 tab, Route: PO, Drug form:     10/30/2013     10/31/2013     Discontinued





                                         CHEWTAB, Daily, Dosing Weight   



                                         127.273, kg, Start date: 10/30/13   



                                         12:57:00, Duration: 30 day, Stop   



                                         date: 13 9:00:00Take with   



                                         food.   

 

    



                 Saline Flush 0.9%     5 mL, Route: IVP, Drug Form: INJ,     10/29/2013      10/30/2013

                                         Discontinued



                                         Dosing Weight 127.273, kg, Q8H, PRN   



                                         Line Flush, Start date: 10/29/13   



                                         10:02:00, Duration: 30 day, Stop   



                                         date: 13 10:01:00, Administer   



                                         at least once every 8 hours   



                                         Administer at least once every 8   



                                         hours(Same as: BD Posiflush)   

 

    



              aspirin      324 mg, 4 tab, Route: PO, Drug     10/29/2013     10/29/2013     Completed



                                         form: CHEWTAB, ONCE, Dosing Weight   



                                         127.273, kg, Priority: STAT, Start   



                                         date: 10/29/13 10:02:00, Stop date:   



                                         10/29/13 10:02:00Take with food.   

 

    



              potassium chloride     40 mEq, 2 tab, Route: PO, Drug     10/29/2013     10/29/2013    

 Completed



                                         form: ERTAB, ONCE, Dosing Weight   



                                         127.273, kg, Start date: 10/29/13   



                                         15:28:00, Stop date: 10/29/13   



                                         15:28:00(Same as: K-Dur 20)"Do Not   



                                         Crush"  With food and full glass of   



                                         water   

 

    



                 pantoprazole 40 mg     40 mg, 1 tab, PO, Before Dinner, 30     10/31/2013      Ordered





                           oral enteric coated       tab, Substitution Allowed, ECTAB   



                                         tablet    

 

    



                 simvastatin 20 mg     20 mg, 1 tab, PO, Bedtime, 30 tab,     10/31/2013      Ordered



                           oral tablet               Substitution Allowed, TAB   

 

    



                 furosemide 40 mg     40 mg, 1 tab, PO, Daily, 30 tab,     10/31/2013      Ordered



                           oral tablet               Substitution Allowed, TAB   

 

    



              metFORmin 500 mg     500 mg, 1 tab, PO, BID-Meals, 60     10/31/2013     2013    

 Ordered



                           oral tablet               tab, Substitution Allowed, TAB   

 

    



                 lisinopril 40 mg     40 mg, 1 tab, PO, Daily, 14 tab,     10/31/2013      Ordered



                           oral tablet               Substitution Allowed, TAB   

 

    



                 carvedilol 6.25 mg     6.25 mg, 1 tab, PO, Q12H, 60 tab,     10/31/2013      2013

                                         Ordered



                           oral tablet               Substitution Allowed, TAB   

 

    



                 Aspirin Low Dose 81     81 mg, 1 tab, PO, Daily, 30 tab,     10/31/2013      Ordered



                           mg oral tablet            Substitution Allowed, TAB   

 

    



              enoxaparin     40 mg, 0.4 mL, Route: SUB-Q, Drug     10/29/2013     10/31/2013     Discontinued





                                         form: INJ, [id number], Dosing Weight   



                                         127.273, kg, Start date: 10/29/13   



                                         15:00:00, Duration: 30 day, Stop   



                                         date: 13 15:00:00(Same as:   



                                         Lovenox)   

 

    



              temazepam     15 mg, 1 cap, Route: PO, Drug form:     10/29/2013     10/31/2013     Discontinued





                                         CAP, Bedtime, Dosing Weight   



                                         127.273, kg, PRN Sleep, Start date:   



                                         10/29/13 15:31:00, Duration: 30   



                                         day, Stop date: 13   



                                         15:30:00(Same As: Restoril)   

 

    



                 Norco 5/325 oral     1 tab, Route: PO, Drug Form: TAB,     10/29/2013      10/31/2013 

                                         Discontinued



                           tablet                    Dosing Weight 127.273, kg, Q6H, PRN   



                                         Pain, Start date: 10/29/13   



                                         15:31:00, Duration: 30 day, Stop   



                                         date: 13 15:30:00(Same as:   



                                         Norco 325/5)  Do not exceed 4gm/day   



                                         of acetaminophen.   

 

    



                 Saline Flush 0.9%     3 ml, Route: IVP, Drug Form: INJ,     10/30/2013      10/31/2013

                                         Discontinued



                                         Dosing Weight 127.273, kg, Q8H,   



                                         Start date: 10/30/13 0:00:00,   



                                         Duration: 30 day, Stop date:   



                                         13 16:00:00(Same as: BD   



                                         Posiflush)   

 

    



                 Saline Flush 0.9%     5 ml, Route: IVP, Drug Form: INJ,     10/29/2013      10/31/2013

                                         Discontinued



                                         Dosing Weight 127.273, kg, PRN, PRN   



                                         Line Flush, Start date: 10/29/13   



                                         15:16:00, Duration: 30 day, Stop   



                                         date: 13 14:15:00(Same as: BD   



                                         Posiflush)   

 

    



              Tylenol      650 mg, 2 tab, Route: PO, Drug     10/29/2013     10/31/2013     Discontinued





                                         form: TAB, Q6H, Dosing Weight   



                                         127.273, kg, PRN Fever, Start date:   



                                         10/29/13 15:30:00, Duration: 30   



                                         day, Stop date: 13 15:29:00Do   



                                         not exceed 4 gm/day.  (Same as:   



                                         Tylenol)   

 

    



              Zofran       4 mg, 2 mL, Route: IV, Drug form:     10/29/2013     10/31/2013     Discontinued





                                         INJ, Q8H, Dosing Weight 127.273,   



                                         kg, PRN Nausea, Start date:   



                                         10/29/13 15:30:00, Duration: 30   



                                         day, Stop date: 13   



                                         14:29:00(Same as: Zofran)   

 

    



                 spironolactone     25 mg, 1 tab, Route: PO, Drug form:     10/30/2013      10/31/2013 

                                         Discontinued



                                         TAB, Daily, Dosing Weight 127.273,   



                                         kg, Start date: 10/30/13 9:00:00,   



                                         Duration: 30 day, Stop date:   



                                         13 9:00:00(Same As:   



                                         Aldactone)   

 

    



                 influenza virus     0.5 ml, Route: IM, Drug Form: SUSP,     10/01/2013      10/01/2013

                                         Completed



                           vaccine, inactivated      Start date: 10/01/13 9:00:00, Stop   



                                         date: 10/01/13 9:00:00   

 

    



              Lasix        20 mg, 2 mL, Route: IV, Drug form:     10/29/2013     10/31/2013     Discontinued





                                         INJ, BID, Dosing Weight 127.273,   



                                         kg, Start date: 10/29/13 17:00:00,   



                                         Duration: 30 day, Stop date:   



                                         13 9:00:00(Same as: Lasix)   

 

    



              Lasix        40 mg, Route: IVP, Drug form: INJ,     10/30/2013     10/29/2013     Canceled





                                         Daily, Dosing Weight 127.273, kg,   



                                         Start date: 10/30/13 9:00:00,   



                                         Duration: 30 day, Stop date:   



                                         13 9:00:00   

 

    



              lisinopril     40 mg, 2 tab, Route: PO, Drug form:     10/30/2013     10/31/2013     Discontinued





                                         TAB, Daily, Dosing Weight 127.273,   



                                         kg, Start date: 10/30/13 9:00:00,   



                                         Duration: 30 day, Stop date:   



                                         13 9:00:00(Same as: Prinivil,   



                                         Zestril)   

 

    



                 Lasix 40 mg oral     40 mg, 1 tab, Route: PO, Drug form:     10/31/2013      10/31/2013

                                         Canceled



                           tablet                    TAB, BID, Dosing Weight 127.273,   



                                         kg, Start date: 10/31/13 17:00:00,   



                                         Duration: 30 day, Stop date:   



                                         13 9:00:00(Same as: Lasix)   



                                         May cause GI upset.  Give with food   



                                         or milk.   

 

    



              Coreg        6.25 mg, 1 tab, Route: PO, Drug     10/29/2013     10/31/2013     Discontinued





                                         form: TAB, Q12H, Dosing Weight   



                                         127.273, kg, Start date: 10/29/13   



                                         21:00:00, Duration: 30 day, Stop   



                                         date: 13 9:00:00Give with   



                                         food. (Same As: Coreg)   

 

    



                 spironolactone 25 mg     25 mg, 1 tab, PO, Daily, 30 tab,     10/31/2013      2013

                                         Ordered



                           oral tablet               Substitution Allowed, TAB   

 

    



              Lasix        40 mg, 4 mL, Route: IVP, Drug form:     10/29/2013     10/29/2013     Completed





                                         INJ, ONCE, Dosing Weight 127.273,   



                                         kg, Priority: STAT, Start date:   



                                         10/29/13 13:02:00, Stop date:   



                                         10/29/13 13:02:00(Same as: Lasix)   







Immunizations







  



                     Vaccine             Date                Status

 

  



                     influenza virus vaccine, inactivated     10/01/2013          Auth (Verified)







Vital Signs







                Most recent to oldest [Reference Range]:    1               2               3

 

                          Height                    172.72 cm 

                    (10/29/2013 09:50:00)                           

 

                          Current Weight            105 kg 

                          (10/31/2013 00:00:00)      107.007 kg 

                          (10/30/2013 01:21:00)       

 

                          Temperature Oral [96.4-99.1 DegF]    97.7 DegF 

                          (10/31/2013 12:00:00)      97.8 DegF 

                          (10/31/2013 08:18:00)      97.5 DegF 

                                        (10/31/2013 04:00:00)  

 

                          Systolic Blood Pressure [ mmHg]    127 mmHg 

                          (10/31/2013 12:00:00)      156 mmHg 

*HI*

                          (10/31/2013 08:18:00)      117 mmHg 

                                        (10/31/2013 04:00:00)  

 

                          Diastolic Blood Pressure [60-90 mmHg]    61 mmHg 

                          (10/31/2013 12:00:00)      86 mmHg 

                          (10/31/2013 08:18:00)      86 mmHg 

                                        (10/31/2013 04:00:00)  

 

                          Respiratory Rate [14-20 BRMIN]    18 BRMIN 

                          (10/31/2013 12:00:00)      18 BRMIN 

                          (10/31/2013 08:18:00)      16 BRMIN 

                                        (10/31/2013 04:00:00)  

 

                          Peripheral Pulse Rate [ bpm]    50 bpm 

*LOW*

                          (10/31/2013 12:00:00)      88 bpm 

                          (10/31/2013 08:18:00)      79 bpm 

                                        (10/31/2013 04:00:00)  

 

                          Weight                    127.273 kg 

                    (10/29/2013 09:50:00)                           







Results





INFECTIOUS DISEASES





                Most recent to oldest [Reference Range]:    1               2               3

 

                          C difficile DNA [Negative]    Negative 1

                    (10/29/2013 21:30:00)                           







1Interpretive Data: [employer] illum[employer]ene Clostridium difficile assay utilizes 
loop-mediated isothermal DNA amplification (LAMP) technology to detect a 204 bp 
region of the tcdA gene within the PaLoc gene segment present in all known 
toxigenic C. difficile strains.



The assay utilizes FDA cleared IVD reagents. Performance characteristics have be
en verified by the [facility] within the [facility]. The [facility] is authorized under the Clinical Labo
ratory Improvement Amendment of 1988 (CLIA-88) to perform high complexity testin
g.



BEDSIDE GLUCOSE TESTING





                Most recent to oldest [Reference Range]:    1               2               3

 

                          Glucose POC [70-99 mg/dL]    194 mg/dL 2

*HI*

                    (10/31/2013 11:32:00)                           

 

                          Gluc POC Comment 1        Notified RN/MD 

*NA*

                    (10/31/2013 11:32:00)                           







2Interpretive Data:  Upper Reportable Limit: 200 mg/dL.



STOOL TESTS





                Most recent to oldest [Reference Range]:    1               2               3

 

                          Fecal Leukocyte           None Seen 3

                    (10/29/2013 21:30:00)                           







3Interpretive Data: A Value of None Seen, Rare, or Few is Normal.



CHEMISTRY





                Most recent to oldest [Reference Range]:    1               2               3

 

                          Sodium Lvl [135-145 mEq/L]    144 mEq/L 

                          (10/31/2013 05:34:00)      143 mEq/L 

                          (10/30/2013 04:07:00)      141 mEq/L 

                                        (10/29/2013 09:55:00)  

 

                          Potassium Lvl [3.5-5.1 mEq/L]    3.7 mEq/L 

                          (10/31/2013 05:34:00)      3.6 mEq/L 

                          (10/30/2013 04:07:00)      3.4 mEq/L 

*LOW*

                                        (10/29/2013 09:55:00)  

 

                          Chloride Lvl [ mEq/L]    103 mEq/L 

                          (10/31/2013 05:34:00)      104 mEq/L 

                          (10/30/2013 04:07:00)      101 mEq/L 

                                        (10/29/2013 09:55:00)  

 

                          CO2 [24-32 mEq/L]         27 mEq/L 

                          (10/31/2013 05:34:00)      26 mEq/L 

                          (10/30/2013 04:07:00)      28 mEq/L 

                                        (10/29/2013 09:55:00)  

 

                          AGAP [10.0-20.0 mEq/L]    17.7 mEq/L 

                          (10/31/2013 05:34:00)      16.6 mEq/L 

                          (10/30/2013 04:07:00)      15.4 mEq/L 

                                        (10/29/2013 09:55:00)  

 

                          Creatinine Lvl [0.5-1.4 mg/dL]    1.3 mg/dL 

                          (10/31/2013 05:34:00)      1.3 mg/dL 4

                          (10/30/2013 04:07:00)      1.0 mg/dL 

                                        (10/29/2013 15:44:00)  

 

                          eGFR                      65 mL/min/1.73m2 5

*NA*

                          (10/31/2013 05:34:00)      65 mL/min/1.73m2 6

*NA*

                          (10/30/2013 04:07:00)      89 mL/min/1.73m2 7

*NA*

                                        (10/29/2013 15:44:00)  

 

                          BUN [7-22 mg/dL]          18 mg/dL 

                          (10/31/2013 05:34:00)      15 mg/dL 

                          (10/30/2013 04:07:00)      10 mg/dL 

                                        (10/29/2013 09:55:00)  

 

                          B/C Ratio [6-25]          8 

                    (10/29/2013 09:55:00)                           

 

                          Glucose Lvl [70-99 mg/dL]    94 mg/dL 8

                          (10/31/2013 05:34:00)      131 mg/dL 9

*HI*

                          (10/30/2013 04:07:00)      114 mg/dL 10

*HI*

                                        (10/29/2013 09:55:00)  

 

                          Total Protein [6.4-8.4 g/dL]    6.9 g/dL 

                    (10/29/2013 09:55:00)                           

 

                          Albumin Lvl [3.5-5.0 g/dL]    3.7 g/dL 

                    (10/29/2013 09:55:00)                           

 

                          Globulin [2.0-4.0 g/dL]    3.2 g/dL 

                    (10/29/2013 09:55:00)                           

 

                          A/G Ratio [0.7-1.6]       1.2 

                    (10/29/2013 09:55:00)                           

 

                          Calcium Lvl [8.5-10.5 mg/dL]    8.3 mg/dL 

*LOW*

                          (10/31/2013 05:34:00)      8.8 mg/dL 

                          (10/30/2013 04:07:00)      8.6 mg/dL 

                                        (10/29/2013 09:55:00)  

 

                          Phosphorus [2.5-4.5 mg/dL]    4.7 mg/dL 

*HI*

                    (10/30/2013 04:07:00)                           

 

                          Magnesium Lvl [1.8-2.4 mg/dL]    1.7 mg/dL 

*LOW*

                    (10/30/2013 04:07:00)                           

 

                          ALT [0-65 unit/L]         31 unit/L 

                    (10/29/2013 09:55:00)                           

 

                          AST [0-37 unit/L]         24 unit/L 

                    (10/29/2013 09:55:00)                           

 

                          Alk Phos [ unit/L]    178 unit/L 

*HI*

                    (10/29/2013 09:55:00)                           

 

                          Bili Total [0.2-1.3 mg/dL]    1.2 mg/dL 

                    (10/29/2013 09:55:00)                           

 

                          Lipase Lvl [ unit/L]    89 unit/L 

                    (10/29/2013 09:55:00)                           

 

                          Total CK [ unit/L]    144 unit/L 

                          (10/30/2013 04:07:00)      171 unit/L 

                          (10/29/2013 21:22:00)      168 unit/L 

                                        (10/29/2013 15:44:00)  

 

                          CK MB [0.5-3.6 ng/mL]     1.6 ng/mL 

                          (10/30/2013 04:07:00)      1.7 ng/mL 

                          (10/29/2013 21:22:00)      1.6 ng/mL 

                                        (10/29/2013 15:44:00)  

 

                          CK MB Index [0.0-2.5]     1.1 

                          (10/30/2013 04:07:00)      1.0 

                          (10/29/2013 21:22:00)      1.0 

                                        (10/29/2013 15:44:00)  

 

                          Troponin-I [0.00-0.40 ng/mL]    0.04 ng/mL 

                          (10/30/2013 04:07:00)      0.02 ng/mL 

                          (10/29/2013 21:22:00)      0.03 ng/mL 

                                        (10/29/2013 15:44:00)  

 

                          BNP [<=100 pg/mL]         1829 pg/mL 11

*HI*

                    (10/29/2013 09:55:00)                           







4Result Comment: reviewed 10/30/2013 06:40  gr



5Result Comment: The eGFR is calculated using the CKD-EPI formula. In most 
young, healthy individuals the eGFR will be >90 mL/min/1.73m2. The eGFR declines
with age. An eGFR of 60-89 may be normal in some populations, particularly the 
elderly, for whom the CKD-EPI formula has not been extensively validated. Use of
the eGFR is not recommended in the following populations:



Individuals with unstable creatinine concentrations, including pregnant patients
and those with serious co-morbid conditions.



Patients with extremes in muscle mass or diet. 



The data above are obtained from the National Kid[employer] Education Program (
NK[employer]) which additionally recommends that when the eGFR is used in patients with
extremes of body mass index for purposes of drug dosing, the eGFR should be mul
tiplied by the estimated BMI.



6Result Comment: The eGFR is calculated using the CKD-EPI formula. In most 
young, healthy individuals the eGFR will be >90 mL/min/1.73m2. The eGFR declines
with age. An eGFR of 60-89 may be normal in some populations, particularly the 
elderly, for whom the CKD-EPI formula has not been extensively validated. Use of
the eGFR is not recommended in the following populations:



Individuals with unstable creatinine concentrations, including pregnant patients
and those with serious co-morbid conditions.



Patients with extremes in muscle mass or diet. 



The data above are obtained from the National Kid[employer] Education Program (
NK[employer]) which additionally recommends that when the eGFR is used in patients with
extremes of body mass index for purposes of drug dosing, the eGFR should be mul
tiplied by the estimated BMI.



7Result Comment: The eGFR is calculated using the CKD-EPI formula. In most 
young, healthy individuals the eGFR will be >90 mL/min/1.73m2. The eGFR declines
with age. An eGFR of 60-89 may be normal in some populations, particularly the 
elderly, for whom the CKD-EPI formula has not been extensively validated. Use of
the eGFR is not recommended in the following populations:



Individuals with unstable creatinine concentrations, including pregnant patients
and those with serious co-morbid conditions.



Patients with extremes in muscle mass or diet. 



The data above are obtained from the National Kid[employer] Education Program (
[employer]) which additionally recommends that when the eGFR is used in patients with
extremes of body mass index for purposes of drug dosing, the eGFR should be mul
tiplied by the estimated BMI.



8Interpretive Data: Adult reference range values reflect the clinical guidelines

of the [employer].



9Interpretive Data: Adult reference range values reflect the clinical guidelines

of the [employer].



10Interpretive Data: Adult reference range values reflect the clinical 
guidelines

of the [employer].



11Interpretive Data: Elevated results are in line with increasing severity of 

congestive heart failure. Minor elevations between 100 and 300 

may be seen with Myocardial Ischemia, Sodium retaining drugs, 

and compensated/treated heart failure.



HEMATOLOGY





                Most recent to oldest [Reference Range]:    1               2               3

 

                          WBC [3.7-10.4 K/CMM]      6.8 K/CMM 

                          (10/30/2013 04:07:00)      7.1 K/CMM 

                          (10/29/2013 09:55:00)       

 

                          RBC [4.70-6.10 M/CMM]     5.05 M/CMM 

                          (10/30/2013 04:07:00)      5.17 M/CMM 

                          (10/29/2013 09:55:00)       

 

                          Hgb [14.0-18.0 g/dL]      14.5 g/dL 

                          (10/30/2013 04:07:00)      15.1 g/dL 

                          (10/29/2013 09:55:00)       

 

                          Hct [42.0-54.0 %]         44.5 % 

                          (10/30/2013 04:07:00)      46.2 % 

                          (10/29/2013 09:55:00)       

 

                          MCV [80.0-94.0 fL]        88.3 fL 

                          (10/30/2013 04:07:00)      89.4 fL 

                          (10/29/2013 09:55:00)       

 

                          MCH [27.0-31.0 pg]        28.8 pg 

                          (10/30/2013 04:07:00)      29.1 pg 

                          (10/29/2013 09:55:00)       

 

                          MCHC [32.0-36.0 g/dL]     32.6 g/dL 

                          (10/30/2013 04:07:00)      32.6 g/dL 

                          (10/29/2013 09:55:00)       

 

                          RDW [11.5-14.5 %]         15.3 % 

*HI*

                          (10/30/2013 04:07:00)      15.7 % 

*HI*

                          (10/29/2013 09:55:00)       

 

                          Platelet [133-450 K/CMM]    205 K/CMM 

                          (10/30/2013 04:07:00)      189 K/CMM 

                          (10/29/2013 15:44:00)      190 K/CMM 

                                        (10/29/2013 09:55:00)  

 

                          MPV [7.4-10.4 fL]         9.4 fL 

                          (10/30/2013 04:07:00)      9.5 fL 

                          (10/29/2013 09:55:00)       

 

                          Segs [45.0-75.0 %]        78.0 % 

*HI*

                    (10/29/2013 09:55:00)                           

 

                          Lymphocytes [20.0-40.0 %]    13.2 % 

*LOW*

                    (10/29/2013 09:55:00)                           

 

                          Monocytes [2.0-12.0 %]    7.9 % 

                    (10/29/2013 09:55:00)                           

 

                          Eosinophils [0.0-4.0 %]    0.5 % 

                    (10/29/2013 09:55:00)                           

 

                          Basophils [0.0-1.0 %]     0.4 % 

                    (10/29/2013 09:55:00)                           

 

                          Segs-Bands # [1.5-8.1 K/CMM]    5.6 K/CMM 

                    (10/29/2013 09:55:00)                           

 

                          Lymphocytes # [1.0-5.5 K/CMM]    0.9 K/CMM 

*LOW*

                    (10/29/2013 09:55:00)                           

 

                          Monocytes # [0.0-0.8 K/CMM]    0.6 K/CMM 

                    (10/29/2013 09:55:00)                           

 

                          Eosinophils # [0.0-0.5 K/CMM]    0.0 K/CMM 

                    (10/29/2013 09:55:00)                           

 

                          Basophils # [0.0-0.2 K/CMM]    0.0 K/CMM 

                    (10/29/2013 09:55:00)                           

 

                          PT [12.0-14.7 seconds]    17.5 seconds 

*HI*

                    (10/29/2013 09:55:00)                           

 

                          INR [0.85-1.17]           1.46 12

*HI*

                    (10/29/2013 09:55:00)                           

 

                          PTT [22.9-35.8 seconds]    29.5 seconds 13

                          (10/29/2013 15:44:00)      29.5 seconds 14

                          (10/29/2013 09:55:00)       







12Interpretive Data: RECOMMENDED RANGES FOR PROTIME INR:

   2.0-3.0 for most medical and surgical thromboembolic states.

   2.5-3.5 for artificial heart valves and recurrent embolism.



INR SHOULD BE USED ONLY FOR PATIENTS ON STABLE ANTICOAGULANT THERAPY.



13Interpretive Data: Heparin Therapeutic Range:  57 - 92 Seconds



14Interpretive Data: Heparin Therapeutic Range:  57 - 92 Seconds



Microbiology Reports







PROCEDURE:Culture: Stool

                  STATUS:                  Auth (Verified)





                BODY SITE:                      COLLECTED DATE/TIME:    10/29/2013 21:30:00

 

                SOURCE:         Stool           FREE TEXT SOURCE:     







***FINAL REPORTS***



Final Report                               



Normal Enteric Neena Isolated 

No Salmonella, Shigella, Or Campylobacter Isolated



***PRELIMINARY REPORTS***



Preliminary Report                               



Normal Enteric Neena Isolated



Preliminary Report                               



Normal Enteric Neena Isolated 

No Salmonella Or Shigella Isolated

## 2019-09-25 NOTE — XMS REPORT
Encounter CCD: 2013 to 2013

                             Created on: 2046



JOANNA CABRERA

External Reference #: [id number]

: 1965

Sex: Male



Demographics







                          Address                   [address] [place] [address]

[place]  [address]

 

                          Home Phone                [phone]

 

                          Preferred Language        Unknown

 

                          Marital Status            Single

 

                          Scientologist Affiliation     [employer]

 

                          Race                      White/

 

                          Ethnic Group              Non-





Author







                          Author                    Auto Generated

 

                          Organization              [facility]

 

                          Address                   Unknown

 

                          Phone                     Unavailable







Care Team Providers







                    Care Team Member Name    Role                Phone

 

                    Roque Woody    CP                  [phone]







Allergies, Adverse Reactions, Alerts







  



                     Substance           Reaction            Status

 

  



                           NKDA                      Active







Problem List







  



                     Condition           Effective Dates     Status

 

  



                     [D]Anterior chest wall pain     2012          Active

 

  



                           BP+ - Hypertension        Resolved

 

  



                           CHF - Congestive heart failure      Active

 

  



                           CHF - Congestive heart failure      Resolved

 

  



                           Down syndrome             Active

 

  



                           GERD - Gastro-esophageal reflux disease      Resolved

 

  



                           H/O: schizophrenia        Active

 

  



                           NIDDM                     Resolved

 

  



                           Obese build               Resolved

 

  



                           Sleep apnea               Resolved







Medications







    



              Medication     Instructions     Start Date     End Date     Status

 

    



                 flurazepam 15 mg     15 mg, 1 cap, PO, Bedtime,     2013      Ordered



                           oral capsule              Substitution Allowed, CAP   

 

    



                 digoxin 125 mcg     125 microgram, 1 tab, PO, Daily, 30     2013      Ordered



                           (0.125 mg) oral           tab, Substitution Allowed, TAB   



                                         tablet    

 

    



                 mirtazapine 30 mg     30 mg, 1 tab, PO, Bedtime,     2013      Ordered



                           oral tablet               Substitution Allowed   

 

    



                 potassium chloride     40 mEq, 30 mL, PO, Daily, 7 mL,     2013      Ordered



                           20 mEq/15 mL oral         Substitution Allowed, LIQ   



                                         liquid    

 

    



                 Glucophage 500 mg     500 mg, 1 tab, PO, BID-Meals, 60     2013      Ordered



                           oral tablet               tab, Substitution Allowed, TAB   

 

    



                 calcium-vitamin D     1 tab, PO, BID, 30 tab,     2013      Ordered



                           500 mg-125 units          Substitution Allowed, Maintenance,   



                           oral tablet               TAB   

 

    



                 Dextrose 50% Syringe     25 gm, 50 mL, Route: IVP, Drug     2013

                                         Discontinued



                                         Form: INJ, Dosing Weight 106.676,   



                                         kg, PRN, PRN Blood Glucose Results,   



                                         Start date: 13 12:44:00,   



                                         Duration: 30 day, Stop date:   



                                         13 12:43:00   

 

    



                 Dextrose 50% Syringe     12.5 gm, 25 mL, Route: IVP, Drug     2013

                                         Discontinued



                                         Form: INJ, Dosing Weight 106.676,   



                                         kg, PRN, PRN Blood Glucose Results,   



                                         Start date: 13 12:44:00,   



                                         Duration: 30 day, Stop date:   



                                         13 12:43:00   

 

    



              glucagon     1 mg, Route: IM, Drug form:     2013     Discontinued





                                         PDR/INJ, PRN, Dosing Weight   



                                         106.676, kg, PRN Blood Glucose   



                                         Results, Start date: 13   



                                         12:44:00, Duration: 30 day, Stop   



                                         date: 13 12:43:00   

 

    



                 insulin aspart     8 unit, 0.08 mL, Route: SUB-Q, Drug     2013 

                                         Discontinued



                                         form: SOLN, TID-Before Meals,   



                                         Dosing Weight 106.676, kg, PRN   



                                         Blood Glucose Results, Start date:   



                                         13 12:44:00, Duration: 30   



                                         day, Stop date: 13 12:43:00   

 

    



                 insulin aspart     6 unit, 0.06 mL, Route: SUB-Q, Drug     2013 

                                         Discontinued



                                         form: SOLN, TID-Before Meals,   



                                         Dosing Weight 106.676, kg, PRN   



                                         Blood Glucose Results, Start date:   



                                         13 12:44:00, Duration: 30   



                                         day, Stop date: 13 12:43:00   

 

    



                 insulin aspart     10 unit, 0.1 mL, Route: SUB-Q, Drug     2013 

                                         Discontinued



                                         form: SOLN, TID-Before Meals,   



                                         Dosing Weight 106.676, kg, PRN   



                                         Blood Glucose Results, Start date:   



                                         13 12:44:00, Duration: 30   



                                         day, Stop date: 13 12:43:00   

 

    



                 insulin aspart     4 unit, 0.04 mL, Route: SUB-Q, Drug     2013 

                                         Discontinued



                                         form: SOLN, TID-Before Meals,   



                                         Dosing Weight 106.676, kg, PRN   



                                         Blood Glucose Results, Start date:   



                                         13 12:44:00, Duration: 30   



                                         day, Stop date: 13 12:43:00   

 

    



                 insulin aspart     2 unit, 0.02 mL, Route: SUB-Q, Drug     2013 

                                         Discontinued



                                         form: SOLN, TID-Before Meals,   



                                         Dosing Weight 106.676, kg, PRN   



                                         Blood Glucose Results, Start date:   



                                         13 12:44:00, Duration: 30   



                                         day, Stop date: 13 12:43:00   

 

    



              Lasix        40 mg, 4 mL, Route: IVP, Drug form:     2013     Completed





                                         INJ, ONCE, Dosing Weight 102.727,   



                                         kg, Priority: STAT, Start date:   



                                         13 22:54:00, Stop date:   



                                         13 22:54:00   

 

    



              Coreg        12.5 mg, 1 tab, Route: PO, Drug     2013     Completed



                                         form: TAB, ONCE, Dosing Weight   



                                         102.727, kg, Start date: 13   



                                         23:39:00, Stop date: 13   



                                         23:39:00   

 

    



              mirtazapine     30 mg, 2 tab, Route: PO, Drug form:     2013     Completed





                                         TAB, ONCE, Dosing Weight 102.727,   



                                         kg, Start date: 13 23:38:00,   



                                         Stop date: 13 23:38:00   

 

    



              risperidone     3 mg, 3 tab, Route: PO, Drug form:     2013     Discontinued





                                         TAB, Bedtime, Dosing Weight   



                                         106.676, kg, Start date: 13   



                                         21:00:00, Duration: 30 day, Stop   



                                         date: 13 21:00:00   

 

    



              potassium chloride     20 mEq, 1 tab, Route: PO, Drug     2013    

 Completed



                           20 mEq oral tablet,       form: ERTAB, ONCE, Dosing Weight   



                           extended release          106.676, kg, Start date: 13   



                                         11:54:00, Stop date: 13   



                                         11:54:00   

 

    



              flurazepam     15 mg, Route: PO, Drug form: CAP,     2013     Deleted





                                         Bedtime, Dosing Weight 106.676, kg,   



                                         Start date: 13 21:00:00,   



                                         Duration: 30 day, Stop date:   



                                         13 21:00:00   

 

    



                 furosemide 40 mg     40 mg, 1 tab, PO, BID, Substitution     2013      Ordered



                           oral tablet               Allowed   

 

    



                 calcium-vitamin D     1 tab, Route: PO, Drug Form: TAB,     2013

                                         Discontinued



                           500 mg-125 units          Dosing Weight 106.676, kg, BID,   



                           oral tablet               Start date: 13 17:00:00,   



                                         Duration: 30 day, Stop date:   



                                         13 9:00:00   

 

    



                 temazepam 30 mg oral     30 mg, 1 cap, PO, Bedtime,     2013      Ordered



                           capsule                   Substitution Allowed, CAP   

 

    



                 ibuprofen 200 mg     200 mg, 1 tab, PO, PRN, as needed     2013      Ordered



                           oral tablet               for pain, Substitution Allowed   

 

    



              clonazepam     2 mg, 4 tab, Route: PO, Drug form:     2013     Discontinued





                                         TAB, Bedtime, Dosing Weight   



                                         106.676, kg, Start date: 13   



                                         21:00:00, Duration: 30 day, Stop   



                                         date: 13 21:00:00   

 

    



              aspirin      81 mg, Route: PO, Drug form: TAB,     2013     Deleted





                                         Daily, Dosing Weight 106.676, kg,   



                                         Start date: 13 9:00:00,   



                                         Duration: 30 day, Stop date:   



                                         13 9:00:00   

 

    



                 acetaminophen 325 mg     325 mg, 1 tab, PO, PRN, as needed     2013      Ordered





                           oral tablet               for pain, Substitution Allowed   

 

    



              Tylenol      650 mg, 2 tab, Route: PO, Drug     2013     Discontinued





                                         form: TAB, Q6H, PRN Pain, Start   



                                         date: 13 22:42:00, Duration:   



                                         30 day, Stop date: 13   



                                         22:41:00   

 

    



              digoxin 125 mcg     0.125 mg, 1 tab, Route: PO, Drug     2013     

Discontinued



                           (0.125 mg) oral           form: TAB, Daily, Dosing Weight   



                           tablet                    106.676, kg, Start date: 13   



                                         11:30:00, Duration: 30 day, Stop   



                                         date: 13 9:00:00   

 

    



                 Aspirin Low Dose 81     81 mg, 1 tab, PO, Daily,     2013      Ordered



                           mg oral tablet            Substitution Allowed   

 

    



              aspirin 81 mg     81 mg, 1 tab, Route: PO, Drug form:     2013    

 Discontinued



                           tablet, enteric           ECTAB, Daily, Dosing Weight   



                           coated                    106.676, kg, Start date: 13   



                                         11:30:00, Duration: 30 day, Stop   



                                         date: 13 9:00:00   

 

    



              potassium chloride     40 mEq, 2 tab, Route: PO, Drug     2013    

 Discontinued



                           20 mEq oral tablet,       form: ERTAB, Daily, Dosing Weight   



                           extended release          106.676, kg, Start date: 13   



                                         11:30:00, Duration: 30 day, Stop   



                                         date: 13 9:00:00   

 

    



                 lisinopril 40 mg     40 mg, 1 tab, PO, Daily,     2013      Ordered



                           oral tablet               Substitution Allowed   

 

    



              Lasix        40 mg, 4 mL, Route: IVP, Drug form:     2013     Discontinued





                                         INJ, Daily, Dosing Weight 106.676,   



                                         kg, Start date: 13 11:30:00,   



                                         Duration: 30 day, Stop date:   



                                         13 9:00:00   

 

    



              Restoril     15 mg, 1 cap, Route: PO, Drug form:     2013     Discontinued





                                         CAP, Bedtime, Start date: 13   



                                         21:00:00, Duration: 30 day, Stop   



                                         date: 13 21:00:00   

 

    



                 risperidone 3 mg     3 mg, 1 tab, PO, Bedtime, 60 tab,     2013      Ordered



                           oral tablet               Substitution Allowed, TAB   

 

    



              metFORmin     500 mg, 1 tab, Route: PO, Drug     2013     Discontinued





                                         form: TAB, BID-Meals, Dosing Weight   



                                         106.676, kg, Start date: 13   



                                         8:00:00, Duration: 30 day, Stop   



                                         date: 13 17:00:00   

 

    



              potassium chloride     40 mEq, 30 mL, Route: PO, Drug     2013    

 Discontinued



                                         form: LIQ, Daily, Start date:   



                                         13 16:00:00, Duration: 30   



                                         day, Stop date: 13 9:00:00   

 

    



                 Saline Flush 0.9%     5 ml, Route: IVP, Drug Form: INJ,     2013

                                         Discontinued



                                         Dosing Weight 102.727, kg, Q12H,   



                                         Start date: 13 9:00:00,   



                                         Duration: 30 day, Stop date:   



                                         13 21:00:00   

 

    



                 Saline Flush 0.9%     5 ml, Route: IVP, Drug Form: INJ,     2013

                                         Discontinued



                                         Dosing Weight 102.727, kg, PRN, PRN   



                                         Line Flush, Start date: 13   



                                         1:26:00, Duration: 30 day, Stop   



                                         date: 13 1:25:00   

 

    



                 spironolactone     25 mg, 1 tab, Route: PO, Drug form:     2013 

                                         Discontinued



                                         TAB, Daily, Dosing Weight 102.727,   



                                         kg, Start date: 13 9:00:00,   



                                         Duration: 30 day, Stop date:   



                                         13 9:00:00   

 

    



              temazepam     15 mg, 1 cap, Route: PO, Drug form:     2013     Discontinued





                                         CAP, Bedtime, Dosing Weight   



                                         102.727, kg, PRN Insomnia, Start   



                                         date: 13 1:26:00, Duration:   



                                         30 day, Stop date: 13 1:25:00   

 

    



              ondansetron     4 mg, 2 mL, Route: IVP, Drug form:     2013     Discontinued





                                         INJ, Q8H, Dosing Weight 102.727,   



                                         kg, PRN Nausea & Vomiting, Start   



                                         date: 13 1:26:00, Duration:   



                                         30 day, Stop date: 13 1:25:00   

 

    



              lisinopril     40 mg, 2 tab, Route: PO, Drug form:     2013     Discontinued





                                         TAB, Daily, Dosing Weight 102.727,   



                                         kg, Start date: 13 9:00:00,   



                                         Duration: 30 day, Stop date:   



                                         13 9:00:00   

 

    



              carvedilol     12.5 mg, 1 tab, Route: PO, Drug     2013     Discontinued





                                         form: TAB, Q12H, Dosing Weight   



                                         102.727, kg, Start date: 13   



                                         9:00:00, Duration: 30 day, Stop   



                                         date: 13 21:00:00   

 

    



                 Sodium Chloride 0.9%     25 mL, Route: IV, Start date:     2013 

                                         Discontinued



                           IV                        13 22:22:00, Duration: 30   



                                         day, Stop date: 13 22:21:00,   



                                         PRN Line Flush   

 

    



                 BD Normal Saline     10 mL, Route: IV, Drug Form: INJ,     2013 

                                         Discontinued



                           Flush                     PRN, PRN Line Flush, Start date:   



                                         13 22:22:00, Duration: 30   



                                         day, Stop date: 13 22:21:00   

 

    



              Zaroxolyn     2.5 mg, 1 tab, Route: PO, Drug     2013     Completed





                                         form: TAB, ONCE, Dosing Weight   



                                         106.676, kg, Start date: 13   



                                         15:54:00, Stop date: 13   



                                         15:54:00   

 

    



              Lasix        40 mg, 4 mL, Route: IVP, Drug form:     2013     Discontinued





                                         INJ, Q8H, Dosing Weight 106.676,   



                                         kg, Start date: 13 16:00:00,   



                                         Duration: 30 day, Stop date:   



                                         13 8:00:00   

 

    



              Levemir      10 unit, 0.1 mL, Route: SUB-Q, Drug     2013     Discontinued





                                         form: INJ, Daily, Dosing Weight   



                                         106.676, kg, Start date: 13   



                                         13:30:00, Duration: 30 day, Stop   



                                         date: 13 9:00:00   

 

    



              clonidine     0.1 mg, 1 tab, Route: PO, Drug     2013     Completed





                                         form: TAB, ONCE, Dosing Weight   



                                         102.727, kg, Priority: STAT, Start   



                                         date: 13 22:19:00, Stop date:   



                                         13 22:19:00   

 

    



              GI cocktail     30 mL, Route: PO, Drug Form: SUSP,     2013     Completed





                                         Dosing Weight 102.727, kg, ONCE,   



                                         STAT, Start date: 13   



                                         22:19:00, Stop date: 13   



                                         22:19:00   

 

    



              aspirin      324 mg, 4 tab, Route: PO, Drug     2013     Completed



                                         form: CHEWTAB, ONCE, Dosing Weight   



                                         102.727, kg, Priority: STAT, Start   



                                         date: 13 22:18:00, Stop date:   



                                         13 22:18:00   

 

    



                 Saline Flush 0.9%     5 mL, Route: IVP, Drug Form: INJ,     2013

                                         Discontinued



                                         Dosing Weight 102.727, kg, Q8H, PRN   



                                         Line Flush, Start date: 13   



                                         22:18:00, Duration: 30 day, Stop   



                                         date: 13 22:17:00, Administer   



                                         at least once every 8 hours   



                                         Administer at least once every 8   



                                         hours   

 

    



              magnesium sulfate     2 gm, 50 mL, Route: IVPB, Drug     2013     

Completed



                           2gm / NS 50ml             form: INJ, ONCE, Dosing Weight   



                           (premixed)                103.21, kg, Start date: 13   



                                         11:00:00, Duration: 2 hr, Stop   



                                         date: 13 11:00:00   

 

    



              potassium chloride     40 mEq, 30 mL, Route: PO, Drug     2013    

 Completed



                           20 mEq/15 mL oral         form: LIQ, ONCE, Dosing Weight   



                           liquid                    103.21, kg, Start date: 13   



                                         11:00:00, Stop date: 13   



                                         11:00:00   

 

    



              carvedilol 12.5 mg     12.5 mg, 1 tab, PO, BID,     2013     Discontinued





                           oral tablet               Substitution Allowed   

 

    



              hydrALAZINE     20 mg, 1 mL, Route: IVP, Drug form:     2013     Completed





                                         INJ, ONCE, Dosing Weight 102.727,   



                                         kg, Priority: STAT, Start date:   



                                         13 23:39:00, Stop date:   



                                         13 23:39:00   

 

    



                 spironolactone 25 mg     25 mg, 1 tab, PO, Daily,     2013      Ordered



                           oral tablet               Substitution Allowed   

 

    



              potassium chloride     20 mEq, 15 mL, Route: PO, Drug     2013    

 Completed



                           20 mEq/15 mL oral         form: LIQ, ONCE, Dosing Weight   



                           liquid                    106.676, kg, Start date: 13   



                                         16:01:00, Stop date: 13   



                                         16:01:00   







Vital Signs







                Most recent to oldest [Reference Range]:    1               2               3

 

                          Height                    172.72 cm 

                          (2013 02:00:00)      167.64 cm 

                          (2013 22:09:00)       

 

                          Current Weight            106.318 kg 

                    (2013 06:33:00)                           

 

                          Temperature Oral [96.4-99.1 DegF]    97.0 DegF 

                          (2013 15:37:00)      96.9 DegF 

                          (2013 12:05:00)      95.8 DegF 

*LOW*

                                        (2013 07:46:00)  

 

                          Systolic Blood Pressure [ mmHg]    117 mmHg 

                          (2013 15:37:00)      92 mmHg 

                          (2013 12:05:00)      97 mmHg 

                                        (2013 07:46:00)  

 

                          Diastolic Blood Pressure [60-90 mmHg]    71 mmHg 

                          (2013 15:37:00)      57 mmHg 

*LOW*

                          (2013 12:05:00)      64 mmHg 

                                        (2013 07:46:00)  

 

                          Respiratory Rate [14-20 BRMIN]    18 BRMIN 

                          (2013 15:37:00)      18 BRMIN 

                          (2013 12:05:00)      18 BRMIN 

                                        (2013 07:46:00)  

 

                          Peripheral Pulse Rate [ bpm]    65 bpm 

                          (2013 15:37:00)      70 bpm 

                          (2013 12:05:00)      62 bpm 

                                        (2013 07:46:00)  

 

                          Weight                    103.21 kg 

                          (2013 07:35:00)      106.676 kg 

                          (2013 02:00:00)      102.727 kg 

                                        (2013 22:09:00)  







Results





BEDSIDE GLUCOSE TESTING





                Most recent to oldest [Reference Range]:    1               2               3

 

                          Gluc POC Lifscn [70-99 mg/dL]    186 mg/dL 1

*HI*

                          (2013 15:24:00)      229 mg/dL 2

*HI*

                          (2013 11:36:00)      163 mg/dL 3

*HI*

                                        (2013 07:15:00)  

 

                          Comment1                  Notify RN/MD 

*NA*

                          (2013 15:24:00)      Notify RN/MD 

*NA*

                          (2013 11:36:00)      Verify w/Lab 

*NA*

                                        (2013 07:15:00)  







1Interpretive Data:  Upper Reportable Limit: 200 mg/dL.



2Interpretive Data:  Upper Reportable Limit: 200 mg/dL.



3Interpretive Data:  Upper Reportable Limit: 200 mg/dL.



CHEMISTRY





                Most recent to oldest [Reference Range]:    1               2               3

 

                          Sodium Lvl [135-145 mEq/L]    136 mEq/L 

                          (2013 04:50:00)      137 mEq/L 

                          (2013 13:10:00)      139 mEq/L 

                                        (2013 06:30:00)  

 

                          Potassium Lvl [3.5-5.1 mEq/L]    3.4 mEq/L 

*LOW*

                          (2013 04:50:00)      3.7 mEq/L 

                          (2013 13:10:00)      3.4 mEq/L 

*LOW*

                                        (2013 06:30:00)  

 

                          Chloride Lvl [ mEq/L]    93 mEq/L 

*LOW*

                          (2013 04:50:00)      98 mEq/L 

                          (2013 13:10:00)      100 mEq/L 

                                        (2013 06:30:00)  

 

                          CO2 [24-32 mEq/L]         32 mEq/L 

                          (2013 04:50:00)      28 mEq/L 

                          (2013 13:10:00)      31 mEq/L 

                                        (2013 06:30:00)  

 

                          AGAP [10.0-20.0 mEq/L]    14.4 mEq/L 

                          (2013 04:50:00)      14.7 mEq/L 

                          (2013 13:10:00)      11.4 mEq/L 

                                        (2013 06:30:00)  

 

                          Creatinine Lvl [0.5-1.4 mg/dL]    1.3 mg/dL 

                          (2013 04:50:00)      1.2 mg/dL 

                          (2013 13:10:00)      1.2 mg/dL 

                                        (2013 06:30:00)  

 

                          eGFR                      65 mL/min/1.73m2 4

*NA*

                          (2013 04:50:00)      71 mL/min/1.73m2 5

*NA*

                          (2013 13:10:00)      71 mL/min/1.73m2 6

*NA*

                                        (2013 06:30:00)  

 

                          BUN [7-22 mg/dL]          22 mg/dL 

                          (2013 04:50:00)      23 mg/dL 

*HI*

                          (2013 13:10:00)      23 mg/dL 

*HI*

                                        (2013 06:30:00)  

 

                          B/C Ratio [6-25]          18 

                    (2013 22:28:00)                           

 

                          Glucose Lvl [70-99 mg/dL]    153 mg/dL 7

*HI*

                          (2013 04:50:00)      283 mg/dL 8

*HI*

                          (2013 13:10:00)      160 mg/dL 9

*HI*

                                        (2013 06:30:00)  

 

                          Total Protein [6.4-8.4 g/dL]    6.9 g/dL 

                    (2013 22:28:00)                           

 

                          Albumin Lvl [3.5-5.0 g/dL]    3.8 g/dL 

                    (2013 22:28:00)                           

 

                          Globulin [2.0-4.0 g/dL]    3.1 g/dL 

                    (2013 22:28:00)                           

 

                          A/G Ratio [0.7-1.6]       1.2 

                    (2013 22:28:00)                           

 

                          Calcium Lvl [8.5-10.5 mg/dL]    8.6 mg/dL 

                          (2013 04:50:00)      8.2 mg/dL 

*LOW*

                          (2013 13:10:00)      7.9 mg/dL 

*LOW*

                                        (2013 06:30:00)  

 

                          ALT [0-65 unit/L]         27 unit/L 

                    (2013 22:28:00)                           

 

                          AST [0-37 unit/L]         11 unit/L 

                    (2013 22:28:00)                           

 

                          Alk Phos [ unit/L]    128 unit/L 

                    (2013 22:28:00)                           

 

                          Bili Total [0.2-1.3 mg/dL]    0.9 mg/dL 

                    (2013 22:28:00)                           

 

                          Lipase Lvl [ unit/L]    134 unit/L 

                    (2013 22:28:00)                           

 

                          Total CK [ unit/L]    81 unit/L 

                          (2013 04:50:00)      102 unit/L 

                          (2013 04:40:00)       

 

                          CK MB [0.5-3.6 ng/mL]     1.0 ng/mL 

                    (2013 04:40:00)                           

 

                          CK MB Index [0.0-2.5]     1.0 

                    (2013 04:40:00)                           

 

                          Troponin-I [0.00-0.40 ng/mL]    0.03 ng/mL 

                          (2013 04:40:00)      0.03 ng/mL 

                          (2013 22:28:00)       

 

                          BNP [<=100 pg/mL]         466 pg/mL 10

*HI*

                          (2013 04:50:00)      696 pg/mL 11

*HI*

                          (2013 13:10:00)      889 pg/mL 12

*HI*

                                        (2013 06:30:00)  

 

                          CHD Risk [4.00-7.30]      7.43 

*HI*

                    (2013 04:50:00)                           

 

                          Chol [<=199 mg/dL]        156 mg/dL 

                    (2013 04:50:00)                           

 

                          Trig [<=149 mg/dL]        210 mg/dL 

*HI*

                    (2013 04:50:00)                           

 

                          HDL [>=61 mg/dL]          21 mg/dL 

*LOW*

                    (2013 04:50:00)                           

 

                          LDL [<=99 mg/dL]          93 mg/dL 

                    (2013 04:50:00)                           

 

                          Hgb A1C [<=5.6 %]         9.8 % 

*HI*

                    (2013 13:10:00)                           

 

                          Digoxin Lvl [0.8-2.0 ng/mL]    0.2 ng/mL 

*LOW*

                    (2013 04:50:00)                           







4Result Comment: The eGFR is calculated using the CKD-EPI formula. In most 
young, healthy individuals the eGFR will be >90 mL/min/1.73m2. The eGFR declines
with age. An eGFR of 60-89 may be normal in some populations, particularly the 
elderly, for whom the CKD-EPI formula has not been extensively validated. Use of
the eGFR is not recommended in the following populations:



Individuals with unstable creatinine concentrations, including pregnant patients
and those with serious co-morbid conditions.



Patients with extremes in muscle mass or diet. 



The data above are obtained from the National Kid[employer] Disease Education Program (
NKDEP) which additionally recommends that when the eGFR is used in patients with
extremes of body mass index for purposes of drug dosing, the eGFR should be mul
tiplied by the estimated BMI.



5Result Comment: The eGFR is calculated using the CKD-EPI formula. In most 
young, healthy individuals the eGFR will be >90 mL/min/1.73m2. The eGFR declines
with age. An eGFR of 60-89 may be normal in some populations, particularly the 
elderly, for whom the CKD-EPI formula has not been extensively validated. Use of
the eGFR is not recommended in the following populations:



Individuals with unstable creatinine concentrations, including pregnant patients
and those with serious co-morbid conditions.



Patients with extremes in muscle mass or diet. 



The data above are obtained from the National Kid[employer] Education Program (
NKDEP) which additionally recommends that when the eGFR is used in patients with
extremes of body mass index for purposes of drug dosing, the eGFR should be mul
tiplied by the estimated BMI.



6Result Comment: The eGFR is calculated using the CKD-EPI formula. In most 
young, healthy individuals the eGFR will be >90 mL/min/1.73m2. The eGFR declines
with age. An eGFR of 60-89 may be normal in some populations, particularly the 
elderly, for whom the CKD-EPI formula has not been extensively validated. Use of
the eGFR is not recommended in the following populations:



Individuals with unstable creatinine concentrations, including pregnant patients
and those with serious co-morbid conditions.



Patients with extremes in muscle mass or diet. 



The data above are obtained from the National Kid[employer] Disease Education Program (
NKDEP) which additionally recommends that when the eGFR is used in patients with
extremes of body mass index for purposes of drug dosing, the eGFR should be mul
tiplied by the estimated BMI.



7Interpretive Data: Adult reference range values reflect the clinical guidelines

of the [employer].



8Interpretive Data: Adult reference range values reflect the clinical guidelines

of the [employer].



9Interpretive Data: Adult reference range values reflect the clinical guidelines

of the [employer].



10Interpretive Data: Elevated results are in line with increasing severity of 

congestive heart failure. Minor elevations between 100 and 300 

may be seen with Myocardial Ischemia, Sodium retaining drugs, 

and compensated/treated heart failure.



11Interpretive Data: Elevated results are in line with increasing severity of 

congestive heart failure. Minor elevations between 100 and 300 

may be seen with Myocardial Ischemia, Sodium retaining drugs, 

and compensated/treated heart failure.



12Interpretive Data: Elevated results are in line with increasing severity of 

congestive heart failure. Minor elevations between 100 and 300 

may be seen with Myocardial Ischemia, Sodium retaining drugs, 

and compensated/treated heart failure.



HEMATOLOGY





                Most recent to oldest [Reference Range]:    1               2               3

 

                          WBC [3.7-10.4 K/CMM]      7.3 K/CMM 

                          (2013 04:50:00)      6.3 K/CMM 

                          (2013 06:30:00)      6.4 K/CMM 

                                        (2013 04:40:00)  

 

                          RBC [4.70-6.10 M/CMM]     5.15 M/CMM 

                          (2013 04:50:00)      4.69 M/CMM 

*LOW*

                          (2013 06:30:00)      4.73 M/CMM 

                                        (2013 04:40:00)  

 

                          Hgb [14.0-18.0 g/dL]      15.6 g/dL 

                          (2013 04:50:00)      14.2 g/dL 

                          (2013 06:30:00)      14.3 g/dL 

                                        (2013 04:40:00)  

 

                          Hct [42.0-54.0 %]         45.7 % 

                          (2013 04:50:00)      41.9 % 

*LOW*

                          (2013 06:30:00)      42.5 % 

                                        (2013 04:40:00)  

 

                          MCV [80.0-94.0 fL]        88.7 fL 

                          (2013 04:50:00)      89.3 fL 

                          (2013 06:30:00)      90.0 fL 

                                        (2013 04:40:00)  

 

                          MCH [27.0-31.0 pg]        30.3 pg 

                          (2013 04:50:00)      30.2 pg 

                          (2013 06:30:00)      30.2 pg 

                                        (2013 04:40:00)  

 

                          MCHC [32.0-36.0 g/dL]     34.2 g/dL 

                          (2013 04:50:00)      33.8 g/dL 

                          (2013 06:30:00)      33.5 g/dL 

                                        (2013 04:40:00)  

 

                          RDW [11.5-14.5 %]         14.5 % 

                          (2013 04:50:00)      14.1 % 

                          (2013 06:30:00)      14.0 % 

                                        (2013 04:40:00)  

 

                          Platelet [133-450 K/CMM]    176 K/CMM 

                          (2013 04:50:00)      169 K/CMM 

                          (2013 06:30:00)      189 K/CMM 

                                        (2013 04:40:00)  

 

                          MPV [7.4-10.4 fL]         8.8 fL 

                          (2013 04:50:00)      8.5 fL 

                          (2013 06:30:00)      8.9 fL 

                                        (2013 04:40:00)  

 

                          Segs [45.0-75.0 %]        74.8 % 

                          (2013 04:50:00)      72.2 % 

                          (2013 06:30:00)      77.1 % 

*HI*

                                        (2013 04:40:00)  

 

                          Lymphocytes [20.0-40.0 %]    16.5 % 

*LOW*

                          (2013 04:50:00)      20.6 % 

                          (2013 06:30:00)      17.5 % 

*LOW*

                                        (2013 04:40:00)  

 

                          Monocytes [2.0-12.0 %]    6.1 % 

                          (2013 04:50:00)      4.5 % 

                          (2013 06:30:00)      4.5 % 

                                        (2013 04:40:00)  

 

                          Eosinophils [0.0-4.0 %]    2.4 % 

                          (2013 04:50:00)      2.3 % 

                          (2013 06:30:00)      0.5 % 

                                        (2013 04:40:00)  

 

                          Basophils [0.0-1.0 %]     0.2 % 

                          (2013 04:50:00)      0.4 % 

                          (2013 06:30:00)      0.4 % 

                                        (2013 04:40:00)  

 

                          Segs-Bands # [1.5-8.1 K/CMM]    5.5 K/CMM 

                          (2013 04:50:00)      4.6 K/CMM 

                          (2013 06:30:00)      4.9 K/CMM 

                                        (2013 04:40:00)  

 

                          Lymphocytes # [1.0-5.5 K/CMM]    1.2 K/CMM 

                          (2013 04:50:00)      1.3 K/CMM 

                          (2013 06:30:00)      1.1 K/CMM 

                                        (2013 04:40:00)  

 

                          Monocytes # [0.0-0.8 K/CMM]    0.5 K/CMM 

                          (2013 04:50:00)      0.3 K/CMM 

                          (2013 06:30:00)      0.3 K/CMM 

                                        (2013 04:40:00)  

 

                          Eosinophils # [0.0-0.5 K/CMM]    0.2 K/CMM 

                          (2013 04:50:00)      0.1 K/CMM 

                          (2013 06:30:00)      0.0 K/CMM 

                                        (2013 04:40:00)  

 

                          Basophils # [0.0-0.2 K/CMM]    0.0 K/CMM 

                          (2013 04:50:00)      0.0 K/CMM 

                          (2013 06:30:00)      0.0 K/CMM 

                                        (2013 04:40:00)  

 

                          PT [12.0-14.7 seconds]    15.5 seconds 

*HI*

                    (2013 22:28:00)                           

 

                          INR [0.85-1.17]           1.21 13

*HI*

                    (2013 22:28:00)                           

 

                          PTT [22.9-35.8 seconds]    28.5 seconds 14

                    (2013 22:28:00)                           







13Interpretive Data: RECOMMENDED RANGES FOR PROTIME INR:

   2.0-3.0 for most medical and surgical thromboembolic states.

   2.5-3.5 for artificial heart valves and recurrent embolism.



INR SHOULD BE USED ONLY FOR PATIENTS ON STABLE ANTICOAGULANT THERAPY.



14Interpretive Data: Heparin Therapeutic Range:  57 - 92 Seconds



Procedures







  



                     Procedures          Date                Related Diagnosis

 

  



                                         Repair of ligament of knee joint  1  







1left knee ligament surgery

## 2019-09-25 NOTE — XMS REPORT
Encounter CCD: 10/29/2013 to 10/31/2013

                             Created on: 11/15/2041



JOANNA CABRERA

External Reference #: [id number]

: 1965

Sex: Male



Demographics







                          Address                   [address] [place] [address]

[place]  [id number]

 

                          Home Phone                [phone]

 

                          Preferred Language        Unknown

 

                          Marital Status            Single

 

                          Taoist Affiliation     Anglican

 

                          Race                      White/

 

                          Ethnic Group              Non-





Author







                          Author                    Auto Generated

 

                          Organization              [facility]

 

                          Address                   Unknown

 

                          Phone                     Unavailable







Care Team Providers







                    Care Team Member Name    Role                Phone

 

                    Lashon Puckett    CP                  [phone]







Allergies, Adverse Reactions, Alerts







  



                     Substance           Reaction            Status

 

  



                           NKDA                      Active







Problem List







  



                     Condition           Effective Dates     Status

 

  



                     [D]Anterior chest wall pain     2012          Active

 

  



                           BP+ - Hypertension        Active

 

  



                           Cardiomyopathy            Resolved

 

  



                           CHF - Congestive heart failure      Active

 

  



                           CHF - Congestive heart failure      Active

 

  



                           Depression                Active

 

  



                           dm                        Active

 

  



                           Down syndrome             Active

 

  



                           GERD - Gastro-esophageal reflux disease      Active

 

  



                           H/O: schizophrenia        Active

 

  



                           HTN                       Active

 

  



                           ICD (implantable cardiac defibrillator) battery depletion      Active

 

  



                           NIDDM                     Active

 

  



                           Obese build               Active

 

  



                           Sleep apnea               Active







Medications







    



              Medication     Instructions     Start Date     End Date     Status

 

    



              Geodon       20 mg, 1 cap, Route: PO, Drug form:     10/30/2013     10/31/2013     Discontinued





                                         CAP, Bedtime, Dosing Weight   



                                         127.273, kg, Start date: 10/30/13   



                                         21:00:00, Duration: 30 day, Stop   



                                         date: 13 21:00:00(Same As:   



                                         Geodon)Give with Food   

 

    



              simvastatin     20 mg, 1 tab, Route: PO, Drug form:     10/30/2013     10/31/2013     Discontinued





                                         TAB, Bedtime, Dosing Weight   



                                         127.273, kg, Start date: 10/30/13   



                                         21:00:00, Duration: 30 day, Stop   



                                         date: 13 21:00:00(Same as:   



                                         Zocor)   

 

    



              pantoprazole     40 mg, 1 tab, Route: PO, Drug form:     10/30/2013     10/31/2013     

Discontinued



                                         ECTAB, Before Dinner, Dosing Weight   



                                         127.273, kg, Start date: 10/30/13   



                                         16:30:00, Duration: 30 day, Stop   



                                         date: 13 16:30:00Tablet   



                                         should not be chewed or   



                                         crushed.(Same as: Protonix)   

 

    



              Remeron      15 mg, 1 tab, Route: PO, Drug form:     10/30/2013     10/31/2013     Discontinued





                                         TAB, Bedtime, Dosing Weight   



                                         127.273, kg, Start date: 10/30/13   



                                         21:00:00, Duration: 30 day, Stop   



                                         date: 13 21:00:00(Same   



                                         as:Remeron)   

 

    



              metFORmin 500 mg     500 mg, 1 tab, Route: PO, Drug     10/30/2013     10/31/2013     Discontinued





                           oral tablet               form: TAB, BID-Meals, Dosing Weight   



                                         127.273, kg, Start date: 10/30/13   



                                         17:00:00, Duration: 30 day, Stop   



                                         date: 13 8:00:00(Same as:   



                                         Glucophage)  Take with meal   

 

    



              clonazepam     2 mg, 2 tab, Route: PO, Drug form:     10/30/2013     10/31/2013     Discontinued





                                         TAB, Bedtime, Dosing Weight   



                                         127.273, kg, Start date: 10/30/13   



                                         21:00:00, Duration: 30 day, Stop   



                                         date: 13 21:00:00(Same As:   



                                         Klonopin)   

 

    



              aspirin      81 mg, 1 tab, Route: PO, Drug form:     10/30/2013     10/31/2013     Discontinued





                                         CHEWTAB, Daily, Dosing Weight   



                                         127.273, kg, Start date: 10/30/13   



                                         12:57:00, Duration: 30 day, Stop   



                                         date: 13 9:00:00Take with   



                                         food.   

 

    



                 Saline Flush 0.9%     5 mL, Route: IVP, Drug Form: INJ,     10/29/2013      10/30/2013

                                         Discontinued



                                         Dosing Weight 127.273, kg, Q8H, PRN   



                                         Line Flush, Start date: 10/29/13   



                                         10:02:00, Duration: 30 day, Stop   



                                         date: 13 10:01:00, Administer   



                                         at least once every 8 hours   



                                         Administer at least once every 8   



                                         hours(Same as: BD Posiflush)   

 

    



              aspirin      324 mg, 4 tab, Route: PO, Drug     10/29/2013     10/29/2013     Completed



                                         form: CHEWTAB, ONCE, Dosing Weight   



                                         127.273, kg, Priority: STAT, Start   



                                         date: 10/29/13 10:02:00, Stop date:   



                                         10/29/13 10:02:00Take with food.   

 

    



              potassium chloride     40 mEq, 2 tab, Route: PO, Drug     10/29/2013     10/29/2013    

 Completed



                                         form: ERTAB, ONCE, Dosing Weight   



                                         127.273, kg, Start date: 10/29/13   



                                         15:28:00, Stop date: 10/29/13   



                                         15:28:00(Same as: K-Dur 20)"Do Not   



                                         Crush"  With food and full glass of   



                                         water   

 

    



                 pantoprazole 40 mg     40 mg, 1 tab, PO, Before Dinner, 30     10/31/2013      Ordered





                           oral enteric coated       tab, Substitution Allowed, ECTAB   



                                         tablet    

 

    



                 simvastatin 20 mg     20 mg, 1 tab, PO, Bedtime, 30 tab,     10/31/2013      Ordered



                           oral tablet               Substitution Allowed, TAB   

 

    



                 furosemide 40 mg     40 mg, 1 tab, PO, Daily, 30 tab,     10/31/2013      Ordered



                           oral tablet               Substitution Allowed, TAB   

 

    



              metFORmin 500 mg     500 mg, 1 tab, PO, BID-Meals, 60     10/31/2013     2013    

 Ordered



                           oral tablet               tab, Substitution Allowed, TAB   

 

    



                 lisinopril 40 mg     40 mg, 1 tab, PO, Daily, 14 tab,     10/31/2013      Ordered



                           oral tablet               Substitution Allowed, TAB   

 

    



                 carvedilol 6.25 mg     6.25 mg, 1 tab, PO, Q12H, 60 tab,     10/31/2013      2013

                                         Ordered



                           oral tablet               Substitution Allowed, TAB   

 

    



                 Aspirin Low Dose 81     81 mg, 1 tab, PO, Daily, 30 tab,     10/31/2013      Ordered



                           mg oral tablet            Substitution Allowed, TAB   

 

    



              enoxaparin     40 mg, 0.4 mL, Route: SUB-Q, Drug     10/29/2013     10/31/2013     Discontinued





                                         form: INJ, [id number], Dosing Weight   



                                         127.273, kg, Start date: 10/29/13   



                                         15:00:00, Duration: 30 day, Stop   



                                         date: 13 15:00:00(Same as:   



                                         Lovenox)   

 

    



              temazepam     15 mg, 1 cap, Route: PO, Drug form:     10/29/2013     10/31/2013     Discontinued





                                         CAP, Bedtime, Dosing Weight   



                                         127.273, kg, PRN Sleep, Start date:   



                                         10/29/13 15:31:00, Duration: 30   



                                         day, Stop date: 13   



                                         15:30:00(Same As: Restoril)   

 

    



                 Norco 5/325 oral     1 tab, Route: PO, Drug Form: TAB,     10/29/2013      10/31/2013 

                                         Discontinued



                           tablet                    Dosing Weight 127.273, kg, Q6H, PRN   



                                         Pain, Start date: 10/29/13   



                                         15:31:00, Duration: 30 day, Stop   



                                         date: 13 15:30:00(Same as:   



                                         Norco 325/5)  Do not exceed 4gm/day   



                                         of acetaminophen.   

 

    



                 Saline Flush 0.9%     3 ml, Route: IVP, Drug Form: INJ,     10/30/2013      10/31/2013

                                         Discontinued



                                         Dosing Weight 127.273, kg, Q8H,   



                                         Start date: 10/30/13 0:00:00,   



                                         Duration: 30 day, Stop date:   



                                         13 16:00:00(Same as: BD   



                                         Posiflush)   

 

    



                 Saline Flush 0.9%     5 ml, Route: IVP, Drug Form: INJ,     10/29/2013      10/31/2013

                                         Discontinued



                                         Dosing Weight 127.273, kg, PRN, PRN   



                                         Line Flush, Start date: 10/29/13   



                                         15:16:00, Duration: 30 day, Stop   



                                         date: 13 14:15:00(Same as: BD   



                                         Posiflush)   

 

    



              Tylenol      650 mg, 2 tab, Route: PO, Drug     10/29/2013     10/31/2013     Discontinued





                                         form: TAB, Q6H, Dosing Weight   



                                         127.273, kg, PRN Fever, Start date:   



                                         10/29/13 15:30:00, Duration: 30   



                                         day, Stop date: 13 15:29:00Do   



                                         not exceed 4 gm/day.  (Same as:   



                                         Tylenol)   

 

    



              Zofran       4 mg, 2 mL, Route: IV, Drug form:     10/29/2013     10/31/2013     Discontinued





                                         INJ, Q8H, Dosing Weight 127.273,   



                                         kg, PRN Nausea, Start date:   



                                         10/29/13 15:30:00, Duration: 30   



                                         day, Stop date: 13   



                                         14:29:00(Same as: Zofran)   

 

    



                 spironolactone     25 mg, 1 tab, Route: PO, Drug form:     10/30/2013      10/31/2013 

                                         Discontinued



                                         TAB, Daily, Dosing Weight 127.273,   



                                         kg, Start date: 10/30/13 9:00:00,   



                                         Duration: 30 day, Stop date:   



                                         13 9:00:00(Same As:   



                                         Aldactone)   

 

    



                 influenza virus     0.5 ml, Route: IM, Drug Form: SUSP,     10/01/2013      10/01/2013

                                         Completed



                           vaccine, inactivated      Start date: 10/01/13 9:00:00, Stop   



                                         date: 10/01/13 9:00:00   

 

    



              Lasix        20 mg, 2 mL, Route: IV, Drug form:     10/29/2013     10/31/2013     Discontinued





                                         INJ, BID, Dosing Weight 127.273,   



                                         kg, Start date: 10/29/13 17:00:00,   



                                         Duration: 30 day, Stop date:   



                                         13 9:00:00(Same as: Lasix)   

 

    



              Lasix        40 mg, Route: IVP, Drug form: INJ,     10/30/2013     10/29/2013     Canceled





                                         Daily, Dosing Weight 127.273, kg,   



                                         Start date: 10/30/13 9:00:00,   



                                         Duration: 30 day, Stop date:   



                                         13 9:00:00   

 

    



              lisinopril     40 mg, 2 tab, Route: PO, Drug form:     10/30/2013     10/31/2013     Discontinued





                                         TAB, Daily, Dosing Weight 127.273,   



                                         kg, Start date: 10/30/13 9:00:00,   



                                         Duration: 30 day, Stop date:   



                                         13 9:00:00(Same as: Prinivil,   



                                         Zestril)   

 

    



                 Lasix 40 mg oral     40 mg, 1 tab, Route: PO, Drug form:     10/31/2013      10/31/2013

                                         Canceled



                           tablet                    TAB, BID, Dosing Weight 127.273,   



                                         kg, Start date: 10/31/13 17:00:00,   



                                         Duration: 30 day, Stop date:   



                                         13 9:00:00(Same as: Lasix)   



                                         May cause GI upset.  Give with food   



                                         or milk.   

 

    



              Coreg        6.25 mg, 1 tab, Route: PO, Drug     10/29/2013     10/31/2013     Discontinued





                                         form: TAB, Q12H, Dosing Weight   



                                         127.273, kg, Start date: 10/29/13   



                                         21:00:00, Duration: 30 day, Stop   



                                         date: 13 9:00:00Give with   



                                         food. (Same As: Coreg)   

 

    



                 spironolactone 25 mg     25 mg, 1 tab, PO, Daily, 30 tab,     10/31/2013      2013

                                         Ordered



                           oral tablet               Substitution Allowed, TAB   

 

    



              Lasix        40 mg, 4 mL, Route: IVP, Drug form:     10/29/2013     10/29/2013     Completed





                                         INJ, ONCE, Dosing Weight 127.273,   



                                         kg, Priority: STAT, Start date:   



                                         10/29/13 13:02:00, Stop date:   



                                         10/29/13 13:02:00(Same as: Lasix)   







Immunizations







  



                     Vaccine             Date                Status

 

  



                     influenza virus vaccine, inactivated     10/01/2013          Auth (Verified)







Vital Signs







                Most recent to oldest [Reference Range]:    1               2               3

 

                          Height                    172.72 cm 

                    (10/29/2013 09:50:00)                           

 

                          Current Weight            105 kg 

                          (10/31/2013 00:00:00)      107.007 kg 

                          (10/30/2013 01:21:00)       

 

                          Temperature Oral [96.4-99.1 DegF]    97.7 DegF 

                          (10/31/2013 12:00:00)      97.8 DegF 

                          (10/31/2013 08:18:00)      97.5 DegF 

                                        (10/31/2013 04:00:00)  

 

                          Systolic Blood Pressure [ mmHg]    127 mmHg 

                          (10/31/2013 12:00:00)      156 mmHg 

*HI*

                          (10/31/2013 08:18:00)      117 mmHg 

                                        (10/31/2013 04:00:00)  

 

                          Diastolic Blood Pressure [60-90 mmHg]    61 mmHg 

                          (10/31/2013 12:00:00)      86 mmHg 

                          (10/31/2013 08:18:00)      86 mmHg 

                                        (10/31/2013 04:00:00)  

 

                          Respiratory Rate [14-20 BRMIN]    18 BRMIN 

                          (10/31/2013 12:00:00)      18 BRMIN 

                          (10/31/2013 08:18:00)      16 BRMIN 

                                        (10/31/2013 04:00:00)  

 

                          Peripheral Pulse Rate [ bpm]    50 bpm 

*LOW*

                          (10/31/2013 12:00:00)      88 bpm 

                          (10/31/2013 08:18:00)      79 bpm 

                                        (10/31/2013 04:00:00)  

 

                          Weight                    127.273 kg 

                    (10/29/2013 09:50:00)                           







Results





INFECTIOUS DISEASES





                Most recent to oldest [Reference Range]:    1               2               3

 

                          C difficile DNA [Negative]    Negative 1

                    (10/29/2013 21:30:00)                           







1Interpretive Data: [employer] illum[employer]ene Clostridium difficile assay utilizes 
loop-mediated isothermal DNA amplification (LAMP) technology to detect a 204 bp 
region of the tcdA gene within the PaLoc gene segment present in all known 
toxigenic C. difficile strains.



The assay utilizes FDA cleared IVD reagents. Performance characteristics have be
en verified by the [facility] within the [facility]. The [facility] is authorized under the Clinical Labo
ratory Improvement Amendment of 1988 (CLIA-88) to perform high complexity testin
g.



BEDSIDE GLUCOSE TESTING





                Most recent to oldest [Reference Range]:    1               2               3

 

                          Glucose POC [70-99 mg/dL]    194 mg/dL 2

*HI*

                    (10/31/2013 11:32:00)                           

 

                          Gluc POC Comment 1        Notified RN/MD 

*NA*

                    (10/31/2013 11:32:00)                           







2Interpretive Data:  Upper Reportable Limit: 200 mg/dL.



STOOL TESTS





                Most recent to oldest [Reference Range]:    1               2               3

 

                          Fecal Leukocyte           None Seen 3

                    (10/29/2013 21:30:00)                           







3Interpretive Data: A Value of None Seen, Rare, or Few is Normal.



CHEMISTRY





                Most recent to oldest [Reference Range]:    1               2               3

 

                          Sodium Lvl [135-145 mEq/L]    144 mEq/L 

                          (10/31/2013 05:34:00)      143 mEq/L 

                          (10/30/2013 04:07:00)      141 mEq/L 

                                        (10/29/2013 09:55:00)  

 

                          Potassium Lvl [3.5-5.1 mEq/L]    3.7 mEq/L 

                          (10/31/2013 05:34:00)      3.6 mEq/L 

                          (10/30/2013 04:07:00)      3.4 mEq/L 

*LOW*

                                        (10/29/2013 09:55:00)  

 

                          Chloride Lvl [ mEq/L]    103 mEq/L 

                          (10/31/2013 05:34:00)      104 mEq/L 

                          (10/30/2013 04:07:00)      101 mEq/L 

                                        (10/29/2013 09:55:00)  

 

                          CO2 [24-32 mEq/L]         27 mEq/L 

                          (10/31/2013 05:34:00)      26 mEq/L 

                          (10/30/2013 04:07:00)      28 mEq/L 

                                        (10/29/2013 09:55:00)  

 

                          AGAP [10.0-20.0 mEq/L]    17.7 mEq/L 

                          (10/31/2013 05:34:00)      16.6 mEq/L 

                          (10/30/2013 04:07:00)      15.4 mEq/L 

                                        (10/29/2013 09:55:00)  

 

                          Creatinine Lvl [0.5-1.4 mg/dL]    1.3 mg/dL 

                          (10/31/2013 05:34:00)      1.3 mg/dL 4

                          (10/30/2013 04:07:00)      1.0 mg/dL 

                                        (10/29/2013 15:44:00)  

 

                          eGFR                      65 mL/min/1.73m2 5

*NA*

                          (10/31/2013 05:34:00)      65 mL/min/1.73m2 6

*NA*

                          (10/30/2013 04:07:00)      89 mL/min/1.73m2 7

*NA*

                                        (10/29/2013 15:44:00)  

 

                          BUN [7-22 mg/dL]          18 mg/dL 

                          (10/31/2013 05:34:00)      15 mg/dL 

                          (10/30/2013 04:07:00)      10 mg/dL 

                                        (10/29/2013 09:55:00)  

 

                          B/C Ratio [6-25]          8 

                    (10/29/2013 09:55:00)                           

 

                          Glucose Lvl [70-99 mg/dL]    94 mg/dL 8

                          (10/31/2013 05:34:00)      131 mg/dL 9

*HI*

                          (10/30/2013 04:07:00)      114 mg/dL 10

*HI*

                                        (10/29/2013 09:55:00)  

 

                          Total Protein [6.4-8.4 g/dL]    6.9 g/dL 

                    (10/29/2013 09:55:00)                           

 

                          Albumin Lvl [3.5-5.0 g/dL]    3.7 g/dL 

                    (10/29/2013 09:55:00)                           

 

                          Globulin [2.0-4.0 g/dL]    3.2 g/dL 

                    (10/29/2013 09:55:00)                           

 

                          A/G Ratio [0.7-1.6]       1.2 

                    (10/29/2013 09:55:00)                           

 

                          Calcium Lvl [8.5-10.5 mg/dL]    8.3 mg/dL 

*LOW*

                          (10/31/2013 05:34:00)      8.8 mg/dL 

                          (10/30/2013 04:07:00)      8.6 mg/dL 

                                        (10/29/2013 09:55:00)  

 

                          Phosphorus [2.5-4.5 mg/dL]    4.7 mg/dL 

*HI*

                    (10/30/2013 04:07:00)                           

 

                          Magnesium Lvl [1.8-2.4 mg/dL]    1.7 mg/dL 

*LOW*

                    (10/30/2013 04:07:00)                           

 

                          ALT [0-65 unit/L]         31 unit/L 

                    (10/29/2013 09:55:00)                           

 

                          AST [0-37 unit/L]         24 unit/L 

                    (10/29/2013 09:55:00)                           

 

                          Alk Phos [ unit/L]    178 unit/L 

*HI*

                    (10/29/2013 09:55:00)                           

 

                          Bili Total [0.2-1.3 mg/dL]    1.2 mg/dL 

                    (10/29/2013 09:55:00)                           

 

                          Lipase Lvl [ unit/L]    89 unit/L 

                    (10/29/2013 09:55:00)                           

 

                          Total CK [ unit/L]    144 unit/L 

                          (10/30/2013 04:07:00)      171 unit/L 

                          (10/29/2013 21:22:00)      168 unit/L 

                                        (10/29/2013 15:44:00)  

 

                          CK MB [0.5-3.6 ng/mL]     1.6 ng/mL 

                          (10/30/2013 04:07:00)      1.7 ng/mL 

                          (10/29/2013 21:22:00)      1.6 ng/mL 

                                        (10/29/2013 15:44:00)  

 

                          CK MB Index [0.0-2.5]     1.1 

                          (10/30/2013 04:07:00)      1.0 

                          (10/29/2013 21:22:00)      1.0 

                                        (10/29/2013 15:44:00)  

 

                          Troponin-I [0.00-0.40 ng/mL]    0.04 ng/mL 

                          (10/30/2013 04:07:00)      0.02 ng/mL 

                          (10/29/2013 21:22:00)      0.03 ng/mL 

                                        (10/29/2013 15:44:00)  

 

                          BNP [<=100 pg/mL]         1829 pg/mL 11

*HI*

                    (10/29/2013 09:55:00)                           







4Result Comment: reviewed 10/30/2013 06:40  gr



5Result Comment: The eGFR is calculated using the CKD-EPI formula. In most 
young, healthy individuals the eGFR will be >90 mL/min/1.73m2. The eGFR declines
with age. An eGFR of 60-89 may be normal in some populations, particularly the 
elderly, for whom the CKD-EPI formula has not been extensively validated. Use of
the eGFR is not recommended in the following populations:



Individuals with unstable creatinine concentrations, including pregnant patients
and those with serious co-morbid conditions.



Patients with extremes in muscle mass or diet. 



The data above are obtained from the National Kidney Disease Education Program (
NKDEP) which additionally recommends that when the eGFR is used in patients with
extremes of body mass index for purposes of drug dosing, the eGFR should be mul
tiplied by the estimated BMI.



6Result Comment: The eGFR is calculated using the CKD-EPI formula. In most 
young, healthy individuals the eGFR will be >90 mL/min/1.73m2. The eGFR declines
with age. An eGFR of 60-89 may be normal in some populations, particularly the 
elderly, for whom the CKD-EPI formula has not been extensively validated. Use of
the eGFR is not recommended in the following populations:



Individuals with unstable creatinine concentrations, including pregnant patients
and those with serious co-morbid conditions.



Patients with extremes in muscle mass or diet. 



The data above are obtained from the National Kidney Disease Education Program (
NKDEP) which additionally recommends that when the eGFR is used in patients with
extremes of body mass index for purposes of drug dosing, the eGFR should be mul
tiplied by the estimated BMI.



7Result Comment: The eGFR is calculated using the CKD-EPI formula. In most 
young, healthy individuals the eGFR will be >90 mL/min/1.73m2. The eGFR declines
with age. An eGFR of 60-89 may be normal in some populations, particularly the 
elderly, for whom the CKD-EPI formula has not been extensively validated. Use of
the eGFR is not recommended in the following populations:



Individuals with unstable creatinine concentrations, including pregnant patients
and those with serious co-morbid conditions.



Patients with extremes in muscle mass or diet. 



The data above are obtained from the National Kidney Disease Education Program (
NKDEP) which additionally recommends that when the eGFR is used in patients with
extremes of body mass index for purposes of drug dosing, the eGFR should be mul
tiplied by the estimated BMI.



8Interpretive Data: Adult reference range values reflect the clinical guidelines

of the American Diabetes Association.



9Interpretive Data: Adult reference range values reflect the clinical guidelines

of the American Diabetes Association.



10Interpretive Data: Adult reference range values reflect the clinical 
guidelines

of the American Diabetes Association.



11Interpretive Data: Elevated results are in line with increasing severity of 

congestive heart failure. Minor elevations between 100 and 300 

may be seen with Myocardial Ischemia, Sodium retaining drugs, 

and compensated/treated heart failure.



HEMATOLOGY





                Most recent to oldest [Reference Range]:    1               2               3

 

                          WBC [3.7-10.4 K/CMM]      6.8 K/CMM 

                          (10/30/2013 04:07:00)      7.1 K/CMM 

                          (10/29/2013 09:55:00)       

 

                          RBC [4.70-6.10 M/CMM]     5.05 M/CMM 

                          (10/30/2013 04:07:00)      5.17 M/CMM 

                          (10/29/2013 09:55:00)       

 

                          Hgb [14.0-18.0 g/dL]      14.5 g/dL 

                          (10/30/2013 04:07:00)      15.1 g/dL 

                          (10/29/2013 09:55:00)       

 

                          Hct [42.0-54.0 %]         44.5 % 

                          (10/30/2013 04:07:00)      46.2 % 

                          (10/29/2013 09:55:00)       

 

                          MCV [80.0-94.0 fL]        88.3 fL 

                          (10/30/2013 04:07:00)      89.4 fL 

                          (10/29/2013 09:55:00)       

 

                          MCH [27.0-31.0 pg]        28.8 pg 

                          (10/30/2013 04:07:00)      29.1 pg 

                          (10/29/2013 09:55:00)       

 

                          MCHC [32.0-36.0 g/dL]     32.6 g/dL 

                          (10/30/2013 04:07:00)      32.6 g/dL 

                          (10/29/2013 09:55:00)       

 

                          RDW [11.5-14.5 %]         15.3 % 

*HI*

                          (10/30/2013 04:07:00)      15.7 % 

*HI*

                          (10/29/2013 09:55:00)       

 

                          Platelet [133-450 K/CMM]    205 K/CMM 

                          (10/30/2013 04:07:00)      189 K/CMM 

                          (10/29/2013 15:44:00)      190 K/CMM 

                                        (10/29/2013 09:55:00)  

 

                          MPV [7.4-10.4 fL]         9.4 fL 

                          (10/30/2013 04:07:00)      9.5 fL 

                          (10/29/2013 09:55:00)       

 

                          Segs [45.0-75.0 %]        78.0 % 

*HI*

                    (10/29/2013 09:55:00)                           

 

                          Lymphocytes [20.0-40.0 %]    13.2 % 

*LOW*

                    (10/29/2013 09:55:00)                           

 

                          Monocytes [2.0-12.0 %]    7.9 % 

                    (10/29/2013 09:55:00)                           

 

                          Eosinophils [0.0-4.0 %]    0.5 % 

                    (10/29/2013 09:55:00)                           

 

                          Basophils [0.0-1.0 %]     0.4 % 

                    (10/29/2013 09:55:00)                           

 

                          Segs-Bands # [1.5-8.1 K/CMM]    5.6 K/CMM 

                    (10/29/2013 09:55:00)                           

 

                          Lymphocytes # [1.0-5.5 K/CMM]    0.9 K/CMM 

*LOW*

                    (10/29/2013 09:55:00)                           

 

                          Monocytes # [0.0-0.8 K/CMM]    0.6 K/CMM 

                    (10/29/2013 09:55:00)                           

 

                          Eosinophils # [0.0-0.5 K/CMM]    0.0 K/CMM 

                    (10/29/2013 09:55:00)                           

 

                          Basophils # [0.0-0.2 K/CMM]    0.0 K/CMM 

                    (10/29/2013 09:55:00)                           

 

                          PT [12.0-14.7 seconds]    17.5 seconds 

*HI*

                    (10/29/2013 09:55:00)                           

 

                          INR [0.85-1.17]           1.46 12

*HI*

                    (10/29/2013 09:55:00)                           

 

                          PTT [22.9-35.8 seconds]    29.5 seconds 13

                          (10/29/2013 15:44:00)      29.5 seconds 14

                          (10/29/2013 09:55:00)       







12Interpretive Data: RECOMMENDED RANGES FOR PROTIME INR:

   2.0-3.0 for most medical and surgical thromboembolic states.

   2.5-3.5 for artificial heart valves and recurrent embolism.



INR SHOULD BE USED ONLY FOR PATIENTS ON STABLE ANTICOAGULANT THERAPY.



13Interpretive Data: Heparin Therapeutic Range:  57 - 92 Seconds



14Interpretive Data: Heparin Therapeutic Range:  57 - 92 Seconds



Microbiology Reports







PROCEDURE:Culture: Stool

                  STATUS:                  Auth (Verified)





                BODY SITE:                      COLLECTED DATE/TIME:    10/29/2013 21:30:00

 

                SOURCE:         Stool           FREE TEXT SOURCE:     







***FINAL REPORTS***



Final Report                               



Normal Enteric Neena Isolated 

No Salmonella, Shigella, Or Campylobacter Isolated



***PRELIMINARY REPORTS***



Preliminary Report                               



Normal Enteric Neena Isolated



Preliminary Report                               



Normal Enteric Neena Isolated 

No Salmonella Or Shigella Isolated

## 2019-09-25 NOTE — XMS REPORT
Summary of Care: 10/26/16 - 16

                             Created on: 2092



JOANNA CABRERA

External Reference #: [id number]

: 1965

Sex: Male



Demographics







                          Address                   [address] [place] [address]  [id number]

 

                          Home Phone                [phone]

 

                          Preferred Language        English

 

                          Marital Status            Single

 

                          Yarsanism Affiliation     Pentecostal

 

                          Race                      White/

 

                          Ethnic Group              /Latin





Author







                          Author                    [facility]

 

                          Organization              [facility]

 

                          Address                   Unknown

 

                          Phone                     Unavailable







Encounter





ANGEL LUIS Boothe(FIN) [id number] Date(s): 10/26/16 - 16

[facility] [address] [place] [address]-     [phone]


Discharge Disposition: Home or Self Care

Attending Physician: Physician, Non Associated MD

Referring Physician: Kya Feliz NP





Vital Signs







 



                           Most recent to            1



                                         oldest [Reference 



                                         Range]: 

 

 



                           Height                    175.26 cm



                                         (10/26/16 6:38 PM)

 

 



                           Weight                    106.818 kg



                                         (10/26/16 6:38 PM)

 

 



                           Body Mass Index           34.78 m2



                                         (10/26/16 6:38 PM)







Problem List







    



              Condition     Effective Dates     Status       Health Status     Informant

 

    



                     [D]Anterior chest     12             Active  



                                         wall pain(Confirmed)    

 

    



                           Anxiety(Confirmed)        Resolved  

 

    



                           BP+ -                     Active  



                                         Hypertension(Confirm    



                                         ed)    

 

    



                           Cardiomyopathy(Confi      Resolved  



                                         rmed)    

 

    



                           CHF - Congestive          Active  



                                         heart    



                                         failure(Confirmed)    

 

    



                           CHF - Congestive          Active  



                                         heart    



                                         failure(Confirmed)    

 

    



                           Depression(Confirmed      Active  



                                         )    

 

    



                           dm(Confirmed)             Active  

 

    



                           Down                      Active  



                                         syndrome(Confirmed)    

 

    



                           GERD -                    Active  



                                         Gastro-esophageal    



                                         reflux    



                                         disease(Confirmed)    

 

    



                           H/O:                      Active  



                                         schizophrenia(Confir    



                                         med)    

 

    



                           HTN(Confirmed)            Active  

 

    



                           ICD (implantable          Active  



                                         cardiac    



                                         defibrillator)    



                                         battery    



                                         depletion(Confirmed)    

 

    



                           NIDDM(Confirmed)          Active  

 

    



                           Obese                     Active  



                                         build(Confirmed)    

 

    



                           Schizophrenia(Confir      Active  



                                         med)    

 

    



                           Short of breath on        Active  



                                         exertion(Confirmed)    

 

    



                           Sleep                     Active  



                                         apnea(Confirmed)    







Allergies, Adverse Reactions, Alerts







   



                 Substance       Reaction        Severity        Status

 

   



                           NKDA                      Active







Medications





No data available for this section



Results





No data available for this section



Immunizations





Given and Recorded





   



                 Vaccine         Date            Status          Refusal Reason

 

   



                     influenza virus vaccine, inactivated     10/1/13             Given 







Procedures







   



                 Procedure       Date            Related Diagnosis     Body Site

 

   



                                         ICD - Internal cardiac defibrillator   



                                         procedure1   

 

   



                                         torn ligament2   







1placed 2.5years ago



2left knee ligament surgery



Social History







 



                           Social History Type       Response

 

 



                           Alcohol                   Never

 

 



                           Smoking Status            Former smoker; Type: Cigarettes; Tobacco use per day: 0; Number

 of years:



                                         2; Total pack years: 2; Started at age: 22.0; Stopped at age: 24; Previous



                                         treatment: None; Ready to change: No; Concerns about tobacco use in



                                         household: No; Exposure to Tobacco Smoke None; Cigarette Smoking Last 365



                                         Days No; Reg Smoking Cessation Counseling No







Assessment and Plan





No data available for this section

## 2019-09-25 NOTE — XMS REPORT
Encounter CCD: 10/29/2013 to 10/31/2013

                             Created on: 10/08/2097



JOANNA CABRERA

External Reference #: [id number]

: 1965

Sex: Male



Demographics







                          Address                   [address] [place] [address]

[place]  [id number]

 

                          Home Phone                [phone]

 

                          Preferred Language        Unknown

 

                          Marital Status            Single

 

                          Rastafari Affiliation     Christian

 

                          Race                      White/

 

                          Ethnic Group              Non-





Author







                          Author                    Auto Generated

 

                          Organization              [facility]

 

                          Address                   Unknown

 

                          Phone                     Unavailable







Care Team Providers







                    Care Team Member Name    Role                Phone

 

                    Lashon Puckett    CP                  [phone]







Allergies, Adverse Reactions, Alerts







  



                     Substance           Reaction            Status

 

  



                           NKDA                      Active







Problem List







  



                     Condition           Effective Dates     Status

 

  



                     [D]Anterior chest wall pain     2012          Active

 

  



                           BP+ - Hypertension        Active

 

  



                           Cardiomyopathy            Resolved

 

  



                           CHF - Congestive heart failure      Active

 

  



                           CHF - Congestive heart failure      Active

 

  



                           Depression                Active

 

  



                           dm                        Active

 

  



                           Down syndrome             Active

 

  



                           GERD - Gastro-esophageal reflux disease      Active

 

  



                           H/O: schizophrenia        Active

 

  



                           HTN                       Active

 

  



                           ICD (implantable cardiac defibrillator) battery depletion      Active

 

  



                           NIDDM                     Active

 

  



                           Obese build               Active

 

  



                           Sleep apnea               Active







Medications







    



              Medication     Instructions     Start Date     End Date     Status

 

    



              Geodon       20 mg, 1 cap, Route: PO, Drug form:     10/30/2013     10/31/2013     Discontinued





                                         CAP, Bedtime, Dosing Weight   



                                         127.273, kg, Start date: 10/30/13   



                                         21:00:00, Duration: 30 day, Stop   



                                         date: 13 21:00:00(Same As:   



                                         Geodon)Give with Food   

 

    



              simvastatin     20 mg, 1 tab, Route: PO, Drug form:     10/30/2013     10/31/2013     Discontinued





                                         TAB, Bedtime, Dosing Weight   



                                         127.273, kg, Start date: 10/30/13   



                                         21:00:00, Duration: 30 day, Stop   



                                         date: 13 21:00:00(Same as:   



                                         Zocor)   

 

    



              pantoprazole     40 mg, 1 tab, Route: PO, Drug form:     10/30/2013     10/31/2013     

Discontinued



                                         ECTAB, Before Dinner, Dosing Weight   



                                         127.273, kg, Start date: 10/30/13   



                                         16:30:00, Duration: 30 day, Stop   



                                         date: 13 16:30:00Tablet   



                                         should not be chewed or   



                                         crushed.(Same as: Protonix)   

 

    



              Remeron      15 mg, 1 tab, Route: PO, Drug form:     10/30/2013     10/31/2013     Discontinued





                                         TAB, Bedtime, Dosing Weight   



                                         127.273, kg, Start date: 10/30/13   



                                         21:00:00, Duration: 30 day, Stop   



                                         date: 13 21:00:00(Same   



                                         as:Remeron)   

 

    



              metFORmin 500 mg     500 mg, 1 tab, Route: PO, Drug     10/30/2013     10/31/2013     Discontinued





                           oral tablet               form: TAB, BID-Meals, Dosing Weight   



                                         127.273, kg, Start date: 10/30/13   



                                         17:00:00, Duration: 30 day, Stop   



                                         date: 13 8:00:00(Same as:   



                                         Glucophage)  Take with meal   

 

    



              clonazepam     2 mg, 2 tab, Route: PO, Drug form:     10/30/2013     10/31/2013     Discontinued





                                         TAB, Bedtime, Dosing Weight   



                                         127.273, kg, Start date: 10/30/13   



                                         21:00:00, Duration: 30 day, Stop   



                                         date: 13 21:00:00(Same As:   



                                         Klonopin)   

 

    



              aspirin      81 mg, 1 tab, Route: PO, Drug form:     10/30/2013     10/31/2013     Discontinued





                                         CHEWTAB, Daily, Dosing Weight   



                                         127.273, kg, Start date: 10/30/13   



                                         12:57:00, Duration: 30 day, Stop   



                                         date: 13 9:00:00Take with   



                                         food.   

 

    



                 Saline Flush 0.9%     5 mL, Route: IVP, Drug Form: INJ,     10/29/2013      10/30/2013

                                         Discontinued



                                         Dosing Weight 127.273, kg, Q8H, PRN   



                                         Line Flush, Start date: 10/29/13   



                                         10:02:00, Duration: 30 day, Stop   



                                         date: 13 10:01:00, Administer   



                                         at least once every 8 hours   



                                         Administer at least once every 8   



                                         hours(Same as: BD Posiflush)   

 

    



              aspirin      324 mg, 4 tab, Route: PO, Drug     10/29/2013     10/29/2013     Completed



                                         form: CHEWTAB, ONCE, Dosing Weight   



                                         127.273, kg, Priority: STAT, Start   



                                         date: 10/29/13 10:02:00, Stop date:   



                                         10/29/13 10:02:00Take with food.   

 

    



              potassium chloride     40 mEq, 2 tab, Route: PO, Drug     10/29/2013     10/29/2013    

 Completed



                                         form: ERTAB, ONCE, Dosing Weight   



                                         127.273, kg, Start date: 10/29/13   



                                         15:28:00, Stop date: 10/29/13   



                                         15:28:00(Same as: K-Dur 20)"Do Not   



                                         Crush"  With food and full glass of   



                                         water   

 

    



                 pantoprazole 40 mg     40 mg, 1 tab, PO, Before Dinner, 30     10/31/2013      Ordered





                           oral enteric coated       tab, Substitution Allowed, ECTAB   



                                         tablet    

 

    



                 simvastatin 20 mg     20 mg, 1 tab, PO, Bedtime, 30 tab,     10/31/2013      Ordered



                           oral tablet               Substitution Allowed, TAB   

 

    



                 furosemide 40 mg     40 mg, 1 tab, PO, Daily, 30 tab,     10/31/2013      Ordered



                           oral tablet               Substitution Allowed, TAB   

 

    



              metFORmin 500 mg     500 mg, 1 tab, PO, BID-Meals, 60     10/31/2013     2013    

 Ordered



                           oral tablet               tab, Substitution Allowed, TAB   

 

    



                 lisinopril 40 mg     40 mg, 1 tab, PO, Daily, 14 tab,     10/31/2013      Ordered



                           oral tablet               Substitution Allowed, TAB   

 

    



                 carvedilol 6.25 mg     6.25 mg, 1 tab, PO, Q12H, 60 tab,     10/31/2013      2013

                                         Ordered



                           oral tablet               Substitution Allowed, TAB   

 

    



                 Aspirin Low Dose 81     81 mg, 1 tab, PO, Daily, 30 tab,     10/31/2013      Ordered



                           mg oral tablet            Substitution Allowed, TAB   

 

    



              enoxaparin     40 mg, 0.4 mL, Route: SUB-Q, Drug     10/29/2013     10/31/2013     Discontinued





                                         form: INJ, [id number], Dosing Weight   



                                         127.273, kg, Start date: 10/29/13   



                                         15:00:00, Duration: 30 day, Stop   



                                         date: 13 15:00:00(Same as:   



                                         Lovenox)   

 

    



              temazepam     15 mg, 1 cap, Route: PO, Drug form:     10/29/2013     10/31/2013     Discontinued





                                         CAP, Bedtime, Dosing Weight   



                                         127.273, kg, PRN Sleep, Start date:   



                                         10/29/13 15:31:00, Duration: 30   



                                         day, Stop date: 13   



                                         15:30:00(Same As: Restoril)   

 

    



                 Norco 5/325 oral     1 tab, Route: PO, Drug Form: TAB,     10/29/2013      10/31/2013 

                                         Discontinued



                           tablet                    Dosing Weight 127.273, kg, Q6H, PRN   



                                         Pain, Start date: 10/29/13   



                                         15:31:00, Duration: 30 day, Stop   



                                         date: 13 15:30:00(Same as:   



                                         Norco 325/5)  Do not exceed 4gm/day   



                                         of acetaminophen.   

 

    



                 Saline Flush 0.9%     3 ml, Route: IVP, Drug Form: INJ,     10/30/2013      10/31/2013

                                         Discontinued



                                         Dosing Weight 127.273, kg, Q8H,   



                                         Start date: 10/30/13 0:00:00,   



                                         Duration: 30 day, Stop date:   



                                         13 16:00:00(Same as: BD   



                                         Posiflush)   

 

    



                 Saline Flush 0.9%     5 ml, Route: IVP, Drug Form: INJ,     10/29/2013      10/31/2013

                                         Discontinued



                                         Dosing Weight 127.273, kg, PRN, PRN   



                                         Line Flush, Start date: 10/29/13   



                                         15:16:00, Duration: 30 day, Stop   



                                         date: 13 14:15:00(Same as: BD   



                                         Posiflush)   

 

    



              Tylenol      650 mg, 2 tab, Route: PO, Drug     10/29/2013     10/31/2013     Discontinued





                                         form: TAB, Q6H, Dosing Weight   



                                         127.273, kg, PRN Fever, Start date:   



                                         10/29/13 15:30:00, Duration: 30   



                                         day, Stop date: 13 15:29:00Do   



                                         not exceed 4 gm/day.  (Same as:   



                                         Tylenol)   

 

    



              Zofran       4 mg, 2 mL, Route: IV, Drug form:     10/29/2013     10/31/2013     Discontinued





                                         INJ, Q8H, Dosing Weight 127.273,   



                                         kg, PRN Nausea, Start date:   



                                         10/29/13 15:30:00, Duration: 30   



                                         day, Stop date: 13   



                                         14:29:00(Same as: Zofran)   

 

    



                 spironolactone     25 mg, 1 tab, Route: PO, Drug form:     10/30/2013      10/31/2013 

                                         Discontinued



                                         TAB, Daily, Dosing Weight 127.273,   



                                         kg, Start date: 10/30/13 9:00:00,   



                                         Duration: 30 day, Stop date:   



                                         13 9:00:00(Same As:   



                                         Aldactone)   

 

    



                 influenza virus     0.5 ml, Route: IM, Drug Form: SUSP,     10/01/2013      10/01/2013

                                         Completed



                           vaccine, inactivated      Start date: 10/01/13 9:00:00, Stop   



                                         date: 10/01/13 9:00:00   

 

    



              Lasix        20 mg, 2 mL, Route: IV, Drug form:     10/29/2013     10/31/2013     Discontinued





                                         INJ, BID, Dosing Weight 127.273,   



                                         kg, Start date: 10/29/13 17:00:00,   



                                         Duration: 30 day, Stop date:   



                                         13 9:00:00(Same as: Lasix)   

 

    



              Lasix        40 mg, Route: IVP, Drug form: INJ,     10/30/2013     10/29/2013     Canceled





                                         Daily, Dosing Weight 127.273, kg,   



                                         Start date: 10/30/13 9:00:00,   



                                         Duration: 30 day, Stop date:   



                                         13 9:00:00   

 

    



              lisinopril     40 mg, 2 tab, Route: PO, Drug form:     10/30/2013     10/31/2013     Discontinued





                                         TAB, Daily, Dosing Weight 127.273,   



                                         kg, Start date: 10/30/13 9:00:00,   



                                         Duration: 30 day, Stop date:   



                                         13 9:00:00(Same as: Prinivil,   



                                         Zestril)   

 

    



                 Lasix 40 mg oral     40 mg, 1 tab, Route: PO, Drug form:     10/31/2013      10/31/2013

                                         Canceled



                           tablet                    TAB, BID, Dosing Weight 127.273,   



                                         kg, Start date: 10/31/13 17:00:00,   



                                         Duration: 30 day, Stop date:   



                                         13 9:00:00(Same as: Lasix)   



                                         May cause GI upset.  Give with food   



                                         or milk.   

 

    



              Coreg        6.25 mg, 1 tab, Route: PO, Drug     10/29/2013     10/31/2013     Discontinued





                                         form: TAB, Q12H, Dosing Weight   



                                         127.273, kg, Start date: 10/29/13   



                                         21:00:00, Duration: 30 day, Stop   



                                         date: 13 9:00:00Give with   



                                         food. (Same As: Coreg)   

 

    



                 spironolactone 25 mg     25 mg, 1 tab, PO, Daily, 30 tab,     10/31/2013      2013

                                         Ordered



                           oral tablet               Substitution Allowed, TAB   

 

    



              Lasix        40 mg, 4 mL, Route: IVP, Drug form:     10/29/2013     10/29/2013     Completed





                                         INJ, ONCE, Dosing Weight 127.273,   



                                         kg, Priority: STAT, Start date:   



                                         10/29/13 13:02:00, Stop date:   



                                         10/29/13 13:02:00(Same as: Lasix)   







Immunizations







  



                     Vaccine             Date                Status

 

  



                     influenza virus vaccine, inactivated     10/01/2013          Auth (Verified)







Vital Signs







                Most recent to oldest [Reference Range]:    1               2               3

 

                          Height                    172.72 cm 

                    (10/29/2013 09:50:00)                           

 

                          Current Weight            105 kg 

                          (10/31/2013 00:00:00)      107.007 kg 

                          (10/30/2013 01:21:00)       

 

                          Temperature Oral [96.4-99.1 DegF]    97.7 DegF 

                          (10/31/2013 12:00:00)      97.8 DegF 

                          (10/31/2013 08:18:00)      97.5 DegF 

                                        (10/31/2013 04:00:00)  

 

                          Systolic Blood Pressure [ mmHg]    127 mmHg 

                          (10/31/2013 12:00:00)      156 mmHg 

*HI*

                          (10/31/2013 08:18:00)      117 mmHg 

                                        (10/31/2013 04:00:00)  

 

                          Diastolic Blood Pressure [60-90 mmHg]    61 mmHg 

                          (10/31/2013 12:00:00)      86 mmHg 

                          (10/31/2013 08:18:00)      86 mmHg 

                                        (10/31/2013 04:00:00)  

 

                          Respiratory Rate [14-20 BRMIN]    18 BRMIN 

                          (10/31/2013 12:00:00)      18 BRMIN 

                          (10/31/2013 08:18:00)      16 BRMIN 

                                        (10/31/2013 04:00:00)  

 

                          Peripheral Pulse Rate [ bpm]    50 bpm 

*LOW*

                          (10/31/2013 12:00:00)      88 bpm 

                          (10/31/2013 08:18:00)      79 bpm 

                                        (10/31/2013 04:00:00)  

 

                          Weight                    127.273 kg 

                    (10/29/2013 09:50:00)                           







Results





INFECTIOUS DISEASES





                Most recent to oldest [Reference Range]:    1               2               3

 

                          C difficile DNA [Negative]    Negative 1

                    (10/29/2013 21:30:00)                           







1Interpretive Data: [employer] Clostridium difficile assay utilizes 
loop-mediated isothermal DNA amplification (LAMP) technology to detect a 204 bp 
region of the tcdA gene within the PaLoc gene segment present in all known 
toxigenic C. difficile strains.



The assay utilizes FDA cleared IVD reagents. Performance characteristics have be
en verified by the [facility] within the [facility]. The [facility] is authorized under the Clinical Labo
ratory Improvement Amendment of 1988 (CLIA-88) to perform high complexity testin
g.



BEDSIDE GLUCOSE TESTING





                Most recent to oldest [Reference Range]:    1               2               3

 

                          Glucose POC [70-99 mg/dL]    194 mg/dL 2

*HI*

                    (10/31/2013 11:32:00)                           

 

                          Gluc POC Comment 1        Notified RN/MD 

*NA*

                    (10/31/2013 11:32:00)                           







2Interpretive Data:  Upper Reportable Limit: 200 mg/dL.



STOOL TESTS





                Most recent to oldest [Reference Range]:    1               2               3

 

                          Fecal Leukocyte           None Seen 3

                    (10/29/2013 21:30:00)                           







3Interpretive Data: A Value of None Seen, Rare, or Few is Normal.



CHEMISTRY





                Most recent to oldest [Reference Range]:    1               2               3

 

                          Sodium Lvl [135-145 mEq/L]    144 mEq/L 

                          (10/31/2013 05:34:00)      143 mEq/L 

                          (10/30/2013 04:07:00)      141 mEq/L 

                                        (10/29/2013 09:55:00)  

 

                          Potassium Lvl [3.5-5.1 mEq/L]    3.7 mEq/L 

                          (10/31/2013 05:34:00)      3.6 mEq/L 

                          (10/30/2013 04:07:00)      3.4 mEq/L 

*LOW*

                                        (10/29/2013 09:55:00)  

 

                          Chloride Lvl [ mEq/L]    103 mEq/L 

                          (10/31/2013 05:34:00)      104 mEq/L 

                          (10/30/2013 04:07:00)      101 mEq/L 

                                        (10/29/2013 09:55:00)  

 

                          CO2 [24-32 mEq/L]         27 mEq/L 

                          (10/31/2013 05:34:00)      26 mEq/L 

                          (10/30/2013 04:07:00)      28 mEq/L 

                                        (10/29/2013 09:55:00)  

 

                          AGAP [10.0-20.0 mEq/L]    17.7 mEq/L 

                          (10/31/2013 05:34:00)      16.6 mEq/L 

                          (10/30/2013 04:07:00)      15.4 mEq/L 

                                        (10/29/2013 09:55:00)  

 

                          Creatinine Lvl [0.5-1.4 mg/dL]    1.3 mg/dL 

                          (10/31/2013 05:34:00)      1.3 mg/dL 4

                          (10/30/2013 04:07:00)      1.0 mg/dL 

                                        (10/29/2013 15:44:00)  

 

                          eGFR                      65 mL/min/1.73m2 5

*NA*

                          (10/31/2013 05:34:00)      65 mL/min/1.73m2 6

*NA*

                          (10/30/2013 04:07:00)      89 mL/min/1.73m2 7

*NA*

                                        (10/29/2013 15:44:00)  

 

                          BUN [7-22 mg/dL]          18 mg/dL 

                          (10/31/2013 05:34:00)      15 mg/dL 

                          (10/30/2013 04:07:00)      10 mg/dL 

                                        (10/29/2013 09:55:00)  

 

                          B/C Ratio [6-25]          8 

                    (10/29/2013 09:55:00)                           

 

                          Glucose Lvl [70-99 mg/dL]    94 mg/dL 8

                          (10/31/2013 05:34:00)      131 mg/dL 9

*HI*

                          (10/30/2013 04:07:00)      114 mg/dL 10

*HI*

                                        (10/29/2013 09:55:00)  

 

                          Total Protein [6.4-8.4 g/dL]    6.9 g/dL 

                    (10/29/2013 09:55:00)                           

 

                          Albumin Lvl [3.5-5.0 g/dL]    3.7 g/dL 

                    (10/29/2013 09:55:00)                           

 

                          Globulin [2.0-4.0 g/dL]    3.2 g/dL 

                    (10/29/2013 09:55:00)                           

 

                          A/G Ratio [0.7-1.6]       1.2 

                    (10/29/2013 09:55:00)                           

 

                          Calcium Lvl [8.5-10.5 mg/dL]    8.3 mg/dL 

*LOW*

                          (10/31/2013 05:34:00)      8.8 mg/dL 

                          (10/30/2013 04:07:00)      8.6 mg/dL 

                                        (10/29/2013 09:55:00)  

 

                          Phosphorus [2.5-4.5 mg/dL]    4.7 mg/dL 

*HI*

                    (10/30/2013 04:07:00)                           

 

                          Magnesium Lvl [1.8-2.4 mg/dL]    1.7 mg/dL 

*LOW*

                    (10/30/2013 04:07:00)                           

 

                          ALT [0-65 unit/L]         31 unit/L 

                    (10/29/2013 09:55:00)                           

 

                          AST [0-37 unit/L]         24 unit/L 

                    (10/29/2013 09:55:00)                           

 

                          Alk Phos [ unit/L]    178 unit/L 

*HI*

                    (10/29/2013 09:55:00)                           

 

                          Bili Total [0.2-1.3 mg/dL]    1.2 mg/dL 

                    (10/29/2013 09:55:00)                           

 

                          Lipase Lvl [ unit/L]    89 unit/L 

                    (10/29/2013 09:55:00)                           

 

                          Total CK [ unit/L]    144 unit/L 

                          (10/30/2013 04:07:00)      171 unit/L 

                          (10/29/2013 21:22:00)      168 unit/L 

                                        (10/29/2013 15:44:00)  

 

                          CK MB [0.5-3.6 ng/mL]     1.6 ng/mL 

                          (10/30/2013 04:07:00)      1.7 ng/mL 

                          (10/29/2013 21:22:00)      1.6 ng/mL 

                                        (10/29/2013 15:44:00)  

 

                          CK MB Index [0.0-2.5]     1.1 

                          (10/30/2013 04:07:00)      1.0 

                          (10/29/2013 21:22:00)      1.0 

                                        (10/29/2013 15:44:00)  

 

                          Troponin-I [0.00-0.40 ng/mL]    0.04 ng/mL 

                          (10/30/2013 04:07:00)      0.02 ng/mL 

                          (10/29/2013 21:22:00)      0.03 ng/mL 

                                        (10/29/2013 15:44:00)  

 

                          BNP [<=100 pg/mL]         1829 pg/mL 11

*HI*

                    (10/29/2013 09:55:00)                           







4Result Comment: reviewed 10/30/2013 06:40  gr



5Result Comment: The eGFR is calculated using the CKD-EPI formula. In most 
young, healthy individuals the eGFR will be >90 mL/min/1.73m2. The eGFR declines
with age. An eGFR of 60-89 may be normal in some populations, particularly the 
elderly, for whom the CKD-EPI formula has not been extensively validated. Use of
the eGFR is not recommended in the following populations:



Individuals with unstable creatinine concentrations, including pregnant patients
and those with serious co-morbid conditions.



Patients with extremes in muscle mass or diet. 



The data above are obtained from the National Kid[employer] Education Program (
NKDEP) which additionally recommends that when the eGFR is used in patients with
extremes of body mass index for purposes of drug dosing, the eGFR should be mul
tiplied by the estimated BMI.



6Result Comment: The eGFR is calculated using the CKD-EPI formula. In most 
young, healthy individuals the eGFR will be >90 mL/min/1.73m2. The eGFR declines
with age. An eGFR of 60-89 may be normal in some populations, particularly the 
elderly, for whom the CKD-EPI formula has not been extensively validated. Use of
the eGFR is not recommended in the following populations:



Individuals with unstable creatinine concentrations, including pregnant patients
and those with serious co-morbid conditions.



Patients with extremes in muscle mass or diet. 



The data above are obtained from the National Kid[employer] Education Program (
NKDEP) which additionally recommends that when the eGFR is used in patients with
extremes of body mass index for purposes of drug dosing, the eGFR should be mul
tiplied by the estimated BMI.



7Result Comment: The eGFR is calculated using the CKD-EPI formula. In most 
young, healthy individuals the eGFR will be >90 mL/min/1.73m2. The eGFR declines
with age. An eGFR of 60-89 may be normal in some populations, particularly the 
elderly, for whom the CKD-EPI formula has not been extensively validated. Use of
the eGFR is not recommended in the following populations:



Individuals with unstable creatinine concentrations, including pregnant patients
and those with serious co-morbid conditions.



Patients with extremes in muscle mass or diet. 



The data above are obtained from the National Kid[employer] Education Program (
NKDEP) which additionally recommends that when the eGFR is used in patients with
extremes of body mass index for purposes of drug dosing, the eGFR should be mul
tiplied by the estimated BMI.



8Interpretive Data: Adult reference range values reflect the clinical guidelines

of the [employer].



9Interpretive Data: Adult reference range values reflect the clinical guidelines

of the [employer].



10Interpretive Data: Adult reference range values reflect the clinical 
guidelines

of the [employer].



11Interpretive Data: Elevated results are in line with increasing severity of 

congestive heart failure. Minor elevations between 100 and 300 

may be seen with Myocardial Ischemia, Sodium retaining drugs, 

and compensated/treated heart failure.



HEMATOLOGY





                Most recent to oldest [Reference Range]:    1               2               3

 

                          WBC [3.7-10.4 K/CMM]      6.8 K/CMM 

                          (10/30/2013 04:07:00)      7.1 K/CMM 

                          (10/29/2013 09:55:00)       

 

                          RBC [4.70-6.10 M/CMM]     5.05 M/CMM 

                          (10/30/2013 04:07:00)      5.17 M/CMM 

                          (10/29/2013 09:55:00)       

 

                          Hgb [14.0-18.0 g/dL]      14.5 g/dL 

                          (10/30/2013 04:07:00)      15.1 g/dL 

                          (10/29/2013 09:55:00)       

 

                          Hct [42.0-54.0 %]         44.5 % 

                          (10/30/2013 04:07:00)      46.2 % 

                          (10/29/2013 09:55:00)       

 

                          MCV [80.0-94.0 fL]        88.3 fL 

                          (10/30/2013 04:07:00)      89.4 fL 

                          (10/29/2013 09:55:00)       

 

                          MCH [27.0-31.0 pg]        28.8 pg 

                          (10/30/2013 04:07:00)      29.1 pg 

                          (10/29/2013 09:55:00)       

 

                          MCHC [32.0-36.0 g/dL]     32.6 g/dL 

                          (10/30/2013 04:07:00)      32.6 g/dL 

                          (10/29/2013 09:55:00)       

 

                          RDW [11.5-14.5 %]         15.3 % 

*HI*

                          (10/30/2013 04:07:00)      15.7 % 

*HI*

                          (10/29/2013 09:55:00)       

 

                          Platelet [133-450 K/CMM]    205 K/CMM 

                          (10/30/2013 04:07:00)      189 K/CMM 

                          (10/29/2013 15:44:00)      190 K/CMM 

                                        (10/29/2013 09:55:00)  

 

                          MPV [7.4-10.4 fL]         9.4 fL 

                          (10/30/2013 04:07:00)      9.5 fL 

                          (10/29/2013 09:55:00)       

 

                          Segs [45.0-75.0 %]        78.0 % 

*HI*

                    (10/29/2013 09:55:00)                           

 

                          Lymphocytes [20.0-40.0 %]    13.2 % 

*LOW*

                    (10/29/2013 09:55:00)                           

 

                          Monocytes [2.0-12.0 %]    7.9 % 

                    (10/29/2013 09:55:00)                           

 

                          Eosinophils [0.0-4.0 %]    0.5 % 

                    (10/29/2013 09:55:00)                           

 

                          Basophils [0.0-1.0 %]     0.4 % 

                    (10/29/2013 09:55:00)                           

 

                          Segs-Bands # [1.5-8.1 K/CMM]    5.6 K/CMM 

                    (10/29/2013 09:55:00)                           

 

                          Lymphocytes # [1.0-5.5 K/CMM]    0.9 K/CMM 

*LOW*

                    (10/29/2013 09:55:00)                           

 

                          Monocytes # [0.0-0.8 K/CMM]    0.6 K/CMM 

                    (10/29/2013 09:55:00)                           

 

                          Eosinophils # [0.0-0.5 K/CMM]    0.0 K/CMM 

                    (10/29/2013 09:55:00)                           

 

                          Basophils # [0.0-0.2 K/CMM]    0.0 K/CMM 

                    (10/29/2013 09:55:00)                           

 

                          PT [12.0-14.7 seconds]    17.5 seconds 

*HI*

                    (10/29/2013 09:55:00)                           

 

                          INR [0.85-1.17]           1.46 12

*HI*

                    (10/29/2013 09:55:00)                           

 

                          PTT [22.9-35.8 seconds]    29.5 seconds 13

                          (10/29/2013 15:44:00)      29.5 seconds 14

                          (10/29/2013 09:55:00)       







12Interpretive Data: RECOMMENDED RANGES FOR PROTIME INR:

   2.0-3.0 for most medical and surgical thromboembolic states.

   2.5-3.5 for artificial heart valves and recurrent embolism.



INR SHOULD BE USED ONLY FOR PATIENTS ON STABLE ANTICOAGULANT THERAPY.



13Interpretive Data: Heparin Therapeutic Range:  57 - 92 Seconds



14Interpretive Data: Heparin Therapeutic Range:  57 - 92 Seconds



Microbiology Reports







PROCEDURE:Culture: Stool

                  STATUS:                  Auth (Verified)





                BODY SITE:                      COLLECTED DATE/TIME:    10/29/2013 21:30:00

 

                SOURCE:         Stool           FREE TEXT SOURCE:     







***FINAL REPORTS***



Final Report                               



Normal Enteric Neena Isolated 

No Salmonella, Shigella, Or Campylobacter Isolated



***PRELIMINARY REPORTS***



Preliminary Report                               



Normal Enteric Neena Isolated



Preliminary Report                               



Normal Enteric Neena Isolated 

No Salmonella Or Shigella Isolated

## 2019-09-25 NOTE — NUR
Walking rounds done and report received. Patient in recliner, asked to get in bed to take 
vitals and place telemetry monitor, agreeable at this time. Pt is awake, [id number] but is 
noted to talk to self. He was instructed to call for assistance prior to disconnecting 
monitors. Patient hesitant states"i need to use the restroom often" Call bell within reach.

## 2019-09-25 NOTE — XMS REPORT
Encounter CCD: 10/21/2013 to 10/22/2013

                             Created on: 2041



JOANNA CABRERA

External Reference #: [id number]

: 1965

Sex: Male



Demographics







                          Address                   [address] [place] [address]

[place]  [id number]

 

                          Home Phone                [phone]

 

                          Preferred Language        Unknown

 

                          Marital Status            Single

 

                          Episcopalian Affiliation     Confucianist

 

                          Race                      White/

 

                          Ethnic Group              Non-





Author







                          Author                    Auto Generated

 

                          Organization              [facility]

 

                          Address                   Unknown

 

                          Phone                     Unavailable







Care Team Providers







                    Care Team Member Name    Role                Phone

 

                    Kalpesh Murillo     CP                  [phone]







Allergies, Adverse Reactions, Alerts







  



                     Substance           Reaction            Status

 

  



                           NKDA                      Active







Problem List







  



                     Condition           Effective Dates     Status

 

  



                     [D]Anterior chest wall pain     2012          Active

 

  



                           BP+ - Hypertension        Active

 

  



                           CHF - Congestive heart failure      Active

 

  



                           CHF - Congestive heart failure      Active

 

  



                           Depression                Active

 

  



                           dm                        Active

 

  



                           Down syndrome             Active

 

  



                           GERD - Gastro-esophageal reflux disease      Active

 

  



                           H/O: schizophrenia        Active

 

  



                           HTN                       Active

 

  



                           ICD (implantable cardiac defibrillator) battery depletion      Active

 

  



                           NIDDM                     Active

 

  



                           Obese build               Active

 

  



                           Sleep apnea               Active







Medications







    



              Medication     Instructions     Start Date     End Date     Status

 

    



              aspirin      325 mg, 1 tab, Route: PO, Drug     10/21/2013     10/21/2013     Deleted



                                         form: TAB, ONCE, Dosing Weight   



                                         90.909, kg, Priority: STAT, Start   



                                         date: 10/21/13 16:48:00, Stop date:   



                                         10/21/13 16:48:00Take with food.   

 

    



                 Dextrose 50% Syringe     12.5 gm, 25 mL, Route: IVP, Drug     10/21/2013      10/22/2013

                                         Discontinued



                                         Form: INJ, Dosing Weight 90.909,   



                                         kg, PRN, PRN Blood Glucose Results,   



                                         Start date: 10/21/13 16:42:00,   



                                         Duration: 30 day, Stop date:   



                                         13 15:41:00   

 

    



              glucagon     1 mg, Route: IM, Drug form:     10/21/2013     10/22/2013     Discontinued





                                         PDR/INJ, PRN, Dosing Weight 90.909,   



                                         kg, PRN Blood Glucose Results,   



                                         Start date: 10/21/13 16:42:00,   



                                         Duration: 30 day, Stop date:   



                                         13 15:41:00   

 

    



                 Dextrose 50% Syringe     25 gm, 50 mL, Route: IVP, Drug     10/21/2013      10/22/2013

                                         Discontinued



                                         Form: INJ, Dosing Weight 90.909,   



                                         kg, PRN, PRN Blood Glucose Results,   



                                         Start date: 10/21/13 16:42:00,   



                                         Duration: 30 day, Stop date:   



                                         13 15:41:00   

 

    



                 insulin aspart     4 unit, 0.04 mL, Route: SUB-Q, Drug     10/21/2013      10/22/2013 

                                         Discontinued



                                         form: SOLN, TID-Before Meals,   



                                         Dosing Weight 90.909, kg, PRN Blood   



                                         Glucose Results, Start date:   



                                         10/21/13 16:42:00, Duration: 30   



                                         day, Stop date: 13   



                                         16:41:00Roll in palms of hands   



                                         gently;  Do not shake vigorously.   



                                         (Same as: NovoLog)"single patient   



                                         use only"  Stable for 28 days at   



                                         room temperature.Expires in _____   



                                         days from ______________Date   

 

    



                 insulin aspart     5 unit, 0.05 mL, Route: SUB-Q, Drug     10/21/2013      10/22/2013 

                                         Discontinued



                                         form: SOLN, TID-Before Meals,   



                                         Dosing Weight 90.909, kg, PRN Blood   



                                         Glucose Results, Start date:   



                                         10/21/13 16:42:00, Duration: 30   



                                         day, Stop date: 13   



                                         16:41:00Roll in palms of hands   



                                         gently;  Do not shake vigorously.   



                                         (Same as: NovoLog)"single patient   



                                         use only"  Stable for 28 days at   



                                         room temperature.Expires in _____   



                                         days from ______________Date   

 

    



                 insulin aspart     1 unit, 0.01 mL, Route: SUB-Q, Drug     10/21/2013      10/22/2013 

                                         Discontinued



                                         form: SOLN, TID-Before Meals,   



                                         Dosing Weight 90.909, kg, PRN Blood   



                                         Glucose Results, Start date:   



                                         10/21/13 16:42:00, Duration: 30   



                                         day, Stop date: 13   



                                         16:41:00Roll in palms of hands   



                                         gently;  Do not shake vigorously.   



                                         (Same as: NovoLog)"single patient   



                                         use only"  Stable for 28 days at   



                                         room temperature.Expires in _____   



                                         days from ______________Date   

 

    



                 insulin aspart     3 unit, 0.03 mL, Route: SUB-Q, Drug     10/21/2013      10/22/2013 

                                         Discontinued



                                         form: SOLN, TID-Before Meals,   



                                         Dosing Weight 90.909, kg, PRN Blood   



                                         Glucose Results, Start date:   



                                         10/21/13 16:42:00, Duration: 30   



                                         day, Stop date: 13   



                                         16:41:00Roll in palms of hands   



                                         gently;  Do not shake vigorously.   



                                         (Same as: NovoLog)"single patient   



                                         use only"  Stable for 28 days at   



                                         room temperature.Expires in _____   



                                         days from ______________Date   

 

    



                 insulin aspart     2 unit, 0.02 mL, Route: SUB-Q, Drug     10/21/2013      10/22/2013 

                                         Discontinued



                                         form: SOLN, TID-Before Meals,   



                                         Dosing Weight 90.909, kg, PRN Blood   



                                         Glucose Results, Start date:   



                                         10/21/13 16:42:00, Duration: 30   



                                         day, Stop date: 13   



                                         16:41:00Roll in palms of hands   



                                         gently;  Do not shake vigorously.   



                                         (Same as: NovoLog)"single patient   



                                         use only"  Stable for 28 days at   



                                         room temperature.Expires in _____   



                                         days from ______________Date   

 

    



              Zocor        20 mg, 1 tab, Route: PO, Drug form:     10/21/2013     10/22/2013     Discontinued





                                         TAB, Bedtime, Start date: 10/21/13   



                                         21:00:00, Duration: 30 day, Stop   



                                         date: 13 21:00:00(Same as:   



                                         Zocor)   

 

    



              Lasix        40 mg, 4 mL, Route: IVP, Drug form:     10/21/2013     10/21/2013     Completed





                                         INJ, ONCE, Dosing Weight 90.909,   



                                         kg, Priority: STAT, Start date:   



                                         10/21/13 16:46:00, Stop date:   



                                         10/21/13 16:46:00(Same as: Lasix)   

 

    



              aspirin 81 mg     81 mg, Route: PO, Drug form:     10/22/2013     10/21/2013     Deleted





                           tablet, chewable          CHEWTAB, Daily, Dosing Weight   



                                         90.909, kg, Start date: 10/22/13   



                                         9:00:00, Duration: 30 day, Stop   



                                         date: 13 9:00:00   

 

    



                 nitroglycerin SL Tab     0.4 mg, 1 tab, Route: SL, Drug     10/21/2013      10/22/2013

                                         Discontinued



                                         form: TAB, Q5Min, Dosing Weight   



                                         90.909, kg, PRN Chest Pain, Start   



                                         date: 10/21/13 18:25:00, Duration:   



                                         3 doses or times, Stop date:   



                                         Limited # of times(Same   



                                         as:Nitroquick, Nitrostat)"Do Not   



                                         Crush"  Sublingual tablet   

 

    



                 Saline Flush 0.9%     5 ml, Route: IVP, Drug Form: INJ,     10/21/2013      10/22/2013

                                         Discontinued



                                         Dosing Weight 90.909, kg, PRN, PRN   



                                         Line Flush, Start date: 10/21/13   



                                         18:25:00, Duration: 30 day, Stop   



                                         date: 13 17:24:00(Same as: BD   



                                         Posiflush)   

 

    



                 Saline Flush 0.9%     5 ml, Route: IVP, Drug Form: INJ,     10/21/2013      10/22/2013

                                         Discontinued



                                         Dosing Weight 90.909, kg, Q12H,   



                                         Start date: 10/21/13 21:00:00,   



                                         Duration: 30 day, Stop date:   



                                         13 9:00:00(Same as: BD   



                                         Posiflush)   

 

    



                 metFORmin 500 mg     500 mg, 1 tab, PO, BID-Meals, 30     10/21/2013      Ordered



                           oral tablet               tab, Substitution Allowed   

 

    



              temazepam 30 mg oral     30 mg, 1 cap, PO, Bedtime,     10/21/2013     10/22/2013     Discontinued





                           capsule                   Substitution Allowed, CAP   

 

    



                 Saline Flush 0.9%     5 mL, Route: IVP, Drug Form: INJ,     10/21/2013      10/22/2013

                                         Discontinued



                                         Dosing Weight 90.909, kg, Q8H, PRN   



                                         Line Flush, Start date: 10/21/13   



                                         14:13:00, Duration: 30 day, Stop   



                                         date: 13 14:12:00, Administer   



                                         at least once every 8 hours   



                                         Administer at least once every 8   



                                         hours(Same as: BD Posiflush)   

 

    



              nitroglycerin 2%     0.5 inch, Route: TOP, Drug Form:     10/21/2013     10/21/2013    

 Completed



                           topical ointment          OINT, Dosing Weight 90.909, kg,   



                                         ONCE, STAT, Start date: 10/21/13   



                                         15:24:00, Stop date: 10/21/13   



                                         15:24:001 gram is approximately 1   



                                         inch of nitroglycerin ointment   



                                         (20 mg NTG per gram) (Same   



                                         as:Nitro-Bid)   

 

    



              Protonix     40 mg, 1 tab, Route: PO, Drug form:     10/21/2013     10/22/2013     Discontinued





                                         ECTAB, Before Dinner, Dosing Weight   



                                         90.909, kg, Priority: STAT, Start   



                                         date: 10/21/13 16:41:00, Duration:   



                                         30 day, Stop date: 13   



                                         16:30:00Tablet should not be chewed   



                                         or crushed.(Same as: Protonix)   

 

    



              aspirin      325 mg, 1 tab, Route: PO, Drug     10/21/2013     10/21/2013     Completed



                                         form: TAB, ONCE, Dosing Weight   



                                         90.909, kg, Priority: STAT, Start   



                                         date: 10/21/13 15:24:00, Stop date:   



                                         10/21/13 15:24:00Take with food.   

 

    



              Zofran       4 mg, 2 mL, Route: IVP, Drug form:     10/21/2013     10/22/2013     Discontinued





                                         INJ, Q8H, Dosing Weight 90.909, kg,   



                                         PRN as needed for nausea/vomiting,   



                                         Priority: STAT, Start date:   



                                         10/21/13 16:41:00, Duration: 30   



                                         day, Stop date: 13   



                                         16:40:00(Same as: Zofran)   

 

    



                 influenza virus     0.5 ml, Route: IM, Drug Form: SUSP,     10/01/2013      10/01/2013

                                         Completed



                           vaccine, inactivated      Start date: 10/01/13 9:00:00, Stop   



                                         date: 10/01/13 9:00:00   

 

    



              Geodon       20 mg, 1 cap, Route: PO, Drug form:     10/21/2013     10/22/2013     Discontinued





                                         CAP, Bedtime, Dosing Weight 90.909,   



                                         kg, Start date: 10/21/13 21:00:00,   



                                         Duration: 30 day, Stop date:   



                                         13 21:00:00(Same As:   



                                         Geodon)Give with Food   

 

    



                 Sodium Chloride 0.9%     25 mL, Route: IV, Start date:     10/21/2013      10/22/2013 

                                         Discontinued



                           IV                        10/21/13 14:20:00, Duration: 30   



                                         day, Stop date: 13 13:19:00,   



                                         PRN Line Flush   

 

    



              Remeron      15 mg, 1 tab, Route: PO, Drug form:     10/21/2013     10/22/2013     Discontinued





                                         TAB, Bedtime, Dosing Weight 90.909,   



                                         kg, Start date: 10/21/13 21:00:00,   



                                         Duration: 30 day, Stop date:   



                                         13 21:00:00(Same as:Remeron)   

 

    



              lisinopril     40 mg, 2 tab, Route: PO, Drug form:     10/22/2013     10/22/2013     Discontinued





                                         TAB, Daily, Dosing Weight 90.909,   



                                         kg, Start date: 10/22/13 9:00:00,   



                                         Duration: 30 day, Stop date:   



                                         13 9:00:00(Same as: Prinivil,   



                                         Zestril)   

 

    



                 furosemide 40 mg     40 mg, 1 tab, Route: PO, Drug form:     10/22/2013      10/22/2013

                                         Discontinued



                           oral tablet               TAB, Daily, Dosing Weight 90.909,   



                                         kg, Start date: 10/22/13 9:00:00,   



                                         Duration: 30 day, Stop date:   



                                         13 9:00:00(Same as: Lasix)   



                                         May cause GI upset.  Give with food   



                                         or milk.   

 

    



              clonazepam     2 mg, 4 tab, Route: PO, Drug form:     10/21/2013     10/22/2013     Discontinued





                                         TAB, Bedtime, Dosing Weight 90.909,   



                                         kg, Start date: 10/21/13 21:00:00,   



                                         Duration: 30 day, Stop date:   



                                         13 21:00:00(Same As:   



                                         Klonopin)   

 

    



                 BD Normal Saline     10 mL, Route: IV, Drug Form: INJ,     10/21/2013      10/22/2013 

                                         Discontinued



                           Flush                     PRN, PRN Line Flush, Start date:   



                                         10/21/13 14:20:00, Duration: 30   



                                         day, Stop date: 13   



                                         13:19:00(Same as: BD Posiflush)   

 

    



              carvedilol     3.125 mg, 1 tab, Route: PO, Drug     10/21/2013     10/22/2013     Discontinued





                                         form: TAB, Q12H, Dosing Weight   



                                         90.909, kg, Start date: 10/21/13   



                                         21:00:00, Duration: 30 day, Stop   



                                         date: 13 9:00:00Give with   



                                         food. (Same As: Coreg)   

 

    



                 simvastatin 20 mg     20 mg, 1 tab, PO, Bedtime, 30 tab,     10/22/2013      Ordered



                           oral tablet               Substitution Allowed, TAB   

 

    



                 pantoprazole 40 mg     40 mg, 1 tab, PO, Before Dinner, 30     10/22/2013      Ordered





                           oral enteric coated       tab, Substitution Allowed, ECTAB   



                                         tablet    

 

    



              aspirin      81 mg, 1 tab, Route: PO, Drug form:     10/22/2013     10/22/2013     Discontinued





                                         CHEWTAB, Daily, Dosing Weight   



                                         90.909, kg, Start date: 10/22/13   



                                         9:00:00, Duration: 30 day, Stop   



                                         date: 13 9:00:00Take with   



                                         food.   

 

    



                 acetaminophen 325 mg     650 mg, 2 tab, Route: PO, Drug     10/21/2013      10/22/2013

                                         Discontinued



                           oral tablet               form: TAB, Q4H, Dosing Weight   



                                         90.909, kg, PRN as needed for   



                                         fever, Start date: 10/21/13   



                                         16:38:00, Duration: 30 day, Stop   



                                         date: 13 16:37:00Do not   



                                         exceed 4 gm/day.  (Same as:   



                                         Tylenol)   

 

    



              Lipitor      10 mg, Route: PO, Bedtime, Dosing     10/21/2013     10/21/2013     Deleted





                                         Weight 90.909, kg, Start date:   



                                         10/21/13 21:00:00, Duration: 30   



                                         day, Stop date: 13 21:00:00   







Immunizations







  



                     Vaccine             Date                Status

 

  



                     influenza virus vaccine, inactivated     10/01/2013          Auth (Verified)







Vital Signs







                Most recent to oldest [Reference Range]:    1               2               3

 

                          Height                    172.72 cm 

                          (10/21/2013 19:16:00)      172.72 cm 

                          (10/21/2013 13:59:00)       

 

                          Temperature Oral [96.4-99.1 DegF]    96.0 DegF 

*LOW*

                          (10/22/2013 16:25:00)      96.0 DegF 

*LOW*

                          (10/22/2013 12:41:00)      96.8 DegF 

                                        (10/22/2013 07:48:00)  

 

                          Systolic Blood Pressure [ mmHg]    117 mmHg 

                          (10/22/2013 16:25:00)      108 mmHg 

                          (10/22/2013 12:41:00)      107 mmHg 

                                        (10/22/2013 07:48:00)  

 

                          Diastolic Blood Pressure [60-90 mmHg]    92 mmHg 

*HI*

                          (10/22/2013 16:25:00)      82 mmHg 

                          (10/22/2013 12:41:00)      76 mmHg 

                                        (10/22/2013 07:48:00)  

 

                          Respiratory Rate [14-20 BRMIN]    20 BRMIN 

                          (10/22/2013 16:25:00)      18 BRMIN 

                          (10/22/2013 12:41:00)      18 BRMIN 

                                        (10/22/2013 07:48:00)  

 

                          Peripheral Pulse Rate [ bpm]    64 bpm 

                          (10/22/2013 16:25:00)      55 bpm 

*LOW*

                          (10/22/2013 12:41:00)      65 bpm 

                                        (10/22/2013 07:48:00)  

 

                          Weight                    105.085 kg 

                          (10/21/2013 19:16:00)      90.909 kg 

                          (10/21/2013 13:59:00)       







Results





BEDSIDE GLUCOSE TESTING





                Most recent to  [Reference Range]:    1               2               3

 

                          Glucose POC [70-99 mg/dL]    111 mg/dL 1

*HI*

                          (10/22/2013 17:32:00)      202 mg/dL 2

*HI*

                          (10/22/2013 11:47:00)      103 mg/dL 3

*HI*

                                        (10/22/2013 07:17:00)  

 

                          Gluc POC Comment 1        Notified RN/MD 

*NA*

                    (10/21/2013 21:17:00)                           







1Interpretive Data:  Upper Reportable Limit: 200 mg/dL.



2Interpretive Data:  Upper Reportable Limit: 200 mg/dL.



3Interpretive Data:  Upper Reportable Limit: 200 mg/dL.



VIRAL - SEROLOGY





                Most recent to oldest [Reference Range]:    1               2               3

 

                          Influ A [Negative]        Negative 

                    (10/21/2013 15:55:00)                           

 

                          Influ B [Negative]        Negative 4

                    (10/21/2013 15:55:00)                           







4Interpretive Data:   Influenza A&B Antigen: 

Due to the low sensitivity of this test a negative result does not exclude influ
brendon virus infection. A diagnosis of influenza should be considered based on a p
atient's clinical presentation and empiric antiviral treatment should be conside
red, if indicated. If more conclusive testing is desired, follow-up confirmatory
 testing with either viral culture or PCR is warranted.



CHEMISTRY





                Most recent to oldest [Reference Range]:    1               2               3

 

                          Sodium Lvl [135-145 mEq/L]    139 mEq/L 

                          (10/22/2013 05:25:00)      139 mEq/L 

                          (10/21/2013 14:36:00)       

 

                          Potassium Lvl [3.5-5.1 mEq/L]    3.9 mEq/L 

                          (10/22/2013 05:25:00)      3.9 mEq/L 

                          (10/21/2013 14:36:00)       

 

                          Chloride Lvl [ mEq/L]    105 mEq/L 

                          (10/22/2013 05:25:00)      106 mEq/L 

                          (10/21/2013 14:36:00)       

 

                          CO2 [24-32 mEq/L]         24 mEq/L 

                          (10/22/2013 05:25:00)      26 mEq/L 

                          (10/21/2013 14:36:00)       

 

                          AGAP [10.0-20.0 mEq/L]    13.9 mEq/L 

                          (10/22/2013 05:25:00)      10.9 mEq/L 

                          (10/21/2013 14:36:00)       

 

                          Creatinine Lvl [0.5-1.4 mg/dL]    1.1 mg/dL 

                          (10/22/2013 05:25:00)      1.2 mg/dL 

                          (10/21/2013 14:36:00)       

 

                          eGFR                      79 mL/min/1.73m2 5

*NA*

                          (10/22/2013 05:25:00)      71 mL/min/1.73m2 6

*NA*

                          (10/21/2013 14:36:00)       

 

                          BUN [7-22 mg/dL]          25 mg/dL 

*HI*

                          (10/22/2013 05:25:00)      20 mg/dL 

                          (10/21/2013 14:36:00)       

 

                          B/C Ratio [6-25]          17 

                    (10/21/2013 14:36:00)                           

 

                          Glucose Lvl [70-99 mg/dL]    124 mg/dL 7

*HI*

                          (10/22/2013 05:25:00)      122 mg/dL 8

*HI*

                          (10/21/2013 14:36:00)       

 

                          Total Protein [6.4-8.4 g/dL]    6.5 g/dL 

                    (10/21/2013 14:36:00)                           

 

                          Albumin Lvl [3.5-5.0 g/dL]    3.5 g/dL 

                    (10/21/2013 14:36:00)                           

 

                          Globulin [2.0-4.0 g/dL]    3.0 g/dL 

                    (10/21/2013 14:36:00)                           

 

                          A/G Ratio [0.7-1.6]       1.2 

                    (10/21/2013 14:36:00)                           

 

                          Calcium Lvl [8.5-10.5 mg/dL]    8.0 mg/dL 

*LOW*

                          (10/22/2013 05:25:00)      8.6 mg/dL 

                          (10/21/2013 14:36:00)       

 

                          ALT [0-65 unit/L]         45 unit/L 

                    (10/21/2013 14:36:00)                           

 

                          AST [0-37 unit/L]         26 unit/L 

                    (10/21/2013 14:36:00)                           

 

                          Alk Phos [ unit/L]    131 unit/L 

                    (10/21/2013 14:36:00)                           

 

                          Bili Total [0.2-1.3 mg/dL]    1.1 mg/dL 

                    (10/21/2013 14:36:00)                           

 

                          Total CK [ unit/L]    118 unit/L 

                          (10/21/2013 23:10:00)      110 unit/L 

                          (10/21/2013 19:00:00)      101 unit/L 

                                        (10/21/2013 14:36:00)  

 

                          CK MB [0.5-3.6 ng/mL]     1.5 ng/mL 

                          (10/21/2013 23:10:00)      1.2 ng/mL 

                          (10/21/2013 19:00:00)      1.1 ng/mL 

                                        (10/21/2013 14:36:00)  

 

                          CK MB Index [0.0-2.5]     1.3 

                          (10/21/2013 23:10:00)      1.1 

                          (10/21/2013 19:00:00)      1.1 

                                        (10/21/2013 14:36:00)  

 

                          Troponin-I [0.00-0.40 ng/mL]    0.03 ng/mL 

                          (10/21/2013 23:10:00)      0.03 ng/mL 

                          (10/21/2013 19:00:00)      0.04 ng/mL 

                                        (10/21/2013 14:36:00)  

 

                          BNP [<=100 pg/mL]         1962 pg/mL 9

*HI*

                    (10/21/2013 14:36:00)                           







5Result Comment: The eGFR is calculated using the CKD-EPI formula. In most 
young, healthy individuals the eGFR will be >90 mL/min/1.73m2. The eGFR declines
with age. An eGFR of 60-89 may be normal in some populations, particularly the 
elderly, for whom the CKD-EPI formula has not been extensively validated. Use of
the eGFR is not recommended in the following populations:



Individuals with unstable creatinine concentrations, including pregnant patients
and those with serious co-morbid conditions.



Patients with extremes in muscle mass or diet. 



The data above are obtained from the National Kid[employer] Education Program (
NK[employer]) which additionally recommends that when the eGFR is used in patients with
extremes of body mass index for purposes of drug dosing, the eGFR should be mul
tiplied by the estimated BMI.



6Result Comment: The eGFR is calculated using the CKD-EPI formula. In most 
young, healthy individuals the eGFR will be >90 mL/min/1.73m2. The eGFR declines
with age. An eGFR of 60-89 may be normal in some populations, particularly the 
elderly, for whom the CKD-EPI formula has not been extensively validated. Use of
the eGFR is not recommended in the following populations:



Individuals with unstable creatinine concentrations, including pregnant patients
and those with serious co-morbid conditions.



Patients with extremes in muscle mass or diet. 



The data above are obtained from the National Kid[employer] Education Program (
NK[employer]) which additionally recommends that when the eGFR is used in patients with
extremes of body mass index for purposes of drug dosing, the eGFR should be mul
tiplied by the estimated BMI.



7Interpretive Data: Adult reference range values reflect the clinical guidelines

of the [employer].



8Interpretive Data: Adult reference range values reflect the clinical guidelines

of the [employer].



9Interpretive Data: Elevated results are in line with increasing severity of 

congestive heart failure. Minor elevations between 100 and 300 

may be seen with Myocardial Ischemia, Sodium retaining drugs, 

and compensated/treated heart failure.



HEMATOLOGY





                Most recent to oldest [Reference Range]:    1               2               3

 

                          WBC [3.7-10.4 K/CMM]      6.0 K/CMM 

                          (10/22/2013 05:25:00)      6.6 K/CMM 

                          (10/21/2013 14:36:00)       

 

                          RBC [4.70-6.10 M/CMM]     4.90 M/CMM 

                          (10/22/2013 05:25:00)      5.22 M/CMM 

                          (10/21/2013 14:36:00)       

 

                          Hgb [14.0-18.0 g/dL]      14.3 g/dL 

                          (10/22/2013 05:25:00)      15.2 g/dL 

                          (10/21/2013 14:36:00)       

 

                          Hct [42.0-54.0 %]         44.1 % 

                          (10/22/2013 05:25:00)      47.2 % 

                          (10/21/2013 14:36:00)       

 

                          MCV [80.0-94.0 fL]        89.9 fL 

                          (10/22/2013 05:25:00)      90.5 fL 

                          (10/21/2013 14:36:00)       

 

                          MCH [27.0-31.0 pg]        29.1 pg 

                          (10/22/2013 05:25:00)      29.2 pg 

                          (10/21/2013 14:36:00)       

 

                          MCHC [32.0-36.0 g/dL]     32.4 g/dL 

                          (10/22/2013 05:25:00)      32.3 g/dL 

                          (10/21/2013 14:36:00)       

 

                          RDW [11.5-14.5 %]         15.6 % 

*HI*

                          (10/22/2013 05:25:00)      15.4 % 

*HI*

                          (10/21/2013 14:36:00)       

 

                          Platelet [133-450 K/CMM]    198 K/CMM 

                          (10/22/2013 05:25:00)      227 K/CMM 

                          (10/21/2013 14:36:00)       

 

                          MPV [7.4-10.4 fL]         9.2 fL 

                          (10/22/2013 05:25:00)      8.9 fL 

                          (10/21/2013 14:36:00)       

 

                          Segs [45.0-75.0 %]        70.6 % 

                          (10/22/2013 05:25:00)      73.0 % 

                          (10/21/2013 14:36:00)       

 

                          Lymphocytes [20.0-40.0 %]    17.7 % 

*LOW*

                          (10/22/2013 05:25:00)      16.7 % 

*LOW*

                          (10/21/2013 14:36:00)       

 

                          Monocytes [2.0-12.0 %]    9.5 % 

                          (10/22/2013 05:25:00)      9.4 % 

                          (10/21/2013 14:36:00)       

 

                          Eosinophils [0.0-4.0 %]    1.6 % 

                          (10/22/2013 05:25:00)      0.7 % 

                          (10/21/2013 14:36:00)       

 

                          Basophils [0.0-1.0 %]     0.6 % 

                          (10/22/2013 05:25:00)      0.2 % 

                          (10/21/2013 14:36:00)       

 

                          Segs-Bands # [1.5-8.1 K/CMM]    4.3 K/CMM 

                          (10/22/2013 05:25:00)      4.8 K/CMM 

                          (10/21/2013 14:36:00)       

 

                          Lymphocytes # [1.0-5.5 K/CMM]    1.1 K/CMM 

                          (10/22/2013 05:25:00)      1.1 K/CMM 

                          (10/21/2013 14:36:00)       

 

                          Monocytes # [0.0-0.8 K/CMM]    0.6 K/CMM 

                          (10/22/2013 05:25:00)      0.6 K/CMM 

                          (10/21/2013 14:36:00)       

 

                          Eosinophils # [0.0-0.5 K/CMM]    0.1 K/CMM 

                          (10/22/2013 05:25:00)      0.0 K/CMM 

                          (10/21/2013 14:36:00)       

 

                          Basophils # [0.0-0.2 K/CMM]    0.0 K/CMM 

                          (10/22/2013 05:25:00)      0.0 K/CMM 

                          (10/21/2013 14:36:00)       

 

                          PT [12.0-14.7 seconds]    18.5 seconds 

*HI*

                    (10/21/2013 14:36:00)                           

 

                          INR [0.85-1.17]           1.57 10

*HI*

                    (10/21/2013 14:36:00)                           

 

                          PTT [22.9-35.8 seconds]    30.2 seconds 11

                    (10/21/2013 14:36:00)                           







10Interpretive Data: RECOMMENDED RANGES FOR PROTIME INR:

   2.0-3.0 for most medical and surgical thromboembolic states.

   2.5-3.5 for artificial heart valves and recurrent embolism.



INR SHOULD BE USED ONLY FOR PATIENTS ON STABLE ANTICOAGULANT THERAPY.



11Interpretive Data: Heparin Therapeutic Range:  57 - 92 Seconds

## 2019-09-25 NOTE — XMS REPORT
Summary of Care: 17 - 17

                             Created on: 10/29/2118



JOANNA CABRERA

External Reference #: [id number]

: 1965

Sex: Male



Demographics







                          Address                   [address] [place] [address]  [address]

 

                          Home Phone                [phone]

 

                          Preferred Language        English

 

                          Marital Status            Single

 

                          Anglican Affiliation     Sabianist

 

                          Race                      Other

 

                          Ethnic Group              Non-





Author







                          Author                    [facility]

 

                          Organization              [facility]

 

                          Address                   Unknown

 

                          Phone                     Unavailable







Encounter





HQ Shyann(LISA) [id number] Date(s): 17 - 17

[facility] [address] [place] [address]-     [phone]


Discharge Disposition: Home or Self Care

Attending Physician: Jessica Arambula DO

Admitting Physician: Jessica Arambula DO





Vital Signs







                    1                   2                   3



                                         Most recent to   



                                         oldest [Reference   



                                         Range]:   

 

                                        175.26 cm 

                    (17 1:26 AM)                         



                                         Height   

 

                                        97.4 DegF 

                          (17 7:35 AM)          97.8 DegF 

                          (17 5:00 AM)          97.7 DegF 

                                        (17 1:38 AM)



                                         Temperature Oral   



                                         [96.4-99.1 DegF]   

 

                                        105/70 mmHg 

                          (17 7:35 AM)          115/84 mmHg 

                          (17 5:00 AM)          100/69 mmHg 

                                        (17 1:38 AM)



                                         Blood Pressure   



                                         [/60-90 mmHg]   

 

                                        18 BRMIN 

                          (17 7:35 AM)          18 BRMIN 

                          (17 5:00 AM)          18 BRMIN 

                                        (17 1:38 AM)



                                         Respiratory Rate   



                                         [14-20 BRMIN]   

 

                                        80 bpm 

                          (17 7:35 AM)          86 bpm 

                          (17 5:00 AM)          60 bpm 

                                        (17 1:38 AM)



                                         Peripheral Pulse   



                                         Rate [ bpm]   

 

                                        102.926 kg 

                          (17 1:26 AM)          90.909 kg 

                          (17 9:43 PM)           



                                         Weight   

 

                                        33.51 m2 

                    (17 1:26 AM)                         



                                         Body Mass Index   







Problem List







    



              Condition     Effective Dates     Status       Health Status     Informant

 

    



                     [D]Anterior chest     12             Active  



                                         wall pain(Confirmed)    

 

    



                           Anxiety(Confirmed)        Resolved  

 

    



                           BP+ -                     Active  



                                         Hypertension(Confirm    



                                         ed)    

 

    



                           Cardiomyopathy(Confi      Resolved  



                                         rmed)    

 

    



                           CHF - Congestive          Active  



                                         heart    



                                         failure(Confirmed)    

 

    



                           CHF - Congestive          Active  



                                         heart    



                                         failure(Confirmed)    

 

    



                           Depression(Confirmed      Active  



                                         )    

 

    



                           dm(Confirmed)             Active  

 

    



                           Down                      Active  



                                         syndrome(Confirmed)    

 

    



                           GERD -                    Active  



                                         Gastro-esophageal    



                                         reflux    



                                         disease(Confirmed)    

 

    



                           H/O:                      Active  



                                         schizophrenia(Confir    



                                         med)    

 

    



                           HTN(Confirmed)            Active  

 

    



                           ICD (implantable          Active  



                                         cardiac    



                                         defibrillator)    



                                         battery    



                                         depletion(Confirmed)    

 

    



                           NIDDM(Confirmed)          Active  

 

    



                           Obese                     Active  



                                         build(Confirmed)    

 

    



                           Schizophrenia(Confir      Active  



                                         med)    

 

    



                           Short of breath on        Active  



                                         exertion(Confirmed)    

 

    



                           Sleep                     Active  



                                         apnea(Confirmed)    







Allergies, Adverse Reactions, Alerts







   



                 Substance       Reaction        Severity        Status

 

   



                           NKDA                      Active







Medications





aspirin

325 mg, Route: PO, Drug form: TAB, ONCE, Dosing Weight 90.909, kg, Priority: STA
T, Start date: 17 21:58:00 CDT, Stop date: 17 21:58:00 CDT

Start Date: 17

Stop Date: 17

Status: Completed



BD Normal Saline Flush

10 mL, Route: IVP, Drug Form: INJ, PRN, PRN Line Flush, Start date: 17 0:5
7:00 CDT, Duration: 30 day, Stop date: 17 0:56:00 CDT

Notes: (Same as: BD Posiflush)

Start Date: 17

Stop Date: 17

Status: Discontinued



Cardizem

20 mg, Route: IVP, ONCE, Dosing Weight 90.909, kg, Priority: STAT, Start date: 0
17 21:59:00 CDT, Stop date: 17 21:59:00 CDT

Start Date: 17

Stop Date: 17

Status: Completed



clonazePAM

1 mg, 1 tab, Route: PO, Drug form: TAB, ONCE, Dosing Weight 102.926, kg, Priorit
y: STAT, Start date: 17 14:30:00 CDT, Stop date: 17 14:30:00 CDT

Notes: (Same As: KlonoPIN)

Start Date: 17

Stop Date: 17

Status: Completed



Dextrose 50% Syringe

25 gm, 50 mL, Route: IVP, Drug Form: INJ, Dosing Weight 102.926, kg, PRN, PRN Bl
ood Glucose Results, Start date: 17 2:03:00 CDT, Duration: 30 day, Stop da
te: 17 2:02:00 CDT

Start Date: 17

Stop Date: 17

Status: Discontinued



Dextrose 50% Syringe

12.5 gm, 25 mL, Route: IVP, Drug Form: INJ, Dosing Weight 102.926, kg, PRN, PRN 
Blood Glucose Results, Start date: 17 2:03:00 CDT, Duration: 30 day, Stop 
date: 17 2:02:00 CDT

Start Date: 17

Stop Date: 17

Status: Discontinued



glucagon

1 mg, Route: IM, Drug form: PDR/INJ, PRN, Dosing Weight 102.926, kg, PRN Blood G
lucose Results, Start date: 17 2:03:00 CDT, Duration: 30 day, Stop date: 0
17 2:02:00 CDT

Start Date: 17

Stop Date: 17

Status: Discontinued



insulin aspart

2 unit, 0.02 mL, Route: SUB-Q, Drug form: SOLN, Sliding Scale, Dosing Weight 102
.926, kg, PRN Blood Glucose Results, Start date: 17 2:03:00 CDT, Duration:
 30 day, Stop date: 17 2:02:00 CDT

Notes: Roll in palms of hands gently;  Do not shake vigorously. (Same as: NovoLO
G)"single patient use only"WASTE: F/P - Black; E - Municipal Trash Bin  Stable f
or 28 days at room temperature.Expires in _____ days from ______________Date

Start Date: 17

Stop Date: 17

Status: Discontinued



insulin aspart

6 unit, 0.06 mL, Route: SUB-Q, Drug form: SOLN, Sliding Scale, Dosing Weight 102
.926, kg, PRN Blood Glucose Results, Start date: 17 2:03:00 CDT, Duration:
 30 day, Stop date: 17 2:02:00 CDT

Notes: Roll in palms of hands gently;  Do not shake vigorously. (Same as: Demetria [place])"single patient use only"WASTE: F/P - Black; E - Municipal Trash Bin  Stable f
or 28 days at room temperature.Expires in _____ days from ______________Date

Start Date: 17

Stop Date: 17

Status: Discontinued



insulin aspart

4 unit, 0.04 mL, Route: SUB-Q, Drug form: SOLN, Sliding Scale, Dosing Weight 102
.926, kg, PRN Blood Glucose Results, Start date: 17 2:03:00 CDT, Duration:
 30 day, Stop date: 17 2:02:00 CDT

Notes: Roll in palms of hands gently;  Do not shake vigorously. (Same as: Demetria [place])"single patient use only"WASTE: F/P - Black; E - Municipal Trash Bin  Stable f
or 28 days at room temperature.Expires in _____ days from ______________Date

Start Date: 17

Stop Date: 17

Status: Discontinued



insulin aspart

10 unit, 0.1 mL, Route: SUB-Q, Drug form: SOLN, Sliding Scale, Dosing Weight 102
.926, kg, PRN Blood Glucose Results, Start date: 17 2:03:00 CDT, Duration:
 30 day, Stop date: 17 2:02:00 CDT

Notes: Roll in palms of hands gently;  Do not shake vigorously. (Same as: NovoCLINT [place])"single patient use only"WASTE: F/P - Black; E - Municipal Trash Bin  Stable f
or 28 days at room temperature.Expires in _____ days from ______________Date

Start Date: 17

Stop Date: 17

Status: Discontinued



insulin aspart

8 unit, 0.08 mL, Route: SUB-Q, Drug form: SOLN, Sliding Scale, Dosing Weight 102
.926, kg, PRN Blood Glucose Results, Start date: 17 2:03:00 CDT, Duration:
 30 day, Stop date: 17 2:02:00 CDT

Notes: Roll in palms of hands gently;  Do not shake vigorously. (Same as: NovoCLINT [place])"single patient use only"WASTE: F/P - Black; E - Municipal Trash Bin  Stable f
or 28 days at room temperature.Expires in _____ days from ______________Date

Start Date: 17

Stop Date: 17

Status: Discontinued



morphine Sulfate

2 mg, 1 mL, Route: IVP, Drug form: INJ, Q2H, Dosing Weight 102.926, kg, PRN Pain
 Score 7-10, Start date: 17 3:05:00 CDT, Duration: 30 day, Stop date:  3:04:00 CDT

Notes: (Same as:MORPhine Sulfate)

Start Date: 17

Stop Date: 17

Status: Discontinued



NS (Bolus) IV

500 mL, 500 ml/hr, Infuse Over: 1 hr, Route: IV, ONCE, Priority: STAT, Dosing We
ight 90.909 kg, Start date: 17 22:58:00 CDT, Duration: 1 doses or times, S
top date: 17 22:58:00 CDT

Start Date: 17

Stop Date: 17

Status: Completed



ondansetron

4 mg, 2 mL, Route: IVP, Drug form: INJ, Q6H, Dosing Weight 90.909, kg, PRN Nause
a & Vomiting, Start date: 17 0:55:00 CDT, Duration: 30 day, Stop date: 
17 0:54:00 CDT

Notes: (Same as: Zofran)  *** MEDICATION WASTE ***Product Size:  4 mgProduct Was
helen:  ___ mg

Start Date: 17

Stop Date: 17

Status: Discontinued



Sodium Chloride 0.9% IV

250 mL, Route: IVPB, Start date: 17 0:57:00 CDT, Duration: 30 day, Stop da
te: 17 0:56:00 CDT, PRN Line Flush

Start Date: 17

Stop Date: 17

Status: Discontinued



Results





ELECTROLYTES





  



                     Most recent to      1                   2



                                         oldest [Reference  



                                         Range]:  

 

  



                     Sodium Lvl [135-145     137 mEq/L           139 mEq/L



                     mEq/L]              (17 8:34 AM)     (17 10:30 PM)

 

  



                     Potassium Lvl       3.9 mEq/L 1         3.5 mEq/L



                     [3.5-5.1 mEq/L]     (17 8:34 AM)     (17 10:30 PM)

 

  



                     Chloride Lvl [     103 mEq/L           98 mEq/L



                     mEq/L]              (17 8:34 AM)     (17 10:30 PM)

 

  



                     CO2 [24-32 mEq/L]     26 mEq/L            32 mEq/L



                           (17 8:34 AM)          (17 10:30 PM)

 

  



                     AGAP [10.0-20.0     11.9 mEq/L          12.5 mEq/L



                     mEq/L]              (17 8:34 AM)     (17 10:30 PM)







1Result Comment: Specimen Slightly Hemolyzed.



CHEM PANEL





  



                     Most recent to      1                   2



                                         oldest [Reference  



                                         Range]:  

 

  



                     Creatinine Lvl      1.20 mg/dL          1.70 mg/dL



                     [0.50-1.40 mg/dL]     (17 8:34 AM)     *HI*



                                         (17 10:30 PM)

 

  



                     eGFR                69 mL/min/1.73m2 1     45 mL/min/1.73m2 2



                           *NA*                      *NA*



                           (17 8:34 AM)          (17 10:30 PM)

 

  



                     BUN [7-22 mg/dL]     29 mg/dL            33 mg/dL



                           *HI*                      *HI*



                           (17 8:34 AM)          (17 10:30 PM)

 

  



                     B/C Ratio [6-25]     24                  19



                           (17 8:34 AM)          (17 10:30 PM)

 

  



                     Glucose Lvl [70-99     157 mg/dL           204 mg/dL



                     mg/dL]              *HI*                *HI*



                           (17 8:34 AM)          (17 10:30 PM)

 

  



                     Total Protein       6.6 g/dL            7.4 g/dL



                     [6.4-8.4 g/dL]      (17 8:34 AM)     (17 10:30 PM)

 

  



                     Albumin Lvl [3.5-5.0     2.9 g/dL            4.0 g/dL



                     g/dL]               *LOW*               (17 10:30 PM)



                                         (17 8:34 AM) 

 

  



                     Globulin [2.7-4.2     3.7 g/dL            3.4 g/dL



                     g/dL]               (17 8:34 AM)     (17 10:30 PM)

 

  



                     A/G Ratio [0.7-1.6]     0.8                 1.2



                           (17 8:34 AM)          (17 10:30 PM)

 

  



                     Calcium Lvl         8.5 mg/dL           9.0 mg/dL



                     [8.5-10.5 mg/dL]     (17 8:34 AM)     (17 10:30 PM)

 

  



                     ALT [0-65 unit/L]     24 unit/L           23 unit/L



                           (17 8:34 AM)          (17 10:30 PM)

 

  



                     AST [0-37 unit/L]     26 unit/L           16 unit/L



                           (17 8:34 AM)          (17 10:30 PM)

 

  



                     Alk Phos [     94 unit/L           127 unit/L



                     unit/L]             (17 8:34 AM)     (17 10:30 PM)

 

  



                     Bili Total [0.2-1.3     0.6 mg/dL           0.5 mg/dL



                     mg/dL]              (17 8:34 AM)     (17 10:30 PM)







1Result Comment: The eGFR is calculated using the CKD-EPI formula. In most 
young, healthy individuals the eGFR will be >90 mL/min/1.73m2. The eGFR declines
with age. An eGFR of 60-89 may be normal in some populations, particularly the 
elderly, for whom the CKD-EPI formula has not been extensively validated. Use of
the eGFR is not recommended in the following populations:



Individuals with unstable creatinine concentrations, including pregnant patients
and those with serious co-morbid conditions.



Patients with extremes in muscle mass or diet. 



The data above are obtained from the National Kidney Disease Education Program (
NKDEP) which additionally recommends that when the eGFR is used in patients with
extremes of body mass index for purposes of drug dosing, the eGFR should be mul
tiplied by the estimated BMI.



2Result Comment: The eGFR is calculated using the CKD-EPI formula. In most 
young, healthy individuals the eGFR will be >90 mL/min/1.73m2. The eGFR declines
with age. An eGFR of 60-89 may be normal in some populations, particularly the 
elderly, for whom the CKD-EPI formula has not been extensively validated. Use of
the eGFR is not recommended in the following populations:



Individuals with unstable creatinine concentrations, including pregnant patients
and those with serious co-morbid conditions.



Patients with extremes in muscle mass or diet. 



The data above are obtained from the National Kidney Disease Education Program (
NKDEP) which additionally recommends that when the eGFR is used in patients with
extremes of body mass index for purposes of drug dosing, the eGFR should be mul
tiplied by the estimated BMI.



CARDIAC ENZYMES





  



                     Most recent to       [Reference  



                                         Range]:  

 

  



                           Total CK [          182 unit/L 



                           unit/L]                   (17 10:30 PM) 

 

  



                           CK MB [0.5-3.6            1.4 ng/mL 



                           ng/mL]                    (17 10:30 PM) 

 

  



                           CK MB Index               0.8 



                           [0.0-2.5]                 (17 10:30 PM) 

 

  



                     Troponin-I          <0.02 ng/mL         <0.02 ng/mL



                     [0.00-0.40 ng/mL]     (17 3:07 AM)     (17 10:30 PM)







SPECIAL CHEMISTRY





  



                     Most recent to       [Reference  



                                         Range]:  

 

  



                           Hgb A1C [<=5.6 %]         10.3 % 



                                         *HI* 



                                         (17 6:37 AM) 







DRUG SCREEN





  



                     Most recent to       [Reference  



                                         Range]:  

 

  



                           U Amph Scr                Negative 



                           [Negative]                *NA* 



                                         (17 10:30 PM) 

 

  



                           U Annie Scr                Negative 



                           [Negative]                *NA* 



                                         (17 10:30 PM) 

 

  



                           U Benzodia Scr            Negative 



                           [Negative]                *NA* 



                                         (17 10:30 PM) 

 

  



                           U Cocaine Scr             Negative 



                           [Negative]                *NA* 



                                         (17 10:30 PM) 

 

  



                           U Opiate Scr              Negative 



                           [Negative]                *NA* 



                                         (17 10:30 PM) 

 

  



                           U Phencyc Scr             Negative 



                           [Negative]                *NA* 



                                         (17 10:30 PM) 

 

  



                           U Cannab Scr              Negative 



                           [Negative]                *NA* 



                                         (17 10:30 PM) 

 

  



                           UDS Note                  See Note 



                                         (17 10:30 PM) 







URINE AND STOOL





  



                     Most recent to      1                   2



                                         oldest [Reference  



                                         Range]:  

 

  



                           UA Turbidity [Clear]      Clear 



                                         (17 10:30 PM) 

 

  



                           UA Color [Yellow]         Light Yellow 



                                         *NA* 



                                         (17 10:30 PM) 

 

  



                           UA pH [5.0-8.0]           5.0 



                                         (17 10:30 PM) 

 

  



                           UA Spec Grav              1.016 



                           [<=1.030]                 (17 10:30 PM) 

 

  



                           UA Glucose [Negative      Negative mg/dL 



                           mg/dL]                    *NA* 



                                         (17 10:30 PM) 

 

  



                           UA Blood [Negative]       Negative 



                                         (17 10:30 PM) 

 

  



                           UA Ketones [Negative      Negative mg/dL 



                           mg/dL]                    *NA* 



                                         (17 10:30 PM) 

 

  



                           UA Protein [Negative      Negative mg/dL 



                           mg/dL]                    (17 10:30 PM) 

 

  



                           UA Urobilinogen           <=1.0 mg/dL 



                           [0.1-1.0 mg/dL]           *NA* 



                                         (17 10:30 PM) 

 

  



                           UA Bili [Negative]        Negative 



                                         *NA* 



                                         (17 10:30 PM) 

 

  



                           UA Leuk Est               Negative 



                           [Negative]                (17 10:30 PM) 

 

  



                           UA Nitrite                Negative 



                           [Negative]                (17 10:30 PM) 

 

  



                           UA RBC [0-2 /HPF]         <1 /HPF 



                                         (17 10:30 PM) 

 

  



                           UA Sq Epi                 None Seen 



                                         *NA* 



                                         (17 10:30 PM) 

 

  



                           UA Hyal Cast [0-2         7 /LPF 



                           /LPF]                     *HI* 



                                         (17 10:30 PM) 

 

  



                           UA Mucus [None Seen       Few /LPF 



                           /LPF]                     *NA* 



                                         (17 10:30 PM) 







HEMATOLOGY





  



                     Most recent to      1                   2



                                         oldest [Reference  



                                         Range]:  

 

  



                     WBC [3.7-10.4 K/CMM]     9.7 K/CMM           12.5 K/CMM



                           (17 6:37 AM)          *HI*



                                         (17 10:30 PM)

 

  



                     RBC [4.70-6.10      5.25 M/CMM          5.85 M/CMM



                     M/CMM]              (17 6:37 AM)     (17 10:30 PM)

 

  



                     Hgb [14.0-18.0 g/dL]     16.5 g/dL           17.6 g/dL



                           (17 6:37 AM)          (17 10:30 PM)

 

  



                     Hct [42.0-54.0 %]     46.4 %              52.8 %



                           (17 6:37 AM)          (17 10:30 PM)

 

  



                     MCV [80.0-94.0 fL]     88.4 fL             90.2 fL



                           (17 6:37 AM)          (17 10:30 PM)

 

  



                     MCH [27.0-31.0 pg]     31.5 pg             30.0 pg



                           *HI*                      (17 10:30 PM)



                                         (17 6:37 AM) 

 

  



                     MCHC [32.0-36.0     35.6 g/dL           33.3 g/dL



                     g/dL]               (17 6:37 AM)     (17 10:30 PM)

 

  



                     RDW [11.5-14.5 %]     14.7 %              14.1 %



                           *HI*                      (17 10:30 PM)



                                         (17 6:37 AM) 

 

  



                     Platelet [133-450     203 K/CMM           189 K/CMM



                     K/CMM]              (17 6:37 AM)     (17 10:30 PM)

 

  



                     MPV [7.4-10.4 fL]     10.0 fL             8.6 fL



                           (17 6:37 AM)          (17 10:30 PM)

 

  



                     Segs [45.0-75.0 %]     70.1 %              83.7 %



                           (17 6:37 AM)          *HI*



                                         (17 10:30 PM)

 

  



                     Lymphocytes         19.8 %              10.2 %



                     [20.0-40.0 %]       *LOW*               *LOW*



                           (17 6:37 AM)          (17 10:30 PM)

 

  



                     Monocytes [2.0-12.0     8.7 %               5.6 %



                     %]                  (17 6:37 AM)     (17 10:30 PM)

 

  



                     Eosinophils [0.0-4.0     1.1 %               0.4 %



                     %]                  (17 6:37 AM)     (17 10:30 PM)

 

  



                     Basophils [0.0-1.0     0.3 %               0.1 %



                     %]                  (17 6:37 AM)     (17 10:30 PM)

 

  



                     Segs-Bands #        6.8 K/CMM           10.5 K/CMM



                     [1.5-8.1 K/CMM]     (17 6:37 AM)     *HI*



                                         (17 10:30 PM)

 

  



                     Lymphocytes #       1.9 K/CMM           1.3 K/CMM



                     [1.0-5.5 K/CMM]     (17 6:37 AM)     (17 10:30 PM)

 

  



                     Monocytes # [0.0-0.8     0.8 K/CMM           0.7 K/CMM



                     K/CMM]              (17 6:37 AM)     (17 10:30 PM)

 

  



                     Eosinophils #       0.1 K/CMM           0.1 K/CMM



                     [0.0-0.5 K/CMM]     (17 6:37 AM)     (17 10:30 PM)

 

  



                     Basophils # [0.0-0.2     0.0 K/CMM           0.0 K/CMM



                     K/CMM]              (17 6:37 AM)     (17 10:30 PM)

 

  



                           PT [12.0-14.7             19.5 seconds 



                           seconds]                  *HI* 



                                         (17 10:30 PM) 

 

  



                           INR [0.85-1.17]           1.62 



                                         *HI* 



                                         (17 10:30 PM) 

 

  



                           PTT [22.9-35.8            32.7 seconds 



                           seconds]                  (17 10:30 PM) 







Immunizations





Given and Recorded





   



                 Vaccine         Date            Status          Refusal Reason

 

   



                     influenza virus vaccine, inactivated     10/1/13             Given 







Procedures







   



                 Procedure       Date            Related Diagnosis     Body Site

 

   



                                         ICD - Internal cardiac defibrillator   



                                         procedure1   

 

   



                                         torn ligament2   







1placed 2.5years ago



2left knee ligament surgery



Social History







 



                           Social History Type       Response

 

 



                           Substance Abuse           Use: Current.  Type: Cocaine.  Recreational Drug Route: Inhaled.

  Amount:



                                         uses 5x a year.

 

 



                           Alcohol                   Never

 

 



                           Smoking Status            Former smoker; Type: Cigarettes; Tobacco use per day: 0; Number

 of years:



                                         2; Total pack years: 2; Started at age: 22.0; Stopped at age: 24; Previous



                                         treatment: None; Ready to change: No; Concerns about tobacco use in



                                         household: No; Exposure to Tobacco Smoke None; Cigarette Smoking Last 365



                                         Days No; Reg Smoking Cessation Counseling No







Assessment and Plan

Extracted from:





  



                     Title: Medicine H&P *     Author: Nnamdi Prieto DO     Date: 17









                                         Impression and Plan

                                        51-year-old man with hypertension, type 2 diabetes, chronic systolic CHF, EF of 

20%, dilated cardiomyopathy, AICD status who presented to ER today due to chest 
pain and palpitations were started at 6 PM.



Chest pain rule out ACS



trend troponins.  Consult patient's outpatient cardiologist: Dr. nolan



call in a.m.  Recent echocardiogram



 and 



EF 20%, severe global hypokinesis.

Atrial fibrillation



rate is currently controlled.  Continue home medications including 
anticoagulation: Xarelto

Severe dilated cardiomyopathy, systolic CHF with EF 20%



continue home medications

Diabetes mellitus type 2



check hemoglobin A1c, start insulin sliding scale and Accu-Cheks 4 times daily

Hypertension



stable, continue home medications once reconciled

History of cocaine abuse



Patient is full code

DVT prophylaxis: SCDs, ambulation

## 2019-09-25 NOTE — XMS REPORT
Encounter CCD: 2013 to 2013

                             Created on: 2059



JOANNA CABRERA

External Reference #: [id number]

: 1965

Sex: Male



Demographics







                          Address                   [address] [place] [address]

[place]  [id number]

 

                          Home Phone                [phone]

 

                          Preferred Language        Unknown

 

                          Marital Status            Single

 

                          [employer] Affiliation     [employer]

 

                          Race                      White/

 

                          Ethnic Group              Non-





Author







                          Author                    Auto Generated

 

                          Organization              [facility]

 

                          Address                   Unknown

 

                          Phone                     Unavailable







Care Team Providers







                    Care Team Member Name    Role                Phone

 

                    Laura Ray    CP                  [phone]







Allergies, Adverse Reactions, Alerts







  



                     Substance           Reaction            Status

 

  



                           NKDA                      Active







Problem List







  



                     Condition           Effective Dates     Status

 

  



                     [D]Anterior chest wall pain     2012          Active

 

  



                           BP+ - Hypertension        Active

 

  



                           Cardiomyopathy            Resolved

 

  



                           CHF - Congestive heart failure      Active

 

  



                           CHF - Congestive heart failure      Active

 

  



                           Depression                Active

 

  



                           dm                        Active

 

  



                           Down syndrome             Active

 

  



                           GERD - Gastro-esophageal reflux disease      Active

 

  



                           H/O: schizophrenia        Active

 

  



                           HTN                       Active

 

  



                           ICD (implantable cardiac defibrillator) battery depletion      Active

 

  



                           NIDDM                     Active

 

  



                           Obese build               Active

 

  



                           Sleep apnea               Active







Medications







    



              Medication     Instructions     Start Date     End Date     Status

 

    



              aspirin      325 mg, 1 tab, Route: PO, Drug     2013     Completed



                                         form: TAB, ONCE, Dosing Weight   



                                         129.545, kg, Priority: STAT, Start   



                                         date: 13 20:44:00, Stop date:   



                                         13 20:44:00Take with food.   

 

    



                 insulin aspart     2 unit, 0.02 mL, Route: SUB-Q, Drug     2013 

                                         Discontinued



                                         form: SOLN, TID-Before Meals,   



                                         Dosing Weight 129.545, kg, PRN   



                                         Blood Glucose Results, Start date:   



                                         13 0:24:00, Duration: 30 day,   



                                         Stop date: 13 0:23:00Roll in   



                                         palms of hands gently;  Do not   



                                         shake vigorously. (Same as:   



                                         NovoLog)"single patient use only"   



                                         Stable for 28 days at room   



                                         temperature.Expires in _____ days   



                                         from ______________Date   

 

    



                 insulin aspart     4 unit, 0.04 mL, Route: SUB-Q, Drug     2013 

                                         Discontinued



                                         form: SOLN, TID-Before Meals,   



                                         Dosing Weight 129.545, kg, PRN   



                                         Blood Glucose Results, Start date:   



                                         13 0:24:00, Duration: 30 day,   



                                         Stop date: 13 0:23:00Roll in   



                                         palms of hands gently;  Do not   



                                         shake vigorously. (Same as:   



                                         NovoLog)"single patient use only"   



                                         Stable for 28 days at room   



                                         temperature.Expires in _____ days   



                                         from ______________Date   

 

    



                 insulin aspart     10 unit, 0.1 mL, Route: SUB-Q, Drug     2013 

                                         Discontinued



                                         form: SOLN, TID-Before Meals,   



                                         Dosing Weight 129.545, kg, PRN   



                                         Blood Glucose Results, Start date:   



                                         13 0:24:00, Duration: 30 day,   



                                         Stop date: 13 0:23:00Roll in   



                                         palms of hands gently;  Do not   



                                         shake vigorously. (Same as:   



                                         NovoLog)"single patient use only"   



                                         Stable for 28 days at room   



                                         temperature.Expires in _____ days   



                                         from ______________Date   

 

    



                 insulin aspart     6 unit, 0.06 mL, Route: SUB-Q, Drug     2013 

                                         Discontinued



                                         form: SOLN, TID-Before Meals,   



                                         Dosing Weight 129.545, kg, PRN   



                                         Blood Glucose Results, Start date:   



                                         13 0:24:00, Duration: 30 day,   



                                         Stop date: 13 0:23:00Roll in   



                                         palms of hands gently;  Do not   



                                         shake vigorously. (Same as:   



                                         NovoLog)"single patient use only"   



                                         Stable for 28 days at room   



                                         temperature.Expires in _____ days   



                                         from ______________Date   

 

    



                 insulin aspart     8 unit, 0.08 mL, Route: SUB-Q, Drug     2013 

                                         Discontinued



                                         form: SOLN, TID-Before Meals,   



                                         Dosing Weight 129.545, kg, PRN   



                                         Blood Glucose Results, Start date:   



                                         13 0:24:00, Duration: 30 day,   



                                         Stop date: 13 0:23:00Roll in   



                                         palms of hands gently;  Do not   



                                         shake vigorously. (Same as:   



                                         NovoLog)"single patient use only"   



                                         Stable for 28 days at room   



                                         temperature.Expires in _____ days   



                                         from ______________Date   

 

    



                 Saline Flush 0.9%     5 mL, Route: IVP, Drug Form: INJ,     2013

                                         Deleted



                                         Dosing Weight 129.545, kg, Q8H, PRN   



                                         Line Flush, Start date: 13   



                                         20:44:00, Duration: 30 day, Stop   



                                         date: 13 20:43:00, Administer   



                                         at least once every 8 hours   



                                         Administer at least once every 8   



                                         hours   

 

    



              glucagon     1 mg, Route: IM, Drug form:     2013     Discontinued





                                         PDR/INJ, PRN, Dosing Weight   



                                         129.545, kg, PRN Blood Glucose   



                                         Results, Start date: 13   



                                         0:24:00, Duration: 30 day, Stop   



                                         date: 13 0:23:00   

 

    



                 Dextrose 50% Syringe     25 gm, 50 mL, Route: IVP, Drug     2013

                                         Discontinued



                                         Form: INJ, Dosing Weight 129.545,   



                                         kg, PRN, PRN Blood Glucose Results,   



                                         Start date: 13 0:24:00,   



                                         Duration: 30 day, Stop date:   



                                         13 0:23:00   

 

    



                 Dextrose 50% Syringe     12.5 gm, 25 mL, Route: IVP, Drug     2013

                                         Discontinued



                                         Form: INJ, Dosing Weight 129.545,   



                                         kg, PRN, PRN Blood Glucose Results,   



                                         Start date: 13 0:24:00,   



                                         Duration: 30 day, Stop date:   



                                         13 0:23:00   

 

    



              furosemide     60 mg, Route: IV, ONCE, Dosing     2013     Completed





                                         Weight 129.545, kg, Priority: STAT,   



                                         Start date: 13 22:46:00, Stop   



                                         date: 13 22:46:00   

 

    



              Geodon       20 mg, 1 cap, Route: PO, Drug form:     2013     Discontinued





                                         CAP, Bedtime, Dosing Weight 91.5,   



                                         kg, Start date: 13 21:00:00,   



                                         Duration: 30 day, Stop date:   



                                         13 21:00:00(Same As:   



                                         Geodon)Give with Food   

 

    



                 spironolactone     25 mg, 1 tab, Route: PO, Drug form:     2013 

                                         Canceled



                                         TAB, Daily, Dosing Weight 91.5, kg,   



                                         Start date: 13 9:00:00,   



                                         Duration: 30 day, Stop date:   



                                         13 9:00:00(Same As:   



                                         Aldactone)   

 

    



              simvastatin     20 mg, 1 tab, Route: PO, Drug form:     2013     Discontinued





                                         TAB, Bedtime, Dosing Weight 91.5,   



                                         kg, Start date: 13 21:00:00,   



                                         Duration: 30 day, Stop date:   



                                         13 21:00:00(Same as: Zocor)   

 

    



              pantoprazole     40 mg, 1 tab, Route: PO, Drug form:     2013     

Discontinued



                                         ECTAB, Before Dinner, Dosing Weight   



                                         91.5, kg, Start date: 13   



                                         16:30:00, Duration: 30 day, Stop   



                                         date: 13 16:30:00Tablet   



                                         should not be chewed or   



                                         crushed.(Same as: Protonix)   

 

    



              Remeron      15 mg, 1 tab, Route: PO, Drug form:     2013     Discontinued





                                         TAB, Bedtime, Dosing Weight 91.5,   



                                         kg, Start date: 13 21:00:00,   



                                         Duration: 30 day, Stop date:   



                                         13 21:00:00(Same as:Remeron)   

 

    



              metFORmin 500 mg     500 mg, 1 tab, Route: PO, Drug     2013     Discontinued





                           oral tablet               form: TAB, BID-Meals, Dosing Weight   



                                         91.5, kg, Start date: 13   



                                         9:16:00, Duration: 30 day, Stop   



                                         date: 13 8:00:00(Same as:   



                                         Glucophage)  Take with meal   

 

    



              lisinopril     40 mg, 2 tab, Route: PO, Drug form:     2013     Canceled





                                         TAB, Daily, Dosing Weight 91.5, kg,   



                                         Start date: 13 9:00:00,   



                                         Duration: 30 day, Stop date:   



                                         13 9:00:00(Same as: Prinivil,   



                                         Zestril)   

 

    



              clonazepam     2 mg, 2 tab, Route: PO, Drug form:     2013     Discontinued





                                         TAB, Bedtime, Dosing Weight 91.5,   



                                         kg, Start date: 13 21:00:00,   



                                         Duration: 30 day, Stop date:   



                                         13 21:00:00(Same As:   



                                         Klonopin)   

 

    



              carvedilol     6.25 mg, 1 tab, Route: PO, Drug     2013     Discontinued





                                         form: TAB, Q12H, Dosing Weight   



                                         91.5, kg, Start date: 13   



                                         21:00:00, Duration: 30 day, Stop   



                                         date: 13 9:00:00Give with   



                                         food. (Same As: Coreg)   

 

    



                 Saline Flush 0.9%     5 ml, Route: IVP, Drug Form: INJ,     2013

                                         Discontinued



                                         Dosing Weight 129.545, kg, Q12H,   



                                         Start date: 13 9:00:00,   



                                         Duration: 30 day, Stop date:   



                                         13 21:00:00(Same as: BD   



                                         Posiflush)   

 

    



                 Saline Flush 0.9%     5 ml, Route: IVP, Drug Form: INJ,     2013

                                         Discontinued



                                         Dosing Weight 129.545, kg, PRN, PRN   



                                         Line Flush, Start date: 13   



                                         0:23:00, Duration: 30 day, Stop   



                                         date: 13 0:22:00(Same as: BD   



                                         Posiflush)   

 

    



                 spironolactone     25 mg, 1 tab, Route: PO, Drug form:     2013 

                                         Discontinued



                                         TAB, Daily, Dosing Weight 129.545,   



                                         kg, Start date: 13 9:00:00,   



                                         Duration: 30 day, Stop date:   



                                         13 9:00:00(Same As:   



                                         Aldactone)   

 

    



              ondansetron     4 mg, 2 mL, Route: IVP, Drug form:     2013     Discontinued





                                         INJ, Q8H, Dosing Weight 129.545,   



                                         kg, PRN Nausea & Vomiting, Start   



                                         date: 13 0:23:00, Duration:   



                                         30 day, Stop date: 13   



                                         0:22:00(Same as: Zofran)   

 

    



              carvedilol     6.25 mg, 1 tab, Route: PO, Drug     2013     Discontinued





                                         form: TAB, Q12H, Dosing Weight   



                                         129.545, kg, Start date: 13   



                                         9:00:00, Duration: 30 day, Stop   



                                         date: 13 21:00:00Give with   



                                         food. (Same As: Coreg)   

 

    



              furosemide     60 mg, 6 mL, Route: IVP, Drug form:     2013     Discontinued





                                         INJ, Q12H, Dosing Weight 129.545,   



                                         kg, Start date: 13 9:00:00,   



                                         Duration: 30 day, Stop date:   



                                         13 21:00:00(Same as: Lasix)   

 

    



              aspirin 81 mg     81 mg, 1 tab, Route: PO, Drug form:     2013    

 Discontinued



                           tablet, enteric           ECTAB, Daily, Dosing Weight   



                           coated                    129.545, kg, Start date: 13   



                                         9:00:00, Duration: 30 day, Stop   



                                         date: 13 9:00:00Do not crush   



                                         or chew.(Same As: Ecotrin)   

 

    



              lisinopril     40 mg, 2 tab, Route: PO, Drug form:     2013     Discontinued





                                         TAB, Daily, Dosing Weight 129.545,   



                                         kg, Start date: 13 9:00:00,   



                                         Duration: 30 day, Stop date:   



                                         13 9:00:00(Same as: Prinivil,   



                                         Zestril)   

 

    



                 acetaminophen 325 mg     650 mg, 2 tab, Route: PO, Drug     2013

                                         Discontinued



                           oral tablet               form: TAB, Q4H, Dosing Weight 91.5,   



                                         kg, PRN as needed for fever, Start   



                                         date: 13 9:09:00, Duration:   



                                         30 day, Stop date: 13   



                                         9:08:00Do not exceed 4 gm/day.   



                                         (Same as: Tylenol)   

 

    



                 Sodium Chloride 0.9%     250 mL, Route: IVPB, Start date:     2013

                                         Discontinued



                           IV                        13 21:06:00, Duration: 30   



                                         day, Stop date: 13 21:05:00,   



                                         PRN Line Flush   

 

    



                 BD Normal Saline     10 mL, Route: IVP, Drug Form: INJ,     2013

                                         Discontinued



                           Flush                     PRN, PRN Line Flush, Start date:   



                                         13 21:06:00, Duration: 30   



                                         day, Stop date: 13   



                                         21:05:00(Same as: BD Posiflush)   

 

    



              Ativan       1 mg, 0.5 mL, Route: IVP, Drug     2013     Discontinued





                                         form: INJ, Q6H, Dosing Weight   



                                         129.545, kg, PRN Anxiety, Start   



                                         date: 13 0:23:00, Duration:   



                                         30 day, Stop date: 13   



                                         0:22:00(Same as: Ativan)   

 

    



                 influenza virus     0.5 ml, Route: IM, Drug Form: SUSP,     10/01/2013      10/01/2013

                                         Completed



                           vaccine, inactivated      Start date: 10/01/13 9:00:00, Stop   



                                         date: 10/01/13 9:00:00   

 

    



              Lasix        60 mg, 6 mL, Route: IV, Drug form:     2013     Completed





                                         INJ, ONCE, Start date: 13   



                                         17:51:00, Stop date: 13   



                                         17:51:00(Same as: Lasix)   







Immunizations







  



                     Vaccine             Date                Status

 

  



                     influenza virus vaccine, inactivated     10/01/2013          Auth (Verified)







Vital Signs







                Most recent to oldest [Reference Range]:    1               2               3

 

                          Height                    170.1 cm 

                          (2013 00:46:00)      170.18 cm 

                          (2013 20:13:00)       

 

                          Temperature Oral [96.4-99.1 DegF]    97.5 DegF 

                          (2013 16:38:00)      97.5 DegF 

                          (2013 12:08:00)      97.3 DegF 

                                        (2013 08:30:00)  

 

                          Systolic Blood Pressure [ mmHg]    132 mmHg 

                          (2013 16:38:00)      120 mmHg 

                          (2013 12:08:00)      131 mmHg 

                                        (2013 09:29:00)  

 

                          Diastolic Blood Pressure [60-90 mmHg]    102 mmHg 

*HI*

                          (2013 16:38:00)      93 mmHg 

*HI*

                          (2013 12:08:00)      97 mmHg 

*HI*

                                        (2013 09:29:00)  

 

                          Respiratory Rate [14-20 BRMIN]    20 BRMIN 

                          (2013 16:38:00)      19 BRMIN 

                          (2013 12:08:00)      22 BRMIN 

*HI*

                                        (2013 08:30:00)  

 

                          Peripheral Pulse Rate [ bpm]    87 bpm 

                          (2013 16:38:00)      87 bpm 

                          (2013 12:08:00)      100 bpm 

                                        (2013 09:29:00)  

 

                          Weight                    91.5 kg 

                          (2013 00:46:00)      129.545 kg 

                          (2013 20:13:00)       







Results





BEDSIDE GLUCOSE TESTING





                Most recent to oldest [Reference Range]:    1               2               3

 

                          Glucose POC [70-99 mg/dL]    138 mg/dL 1

*HI*

                          (2013 16:21:00)      240 mg/dL 2

*HI*

                          (2013 11:56:00)      158 mg/dL 3

*HI*

                                        (2013 07:38:00)  

 

                          Gluc POC Comment 1        Notify RN/MD 

*NA*

                          (2013 16:21:00)      Notify RN/MD 

*NA*

                          (2013 11:56:00)      Notify RN/MD 

*NA*

                                        (2013 07:38:00)  

 

                          Gluc POC Comment 2        Assess Patient 

*NA*

                          (2013 16:21:00)      Assess Patient 

*NA*

                          (2013 11:56:00)      Assess Patient 

*NA*

                                        (2013 07:38:00)  







1Interpretive Data:  Upper Reportable Limit: 200 mg/dL.



2Interpretive Data:  Upper Reportable Limit: 200 mg/dL.



3Interpretive Data:  Upper Reportable Limit: 200 mg/dL.



URINALYSIS





                Most recent to oldest [Reference Range]:    1               2               3

 

                          UA Turbidity [Clear]      Slight Cloudy 

                    (2013 22:49:49)                           

 

                          UA Color [Yellow]         Hannah 

*ABN*

                    (2013 22:49:49)                           

 

                          UA pH [5.0-8.0]           6.0 

                    (2013 22:49:49)                           

 

                          UA Spec Grav [<=1.030]    >=1.030 

*ABN*

                    (2013 22:49:49)                           

 

                          UA Glucose [Negative]     Negative 

                    (2013 22:49:49)                           

 

                          UA Blood [Negative]       Trace 

*ABN*

                    (2013 22:49:49)                           

 

                          UA Ketones [Negative]     Negative 

*NA*

                    (2013 22:49:49)                           

 

                          UA Protein [Negative mg/dL]    >=300 mg/dL 

*ABN*

                    (2013 22:49:49)                           

 

                          UA Urobilinogen [0.1-1.0 EU/dL]    0.2 EU/dL 

                    (2013 22:49:49)                           

 

                          UA Bili [Negative]        Moderate 4

*ABN*

                    (2013 22:49:49)                           

 

                          UA Leuk Est [Negative]    Negative 

                    (2013 22:49:49)                           

 

                          UA Nitrite [Negative]     Negative 

                    (2013 22:49:49)                           

 

                          UA WBC [None Seen /HPF]    0-2 /HPF 

                    (2013 22:49:49)                           

 

                          UA RBC [0-2 /HPF]         0-2 /HPF 

                    (2013 22:49:49)                           

 

                          UA Bacteria [None Seen /HPF]    Few /HPF 

                    (2013 22:49:49)                           

 

                          UA Sq Epi [Few /LPF]      Occasional /LPF 

                    (2013 22:49:49)                           

 

                          UA Hyal Cast [0-2]        0-2 

                    (2013 22:49:49)                           

 

                          UA Mucus [None Seen /LPF]    Many /LPF 

*ABN*

                    (2013 22:49:49)                           

 

                          Micro?                    Performed 

                    (2013 22:49:49)                           







4Result Comment: Interpret positive bilirubin results with caution. Confirmatory
testing

not possible due to the unavailability of reagent.  Correlation with

serum chemistry results recommended.



CHEMISTRY





                Most recent to oldest [Reference Range]:    1               2               3

 

                          Sodium Lvl [135-145 mEq/L]    139 mEq/L 

                    (2013:06:00)                           

 

                          Potassium Lvl [3.5-5.1 mEq/L]    3.6 mEq/L 

                    (2013:06:00)                           

 

                          Chloride Lvl [ mEq/L]    106 mEq/L 

                    (2013:06:00)                           

 

                          CO2 [24-32 mEq/L]         24 mEq/L 

                    (2013:06:00)                           

 

                          AGAP [10.0-20.0 mEq/L]    12.6 mEq/L 

                    (2013:06:00)                           

 

                          Creatinine Lvl [0.5-1.4 mg/dL]    1.0 mg/dL 

                    (2013:06:00)                           

 

                          eGFR                      89 mL/min/1.73m2 5

*NA*

                    (2013:06:00)                           

 

                          BUN [7-22 mg/dL]          9 mg/dL 

                    (2013:06:00)                           

 

                          B/C Ratio [6-25]          9 

                    (2013:06:00)                           

 

                          Glucose Lvl [70-99 mg/dL]    134 mg/dL 6

*HI*

                    (2013:06:00)                           

 

                          Total Protein [6.4-8.4 g/dL]    6.7 g/dL 

                    (2013:06:00)                           

 

                          Albumin Lvl [3.5-5.0 g/dL]    3.5 g/dL 

                    (2013:06:00)                           

 

                          Globulin [2.0-4.0 g/dL]    3.2 g/dL 

                    (2013:06:00)                           

 

                          A/G Ratio [0.7-1.6]       1.1 

                    (2013:06:00)                           

 

                          Calcium Lvl [8.5-10.5 mg/dL]    8.1 mg/dL 

*LOW*

                    (2013:06:00)                           

 

                          Magnesium Lvl [1.8-2.4 mg/dL]    1.7 mg/dL 

*LOW*

                    (2013:06:00)                           

 

                          ALT [0-65 unit/L]         21 unit/L 

                    (2013 21:06:00)                           

 

                          AST [0-37 unit/L]         17 unit/L 

                    (2013:06:00)                           

 

                          Alk Phos [ unit/L]    162 unit/L 

*HI*

                    (2013:06:00)                           

 

                          Bili Total [0.2-1.3 mg/dL]    1.1 mg/dL 

                    (2013:06:00)                           

 

                          Lipase Lvl [ unit/L]    72 unit/L 

*LOW*

                    (2013:06:00)                           

 

                          Total CK [ unit/L]    240 unit/L 

*HI*

                          (2013:15:00)      253 unit/L 

*HI*

                          (2013:15:00)      238 unit/L 

*HI*

                                        (2013:06:00)  

 

                          CK MB [0.5-3.6 ng/mL]     1.7 ng/mL 

                          (2013:15:00)      1.9 ng/mL 

                          (2013:15:00)      1.8 ng/mL 

                                        (2013:06:00)  

 

                          CK MB Index [0.0-2.5]     0.7 

                          (2013:15:00)      0.8 

                          (2013:15:00)      0.8 

                                        (2013:06:00)  

 

                          Troponin-I [0.00-0.40 ng/mL]    0.04 ng/mL 

                          (2013:15:00)      0.03 ng/mL 

                          (2013:15:00)      0.04 ng/mL 

                                        (2013:06:00)  

 

                          BNP [<=100 pg/mL]         1615 pg/mL 7

*HI*

                    (2013:06:00)                           







5Result Comment: The eGFR is calculated using the CKD-EPI formula. In most 
young, healthy individuals the eGFR will be >90 mL/min/1.73m2. The eGFR declines
with age. An eGFR of 60-89 may be normal in some populations, particularly the 
elderly, for whom the CKD-EPI formula has not been extensively validated. Use of
the eGFR is not recommended in the following populations:



Individuals with unstable creatinine concentrations, including pregnant patients
and those with serious co-morbid conditions.



Patients with extremes in muscle mass or diet. 



The data above are obtained from the [employer] Kid[employer] Education Program (
NKDEP) which additionally recommends that when the eGFR is used in patients with
extremes of body mass index for purposes of drug dosing, the eGFR should be mul
tiplied by the estimated BMI.



6Interpretive Data: Adult reference range values reflect the clinical guidelines

of the [employer].



7Interpretive Data: Elevated results are in line with increasing severity of 

congestive heart failure. Minor elevations between 100 and 300 

may be seen with Myocardial Ischemia, Sodium retaining drugs, 

and compensated/treated heart failure.



HEMATOLOGY





                Most recent to oldest [Reference Range]:    1               2               3

 

                          WBC [3.7-10.4 K/CMM]      6.6 K/CMM 

                    (2013:06:00)                           

 

                          RBC [4.70-6.10 M/CMM]     5.32 M/CMM 

                    (2013:)                           

 

                          Hgb [14.0-18.0 g/dL]      15.0 g/dL 

                    (2013:00)                           

 

                          Hct [42.0-54.0 %]         46.8 % 

                    (2013:00)                           

 

                          MCV [80.0-94.0 fL]        87.9 fL 

                    (2013:00)                           

 

                          MCH [27.0-31.0 pg]        28.1 pg 

                    (2013:00)                           

 

                          MCHC [32.0-36.0 g/dL]     32.0 g/dL 

                    (2013::00)                           

 

                          RDW [11.5-14.5 %]         15.7 % 

*HI*

                    (2013:00)                           

 

                          Platelet [133-450 K/CMM]    203 K/CMM 

                    (2013::00)                           

 

                          MPV [7.4-10.4 fL]         8.6 fL 

                    (201300)                           

 

                          Segs [45.0-75.0 %]        79.0 % 

*HI*

                    (2013:)                           

 

                          Lymphocytes [20.0-40.0 %]    12.8 % 

*LOW*

                    (201306:00)                           

 

                          Monocytes [2.0-12.0 %]    7.1 % 

                    (2013 21:06:00)                           

 

                          Eosinophils [0.0-4.0 %]    0.7 % 

                    (2013 21:06:00)                           

 

                          Basophils [0.0-1.0 %]     0.4 % 

                    (2013 21:06:00)                           

 

                          Segs-Bands # [1.5-8.1 K/CMM]    5.2 K/CMM 

                    (2013 21:06:00)                           

 

                          Lymphocytes # [1.0-5.5 K/CMM]    0.8 K/CMM 

*LOW*

                    (2013 21:06:00)                           

 

                          Monocytes # [0.0-0.8 K/CMM]    0.5 K/CMM 

                    (2013 21:06:00)                           

 

                          Eosinophils # [0.0-0.5 K/CMM]    0.0 K/CMM 

                    (2013 21:06:00)                           

 

                          Basophils # [0.0-0.2 K/CMM]    0.0 K/CMM 

                    (2013 21:06:00)                           

 

                          PT [12.0-14.7 seconds]    16.9 seconds 

*HI*

                    (2013 21:06:00)                           

 

                          INR [0.85-1.17]           1.39 8

*HI*

                    (2013 21:06:00)                           

 

                          PTT [22.9-35.8 seconds]    30.3 seconds 9

                    (2013 21:06:00)                           







8Interpretive Data: RECOMMENDED RANGES FOR PROTIME INR:

   2.0-3.0 for most medical and surgical thromboembolic states.

   2.5-3.5 for artificial heart valves and recurrent embolism.



INR SHOULD BE USED ONLY FOR PATIENTS ON STABLE ANTICOAGULANT THERAPY.



9Interpretive Data: Heparin Therapeutic Range:  57 - 92 Seconds

## 2019-09-25 NOTE — XMS REPORT
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 

 Dx at [facility] 

 Hyperlipidemia, mixed 10/5/1999 

 Hypertension  

 TERRENCE (obstructive sleep apnea) 2011 - AHI 83 with SaO2 jonnathan 72% - titrated to CPAP 17cm,  - restudied a
nd apnea+hypopnea index (AHI) was 38.5, and SaO2 jonnathan was 81.0% 

 RHINITIS, ALLERGIC, DUE TO POLLEN 10/5/1999 

 Schizoaffective disorder 2001 

 Type II or unspecified type diabetes mellitus with peripheral circulatory di
sorders, not stated as uncontrolled(250.70) 2014 



Past Surgical History: 

Procedure Laterality Date 

 HX KNEE SURGERY   



Current Outpatient Medications: 

  acetaminophen (TYLENOL) 650 MG CR tablet, Take 650 mg by mouth., Disp: , Rf
l: 

  aspirin EC 81 MG TBEC, Take 81 mg by mouth., Disp: , Rfl: 

  Blood Glucose Monitoring Suppl (ONETOUCH VERIO) w/Device KIT, Check blood s
ugars 4 times daily; Dx E11.65, Disp: 1 Kit, Rfl: 0

  carvedilol (COREG) 25 MG tablet, Take 1 Tab by mouth two times daily., Disp
: 60 Tab, Rfl: 5

  clonazepam (KLONOPIN) 1 MG tablet, 1 mg two times daily., Disp: , Rfl: 

  digoxin (LANOXIN) 125 MCG tablet, Take 125 mcg by mouth daily., Disp: , Rfl
: 

  docusate sodium (COLACE) 100 MG capsule, Take 1 Cap by mouth two times hao
y., Disp: 60 Cap, Rfl: 2

  Insulin Detemir (LEVEMIR FLEXTOUCH) 100 UNIT/ML SOPN, ADMINISTER 32 UNITS U
NDER THE SKIN EVERY NIGHT AT BEDTIME, Disp: 9 mL, Rfl: 3

  Insulin Glulisine (APIDRA SOLOSTAR) 100 UNIT/ML SOPN, Inject 12-20 units TI
DAC SQ, Disp: 6 Pen, Rfl: 3

  Insulin Pen Needle (BD PEN NEEDLE RANDEE U/F) 32G X 4 MM MISC, Use as directe
d to inject 4 times daily, Disp: 200 Each, Rfl: 5

  Lancets Misc. (UNISTIK 3 COMFORT) MISC, 1 Each four times daily., Disp: 200
Each, Rfl: 11

  metformin (GLUCOPHAGE) 500 MG tablet, TAKE 1 TABLET BY MOUTH TWICE DAILY, D
isp: 180 Tab, Rfl: 0

  metformin (GLUCOPHAGE) 500 MG tablet, TAKE 1 TABLET BY MOUTH twice a day, D
isp: 180 Tab, Rfl: 1

  ONETOUCH DELICA LANCETS 33G MISC, Check glucose 4x daily; Dx E11.65, Disp: 
400 Each, Rfl: 10

  ONETOUCH DELICA LANCETS 33G MISC, 1 Each four times daily., Disp: 200 Each,
Rfl: 11

  ONETOUCH VERIO, TEST FOUR TIMES DAILY, Disp: 300 Strip, Rfl: 0

  oxcarbazepine (TRILEPTAL) 600 MG tablet, nightly., Disp: , Rfl: 

  pantoprazole (PROTONIX) 40 MG tablet, daily., Disp: , Rfl: 

  Rivaroxaban (XARELTO) 20 MG TABS, Take 20 mg by mouth daily., Disp: , Rfl: 

  SAPHRIS 5 MG SUBL, , Disp: , Rfl: 

  torsemide (DEMADEX) 20 MG tablet, Take 20 mg by mouth daily., Disp: , Rfl: 

  zolpidem (AMBIEN) 10 MG tablet, Take 15 mg by mouth nightly as needed for S
leep., Disp: , Rfl: 



Family History 

Problem Relation Name Age of Onset 

 Diabetes Father   

 Cancer Father   

     liver diagnosis,  9 month after Diagnosis, 7 years ago, 

 High Blood Pressure Mother   

 Elevated Lipids Mother   

 Heart  Disease Maternal Grandfather   



Social History

  Socioeconomic History

    Marital status: Single

    Spouse name: Not on file

    Number of children: Not on file

    Years of education: Not on file

    Highest education level: Not on file

  Occupational History

    Not on file

  Social Needs

    Financial resource strain: Not on file

    Food insecurity:

      Worry: Not on file

      Inability: Not on file

    Transportation needs:

      Medical: Not on file

      Non-medical: Not on file

  Tobacco Use

    Smoking status: Never Smoker

    Smokeless tobacco: Never Used

    Tobacco comment: Trying to quit 2011.

  Substance and Sexual Activity

    Alcohol use: No

      Alcohol/week: 0.0 oz

      Comment: Alcoholic Drinks/day: no

    Drug use: Not Currently

      Comment: Drug use: crack 3.5 years ago, on/off 15 years, once a month

    Sexual activity: Not Currently

      Partners: Female

  Lifestyle

    Physical activity:

      Days per week: Not on file

      Minutes per session: Not on file

    Stress: Not on file

  Relationships

    Social connections:

      Talks on phone: Not on file

      Gets together: Not on file

      Attends Protestant service: Not on file

      Active member of club or organization: Not on file

      Attends meetings of clubs or organizations: Not on file

      Relationship status: Not on file

    Intimate partner violence:

      Fear of current or ex partner: Not on file

      Emotionally abused: Not on file

      Physically abused: Not on file

      Forced sexual activity: Not on file

  Other Topics

    Concerns:

      Not on file

  Social History Narrative

    Born in [place]

    Single

    Lives at Care home. Going to Disability assistive rehabilitative services

    

    



ROS



Vital Signs

Height: 5' 6" (167.6 cm)

Weight - Scale: 233 lb (105.7 kg)

Temp: 97.5 F (36.4 C)

Temp Source: Oral

Pulse: 87

Respirations: 16

BP: 110/76

Patient Position: Sitting

BP Location: right arm 

Oxygen Therapy

O2 Sat: 92 %

O2 Flow Rate: Room Air 

Height and Weight

BSA (Calculated - sq m): 2.22 sq meters

BMI (Calculated): 37.7

Predicted Body Weight: 140.65



Physical Exam



Problem List Items Addressed This Visit  

 TERRENCE (obstructive sleep apnea) - Primary 

  Download reviewed and discussed with patient - nightly use and no data availab
le to assesscontrol of obstructive events due to large nightly leaks. 



Patient appreciates and acknowledges the benefit from CPAP on sleep quality and 
daytime function.



Patient commended on use and encouraged to continue



Setting change - none at this time



Will order new mask fitting and supplies to promote better mask seal and better 
control of TERRENCE. 



 

 

 Relevant Orders 

 PAP SUPPLIES 

 Insomnia 

  Patient referred to Dr. Mcgrath

 

 

 Excessive daytime sleepiness 

  Multifactorial - poorly controlled sleep apnea is being addressed, patient has
been referred to Dr. Mcgrath for CBT-I, and patient was educated on the felipa
ting effects of many of his medications. We advised the patient to discuss lower
ing the dosages of these medications with the prescribing physicians and to disc
uss changing medication timing to help with daytime sleepiness. 

 

 

 







Electronically signed by Miguel Ángel Bryan NP at 2019 12:46 PM CDT

documented in this encounter



Plan of Treatment







                          Care Team                 Description



                     Date                Type                Specialty  

 

                                        



Miguel Ángel Bryan NP



[address] [place] [address]



[phone] (Fax)                       



                     2019          Office Visit        [facility] Center  

 

                                        



Rlaf Pereira MD



[address]



[place], [place] [address] [phone] (Fax)                       



                     10/17/2019          Office Visit        Endocrinology  









                                        Order Schedule



                 Name            Type            Priority        Associated Diagnoses 

 

                                        Ordered: 2019



                 PAP SUPPLIES     General Supply     Routine         TERRENCE (obstructive sleep 



                                         apnea) 









   



                 Health Maintenance     Due Date        Last Done       Comments

 

   



                           COLON CANCER SCREENIN1965  



                                         COLONOSCOPY   

 

   



                           MEDICARE AWV              1965  

 

   



                           TETANUS SHOT (ADULT)      1980  

 

   



                           ANNUAL DIABETIC           1983  



                                         RETINOPATHY SCREENING   

 

   



                           BMI FOLLOW UP PLAN        1983  

 

   



                     FLU VACCINE > 6 MONTHS     2019, 2001 

 

   



                     A1C TESTING EVERY 6     2019, 2019, 12/10/2018, 



                           MONTHS                    Additional history exists 

 

   



                     ANNUAL DIABETIC FOOT EXAM     2020, 2019, 2017, 



                                         Additional history exists 

 

   



                     HEPATITIS C SCREENING     Completed           04/15/2019 

 

   



                     HIV SCREENING       Completed           04/15/2019 



documented as of this encounter



Results

Not on filedocumented in this encounter



Visit Diagnoses











                                         Diagnosis

 





                                         TERRENCE (obstructive sleep apnea) - Primary



                                         Obstructive sleep apnea (adult) (pediatric)

 





                                         Insomnia, unspecified type

 





                                         Excessive daytime sleepiness



documented in this encounter



Insurance







                                        Type



            Payer      Benefit     Subscriber ID     Effective     Phone      Address 



                           Plan /                    Dates   



                                         Group Medicare [employer]     M[employer] - [employer]     xxxxxxxxx     3/1/2018-P      [address] 



                     [address]                resent              [id number] 



                           [place] 



                           PLAN                      [id number] 









     



            Guarantor Name     Account     Relation to     Date of     Phone      Billing Address



                     Type                Patient             Birth  

 

     



            Valeriy Vasquez     Personal/F     Self       1965     [phone]     [phone] 

[facility]



                     caitie               (Home)              [place] [address] [phone] 



                                         (Work) 



documented as of this encounter



Advance Directives







                          Patient Representative    Explanation



                           Type                      Date Recorded  

 

                                                     



                                         Advance Directives   



                                         and Living Will   

 

                                                     



                                         Power of

## 2019-09-25 NOTE — XMS REPORT
Encounter CCD: 2013 to 10/02/2013

                             Created on: 2023



JOANNA CABRERA

External Reference #: [id number]

: 1965

Sex: Male



Demographics







                          Address                   [address] [place] [address]

[place]  [id number]

 

                          Home Phone                [phone]

 

                          Preferred Language        Unknown

 

                          Marital Status            Single

 

                          Alevism Affiliation     Scientology

 

                          Race                      White/

 

                          Ethnic Group              Non-





Author







                          Author                    Auto Generated

 

                          Organization              [facility]

 

                          Address                   Unknown

 

                          Phone                     Unavailable







Care Team Providers







                    Care Team Member Name    Role                Phone

 

                    Kalpesh Zamorano    CP                  [phone]







Allergies, Adverse Reactions, Alerts







  



                     Substance           Reaction            Status

 

  



                           NKDA                      Active







Problem List







  



                     Condition           Effective Dates     Status

 

  



                     [D]Anterior chest wall pain     2012          Active

 

  



                           BP+ - Hypertension        Resolved

 

  



                           CHF - Congestive heart failure      Active

 

  



                           CHF - Congestive heart failure      Resolved

 

  



                           Depression                Resolved

 

  



                           dm                        Active

 

  



                           Down syndrome             Active

 

  



                           GERD - Gastro-esophageal reflux disease      Resolved

 

  



                           H/O: schizophrenia        Active

 

  



                           HTN                       Active

 

  



                           ICD (implantable cardiac defibrillator) battery depletion      Active

 

  



                           NIDDM                     Resolved

 

  



                           Obese build               Resolved

 

  



                           Sleep apnea               Active







Medications







    



              Medication     Instructions     Start Date     End Date     Status

 

    



               NS 1,000 mL     1,000 mL, Rate: 100 ml/hr, Infuse     2013    

 Discontinued



                                         over: 10 hr, Route: IV, Dosing   



                                         Weight 111.364 kg, Total Volume:   



                                         1,000, Start date: 13   



                                         14:37:00, Duration: 30 day, Stop   



                                         date: 10/30/13 14:36:00   

 

    



              GoLYTELY     2,000 mL, Route: PO, Drug Form:     10/01/2013     10/01/2013     Completed





                                         PDR/REC, Dosing Weight 111.364, kg,   



                                         ONCE, NOW, Start date: 10/01/13   



                                         19:36:00, Stop date: 10/01/13   



                                         19:36:00   

 

    



                 Saline Flush 0.9%     5 ml, Route: IVP, Drug Form: INJ,     2013

                                         Discontinued



                                         Dosing Weight 129.545, kg, PRN, PRN   



                                         Line Flush, Start date: 13   



                                         13:49:00, Duration: 30 day, Stop   



                                         date: 10/30/13 13:48:00   

 

    



              ondansetron     4 mg, 2 mL, Route: IVP, Drug form:     2013     10/02/2013     Discontinued





                                         INJ, Q8H, Dosing Weight 129.545,   



                                         kg, PRN Nausea & Vomiting, Start   



                                         date: 13 13:49:00, Duration:   



                                         30 day, Stop date: 10/30/13   



                                         13:48:00   

 

    



                 morphine Sulfate     2 mg, 1 mL, Route: IVP, Drug form:     2013      10/02/2013

                                         Discontinued



                                         INJ, Q3H, Dosing Weight 129.545,   



                                         kg, PRN Pain Score 1-5, Start date:   



                                         13 13:49:00, Duration: 30   



                                         day, Stop date: 10/30/13 13:48:00   

 

    



              aspirin      325 mg, 1 tab, Route: PO, Drug     2013     Completed



                                         form: TAB, ONCE, Dosing Weight   



                                         111.364, kg, Start date: 13   



                                         15:09:00, Stop date: 13   



                                         15:09:00   

 

    



              1/2 NS 1,000 mL     1,000 mL, Rate: 50 ml/hr, Infuse     2013     10/01/2013     

Discontinued



                                         over: 20 hr, Route: IV, Dosing   



                                         Weight 111.364 kg, Total Volume:   



                                         1,000, Start date: 13   



                                         15:56:00, Duration: 30 day, Stop   



                                         date: 10/30/13 15:55:00   

 

    



              spironolactone     25 mg, Route: PO, Drug form: TAB,     10/01/2013     2013     

Canceled



                                         Daily, Dosing Weight 111.364, kg,   



                                         Start date: 10/01/13 9:00:00,   



                                         Duration: 30 day, Stop date:   



                                         10/30/13 9:00:00   

 

    



                 Flagyl 500 mg oral     500 mg, 1 tab, PO, Q8H, 21 tab,     10/02/2013      10/02/2013 

                                         Discontinued



                           tablet                    Substitution Allowed   

 

    



                 Cipro 500 mg oral     500 mg, 1 tab, PO, Q12H, 14 tab,     10/02/2013      10/02/2013 

                                         Discontinued



                           tablet                    Substitution Allowed, TAB   

 

    



              risperidone     3 mg, 3 tab, Route: PO, Drug form:     2013     10/02/2013     Discontinued





                                         TAB, Bedtime, Dosing Weight   



                                         111.364, kg, Start date: 13   



                                         21:00:00, Duration: 30 day, Stop   



                                         date: 10/29/13 21:00:00   

 

    



              mirtazapine     30 mg, 2 tab, Route: PO, Drug form:     2013     10/02/2013     Discontinued





                                         TAB, Bedtime, Dosing Weight   



                                         111.364, kg, Start date: 13   



                                         21:00:00, Duration: 30 day, Stop   



                                         date: 10/29/13 21:00:00   

 

    



              lisinopril     40 mg, 2 tab, Route: PO, Drug form:     10/01/2013     2013     Discontinued





                                         TAB, Daily, Dosing Weight 111.364,   



                                         kg, Start date: 10/01/13 9:00:00,   



                                         Duration: 30 day, Stop date:   



                                         10/30/13 9:00:00   

 

    



                 furosemide 40 mg     40 mg, 1 tab, Route: PO, Drug form:     2013

                                         Canceled



                           oral tablet               TAB, BID, Dosing Weight 111.364,   



                                         kg, Start date: 13 17:00:00,   



                                         Duration: 30 day, Stop date:   



                                         10/30/13 9:00:00   

 

    



              clonazepam     2 mg, 4 tab, Route: PO, Drug form:     2013     10/02/2013     Discontinued





                                         TAB, Bedtime, Dosing Weight   



                                         111.364, kg, Start date: 13   



                                         21:00:00, Duration: 30 day, Stop   



                                         date: 10/29/13 21:00:00   

 

    



              aspirin      81 mg, 1 tab, Route: PO, Drug form:     10/01/2013     10/02/2013     Discontinued





                                         [place], Daily, Dosing Weight   



                                         111.364, kg, Start date: 10/01/13   



                                         9:00:00, Duration: 30 day, Stop   



                                         date: 10/30/13 9:00:00   

 

    



              Flagyl       500 mg, 1 tab, Route: PO, Drug     2013     10/02/2013     Discontinued





                                         form: TAB, ABXQ6H, Dosing Weight   



                                         129.545, kg, Start date: 13   



                                         15:00:00, Duration: 30 day, Stop   



                                         date: 10/30/13 9:00:00   

 

    



              Cipro I.V. 400     400 mg, 200 mL, Route: IV, Drug     2013     10/02/2013     Discontinued





                           mg/200 mL                 form: INJ, [id number], Dosing Weight   



                           intravenous solution      129.545, kg, Start date: 13   



                                         15:00:00, Duration: 30 day, Stop   



                                         date: 10/30/13 3:00:00   

 

    



              ondansetron     4 mg, 2 mL, Route: IVP, Drug form:     10/02/2013     10/02/2013     Discontinued





                                         INJ, ONCE, Dosing Weight 111.364,   



                                         kg, PRN Nausea & Vomiting, Start   



                                         date: 10/02/13 9:22:00   

 

    



              naloxone     0.04 mg, 0.1 mL, Route: IVP, Drug     10/02/2013     10/02/2013     Discontinued





                                         form: INJ, Q2MIN, Dosing Weight   



                                         111.364, kg, PRN Narcotic Reversal,   



                                         Start date: 10/02/13 9:22:00,   



                                         Duration: 8 doses or times, Stop   



                                         date: Limited # of times   

 

    



                 morphine Sulfate     2 mg, 1 mL, Route: IVP, Drug form:     10/02/2013      10/02/2013

                                         Discontinued



                                         INJ, Q5Min, Dosing Weight 111.364,   



                                         kg, PRN Pain Score 4-6, Start date:   



                                         10/02/13 9:22:00, Duration: 5 doses   



                                         or times, Stop date: Limited # of   



                                         times   

 

    



              flumazenil     0.2 mg, 2 mL, Route: IVP, Drug     10/02/2013     10/02/2013     Discontinued





                                         form: INJ, PRN, Dosing Weight   



                                         111.364, kg, PRN Benzodiazepine   



                                         Reversal, Initial dose, Start date:   



                                         10/02/13 9:22:00, Duration: 30 day,   



                                         Stop date: 13 9:21:00   

 

    



              Lactated Ringers     1,000 mL, Rate: 25 ml/hr, Infuse     10/02/2013     10/02/2013    

 Discontinued



                           Injection IV 1,000        over: 40 hr, Route: IV, Dosing   



                           mL                        Weight 111.364 kg, Total Volume:   



                                         1,000, Start date: 10/02/13   



                                         9:23:00, Duration: 30 day, Stop   



                                         date: 13 9:22:00   

 

    



                 Sodium Chloride 0.9%     25 mL, Route: IV, Start date:     2013      10/02/2013 

                                         Discontinued



                           IV                        13 11:10:00, Duration: 30   



                                         day, Stop date: 10/30/13 11:09:00,   



                                         PRN Line Flush   

 

    



                 BD Normal Saline     10 mL, Route: IV, Drug Form: INJ,     2013      10/02/2013 

                                         Discontinued



                           Flush                     PRN, PRN Line Flush, Start date:   



                                         13 11:10:00, Duration: 30   



                                         day, Stop date: 10/30/13 11:09:00   

 

    



                 influenza virus     0.5 ml, Route: IM, Drug Form: SUSP,     10/01/2013      10/01/2013

                                         Completed



                           vaccine, inactivated      Start date: 10/01/13 9:00:00, Stop   



                                         date: 10/01/13 9:00:00   

 

    



                 morphine Sulfate     4 mg, 1 mL, Route: IVP, Drug form:     2013      10/02/2013

                                         Discontinued



                                         INJ, ONCE, Dosing Weight 129.545,   



                                         kg, PRN Pain Score 6-10, Priority:   



                                         STAT, Start date: 13 13:49:00   

 

    



              Prinivil     40 mg, 2 tab, Route: PO, Drug form:     2013     10/02/2013     Discontinued





                                         TAB, Daily, Dosing Weight 111.364,   



                                         kg, Start date: 13 16:00:00,   



                                         Duration: 30 day, Stop date:   



                                         10/30/13 9:00:00   

 

    



                 Flagyl 500 mg oral     500 mg, 1 tab, PO, Q8H, 21 tab,     10/02/2013      10/09/2013 

                                         Ordered



                           tablet                    Substitution Allowed   

 

    



                 Cipro 500 mg oral     500 mg, 1 tab, PO, Q12H, 14 tab,     10/02/2013      10/09/2013 

                                         Ordered



                           tablet                    Substitution Allowed, TAB   

 

    



              hydrALAZINE     10 mg, 1 tab, Route: PO, Drug form:     2013     10/02/2013     Discontinued





                                         TAB, Q6H, Dosing Weight 111.364,   



                                         kg, PRN Elevated BP, for sbp > 160,   



                                         Start date: 13 15:10:00,   



                                         Duration: 30 day, Stop date:   



                                         10/30/13 15:09:00   

 

    



              Zofran       4 mg, Route: IVP, Drug form: INJ,     2013     Completed





                                         ONCE, Dosing Weight 129.545, kg,   



                                         Priority: STAT, Start date:   



                                         13 11:07:00, Stop date:   



                                         13 11:07:00   

 

    



                 influenza virus     0.5 ml, Route: IM, Drug Form: SUSP,     10/01/2013      10/01/2013

                                         Completed



                           vaccine, inactivated      Daily, Start date: 10/01/13   



                                         9:00:00, Duration: 1 doses or   



                                         times, Stop date: 10/01/13 9:00:00   

 

    



              Tylenol      650 mg, 2 tab, Route: PO, Drug     2013     10/02/2013     Discontinued





                                         form: TAB, Q4H, Dosing Weight   



                                         129.545, kg, PRN Fever, Start date:   



                                         13 13:56:00, Duration: 30   



                                         day, Stop date: 10/30/13 13:55:00   

 

    



              Zofran       4 mg, Route: IVP, Drug form: INJ,     2013     Completed





                                         ONCE, Dosing Weight 129.545, kg,   



                                         Priority: STAT, Start date:   



                                         13 11:06:00, Stop date:   



                                         13 11:06:00   







Immunizations







  



                     Vaccine             Date                Status

 

  



                     influenza virus vaccine, inactivated     10/01/2013          Auth (Verified)







Vital Signs







                Most recent to oldest [Reference Range]:    1               2               3

 

                          Height                    170.18 cm 

                          (2013 14:35:00)      170.18 cm 

                          (2013 08:34:00)       

 

                          Current Weight            112.727 kg 

                    (10/02/2013 08:00:00)                           

 

                          Temperature Oral [96.4-99.1 DegF]    96.3 DegF 

*LOW*

                          (10/02/2013 06:04:00)      96.2 DegF 

*LOW*

                          (10/01/2013 23:52:00)      97.3 DegF 

                                        (10/01/2013 16:44:00)  

 

                          Systolic Blood Pressure [ mmHg]    132 mmHg 

                          (10/02/2013 10:50:00)      111 mmHg 

                          (10/02/2013 10:30:00)      112 mmHg 

                                        (10/02/2013 10:15:00)  

 

                          Diastolic Blood Pressure [60-90 mmHg]    85 mmHg 

                          (10/02/2013 10:50:00)      81 mmHg 

                          (10/02/2013 10:30:00)      73 mmHg 

                                        (10/02/2013 10:15:00)  

 

                          Respiratory Rate [14-20 BRMIN]    17 BRMIN 

                          (10/02/2013 10:50:00)      18 BRMIN 

                          (10/02/2013 10:30:00)      16 BRMIN 

                                        (10/02/2013 10:15:00)  

 

                          Peripheral Pulse Rate [ bpm]    70 bpm 

                          (10/02/2013 09:05:00)      65 bpm 

                          (10/02/2013 06:04:00)      69 bpm 

                                        (10/01/2013 23:52:00)  

 

                          Weight                    111.364 kg 

                          (2013 14:35:00)      129.545 kg 

                          (2013 08:34:00)       







Results





INFECTIOUS DISEASES





                Most recent to oldest [Reference Range]:    1               2               3

 

                          C difficile DNA [Negative]    Negative 1

                    (10/02/2013 09:50:00)                           







1Interpretive Data: [employer] Clostridium difficile assay utilizes 
loop-mediated isothermal DNA amplification (LAMP) technology to detect a 204 bp 
region of the tcdA gene within the PaLoc gene segment present in all known 
toxigenic C. difficile strains.



The assay utilizes FDA cleared IVD reagents. Performance characteristics have be
en verified by the [facility] within the [facility]. The [facility] is authorized under the Clinical Labo
ratory Improvement Amendment of 1988 (CLIA-88) to perform high complexity testin
g.



PARASITOLOGY - SEROLOGY





                Most recent to oldest [Reference Range]:    1               2               3

 

                          Cryptospor Ag [Negative]    Negative 

                    (10/02/2013 09:50:00)                           







BEDSIDE GLUCOSE TESTING





                Most recent to oldest [Reference Range]:    1               2               3

 

                          Glucose POC [70-99 mg/dL]    99 mg/dL 2

                          (10/02/2013 10:06:00)      98 mg/dL 3

                          (10/02/2013 09:08:00)      96 mg/dL 4

                                        (10/02/2013 06:03:00)  

 

                          Gluc POC Comment 1        Notified RN/MD 

*NA*

                          (10/02/2013 10:06:00)      Notified RN/MD 

*NA*

                          (10/01/2013 16:44:00)       







2Interpretive Data:  Upper Reportable Limit: 200 mg/dL.



3Interpretive Data:  Upper Reportable Limit: 200 mg/dL.



4Interpretive Data:  Upper Reportable Limit: 200 mg/dL.



URINALYSIS





                Most recent to oldest [Reference Range]:    1               2               3

 

                          UA Turbidity [Clear]      Slight Cloudy 

                    (2013 10:45:02)                           

 

                          UA Color [Yellow]         Orange 

*ABN*

                    (2013 10:45:02)                           

 

                          UA pH [5.0-8.0]           6.0 

                    (2013 10:45:02)                           

 

                          UA Spec Grav [<=1.030]    >=1.030 

*ABN*

                    (2013 10:45:02)                           

 

                          UA Glucose [Negative]     Negative 

                    (2013 10:45:02)                           

 

                          UA Blood [Negative]       Trace 

*ABN*

                    (2013 10:45:02)                           

 

                          UA Ketones [Negative]     Trace 

*ABN*

                    (2013 10:45:02)                           

 

                          UA Protein [Negative mg/dL]    >=300 mg/dL 

*ABN*

                    (2013 10:45:02)                           

 

                          UA Urobilinogen [0.1-1.0 EU/dL]    2.0 EU/dL 

*HI*

                    (2013 10:45:02)                           

 

                          UA Bili [Negative]        Moderate 5

*ABN*

                    (2013 10:45:02)                           

 

                          UA Leuk Est [Negative]    Negative 

                    (2013 10:45:02)                           

 

                          UA Nitrite [Negative]     Negative 

                    (2013 10:45:02)                           

 

                          UA WBC [None Seen /HPF]    0-2 /HPF 

                    (2013 10:45:02)                           

 

                          UA RBC [0-2 /HPF]         0-2 /HPF 

                    (2013 10:45:02)                           

 

                          UA Bacteria [None Seen /HPF]    Few /HPF 

                    (2013 10:45:02)                           

 

                          UA Sq Epi [Few /LPF]      Occasional /LPF 

                    (2013 10:45:02)                           

 

                          UA Hyal Cast [0-2]        0-2 

                    (2013 10:45:02)                           

 

                          UA Fine Gran [None Seen /LPF]    0-2 /LPF 

*ABN*

                    (2013 10:45:02)                           

 

                          UA Amorph Cathryn [None Seen /HPF]    Occasional /HPF 

*ABN*

                    (2013 10:45:02)                           

 

                          UA Mucus [None Seen /LPF]    Many /LPF 

*ABN*

                    (2013 10:45:02)                           







5Result Comment: Interpret positive bilirubin results with caution. Confirmatory
testing not possible due to the unavailability of reagent.  Correlation with ser
um chemistry results recommended.



CHEMISTRY





                Most recent to oldest [Reference Range]:    1               2               3

 

                          Sodium Lvl [135-145 mEq/L]    142 mEq/L 

                          (10/01/2013 05:50:00)      141 mEq/L 

                          (2013 08:35:00)       

 

                          Potassium Lvl [3.5-5.1 mEq/L]    4.0 mEq/L 

                          (10/01/2013 05:50:00)      4.2 mEq/L 

                          (2013 08:35:00)       

 

                          Chloride Lvl [ mEq/L]    106 mEq/L 

                          (10/01/2013 05:50:00)      104 mEq/L 

                          (2013 08:35:00)       

 

                          CO2 [24-32 mEq/L]         28 mEq/L 

                          (10/01/2013 05:50:00)      27 mEq/L 

                          (2013 08:35:00)       

 

                          AGAP [10.0-20.0 mEq/L]    12.0 mEq/L 

                          (10/01/2013 05:50:00)      14.2 mEq/L 

                          (2013 08:35:00)       

 

                          Creatinine Lvl [0.5-1.4 mg/dL]    1.2 mg/dL 

                          (10/01/2013 05:50:00)      1.3 mg/dL 

                          (2013 08:35:00)       

 

                          eGFR                      71 mL/min/1.73m2 6

*NA*

                          (10/01/2013 05:50:00)      65 mL/min/1.73m2 7

*NA*

                          (2013 08:35:00)       

 

                          BUN [7-22 mg/dL]          17 mg/dL 

                          (10/01/2013 05:50:00)      17 mg/dL 

                          (2013 08:35:00)       

 

                          B/C Ratio [6-25]          14 

                          (10/01/2013 05:50:00)      13 

                          (2013 08:35:00)       

 

                          Glucose Lvl [70-99 mg/dL]    149 mg/dL 8

*HI*

                          (10/01/2013 05:50:00)      137 mg/dL 9

*HI*

                          (2013 08:35:00)       

 

                          Total Protein [6.4-8.4 g/dL]    5.2 g/dL 

*LOW*

                          (10/01/2013 05:50:00)      6.7 g/dL 

                          (2013 08:35:00)       

 

                          Albumin Lvl [3.5-5.0 g/dL]    3.1 g/dL 

*LOW*

                          (10/01/2013 05:50:00)      3.7 g/dL 

                          (2013 08:35:00)       

 

                          Globulin [2.0-4.0 g/dL]    2.1 g/dL 

                          (10/01/2013 05:50:00)      3.0 g/dL 

                          (2013 08:35:00)       

 

                          A/G Ratio [0.7-1.6]       1.5 

                          (10/01/2013 05:50:00)      1.2 

                          (2013 08:35:00)       

 

                          Calcium Lvl [8.5-10.5 mg/dL]    7.6 mg/dL 

*LOW*

                          (10/01/2013 05:50:00)      8.3 mg/dL 

*LOW*

                          (2013 08:35:00)       

 

                          ALT [0-65 unit/L]         63 unit/L 

                          (10/01/2013 05:50:00)      78 unit/L 

*HI*

                          (2013 08:35:00)       

 

                          AST [0-37 unit/L]         33 unit/L 

                          (10/01/2013 05:50:00)      69 unit/L 

*HI*

                          (2013 08:35:00)       

 

                          Alk Phos [ unit/L]    114 unit/L 

                          (10/01/2013 05:50:00)      137 unit/L 

*HI*

                          (2013 08:35:00)       

 

                          Bili Total [0.2-1.3 mg/dL]    1.2 mg/dL 

                          (10/01/2013 05:50:00)      1.9 mg/dL 

*HI*

                          (2013 08:35:00)       

 

                          Amylase Lvl [ unit/L]    11 unit/L 

*LOW*

                    (2013 08:35:00)                           

 

                          Lipase Lvl [ unit/L]    76 unit/L 

                    (2013 08:35:00)                           

 

                          Total CK [ unit/L]    100 unit/L 

                          (10/01/2013 05:50:00)      223 unit/L 

*HI*

                          (2013 19:18:00)      131 unit/L 

                                        (2013 12:30:00)  

 

                          CK MB [0.5-3.6 ng/mL]     1.1 ng/mL 

                          (10/01/2013 05:50:00)      2.1 ng/mL 

                          (2013 19:18:00)      1.1 ng/mL 

                                        (2013 12:30:00)  

 

                          CK MB Index [0.0-2.5]     1.1 

                          (10/01/2013 05:50:00)      0.9 

                          (2013 19:18:00)      0.8 

                                        (2013 12:30:00)  

 

                          Troponin-I [0.00-0.40 ng/mL]    0.02 ng/mL 

                          (10/01/2013 05:50:00)      0.03 ng/mL 

                          (2013 19:18:00)      0.04 ng/mL 

                                        (2013 12:30:00)  







6Result Comment: The eGFR is calculated using the CKD-EPI formula. In most 
young, healthy individuals the eGFR will be >90 mL/min/1.73m2. The eGFR declines
with age. An eGFR of 60-89 may be normal in some populations, particularly the 
elderly, for whom the CKD-EPI formula has not been extensively validated. Use of
the eGFR is not recommended in the following populations:



Individuals with unstable creatinine concentrations, including pregnant patients
and those with serious co-morbid conditions.



Patients with extremes in muscle mass or diet. 



The data above are obtained from the National Kidney Disease Education Program (
NKDEP) which additionally recommends that when the eGFR is used in patients with
extremes of body mass index for purposes of drug dosing, the eGFR should be mul
tiplied by the estimated BMI.



7Result Comment: The eGFR is calculated using the CKD-EPI formula. In most 
young, healthy individuals the eGFR will be >90 mL/min/1.73m2. The eGFR declines
with age. An eGFR of 60-89 may be normal in some populations, particularly the 
elderly, for whom the CKD-EPI formula has not been extensively validated. Use of
the eGFR is not recommended in the following populations:



Individuals with unstable creatinine concentrations, including pregnant patients
and those with serious co-morbid conditions.



Patients with extremes in muscle mass or diet. 



The data above are obtained from the National Kidney Disease Education Program (
NKDEP) which additionally recommends that when the eGFR is used in patients with
extremes of body mass index for purposes of drug dosing, the eGFR should be mul
tiplied by the estimated BMI.



8Interpretive Data: Adult reference range values reflect the clinical guidelines

of the American Diabetes Association.



9Interpretive Data: Adult reference range values reflect the clinical guidelines

of the American Diabetes Association.



HEMATOLOGY





                Most recent to oldest [Reference Range]:    1               2               3

 

                          WBC [3.7-10.4 K/CMM]      7.1 K/CMM 

                          (10/01/2013 05:50:27)      8.9 K/CMM 

                          (2013 08:35:00)       

 

                          RBC [4.70-6.10 M/CMM]     4.46 M/CMM 

*LOW*

                          (10/01/2013 05:50:27)      4.80 M/CMM 

                          (2013 08:35:00)       

 

                          Hgb [14.0-18.0 g/dL]      13.5 g/dL 

*LOW*

                          (10/01/2013 05:50:27)      14.6 g/dL 

                          (2013 08:35:00)       

 

                          Hct [42.0-54.0 %]         40.5 % 

*LOW*

                          (10/01/2013 05:50:27)      44.0 % 

                          (2013 08:35:00)       

 

                          MCV [80.0-94.0 fL]        90.9 fL 

                          (10/01/2013 05:50:27)      91.7 fL 

                          (2013 08:35:00)       

 

                          MCH [27.0-31.0 pg]        30.3 pg 

                          (10/01/2013 05:50:27)      30.5 pg 

                          (2013 08:35:00)       

 

                          MCHC [32.0-36.0 g/dL]     33.4 g/dL 

                          (10/01/2013 05:50:27)      33.3 g/dL 

                          (2013 08:35:00)       

 

                          RDW [11.5-14.5 %]         15.5 % 

*HI*

                          (10/01/2013 05:50:27)      15.6 % 

*HI*

                          (2013 08:35:00)       

 

                          Platelet [133-450 K/CMM]    156 K/CMM 

                          (10/01/2013 05:50:27)      215 K/CMM 

                          (2013 08:35:00)       

 

                          MPV [7.4-10.4 fL]         8.5 fL 

                          (10/01/2013 05:50:27)      8.8 fL 

                          (2013 08:35:00)       

 

                          Segs [45.0-75.0 %]        79.7 % 

*HI*

                          (10/01/2013 05:50:27)      81.1 % 

*HI*

                          (2013 08:35:00)       

 

                          Lymphocytes [20.0-40.0 %]    11.9 % 

*LOW*

                          (10/01/2013 05:50:27)      10.8 % 

*LOW*

                          (2013 08:35:00)       

 

                          Monocytes [2.0-12.0 %]    6.8 % 

                          (10/01/2013 05:50:27)      7.8 % 

                          (2013 08:35:00)       

 

                          Eosinophils [0.0-4.0 %]    1.2 % 

                          (10/01/2013 05:50:27)      0.2 % 

                          (2013 08:35:00)       

 

                          Basophils [0.0-1.0 %]     0.4 % 

                          (10/01/2013 05:50:27)      0.1 % 

                          (2013 08:35:00)       

 

                          Segs-Bands # [1.5-8.1 K/CMM]    5.6 K/CMM 

                          (10/01/2013 05:50:27)      7.2 K/CMM 

                          (2013 08:35:00)       

 

                          Lymphocytes # [1.0-5.5 K/CMM]    0.8 K/CMM 

*LOW*

                          (10/01/2013 05:50:27)      1.0 K/CMM 

                          (2013 08:35:00)       

 

                          Monocytes # [0.0-0.8 K/CMM]    0.5 K/CMM 

                          (10/01/2013 05:50:27)      0.7 K/CMM 

                          (2013 08:35:00)       

 

                          Eosinophils # [0.0-0.5 K/CMM]    0.1 K/CMM 

                          (10/01/2013 05:50:27)      0.0 K/CMM 

                          (2013 08:35:00)       

 

                          Basophils # [0.0-0.2 K/CMM]    0.0 K/CMM 

                          (10/01/2013 05:50:27)      0.0 K/CMM 

                          (2013 08:35:00)       







Microbiology Reports







PROCEDURE:Culture: Stool

                  STATUS:                  In Progress





                BODY SITE:                      COLLECTED DATE/TIME:    10/02/2013 09:50:00

 

                SOURCE:         Stool           FREE TEXT SOURCE:     







***PRELIMINARY REPORTS***



Preliminary Report                               



No Gram Negative Enteric Neena Isolated 

Gram Positive Neena Only Isolated



Preliminary Report                               



Normal Enteric Neena Isolated



PROCEDURE:Ova and Parasites Conc and Tri

                  STATUS:                  Auth (Verified)





                BODY SITE:                      COLLECTED DATE/TIME:    10/02/2013 09:50:00

 

                SOURCE:         Stool           FREE TEXT SOURCE:     







***STAIN REPORTS***



Stain Report



No Ova, Cysts Or Parasites Seen



Stain Report



No Ova, Cysts Or Parasites Seen

## 2019-09-25 NOTE — XMS REPORT
Summary of Care: 16 - 10/12/16

                             Created on: 2049



JOANNA CABRERA

External Reference #: [id number]

: 1965

Sex: Male



Demographics







                          Address                   [address] [place] [address]

[place]  [address]-

 

                          Home Phone                [phone]

 

                          Preferred Language        English

 

                          Marital Status            Single

 

                          Yazidism Affiliation     Samaritan

 

                          Race                      White/

 

                          Ethnic Group              Non-





Author







                          Author                    [facility]

 

                          Organization              [facility]

 

                          Address                   Unknown

 

                          Phone                     Unavailable







Encounter





ANGEL LUIS Boothe(FIN) [id number] Date(s): 16 - 10/12/16

[facility] [address] [place] [address]-     [phone]


Discharge Disposition: Home or Self Care

Attending Physician: Physician, Non Associated MD

Referring Physician: Kya Feliz NP





Vital Signs







 



                           Most recent to            1



                                         oldest [Reference 



                                         Range]: 

 

 



                           Height                    175.26 cm



                                         (16 1:39 PM)

 

 



                           Weight                    106.364 kg



                                         (16 1:39 PM)

 

 



                           Body Mass Index           34.63 m2



                                         (16 1:39 PM)







Problem List







    



              Condition     Effective Dates     Status       Health Status     Informant

 

    



                     [D]Anterior chest     12             Active  



                                         wall pain(Confirmed)    

 

    



                           Anxiety(Confirmed)        Resolved  

 

    



                           BP+ -                     Active  



                                         Hypertension(Confirm    



                                         ed)    

 

    



                           Cardiomyopathy(Confi      Resolved  



                                         rmed)    

 

    



                           CHF - Congestive          Active  



                                         heart    



                                         failure(Confirmed)    

 

    



                           CHF - Congestive          Active  



                                         heart    



                                         failure(Confirmed)    

 

    



                           Depression(Confirmed      Active  



                                         )    

 

    



                           dm(Confirmed)             Active  

 

    



                           Down                      Active  



                                         syndrome(Confirmed)    

 

    



                           GERD -                    Active  



                                         Gastro-esophageal    



                                         reflux    



                                         disease(Confirmed)    

 

    



                           H/O:                      Active  



                                         schizophrenia(Confir    



                                         med)    

 

    



                           HTN(Confirmed)            Active  

 

    



                           ICD (implantable          Active  



                                         cardiac    



                                         defibrillator)    



                                         battery    



                                         depletion(Confirmed)    

 

    



                           NIDDM(Confirmed)          Active  

 

    



                           Obese                     Active  



                                         build(Confirmed)    

 

    



                           Schizophrenia(Confir      Active  



                                         med)    

 

    



                           Short of breath on        Active  



                                         exertion(Confirmed)    

 

    



                           Sleep                     Active  



                                         apnea(Confirmed)    







Allergies, Adverse Reactions, Alerts







   



                 Substance       Reaction        Severity        Status

 

   



                           NKDA                      Active







Medications





No data available for this section



Results





No data available for this section



Immunizations





Given and Recorded





   



                 Vaccine         Date            Status          Refusal Reason

 

   



                     influenza virus vaccine, inactivated     10/1/13             Given 







Procedures







   



                 Procedure       Date            Related Diagnosis     Body Site

 

   



                                         ICD - Internal cardiac defibrillator   



                                         procedure1   

 

   



                                         torn ligament2   







1placed 2.5years ago



2left knee ligament surgery



Social History







 



                           Social History Type       Response

 

 



                           Alcohol                   Never

 

 



                           Smoking Status            Former smoker; Type: Cigarettes; Tobacco use per day: 0; Number

 of years:



                                         2; Total pack years: 2; Started at age: 22.0; Stopped at age: 24; Previous



                                         treatment: None; Ready to change: No; Concerns about tobacco use in



                                         household: No; Exposure to Tobacco Smoke None; Cigarette Smoking Last 365



                                         Days No; Reg Smoking Cessation Counseling No







Assessment and Plan





No data available for this section

## 2019-09-25 NOTE — XMS REPORT
Summary of Care: 17 - 17

                             Created on: 2054



JOANNA CABRERA

External Reference #: [id number]

: 1965

Sex: Male



Demographics







                          Address                   [address]

[place]  [address]

 

                          Home Phone                [phone]

 

                          Preferred Language        English

 

                          Marital Status            Single

 

                          Roman Catholic Affiliation     Anglican

 

                          Race                      White

 

                          Additional Race(s)        White/



 

                          Ethnic Group              Non-





Author







                          Author                    [facility]

 

                          Organization              [facility]

 

                          Address                   Unknown

 

                          Phone                     Unavailable







Encounter





HQ Shyann(LISA) [id number] Date(s): 17 - 17

[facility] [address] [place] [address]-     [phone]
[phone]

Discharge Disposition: Home or Self Care

Attending Physician: Kalpesh Gomes MD

Admitting Physician: Kalpesh Gomes MD





Vital Signs







                    1                   2                   3



                                         Most recent to   



                                         oldest [Reference   



                                         Range]:   

 

                                        170.18 cm 

                          (17 8:23 PM)        170.18 cm 

                          (17 4:44 PM)         



                                         Height   

 

                                        101.051 kg 

                    (17 4:51 AM)                         



                                         Current Weight   

 

                                        97.8 DegF 

                          (17 11:23 AM)       97.8 DegF 

                          (17 7:31 AM)        97.6 DegF 

                                        (17 5:22 AM)



                                         Temperature Oral   



                                         [96.4-99.1 DegF]   

 

                                        97/69 mmHg 

                          (17 11:23 AM)       111/65 mmHg 

                          (17 7:31 AM)        107/65 mmHg 

                                        (17 5:22 AM)



                                         Blood Pressure   



                                         [/60-90 mmHg]   

 

                                        18 BRMIN 

                          (17 11:23 AM)       18 BRMIN 

                          (17 7:31 AM)        18 BRMIN 

                                        (17 5:22 AM)



                                         Respiratory Rate   



                                         [14-20 BRMIN]   

 

                                        64 bpm 

                          (17 11:23 AM)       54 bpm 

*LOW*

                          (17 7:31 AM)        69 bpm 

                                        (17 5:22 AM)



                                         Peripheral Pulse   



                                         Rate [ bpm]   

 

                                        101.364 kg 

                          (17 8:23 PM)        102.727 kg 

                          (17 4:44 PM)         



                                         Weight   

 

                                        35 m2 

                          (17 8:23 PM)        35.47 m2 

                          (17 4:44 PM)         



                                         Body Mass Index   







Problem List







    



              Condition     Effective Dates     Status       Health Status     Informant

 

    



                     [D]Anterior chest     12             Active  



                                         wall pain(Confirmed)    

 

    



                           Anxiety(Confirmed)        Resolved  

 

    



                           BP+ -                     Active  



                                         Hypertension(Confirm    



                                         ed)    

 

    



                           Pacemaker(Confirmed)      Active  

 

    



                           Cardiomyopathy(Confi      Resolved  



                                         rmed)    

 

    



                           CHF - Congestive          Active  



                                         heart    



                                         failure(Confirmed)    

 

    



                           CHF - Congestive          Active  



                                         heart    



                                         failure(Confirmed)    

 

    



                           Depression(Confirmed      Active  



                                         )    

 

    



                           dm(Confirmed)             Active  

 

    



                           Down                      Active  



                                         syndrome(Confirmed)    

 

    



                           GERD -                    Active  



                                         Gastro-esophageal    



                                         reflux    



                                         disease(Confirmed)    

 

    



                           H/O:                      Active  



                                         schizophrenia(Confir    



                                         med)    

 

    



                           HTN(Confirmed)            Active  

 

    



                           ICD (implantable          Active  



                                         cardiac    



                                         defibrillator)    



                                         battery    



                                         depletion(Confirmed)    

 

    



                           NIDDM(Confirmed)          Active  

 

    



                           Obese                     Active  



                                         build(Confirmed)    

 

    



                           Schizophrenia(Confir      Active  



                                         med)    

 

    



                           Short of breath on        Active  



                                         exertion(Confirmed)    

 

    



                           Sleep                     Active  



                                         apnea(Confirmed)    

 

    



                           Sleep                     Active  



                                         apnea(Confirmed)    







Allergies, Adverse Reactions, Alerts







   



                 Substance       Reaction        Severity        Status

 

   



                           NKDA                      Active







Medications





acetaminophen-hydrocodone 325 mg-5 mg oral tablet

1 tab, Route: PO, Drug Form: TAB, Dosing Weight 101.364, kg, Q4H, PRN Pain Score
 4-6, Start date: 17 21:21:00 CST, Duration: 30 day, Stop date: 17 2
1:20:00 CST

Notes: (Same as: Norco 325/5)  Do not exceed 4gm/day of acetaminophen.

Start Date: 17

Stop Date: 17

Status: Discontinued



carvedilol

18.75 mg, 1.5 tab, Route: PO, Drug form: TAB, Q12H, Dosing Weight 101.364, kg, S
tart date: 17 9:00:00 CST, Duration: 30 day, Stop date: 17 21:00:00 
CST

Notes: Give with food. (Same As: Coreg)

Start Date: 17

Stop Date: 17

Status: Discontinued



clonazePAM

1 mg, 2 tab, Route: PO, Drug form: TAB, Bedtime, Dosing Weight 101.364, kg, Star
t date: 17 23:30:00 CST, Duration: 30 day, Stop date: 17 21:00:00 CS
T

Notes: (Same As: KlonoPIN)

Start Date: 17

Stop Date: 17

Status: Discontinued



Dextrose 50% Syringe

25 gm, 50 mL, Route: IVP, Drug Form: INJ, Dosing Weight 101.364, kg, PRN, PRN Bl
ood Glucose Results, Start date: 17 21:28:00 CST, Duration: 30 day, Stop d
ate: 17 21:27:00 CST

Start Date: 17

Stop Date: 17

Status: Discontinued



Dextrose 50% Syringe

12.5 gm, 25 mL, Route: IVP, Drug Form: INJ, Dosing Weight 101.364, kg, PRN, PRN 
Blood Glucose Results, Start date: 17 21:28:00 CST, Duration: 30 day, Stop
 date: 17 21:27:00 CST

Start Date: 17

Stop Date: 17

Status: Discontinued



Geodon

10 mg, Route: IM, Drug form: PDR/INJ, ONCE, Dosing Weight 101.364, kg, PRN Agita
tion, Start date: 17 6:38:00 CST

Notes: Reconstitute with 1.2 ml of sterile water.  Final concentration=20 mg/1ml
.**** Maximum 40 mg/24 hours *****(Same As: Geodon).  *** MEDICATION WASTE ***Pr
oduct Size:  20 mgProduct Wasted:  _10__ mg

Start Date: 17

Stop Date: 17

Status: Completed



glucagon

1 mg, Route: IM, Drug form: PDR/INJ, PRN, Dosing Weight 101.364, kg, PRN Blood G
lucose Results, Start date: 17 21:28:00 CST, Duration: 30 day, Stop date: 
17 21:27:00 CST

Start Date: 17

Stop Date: 17

Status: Discontinued



insulin lispro

4 unit, 0.04 mL, Route: SUB-Q, Drug form: SOLN, TID-Before Meals, Dosing Weight 
101.364, kg, PRN Blood Glucose Results, Start date: 17 21:28:00 CST, Durat
ion: 30 day, Stop date: 17 21:27:00 CST

Notes: Roll in palms of hands gently;  Do not shake `vigorously. (Same as: Brittnee
og )"Single Patient Use Only "WASTE: F/P - Black; E - Municipal Trash Bin  Stabl
e for 28 days at room temperature.Expires in _____ days from ______________Date

Start Date: 17

Stop Date: 17

Status: Discontinued



insulin lispro

2 unit, 0.02 mL, Route: SUB-Q, Drug form: SOLN, TID-Before Meals, Dosing Weight 
101.364, kg, PRN Blood Glucose Results, Start date: 17 21:28:00 CST, Durat
ion: 30 day, Stop date: 17 21:27:00 CST

Notes: Roll in palms of hands gently;  Do not shake `vigorously. (Same as: Humal
og )"Single Patient Use Only "WASTE: F/P - Black; E - Municipal Trash Bin  Stabl
e for 28 days at room temperature.Expires in _____ days from ______________Date

Start Date: 17

Stop Date: 17

Status: Discontinued



insulin lispro

10 unit, 0.1 mL, Route: SUB-Q, Drug form: SOLN, TID-Before Meals, Dosing Weight 
101.364, kg, PRN Blood Glucose Results, Start date: 17 21:28:00 CST, Durat
ion: 30 day, Stop date: 17 21:27:00 CST

Notes: Roll in palms of hands gently;  Do not shake `vigorously. (Same as: Brittnee
og )"Single Patient Use Only "WASTE: F/P - Black; E - Municipal Trash Bin  Stabl
e for 28 days at room temperature.Expires in _____ days from ______________Date

Start Date: 17

Stop Date: 17

Status: Discontinued



insulin lispro

6 unit, 0.06 mL, Route: SUB-Q, Drug form: SOLN, TID-Before Meals, Dosing Weight 
101.364, kg, PRN Blood Glucose Results, Start date: 17 21:28:00 CST, Durat
ion: 30 day, Stop date: 17 21:27:00 CST

Notes: Roll in palms of hands gently;  Do not shake `vigorously. (Same as: Brittnee min )"Single Patient Use Only "WASTE: F/P - Black; E - Municipal Trash Bin  Stabl
e for 28 days at room temperature.Expires in _____ days from ______________Date

Start Date: 17

Stop Date: 17

Status: Discontinued



insulin lispro

8 unit, 0.08 mL, Route: SUB-Q, Drug form: SOLN, TID-Before Meals, Dosing Weight 
101.364, kg, PRN Blood Glucose Results, Start date: 17 21:28:00 CST, Durat
ion: 30 day, Stop date: 17 21:27:00 CST

Notes: Roll in palms of hands gently;  Do not shake `vigorously. (Same as: Brittnee min )"Single Patient Use Only "WASTE: F/P - Black; E - Municipal Trash Bin  Stabl
e for 28 days at room temperature.Expires in _____ days from ______________Date

Start Date: 17

Stop Date: 17

Status: Discontinued



lisinopril

2.5 mg, 0.5 tab, Route: PO, Drug form: TAB, Daily, Dosing Weight 101.364, kg, St
art date: 17 9:00:00 CST, Duration: 30 day, Stop date: 17 9:00:00 CS
T

Notes: (Same as: Prinivil, Zestril)

Start Date: 17

Stop Date: 17

Status: Discontinued



LORazepam

1 mg, 1 tab, Route: PO, Drug form: TAB, ONCE, Dosing Weight 101.364, kg, Priorit
y: NOW, Start date: 17 4:55:00 CST, Stop date: 17 4:55:00 CST

Notes: (Same as: Ativan)

Start Date: 17

Stop Date: 17

Status: Completed



morphine Sulfate

6 mg, 3 mL, Route: PO, Drug form: SOLN, Q4H, PRN Pain Score 7-10, Start date:  22:13:00 CST, Duration: 30 day, Stop date: 17 22:12:00 CST

Notes: (Same as:MORPhine Sulfate)

Start Date: 17

Stop Date: 17

Status: Discontinued



morphine Sulfate

2 mg, Route: IVP, Q4H, Dosing Weight 101.364, kg, PRN Pain Score 7-10, Start josefa
e: 17 21:21:00 CST, Duration: 30 day, Stop date: 17 21:20:00 CST

Start Date: 17

Stop Date: 17

Status: Deleted



Risperdal

2 mg, 2 tab, Route: PO, Drug form: TAB, Bedtime, Dosing Weight 101.364, kg, Star
t date: 17 23:30:00 CST, Duration: 30 day, Stop date: 17 21:00:00 CS
T

Notes: (Same as: Risperdal)

Start Date: 17

Stop Date: 17

Status: Discontinued



rivaroxaban 20 mg oral tablet

20 mg=1 tab, PO, Daily, 0 Refill(s)

Start Date: 17

Status: Ordered



Saline Flush 0.9%

10 mL, Route: IVP, Drug Form: INJ, Dosing Weight 102.727, kg, PRN, PRN Line Flus
h, Start date: 17 16:50:00 CST, Duration: 30 day, Stop date: 17 16:4
9:00 CST

Notes: (Same as: BD Posiflush)

Start Date: 17

Stop Date: 17

Status: Discontinued



torsemide

20 mg, 2 tab, Route: PO, Drug form: TAB, Daily, Dosing Weight 101.364, kg, Start
 date: 17 9:00:00 CST, Duration: 30 day, Stop date: 17 9:00:00 CST

Notes: (Same As: Demadex)

Start Date: 17

Stop Date: 17

Status: Discontinued



torsemide 20 mg oral tablet

20 mg=1 tab, PO, Daily, # 30 tab, 1 Refill(s)

Start Date: 17

Status: Ordered



trazodone 50 mg oral tablet

50 mg, 1 tab, Route: PO, Drug form: TAB, Bedtime, Dosing Weight 101.364, kg, Sta
rt date: 17 23:30:00 CST, Duration: 30 day, Stop date: 17 21:00:00 C
ST

Notes: (Same As: Desyrel)

Start Date: 17

Stop Date: 17

Status: Discontinued



Results





ELECTROLYTES





                    1                   2                   3



                                         Most recent to   



                                         oldest [Reference   



                                         Range]:   

 

                                        138 mEq/L 

                    (17 5:43 PM)                          



                                         Sodium Lvl [135-145   



                                         mEq/L]   

 

                                        4.3 mEq/L 

                    (17 5:43 PM)                          



                                         Potassium Lvl   



                                         [3.5-5.1 mEq/L]   

 

                                        99 mEq/L 

                    (17 5:43 PM)                          



                                         Chloride Lvl [   



                                         mEq/L]   

 

                                        32 mEq/L 

                    (17 5:43 PM)                          



                                         CO2 [24-32 mEq/L]   

 

                                        11.3 mEq/L 

                    (17 5:43 PM)                          



                                         AGAP [10.0-20.0   



                                         mEq/L]   







CHEM PANEL





                    1                   2                   3



                                         Most recent to   



                                         oldest [Reference   



                                         Range]:   

 

                                        1.79 mg/dL 

*HI*

                    (17 5:43 PM)                          



                                         Creatinine Lvl   



                                         [0.50-1.40 mg/dL]   

 

                                        43 mL/min/1.73m2 1

*NA*

                    (17 5:43 PM)                          



                                         eGFR   

 

                                        29 mg/dL 

*HI*

                    (17 5:43 PM)                          



                                         BUN [7-22 mg/dL]   

 

                                        16 

                    (17 5:43 PM)                          



                                         B/C Ratio [6-25]   

 

                                        76 mg/dL 

                    (17 5:43 PM)                          



                                         Glucose Lvl [70-99   



                                         mg/dL]   

 

                                        7.5 g/dL 

                    (17 5:43 PM)                          



                                         Total Protein   



                                         [6.4-8.4 g/dL]   

 

                                        3.7 g/dL 

                    (17 5:43 PM)                          



                                         Albumin Lvl [3.5-5.0   



                                         g/dL]   

 

                                        3.8 g/dL 

                    (17 5:43 PM)                          



                                         Globulin [2.7-4.2   



                                         g/dL]   

 

                                        1.0 

                    (17 5:43 PM)                          



                                         A/G Ratio [0.7-1.6]   

 

                                        8.8 mg/dL 

                    (17 5:43 PM)                          



                                         Calcium Lvl   



                                         [8.5-10.5 mg/dL]   

 

                                        26 unit/L 

                    (17 5:43 PM)                          



                                         ALT [0-65 unit/L]   

 

                                        24 unit/L 

                    (17 5:43 PM)                          



                                         AST [0-37 unit/L]   

 

                                        107 unit/L 

                    (17 5:43 PM)                          



                                         Alk Phos [   



                                         unit/L]   

 

                                        0.6 mg/dL 

                    (17 5:43 PM)                          



                                         Bili Total [0.2-1.3   



                                         mg/dL]   







1Result Comment: The eGFR is calculated using the CKD-EPI formula. In most 
young, healthy individuals the eGFR will be >90 mL/min/1.73m2. The eGFR declines
with age. An eGFR of 60-89 may be normal in some populations, particularly the 
elderly, for whom the CKD-EPI formula has not been extensively validated. Use of
the eGFR is not recommended in the following populations:



Individuals with unstable creatinine concentrations, including pregnant patients
and those with serious co-morbid conditions.



Patients with extremes in muscle mass or diet. 



The data above are obtained from the National Kidney Disease Education Program (
NKDEP) which additionally recommends that when the eGFR is used in patients with
extremes of body mass index for purposes of drug dosing, the eGFR should be mul
tiplied by the estimated BMI.



CARDIAC ENZYMES





                    1                   2                   3



                                         Most recent to   



                                         oldest [Reference   



                                         Range]:   

 

                                        152 unit/L 

                          (17 1:58 AM)        168 unit/L 

                          (17 9:50 PM)        224 unit/L 

*HI*

                                        (17 5:43 PM) 



                                         Total CK [   



                                         unit/L]   

 

                                        1.1 ng/mL 

                          (17 1:58 AM)        1.1 ng/mL 

                          (17 9:50 PM)        0.9 ng/mL 

                                        (17 5:43 PM) 



                                         CK MB [0.5-3.6   



                                         ng/mL]   

 

                                        0.7 

                          (17 1:58 AM)        0.7 

                          (17 9:50 PM)        0.4 

                                        (17 5:43 PM) 



                                         CK MB Index   



                                         [0.0-2.5]   

 

                                        <0.02 ng/mL 

                          (17 1:58 AM)        <0.02 ng/mL 

                          (17 9:50 PM)        <0.02 ng/mL 

                                        (17 5:43 PM) 



                                         Troponin-I   



                                         [0.00-0.40 ng/mL]   

 

                                        205 pg/mL 

*HI*

                    (17 5:43 PM)                          



                                         BNP [<=100 pg/mL]   







LIPIDS





                    1                   2                   3



                                         Most recent to   



                                         oldest [Reference   



                                         Range]:   

 

                                        4.62 

                    (17 1:58 AM)                          



                                         CHD Risk [4.00-7.30]   

 

                                        157 mg/dL 

                    (17 1:58 AM)                          



                                         Chol [<=199 mg/dL]   

 

                                        154 mg/dL 

*HI*

                    (17 1:58 AM)                          



                                         Trig [<=149 mg/dL]   

 

                                        34 mg/dL 

*LOW*

                    (17 1:58 AM)                          



                                         HDL [>=61 mg/dL]   

 

                                        92 mg/dL 

                    (17 1:58 AM)                          



                                         LDL (Calculated)   



                                         [<=99 mg/dL]   

 

                                        31 

*NA*

                    (17 1:58 AM)                          



                                         VLDL   







DRUG SCREEN





                    1                   2                   3



                                         Most recent to   



                                         oldest [Reference   



                                         Range]:   

 

                                        Negative 

*NA*

                    (17 2:03 AM)                          



                                         U Amph Scr   



                                         [Negative]   

 

                                        Negative 

*NA*

                    (17 2:03 AM)                          



                                         U Annie Scr   



                                         [Negative]   

 

                                        Negative 

*NA*

                    (17 2:03 AM)                          



                                         U Benzodia Scr   



                                         [Negative]   

 

                                        Negative 

*NA*

                    (17 2:03 AM)                          



                                         U Cocaine Scr   



                                         [Negative]   

 

                                        Positive 

*ABN*

                    (17 2:03 AM)                          



                                         U Opiate Scr   



                                         [Negative]   

 

                                        Negative 

*NA*

                    (17 2:03 AM)                          



                                         U Phencyc Scr   



                                         [Negative]   

 

                                        Negative 

*NA*

                    (17 2:03 AM)                          



                                         U Cannab Scr   



                                         [Negative]   

 

                                        See Note 

                    (17 2:03 AM)                          



                                         [facility] Note   







TOXICOLOGY





                    1                   2                   3



                                         Most recent to   



                                         oldest [Reference   



                                         Range]:   

 

                                        0.9 ng/mL 

                    (17 5:43 PM)                          



                                         Digoxin Lvl [0.8-2.0   



                                         ng/mL]   







HEMATOLOGY





                    1                   2                   3



                                         Most recent to   



                                         oldest [Reference   



                                         Range]:   

 

                                        13.9 K/CMM 

*HI*

                    (17 5:43 PM)                          



                                         WBC [3.7-10.4 K/CMM]   

 

                                        5.55 M/CMM 

                    (17 5:43 PM)                          



                                         RBC [4.70-6.10   



                                         M/CMM]   

 

                                        15.8 g/dL 

                    (17 5:43 PM)                          



                                         Hgb [14.0-18.0 g/dL]   

 

                                        46.2 % 

                    (17 5:43 PM)                          



                                         Hct [42.0-54.0 %]   

 

                                        83.3 fL 

                    (17 5:43 PM)                          



                                         MCV [80.0-94.0 fL]   

 

                                        28.4 pg 

                    (17 5:43 PM)                          



                                         MCH [27.0-31.0 pg]   

 

                                        34.2 g/dL 

                    (17 5:43 PM)                          



                                         MCHC [32.0-36.0   



                                         g/dL]   

 

                                        16.1 % 

*HI*

                    (17 5:43 PM)                          



                                         RDW [11.5-14.5 %]   

 

                                        178 K/CMM 

                    (17 5:43 PM)                          



                                         Platelet [133-450   



                                         K/CMM]   

 

                                        8.3 fL 

                    (17 5:43 PM)                          



                                         MPV [7.4-10.4 fL]   

 

                                        80.6 % 

*HI*

                    (17 5:43 PM)                          



                                         Segs [45.0-75.0 %]   

 

                                        9.6 % 

*LOW*

                    (17 5:43 PM)                          



                                         Lymphocytes   



                                         [20.0-40.0 %]   

 

                                        7.9 % 

                    (17 5:43 PM)                          



                                         Monocytes [2.0-12.0   



                                         %]   

 

                                        1.6 % 

                    (17 5:43 PM)                          



                                         Eosinophils [0.0-4.0   



                                         %]   

 

                                        0.3 % 

                    (17 5:43 PM)                          



                                         Basophils [0.0-1.0   



                                         %]   

 

                                        11.2 K/CMM 

*HI*

                    (17 5:43 PM)                          



                                         Segs-Bands #   



                                         [1.5-8.1 K/CMM]   

 

                                        1.3 K/CMM 

                    (17 5:43 PM)                          



                                         Lymphocytes #   



                                         [1.0-5.5 K/CMM]   

 

                                        1.1 K/CMM 

*HI*

                    (17 5:43 PM)                          



                                         Monocytes # [0.0-0.8   



                                         K/CMM]   

 

                                        0.2 K/CMM 

                    (17 5:43 PM)                          



                                         Eosinophils #   



                                         [0.0-0.5 K/CMM]   

 

                                        16.9 seconds 

*HI*

                          (17 5:30 AM)        26.5 seconds 

*HI*

                          (17 2:01 AM)        33.0 seconds 

*HI*

                                        (17 5:43 PM) 



                                         PT [12.0-14.7   



                                         seconds]   

 

                                        1.37 

*HI*

                          (17 5:30 AM)        2.41 

*HI*

                          (17 2:01 AM)        3.17 

*HI*

                                        (17 5:43 PM) 



                                         INR [0.85-1.17]   

 

                                        <0.27 ug/mL FEU 

*NA*

                    (17 5:43 PM)                          



                                         D-Dimer   

 

                                        33.7 seconds 

                          (17 5:30 AM)        44.3 seconds 

*HI*

                          (17 2:01 AM)        49.6 seconds 

*HI*

                                        (17 5:43 PM) 



                                         PTT [22.9-35.8   



                                         seconds]   







Immunizations





Given and Recorded





   



                 Vaccine         Date            Status          Refusal Reason

 

   



                     influenza virus vaccine, inactivated     10/1/13             Given 









Not Given





   



                 Vaccine         Date            Status          Refusal Reason

 

   



                 pneumococcal 23-valent vaccine     17         Not Given       Patient Refuses







Procedures







   



                 Procedure       Date            Related Diagnosis     Body Site

 

   



                                         ICD - Internal cardiac defibrillator   



                                         procedure1   

 

   



                                         torn ligament2   







1placed 2.5years ago



2left knee ligament surgery



Social History







 



                           Social History Type       Response

 

 



                           Substance Abuse           Use: Past.  Type: Cocaine.  Recreational Drug Route: Inhaled.



 

 



                           Alcohol                   Never

 

 



                           Smoking Status            Former smoker; Exposure to Tobacco Smoke None; Cigarette Smoking

 Last 365



                                         Days No; Reg Smoking Cessation Counseling No







Assessment and Plan





No data available for this section

## 2019-09-25 NOTE — XMS REPORT
Encounter CCD: 2012 to 2012

                             Created on: 2041



JOANNA CABRERA

External Reference #: [id number]

: 1965

Sex: Male



Demographics







                          Address                   [address] [place] [address]

[place]  [address]

 

                          Home Phone                [phone]

 

                          Preferred Language        Unknown

 

                          Marital Status            Single

 

                          Christian Affiliation     Confucianism

 

                          Race                      White/

 

                          Ethnic Group              Non-





Author







                          Author                    Auto Generated

 

                          Organization              [facility]

 

                          Address                   Unknown

 

                          Phone                     Unavailable







Care Team Providers







                    Care Team Member Name    Role                Phone

 

                    Mary Zee    CP                  [phone]







Allergies, Adverse Reactions, Alerts







  



                     Substance           Reaction            Status

 

  



                           NKDA                      Active







Problem List







  



                     Condition           Effective Dates     Status

 

  



                     [D]Anterior chest wall pain     2012          Active

 

  



                           CHF - Congestive heart failure      Active

 

  



                           Down syndrome             Active

 

  



                           H/O: schizophrenia        Active







Medications







    



              Medication     Instructions     Start Date     End Date     Status

 

    



              lisinopril     10 mg, 1 tab, Route: PO, Drug form:     2012     Discontinued





                                         TAB, Daily, Dosing Weight 105.909,   



                                         kg, Priority: NOW, Start date:   



                                         12 7:51:00, Duration: 30 day,   



                                         Stop date: 10/12/12 9:00:00   

 

    



              spironolactone 25 mg     Daily, Substitution Allowed     2012     

Discontinued



                                         oral tablet    

 

    



              enoxaparin     40 mg, 0.4 mL, Route: SUB-Q, Drug     2012     Discontinued





                                         form: INJ, [id number], Dosing Weight   



                                         105.909, kg, Start date: 12   



                                         8:00:00, Duration: 30 day, Stop   



                                         date: 10/12/12 8:00:00   

 

    



              furosemide     40 mg, Daily, Substitution Allowed     2012     Discontinued



 

    



              carvedilol 25 mg     BID, Substitution Allowed     2012     Discontinued





                                         oral tablet    

 

    



              Lasix        40 mg, Route: IVP, Drug form: INJ,     2012     Completed





                                         ONCE, Dosing Weight 105.909, kg,   



                                         Priority: STAT, Start date:   



                                         12 23:26:00, Stop date:   



                                         12 23:26:00   

 

    



              clonazepam     2 mg, 4 tab, Route: PO, Drug form:     2012     Discontinued





                                         TAB, Bedtime, Dosing Weight   



                                         105.909, kg, PRN Insomnia, Start   



                                         date: 12 21:49:00, Duration:   



                                         30 day, Stop date: 10/13/12   



                                         21:48:00   

 

    



              folic acid     1 mg, 1 tab, Route: PO, Drug form:     09/15/2012     2012     Discontinued





                                         TAB, Daily, Dosing Weight 105.909,   



                                         kg, Priority: NOW, Start date:   



                                         09/15/12 11:00:00, Duration: 30   



                                         day, Stop date: 10/15/12 9:00:00   

 

    



              thiamine     100 mg, 1 mL, Route: IM, Drug form:     09/15/2012     2012     Discontinued





                                         INJ, Daily, Dosing Weight 105.909,   



                                         kg, Start date: 09/15/12 11:00:00,   



                                         Duration: 30 day, Stop date:   



                                         10/14/12 11:00:00   

 

    



              carvedilol     12.5 mg, 1 tab, Route: PO, Drug     2012     Discontinued





                                         form: TAB, BID, Dosing Weight   



                                         105.909, kg, Start date: 12   



                                         9:00:00, Duration: 30 day, Stop   



                                         date: 10/13/12 17:00:00   

 

    



              atorvastatin     10 mg, 1 tab, Route: PO, Drug form:     2012     

Discontinued



                                         TAB, QPM, Dosing Weight 105.909,   



                                         kg, Start date: 12 17:00:00,   



                                         Duration: 30 day, Stop date:   



                                         10/13/12 17:00:00   

 

    



                 Ultram 50 mg oral     50 mg, 1 tab, PO, Q4H, PRN, 20 tab,     2012      Ordered





                           tablet                    pain, Substitution Allowed   

 

    



                 folic acid 1 mg oral     1 mg, 1 tab, PO, Daily, 10 tab,     2012      Ordered



                           tablet                    Substitution Allowed, TAB   

 

    



                 atorvastatin 10 mg     10 mg, 1 tab, PO, QPM, 30 tab,     2012      Ordered



                           oral tablet               Substitution Allowed, TAB   

 

    



                 risperidone 1 mg     2 mg, 2 tab, PO, Daily, 10 tab,     2012      Ordered



                           oral tablet               Substitution Allowed, TAB   

 

    



                 Saline Flush 0.9%     5 ml, Route: IVP, Drug Form: INJ,     2012

                                         Discontinued



                                         Dosing Weight 105.909, kg, PRN, PRN   



                                         Line Flush, Start date: 12   



                                         22:07:00, Duration: 30 day, Stop   



                                         date: 10/12/12 22:06:00   

 

    



                 Saline Flush 0.9%     5 ml, Route: IVP, Drug Form: INJ,     2012

                                         Discontinued



                                         Dosing Weight 105.909, kg, Q12H,   



                                         Start date: 12 9:00:00,   



                                         Duration: 30 day, Stop date:   



                                         10/12/12 21:00:00   

 

    



                 Saline Flush 0.9%     5 ml, Route: IVP, Drug Form: INJ,     2012

                                         Discontinued



                                         Dosing Weight 105.909, kg, PRN, PRN   



                                         Line Flush, Start date: 12   



                                         2:57:00, Duration: 30 day, Stop   



                                         date: 10/13/12 2:56:00   

 

    



              aspirin 325 mg     325 mg, 1 tab, Route: PO, Drug     2012     Deleted





                           tablet                    form: TAB, ONCE, Dosing Weight   



                                         105.909, kg, Priority: STAT, Start   



                                         date: 12 22:07:00, Stop date:   



                                         12 22:07:00   

 

    



              furosemide     40 mg, 4 mL, Route: IVP, Drug form:     2012     09/15/2012     Discontinued





                                         INJ, Q12H, Dosing Weight 105.909,   



                                         kg, Start date: 12 9:00:00,   



                                         Duration: 30 day, Stop date:   



                                         10/12/12 21:00:00   

 

    



              ondansetron     4 mg, 2 mL, Route: IVP, Drug form:     2012     Discontinued





                                         INJ, Q8H, Dosing Weight 105.909,   



                                         kg, PRN Nausea & Vomiting, Start   



                                         date: 12 2:57:00, Duration:   



                                         30 day, Stop date: 10/13/12 2:56:00   

 

    



              famotidine     20 mg, 1 tab, Route: PO, Drug form:     2012     Discontinued





                                         TAB, Q12H, Dosing Weight 105.909,   



                                         kg, Start date: 12 9:00:00,   



                                         Duration: 30 day, Stop date:   



                                         10/12/12 21:00:00   

 

    



              aspirin 81 mg     81 mg, 1 tab, Route: PO, Drug form:     2012    

 Discontinued



                           tablet, chewable          CHEWTAB, Breakfast, Dosing Weight   



                                         105.909, kg, Start date: 12   



                                         8:00:00, Duration: 30 day, Stop   



                                         date: 10/12/12 8:00:00   

 

    



              Zaroxolyn     5 mg, 1 tab, Route: PO, Drug form:     09/15/2012     09/15/2012     Discontinued





                                         TAB, After Breakfast, Dosing Weight   



                                         105.909, kg, Start date: 09/15/12   



                                         8:30:00, Duration: 30 day, Stop   



                                         date: 10/14/12 8:30:00   

 

    



              ibuprofen     600 mg, 1 tab, Route: PO, Drug     2012     Completed





                                         form: TAB, ONCE, Dosing Weight   



                                         105.909, kg, Priority: STAT, Start   



                                         date: 12 0:54:00, Stop date:   



                                         12 0:54:00   

 

    



              potassium chloride     20 mEq, 1 tab, Route: PO, Drug     2012    

 Completed



                           20 mEq oral tablet,       form: ERTAB, ONCE, Dosing Weight   



                           extended release          105.909, kg, Start date: 12   



                                         7:10:00, Stop date: 12   



                                         7:10:00   

 

    



              atorvastatin 10 mg     Daily, Substitution Allowed     2012     Discontinued





                                         oral tablet    

 

    



              Robitussin-DM     10 mL, Route: PO, Drug Form: LIQ,     2012     Discontinued





                                         Q4H, PRN Cough, Start date:   



                                         12 22:26:00, Duration: 30   



                                         day, Stop date: 10/13/12 22:25:00   

 

    



              risperidone     2 mg, Daily, Substitution Allowed     2012     Discontinued



 

    



                 aspirin 81 mg     81 mg, 1 tab, PO, Daily, 0 tab,     09/15/2012      Ordered



                           tablet, enteric           Substitution Allowed, ECTAB   



                                         coated    

 

    



              Lasix 40 mg oral     40 mg, 1 tab, PO, Daily, 90 tab,     09/15/2012     2012    

 Discontinued



                           tablet                    Substitution Allowed, TAB   

 

    



              nitroglycerin 2%     1 inch, Route: TOP, Drug Form:     2012     Completed





                           ointment                  OINT, Dosing Weight 105.909, kg,   



                                         ONCE, STAT, Start date: 12   



                                         22:27:00, Stop date: 12   



                                         22:27:00   

 

    



                 Sodium Chloride 0.9%     25 mL, Route: IV, Start date:     09/15/2012      2012 

                                         Discontinued



                           IV                        09/15/12 12:46:00, Duration: 30   



                                         day, Stop date: 10/15/12 12:45:00,   



                                         PRN Line Flush   

 

    



              aspirin 81 mg     324 mg, 4 tab, Route: PO, Drug     2012     Completed





                           tablet, chewable          form: CHEWTAB, ONCE, Dosing Weight   



                                         105.909, kg, Priority: STAT, Start   



                                         date: 12 22:26:00, Stop date:   



                                         12 22:26:00   

 

    



                 spironolactone 25 mg     25 mg, 1 tab, PO, Daily, 90 tab,     09/15/2012      Ordered





                           oral tablet               Substitution Allowed, TAB   

 

    



                 Coreg 12.5 mg oral     12.5 mg, 1 tab, PO, BID, 180 tab,     09/15/2012      Ordered



                           tablet                    Substitution Allowed, TAB   

 

    



                 lisinopril 10 mg     10 mg, 1 tab, PO, Daily, 90 tab,     09/15/2012      Ordered



                           oral tablet               Substitution Allowed, TAB   

 

    



                 Lasix 40 mg oral     60 mg, 1.5 tab, PO, Daily, 45 tab,     09/15/2012      Ordered



                           tablet                    2, 2, Substitution Allowed, TAB   

 

    



                 spironolactone     25 mg, 1 tab, Route: PO, Drug form:     2012 

                                         Discontinued



                                         TAB, Daily, Dosing Weight 105.909,   



                                         kg, Start date: 12 9:00:00,   



                                         Duration: 30 day, Stop date:   



                                         10/13/12 9:00:00   

 

    



              Tylenol      650 mg, 2 tab, Route: PO, Drug     2012     Discontinued





                                         form: TAB, Q4H, PRN Pain, Start   



                                         date: 12 5:23:00, Duration:   



                                         30 day, Stop date: 10/16/12 5:22:00   

 

    



              risperidone     2 mg, 2 tab, Route: PO, Drug form:     2012     Discontinued





                                         TAB, Daily, Dosing Weight 105.909,   



                                         kg, Start date: 12 9:00:00,   



                                         Duration: 30 day, Stop date:   



                                         10/13/12 9:00:00   

 

    



                 Lasix 40 mg oral     60 mg, 3 tab, Route: PO, Drug form:     2012

                                         Discontinued



                           tablet                    TAB, Daily, Dosing Weight 105.909,   



                                         kg, Start date: 12 9:00:00,   



                                         Duration: 30 day, Stop date:   



                                         10/15/12 9:00:00   

 

    



                 clonazepam 2 mg oral     Bedtime, Substitution Allowed     2012      Ordered



                                         tablet    

 

    



              mirtazapine 30 mg     Bedtime, Substitution Allowed     2012     Discontinued





                                         oral tablet    

 

    



              flurazepam 30 mg     Bedtime, Substitution Allowed     2012     Discontinued





                                         oral capsule    







Vital Signs







                Most recent to oldest [Reference Range]:    1               2               3

 

                          Height                    167.64 cm 

                    (2012 21:44:00)                           

 

                          Current Weight            111.909 kg 

                          (2012 09:43:00)      111.875 kg 

                          (2012 03:43:00)      106.000 kg 

                                        (2012 12:44:00)  

 

                          Temperature Oral [96.4-99.1 DegF]    98 DegF 

                          (2012 13:20:00)      99.2 DegF 

*HI*

                          (2012 08:11:00)      100.5 DegF 

*HI*

                                        (2012 06:22:00)  

 

                          Systolic Blood Pressure [ mmHg]    93 mmHg 

                          (2012 13:20:00)      110 mmHg 

                          (2012 08:11:00)      106 mmHg 

                                        (2012 05:08:00)  

 

                          Diastolic Blood Pressure [60-90 mmHg]    63 mmHg 

                          (2012 13:20:00)      71 mmHg 

                          (2012 08:11:00)      73 mmHg 

                                        (2012 05:08:00)  

 

                          Respiratory Rate [14-20 BRMIN]    20 BRMIN 

                          (2012 13:20:00)      18 BRMIN 

                          (2012 08:11:00)      18 BRMIN 

                                        (2012 05:08:00)  

 

                          Peripheral Pulse Rate [ bpm]    83 bpm 

                          (2012 13:20:00)      86 bpm 

                          (2012 08:11:00)      82 bpm 

                                        (2012 05:08:00)  

 

                          Weight                    105.909 kg 

                    (2012 21:44:00)                           







Results





BEDSIDE GLUCOSE TESTING





                Most recent to oldest [Reference Range]:    1               2               3

 

                          Gluc POC Lifscn [70-99 mg/dL]    159 mg/dL 1

*HI*

                          (2012 16:30:00)      131 mg/dL 2

*HI*

                          (2012 11:55:00)       

 

                          Comment1                  Notify RN/MD 

*NA*

                          (2012 16:30:00)      Notify RN/MD 

*NA*

                          (2012 11:55:00)       







1Interpretive Data:  Upper Reportable Limit: 200 mg/dL.



2Interpretive Data:  Upper Reportable Limit: 200 mg/dL.



URINALYSIS





                Most recent to oldest [Reference Range]:    1               2               3

 

                          UA Turbidity [Clear]      Clear 

                    (2012 23:45:00)                           

 

                          UA Color [Yellow]         Yellow 

*NA*

                    (2012 23:45:00)                           

 

                          UA pH [5.0-8.0]           6.0 

                    (2012 23:45:00)                           

 

                          UA Spec Grav [<=1.030]    >=1.030 

*ABN*

                    (2012 23:45:00)                           

 

                          UA Glucose [Negative]     Negative 

                    (2012 23:45:00)                           

 

                          UA Blood [Negative]       Small 

*ABN*

                    (2012 23:45:00)                           

 

                          UA Ketones [Negative]     Negative 

*NA*

                    (2012 23:45:00)                           

 

                          UA Protein [Negative mg/dL]    100 mg/dL 

*ABN*

                    (2012 23:45:00)                           

 

                          UA Urobilinogen [0.1-1.0 EU/dL]    4.0 EU/dL 

*HI*

                    (2012 23:45:00)                           

 

                          UA Bili [Negative]        Negative 

*NA*

                    (2012 23:45:00)                           

 

                          UA Leuk Est [Negative]    Negative 

                    (2012 23:45:00)                           

 

                          UA Nitrite [Negative]     Negative 

                    (2012 23:45:00)                           

 

                          UA WBC [None Seen /HPF]    0-2 /HPF 

                    (2012 23:45:00)                           

 

                          UA RBC [0-2 /HPF]         0-2 /HPF 

                    (2012 23:45:00)                           

 

                          UA Bacteria [None Seen /HPF]    Occasional /HPF 

                    (2012 23:45:00)                           

 

                          UA Sq Epi [Few /LPF]      Rare /LPF 

                    (2012 23:45:00)                           

 

                          UA Amorph Cathryn [None Seen /HPF]    Occasional /HPF 

*ABN*

                    (2012 23:45:00)                           

 

                          UA Mucus [None Seen /LPF]    Few /LPF 

                    (2012 23:45:00)                           







CHEMISTRY





                Most recent to oldest [Reference Range]:    1               2               3

 

                          Sodium Lvl [135-145 mEq/L]    135 mEq/L 

                          (2012 05:00:00)      139 mEq/L 

                          (09/15/2012 04:30:00)      139 mEq/L 

                                        (2012 05:00:00)  

 

                          Potassium Lvl [3.5-5.1 mEq/L]    4.3 mEq/L 

                          (2012 05:00:00)      3.7 mEq/L 

                          (09/15/2012 04:30:00)      3.4 mEq/L 

*LOW*

                                        (2012 05:00:00)  

 

                          Chloride Lvl [ mEq/L]    96 mEq/L 

                          (2012 05:00:00)      99 mEq/L 

                          (09/15/2012 04:30:00)      100 mEq/L 

                                        (2012 05:00:00)  

 

                          CO2 [24-32 mEq/L]         29 mEq/L 

                          (2012 05:00:00)      34 mEq/L 

*HI*

                          (09/15/2012 04:30:00)      30 mEq/L 

                                        (2012 05:00:00)  

 

                          AGAP [10.0-20.0 mEq/L]    14.3 mEq/L 

                          (2012 05:00:00)      9.7 mEq/L 

*LOW*

                          (09/15/2012 04:30:00)      12.4 mEq/L 

                                        (2012 05:00:00)  

 

                          Creatinine Lvl [0.5-1.4 mg/dL]    1.4 mg/dL 

                          (2012 05:00:00)      1.2 mg/dL 

                          (09/15/2012 04:30:00)      1.2 mg/dL 

                                        (2012 05:00:00)  

 

                          eGFR                      >60 3

*NA*

                    (09/15/2012 04:30:00)                           

 

                          BUN [7-22 mg/dL]          23 mg/dL 

*HI*

                          (2012 05:00:00)      19 mg/dL 

                          (09/15/2012 04:30:00)      17 mg/dL 

                                        (2012 05:00:00)  

 

                          B/C Ratio [6-25]          16 

                          (2012 05:00:00)      16 

                          (09/15/2012 04:30:00)      15 

                                        (2012 22:17:00)  

 

                          Glucose Lvl [70-99 mg/dL]    128 mg/dL 4

*HI*

                          (2012 05:00:00)      109 mg/dL 5

*HI*

                          (09/15/2012 04:30:00)      157 mg/dL 6

*HI*

                                        (2012 05:00:00)  

 

                          Total Protein [6.4-8.4 g/dL]    7.9 g/dL 

                          (2012 05:00:00)      6.7 g/dL 

                          (09/15/2012 04:30:00)      6.1 g/dL 

*LOW*

                                        (2012 05:00:00)  

 

                          Albumin Lvl [3.5-5.0 g/dL]    4.0 g/dL 

                          (2012 05:00:00)      3.2 g/dL 

*LOW*

                          (09/15/2012 04:30:00)      3.4 g/dL 

*LOW*

                                        (2012 05:00:00)  

 

                          Globulin [2.0-4.0 g/dL]    3.9 g/dL 

                          (2012 05:00:00)      3.5 g/dL 

                          (09/15/2012 04:30:00)      2.7 g/dL 

                                        (2012 05:00:00)  

 

                          A/G Ratio [0.7-1.6]       1.0 

                          (2012 05:00:00)      0.9 

                          (09/15/2012 04:30:00)      1.3 

                                        (2012 05:00:00)  

 

                          Calcium Lvl [8.5-10.5 mg/dL]    8.9 mg/dL 

                          (2012 05:00:00)      7.8 mg/dL 

*LOW*

                          (09/15/2012 04:30:00)      7.3 mg/dL 

*LOW*

                                        (2012 05:00:00)  

 

                          Magnesium Lvl [1.8-2.4 mg/dL]    2.2 mg/dL 

                          (09/15/2012 04:30:00)      2.3 mg/dL 

                          (2012 22:17:00)       

 

                          ALT [0-65 unit/L]         128 unit/L 

*HI*

                          (2012 05:00:00)      136 unit/L 

*HI*

                          (09/15/2012 04:30:00)      184 unit/L 

*HI*

                                        (2012 05:00:00)  

 

                          AST [0-37 unit/L]         38 unit/L 

*HI*

                          (2012 05:00:00)      43 unit/L 

*HI*

                          (09/15/2012 04:30:00)      48 unit/L 

*HI*

                                        (2012 05:00:00)  

 

                          Alk Phos [ unit/L]    198 unit/L 

*HI*

                          (2012 05:00:00)      146 unit/L 

*HI*

                          (09/15/2012 04:30:00)      150 unit/L 

*HI*

                                        (2012 05:00:00)  

 

                          Bili Total [0.2-1.3 mg/dL]    1.5 mg/dL 

*HI*

                          (2012 05:00:00)      1.1 mg/dL 

                          (09/15/2012 04:30:00)      1.1 mg/dL 

                                        (2012 05:00:00)  

 

                          Bili Direct [0.0-0.3 mg/dL]    0.5 mg/dL 

*HI*

                    (2012 05:00:00)                           

 

                          Bili Indirect [0.0-1.0 mg/dL]    0.6 mg/dL 

                    (2012 05:00:00)                           

 

                          Total CK [ unit/L]    433 unit/L 

*HI*

                          (2012 09:00:00)      458 unit/L 

*HI*

                          (2012 04:00:00)      574 unit/L 

*HI*

                                        (2012 22:17:00)  

 

                          CK MB [0.5-3.6 ng/mL]     1.7 ng/mL 

                          (2012 04:00:00)      1.9 ng/mL 

                          (2012 22:17:00)       

 

                          CK MB Index [0.0-2.5]     0.4 

                          (2012 04:00:00)      0.3 

                          (2012 22:17:00)       

 

                          Troponin-I [0.00-0.40 ng/mL]    0.03 ng/mL 

                          (2012 09:00:00)      0.03 ng/mL 

                          (2012 04:00:00)      0.03 ng/mL 

                                        (2012 22:17:00)  

 

                          BNP [<=100 pg/mL]         1404 pg/mL 7

*HI*

                    (2012 22:17:00)                           

 

                          CHD Risk [4.00-7.30]      12.62 

*HI*

                    (2012 04:00:00)                           

 

                          Chol [120-200 mg/dL]      101 mg/dL 

*LOW*

                    (2012 04:00:00)                           

 

                          Trig [0-200 mg/dL]        88 mg/dL 

                    (2012 04:00:00)                           

 

                          HDL [>=35 mg/dL]          8 mg/dL 

*LOW*

                    (2012 04:00:00)                           

 

                          LDL [0-129 mg/dL]         75 mg/dL 

                    (2012 04:00:00)                           

 

                          Hgb A1C                   6.6 % 8

*NA*

                    (2012 05:00:00)                           







3Result Comment: Expected eGFR for >20 yr. age group: >=60 ml/min/1.73 sq m 



The eGFR calculation is not valid in pregnancy or for



 persons < 18 years of age.



 From [employer] Education [employer] (NKDEP)



4Interpretive Data: Adult reference range values reflect the clinical guidelines

of the [employer].



5Interpretive Data: Adult reference range values reflect the clinical guidelines

of the [employer] Association.



6Interpretive Data: Adult reference range values reflect the clinical guidelines

of the [employer].



7Interpretive Data: Elevated results are in line with increasing severity of 

congestive heart failure. Minor elevations between 100 and 300 

may be seen with Myocardial Ischemia, Sodium retaining drugs, 

and compensated/treated heart failure.



8Interpretive Data: HbA1C%       eAG(mg/dL)            Interpretation

 6.0           126               Very good control

 6.5           140               Very good control

 7.0           154                  Good Control

 7.5           169                  Good Control

 8.0           183         Marginal Control, take action to lower

 8.5           197         Marginal Control, take action to lower

 9.0           212           Poor Control, take action to lower

 9.5           226           Poor Control, take action to lower

10.0           240           Poor Control, take action to lower



HEMATOLOGY





                Most recent to oldest [Reference Range]:    1               2               3

 

                          WBC [3.7-10.4 K/CMM]      7.6 K/CMM 

                          (09/15/2012 04:30:00)      8.4 K/CMM 

                          (2012 05:00:00)      9.2 K/CMM 

                                        (2012 09:00:00)  

 

                          RBC [4.70-6.10 M/CMM]     5.07 M/CMM 

                          (09/15/2012 04:30:00)      4.83 M/CMM 

                          (2012 05:00:00)      4.74 M/CMM 

                                        (2012 09:00:00)  

 

                          Hgb [14.0-18.0 g/dL]      15.4 g/dL 

                          (09/15/2012 04:30:00)      14.4 g/dL 

                          (2012 05:00:00)      14.3 g/dL 

                                        (2012 09:00:00)  

 

                          Hct [42.0-54.0 %]         45.9 % 

                          (09/15/2012 04:30:00)      44.5 % 

                          (2012 05:00:00)      44.1 % 

                                        (2012 09:00:00)  

 

                          MCV [80.0-94.0 fL]        90.6 fL 

                          (09/15/2012 04:30:00)      92.1 fL 

                          (2012 05:00:00)      93.1 fL 

                                        (2012 09:00:00)  

 

                          MCH [27.0-31.0 pg]        30.5 pg 

                          (09/15/2012 04:30:00)      29.9 pg 

                          (2012 05:00:00)      30.1 pg 

                                        (2012 09:00:00)  

 

                          MCHC [32.0-36.0 g/dL]     33.6 g/dL 

                          (09/15/2012 04:30:00)      32.5 g/dL 

                          (2012 05:00:00)      32.4 g/dL 

                                        (2012 09:00:00)  

 

                          RDW [11.5-14.5 %]         14.7 % 

*HI*

                          (09/15/2012 04:30:00)      15.0 % 

*HI*

                          (2012 05:00:00)      15.2 % 

*HI*

                                        (2012 09:00:00)  

 

                          Platelet [133-450 K/CMM]    200 K/CMM 

                          (09/15/2012 04:30:00)      201 K/CMM 

                          (2012 05:00:00)      206 K/CMM 

                                        (2012 09:00:00)  

 

                          MPV [7.4-10.4 fL]         9.6 fL 

                          (09/15/2012 04:30:00)      9.1 fL 

                          (2012 05:00:00)      9.1 fL 

                                        (2012 09:00:00)  

 

                          Segs [45.0-75.0 %]        70.3 % 

                          (09/15/2012 04:30:00)      74.0 % 

                          (2012 05:00:00)      76.7 % 

*HI*

                                        (2012 09:00:00)  

 

                          Lymphocytes [20.0-40.0 %]    12.6 % 

*LOW*

                          (09/15/2012 04:30:00)      13.9 % 

*LOW*

                          (2012 05:00:00)      12.7 % 

*LOW*

                                        (2012 09:00:00)  

 

                          Monocytes [2.0-12.0 %]    14.0 % 

*HI*

                          (09/15/2012 04:30:00)      9.9 % 

                          (2012 05:00:00)      8.4 % 

                                        (2012 09:00:00)  

 

                          Eosinophils [0.0-4.0 %]    2.9 % 

                          (09/15/2012 04:30:00)      2.0 % 

                          (2012 05:00:00)      1.1 % 

                                        (2012 09:00:00)  

 

                          Basophils [0.0-1.0 %]     0.2 % 

                          (09/15/2012 04:30:00)      0.2 % 

                          (2012 05:00:00)      1.1 % 

*HI*

                                        (2012 09:00:00)  

 

                          Segs-Bands # [1.5-8.1 K/CMM]    5.3 K/CMM 

                          (09/15/2012 04:30:00)      6.2 K/CMM 

                          (2012 05:00:00)      7.0 K/CMM 

                                        (2012 09:00:00)  

 

                          Lymphocytes # [1.0-5.5 K/CMM]    1.0 K/CMM 

                          (09/15/2012 04:30:00)      1.2 K/CMM 

                          (2012 05:00:00)      1.2 K/CMM 

                                        (2012 09:00:00)  

 

                          Monocytes # [0.0-0.8 K/CMM]    1.1 K/CMM 

*HI*

                          (09/15/2012 04:30:00)      0.8 K/CMM 

                          (2012 05:00:00)      0.8 K/CMM 

                                        (2012 09:00:00)  

 

                          Eosinophils # [0.0-0.5 K/CMM]    0.2 K/CMM 

                          (09/15/2012 04:30:00)      0.2 K/CMM 

                          (2012 05:00:00)      0.1 K/CMM 

                                        (2012 09:00:00)  

 

                          Basophils # [0.0-0.2 K/CMM]    0.0 K/CMM 

                          (09/15/2012 04:30:00)      0.0 K/CMM 

                          (2012 05:00:00)      0.1 K/CMM 

                                        (2012 09:00:00)  

 

                          PT [12.0-14.7 seconds]    19.3 seconds 

*HI*

                    (2012 22:17:00)                           

 

                          INR [0.85-1.17]           1.61 9

*HI*

                    (2012 22:17:00)                           

 

                          D-Dimer                   2.45 ug/mL FEU 10

*NA*

                    (2012 22:17:00)                           

 

                          PTT [22.9-35.8 seconds]    36.5 seconds 11

*HI*

                    (2012 22:17:00)                           







9Interpretive Data: RECOMMENDED RANGES FOR PROTIME INR:

   2.0-3.0 for most medical and surgical thromboembolic states.

   2.5-3.5 for artificial heart valves and recurrent embolism.



INR SHOULD BE USED ONLY FOR PATIENTS ON STABLE ANTICOAGULANT THERAPY.



10Interpretive Data: In DIC, quantitative D-Dimer is generally greater than 

0.66 ug/mL FEU.  Values of quantitative D-Dimer less than 

0.40 ug/mL FEU have been reported to be associated with a low 

probability of deep vein thrombosis/pulmonary embolism. 

This test alone should not be used to rule out DVT/PE.



11Interpretive Data: Heparin Therapeutic Range:  57 - 92 Seconds

## 2019-09-25 NOTE — XMS REPORT
Summary of Care: 17 - 17

                             Created on: 2047



AJFRANSISCOTAJJOANNA

External Reference #: [id number]

: 1965

Sex: Male



Demographics







                          Address                   [address] [place] [address]  [address]

 

                          Home Phone                [phone]

 

                          Preferred Language        English

 

                          Marital Status            Single

 

                          Restoration Affiliation     Taoism

 

                          Race                      Other

 

                          Ethnic Group              Non-





Author







                          Author                    [facility]

 

                          Organization              [facility]

 

                          Address                   Unknown

 

                          Phone                     Unavailable







Encounter





HQ Shyann(LISA) [id number] Date(s): 17 - 17

[facility] [address] [place] [address]-     [phone]


Discharge Disposition: Home or Self Care

Attending Physician: Nnamdi Walls MD

Admitting Physician: Nnamdi Walls MD





Vital Signs







                    1                   2                   3



                                         Most recent to   



                                         oldest [Reference   



                                         Range]:   

 

                                        98.0 DegF 

                          (17 12:00 PM)        98.0 DegF 

                          (17 8:00 AM)         98.3 DegF 

                                        (17 4:00 AM)



                                         Temperature Oral   



                                         [96.4-99.1 DegF]   

 

                                        112/79 mmHg 

                          (17 12:00 PM)        115/81 mmHg 

                          (17 8:00 AM)         124/86 mmHg 

                                        (17 4:00 AM)



                                         Blood Pressure   



                                         [/60-90 mmHg]   

 

                                        18 BRMIN 

                          (17 12:00 PM)        18 BRMIN 

                          (17 8:00 AM)         20 BRMIN 

                                        (17 4:00 AM)



                                         Respiratory Rate   



                                         [14-20 BRMIN]   

 

                                        62 bpm 

                          (17 12:00 PM)        91 bpm 

                          (17 8:00 AM)         76 bpm 

                                        (17 4:00 AM)



                                         Peripheral Pulse   



                                         Rate [ bpm]   

 

                                        101.364 kg 

                          (17 1:24 AM)         101.364 kg 

                          (17 8:18 PM)          



                                         Weight   







Problem List







    



              Condition     Effective Dates     Status       Health Status     Informant

 

    



                     [D]Anterior chest     12             Active  



                                         wall pain(Confirmed)    

 

    



                           Anxiety(Confirmed)        Resolved  

 

    



                           BP+ -                     Active  



                                         Hypertension(Confirm    



                                         ed)    

 

    



                           Cardiomyopathy(Confi      Resolved  



                                         rmed)    

 

    



                           CHF - Congestive          Active  



                                         heart    



                                         failure(Confirmed)    

 

    



                           CHF - Congestive          Active  



                                         heart    



                                         failure(Confirmed)    

 

    



                           Depression(Confirmed      Active  



                                         )    

 

    



                           dm(Confirmed)             Active  

 

    



                           Down                      Active  



                                         syndrome(Confirmed)    

 

    



                           GERD -                    Active  



                                         Gastro-esophageal    



                                         reflux    



                                         disease(Confirmed)    

 

    



                           H/O:                      Active  



                                         schizophrenia(Confir    



                                         med)    

 

    



                           HTN(Confirmed)            Active  

 

    



                           ICD (implantable          Active  



                                         cardiac    



                                         defibrillator)    



                                         battery    



                                         depletion(Confirmed)    

 

    



                           NIDDM(Confirmed)          Active  

 

    



                           Obese                     Active  



                                         build(Confirmed)    

 

    



                           Schizophrenia(Confir      Active  



                                         med)    

 

    



                           Short of breath on        Active  



                                         exertion(Confirmed)    

 

    



                           Sleep                     Active  



                                         apnea(Confirmed)    







Allergies, Adverse Reactions, Alerts







   



                 Substance       Reaction        Severity        Status

 

   



                           NKDA                      Active







Medications





acetaminophen

650 mg, 2 tab, Route: PO, Drug form: TAB, Q4H, Dosing Weight 101.364, kg, PRN He
adache 1-3, Start date: 17 1:08:00 CDT, Duration: 30 day, Stop date:  1:07:00 CDT

Notes: Do not exceed 4 gm/day.  (Same as: Tylenol)

Start Date: 17

Stop Date: 17

Status: Discontinued



acetaminophen-hydrocodone 325 mg-5 mg oral tablet

1 tab, Route: PO, Drug Form: TAB, Dosing Weight 101.364, kg, Q4H, PRN Pain Score
 4-6, Start date: 17 1:08:00 CDT, Duration: 30 day, Stop date: 17 1:
07:00 CDT

Notes: (Same as: Norco 325/5)  Do not exceed 4gm/day of acetaminophen.

Start Date: 17

Stop Date: 17

Status: Discontinued



Ambien

See Instructions, 15mg PO Bedtime, 0 Refill(s)

Start Date: 17

Stop Date: 17

Status: Discontinued



ARIPiprazole

5 mg, 1 tab, Route: PO, Drug form: TAB, Daily, Dosing Weight 101.364, kg, Priori
ty: NOW, Start date: 17 16:17:00 CDT, Duration: 30 day, Stop date: 
7 9:00:00 CDT

Notes: Non-Formulary Drug.  (Same as: Abilify)

Start Date: 17

Stop Date: 17

Status: Discontinued



aspirin

324 mg, 4 tab, Route: PO, Drug form: CHEWTAB, ONCE, Dosing Weight 101.364, kg, P
riority: STAT, Start date: 17 20:38:00 CDT, Stop date: 17 20:38:00 C
DT

Notes: Take with food.

Start Date: 17

Stop Date: 17

Status: Completed



aspirin 81 mg tablet, enteric coated

81 mg, 1 tab, Route: PO, Drug form: ECTAB, Daily, Dosing Weight 101.364, kg, Sta
rt date: 17 9:00:00 CDT, Duration: 30 day, Stop date: 17 9:00:00 CDT

Notes: Do not crush or chew.(Same As: Ecotrin)

Start Date: 17

Stop Date: 17

Status: Discontinued



atorvastatin

40 mg, 1 tab, Route: PO, Drug form: TAB, Bedtime, Dosing Weight 101.364, kg, Sta
rt date: 17 21:00:00 CDT, Duration: 30 day, Stop date: 17 21:00:00 C
DT

Notes: (Same as: Lipitor)

Start Date: 17

Stop Date: 17

Status: Discontinued



carvedilol 25 mg oral tablet

25 mg=1 tab, PO, BID, # 60 tab, 0 Refill(s)

Start Date: 17

Stop Date: 17

Status: Discontinued



carvedilol 3.125 mg oral tablet

3.125 mg=1 tab, PO, Q12H, # 60 tab, 0 Refill(s), given to patient

Start Date: 17

Status: Ordered



clonazePAM

0.5 mg, 0.5 tab, Route: PO, Drug form: TAB, ONCE, Dosing Weight 101.364, kg, Sta
rt date: 17 1:28:00 CDT, Stop date: 17 1:28:00 CDT

Notes: (Same As: KlonoPIN)

Start Date: 17

Stop Date: 17

Status: Completed



clonazePAM 1 mg oral tablet

1 mg=1 tab, PO, Q12H, PRN Anxiety, # 30 tab, 0 Refill(s)

Start Date: 17

Stop Date: 17

Status: Discontinued



Coreg

3.125 mg, 1 tab, Route: PO, Drug form: TAB, Q12H, Dosing Weight 101.364, kg, Sta
rt date: 17 21:00:00 CDT, Duration: 30 day, Stop date: 17 9:00:00 CD
T

Notes: Give with food. (Same As: Coreg)

Start Date: 17

Stop Date: 17

Status: Discontinued



Coreg

25 mg, 1 tab, Route: PO, Drug form: TAB, BID, Dosing Weight 101.364, kg, Start d
ate: 17 9:00:00 CDT, Duration: 30 day, Stop date: 17 17:00:00 CDT

Notes: Give with food. (Same As: Coreg)

Start Date: 17

Stop Date: 17

Status: Discontinued



Dextrose 50% Syringe

12.5 gm, 25 mL, Route: IVP, Drug Form: INJ, Dosing Weight 101.364, kg, PRN, PRN 
Blood Glucose Results, Start date: 17 1:19:00 CDT, Duration: 30 day, Stop 
date: 17 1:18:00 CDT

Start Date: 17

Stop Date: 17

Status: Discontinued



Dextrose 50% Syringe

25 gm, 50 mL, Route: IVP, Drug Form: INJ, Dosing Weight 101.364, kg, PRN, PRN Bl
ood Glucose Results, Start date: 17 1:19:00 CDT, Duration: 30 day, Stop da
te: 17 1:18:00 CDT

Start Date: 17

Stop Date: 17

Status: Discontinued



diphenhydrAMINE

25 mg, 1 cap, Route: PO, Drug form: CAP, Bedtime, Dosing Weight 101.364, kg, PRN
 Insomnia, Start date: 17 1:08:00 CDT, Duration: 30 day, Stop date:  1:07:00 CDT

Notes: (Same as: Benadryl)

Start Date: 17

Stop Date: 17

Status: Discontinued



FLUoxetine

40 mg, PO, Daily, 0 Refill(s)

Start Date: 17

Status: Ordered



FLUoxetine

40 mg, 2 cap, Route: PO, Drug form: CAP, Daily, Dosing Weight 101.364, kg, Prior
ity: NOW, Start date: 17 16:18:00 CDT, Duration: 30 day, Stop date:  9:00:00 CDT

Notes: (Same as: Prozac, Sarafem)

Start Date: 17

Stop Date: 17

Status: Discontinued



glucagon

1 mg, Route: IM, Drug form: PDR/INJ, PRN, Dosing Weight 101.364, kg, PRN Blood G
lucose Results, Start date: 17 1:19:00 CDT, Duration: 30 day, Stop date: 0
17 1:18:00 CDT

Start Date: 17

Stop Date: 17

Status: Discontinued



insulin aspart

1 unit, 0.01 mL, Route: SUB-Q, Drug form: SOLN, Bedtime, Dosing Weight 101.364, 
kg, PRN Blood Glucose Results, Start date: 17 1:19:00 CDT, Duration: 30 da
y, Stop date: 17 1:18:00 CDT

Notes: Roll in palms of hands gently;  Do not shake vigorously. (Same as: Demetria [place])"single patient use only"WASTE: F/P - Black; E - Municipal Trash Bin  Stable f
or 28 days at room temperature.Expires in _____ days from ______________Date

Start Date: 17

Stop Date: 17

Status: Discontinued



insulin aspart

2 unit, 0.02 mL, Route: SUB-Q, Drug form: SOLN, Bedtime, Dosing Weight 101.364, 
kg, PRN Blood Glucose Results, Start date: 17 1:19:00 CDT, Duration: 30 da
y, Stop date: 17 1:18:00 CDT

Notes: Roll in palms of hands gently;  Do not shake vigorously. (Same as: Demetria [place])"single patient use only"WASTE: F/P - Black; E - Municipal Trash Bin  Stable f
or 28 days at room temperature.Expires in _____ days from ______________Date

Start Date: 17

Stop Date: 17

Status: Discontinued



insulin aspart

3 unit, 0.03 mL, Route: SUB-Q, Drug form: SOLN, Bedtime, Dosing Weight 101.364, 
kg, PRN Blood Glucose Results, Start date: 17 1:19:00 CDT, Duration: 30 da
y, Stop date: 17 1:18:00 CDT

Notes: Roll in palms of hands gently;  Do not shake vigorously. (Same as: Demetria [place])"single patient use only"WASTE: F/P - Black; E - Municipal Trash Bin  Stable f
or 28 days at room temperature.Expires in _____ days from ______________Date

Start Date: 17

Stop Date: 17

Status: Discontinued



insulin aspart

4 unit, 0.04 mL, Route: SUB-Q, Drug form: SOLN, Bedtime, Dosing Weight 101.364, 
kg, PRN Blood Glucose Results, Start date: 17 1:19:00 CDT, Duration: 30 da
y, Stop date: 17 1:18:00 CDT

Notes: Roll in palms of hands gently;  Do not shake vigorously. (Same as: Demetria [place])"single patient use only"WASTE: F/P - Black; E - Municipal Trash Bin  Stable f
or 28 days at room temperature.Expires in _____ days from ______________Date

Start Date: 17

Stop Date: 17

Status: Discontinued



insulin aspart

5 unit, 0.05 mL, Route: SUB-Q, Drug form: SOLN, TID-Before Meals, Dosing Weight 
101.364, kg, PRN Blood Glucose Results, Start date: 17 1:19:00 CDT, Durati
on: 30 day, Stop date: 17 1:18:00 CDT

Notes: Roll in palms of hands gently;  Do not shake vigorously. (Same as: NovoCLINT [place])"single patient use only"WASTE: F/P - Black; E - Municipal Trash Bin  Stable f
or 28 days at room temperature.Expires in _____ days from ______________Date

Start Date: 17

Stop Date: 17

Status: Discontinued



insulin aspart

4 unit, 0.04 mL, Route: SUB-Q, Drug form: SOLN, TID-Before Meals, Dosing Weight 
101.364, kg, PRN Blood Glucose Results, Start date: 17 1:19:00 CDT, Durati
on: 30 day, Stop date: 17 1:18:00 CDT

Notes: Roll in palms of hands gently;  Do not shake vigorously. (Same as: NovoCLINT [place])"single patient use only"WASTE: F/P - Black; E - Municipal Trash Bin  Stable f
or 28 days at room temperature.Expires in _____ days from ______________Date

Start Date: 17

Stop Date: 17

Status: Discontinued



insulin aspart

1 unit, 0.01 mL, Route: SUB-Q, Drug form: SOLN, TID-Before Meals, Dosing Weight 
101.364, kg, PRN Blood Glucose Results, Start date: 17 1:19:00 CDT, Durati
on: 30 day, Stop date: 17 1:18:00 CDT

Notes: Roll in palms of hands gently;  Do not shake vigorously. (Same as: NovoCLINT [place])"single patient use only"WASTE: F/P - Black; E - Municipal Trash Bin  Stable f
or 28 days at room temperature.Expires in _____ days from ______________Date

Start Date: 17

Stop Date: 17

Status: Discontinued



insulin aspart

3 unit, 0.03 mL, Route: SUB-Q, Drug form: SOLN, TID-Before Meals, Dosing Weight 
101.364, kg, PRN Blood Glucose Results, Start date: 17 1:19:00 CDT, Durati
on: 30 day, Stop date: 17 1:18:00 CDT

Notes: Roll in palms of hands gently;  Do not shake vigorously. (Same as: Demetria [place])"single patient use only"WASTE: F/P - Black; E - Municipal Trash Bin  Stable f
or 28 days at room temperature.Expires in _____ days from ______________Date

Start Date: 17

Stop Date: 17

Status: Discontinued



insulin aspart

2 unit, 0.02 mL, Route: SUB-Q, Drug form: SOLN, TID-Before Meals, Dosing Weight 
101.364, kg, PRN Blood Glucose Results, Start date: 17 1:19:00 CDT, Durati
on: 30 day, Stop date: 17 1:18:00 CDT

Notes: Roll in palms of hands gently;  Do not shake vigorously. (Same as: Demetria [place])"single patient use only"WASTE: F/P - Black; E - Municipal Trash Bin  Stable f
or 28 days at room temperature.Expires in _____ days from ______________Date

Start Date: 17

Stop Date: 17

Status: Discontinued



Levemir

10 unit, 0.1 mL, Route: SUB-Q, Drug form: INJ, Bedtime, Dosing Weight 101.364, k
g, Start date: 17 21:00:00 CDT, Duration: 30 day, Stop date: 17 21:0
0:00 CDT

Notes: Same as LevemirDo not hold insulin without contacting prescriberWASTE: F/
P - Black; E - Municipal Trash Bin  "single patient use only"

Start Date: 17

Stop Date: 17

Status: Discontinued



Levemir 100 units/mL

20 unit, SUB-Q, Bedtime, 0 Refill(s)

Start Date: 17

Stop Date: 17

Status: Discontinued



lisinopril

5 mg, 1 tab, Route: PO, Drug form: TAB, Daily, Dosing Weight 101.364, kg, Start 
date: 17 9:00:00 CDT, Duration: 30 day, Stop date: 17 9:00:00 CDT

Notes: (Same as: Prinivil, Zestril)

Start Date: 17

Stop Date: 17

Status: Discontinued



lisinopril 5 mg oral tablet

5 mg=1 tab, PO, Daily, # 30 tab, 0 Refill(s)

Start Date: 17

Status: Ordered



morphine Sulfate

2 mg, 1 mL, Route: IVP, Drug form: INJ, Q6H, Dosing Weight 101.364, kg, PRN Pain
 Score 4-6, Start date: 17 1:08:00 CDT, Duration: 30 day, Stop date:  1:07:00 CDT

Notes: (Same as:MORPhine Sulfate)

Start Date: 17

Stop Date: 17

Status: Discontinued



morphine Sulfate

4 mg, 1 mL, Route: IVP, Drug form: INJ, ONCE, Dosing Weight 101.364, kg, Priorit
y: STAT, Start date: 17 20:38:00 CDT, Stop date: 17 20:38:00 CDT

Notes: (Same as:MORPhine Sulfate)

Start Date: 17

Stop Date: 17

Status: Completed



ondansetron

4 mg, 1 tab, Route: PO, Drug form: TAB, Q8H, Dosing Weight 101.364, kg, PRN Naus
ea & Vomiting, Start date: 17 1:08:00 CDT, Duration: 30 day, Stop date: 
17 1:07:00 CDT

Notes: (Same as: Zofran)

Start Date: 17

Stop Date: 17

Status: Discontinued



ondansetron

4 mg, 2 mL, Route: IVP, Drug form: INJ, ONCE, Dosing Weight 101.364, kg, Priorit
y: STAT, Start date: 17 20:38:00 CDT, Stop date: 17 20:38:00 CDT

Notes: (Same as: Zofran)  *** MEDICATION WASTE ***Product Size:  4 mgProduct Was
helen:  ___ mg

Start Date: 17

Stop Date: 17

Status: Completed



pantoprazole

40 mg, 1 tab, Route: PO, Drug form: ECTAB, Before Dinner, Dosing Weight 101.364,
 kg, Start date: 17 16:30:00 CDT, Duration: 30 day, Stop date: 17 16
:30:00 CDT

Notes: Tablet should not be chewed or crushed.(Same as: Protonix)

Start Date: 17

Stop Date: 17

Status: Discontinued



pantoprazole

40 mg, Route: IVP, Drug form: INJ, Before Dinner, Dosing Weight 101.364, kg, Sta
rt date: 17 16:30:00 CDT, Duration: 30 day, Stop date: 17 16:30:00 C
DT

Notes: For IV push reconstitute with 10 ml 0.9% sodium chloride and push over 2 
minutes. (Same as: Protonix)

Start Date: 17

Stop Date: 17

Status: Canceled



rivaroxaban

20 mg, 1 tab, Route: PO, Drug form: TAB, Daily, Dosing Weight 101.364, kg, Start
 date: 17 9:00:00 CDT, Duration: 30 day, Stop date: 17 9:00:00 CDT

Notes: (Same as: Xarelto)Administer with food

Start Date: 17

Stop Date: 17

Status: Discontinued



rivaroxaban 20 mg oral tablet

20 mg=1 tab, PO, Daily, # 30 tab, 0 Refill(s)

Start Date: 17

Status: Ordered



Saline Flush 0.9%

10 mL, Route: IVP, Drug Form: INJ, Dosing Weight 101.364, kg, PRN, PRN Line Flus
h, Start date: 17 20:38:00 CDT, Duration: 30 day, Stop date: 17 20:3
7:00 CDT

Notes: (Same as: BD Posiflush)

Start Date: 17

Stop Date: 17

Status: Discontinued



thiothixene 5 mg oral capsule

5 mg=1 cap, PO, Bedtime, # 60 cap, 0 Refill(s)

Start Date: 17

Stop Date: 17

Status: Discontinued



thiothixene 5 mg oral capsule

5 mg=1 cap, PO, Bedtime, # 60 cap, 0 Refill(s)

Start Date: 17

Stop Date: 17

Status: Discontinued



torsemide

20 mg, 1 tab, Route: PO, Drug form: TAB, Daily, Dosing Weight 101.364, kg, Start
 date: 17 9:00:00 CDT, Duration: 30 day, Stop date: 17 9:00:00 CDT

Notes: (Same As: Demadex)

Start Date: 17

Stop Date: 17

Status: Discontinued



torsemide 20 mg oral tablet

20 mg=1 tab, PO, Daily, # 30 tab, 0 Refill(s)

Start Date: 17

Status: Ordered



Vitamin D3 50,000 intl units oral capsule

50,000 IntlUnit=1 cap, PO, qWeek, # 12 cap, 0 Refill(s)

Start Date: 17

Stop Date: 17

Status: Ordered



Results





ELECTROLYTES





                    1                   2                   3



                                         Most recent to   



                                         oldest [Reference   



                                         Range]:   

 

                                        141 mEq/L 

                          (17 3:55 AM)         142 mEq/L 

                          (17 9:28 PM)          



                                         Sodium Lvl [135-145   



                                         mEq/L]   

 

                                        3.9 mEq/L 

                          (17 3:55 AM)         3.7 mEq/L 

                          (17 9:28 PM)          



                                         Potassium Lvl   



                                         [3.5-5.1 mEq/L]   

 

                                        106 mEq/L 

                          (17 3:55 AM)         104 mEq/L 

                          (17 9:28 PM)          



                                         Chloride Lvl [   



                                         mEq/L]   

 

                                        27 mEq/L 

                          (17 3:55 AM)         30 mEq/L 

                          (17 9:28 PM)          



                                         CO2 [24-32 mEq/L]   

 

                                        11.9 mEq/L 

                          (17 3:55 AM)         11.7 mEq/L 

                          (17 9:28 PM)          



                                         AGAP [10.0-20.0   



                                         mEq/L]   







CHEM PANEL





                    1                   2                   3



                                         Most recent to   



                                         oldest [Reference   



                                         Range]:   

 

                                        1.40 mg/dL 

                          (17 3:55 AM)         1.56 mg/dL 

*HI*

                          (17 9:28 PM)          



                                         Creatinine Lvl   



                                         [0.50-1.40 mg/dL]   

 

                                        57 mL/min/1.73m2 1

*NA*

                          (17 3:55 AM)         50 mL/min/1.73m2 2

*NA*

                          (17 9:28 PM)          



                                         eGFR   

 

                                        23 mg/dL 

*HI*

                          (17 3:55 AM)         21 mg/dL 

                          (17 9:28 PM)          



                                         BUN [7-22 mg/dL]   

 

                                        13 

                    (17 9:28 PM)                          



                                         B/C Ratio [6-25]   

 

                                        196 mg/dL 

*HI*

                          (17 3:55 AM)         188 mg/dL 

*HI*

                          (17 9:28 PM)          



                                         Glucose Lvl [70-99   



                                         mg/dL]   

 

                                        7.3 g/dL 

                    (17 9:28 PM)                          



                                         Total Protein   



                                         [6.4-8.4 g/dL]   

 

                                        3.9 g/dL 

                    (17 9:28 PM)                          



                                         Albumin Lvl [3.5-5.0   



                                         g/dL]   

 

                                        3.4 g/dL 

                    (17 9:28 PM)                          



                                         Globulin [2.7-4.2   



                                         g/dL]   

 

                                        1.1 

                    (17 9:28 PM)                          



                                         A/G Ratio [0.7-1.6]   

 

                                        8.3 mg/dL 

*LOW*

                          (17 3:55 AM)         8.4 mg/dL 

*LOW*

                          (17 9:28 PM)          



                                         Calcium Lvl   



                                         [8.5-10.5 mg/dL]   

 

                                        27 unit/L 

                    (17 9:28 PM)                          



                                         ALT [0-65 unit/L]   

 

                                        28 unit/L 

                    (17 9:28 PM)                          



                                         AST [0-37 unit/L]   

 

                                        124 unit/L 

                    (17 9:28 PM)                          



                                         Alk Phos [   



                                         unit/L]   

 

                                        0.6 mg/dL 

                    (17 9:28 PM)                          



                                         Bili Total [0.2-1.3   



                                         mg/dL]   







1Result Comment: The eGFR is calculated using the CKD-EPI formula. In most 
young, healthy individuals the eGFR will be >90 mL/min/1.73m2. The eGFR declines
with age. An eGFR of 60-89 may be normal in some populations, particularly the 
elderly, for whom the CKD-EPI formula has not been extensively validated. Use of
the eGFR is not recommended in the following populations:



Individuals with unstable creatinine concentrations, including pregnant patients
and those with serious co-morbid conditions.



Patients with extremes in muscle mass or diet. 



The data above are obtained from the National Kidney Disease Education Program (
NKDEP) which additionally recommends that when the eGFR is used in patients with
extremes of body mass index for purposes of drug dosing, the eGFR should be mul
tiplied by the estimated BMI.



2Result Comment: The eGFR is calculated using the CKD-EPI formula. In most 
young, healthy individuals the eGFR will be >90 mL/min/1.73m2. The eGFR declines
with age. An eGFR of 60-89 may be normal in some populations, particularly the 
elderly, for whom the CKD-EPI formula has not been extensively validated. Use of
the eGFR is not recommended in the following populations:



Individuals with unstable creatinine concentrations, including pregnant patients
and those with serious co-morbid conditions.



Patients with extremes in muscle mass or diet. 



The data above are obtained from the National Kidney Disease Education Program (
NKDEP) which additionally recommends that when the eGFR is used in patients with
extremes of body mass index for purposes of drug dosing, the eGFR should be mul
tiplied by the estimated BMI.



CARDIAC ENZYMES





                    1                   2                   3



                                         Most recent to   



                                         oldest [Reference   



                                         Range]:   

 

                                        160 unit/L 

                          (17 9:13 AM)         193 unit/L 

*HI*

                          (17 3:55 AM)         195 unit/L 

*HI*

                                        (17 3:55 AM) 



                                         Total CK [   



                                         unit/L]   

 

                                        1.1 ng/mL 

                          (17 9:13 AM)         1.2 ng/mL 

                          (17 3:55 AM)         1.7 ng/mL 

                                        (17 9:28 PM) 



                                         CK MB [0.5-3.6   



                                         ng/mL]   

 

                                        0.7 

                    (17 9:28 PM)                          



                                         CK MB Index   



                                         [0.0-2.5]   

 

                                        <0.02 ng/mL 

                          (17 9:13 AM)         0.02 ng/mL 

                          (17 3:55 AM)         0.02 ng/mL 

                                        (17 9:28 PM) 



                                         Troponin-I   



                                         [0.00-0.40 ng/mL]   

 

                                        208 pg/mL 

*HI*

                    (17 9:28 PM)                          



                                         BNP [<=100 pg/mL]   







DRUG SCREEN





                    1                   2                   3



                                         Most recent to   



                                         oldest [Reference   



                                         Range]:   

 

                                        Negative 

*NA*

                    (17 12:02 AM)                          



                                         U Amph Scr   



                                         [Negative]   

 

                                        Negative 

*NA*

                    (17 12:02 AM)                          



                                         U Annie Scr   



                                         [Negative]   

 

                                        Negative 

*NA*

                    (17 12:02 AM)                          



                                         U Benzodia Scr   



                                         [Negative]   

 

                                        Positive 

*ABN*

                    (17 12:02 AM)                          



                                         U Cocaine Scr   



                                         [Negative]   

 

                                        Positive 

*ABN*

                    (17 12:02 AM)                          



                                         U Opiate Scr   



                                         [Negative]   

 

                                        Negative 

*NA*

                    (17 12:02 AM)                          



                                         U Phencyc Scr   



                                         [Negative]   

 

                                        Negative 

*NA*

                    (17 12:02 AM)                          



                                         U Cannab Scr   



                                         [Negative]   

 

                                        See Note 

                    (17 12:02 AM)                          



                                         UDS Note   







HEMATOLOGY





                    1                   2                   3



                                         Most recent to   



                                         oldest [Reference   



                                         Range]:   

 

                                        8.9 K/CMM 

                    (17 9:28 PM)                          



                                         WBC [3.7-10.4 K/CMM]   

 

                                        5.23 M/CMM 

                    (17 9:28 PM)                          



                                         RBC [4.70-6.10   



                                         M/CMM]   

 

                                        15.9 g/dL 

                    (17 9:28 PM)                          



                                         Hgb [14.0-18.0 g/dL]   

 

                                        47.9 % 

                    (17 9:28 PM)                          



                                         Hct [42.0-54.0 %]   

 

                                        91.5 fL 

                    (17 9:28 PM)                          



                                         MCV [80.0-94.0 fL]   

 

                                        30.4 pg 

                    (17 9:28 PM)                          



                                         MCH [27.0-31.0 pg]   

 

                                        33.2 g/dL 

                    (17 9:28 PM)                          



                                         MCHC [32.0-36.0   



                                         g/dL]   

 

                                        14.3 % 

                    (17 9:28 PM)                          



                                         RDW [11.5-14.5 %]   

 

                                        169 K/CMM 

                    (17 9:28 PM)                          



                                         Platelet [133-450   



                                         K/CMM]   

 

                                        8.4 fL 

                    (17 9:28 PM)                          



                                         MPV [7.4-10.4 fL]   

 

                                        79.2 % 

*HI*

                    (17 9:28 PM)                          



                                         Segs [45.0-75.0 %]   

 

                                        12.9 % 

*LOW*

                    (17 9:28 PM)                          



                                         Lymphocytes   



                                         [20.0-40.0 %]   

 

                                        6.9 % 

                    (17 9:28 PM)                          



                                         Monocytes [2.0-12.0   



                                         %]   

 

                                        0.9 % 

                    (17 9:28 PM)                          



                                         Eosinophils [0.0-4.0   



                                         %]   

 

                                        0.1 % 

                    (17 9:28 PM)                          



                                         Basophils [0.0-1.0   



                                         %]   

 

                                        7.0 K/CMM 

                    (17 9:28 PM)                          



                                         Segs-Bands #   



                                         [1.5-8.1 K/CMM]   

 

                                        1.1 K/CMM 

                    (17 9:28 PM)                          



                                         Lymphocytes #   



                                         [1.0-5.5 K/CMM]   

 

                                        0.6 K/CMM 

                    (17 9:28 PM)                          



                                         Monocytes # [0.0-0.8   



                                         K/CMM]   

 

                                        0.1 K/CMM 

                    (17 9:28 PM)                          



                                         Eosinophils #   



                                         [0.0-0.5 K/CMM]   

 

                                        0.0 K/CMM 

                    (17 9:28 PM)                          



                                         Basophils # [0.0-0.2   



                                         K/CMM]   

 

                                        21.1 seconds 

*HI*

                    (17 9:28 PM)                          



                                         PT [12.0-14.7   



                                         seconds]   

 

                                        1.79 

*HI*

                    (17 9:28 PM)                          



                                         INR [0.85-1.17]   

 

                                        43.5 seconds 

*HI*

                    (17 9:28 PM)                          



                                         PTT [22.9-35.8   



                                         seconds]   







Immunizations





Given and Recorded





   



                 Vaccine         Date            Status          Refusal Reason

 

   



                     influenza virus vaccine, inactivated     10/1/13             Given 









Not Given





   



                 Vaccine         Date            Status          Refusal Reason

 

   



                 pneumococcal 23-valent vaccine     17         Not Given       Patient Refuses







Procedures







   



                 Procedure       Date            Related Diagnosis     Body Site

 

   



                                         ICD - Internal cardiac defibrillator   



                                         procedure1   

 

   



                                         torn ligament2   







1placed 2.5years ago



2left knee ligament surgery



Social History







 



                           Social History Type       Response

 

 



                           Substance Abuse           Use: Current.  Type: Cocaine.  Recreational Drug Route: Inhaled.

  Amount:



                                         uses 5x a year.

 

 



                           Alcohol                   Never

 

 



                           Smoking Status            Former smoker; Type: Cigarettes; Tobacco use per day: 0; Number

 of years:



                                         2; Total pack years: 2; Started at age: 22.0; Stopped at age: 24; Previous



                                         treatment: None; Ready to change: No; Concerns about tobacco use in



                                         household: No; Exposure to Tobacco Smoke None; Cigarette Smoking Last 365



                                         Days No; Reg Smoking Cessation Counseling No







Assessment and Plan

Extracted from:





  



                     Title: Discharge Summary *     Author: Deshaun Barr MD     Date: 17









                                         Discharge Plan



Discharge Summary



Patient Name: Joanna Vasquez

: 1965

MRN: [id number]

Date of Admission: 17

Date of Discharge: 17

Attending Physician: Dr. Hendricks

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 
placement, A-fib, systolic CHF, cocaine abuse in the past, schizoaffective d/o, 
anxiety, who presented to ED for chest discomfort. Pt reports that he started 
experiencing cosntant tightness in the left upper chest area around 6:30pm just 
after eating dinner and while he was resting and listening to the radio. Pt 
thinks that his defibrillator went off and he felt prickly sensations. No 
radiation of pain. He also endorsed palpitations and diaphoresis, no nausea or 
vomiting. Pt tried some relaxation techniques which temporarily improved his 
chest discomfort. Pt has presented with similar events in the past, most recent 
visit was last week. Dr. Anderson was consulted last time and diagnosed pain to be 
due to costochondritis. Pt reports feeling more anxious recently because he has 
not been getting his clonazepam for the past week. 

Discharge Diagnoses: Atypical Chest Pain; Suicidal Ideation; Cocaine Abuse

Hospital Course: Patient was admitted to observation and ACS protocol was 
initiated and Dr. Anderson, cardiology was consulted for possible AICD 
interrogation.  Dr. Anderson has seen patient before for similar episodes and r/o 
cardiacc etiology and recent AICD interrogation also on file, cardiology signed 
off.  Cardiac enzymes and EKGs were trended and were negative.  At home psych 
medications were restarted. Patient's drug screen was positive for cocaine and 
opioids.  Patient admitted to recent crack cocaine use.  He endorsed desire to 
be transferred to inpatient psych, psych response was consulted and assessed 
patient, they recommended outpatient facility and provided resources to the 
patient.  On , patient reported no suicidal ideation, homocidal ideation, 
visual hallucinations, auditory hallucinations.  Patient was discharged back to 
[facility] in stable condition.  

Consults: Psych Response

Pertinent Studies:

* Final Report *



Reason For Exam

Chief Complaint:chest pain that started like an hour ago with palpitations. [facility]



Radiology Report

EXAM: XR CHEST 1 VIEW



DATE: 2017 8:38 PM CDT



INDICATION: Chest pain.



COMPARISON: 2017.



TECHNIQUE: A single AP view of the chest was obtained.



FINDINGS:

A left subclavian defibrillator device is unchanged in position. There is a 
scarring noted within the left lung base. No focal consolidation is visualized. 
The cardiac silhouette is enlarged. The costophrenic recesses are sharp and 
without effusion. No acute osseous abnormality is noted.



IMPRESSION:

No acute cardiopulmonary abnormality.



Condition: stable

Diet: regular balanced, healthy diet

Activity: as tolerated

Meds: Please see home medication reconciliation

Follow-up:  with outpatient Psych resources.

Patient Instructions: Please follow-up as above and take all medications as 
prescribed

Recommendations for Primary Care physician: If you need any additional 
information regarding your patient



s hospital stay please call the page  at [facility] 
[phone], and ask him/her to page me.

Patient discussed with attending physician, Dr. Ruthie Barr MD

PGY-1 MSO# [id number]





Extracted from:





  



                     Title: Brief Progress Note     Author: Adolfo Nevarez MD     Date: 17









                                        Patient:   JOANNA VASQUEZ            MRN: [id number]            FIN: [id number]



Age:   52 years     Sex:  Male     :  1965

Associated Diagnoses:   None



Saw patient this morning; patient reports chest discomfort is improved.  Patient
acknowledges he used cocaine and that this most likely the cause for his chest 
pain.  Spoke with cardiology, acute chest discomfort most likely secondary to 
AICD firing secondary to cocaine use.  No device interrogation needed at this 
time, as this was recently done and is most likely preventing a dangerous 
arrythmia.



During discharge planning, the patient requested to be transferred to a drug 
rehab facility.  At this point, he also began stating that if he goes home he 
will hurt himself by ingesting cocaine.  He states he has a cocaine addiction 
and needs rehab.



Patient's home psych meds restarted, abilify 5 mg qd and fluoxetine 40 mg qd. 
Psych response consulted. SW consulted for rehab facilities/resources.  Patient 
sitter ordered.



Keep in observation pending psych response evaluation.



Patient discussed with attending, Dr. Hendricks.

ROGERIO Nevarez M.D.

PGY1, MSO [id number]





Extracted from:





  



                     Title: General Admission H&P *     Author: Matilda Evans MD     Date: 17









                                         Impression and Plan

                                        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

 A-fib, systolic CHF, cocaine abuse in the past, schizoaffective d/o, anxiety, 
who presented to ED for nonexertional chest discomfort



Nonexertional chest discomfort

                                        -ACS r/o

                                        -no significant changes on EKG. 1st set of cardiac enzymes negative.

                                        -pain worsened with palpation of area overlaying AICD

                                        -trend troponins

                                        -Prior cardiac catheterization did not show evidence of CAD 

                                        -Consult pt



s cardiologist: Dr. Anderson for AICD interrogation

                                        -ASA, Lisinopril, carvedilol, high intensity statin, Xarelto



NBA

                                        -Cr 1.56, was 1.2 on discharge last week

                                        -could be due to drug use

                                        -cont monitor



Atrial fibrillation

                                        -rate is currently controlled.

                                        -restart home meds Xarelto, carvedilol



Severe dilated cardiomyopathy, systolic CHF with EF 20%

                                        -Most recent echo in April showed EF 20%, severe global hypokinesis

                                        -continue home meds torsemide 20 qd



DM2

                                        -A1c 10.3%

                                        -glucose 188 on admission

                                        -start levemir 10 qhs

                                        -Low dose sliding scale insulin



Hypertension

                                        -continue home medications Lisinopril and carvedilol



Cocaine abuse

                                        -Pt denies drug use, but UDS positive for cocaine

                                        -social work consult



FEN: heart healthy diet

DVT: xarelto

Dispo: observation for ACS r/o



Pt seen and discussed with senior Dr. Zarina Evans MD PGY1



MSO# [id number]



Senior Addendum:

Patient examined. Agree with above. 51 yo M with nonischemic CM (EF 20%), AICD 
placement, chronic atrial fibrillation, cocaine abuse with last use on , DM,
HTN and schizoaffective disorder presents with atypical chest pain. Patient with
extensive cardiac risk factors. Admit to observation for ACS rule out. Patient 
currently in AFib, rate controlled with no ischemia on EKG and cardiac enzymes 
are negative. Start medical management with ASA, ACE, BB, statin, Xarelto. 
Consult patient's cardiologist Dr. Anderson. Counseled on avoiding cocaine use. We 
will give levemir and SSI for uncontrolled diabetes.



Patient discussed with attending, Dr. Arriaga, who agrees with the plan.



Kriss Srinivasan MD

PGY2

MSO [id number]

## 2019-09-25 NOTE — NUR
patient disconnected himself off telemetry with informing staff, and is laying on sofa and 
states "he does not wanna get in bed", because his stomach hurts"

## 2019-09-25 NOTE — XMS REPORT
Summary of Care: 17 - 17

                             Created on: 05/15/2045



JOANNA CARBERA

External Reference #: [id number]

: 1965

Sex: Male



Demographics







                          Address                   [address] [place] [address]  [phone]

 

                          Home Phone                [phone]

 

                          Preferred Language        English

 

                          Marital Status            Single

 

                          Tenriism Affiliation     Latter-day

 

                          Race                      White/

 

                          Ethnic Group              Non-





Author







                          Author                    [facility]

 

                          Organization              [facility]

 

                          Address                   Unknown

 

                          Phone                     Unavailable







Encounter





HQ Shyann(LISA) [id number] Date(s): 17 - 17

[facility] [address] [place] [address]-     [phone]


Discharge Disposition: Home or Self Care

Attending Physician: James Bethea MD

Admitting Physician: James Bethea MD





Vital Signs







                    1                   2                   3



                                         Most recent to   



                                         oldest [Reference   



                                         Range]:   

 

                                        172.72 cm 

                    (17 1:18 AM)                         



                                         Height   

 

                                        98.2 DegF 

                          (17 11:20 AM)         97.5 DegF 

                          (17 7:12 AM)          97.5 DegF 

                                        (17 4:38 AM)



                                         Temperature Oral   



                                         [96.4-99.1 DegF]   

 

                                        111/85 mmHg 

                          (17 11:20 AM)         105/62 mmHg 

                          (17 7:12 AM)          102/71 mmHg 

                                        (17 4:38 AM)



                                         Blood Pressure   



                                         [/60-90 mmHg]   

 

                                        18 BRMIN 

                          (17 11:20 AM)         18 BRMIN 

                          (17 7:12 AM)          18 BRMIN 

                                        (17 4:38 AM)



                                         Respiratory Rate   



                                         [14-20 BRMIN]   

 

                                        62 bpm 

                          (17 11:20 AM)         61 bpm 

                          (17 7:12 AM)          75 bpm 

                                        (17 4:38 AM)



                                         Peripheral Pulse   



                                         Rate [ bpm]   

 

                                        99 kg 

                          (17 1:18 AM)         104.545 kg 

                          (17 8:44 PM)          



                                         Weight   

 

                                        33.19 m2 

                    (17 1:18 AM)                         



                                         Body Mass Index   







Problem List







    



              Condition     Effective Dates     Status       Health Status     Informant

 

    



                     [D]Anterior chest     9/12/12             Active  



                                         wall pain(Confirmed)    

 

    



                           Anxiety(Confirmed)        Resolved  

 

    



                           BP+ -                     Active  



                                         Hypertension(Confirm    



                                         ed)    

 

    



                           Cardiomyopathy(Confi      Resolved  



                                         rmed)    

 

    



                           CHF - Congestive          Active  



                                         heart    



                                         failure(Confirmed)    

 

    



                           CHF - Congestive          Active  



                                         heart    



                                         failure(Confirmed)    

 

    



                           Depression(Confirmed      Active  



                                         )    

 

    



                           dm(Confirmed)             Active  

 

    



                           Down                      Active  



                                         syndrome(Confirmed)    

 

    



                           GERD -                    Active  



                                         Gastro-esophageal    



                                         reflux    



                                         disease(Confirmed)    

 

    



                           H/O:                      Active  



                                         schizophrenia(Confir    



                                         med)    

 

    



                           HTN(Confirmed)            Active  

 

    



                           ICD (implantable          Active  



                                         cardiac    



                                         defibrillator)    



                                         battery    



                                         depletion(Confirmed)    

 

    



                           NIDDM(Confirmed)          Active  

 

    



                           Obese                     Active  



                                         build(Confirmed)    

 

    



                           Schizophrenia(Confir      Active  



                                         med)    

 

    



                           Short of breath on        Active  



                                         exertion(Confirmed)    

 

    



                           Sleep                     Active  



                                         apnea(Confirmed)    







Allergies, Adverse Reactions, Alerts







   



                 Substance       Reaction        Severity        Status

 

   



                           NKDA                      Active







Medications





acetaminophen-hydrocodone 325 mg-5 mg oral tablet

2 tab, Route: PO, Drug Form: TAB, Dosing Weight 104.545, kg, Q6H, PRN Pain Score
 4-6, Start date: 17 0:03:00 CDT, Duration: 30 day, Stop date: 17 0:
02:00 CDT

Notes: (Same as: Norco 325/5)  Do not exceed 4gm/day of acetaminophen.

Start Date: 17

Stop Date: 17

Status: Discontinued



Ambien

5 mg, 0.5 tab, Route: PO, Drug form: TAB, Bedtime, Dosing Weight 99, kg, PRN Sle
ep, Start date: 17 12:20:00 CDT, Duration: 30 day, Stop date: 17 12:
19:00 CDT

Notes: (Same As: Ambien)

Start Date: 17

Stop Date: 17

Status: Discontinued



AMIODarone

200 mg, 1 tab, Route: PO, Drug form: TAB, Q12H, Dosing Weight 104.545, kg, Start
 date: 17 9:00:00 CDT, Duration: 30 day, Stop date: 17 21:00:00 CDT

Notes: (Same as: Cordarone)

Start Date: 17

Stop Date: 17

Status: Discontinued



AMIODarone + Dextrose 5% in Water  mL

150 mg, Route: IVPB, ONCE, Dosing Weight 104.545, kg, Start date: 17 0:03:
00 CDT, Stop date: 17 0:03:00 CDT

Start Date: 17

Stop Date: 17

Status: Discontinued



ARIPiprazole

5 mg, 1 tab, Route: PO, Drug form: TAB, Daily, Dosing Weight 99, kg, Start date:
 17 9:00:00 CDT, Duration: 30 day, Stop date: 17 9:00:00 CDT

Notes: Non-Formulary Drug.  (Same as: Abilify)

Start Date: 17

Stop Date: 17

Status: Discontinued



ARIPiprazole

5 mg, PO, Daily, 0 Refill(s)

Start Date: 17

Status: Ordered



aspirin

324 mg, 4 tab, Route: CHEW, Drug form: CHEWTAB, ONCE, Dosing Weight 104.545, kg,
 Priority: STAT, Start date: 17 23:08:00 CDT, Stop date: 17 23:08:00
 CDT

Notes: Take with food.

Start Date: 17

Stop Date: 17

Status: Completed



aspirin 325 mg tablet, enteric coated

325 mg, 1 tab, Route: PO, Drug form: ECTAB, Daily, Dosing Weight 104.545, kg, St
art date: 17 9:00:00 CDT, Duration: 30 day, Stop date: 17 9:00:00 CD
T

Notes: (Do Not Crush)  Do not crush or chew.

Start Date: 17

Stop Date: 17

Status: Discontinued



atorvastatin

10 mg, PO, Daily, 0 Refill(s)

Start Date: 17

Status: Ordered



atorvastatin

10 mg, 1 tab, Route: PO, Drug form: TAB, Bedtime, Dosing Weight 104.545, kg, Sta
rt date: 17 21:00:00 CDT, Duration: 30 day, Stop date: 17 21:00:00 C
DT

Notes: (Same As: Lipitor)

Start Date: 17

Stop Date: 17

Status: Canceled



BD Normal Saline Flush

10 mL, Route: IVP, Drug Form: INJ, PRN, PRN Line Flush, Start date: 17 21:
20:00 CDT, Duration: 30 day, Stop date: 17 21:19:00 CDT

Notes: (Same as: BD Posiflush)

Start Date: 17

Stop Date: 17

Status: Discontinued



clonazePAM

1 mg, 1 tab, Route: PO, Drug form: TAB, Daily, Dosing Weight 99, kg, Start date:
 17 9:00:00 CDT, Duration: 30 day, Stop date: 17 9:00:00 CDT

Notes: (Same As: KlonoPIN)

Start Date: 17

Stop Date: 17

Status: Discontinued



clonazePAM

1 mg, PO, Daily, 0 Refill(s)

Start Date: 17

Status: Ordered



Cogentin

1 mg, 1 tab, Route: PO, Drug form: TAB, Bedtime, Dosing Weight 99, kg, Start josefa
e: 17 21:00:00 CDT, Duration: 30 day, Stop date: 17 21:00:00 CDT

Notes: (Same As: Cogentin)

Start Date: 17

Stop Date: 17

Status: Discontinued



Colace 100 mg oral capsule

100 mg, 1 cap, Route: PO, Drug form: CAP, BID, Dosing Weight 104.545, kg, PRN Co
nstipation, Start date: 17 0:03:00 CDT, Duration: 30 day, Stop date:  0:02:00 CDT

Notes: (Same as: Colace) (Do Not Crush)

Start Date: 17

Stop Date: 17

Status: Discontinued



Coreg

3.125 mg, Route: PO, Drug form: TAB, Daily, Dosing Weight 99, kg, Start date:  9:00:00 CDT, Duration: 30 day, Stop date: 17 9:00:00 CDT

Start Date: 17

Stop Date: 17

Status: Canceled



Dextrose 50% Syringe

12.5 gm, 25 mL, Route: IVP, Drug Form: INJ, Dosing Weight 104.545, kg, PRN, PRN 
Blood Glucose Results, Start date: 17 0:05:00 CDT, Duration: 30 day, Stop 
date: 17 0:04:00 CDT

Start Date: 17

Stop Date: 17

Status: Discontinued



Dextrose 50% Syringe

25 gm, 50 mL, Route: IVP, Drug Form: INJ, Dosing Weight 104.545, kg, PRN, PRN Bl
ood Glucose Results, Start date: 17 0:05:00 CDT, Duration: 30 day, Stop da
te: 17 0:04:00 CDT

Start Date: 17

Stop Date: 17

Status: Discontinued



diltiazem

10 mg, 2 mL, Route: IVP, Drug form: INJ, ONCE, Dosing Weight 104.545, kg, Priori
ty: STAT, Start date: 17 22:34:00 CDT, Stop date: 17 22:34:00 CDT

Notes: (Same as: Cardizem)

Start Date: 17

Stop Date: 17

Status: Completed



diltiazem

30 mg, 1 tab, Route: PO, Drug form: TAB, Q6H, Dosing Weight 104.545, kg, Priorit
y: STAT, Start date: 17 0:03:00 CDT, Duration: 30 day, Stop date: 17
 0:00:00 CDT

Notes: (Same as: Cardizem)  Before meals

Start Date: 17

Stop Date: 17

Status: Discontinued



DuoNeb inhalation solution

3 mL, Route: NEB, Drug Form: SOLN, Dosing Weight 104.545, kg, RQID, PRN Shortnes
s of breath, Start date: 17 0:03:00 CDT, Duration: 30 day, Stop date: 05/3
0/17 0:02:00 CDT

Notes: (Same as: Duoneb)

Start Date: 17

Stop Date: 17

Status: Discontinued



glipiZIDE

10 mg, 1 tab, Route: PO, Drug form: TAB, Before Breakfast, Dosing Weight 99, kg,
 Start date: 17 7:30:00 CDT, Duration: 30 day, Stop date: 17 7:30:00
 CDT

Notes: (Same as: Glucotrol)  30 min before meals.

Start Date: 17

Stop Date: 17

Status: Discontinued



glipiZIDE

10 mg, PO, Daily, 0 Refill(s)

Start Date: 17

Status: Ordered



glipiZIDE 5 mg oral tablet

5 mg, 1 tab, Route: PO, Drug form: TAB, Daily, Dosing Weight 99, kg, Priority: N
OW, Start date: 17 12:21:00 CDT, Duration: 30 day, Stop date: 17 9:0
0:00 CDT

Notes: (Same as: Glucotrol)  30 min before meals.

Start Date: 17

Stop Date: 17

Status: Discontinued



glucagon

1 mg, Route: IM, Drug form: PDR/INJ, PRN, Dosing Weight 104.545, kg, PRN Blood G
lucose Results, Start date: 17 0:05:00 CDT, Duration: 30 day, Stop date: 0
17 0:04:00 CDT

Start Date: 17

Stop Date: 17

Status: Discontinued



heparin

5,000 unit, 1 mL, Route: SUB-Q, Drug form: INJ, Q12H, Dosing Weight 104.545, kg,
 Start date: 17 9:00:00 CDT, Duration: 30 day, Stop date: 17 21:00:0
0 CDT

Notes: porcine heparin

Start Date: 17

Stop Date: 17

Status: Discontinued



hydrALAZINE

20 mg, 1 mL, Route: IVP, Drug form: INJ, Q4H, Dosing Weight 104.545, kg, PRN See
 Nurse's Notes, Start date: 17 0:03:00 CDT, Duration: 30 day, Stop date: 0
17 0:02:00 CDT, systolic > 160

Notes: (Same as: Apresoline)Push over 5 minutes

Start Date: 17

Stop Date: 17

Status: Discontinued



insulin aspart

6 unit, 0.06 mL, Route: SUB-Q, Drug form: SOLN, TID-Before Meals, Dosing Weight 
104.545, kg, PRN Blood Glucose Results, Start date: 17 0:05:00 CDT, Durati
on: 30 day, Stop date: 17 0:04:00 CDT

Notes: Roll in palms of hands gently;  Do not shake vigorously. (Same as: Demetria U[place])"single patient use only"WASTE: F/P - Black; E - Municipal Trash Bin  Stable f
or 28 days at room temperature.Expires in _____ days from ______________Date

Start Date: 17

Stop Date: 17

Status: Discontinued



insulin aspart

9 unit, 0.09 mL, Route: SUB-Q, Drug form: SOLN, TID-Before Meals, Dosing Weight 
104.545, kg, PRN Blood Glucose Results, Start date: 17 0:05:00 CDT, Durati
on: 30 day, Stop date: 17 0:04:00 CDT

Notes: Roll in palms of hands gently;  Do not shake vigorously. (Same as: Demetria [place])"single patient use only"WASTE: F/P - Black; E - Municipal Trash Bin  Stable f
or 28 days at room temperature.Expires in _____ days from ______________Date

Start Date: 17

Stop Date: 17

Status: Discontinued



insulin aspart

15 unit, 0.15 mL, Route: SUB-Q, Drug form: SOLN, TID-Before Meals, Dosing Weight
 104.545, kg, PRN Blood Glucose Results, Start date: 17 0:05:00 CDT, Durat
ion: 30 day, Stop date: 17 0:04:00 CDT

Notes: Roll in palms of hands gently;  Do not shake vigorously. (Same as: Demetria U[place])"single patient use only"WASTE: F/P - Black; E - Municipal Trash Bin  Stable f
or 28 days at room temperature.Expires in _____ days from ______________Date

Start Date: 17

Stop Date: 17

Status: Discontinued



insulin aspart

12 unit, 0.12 mL, Route: SUB-Q, Drug form: SOLN, TID-Before Meals, Dosing Weight
 104.545, kg, PRN Blood Glucose Results, Start date: 17 0:05:00 CDT, Durat
ion: 30 day, Stop date: 17 0:04:00 CDT

Notes: Roll in palms of hands gently;  Do not shake vigorously. (Same as: Demetria [place])"single patient use only"WASTE: F/P - Black; E - Municipal Trash Bin  Stable f
or 28 days at room temperature.Expires in _____ days from ______________Date

Start Date: 17

Stop Date: 17

Status: Discontinued



insulin aspart

3 unit, 0.03 mL, Route: SUB-Q, Drug form: SOLN, TID-Before Meals, Dosing Weight 
104.545, kg, PRN Blood Glucose Results, Start date: 17 0:05:00 CDT, Durati
on: 30 day, Stop date: 17 0:04:00 CDT

Notes: Roll in palms of hands gently;  Do not shake vigorously. (Same as: Demetria [place])"single patient use only"WASTE: F/P - Black; E - Municipal Trash Bin  Stable f
or 28 days at room temperature.Expires in _____ days from ______________Date

Start Date: 17

Stop Date: 17

Status: Discontinued



insulin aspart

4 unit, 0.04 mL, Route: SUB-Q, Drug form: SOLN, Bedtime, Dosing Weight 104.545, 
kg, PRN Blood Glucose Results, Start date: 17 0:05:00 CDT, Duration: 30 da
y, Stop date: 17 0:04:00 CDT

Notes: Roll in palms of hands gently;  Do not shake vigorously. (Same as: Demetria [place])"single patient use only"WASTE: F/P - Black; E - Municipal Trash Bin  Stable f
or 28 days at room temperature.Expires in _____ days from ______________Date

Start Date: 17

Stop Date: 17

Status: Discontinued



insulin aspart

6 unit, 0.06 mL, Route: SUB-Q, Drug form: SOLN, Bedtime, Dosing Weight 104.545, 
kg, PRN Blood Glucose Results, Start date: 17 0:05:00 CDT, Duration: 30 da
y, Stop date: 17 0:04:00 CDT

Notes: Roll in palms of hands gently;  Do not shake vigorously. (Same as: Demetria [place])"single patient use only"WASTE: F/P - Black; E - Municipal Trash Bin  Stable f
or 28 days at room temperature.Expires in _____ days from ______________Date

Start Date: 17

Stop Date: 17

Status: Discontinued



insulin aspart

8 unit, 0.08 mL, Route: SUB-Q, Drug form: SOLN, Bedtime, Dosing Weight 104.545, 
kg, PRN Blood Glucose Results, Start date: 17 0:05:00 CDT, Duration: 30 da
y, Stop date: 17 0:04:00 CDT

Notes: Roll in palms of hands gently;  Do not shake vigorously. (Same as: Demetria [place])"single patient use only"WASTE: F/P - Black; E - Municipal Trash Bin  Stable f
or 28 days at room temperature.Expires in _____ days from ______________Date

Start Date: 17

Stop Date: 17

Status: Discontinued



insulin aspart

10 unit, 0.1 mL, Route: SUB-Q, Drug form: SOLN, Bedtime, Dosing Weight 104.545, 
kg, PRN Blood Glucose Results, Start date: 17 0:05:00 CDT, Duration: 30 da
y, Stop date: 17 0:04:00 CDT

Notes: Roll in palms of hands gently;  Do not shake vigorously. (Same as: NovoLO
G)"single patient use only"WASTE: F/P - Black; E - Municipal Trash Bin  Stable f
or 28 days at room temperature.Expires in _____ days from ______________Date

Start Date: 17

Stop Date: 17

Status: Discontinued



Insulin regular

4 unit, 0.04 mL, Route: IVP, Drug form: SOLN, ONCE, Dosing Weight 104.545, kg, P
riority: STAT, Start date: 17 22:47:00 CDT, Stop date: 17 22:47:00 C
DT

Notes: (Same as: Humulin R) Roll in palms of hands gently;  Do not shake vigorou
sly. "single patient use only"(Restricted to patients requiring a dose >  60 
units)WASTE: F/P - Black; E - Municipal Trash Bin  Stable for 28 days at room 
temperatureExpires in ______ days from ______________Date

Start Date: 17

Stop Date: 17

Status: Completed



lisinopril

5 mg, PO, Daily, 0 Refill(s)

Start Date: 17

Status: Ordered



metFORMIN

500 mg, PO, Daily, 0 Refill(s)

Start Date: 17

Status: Ordered



metFORMIN 500 mg oral tablet

500 mg, Route: PO, Drug form: TAB, BID, Dosing Weight 99, kg, Priority: NOW, Sta
rt date: 17 12:21:00 CDT, Duration: 30 day, Stop date: 17 9:00:00 CD
T

Start Date: 17

Stop Date: 17

Status: Discontinued



metolazone

2.5 mg, PO, qWeek, # 15 tab, 0 Refill(s)

Start Date: 17

Status: Ordered



morphine Sulfate

2 mg, 1 mL, Route: IVP, Drug form: INJ, Q15Min, Dosing Weight 104.545, kg, PRN C
hest Pain, Start date: 17 0:03:00 CDT, Duration: 2 doses or times, Stop da
te: Limited # of times

Notes: (Same as:MORPhine Sulfate)

Start Date: 17

Stop Date: 17

Status: Discontinued



ondansetron

4 mg, 1 tab, Route: PO, Drug form: TAB, Q8H, Dosing Weight 104.545, kg, PRN Naus
ea & Vomiting, Start date: 17 0:03:00 CDT, Duration: 30 day, Stop date: 
17 0:02:00 CDT

Notes: (Same as: Zofran)

Start Date: 17

Stop Date: 17

Status: Discontinued



pantoprazole

40 mg, PO, Daily, # 30 tab, 0 Refill(s)

Start Date: 17

Stop Date: 17

Status: Ordered



pantoprazole

40 mg, Route: IVP, Drug form: INJ, Before Dinner, Dosing Weight 104.545, kg, Sta
rt date: 17 0:12:00 CDT, Duration: 30 day, Stop date: 17 16:30:00 CD
T

Notes: For IV push reconstitute with 10 ml 0.9% sodium chloride and push over 2 
minutes. (Same as: Protonix)

Start Date: 17

Stop Date: 17

Status: Discontinued



potassium chloride

20 mEq, 1 tab, Route: PO, Drug form: ERTAB, ONCE, Dosing Weight 99, kg, Start da
te: 17 10:44:00 CDT, Stop date: 17 10:44:00 CDT

Notes: (Same as: K-Dur 20)"Do Not Crush"  With food and full glass of water

Start Date: 17

Stop Date: 17

Status: Completed



potassium chloride 20 mEq oral tablet, extended release

20 mEq, 1 tab, Route: PO, Drug form: ERTAB, ONCE, Dosing Weight 99, kg, Start da
te: 17 7:45:00 CDT, Stop date: 17 7:45:00 CDT

Notes: (Same as: K-Dur 20)"Do Not Crush"  With food and full glass of water

Start Date: 17

Stop Date: 17

Status: Completed



potassium chloride 20 mEq/15 mL oral liquid

40 mEq, 30 mL, Route: PO, Drug form: LIQ, ONCE, Dosing Weight 99, kg, Priority: 
NOW, Start date: 17 11:39:00 CDT, Stop date: 17 11:39:00 CDT

Notes: (Same as: Potassium Chloride)

Start Date: 17

Stop Date: 17

Status: Completed



potassium chloride 20 mEq/15 mL oral liquid

40 mEq, 30 mL, Route: PO, Drug form: LIQ, ONCE, Dosing Weight 99, kg, Start date
: 17 11:28:00 CDT, Stop date: 17 11:28:00 CDT

Start Date: 17

Stop Date: 17

Status: Discontinued



rivaroxaban

20 mg, 1 tab, Route: PO, Drug form: TAB, Daily, Dosing Weight 99, kg, Start date
: 17 9:00:00 CDT, Duration: 30 day, Stop date: 17 9:00:00 CDT

Notes: (Same as: Xarelto)Administer with food

Start Date: 17

Stop Date: 17

Status: Discontinued



rivaroxaban

20 mg, PO, Daily, 0 Refill(s)

Start Date: 17

Status: Ordered



Robitussin 100 mg/5 mL oral liquid

100 mg, 5 mL, Route: PO, Drug form: SYRP, Q4H, Dosing Weight 104.545, kg, PRN Se
e Nurse's Notes, Start date: 17 0:03:00 CDT, Duration: 30 day, Stop date: 
17 0:02:00 CDT, as needed for congestion, cough

Notes: (Same as: Robitussin)

Start Date: 17

Stop Date: 17

Status: Discontinued



Saline Flush 0.9%

10 mL, Route: IVP, Drug Form: INJ, Dosing Weight 104.545, kg, PRN, PRN Line Flus
h, Start date: 17 21:15:00 CDT, Duration: 30 day, Stop date: 17 21:1
4:00 CDT

Start Date: 17

Stop Date: 17

Status: Discontinued



Saline Flush 0.9%

10 ml, Route: IVP, Drug Form: INJ, Dosing Weight 104.545, kg, PRN, PRN Line Flus
h, Start date: 17 0:03:00 CDT, Duration: 30 day, Stop date: 17 0:02:
00 CDT

Start Date: 17

Stop Date: 17

Status: Discontinued



Saline Flush 0.9%

10 ml, Route: IVP, Drug Form: INJ, Dosing Weight 104.545, kg, Q12H, Start date: 
17 9:00:00 CDT, Duration: 30 day, Stop date: 17 21:00:00 CDT

Notes: (Same as: BD Posiflush)

Start Date: 17

Stop Date: 17

Status: Discontinued



sodium bicarbonate 650 mg oral tablet

650 mg, 1 tab, Route: PO, Drug form: TAB, TID, Dosing Weight 99, kg, Priority: N
OW, Start date: 17 11:29:00 CDT, Duration: 2 day, Stop date: 17 9:00
:00 CDT

Notes: "Dissolve tablet in a glass of water prior to oral administration.  STOMA
CH WARNING: To avoid serious injury, do not take until tablet is completely diss
olved.  It is very important not to take this product when overly full from food
 or drink."

Start Date: 17

Stop Date: 17

Status: Discontinued



Sodium Chloride 0.9% (Bolus) IV

1,000 mL, 1,000 ml/hr, Infuse Over: 1 hr, Route: IV, ONCE, Priority: STAT, Dosin
g Weight 104.545 kg, Start date: 17 0:05:00 CDT, Duration: 1 doses or time
s, Stop date: 17 0:05:00 CDT

Start Date: 17

Stop Date: 17

Status: Completed



sodium chloride 0.9% 1000 ml INJ 1,000 mL

1,000 mL, Rate: 60 ml/hr, Infuse over: 16.7 hr, Route: IV, Dosing Weight 104.545
 kg, Total Volume: 1,000, Start date: 17 0:03:00 CDT, Stop date: 17 
0:02:00 CDT

Start Date: 17

Stop Date: 17

Status: Discontinued



Sodium Chloride 0.9% IV

250 mL, Route: IVPB, Start date: 17 21:20:00 CDT, Duration: 30 day, Stop d
ate: 17 21:19:00 CDT, PRN Line Flush

Start Date: 17

Stop Date: 17

Status: Discontinued



sodium phosphate

15 mmol, 250 mL, Route: IVPB, Drug form: INJ, ONCE, Dosing Weight 99, kg, PRN Ab
normal Lab Result, Priority: NOW, Start date: 17 11:39:00 CDT

Start Date: 17

Stop Date: 17

Status: Completed



torsemide

20 mg, PO, Daily, # 20 tab, 0 Refill(s)

Start Date: 17

Status: Ordered



trazodone

50 mg, 1 tab, Route: PO, Drug form: TAB, Bedtime, Dosing Weight 104.545, kg, PRN
 Insomnia, Start date: 17 0:03:00 CDT, Duration: 30 day, Stop date:  0:02:00 CDT

Notes: (Same As: Desyrel)

Start Date: 17

Stop Date: 17

Status: Discontinued



Tylenol

650 mg, 2 tab, Route: PO, Drug form: TAB, Q6H, Dosing Weight 104.545, kg, PRN Se
e Nurse's Notes, Start date: 17 0:03:00 CDT, Duration: 30 day, Stop date: 
17 0:02:00 CDT, Pain 1-3/Temp > 100.4 F, headache

Notes: Do not exceed 4 gm/day.  (Same as: Tylenol)

Start Date: 17

Stop Date: 17

Status: Discontinued



Results





ELECTROLYTES





                    1                   2                   3



                                         Most recent to   



                                         oldest [Reference   



                                         Range]:   

 

                                        140 mEq/L 

                          (17 5:45 AM)          138 mEq/L 

                          (17 5:20 AM)          134 mEq/L 

*LOW*

                                        (17 4:13 AM) 



                                         Sodium Lvl [135-145   



                                         mEq/L]   

 

                                        4.5 mEq/L 

                          (17 5:45 AM)          3.3 mEq/L 

*LOW*

                          (17 5:20 AM)          3.1 mEq/L 

*LOW*

                                        (17 4:13 AM) 



                                         Potassium Lvl   



                                         [3.5-5.1 mEq/L]   

 

                                        110 mEq/L 

*HI*

                          (17 5:45 AM)          103 mEq/L 

                          (17 5:20 AM)          96 mEq/L 

                                        (17 4:13 AM) 



                                         Chloride Lvl [   



                                         mEq/L]   

 

                                        23 mEq/L 

*LOW*

                          (17 5:45 AM)          26 mEq/L 

                          (17 5:20 AM)          22 mEq/L 

*LOW*

                                        (17 4:13 AM) 



                                         CO2 [24-32 mEq/L]   

 

                                        11.5 mEq/L 

                          (17 5:45 AM)          12.3 mEq/L 

                          (17 5:20 AM)          19.1 mEq/L 

                                        (17 4:13 AM) 



                                         AGAP [10.0-20.0   



                                         mEq/L]   







CHEM PANEL





                    1                   2                   3



                                         Most recent to   



                                         oldest [Reference   



                                         Range]:   

 

                                        1.10 mg/dL 

                          (17 5:45 AM)          1.30 mg/dL 

                          (17 5:20 AM)          2.40 mg/dL 

*HI*

                                        (17 4:13 AM) 



                                         Creatinine Lvl   



                                         [0.50-1.40 mg/dL]   

 

                                        77 mL/min/1.73m2 1

*NA*

                          (17 5:45 AM)          63 mL/min/1.73m2 2

*NA*

                          (17 5:20 AM)          30 mL/min/1.73m2 3

*NA*

                                        (17 4:13 AM) 



                                         eGFR   

 

                                        21 mg/dL 

                          (17 5:45 AM)          27 mg/dL 

*HI*

                          (17 5:20 AM)          39 mg/dL 

*HI*

                                        (17 4:13 AM) 



                                         BUN [7-22 mg/dL]   

 

                                        19 

                          (17 5:45 AM)          21 

                          (17 5:20 AM)          16 

                                        (17 4:13 AM) 



                                         B/C Ratio [6-25]   

 

                                        131 mg/dL 

*HI*

                          (17 5:45 AM)          180 mg/dL 

*HI*

                          (17 5:20 AM)          270 mg/dL 

*HI*

                                        (17 4:13 AM) 



                                         Glucose Lvl [70-99   



                                         mg/dL]   

 

                                        6.4 g/dL 

                          (17 5:45 AM)          5.9 g/dL 

*LOW*

                          (17 5:20 AM)          9.0 g/dL 

*HI*

                                        (17 4:13 AM) 



                                         Total Protein   



                                         [6.4-8.4 g/dL]   

 

                                        3.3 g/dL 

*LOW*

                          (17 5:45 AM)          3.0 g/dL 

*LOW*

                          (17 5:20 AM)          3.7 g/dL 

                                        (17 4:13 AM) 



                                         Albumin Lvl [3.5-5.0   



                                         g/dL]   

 

                                        3.1 g/dL 

                          (17 5:45 AM)          2.9 g/dL 

                          (17 5:20 AM)          5.3 g/dL 

*HI*

                                        (17 4:13 AM) 



                                         Globulin [2.7-4.2   



                                         g/dL]   

 

                                        1.1 

                          (17 5:45 AM)          1.0 

                          (17 5:20 AM)          0.7 

                                        (17 4:13 AM) 



                                         A/G Ratio [0.7-1.6]   

 

                                        8.2 mg/dL 

*LOW*

                          (17 5:45 AM)          7.8 mg/dL 

*LOW*

                          (17 5:20 AM)          8.4 mg/dL 

*LOW*

                                        (17 4:13 AM) 



                                         Calcium Lvl   



                                         [8.5-10.5 mg/dL]   

 

                                        2.1 mg/dL 

*LOW*

                    (17 5:20 AM)                          



                                         Phosphorus [2.5-4.5   



                                         mg/dL]   

 

                                        2.2 mg/dL 

                          (17 5:45 AM)          2.0 mg/dL 

                          (17 5:20 AM)          2.1 mg/dL 

                                        (17 4:13 AM) 



                                         Magnesium Lvl   



                                         [1.8-2.4 mg/dL]   

 

                                        109 unit/L 

*HI*

                          (17 5:45 AM)          87 unit/L 

*HI*

                          (17 5:20 AM)          72 unit/L 

*HI*

                                        (17 4:13 AM) 



                                         ALT [0-65 unit/L]   

 

                                        182 unit/L 

*HI*

                          (17 5:45 AM)          219 unit/L 

*HI*

                          (17 5:20 AM)          231 unit/L 

*HI*

                                        (17 4:13 AM) 



                                         AST [0-37 unit/L]   

 

                                        109 unit/L 

                          (17 5:45 AM)          119 unit/L 

                          (17 5:20 AM)          96 unit/L 

                                        (17 4:13 AM) 



                                         Alk Phos [   



                                         unit/L]   

 

                                        0.4 mg/dL 

                          (17 5:45 AM)          0.5 mg/dL 

                          (17 5:20 AM)          1.0 mg/dL 

                                        (17 4:13 AM) 



                                         Bili Total [0.2-1.3   



                                         mg/dL]   

 

                                        185 unit/L 

                    (17 10:00 PM)                          



                                         Lipase Lvl [   



                                         unit/L]   







1Result Comment: The eGFR is calculated using the CKD-EPI formula. In most 
young, healthy individuals the eGFR will be >90 mL/min/1.73m2. The eGFR declines
with age. An eGFR of 60-89 may be normal in some populations, particularly the 
elderly, for whom the CKD-EPI formula has not been extensively validated. Use of
the eGFR is not recommended in the following populations:



Individuals with unstable creatinine concentrations, including pregnant patients
and those with serious co-morbid conditions.



Patients with extremes in muscle mass or diet. 



The data above are obtained from the National Kid[employer] Education Program (
NK[employer]) which additionally recommends that when the eGFR is used in patients with
extremes of body mass index for purposes of drug dosing, the eGFR should be mul
tiplied by the estimated BMI.



2Result Comment: The eGFR is calculated using the CKD-EPI formula. In most 
young, healthy individuals the eGFR will be >90 mL/min/1.73m2. The eGFR declines
with age. An eGFR of 60-89 may be normal in some populations, particularly the 
elderly, for whom the CKD-EPI formula has not been extensively validated. Use of
the eGFR is not recommended in the following populations:



Individuals with unstable creatinine concentrations, including pregnant patients
and those with serious co-morbid conditions.



Patients with extremes in muscle mass or diet. 



The data above are obtained from the National Kid[employer] Education Program (
[employer]) which additionally recommends that when the eGFR is used in patients with
extremes of body mass index for purposes of drug dosing, the eGFR should be mul
tiplied by the estimated BMI.



3Result Comment: The eGFR is calculated using the CKD-EPI formula. In most 
young, healthy individuals the eGFR will be >90 mL/min/1.73m2. The eGFR declines
with age. An eGFR of 60-89 may be normal in some populations, particularly the 
elderly, for whom the CKD-EPI formula has not been extensively validated. Use of
the eGFR is not recommended in the following populations:



Individuals with unstable creatinine concentrations, including pregnant patients
and those with serious co-morbid conditions.



Patients with extremes in muscle mass or diet. 



The data above are obtained from the National Kid[employer] Education Program (
[employer]) which additionally recommends that when the eGFR is used in patients with
extremes of body mass index for purposes of drug dosing, the eGFR should be mul
tiplied by the estimated BMI.



CARDIAC ENZYMES





                    1                   2                   3



                                         Most recent to   



                                         oldest [Reference   



                                         Range]:   

 

                                        3802 unit/L 

*HI*

                          (17 5:45 AM)          6880 unit/L 

*HI*

                          (17 5:20 AM)          64327 unit/L 

*HI*

                                        (17 12:14 PM) 



                                         Total CK [   



                                         unit/L]   

 

                                        12.1 ng/mL 

*HI*

                          (17 5:45 AM)          31.0 ng/mL 

*HI*

                          (17 12:14 PM)        28.7 ng/mL 

*HI*

                                        (17 4:13 AM) 



                                         CK MB [0.5-3.6   



                                         ng/mL]   

 

                                        0.3 

                          (17 12:14 PM)        0.3 

                          (17 10:00 PM)         



                                         CK MB Index   



                                         [0.0-2.5]   

 

                                        0.12 ng/mL 

                          (17 12:14 PM)        0.25 ng/mL 

                          (17 4:13 AM)         0.34 ng/mL 

                                        (17 10:00 PM) 



                                         Troponin-I   



                                         [0.00-0.40 ng/mL]   

 

                                        881 pg/mL 

*HI*

                    (17 10:00 PM)                          



                                         BNP [<=100 pg/mL]   







LIPIDS





                    1                   2                   3



                                         Most recent to   



                                         oldest [Reference   



                                         Range]:   

 

                                        5.00 

                    (17 4:13 AM)                          



                                         CHD Risk [4.00-7.30]   

 

                                        130 mg/dL 

                    (17 4:13 AM)                          



                                         Chol [<=199 mg/dL]   

 

                                        189 mg/dL 

*HI*

                    (17 4:13 AM)                          



                                         Trig [<=149 mg/dL]   

 

                                        26 mg/dL 

*LOW*

                    (17 4:13 AM)                          



                                         HDL [>=61 mg/dL]   

 

                                        66 mg/dL 

                    (17 4:13 AM)                          



                                         LDL (Calculated)   



                                         [<=99 mg/dL]   

 

                                        38 

*NA*

                    (17 4:13 AM)                          



                                         VLDL   







DRUG SCREEN





                    1                   2                   3



                                         Most recent to   



                                         oldest [Reference   



                                         Range]:   

 

                                        Negative 

*NA*

                    (17 12:27 AM)                          



                                         U Amph Scr   



                                         [Negative]   

 

                                        Negative 

*NA*

                    (17 12:27 AM)                          



                                         U Annie Scr   



                                         [Negative]   

 

                                        Negative 

*NA*

                    (17 12:27 AM)                          



                                         U Benzodia Scr   



                                         [Negative]   

 

                                        Positive 

*ABN*

                    (17 12:27 AM)                          



                                         U Cocaine Scr   



                                         [Negative]   

 

                                        Negative 

*NA*

                    (17 12:27 AM)                          



                                         U Opiate Scr   



                                         [Negative]   

 

                                        Negative 

*NA*

                    (17 12:27 AM)                          



                                         U Phencyc Scr   



                                         [Negative]   

 

                                        Negative 

*NA*

                    (17 12:27 AM)                          



                                         U Cannab Scr   



                                         [Negative]   

 

                                        See Note 

                    (17 12:27 AM)                          



                                         UDS Note   







URINE CHEM





                    1                   2                   3



                                         Most recent to   



                                         oldest [Reference   



                                         Range]:   

 

                                        282.00 mg/dL 

*NA*

                    (17 12:27 AM)                          



                                         U Creatinine   

 

                                        178.5 mg/dL 

*NA*

                    (17 12:27 AM)                          



                                         U Protein   

 

                                        0.6 

*NA*

                    (17 12: AM)                          



                                         U Prot/Creat   

 

                                        724 mg/dL 

*NA*

                    (17 12: AM)                          



                                         U Urea   

 

                                        19 mEq/L 

*NA*

                    (17 12: AM)                          



                                         U Sodium   

 

                                        63.2 mEq/L 

*NA*

                    (17 12: AM)                          



                                         U Potassium   

 

                                        <10 mEq/L 

*NA*

                    (17 12: AM)                          



                                         U Chloride   

 

                                        620 mOsm/kg 

                    (17 12: AM)                          



                                         U Osmolality   



                                         [300-800 mOsm/kg]   

 

                                        None Seen 

                    (17 12: AM)                          



                                         U Eos [None Seen]   







URINE AND STOOL





                    1                   2                   3



                                         Most recent to   



                                         oldest [Reference   



                                         Range]:   

 

                                        Moderate 

*ABN*

                    (17 12: AM)                          



                                         UA Turbidity [Clear]   

 

                                        Yellow 

*NA*

                    (17 12: AM)                          



                                         UA Color   

 

                                        5.0 

                    (17 12: AM)                          



                                         UA pH [5.0-8.0]   

 

                                        1.022 

                    (17 12: AM)                          



                                         UA Spec Grav   



                                         [<=1.030]   

 

                                        500 mg/dL 

*ABN*

                    (17 12:27 AM)                          



                                         UA Glucose [Negative   



                                         mg/dL]   

 

                                        Large 

*ABN*

                    (17 12:27 AM)                          



                                         UA Blood [Negative]   

 

                                        Negative mg/dL 

*NA*

                    (17 12: AM)                          



                                         UA Ketones [Negative   



                                         mg/dL]   

 

                                        100 mg/dL 

*ABN*

                    (17 12: AM)                          



                                         UA Protein [Negative   



                                         mg/dL]   

 

                                        <=1.0 mg/dL 

*NA*

                    (17 12: AM)                          



                                         UA Urobilinogen   



                                         [0.1-1.0 mg/dL]   

 

                                        Negative 

*NA*

                    (17 12:27 AM)                          



                                         UA Bili [Negative]   

 

                                        Negative 

                    (17 12:27 AM)                          



                                         UA Leuk Est   



                                         [Negative]   

 

                                        Negative 

                    (17 12: AM)                          



                                         UA Nitrite   



                                         [Negative]   

 

                                        21 /HPF 

*HI*

                    (17 12:27 AM)                          



                                         UA WBC [0-5 /HPF]   

 

                                        Occasional /HPF 

*NA*

                    (17 12:27 AM)                          



                                         UA Bacteria [None   



                                         Seen /HPF]   

 

                                        Many /LPF 

*ABN*

                    (17 12:27 AM)                          



                                         UA Sq Epi [Few /LPF]   

 

                                        38 /LPF 

*HI*

                    (17 12:27 AM)                          



                                         UA Hyal Cast [0-2   



                                         /LPF]   

 

                                        Few /LPF 

*NA*

                    (17 12:27 AM)                          



                                         UA Mucus [None Seen   



                                         /LPF]   

 

                                        3 /LPF 

*NA*

                    (17 12:27 AM)                          



                                         UA Gran Cast   







HEMATOLOGY





                    1                   2                   3



                                         Most recent to   



                                         oldest [Reference   



                                         Range]:   

 

                                        7.3 K/CMM 

                          (17 5:45 AM)          6.8 K/CMM 

                          (17 5:20 AM)          14.7 K/CMM 

*HI*

                                        (17 4:13 AM) 



                                         WBC [3.7-10.4 K/CMM]   

 

                                        4.89 M/CMM 

                          (17 5:45 AM)          4.87 M/CMM 

                          (17 5:20 AM)          5.12 M/CMM 

                                        (17 4:13 AM) 



                                         RBC [4.70-6.10   



                                         M/CMM]   

 

                                        14.9 g/dL 

                          (17 5:45 AM)          15.0 g/dL 

                          (17 5:20 AM)          15.9 g/dL 

                                        (17 4:13 AM) 



                                         Hgb [14.0-18.0 g/dL]   

 

                                        44.5 % 

                          (17 5:45 AM)          43.4 % 

                          (17 5:20 AM)          45.6 % 

                                        (17 4:13 AM) 



                                         Hct [42.0-54.0 %]   

 

                                        90.9 fL 

                          (17 5:45 AM)          89.1 fL 

                          (17 5:20 AM)          89.1 fL 

                                        (17 4:13 AM) 



                                         MCV [80.0-94.0 fL]   

 

                                        30.4 pg 

                          (17 5:45 AM)          30.9 pg 

                          (17 5:20 AM)          31.0 pg 

                                        (17 4:13 AM) 



                                         MCH [27.0-31.0 pg]   

 

                                        33.5 g/dL 

                          (17 5:45 AM)          34.7 g/dL 

                          (17 5:20 AM)          34.8 g/dL 

                                        (17 4:13 AM) 



                                         MCHC [32.0-36.0   



                                         g/dL]   

 

                                        13.2 % 

                          (17 5:45 AM)          13.4 % 

                          (17 5:20 AM)          13.7 % 

                                        (17 4:13 AM) 



                                         RDW [11.5-14.5 %]   

 

                                        139 K/CMM 

                          (17 5:45 AM)          129 K/CMM 

*LOW*

                          (17 5:20 AM)          169 K/CMM 

                                        (17 4:13 AM) 



                                         Platelet [133-450   



                                         K/CMM]   

 

                                        9.0 fL 

                          (17 5:45 AM)          9.1 fL 

                          (17 5:20 AM)          9.2 fL 

                                        (17 4:13 AM) 



                                         MPV [7.4-10.4 fL]   

 

                                        68.5 % 

                          (17 5:45 AM)          70.4 % 

                          (17 5:20 AM)          75.8 % 

*HI*

                                        (17 4:13 AM) 



                                         Segs [45.0-75.0 %]   

 

                                        21.0 % 

                          (17 5:45 AM)          19.0 % 

*LOW*

                          (17 5:20 AM)          12.0 % 

*LOW*

                                        (17 4:13 AM) 



                                         Lymphocytes   



                                         [20.0-40.0 %]   

 

                                        7.9 % 

                          (17 5:45 AM)          8.2 % 

                          (17 5:20 AM)          11.8 % 

                                        (17 4:13 AM) 



                                         Monocytes [2.0-12.0   



                                         %]   

 

                                        2.3 % 

                          (17 5:45 AM)          2.1 % 

                          (17 5:20 AM)          0.3 % 

                                        (17 4:13 AM) 



                                         Eosinophils [0.0-4.0   



                                         %]   

 

                                        0.3 % 

                          (17 5:45 AM)          0.3 % 

                          (17 5:20 AM)          0.1 % 

                                        (17 4:13 AM) 



                                         Basophils [0.0-1.0   



                                         %]   

 

                                        5.0 K/CMM 

                          (17 5:45 AM)          4.8 K/CMM 

                          (17 5:20 AM)          11.1 K/CMM 

*HI*

                                        (17 4:13 AM) 



                                         Segs-Bands #   



                                         [1.5-8.1 K/CMM]   

 

                                        1.5 K/CMM 

                          (17 5:45 AM)          1.3 K/CMM 

                          (17 5:20 AM)          1.8 K/CMM 

                                        (17 4:13 AM) 



                                         Lymphocytes #   



                                         [1.0-5.5 K/CMM]   

 

                                        0.6 K/CMM 

                          (17 5:45 AM)          0.6 K/CMM 

                          (17 5:20 AM)          1.7 K/CMM 

*HI*

                                        (17 4:13 AM) 



                                         Monocytes # [0.0-0.8   



                                         K/CMM]   

 

                                        0.2 K/CMM 

                          (17 5:45 AM)          0.1 K/CMM 

                          (17 5:20 AM)          0.0 K/CMM 

                                        (17 10:00 PM) 



                                         Eosinophils #   



                                         [0.0-0.5 K/CMM]   

 

                                        0.0 K/CMM 

                          (17 5:45 AM)          0.0 K/CMM 

                          (17 10:00 PM)         



                                         Basophils # [0.0-0.2   



                                         K/CMM]   

 

                                        14.6 seconds 

                          (17 5:45 AM)          14.6 seconds 

                          (17 5:20 AM)          16.4 seconds 

*HI*

                                        (17 4:13 AM) 



                                         PT [12.0-14.7   



                                         seconds]   

 

                                        1.12 

                          (17 5:45 AM)          1.12 

                          (17 5:20 AM)          1.30 

*HI*

                                        (17 4:13 AM) 



                                         INR [0.85-1.17]   

 

                                        32.9 seconds 

                    (17 10:00 PM)                          



                                         PTT [22.9-35.8   



                                         seconds]   







Immunizations





Given and Recorded





   



                 Vaccine         Date            Status          Refusal Reason

 

   



                     influenza virus vaccine, inactivated     10/1/13             Given 







Procedures







   



                 Procedure       Date            Related Diagnosis     Body Site

 

   



                                         ICD - Internal cardiac defibrillator   



                                         procedure1   

 

   



                                         torn ligament2   







1placed 2.5years ago



2left knee ligament surgery



Social History







 



                           Social History Type       Response

 

 



                           Substance Abuse           Use: Current.  Type: Cocaine.  Recreational Drug Route: Inhaled.

  Amount:



                                         uses 5x a year.

 

 



                           Alcohol                   Never

 

 



                           Smoking Status            Former smoker; Type: Cigarettes; Tobacco use per day: 0; Number

 of years:



                                         2; Total pack years: 2; Started at age: 22.0; Stopped at age: 24; Previous



                                         treatment: None; Ready to change: No; Concerns about tobacco use in



                                         household: No; Exposure to Tobacco Smoke None; Cigarette Smoking Last 365



                                         Days No; Reg Smoking Cessation Counseling No







Assessment and Plan

Extracted from:





  



                     Title: Discharge Summary *     Author: Nnamdi Prieto DO     Date: 17









                                         Discharge Information

Admit date: 2017

Discharge date: May 2, 2017 at 10:35



Total time spent on discharge: 31 minutes



Discharge diagnoses:

Chest Pain

Defibrillator charge

Afib with RVR

Acute renal failure

AGMA - resolved

Hypokalemia

Hypophosphatemia

H/O chronic systolic CHF / ICMP EF 20% s/p AICD placement

Rhabdomyolysis -resolved

Transaminitis - likely 2/2 rhabdomyolysis, monitor LFT's

H/O cocaine abuse - pt has been counseled to refrain from drug abuse

H/O HTN - currently hypotensive, hold BP meds for now

Hypotension- resolved

H/O DMII - improving, hold metformin for now due to ARF, c/w glipizide, c/w 
accucheks and SSI coverage

H/O HLD



Consulting physicians: Cardiology and Nephrology



Procedures: none



History and Hospital Course:

                                        51-year-old man with high blood pressure, type 2 diabetes, chronic systolic CHF,

 EF of 20%, dilated cardiomyopathy who was brought by EMS to the emergency room 
for evaluation after he had called with complaint about his AICD discharging and
shocking him.  Patient reports that he had been in an argument with his family 
member and that was when he started having the chest pain, and the next thing he
remembered was his defibrillator going off multiple times.  The patient also 
reports palpitations with irregular heartbeat.  Patient reports chest pain, 
which is dull, widespread all over the chest.  He describes shortness of breath 
as well.  He denies any diaphoresis, denies any nausea or vomiting.  The patient
reports that he had been smoking cocaine pretty much over the weekend and that 
was the source of disagreement with his family members.  In any case, on arrival
here, his initial diagnostics did not reveal any changes in his EKG different 
from before.  He did have atrial fibrillation with rapid ventricular rate.  He 
did not have any acute ST-T wave changes.  His troponins were pretty much 
undetectable.  His chest x-ray did not show any pulmonary edema.  However, the 
patient was in acute renal failure with a creatinine of over 3; from about a 
month ago, his creatinine was within normal limits.  He also had a total CK of 
14,000 with a BNP of 881.  His cocaine was positive again in the urine drug 
screen.  He did have a leukocytosis of 16.2 with marginal bandemia but I think 
is likely die to intravasuclar contraction.  The patient was given Cardizem 
intravenously secondary to the atrial fibrillation.  His rate gradually came 
down and plan was for the patient to be admitted to inpatient status to allow 
for additional diagnostic evaluation and management to be completed.  An 
emergent call to his AICD company was done and his AICD was interrogated in the 
emergency room.  No defibrillator discharges were actually noted.  The patient 
just had atrial fibrillation and the device did try to override the patient's 
atrial fibrillation.



Patient seen and evaluated by cardiology Dr. Anderson, device interrogated. Medical 
management continued, heart rate controlled. Pt also seen by nephrology service,
rhabdo resolved with Aggressive IVF, CK downtrended, kidney injury resolved. Pt 
cleared for discharged.



Condition: Stable

Disposition: Home

Medications: Refer to the discharge med rec: arirprazole, atorvastatin, 
benztropine, corge, clonazeapm, glipizide, lisinopril, metformin, metolazone, 
pantoprazole, rivaroxaban, torsemide, ziprasidone, zolpidem

Instructions: Activity: No restrictions.

Follow-up: PCP in 1 week. Cardiology in 1 week





Extracted from:





  



                     Title: Clinical Document     Author: Sohail Anderson MD     Date: 17











PLAN & TREATMENT

Atrial Fibrillation: Rate currently controlled, continue amiodarone and ASA.

Severe Nonischemic LV Systolic DCMP: C  revealed no CAD, current 
exacerbation due to tachycardia and illicit drug use.

Rhabdomyolysis: Due to cocaine use.

Acute on CKD: Likely due to illicit drug use.

Cocaine Use: Pt (+) cor cocaine on admission, continue supportive Rx. Evidence 
of significant hemodynamic impact with tachycardia on admission, ARF, and 
rhabdomyolysis.

DM Type 2: Continue Rx.

HTN: BP Stable.



Tele: atrial with HR controlled well

Less soB

okay to d/c home, patient can drink fluid at home,

 
_____________________________________________________________________________________________

Subjective:



Exp. Problem Focused History: (1-3 HPI elements, 1 ROS)



#



ROS: no fever, no chills

________________________________________________________________________________



Objective:

Expanded Problem Focused PE





Vitals and Temp:



VitalsTmp(F)[id number]

                                         07:[id number]/6218------

                                         04:3897.429925/7118------

                                         00:2597.372896/7818------

                                         20:52--------------97---

                                         15:5297.830958/8416------



                                        24 Hr Tmax: 97.6F (36.44c) at  00:25Vital Signs are the last 5 in the past 

48 hours.



General:  No acute distress.

Respiratory:  Lungs are clear to auscultation, Respirations are non-labored.

Cardiovascular:  Normal rate, Regular rhythm, No murmur, No gallop, Normal 
peripheral perfusion, No edema.

Gastrointestinal:  Soft, Non-tender, Non-distended, Normal bowel sounds, No 
organomegaly.

Integumentary:  Clean, Dry, Intact, Warm, Moist, No rash.



                                        24hr Labs

                                         0722

Glucose VXI362  H

                                         0545

Sodium Mii680  

Potassium Lvl4.5  

Chloride Yka231  H

  L

AGAP11.5  

Glucose Hak025  H

Creatinine Lvl1.10  

BUN21  

B/C Ratio19  

Total Protein6.4  

Albumin Lvl3.3  L

Globulin3.1  

A/G Ratio1.1  

Calcium Lvl8.2  L

UOU785  H

UNF842  H

Alk Wpzp719  

Bili Total0.4  

eGFR77  

Magnesium Lvl2.2  

CK MB12.1  H

Total TN4460  H

WBC7.3  

RBC4.89  

Hgb14.9  

Hct44.5  

MCV90.9  

MCH30.4  

MCHC33.5  

RDW13.2  

Rmupjpye930  

MPV9.0  

Segs68.5  

Monocytes7.9  

Glyijqglzrn80.0  

Eosinophils2.3  

Basophils0.3  

Segs-Bands #5.0  

Lymphocytes #1.5  

Monocytes #0.6  

Eosinophils #0.2  

Basophils #0.0  

PT14.6  

INR1.12  

                                         0510

Glucose JOD597  H

                                         1955

Glucose QHM114  H

                                         1613

Glucose KJC543  H

                                         1227

Glucose VDR507  H

                                         0741

Glucose AEU182  H

## 2019-09-25 NOTE — XMS REPORT
Encounter CCD: 10/11/2013 to 10/13/2013

                             Created on: 2119



JOANNA CABRERA

External Reference #: [id number]

: 1965

Sex: Male



Demographics







                          Address                   [address] [place] [address]

[place]  [id number]

 

                          Home Phone                [phone]

 

                          Preferred Language        Unknown

 

                          Marital Status            Single

 

                          Yazidism Affiliation     Mu-ism

 

                          Race                      White/

 

                          Ethnic Group              Non-





Author







                          Author                    Auto Generated

 

                          Organization              [facility]

 

                          Address                   Unknown

 

                          Phone                     Unavailable







Care Team Providers







                    Care Team Member Name    Role                Phone

 

                    Jose Stinson    CP                  [phone]







Allergies, Adverse Reactions, Alerts







  



                     Substance           Reaction            Status

 

  



                           NKDA                      Active







Problem List







  



                     Condition           Effective Dates     Status

 

  



                     [D]Anterior chest wall pain     2012          Active

 

  



                           BP+ - Hypertension        Resolved

 

  



                           CHF - Congestive heart failure      Active

 

  



                           CHF - Congestive heart failure      Resolved

 

  



                           Depression                Resolved

 

  



                           dm                        Active

 

  



                           Down syndrome             Active

 

  



                           GERD - Gastro-esophageal reflux disease      Resolved

 

  



                           H/O: schizophrenia        Active

 

  



                           HTN                       Active

 

  



                           ICD (implantable cardiac defibrillator) battery depletion      Active

 

  



                           NIDDM                     Resolved

 

  



                           Obese build               Resolved

 

  



                           Sleep apnea               Active







Medications







    



              Medication     Instructions     Start Date     End Date     Status

 

    



                 metoprolol 5 mg/5 ml     5 mg, 5 mL, Route: IVP, Drug form:     10/11/2013      10/11/2013

                                         Completed



                           INJ                       INJ, ONCE, Dosing Weight 129.545,   



                                         kg, Priority: STAT, Start date:   



                                         10/11/13 16:31:00, Stop date:   



                                         10/11/13 16:31:00(Same as:   



                                         Lopressor)   

 

    



              ondansetron     4 mg, 2 mL, Route: IVP, Drug form:     10/11/2013     10/13/2013     Discontinued





                                         INJ, Q8H, Dosing Weight 129.545,   



                                         kg, PRN Nausea & Vomiting, Start   



                                         date: 10/11/13 17:32:00, Duration:   



                                         30 day, Stop date: 11/10/13   



                                         17:31:00(Same as: Zofran)   

 

    



              docusate     100 mg, 1 cap, Route: PO, Drug     10/11/2013     10/13/2013     Discontinued





                                         form: CAP, BID, Dosing Weight   



                                         129.545, kg, PRN Constipation,   



                                         Start date: 10/11/13 17:32:00,   



                                         Duration: 30 day, Stop date:   



                                         11/10/13 17:31:00(Same as: Colace)   



                                         (Do Not Crush)   

 

    



              acetaminophen     650 mg, 20.3 mL, Route: PO, Drug     10/11/2013     10/13/2013     Discontinued





                                         form: LIQ, Q4H, Dosing Weight   



                                         129.545, kg, PRN Pain 1-3/Temp >   



                                         100.4 F, Start date: 10/11/13   



                                         17:32:00, Duration: 30 day, Stop   



                                         date: 11/10/13 17:31:00Max   



                                         acetaminophen=4000mg/day (4   



                                         gm/day).  (Same as: Tylenol)   

 

    



              enalapril     1.25 mg, 1 mL, Route: IVP, Drug     10/11/2013     10/11/2013     Completed





                                         form: INJ, ONCE, Dosing Weight   



                                         129.545, kg, Priority: STAT, Start   



                                         date: 10/11/13 16:30:00, Stop date:   



                                         10/11/13 16:30:00(Same as:   



                                         Vasotec-IV)   

 

    



                 Remeron 15 mg oral     15 mg, 1 tab, PO, Bedtime, 30 tab,     10/11/2013      10/13/2013

                                         Discontinued



                           tablet                    Substitution Allowed, TAB   

 

    



              cyanocobalamin     5 microgram, Route: PO, Drug form:     10/12/2013     10/11/2013    

 Deleted



                                         TAB, Daily, Dosing Weight 108.004,   



                                         kg, Start date: 10/12/13 9:00:00,   



                                         Duration: 30 day, Stop date:   



                                         11/10/13 9:00:00   

 

    



              clonazepam     2 mg, 2 tab, Route: PO, Drug form:     10/11/2013     10/13/2013     Discontinued





                                         TAB, Bedtime, Dosing Weight   



                                         108.004, kg, Start date: 10/11/13   



                                         21:00:00, Duration: 30 day, Stop   



                                         date: 13 21:00:00(Same As:   



                                         Klonopin)   

 

    



              lisinopril     40 mg, 2 tab, Route: PO, Drug form:     10/12/2013     10/13/2013     Discontinued





                                         TAB, Daily, Dosing Weight 108.004,   



                                         kg, Start date: 10/12/13 9:00:00,   



                                         Duration: 30 day, Stop date:   



                                         11/10/13 9:00:00(Same as: Prinivil,   



                                         Zestril)   

 

    



              Geodon       20 mg, 1 cap, Route: PO, Drug form:     10/11/2013     10/13/2013     Discontinued





                                         CAP, Bedtime, Dosing Weight   



                                         108.004, kg, Start date: 10/11/13   



                                         21:00:00, Duration: 30 day, Stop   



                                         date: 13 21:00:00(Same As:   



                                         Geodon)Give with Food   

 

    



              Remeron      15 mg, 1 tab, Route: PO, Drug form:     10/11/2013     10/13/2013     Discontinued





                                         TAB, Bedtime, Dosing Weight   



                                         108.004, kg, Start date: 10/11/13   



                                         21:00:00, Duration: 30 day, Stop   



                                         date: 13 21:00:00(Same   



                                         as:Remeron)   

 

    



              Geodon 20 mg oral     20 mg, 1 cap, PO, Bedtime, with     10/11/2013     10/13/2013    

 Discontinued



                           capsule                   food, 180 cap, Substitution   



                                         Allowed, CAP   



                                         with food   

 

    



              Vitamin B12     1,000 microgram, 1 tab, Route: PO,     10/12/2013     10/13/2013     Discontinued





                                         Drug form: TAB, Daily, Start date:   



                                         10/12/13 9:00:00, Duration: 30 day,   



                                         Stop date: 11/10/13 9:00:00(Same   



                                         As: Vitamin B-12)   

 

    



                 insulin regular 100     5 unit, 0.05 mL, Route: SUB-Q, Drug     10/12/2013      10/13/2013

                                         Discontinued



                           units/mL human            form: SOLN, TID-Before Meals, PRN   



                           recombinant               Blood Glucose Results, Start date:   



                                         10/12/13 20:10:00, Duration: 30   



                                         day, Stop date: 13   



                                         20:09:00(Same as: Humulin R) Roll   



                                         in palms of hands gently;  Do not   



                                         shake vigorously. "single patient   



                                         use only"(Restricted to patients   



                                         requiring a dose >  60 units)   



                                         Stable for 28 days at room   



                                         temperatureExpires in ______ days   



                                         from ______________Date   

 

    



                 insulin regular 100     4 unit, 0.04 mL, Route: SUB-Q, Drug     10/12/2013      10/13/2013

                                         Discontinued



                           units/mL human            form: SOLN, TID-Before Meals, PRN   



                           recombinant               Blood Glucose Results, Start date:   



                                         10/12/13 20:10:00, Duration: 30   



                                         day, Stop date: 13   



                                         20:09:00(Same as: Humulin R) Roll   



                                         in palms of hands gently;  Do not   



                                         shake vigorously. "single patient   



                                         use only"(Restricted to patients   



                                         requiring a dose >  60 units)   



                                         Stable for 28 days at room   



                                         temperatureExpires in ______ days   



                                         from ______________Date   

 

    



                 insulin regular 100     3 unit, 0.03 mL, Route: SUB-Q, Drug     10/12/2013      10/13/2013

                                         Discontinued



                           units/mL human            form: SOLN, TID-Before Meals, PRN   



                           recombinant               Blood Glucose Results, Start date:   



                                         10/12/13 20:10:00, Duration: 30   



                                         day, Stop date: 13   



                                         20:09:00(Same as: Humulin R) Roll   



                                         in palms of hands gently;  Do not   



                                         shake vigorously. "single patient   



                                         use only"(Restricted to patients   



                                         requiring a dose >  60 units)   



                                         Stable for 28 days at room   



                                         temperatureExpires in ______ days   



                                         from ______________Date   

 

    



              furosemide     40 mg, 4 mL, Route: IVP, Drug form:     10/11/2013     10/13/2013     Discontinued





                                         INJ, Q8H, Dosing Weight 129.545,   



                                         kg, Priority: Routine, Start date:   



                                         10/11/13 23:00:00, Duration: 30   



                                         day, Stop date: 11/10/13   



                                         15:00:00(Same as: Lasix)   

 

    



                 insulin regular 100     2 unit, 0.02 mL, Route: SUB-Q, Drug     10/12/2013      10/13/2013

                                         Discontinued



                           units/mL human            form: SOLN, TID-Before Meals, PRN   



                           recombinant               Blood Glucose Results, Start date:   



                                         10/12/13 20:10:00, Duration: 30   



                                         day, Stop date: 13   



                                         20:09:00(Same as: Humulin R) Roll   



                                         in palms of hands gently;  Do not   



                                         shake vigorously. "single patient   



                                         use only"(Restricted to patients   



                                         requiring a dose >  60 units)   



                                         Stable for 28 days at room   



                                         temperatureExpires in ______ days   



                                         from ______________Date   

 

    



                 insulin regular 100     1 unit, 0.01 mL, Route: SUB-Q, Drug     10/12/2013      10/13/2013

                                         Discontinued



                           units/mL human            form: SOLN, TID-Before Meals, PRN   



                           recombinant               Blood Glucose Results, Start date:   



                                         10/12/13 20:10:00, Duration: 30   



                                         day, Stop date: 13   



                                         20:09:00(Same as: Humulin R) Roll   



                                         in palms of hands gently;  Do not   



                                         shake vigorously. "single patient   



                                         use only"(Restricted to patients   



                                         requiring a dose >  60 units)   



                                         Stable for 28 days at room   



                                         temperatureExpires in ______ days   



                                         from ______________Date   

 

    



                 furosemide 40 mg     40 mg, 1 tab, PO, Daily, 30 tab,     10/13/2013      Ordered



                           oral tablet               Substitution Allowed, TAB   

 

    



              aspirin      81 mg, 1 tab, Route: PO, Drug form:     10/12/2013     10/13/2013     Discontinued





                                         ECTAB, Daily, Dosing Weight   



                                         108.004, kg, Start date: 10/12/13   



                                         9:00:00, Duration: 30 day, Stop   



                                         date: 11/10/13 9:00:00Do not crush   



                                         or chew.(Same As: Ecotrin)   

 

    



              furosemide     40 mg, 4 mL, Route: IVP, Drug form:     10/11/2013     10/11/2013     Completed





                                         INJ, ONCE, Dosing Weight 129.545,   



                                         kg, Priority: STAT, Start date:   



                                         10/11/13 14:35:00, Stop date:   



                                         10/11/13 14:35:00(Same as: Lasix)   

 

    



              simethicone     40 mg, 0.5 tab, Route: PO, Drug     10/13/2013     10/13/2013     Discontinued





                                         form: CHEWTAB, Q6H, Dosing Weight   



                                         108.004, kg, PRN Gas, Start date:   



                                         10/13/13 13:23:00, Duration: 30   



                                         day, Stop date: 13   



                                         13:22:00(Same as: Mylicon)   

 

    



              hydromorphone     1 mg, 1 mL, Route: IVP, Drug form:     10/11/2013     10/11/2013     

Completed



                                         INJ, ONCE, Dosing Weight 129.545,   



                                         kg, Priority: STAT, Start date:   



                                         10/11/13 13:00:00, Stop date:   



                                         10/11/13 13:00:00Same as: Dilaudid   

 

    



                 influenza virus     0.5 ml, Route: IM, Drug Form: SUSP,     10/01/2013      10/01/2013

                                         Completed



                           vaccine, inactivated      Start date: 10/01/13 9:00:00, Stop   



                                         date: 10/01/13 9:00:00   

 

    



                 carvedilol 3.125 mg     3.125 mg, 1 tab, PO, Q12H, 60 tab,     10/13/2013      Ordered





                           oral tablet               Substitution Allowed, TAB   

 

    



                 Aspirin Low Dose 81     81 mg, 1 tab, PO, Daily, 30 tab,     10/13/2013      Ordered



                           mg oral tablet            Substitution Allowed, TAB   

 

    



                 Remeron 15 mg oral     15 mg, 1 tab, PO, Bedtime, 14 tab,     10/13/2013      Ordered





                           tablet                    Substitution Allowed, TAB   

 

    



              Dextrose 50% in     25 mL, Route: IVP, Start date:     10/12/2013     10/13/2013     Discontinued





                           Water IV                  10/12/13 20:10:00, Duration: 30   



                                         day, Stop date: 13 19:09:00,   



                                         PRN Blood Glucose Results   

 

    



              Dextrose 50% in     50 mL, Route: IVP, Start date:     10/12/2013     10/13/2013     Discontinued





                           Water IV                  10/12/13 20:09:00, Duration: 30   



                                         day, Stop date: 13 19:08:00,   



                                         PRN Blood Glucose Results   

 

    



                 Geodon 20 mg oral     20 mg, 1 cap, PO, Bedtime, with     10/13/2013      Ordered



                           capsule                   food, 14 cap, Substitution Allowed,   



                                         CAP   



                                         with food   

 

    



                 lisinopril 40 mg     40 mg, 1 tab, PO, Daily, 14 tab,     10/13/2013      Ordered



                           oral tablet               Substitution Allowed, TAB   

 

    



                 clonazepam 2 mg oral     2 mg, 1 tab, PO, Bedtime, 14 tab,     10/13/2013      Ordered





                           tablet                    Substitution Allowed, TAB   

 

    



                 Saline Flush 0.9%     5 mL, Route: IVP, Drug Form: INJ,     10/11/2013      10/13/2013

                                         Discontinued



                                         Dosing Weight 129.545, kg, Q8H, PRN   



                                         Line Flush, Start date: 10/11/13   



                                         13:00:00, Duration: 30 day, Stop   



                                         date: 11/10/13 12:59:00, Administer   



                                         at least once every 8 hours   



                                         Administer at least once every 8   



                                         hours(Same as: BD Posiflush)   

 

    



              hydrALAZINE     20 mg, 1 mL, Route: IV, Drug form:     10/11/2013     10/13/2013     Discontinued





                                         INJ, Q4H, PRN Elevated BP, Start   



                                         date: 10/11/13 19:42:00, Duration:   



                                         30 day, Stop date: 11/10/13   



                                         19:41:00(Same as: Apresoline)   

 

    



              Coreg        3.125 mg, 1 tab, Route: PO, Drug     10/13/2013     10/13/2013     Canceled



                                         form: TAB, Q12H, Dosing Weight   



                                         108.004, kg, Start date: 10/13/13   



                                         21:00:00, Duration: 30 day, Stop   



                                         date: 13 20:59:00Give with   



                                         food. (Same As: Coreg)   

 

    



                 BD Normal Saline     10 mL, Route: IV, Drug Form: INJ,     10/12/2013      10/13/2013 

                                         Discontinued



                           Flush                     Q8H, Start date: 10/12/13 0:00:00,   



                                         Duration: 30 day, Stop date:   



                                         11/10/13 16:00:00(Same as: BD   



                                         Posiflush)   

 

    



                 cyanocobalamin     5 microgram, PO, Daily,     10/11/2013      Ordered



                                         Substitution Allowed, TAB   







Immunizations







  



                     Vaccine             Date                Status

 

  



                     influenza virus vaccine, inactivated     10/01/2013          Auth (Verified)







Vital Signs







                Most recent to oldest [Reference Range]:    1               2               3

 

                          Height                    172.72 cm 

                          (10/11/2013 17:51:00)      172.72 cm 

                          (10/11/2013 12:38:00)       

 

                          Current Weight            97.005 kg 

                          (10/13/2013 00:00:00)      106.364 kg 

                          (10/12/2013 05:00:00)       

 

                          Temperature Oral [96.4-99.1 DegF]    98.0 DegF 

                          (10/13/2013 12:00:00)      98.1 DegF 

                          (10/13/2013 07:51:00)      97.9 DegF 

                                        (10/13/2013 04:00:00)  

 

                          Systolic Blood Pressure [ mmHg]    125 mmHg 

                          (10/13/2013 12:00:00)      125 mmHg 

                          (10/13/2013 07:51:00)      110 mmHg 

                                        (10/13/2013 04:00:00)  

 

                          Diastolic Blood Pressure [60-90 mmHg]    89 mmHg 

                          (10/13/2013 12:00:00)      91 mmHg 

*HI*

                          (10/13/2013 07:51:00)      81 mmHg 

                                        (10/13/2013 04:00:00)  

 

                          Respiratory Rate [14-20 BRMIN]    18 BRMIN 

                          (10/13/2013 12:00:00)      18 BRMIN 

                          (10/13/2013 07:51:00)      18 BRMIN 

                                        (10/13/2013 04:00:00)  

 

                          Peripheral Pulse Rate [ bpm]    68 bpm 

                          (10/13/2013 12:00:00)      81 bpm 

                          (10/13/2013 07:51:00)      70 bpm 

                                        (10/13/2013 04:00:00)  

 

                          Weight                    108.004 kg 

                          (10/11/2013 17:51:00)      129.545 kg 

                          (10/11/2013 12:38:00)       







Results





INFECTIOUS DISEASES





                Most recent to oldest [Reference Range]:    1               2               3

 

                          C difficile DNA [Negative]    Negative 1

                    (10/13/2013 07:00:00)                           







1Interpretive Data: [employer] Clostridium difficile assay utilizes 
loop-mediated isothermal DNA amplification (LAMP) technology to detect a 204 bp 
region of the tcdA gene within the PaLoc gene segment present in all known 
toxigenic C. difficile strains.



The assay utilizes FDA cleared IVD reagents. Performance characteristics have be
en verified by the [facility] within the [facility]. The [facility] is authorized under the Clinical Labo
ratory Improvement Amendment of 1988 (CLIA-88) to perform high complexity testin
g.



BEDSIDE GLUCOSE TESTING





                Most recent to  [Reference Range]:    1               2               3

 

                          Glucose POC [70-99 mg/dL]    132 mg/dL 2

*HI*

                          (10/13/2013 11:46:00)      93 mg/dL 3

                          (10/13/2013 06:24:00)      142 mg/dL 4

*HI*

                                        (10/12/2013 21:25:00)  

 

                          Gluc POC Comment 1        Notify RN/MD 

*NA*

                          (10/13/2013 11:46:00)      Notify RN/MD 

*NA*

                          (10/13/2013 06:24:00)      Notify RN/MD 

*NA*

                                        (10/12/2013 21:25:00)  







2Interpretive Data:  Upper Reportable Limit: 200 mg/dL.



3Interpretive Data:  Upper Reportable Limit: 200 mg/dL.



4Interpretive Data:  Upper Reportable Limit: 200 mg/dL.



CHEMISTRY





                Most recent to oldest [Reference Range]:    1               2               3

 

                          Sodium Lvl [135-145 mEq/L]    141 mEq/L 

                          (10/12/2013 05:29:00)      140 mEq/L 

                          (10/11/2013 13:05:00)       

 

                          Potassium Lvl [3.5-5.1 mEq/L]    3.6 mEq/L 

                          (10/12/2013 05:29:00)      4.2 mEq/L 

                          (10/11/2013 13:05:00)       

 

                          Chloride Lvl [ mEq/L]    100 mEq/L 

                          (10/12/2013 05:29:00)      104 mEq/L 

                          (10/11/2013 13:05:00)       

 

                          CO2 [24-32 mEq/L]         29 mEq/L 

                          (10/12/2013 05:29:00)      24 mEq/L 

                          (10/11/2013 13:05:00)       

 

                          AGAP [10.0-20.0 mEq/L]    15.6 mEq/L 

                          (10/12/2013 05:29:00)      16.2 mEq/L 

                          (10/11/2013 13:05:00)       

 

                          Creatinine Lvl [0.5-1.4 mg/dL]    1.2 mg/dL 

                          (10/12/2013 05:29:00)      1.3 mg/dL 

                          (10/11/2013 13:05:00)       

 

                          eGFR                      71 mL/min/1.73m2 5

*NA*

                          (10/12/2013 05:29:00)      65 mL/min/1.73m2 6

*NA*

                          (10/11/2013 13:05:00)       

 

                          BUN [7-22 mg/dL]          17 mg/dL 

                          (10/12/2013 05:29:00)      20 mg/dL 

                          (10/11/2013 13:05:00)       

 

                          B/C Ratio [6-25]          15 

                    (10/11/2013 13:05:00)                           

 

                          Glucose Lvl [70-99 mg/dL]    95 mg/dL 7

                          (10/12/2013 05:29:00)      115 mg/dL 8

*HI*

                          (10/11/2013 13:05:00)       

 

                          Total Protein [6.4-8.4 g/dL]    6.9 g/dL 

                    (10/11/2013 13:05:00)                           

 

                          Albumin Lvl [3.5-5.0 g/dL]    3.8 g/dL 

                    (10/11/2013 13:05:00)                           

 

                          Globulin [2.0-4.0 g/dL]    3.1 g/dL 

                    (10/11/2013 13:05:00)                           

 

                          A/G Ratio [0.7-1.6]       1.2 

                    (10/11/2013 13:05:00)                           

 

                          Calcium Lvl [8.5-10.5 mg/dL]    8.0 mg/dL 

*LOW*

                          (10/12/2013 05:29:00)      8.4 mg/dL 

*LOW*

                          (10/11/2013 13:05:00)       

 

                          Magnesium Lvl [1.8-2.4 mg/dL]    2.0 mg/dL 

                    (10/11/2013 13:05:00)                           

 

                          ALT [0-65 unit/L]         136 unit/L 

*HI*

                    (10/11/2013 13:05:00)                           

 

                          AST [0-37 unit/L]         153 unit/L 

*HI*

                    (10/11/2013 13::00)                           

 

                          Alk Phos [ unit/L]    126 unit/L 

                    (10/11/2013 13::00)                           

 

                          Bili Total [0.2-1.3 mg/dL]    1.7 mg/dL 

*HI*

                    (10/11/2013 13:05:00)                           

 

                          Total CK [ unit/L]    293 unit/L 

*HI*

                    (10/11/2013 13:05:00)                           

 

                          CK MB [0.5-3.6 ng/mL]     1.8 ng/mL 

                    (10/11/2013 13:05:00)                           

 

                          CK MB Index [0.0-2.5]     0.6 

                    (10/11/2013 13::00)                           

 

                          Troponin-I [0.00-0.40 ng/mL]    0.03 ng/mL 

                    (10/11/2013 13:05:00)                           

 

                          BNP [<=100 pg/mL]         2290 pg/mL 9

*HI*

                    (10/11/2013 13:05:00)                           

 

                          Digoxin Lvl [0.8-2.0 ng/mL]    <0.1 ng/mL 

*LOW*

                    (10/11/2013 13:05:00)                           







5Result Comment: The eGFR is calculated using the CKD-EPI formula. In most 
young, healthy individuals the eGFR will be >90 mL/min/1.73m2. The eGFR declines
with age. An eGFR of 60-89 may be normal in some populations, particularly the 
elderly, for whom the CKD-EPI formula has not been extensively validated. Use of
the eGFR is not recommended in the following populations:



Individuals with unstable creatinine concentrations, including pregnant patients
and those with serious co-morbid conditions.



Patients with extremes in muscle mass or diet. 



The data above are obtained from the National Kid[employer] Disease Education Program (
NKDEP) which additionally recommends that when the eGFR is used in patients with
extremes of body mass index for purposes of drug dosing, the eGFR should be mul
tiplied by the estimated BMI.



6Result Comment: The eGFR is calculated using the CKD-EPI formula. In most 
young, healthy individuals the eGFR will be >90 mL/min/1.73m2. The eGFR declines
with age. An eGFR of 60-89 may be normal in some populations, particularly the 
elderly, for whom the CKD-EPI formula has not been extensively validated. Use of
the eGFR is not recommended in the following populations:



Individuals with unstable creatinine concentrations, including pregnant patients
and those with serious co-morbid conditions.



Patients with extremes in muscle mass or diet. 



The data above are obtained from the National Kid[employer] Disease Education Program (
NKDEP) which additionally recommends that when the eGFR is used in patients with
extremes of body mass index for purposes of drug dosing, the eGFR should be mul
tiplied by the estimated BMI.



7Interpretive Data: Adult reference range values reflect the clinical guidelines

of the American Diabetes Association.



8Interpretive Data: Adult reference range values reflect the clinical guidelines

of the American Diabetes Association.



9Interpretive Data: Elevated results are in line with increasing severity of 

congestive heart failure. Minor elevations between 100 and 300 

may be seen with Myocardial Ischemia, Sodium retaining drugs, 

and compensated/treated heart failure.



HEMATOLOGY





                Most recent to oldest [Reference Range]:    1               2               3

 

                          WBC [3.7-10.4 K/CMM]      7.9 K/CMM 

                          (10/12/2013 05:29:00)      8.8 K/CMM 

                          (10/11/2013 13:05:00)       

 

                          RBC [4.70-6.10 M/CMM]     4.61 M/CMM 

*LOW*

                          (10/12/2013 05:29:00)      5.04 M/CMM 

                          (10/11/2013 13:05:00)       

 

                          Hgb [14.0-18.0 g/dL]      13.8 g/dL 

*LOW*

                          (10/12/2013 05:29:00)      14.7 g/dL 

                          (10/11/2013 13:05:00)       

 

                          Hct [42.0-54.0 %]         41.8 % 

*LOW*

                          (10/12/2013 05:29:00)      45.5 % 

                          (10/11/2013 13:05:00)       

 

                          MCV [80.0-94.0 fL]        90.6 fL 

                          (10/12/2013 05:29:00)      90.3 fL 

                          (10/11/2013 13:05:00)       

 

                          MCH [27.0-31.0 pg]        30.0 pg 

                          (10/12/2013 05:29:00)      29.1 pg 

                          (10/11/2013 13:05:00)       

 

                          MCHC [32.0-36.0 g/dL]     33.1 g/dL 

                          (10/12/2013 05:29:00)      32.2 g/dL 

                          (10/11/2013 13:05:00)       

 

                          RDW [11.5-14.5 %]         15.4 % 

*HI*

                          (10/12/2013 05:29:00)      15.2 % 

*HI*

                          (10/11/2013 13:05:00)       

 

                          Platelet [133-450 K/CMM]    241 K/CMM 

                          (10/12/2013 05:29:00)      266 K/CMM 

                          (10/11/2013 13:05:00)       

 

                          MPV [7.4-10.4 fL]         8.8 fL 

                          (10/12/2013 05:29:00)      8.7 fL 

                          (10/11/2013 13:05:00)       

 

                          Segs [45.0-75.0 %]        73.8 % 

                          (10/12/2013 05:29:00)      81.9 % 

*HI*

                          (10/11/2013 13:05:00)       

 

                          Lymphocytes [20.0-40.0 %]    13.6 % 

*LOW*

                          (10/12/2013 05:29:00)      9.1 % 

*LOW*

                          (10/11/2013 13:05:00)       

 

                          Monocytes [2.0-12.0 %]    12.1 % 

*HI*

                          (10/12/2013 05:29:00)      8.8 % 

                          (10/11/2013 13:05:00)       

 

                          Eosinophils [0.0-4.0 %]    0.1 % 

                          (10/12/2013 05:29:00)      0.1 % 

                          (10/11/2013 13:05:00)       

 

                          Basophils [0.0-1.0 %]     0.4 % 

                          (10/12/2013 05:29:00)      0.1 % 

                          (10/11/2013 13:05:00)       

 

                          Segs-Bands # [1.5-8.1 K/CMM]    5.8 K/CMM 

                          (10/12/2013 05:29:00)      7.2 K/CMM 

                          (10/11/2013 13:05:00)       

 

                          Lymphocytes # [1.0-5.5 K/CMM]    1.1 K/CMM 

                          (10/12/2013 05:29:00)      0.8 K/CMM 

*LOW*

                          (10/11/2013 13:05:00)       

 

                          Monocytes # [0.0-0.8 K/CMM]    1.0 K/CMM 

*HI*

                          (10/12/2013 05:29:00)      0.8 K/CMM 

                          (10/11/2013 13:05:00)       

 

                          Eosinophils # [0.0-0.5 K/CMM]    0.0 K/CMM 

                          (10/12/2013 05:29:00)      0.0 K/CMM 

                          (10/11/2013 13:05:00)       

 

                          Basophils # [0.0-0.2 K/CMM]    0.0 K/CMM 

                    (10/12/2013 05:29:00)

## 2019-09-25 NOTE — XMS REPORT
Encounter CCD: 10/21/2013 to 10/22/2013

                             Created on: 2073



JOANNA CABRERA

External Reference #: [id number]

: 1965

Sex: Male



Demographics







                          Address                   [address] [place] [address]

[place]  [id number]

 

                          Home Phone                [phone]

 

                          Preferred Language        Unknown

 

                          Marital Status            Single

 

                          Worship Affiliation     Judaism

 

                          Race                      White/

 

                          Ethnic Group              Non-





Author







                          Author                    Auto Generated

 

                          Organization              [facility]

 

                          Address                   Unknown

 

                          Phone                     Unavailable







Care Team Providers







                    Care Team Member Name    Role                Phone

 

                    Kalpesh Murillo     CP                  [phone]







Allergies, Adverse Reactions, Alerts







  



                     Substance           Reaction            Status

 

  



                           NKDA                      Active







Problem List







  



                     Condition           Effective Dates     Status

 

  



                     [D]Anterior chest wall pain     2012          Active

 

  



                           BP+ - Hypertension        Active

 

  



                           CHF - Congestive heart failure      Active

 

  



                           CHF - Congestive heart failure      Active

 

  



                           Depression                Active

 

  



                           dm                        Active

 

  



                           Down syndrome             Active

 

  



                           GERD - Gastro-esophageal reflux disease      Active

 

  



                           H/O: schizophrenia        Active

 

  



                           HTN                       Active

 

  



                           ICD (implantable cardiac defibrillator) battery depletion      Active

 

  



                           NIDDM                     Active

 

  



                           Obese build               Active

 

  



                           Sleep apnea               Active







Medications







    



              Medication     Instructions     Start Date     End Date     Status

 

    



              aspirin      325 mg, 1 tab, Route: PO, Drug     10/21/2013     10/21/2013     Deleted



                                         form: TAB, ONCE, Dosing Weight   



                                         90.909, kg, Priority: STAT, Start   



                                         date: 10/21/13 16:48:00, Stop date:   



                                         10/21/13 16:48:00Take with food.   

 

    



                 Dextrose 50% Syringe     12.5 gm, 25 mL, Route: IVP, Drug     10/21/2013      10/22/2013

                                         Discontinued



                                         Form: INJ, Dosing Weight 90.909,   



                                         kg, PRN, PRN Blood Glucose Results,   



                                         Start date: 10/21/13 16:42:00,   



                                         Duration: 30 day, Stop date:   



                                         13 15:41:00   

 

    



              glucagon     1 mg, Route: IM, Drug form:     10/21/2013     10/22/2013     Discontinued





                                         PDR/INJ, PRN, Dosing Weight 90.909,   



                                         kg, PRN Blood Glucose Results,   



                                         Start date: 10/21/13 16:42:00,   



                                         Duration: 30 day, Stop date:   



                                         13 15:41:00   

 

    



                 Dextrose 50% Syringe     25 gm, 50 mL, Route: IVP, Drug     10/21/2013      10/22/2013

                                         Discontinued



                                         Form: INJ, Dosing Weight 90.909,   



                                         kg, PRN, PRN Blood Glucose Results,   



                                         Start date: 10/21/13 16:42:00,   



                                         Duration: 30 day, Stop date:   



                                         13 15:41:00   

 

    



                 insulin aspart     4 unit, 0.04 mL, Route: SUB-Q, Drug     10/21/2013      10/22/2013 

                                         Discontinued



                                         form: SOLN, TID-Before Meals,   



                                         Dosing Weight 90.909, kg, PRN Blood   



                                         Glucose Results, Start date:   



                                         10/21/13 16:42:00, Duration: 30   



                                         day, Stop date: 13   



                                         16:41:00Roll in palms of hands   



                                         gently;  Do not shake vigorously.   



                                         (Same as: NovoLog)"single patient   



                                         use only"  Stable for 28 days at   



                                         room temperature.Expires in _____   



                                         days from ______________Date   

 

    



                 insulin aspart     5 unit, 0.05 mL, Route: SUB-Q, Drug     10/21/2013      10/22/2013 

                                         Discontinued



                                         form: SOLN, TID-Before Meals,   



                                         Dosing Weight 90.909, kg, PRN Blood   



                                         Glucose Results, Start date:   



                                         10/21/13 16:42:00, Duration: 30   



                                         day, Stop date: 13   



                                         16:41:00Roll in palms of hands   



                                         gently;  Do not shake vigorously.   



                                         (Same as: NovoLog)"single patient   



                                         use only"  Stable for 28 days at   



                                         room temperature.Expires in _____   



                                         days from ______________Date   

 

    



                 insulin aspart     1 unit, 0.01 mL, Route: SUB-Q, Drug     10/21/2013      10/22/2013 

                                         Discontinued



                                         form: SOLN, TID-Before Meals,   



                                         Dosing Weight 90.909, kg, PRN Blood   



                                         Glucose Results, Start date:   



                                         10/21/13 16:42:00, Duration: 30   



                                         day, Stop date: 13   



                                         16:41:00Roll in palms of hands   



                                         gently;  Do not shake vigorously.   



                                         (Same as: NovoLog)"single patient   



                                         use only"  Stable for 28 days at   



                                         room temperature.Expires in _____   



                                         days from ______________Date   

 

    



                 insulin aspart     3 unit, 0.03 mL, Route: SUB-Q, Drug     10/21/2013      10/22/2013 

                                         Discontinued



                                         form: SOLN, TID-Before Meals,   



                                         Dosing Weight 90.909, kg, PRN Blood   



                                         Glucose Results, Start date:   



                                         10/21/13 16:42:00, Duration: 30   



                                         day, Stop date: 13   



                                         16:41:00Roll in palms of hands   



                                         gently;  Do not shake vigorously.   



                                         (Same as: NovoLog)"single patient   



                                         use only"  Stable for 28 days at   



                                         room temperature.Expires in _____   



                                         days from ______________Date   

 

    



                 insulin aspart     2 unit, 0.02 mL, Route: SUB-Q, Drug     10/21/2013      10/22/2013 

                                         Discontinued



                                         form: SOLN, TID-Before Meals,   



                                         Dosing Weight 90.909, kg, PRN Blood   



                                         Glucose Results, Start date:   



                                         10/21/13 16:42:00, Duration: 30   



                                         day, Stop date: 13   



                                         16:41:00Roll in palms of hands   



                                         gently;  Do not shake vigorously.   



                                         (Same as: NovoLog)"single patient   



                                         use only"  Stable for 28 days at   



                                         room temperature.Expires in _____   



                                         days from ______________Date   

 

    



              Zocor        20 mg, 1 tab, Route: PO, Drug form:     10/21/2013     10/22/2013     Discontinued





                                         TAB, Bedtime, Start date: 10/21/13   



                                         21:00:00, Duration: 30 day, Stop   



                                         date: 13 21:00:00(Same as:   



                                         Zocor)   

 

    



              Lasix        40 mg, 4 mL, Route: IVP, Drug form:     10/21/2013     10/21/2013     Completed





                                         INJ, ONCE, Dosing Weight 90.909,   



                                         kg, Priority: STAT, Start date:   



                                         10/21/13 16:46:00, Stop date:   



                                         10/21/13 16:46:00(Same as: Lasix)   

 

    



              aspirin 81 mg     81 mg, Route: PO, Drug form:     10/22/2013     10/21/2013     Deleted





                           tablet, chewable          CHEWTAB, Daily, Dosing Weight   



                                         90.909, kg, Start date: 10/22/13   



                                         9:00:00, Duration: 30 day, Stop   



                                         date: 13 9:00:00   

 

    



                 nitroglycerin SL Tab     0.4 mg, 1 tab, Route: SL, Drug     10/21/2013      10/22/2013

                                         Discontinued



                                         form: TAB, Q5Min, Dosing Weight   



                                         90.909, kg, PRN Chest Pain, Start   



                                         date: 10/21/13 18:25:00, Duration:   



                                         3 doses or times, Stop date:   



                                         Limited # of times(Same   



                                         as:Nitroquick, Nitrostat)"Do Not   



                                         Crush"  Sublingual tablet   

 

    



                 Saline Flush 0.9%     5 ml, Route: IVP, Drug Form: INJ,     10/21/2013      10/22/2013

                                         Discontinued



                                         Dosing Weight 90.909, kg, PRN, PRN   



                                         Line Flush, Start date: 10/21/13   



                                         18:25:00, Duration: 30 day, Stop   



                                         date: 13 17:24:00(Same as: BD   



                                         Posiflush)   

 

    



                 Saline Flush 0.9%     5 ml, Route: IVP, Drug Form: INJ,     10/21/2013      10/22/2013

                                         Discontinued



                                         Dosing Weight 90.909, kg, Q12H,   



                                         Start date: 10/21/13 21:00:00,   



                                         Duration: 30 day, Stop date:   



                                         13 9:00:00(Same as: BD   



                                         Posiflush)   

 

    



                 metFORmin 500 mg     500 mg, 1 tab, PO, BID-Meals, 30     10/21/2013      Ordered



                           oral tablet               tab, Substitution Allowed   

 

    



              temazepam 30 mg oral     30 mg, 1 cap, PO, Bedtime,     10/21/2013     10/22/2013     Discontinued





                           capsule                   Substitution Allowed, CAP   

 

    



                 Saline Flush 0.9%     5 mL, Route: IVP, Drug Form: INJ,     10/21/2013      10/22/2013

                                         Discontinued



                                         Dosing Weight 90.909, kg, Q8H, PRN   



                                         Line Flush, Start date: 10/21/13   



                                         14:13:00, Duration: 30 day, Stop   



                                         date: 13 14:12:00, Administer   



                                         at least once every 8 hours   



                                         Administer at least once every 8   



                                         hours(Same as: BD Posiflush)   

 

    



              nitroglycerin 2%     0.5 inch, Route: TOP, Drug Form:     10/21/2013     10/21/2013    

 Completed



                           topical ointment          OINT, Dosing Weight 90.909, kg,   



                                         ONCE, STAT, Start date: 10/21/13   



                                         15:24:00, Stop date: 10/21/13   



                                         15:24:001 gram is approximately 1   



                                         inch of nitroglycerin ointment   



                                         (20 mg NTG per gram) (Same   



                                         as:Nitro-Bid)   

 

    



              Protonix     40 mg, 1 tab, Route: PO, Drug form:     10/21/2013     10/22/2013     Discontinued





                                         ECTAB, Before Dinner, Dosing Weight   



                                         90.909, kg, Priority: STAT, Start   



                                         date: 10/21/13 16:41:00, Duration:   



                                         30 day, Stop date: 13   



                                         16:30:00Tablet should not be chewed   



                                         or crushed.(Same as: Protonix)   

 

    



              aspirin      325 mg, 1 tab, Route: PO, Drug     10/21/2013     10/21/2013     Completed



                                         form: TAB, ONCE, Dosing Weight   



                                         90.909, kg, Priority: STAT, Start   



                                         date: 10/21/13 15:24:00, Stop date:   



                                         10/21/13 15:24:00Take with food.   

 

    



              Zofran       4 mg, 2 mL, Route: IVP, Drug form:     10/21/2013     10/22/2013     Discontinued





                                         INJ, Q8H, Dosing Weight 90.909, kg,   



                                         PRN as needed for nausea/vomiting,   



                                         Priority: STAT, Start date:   



                                         10/21/13 16:41:00, Duration: 30   



                                         day, Stop date: 13   



                                         16:40:00(Same as: Zofran)   

 

    



                 influenza virus     0.5 ml, Route: IM, Drug Form: SUSP,     10/01/2013      10/01/2013

                                         Completed



                           vaccine, inactivated      Start date: 10/01/13 9:00:00, Stop   



                                         date: 10/01/13 9:00:00   

 

    



              Geodon       20 mg, 1 cap, Route: PO, Drug form:     10/21/2013     10/22/2013     Discontinued





                                         CAP, Bedtime, Dosing Weight 90.909,   



                                         kg, Start date: 10/21/13 21:00:00,   



                                         Duration: 30 day, Stop date:   



                                         13 21:00:00(Same As:   



                                         Geodon)Give with Food   

 

    



                 Sodium Chloride 0.9%     25 mL, Route: IV, Start date:     10/21/2013      10/22/2013 

                                         Discontinued



                           IV                        10/21/13 14:20:00, Duration: 30   



                                         day, Stop date: 13 13:19:00,   



                                         PRN Line Flush   

 

    



              Remeron      15 mg, 1 tab, Route: PO, Drug form:     10/21/2013     10/22/2013     Discontinued





                                         TAB, Bedtime, Dosing Weight 90.909,   



                                         kg, Start date: 10/21/13 21:00:00,   



                                         Duration: 30 day, Stop date:   



                                         13 21:00:00(Same as:Remeron)   

 

    



              lisinopril     40 mg, 2 tab, Route: PO, Drug form:     10/22/2013     10/22/2013     Discontinued





                                         TAB, Daily, Dosing Weight 90.909,   



                                         kg, Start date: 10/22/13 9:00:00,   



                                         Duration: 30 day, Stop date:   



                                         13 9:00:00(Same as: Prinivil,   



                                         Zestril)   

 

    



                 furosemide 40 mg     40 mg, 1 tab, Route: PO, Drug form:     10/22/2013      10/22/2013

                                         Discontinued



                           oral tablet               TAB, Daily, Dosing Weight 90.909,   



                                         kg, Start date: 10/22/13 9:00:00,   



                                         Duration: 30 day, Stop date:   



                                         13 9:00:00(Same as: Lasix)   



                                         May cause GI upset.  Give with food   



                                         or milk.   

 

    



              clonazepam     2 mg, 4 tab, Route: PO, Drug form:     10/21/2013     10/22/2013     Discontinued





                                         TAB, Bedtime, Dosing Weight 90.909,   



                                         kg, Start date: 10/21/13 21:00:00,   



                                         Duration: 30 day, Stop date:   



                                         13 21:00:00(Same As:   



                                         Klonopin)   

 

    



                 BD Normal Saline     10 mL, Route: IV, Drug Form: INJ,     10/21/2013      10/22/2013 

                                         Discontinued



                           Flush                     PRN, PRN Line Flush, Start date:   



                                         10/21/13 14:20:00, Duration: 30   



                                         day, Stop date: 13   



                                         13:19:00(Same as: BD Posiflush)   

 

    



              carvedilol     3.125 mg, 1 tab, Route: PO, Drug     10/21/2013     10/22/2013     Discontinued





                                         form: TAB, Q12H, Dosing Weight   



                                         90.909, kg, Start date: 10/21/13   



                                         21:00:00, Duration: 30 day, Stop   



                                         date: 13 9:00:00Give with   



                                         food. (Same As: Coreg)   

 

    



                 simvastatin 20 mg     20 mg, 1 tab, PO, Bedtime, 30 tab,     10/22/2013      Ordered



                           oral tablet               Substitution Allowed, TAB   

 

    



                 pantoprazole 40 mg     40 mg, 1 tab, PO, Before Dinner, 30     10/22/2013      Ordered





                           oral enteric coated       tab, Substitution Allowed, ECTAB   



                                         tablet    

 

    



              aspirin      81 mg, 1 tab, Route: PO, Drug form:     10/22/2013     10/22/2013     Discontinued





                                         CHEWTAB, Daily, Dosing Weight   



                                         90.909, kg, Start date: 10/22/13   



                                         9:00:00, Duration: 30 day, Stop   



                                         date: 13 9:00:00Take with   



                                         food.   

 

    



                 acetaminophen 325 mg     650 mg, 2 tab, Route: PO, Drug     10/21/2013      10/22/2013

                                         Discontinued



                           oral tablet               form: TAB, Q4H, Dosing Weight   



                                         90.909, kg, PRN as needed for   



                                         fever, Start date: 10/21/13   



                                         16:38:00, Duration: 30 day, Stop   



                                         date: 13 16:37:00Do not   



                                         exceed 4 gm/day.  (Same as:   



                                         Tylenol)   

 

    



              Lipitor      10 mg, Route: PO, Bedtime, Dosing     10/21/2013     10/21/2013     Deleted





                                         Weight 90.909, kg, Start date:   



                                         10/21/13 21:00:00, Duration: 30   



                                         day, Stop date: 13 21:00:00   







Immunizations







  



                     Vaccine             Date                Status

 

  



                     influenza virus vaccine, inactivated     10/01/2013          Auth (Verified)







Vital Signs







                Most recent to oldest [Reference Range]:    1               2               3

 

                          Height                    172.72 cm 

                          (10/21/2013 19:16:00)      172.72 cm 

                          (10/21/2013 13:59:00)       

 

                          Temperature Oral [96.4-99.1 DegF]    96.0 DegF 

*LOW*

                          (10/22/2013 16:25:00)      96.0 DegF 

*LOW*

                          (10/22/2013 12:41:00)      96.8 DegF 

                                        (10/22/2013 07:48:00)  

 

                          Systolic Blood Pressure [ mmHg]    117 mmHg 

                          (10/22/2013 16:25:00)      108 mmHg 

                          (10/22/2013 12:41:00)      107 mmHg 

                                        (10/22/2013 07:48:00)  

 

                          Diastolic Blood Pressure [60-90 mmHg]    92 mmHg 

*HI*

                          (10/22/2013 16:25:00)      82 mmHg 

                          (10/22/2013 12:41:00)      76 mmHg 

                                        (10/22/2013 07:48:00)  

 

                          Respiratory Rate [14-20 BRMIN]    20 BRMIN 

                          (10/22/2013 16:25:00)      18 BRMIN 

                          (10/22/2013 12:41:00)      18 BRMIN 

                                        (10/22/2013 07:48:00)  

 

                          Peripheral Pulse Rate [ bpm]    64 bpm 

                          (10/22/2013 16:25:00)      55 bpm 

*LOW*

                          (10/22/2013 12:41:00)      65 bpm 

                                        (10/22/2013 07:48:00)  

 

                          Weight                    105.085 kg 

                          (10/21/2013 19:16:00)      90.909 kg 

                          (10/21/2013 13:59:00)       







Results





BEDSIDE GLUCOSE TESTING





                Most recent to  [Reference Range]:    1               2               3

 

                          Glucose POC [70-99 mg/dL]    111 mg/dL 1

*HI*

                          (10/22/2013 17:32:00)      202 mg/dL 2

*HI*

                          (10/22/2013 11:47:00)      103 mg/dL 3

*HI*

                                        (10/22/2013 07:17:00)  

 

                          Gluc POC Comment 1        Notified RN/MD 

*NA*

                    (10/21/2013 21:17:00)                           







1Interpretive Data:  Upper Reportable Limit: 200 mg/dL.



2Interpretive Data:  Upper Reportable Limit: 200 mg/dL.



3Interpretive Data:  Upper Reportable Limit: 200 mg/dL.



VIRAL - SEROLOGY





                Most recent to oldest [Reference Range]:    1               2               3

 

                          Influ A [Negative]        Negative 

                    (10/21/2013 15:55:00)                           

 

                          Influ B [Negative]        Negative 4

                    (10/21/2013 15:55:00)                           







4Interpretive Data:   Influenza A&B Antigen: 

Due to the low sensitivity of this test a negative result does not exclude influ
brendon virus infection. A diagnosis of influenza should be considered based on a p
atient's clinical presentation and empiric antiviral treatment should be conside
red, if indicated. If more conclusive testing is desired, follow-up confirmatory
 testing with either viral culture or PCR is warranted.



CHEMISTRY





                Most recent to oldest [Reference Range]:    1               2               3

 

                          Sodium Lvl [135-145 mEq/L]    139 mEq/L 

                          (10/22/2013 05:25:00)      139 mEq/L 

                          (10/21/2013 14:36:00)       

 

                          Potassium Lvl [3.5-5.1 mEq/L]    3.9 mEq/L 

                          (10/22/2013 05:25:00)      3.9 mEq/L 

                          (10/21/2013 14:36:00)       

 

                          Chloride Lvl [ mEq/L]    105 mEq/L 

                          (10/22/2013 05:25:00)      106 mEq/L 

                          (10/21/2013 14:36:00)       

 

                          CO2 [24-32 mEq/L]         24 mEq/L 

                          (10/22/2013 05:25:00)      26 mEq/L 

                          (10/21/2013 14:36:00)       

 

                          AGAP [10.0-20.0 mEq/L]    13.9 mEq/L 

                          (10/22/2013 05:25:00)      10.9 mEq/L 

                          (10/21/2013 14:36:00)       

 

                          Creatinine Lvl [0.5-1.4 mg/dL]    1.1 mg/dL 

                          (10/22/2013 05:25:00)      1.2 mg/dL 

                          (10/21/2013 14:36:00)       

 

                          eGFR                      79 mL/min/1.73m2 5

*NA*

                          (10/22/2013 05:25:00)      71 mL/min/1.73m2 6

*NA*

                          (10/21/2013 14:36:00)       

 

                          BUN [7-22 mg/dL]          25 mg/dL 

*HI*

                          (10/22/2013 05:25:00)      20 mg/dL 

                          (10/21/2013 14:36:00)       

 

                          B/C Ratio [6-25]          17 

                    (10/21/2013 14:36:00)                           

 

                          Glucose Lvl [70-99 mg/dL]    124 mg/dL 7

*HI*

                          (10/22/2013 05:25:00)      122 mg/dL 8

*HI*

                          (10/21/2013 14:36:00)       

 

                          Total Protein [6.4-8.4 g/dL]    6.5 g/dL 

                    (10/21/2013 14:36:00)                           

 

                          Albumin Lvl [3.5-5.0 g/dL]    3.5 g/dL 

                    (10/21/2013 14:36:00)                           

 

                          Globulin [2.0-4.0 g/dL]    3.0 g/dL 

                    (10/21/2013 14:36:00)                           

 

                          A/G Ratio [0.7-1.6]       1.2 

                    (10/21/2013 14:36:00)                           

 

                          Calcium Lvl [8.5-10.5 mg/dL]    8.0 mg/dL 

*LOW*

                          (10/22/2013 05:25:00)      8.6 mg/dL 

                          (10/21/2013 14:36:00)       

 

                          ALT [0-65 unit/L]         45 unit/L 

                    (10/21/2013 14:36:00)                           

 

                          AST [0-37 unit/L]         26 unit/L 

                    (10/21/2013 14:36:00)                           

 

                          Alk Phos [ unit/L]    131 unit/L 

                    (10/21/2013 14:36:00)                           

 

                          Bili Total [0.2-1.3 mg/dL]    1.1 mg/dL 

                    (10/21/2013 14:36:00)                           

 

                          Total CK [ unit/L]    118 unit/L 

                          (10/21/2013 23:10:00)      110 unit/L 

                          (10/21/2013 19:00:00)      101 unit/L 

                                        (10/21/2013 14:36:00)  

 

                          CK MB [0.5-3.6 ng/mL]     1.5 ng/mL 

                          (10/21/2013 23:10:00)      1.2 ng/mL 

                          (10/21/2013 19:00:00)      1.1 ng/mL 

                                        (10/21/2013 14:36:00)  

 

                          CK MB Index [0.0-2.5]     1.3 

                          (10/21/2013 23:10:00)      1.1 

                          (10/21/2013 19:00:00)      1.1 

                                        (10/21/2013 14:36:00)  

 

                          Troponin-I [0.00-0.40 ng/mL]    0.03 ng/mL 

                          (10/21/2013 23:10:00)      0.03 ng/mL 

                          (10/21/2013 19:00:00)      0.04 ng/mL 

                                        (10/21/2013 14:36:00)  

 

                          BNP [<=100 pg/mL]         1962 pg/mL 9

*HI*

                    (10/21/2013 14:36:00)                           







5Result Comment: The eGFR is calculated using the CKD-EPI formula. In most 
young, healthy individuals the eGFR will be >90 mL/min/1.73m2. The eGFR declines
with age. An eGFR of 60-89 may be normal in some populations, particularly the 
elderly, for whom the CKD-EPI formula has not been extensively validated. Use of
the eGFR is not recommended in the following populations:



Individuals with unstable creatinine concentrations, including pregnant patients
and those with serious co-morbid conditions.



Patients with extremes in muscle mass or diet. 



The data above are obtained from the National Kid[employer] Education Program (
NK[employer]) which additionally recommends that when the eGFR is used in patients with
extremes of body mass index for purposes of drug dosing, the eGFR should be mul
tiplied by the estimated BMI.



6Result Comment: The eGFR is calculated using the CKD-EPI formula. In most 
young, healthy individuals the eGFR will be >90 mL/min/1.73m2. The eGFR declines
with age. An eGFR of 60-89 may be normal in some populations, particularly the 
elderly, for whom the CKD-EPI formula has not been extensively validated. Use of
the eGFR is not recommended in the following populations:



Individuals with unstable creatinine concentrations, including pregnant patients
and those with serious co-morbid conditions.



Patients with extremes in muscle mass or diet. 



The data above are obtained from the National Kid[employer] Education Program (
NK[employer]) which additionally recommends that when the eGFR is used in patients with
extremes of body mass index for purposes of drug dosing, the eGFR should be mul
tiplied by the estimated BMI.



7Interpretive Data: Adult reference range values reflect the clinical guidelines

of the [employer].



8Interpretive Data: Adult reference range values reflect the clinical guidelines

of the [employer].



9Interpretive Data: Elevated results are in line with increasing severity of 

congestive heart failure. Minor elevations between 100 and 300 

may be seen with Myocardial Ischemia, Sodium retaining drugs, 

and compensated/treated heart failure.



HEMATOLOGY





                Most recent to oldest [Reference Range]:    1               2               3

 

                          WBC [3.7-10.4 K/CMM]      6.0 K/CMM 

                          (10/22/2013 05:25:00)      6.6 K/CMM 

                          (10/21/2013 14:36:00)       

 

                          RBC [4.70-6.10 M/CMM]     4.90 M/CMM 

                          (10/22/2013 05:25:00)      5.22 M/CMM 

                          (10/21/2013 14:36:00)       

 

                          Hgb [14.0-18.0 g/dL]      14.3 g/dL 

                          (10/22/2013 05:25:00)      15.2 g/dL 

                          (10/21/2013 14:36:00)       

 

                          Hct [42.0-54.0 %]         44.1 % 

                          (10/22/2013 05:25:00)      47.2 % 

                          (10/21/2013 14:36:00)       

 

                          MCV [80.0-94.0 fL]        89.9 fL 

                          (10/22/2013 05:25:00)      90.5 fL 

                          (10/21/2013 14:36:00)       

 

                          MCH [27.0-31.0 pg]        29.1 pg 

                          (10/22/2013 05:25:00)      29.2 pg 

                          (10/21/2013 14:36:00)       

 

                          MCHC [32.0-36.0 g/dL]     32.4 g/dL 

                          (10/22/2013 05:25:00)      32.3 g/dL 

                          (10/21/2013 14:36:00)       

 

                          RDW [11.5-14.5 %]         15.6 % 

*HI*

                          (10/22/2013 05:25:00)      15.4 % 

*HI*

                          (10/21/2013 14:36:00)       

 

                          Platelet [133-450 K/CMM]    198 K/CMM 

                          (10/22/2013 05:25:00)      227 K/CMM 

                          (10/21/2013 14:36:00)       

 

                          MPV [7.4-10.4 fL]         9.2 fL 

                          (10/22/2013 05:25:00)      8.9 fL 

                          (10/21/2013 14:36:00)       

 

                          Segs [45.0-75.0 %]        70.6 % 

                          (10/22/2013 05:25:00)      73.0 % 

                          (10/21/2013 14:36:00)       

 

                          Lymphocytes [20.0-40.0 %]    17.7 % 

*LOW*

                          (10/22/2013 05:25:00)      16.7 % 

*LOW*

                          (10/21/2013 14:36:00)       

 

                          Monocytes [2.0-12.0 %]    9.5 % 

                          (10/22/2013 05:25:00)      9.4 % 

                          (10/21/2013 14:36:00)       

 

                          Eosinophils [0.0-4.0 %]    1.6 % 

                          (10/22/2013 05:25:00)      0.7 % 

                          (10/21/2013 14:36:00)       

 

                          Basophils [0.0-1.0 %]     0.6 % 

                          (10/22/2013 05:25:00)      0.2 % 

                          (10/21/2013 14:36:00)       

 

                          Segs-Bands # [1.5-8.1 K/CMM]    4.3 K/CMM 

                          (10/22/2013 05:25:00)      4.8 K/CMM 

                          (10/21/2013 14:36:00)       

 

                          Lymphocytes # [1.0-5.5 K/CMM]    1.1 K/CMM 

                          (10/22/2013 05:25:00)      1.1 K/CMM 

                          (10/21/2013 14:36:00)       

 

                          Monocytes # [0.0-0.8 K/CMM]    0.6 K/CMM 

                          (10/22/2013 05:25:00)      0.6 K/CMM 

                          (10/21/2013 14:36:00)       

 

                          Eosinophils # [0.0-0.5 K/CMM]    0.1 K/CMM 

                          (10/22/2013 05:25:00)      0.0 K/CMM 

                          (10/21/2013 14:36:00)       

 

                          Basophils # [0.0-0.2 K/CMM]    0.0 K/CMM 

                          (10/22/2013 05:25:00)      0.0 K/CMM 

                          (10/21/2013 14:36:00)       

 

                          PT [12.0-14.7 seconds]    18.5 seconds 

*HI*

                    (10/21/2013 14:36:00)                           

 

                          INR [0.85-1.17]           1.57 10

*HI*

                    (10/21/2013 14:36:00)                           

 

                          PTT [22.9-35.8 seconds]    30.2 seconds 11

                    (10/21/2013 14:36:00)                           







10Interpretive Data: RECOMMENDED RANGES FOR PROTIME INR:

   2.0-3.0 for most medical and surgical thromboembolic states.

   2.5-3.5 for artificial heart valves and recurrent embolism.



INR SHOULD BE USED ONLY FOR PATIENTS ON STABLE ANTICOAGULANT THERAPY.



11Interpretive Data: Heparin Therapeutic Range:  57 - 92 Seconds

## 2019-09-25 NOTE — XMS REPORT
Encounter CCD: 2014 to 2014

                             Created on: 07/15/2052



JOANNA CABRERA

External Reference #: [id number]

: 1965

Sex: Male



Demographics







                          Address                   [address] [place] [address]

[place]  [id number]

 

                          Home Phone                [phone]

 

                          Preferred Language        Unknown

 

                          Marital Status            Single

 

                          Catholic Affiliation     Sikhism

 

                          Race                      White/

 

                          Ethnic Group              Non-





Author







                          Author                    Auto Generated

 

                          Organization              [facility]

 

                          Address                   Unknown

 

                          Phone                     Unavailable







Care Team Providers







                    Care Team Member Name    Role                Phone

 

                    Italo Arambula    CP                  [phone]







Allergies, Adverse Reactions, Alerts







  



                     Substance           Reaction            Status

 

  



                           NKDA                      Active







Problem List







  



                     Condition           Effective Dates     Status

 

  



                     [D]Anterior chest wall pain     2012          Active

 

  



                           Anxiety                   Resolved

 

  



                           BP+ - Hypertension        Active

 

  



                           Cardiomyopathy            Resolved

 

  



                           CHF - Congestive heart failure      Active

 

  



                           CHF - Congestive heart failure      Active

 

  



                           Depression                Active

 

  



                           dm                        Active

 

  



                           Down syndrome             Active

 

  



                           GERD - Gastro-esophageal reflux disease      Active

 

  



                           H/O: schizophrenia        Active

 

  



                           HTN                       Active

 

  



                           ICD (implantable cardiac defibrillator) battery depletion      Active

 

  



                           NIDDM                     Active

 

  



                           Obese build               Active

 

  



                           Schizophrenia             Active

 

  



                           Short of breath on exertion      Active

 

  



                           Sleep apnea               Active







Medications







    



              Medication     Instructions     Start Date     End Date     Status

 

    



              aspirin      324 mg, 4 tab, Route: PO, Drug     2014     Completed



                                         form: CHEWTAB, ONCE, Dosing Weight   



                                         106.818, kg, Priority: STAT, Start   



                                         date: 14 4:32:00, Stop date:   



                                         14 4:32:00Take with food.   

 

    



                 Saline Flush 0.9%     5 mL, Route: IVP, Drug Form: INJ,     2014

                                         Discontinued



                                         Dosing Weight 106.818, kg, Q8H, PRN   



                                         Line Flush, Start date: 14   



                                         4:32:00, Duration: 30 day, Stop   



                                         date: 14 4:31:00, Administer   



                                         at least once every 8 hours   



                                         Administer at least once every 8   



                                         hours(Same as: BD Posiflush)   

 

    



              ondansetron     4 mg, 2 mL, Route: IVP, Drug form:     2014     Completed





                                         INJ, ONCE, Dosing Weight 106.818,   



                                         kg, Priority: STAT, Start date:   



                                         14 4:32:00, Stop date:   



                                         14 4:32:00(Same as: Zofran)   

 

    



                 tramadol 50 mg oral     50 mg=1 tab, PO, Q6H, Pain, # 40     2014

                                         Ordered



                           tablet                    tab, 0 Refill(s)   

 

    



                 ondansetron 4 mg     4 mg=1 ea, PO, Q8H, # 10 tab, 0     2014      Ordered



                           oral disintegrating       Refill(s)   



                                         strip    

 

    



                 influenza virus     0.5 ml, Route: IM, Drug Form: SUSP,     10/01/2013      10/01/2013

                                         Completed



                           vaccine, inactivated      Start date: 10/01/13 9:00:00, Stop   



                                         date: 10/01/13 9:00:00   

 

    



              Lasix        40 mg, 4 mL, Route: IVP, Drug form:     2014     Completed





                                         INJ, ONCE, Dosing Weight 106.818,   



                                         kg, Priority: STAT, Start date:   



                                         14 8:17:00, Stop date:   



                                         14 8:17:00(Same as: Lasix)   







Immunizations







  



                     Vaccine             Date                Status

 

  



                     influenza virus vaccine, inactivated     10/01/2013          Auth (Verified)







Vital Signs







                Most recent to oldest [Reference Range]:    1               2               3

 

                          Height                    172.72 cm 

                    (2014 04:18:00)                           

 

                          Temperature Oral [96.4-99.1 DegF]    97.7 DegF 

                          (2014 09:10:00)      97.7 DegF 

                          (2014 08:38:00)      97.6 DegF 

                                        (2014 04:18:00)  

 

                          Systolic Blood Pressure [ mmHg]    141 mmHg 

*HI*

                          (2014 09:10:00)      145 mmHg 

*HI*

                          (2014 08:38:00)      135 mmHg 

                                        (2014 06:29:00)  

 

                          Diastolic Blood Pressure [60-90 mmHg]    101 mmHg 

*HI*

                          (2014 09:10:00)      108 mmHg 

*HI*

                          (2014 08:38:00)      105 mmHg 

*HI*

                                        (2014 06:29:00)  

 

                          Respiratory Rate [14-20 BRMIN]    16 BRMIN 

                          (2014 09:10:00)      16 BRMIN 

                          (2014 08:38:00)      16 BRMIN 

                                        (2014 06:29:00)  

 

                          Peripheral Pulse Rate [ bpm]    84 bpm 

                          (2014 09:10:00)      88 bpm 

                          (2014 08:38:00)      89 bpm 

                                        (2014 06:29:00)  

 

                          Weight                    106.818 kg 

                    (2014 04:18:00)                           







Results





URINALYSIS





                          Most recent to oldest [Reference Range]:    1

 

                          UA Turbidity [Clear]      Slight 

*ABN*

                                        (2014 05:25:57)  

 

                          UA Color [Yellow]         Orange 

*ABN*

                                        (2014 05:25:57)  

 

                          UA pH [5.0-8.0]           6.0 

                                        (2014 05:25:57)  

 

                          UA Spec Grav [<=1.030]    1.028 

                                        (2014 05:25:57)  

 

                          UA Glucose [Negative mg/dL]    Negative mg/dL 

*NA*

                                        (2014 05:25:57)  

 

                          UA Blood [Negative]       Negative 

                                        (2014 05:25:57)  

 

                          UA Ketones                TR 

*NA*

                                        (2014 05:25:57)  

 

                          UA Protein [Negative mg/dL]    >=300 mg/dL 

*ABN*

                                        (2014 05:25:57)  

 

                          UA Urobilinogen [0.1-1.0 mg/dL]    8.0 mg/dL 

*HI*

                                        (2014 05:25:57)  

 

                          UA Bili [Negative]        Moderate 

*ABN*

                                        (2014 05:25:57)  

 

                          UA Leuk Est [Negative]    Negative 

                                        (2014 05:25:57)  

 

                          UA Nitrite [Negative]     Negative 

                                        (2014 05:25:57)  

 

                          UA Bacteria [None Seen /HPF]    Moderate /HPF 

*ABN*

                                        (2014 05:25:57)  

 

                          UA Sq Epi [Few /LPF]      Moderate /LPF 

*ABN*

                                        (2014 05:25:57)  

 

                          UA Mucus [None Seen /LPF]    Few /LPF 

*NA*

                                        (2014 05:25:57)  







CHEMISTRY





                          Most recent to oldest [Reference Range]:    1

 

                          Sodium Lvl [135-145 mEq/L]    138 mEq/L 

                                        (2014 05:15:00)  

 

                          Potassium Lvl [3.5-5.1 mEq/L]    4.4 mEq/L 

                                        (2014 05:15:00)  

 

                          Chloride Lvl [ mEq/L]    106 mEq/L 

                                        (2014 05:15:00)  

 

                          CO2 [24-32 mEq/L]         22 mEq/L 

*LOW*

                                        (2014 05:15:00)  

 

                          AGAP [10.0-20.0 mEq/L]    14.4 mEq/L 

                                        (2014 05:15:00)  

 

                          Creatinine Lvl [0.5-1.4 mg/dL]    1.2 mg/dL 

                                        (2014 05:15:00)  

 

                          eGFR                      71 mL/min/1.73m2 1

*NA*

                                        (2014 05:15:00)  

 

                          BUN [7-22 mg/dL]          19 mg/dL 

                                        (2014 05:15:00)  

 

                          B/C Ratio [6-25]          16 

                                        (2014 05:15:00)  

 

                          Glucose Lvl [70-99 mg/dL]    131 mg/dL 2

*HI*

                                        (2014 05:15:00)  

 

                          Total Protein [6.4-8.4 g/dL]    6.7 g/dL 

                                        (2014 05:15:00)  

 

                          Albumin Lvl [3.5-5.0 g/dL]    3.7 g/dL 

                                        (2014 05:15:00)  

 

                          Globulin [2.0-4.0 g/dL]    3.0 g/dL 

                                        (2014 05:15:00)  

 

                          A/G Ratio [0.7-1.6]       1.2 

                                        (2014 05:15:00)  

 

                          Calcium Lvl [8.5-10.5 mg/dL]    8.5 mg/dL 

                                        (2014 05:15:00)  

 

                          ALT [0-65 unit/L]         65 unit/L 

                                        (2014 05:15:00)  

 

                          AST [0-37 unit/L]         50 unit/L 

*HI*

                                        (2014 05:15:00)  

 

                          Alk Phos [ unit/L]    155 unit/L 

*HI*

                                        (2014 05:15:00)  

 

                          Bili Total [0.2-1.3 mg/dL]    2.6 mg/dL 

*HI*

                                        (2014 05:15:00)  

 

                          Lipase Lvl [ unit/L]    83 unit/L 

                                        (2014 05:15:00)  

 

                          Total CK [ unit/L]    93 unit/L 

                                        (2014 05:15:00)  

 

                          CK MB [0.5-3.6 ng/mL]     1.0 ng/mL 

                                        (2014 05:15:00)  

 

                          CK MB Index [0.0-2.5]     1.1 

                                        (2014 05:15:00)  

 

                          Troponin-I [0.00-0.40 ng/mL]    <0.02 ng/mL 

                                        (2014 05:15:00)  

 

                          BNP [<=100 pg/mL]         2058 pg/mL 3

*HI*

                                        (2014 05:15:00)  







1Result Comment: The eGFR is calculated using the CKD-EPI formula. In most 
young, healthy individuals the eGFR will be >90 mL/min/1.73m2. The eGFR declines
with age. An eGFR of 60-89 may be normal in some populations, particularly the 
elderly, for whom the CKD-EPI formula has not been extensively validated. Use of
the eGFR is not recommended in the following populations:



Individuals with unstable creatinine concentrations, including pregnant patients
and those with serious co-morbid conditions.



Patients with extremes in muscle mass or diet. 



The data above are obtained from the National Kidney Disease Education Program (
NKDEP) which additionally recommends that when the eGFR is used in patients with
extremes of body mass index for purposes of drug dosing, the eGFR should be mul
tiplied by the estimated BMI.



2Interpretive Data: Adult reference range values reflect the clinical guidelines

of the American Diabetes Association.



3Interpretive Data: Elevated results are in line with increasing severity of 

congestive heart failure. Minor elevations between 100 and 300 

may be seen with Myocardial Ischemia, Sodium retaining drugs, 

and compensated/treated heart failure.



HEMATOLOGY





                          Most recent to oldest [Reference Range]:    1

 

                          WBC [3.7-10.4 K/CMM]      8.1 K/CMM 

                                        (2014 05:15:00)  

 

                          RBC [4.70-6.10 M/CMM]     5.27 M/CMM 

                                        (2014 05:15:00)  

 

                          Hgb [14.0-18.0 g/dL]      14.0 g/dL 

                                        (2014 05:15:00)  

 

                          Hct [42.0-54.0 %]         43.2 % 

                                        (2014 05:15:00)  

 

                          MCV [80.0-94.0 fL]        81.9 fL 

                                        (2014 05:15:00)  

 

                          MCH [27.0-31.0 pg]        26.6 pg 

*LOW*

                                        (2014 05:15:00)  

 

                          MCHC [32.0-36.0 g/dL]     32.5 g/dL 

                                        (2014 05:15:00)  

 

                          RDW [11.5-14.5 %]         22.5 % 

*HI*

                                        (2014 05:15:00)  

 

                          Platelet [133-450 K/CMM]    213 K/CMM 

                                        (2014 05:15:00)  

 

                          MPV [7.4-10.4 fL]         8.9 fL 

                                        (2014 05:15:00)  

 

                          Segs [45.0-75.0 %]        78.8 % 

*HI*

                                        (2014 05:15:00)  

 

                          Lymphocytes [20.0-40.0 %]    12.2 % 

*LOW*

                                        (2014 05:15:00)  

 

                          Monocytes [2.0-12.0 %]    8.0 % 

                                        (2014 05:15:00)  

 

                          Eosinophils [0.0-4.0 %]    0.7 % 

                                        (2014 05:15:00)  

 

                          Basophils [0.0-1.0 %]     0.3 % 

                                        (2014 05:15:00)  

 

                          Segs-Bands # [1.5-8.1 K/CMM]    6.4 K/CMM 

                                        (2014 05:15:00)  

 

                          Lymphocytes # [1.0-5.5 K/CMM]    1.0 K/CMM 

                                        (2014 05:15:00)  

 

                          Monocytes # [0.0-0.8 K/CMM]    0.7 K/CMM 

                                        (2014 05:15:00)  

 

                          Eosinophils # [0.0-0.5 K/CMM]    0.1 K/CMM 

                                        (2014 05:15:00)  

 

                          Basophils # [0.0-0.2 K/CMM]    0.0 K/CMM 

                                        (2014 05:15:00)  

 

                          Polychrom [None Seen]     Slight 

                                        (2014 05:15:00)  

 

                          Tear Cell [None Seen]     Slight 

*ABN*

                                        (2014 05:15:00)  

 

                          Elliptocyte [None Seen]    Slight 

*ABN*

                                        (2014 05:15:00)  

 

                          Plt Morph                 Normal 

                                        (2014 05:15:00)  

 

                          PT [12.0-14.7 seconds]    20.4 seconds 

*HI*

                                        (2014 05:15:00)  

 

                          INR [0.85-1.17]           1.78 4

*HI*

                                        (2014 05:15:00)  

 

                          PTT [22.9-35.8 seconds]    30.7 seconds 5

                                        (2014 05:15:00)  







4Interpretive Data: RECOMMENDED RANGES FOR PROTIME INR:

   2.0-3.0 for most medical and surgical thromboembolic states.

   2.5-3.5 for artificial heart valves and recurrent embolism.



INR SHOULD BE USED ONLY FOR PATIENTS ON STABLE ANTICOAGULANT THERAPY.



5Interpretive Data: Heparin Therapeutic Range:  57 - 92 Seconds

## 2019-09-25 NOTE — NUR
Nutrition Screen Note



RD Recommendation for Physician: 

- Rec adding low sodium to ADA 1800 diet as medically appropriate 



Plan of Care: RD following, monitoring for tolerance and adequacy



Nutrition reason for involvement:

Nutrition Risk Trigger  MST 



Primary Diagnose(s): Afib, chest pain 



PMH: CHF, Diabetes mellitus



Ht: 67in 

Wt: 240lb

BMI: 37.6kg/m2

IBW: 148lb +/- 10%



RD Assessment:

(9/25) Chart reviewed. Labs and meds reviewed. 55yo M, who was admitted for chest pain and 
Afib. Visited pt in the room. Pt complained of some abdominal discomfort due to hx of 
diverticulosis. Pt denied any nausea or vomiting. Pt denied any chewing or swallowing 
difficulty. BG was running ~300-400; Per RN, pt was constantly asking for fruit juices this 
AM. Pt has attempted to cut down on fluids and juices intake in the past but was 
unsuccessful. Pt refused any diet education during my time of visit. Will continue to 
monitor and follow. 



Current Diet: ADA 1800



Malnutrition Evaluation (9/25/2019)

The patient does not meet criteria for a specified degree of malnutrition at this time. Will 
re-evaluate at follow-up as appropriate. 



Energy intake:

Adequate PO intake reported 



Weight loss:

No weight loss reported 



Fat loss: No loss 

Muscle loss: No loss 



Supporting Evidence:

Fluid accumulation:  fluid overload on lasix 

Functional Status: no changes 



Diet Education Needs Assessment:

Diet education indicated, pt was not interested.  Pt received prior diet education 
regarding CHF and DM but was not compliant with diet restrictions.  



Nutrition Care Level: low 



Signed: Elena Alexandra, MS, RD, LD

## 2019-09-26 NOTE — NUR
Patient refusing to have vitals taken and removed telemonitor without notifying RN. He 
states,"I want to sleep"

## 2019-09-26 NOTE — PROGRESS NOTE
DATE:  09/26/2019

 

Cardiology Progress Note 

 

SUBJECTIVE:  Denies any chest pain, stable shortness of breath.  Feels fatigued and

tired with continued diarrhea.  No abdominal discomfort today. 

 

OBJECTIVE:  VITAL SIGNS:  Temperature 97.7, heart rate 85, respiratory rate 127/88, O2

saturation 95%, respiratory rate 22, BMI 37.5. 

GENERAL:  In no acute distress, alert. 

NECK:  No JVD. 

CHEST:  Clear to auscultation. 

CARDIOVASCULAR:  Irregularly irregular rate and rhythm.  Normal S1 and S2.  No S3 or S4. 

ABDOMEN:  Soft.  Bowel sounds positive, distended.  No rebound or guarding. 

EXTREMITIES:  Trace edema.

 

CARDIOVASCULAR MEDICATIONS:  Reviewed.

1. Atorvastatin 40 mg at bedtime.

2. Carvedilol 37.5 mg every 12 hours.

3. Was on metoprolol, which was discontinued to maintain single beta-blocker.

4. Lisinopril was discontinued, given worsening creatinine.

5. Furosemide 40 mg IV b.i.d., will be switched to p.o.

6. Xarelto 20 mg daily.

 

STUDIES:  Reviewed.  Sodium 137, potassium 3.5, chloride 98, bicarbonate 30.  BUN 35,

creatinine 1.7, glucose 100.  White blood cells 12.8, hemoglobin 12.9, platelets 177.

INR 1.1.  Serial cardiac enzymes, troponin negative x3. 

 

, , alkaline phosphatase 131.

 

ASSESSMENT:  

1. Abnormal LFTs in the setting of abdominal discomfort pending further evaluation.

2. Acute-on-chronic systolic heart failure.

3. Atrial fibrillation.

4. Acute kidney injury on chronic kidney disease.

5. Morbid obesity.

6. History of atrial fibrillation.

 

RECOMMENDATIONS:  

1. Discontinue atorvastatin, given abnormal LFTs and further workup advised.

2. Okay to hold ACE inhibitor, given worsening renal function, monitor.

3. Transition IV to p.o. diuretics for now as the patient seems to be euvolemic.

4. Continue rate control strategy for atrial fibrillation.

5. The patient is on Xarelto, which is his home anticoagulation.  If any plans for

procedures, can hold his medication. 

 

 

 

 

______________________________

Devin Burnette MD

 

AFV/MODL

D:  09/26/2019 15:57:26

T:  09/26/2019 22:29:44

Job #:  [id number]

## 2019-09-26 NOTE — NUR
PATIENT REFUSES TO WEAR TELEMETRY AND DR NEAL STATES TO NOTATE THAT PATIENT REFUSES INSTEAD 
OF D/C TELE. PATIENT ALSO NON COMPLIANT WITH NC FOR O2.

## 2019-09-26 NOTE — NUR
patient agreeded with being on portable telemetry, and then after 30 minutes removed 
telemetry and threw it on the floor, states "he doesnt want to wear it because it shocks 
him", dr moore informed of pateints refusal to wear telemetry, no new orders

## 2019-09-27 NOTE — NUR
Patient came back in his room at this time. Patient took his all medicines as order without 
any issued. Patient refused telemetry monitor. Will continue to monitor.

## 2019-09-27 NOTE — CONSULTATION
DATE OF CONSULTATION:  09/27/2019

 

Psychiatric Consultation 

 

REASON FOR CONSULTATION:  To evaluate the patient's psychosis.

 

HISTORY OF PRESENT ILLNESS:  The patient is a 54 years old  male, admitted to

the hospital for atrial fibrillation and chest pain.  Psychiatric consultation is called

to evaluate the patient's mood and psychosis.  As per medical record, the patient has

atrial fibrillation, bilateral pleural effusion, schizophrenia, diabetes, anxiety,

depression, hypothyroidism, hypertension, reflux. 

 

Upon evaluation today, the patient is found to be in the room.  The patient is alert,

awake, and oriented to situation.  He is calm and cooperative at this time.  He denies

any depression or anxiety.  He denies any hallucination.  He denies any suicidal or

homicidal ideation.  He reports sleeping and eating are okay.  The patient claims that

he has history of schizoaffective disorder for 19 years and has been taking Geodon.  He

sees a psychiatrist,  __________ outpatient.  He claims that pain was initially

started due to hearing waves at night time.  He denies any hallucinations at this time. 

 

As per nursing staff, the patient has been uncooperative, refusing to put on telemetry,

and noncompliance with oxygen at times.  He sometimes put himself on the floor.  He is

difficult to be redirected. 

 

PAST PSYCHIATRIC HISTORY:  The patient reports history of schizoaffective disorder,

depression, and anxiety for 19 years.  He denies past suicide attempts. 

 

SOCIAL HISTORY:  He denies alcohol or drug use.

 

FAMILY HISTORY:  Denies.

 

SOCIAL HISTORY:  The patient lives in a nursing home, [facility].

 

MENTAL STATUS EXAM:  The patient is a middle-aged  male.  He is alert, awake,

and oriented to situation.  His mood is fair.  He denies any suicidal or homicidal

ideation.  Affect is congruent with mood.  Psychomotor state is passive.  Insight and

judgment are limited.  Thought process is concrete to simple.  Thought content is no

delusion elicited. 

 

CURRENT MEDICATIONS:  

1. Klonopin 0.5 at bedtime.

2. Insulin.

3. Lasix.

4. Coreg.

5. Levaquin.

6. Flagyl.

7. Ambien 10 mg p.o. at bedtime.

8. Protonix.

9. Xarelto.

10. Tylenol #3.

11. Acetaminophen.

12. Zofran.

13. Dextrose.

 

CURRENT LABS:  WBC 10.84, RBC 4.48, hemoglobin 12.8, hematocrit 38.3, platelets 165.

Sodium 134, potassium 3.6, chloride 97.  BUN 26, creatinine 1.46.  , ALT 1170. 

 

ASSESSMENT:  Schizoaffective disorder, bipolar type.

 

PLAN:  Plan is to continue with:

1. Ambien 10 mg p.o. at bedtime.

2. Continue Klonopin 0.5 mg p.o. at bedtime.

3. Add Abilify 2 mg p.o. q.6 hours p.r.n.

4. Add Ativan 0.5 mg p.o. q.6 hours p.r.n.

5. Add Abilify 2 mg p.o. daily.

6. Add Abilify 2 mg p.o. q.6 hours p.r.n. agitation.

7. Add Haldol 1 mg IM q.6 hours p.r.n. for severe agitation.

8. Monitor for aggression and mood. 

Thank you for this consultation. 

 

Discussed with the nursing staff.

 

 

Dictated by Diana Reed PA-C

 

______________________________

Zev Zelaya MD

 

QTV/MODL

D:  09/27/2019 14:10:41

T:  09/27/2019 21:48:18

Job #:  [id number]

## 2019-09-27 NOTE — NUR
Report received from USMAN Vasquez. Walking round done. Patient was not in the room. Patient was 
in . Notified to Charge nurse Jo Ann at this time.

## 2019-09-27 NOTE — PROGRESS NOTE
DATE:  09/27/2019

 

Cardiology Progress Note 

 

SUBJECTIVE:  No complaints.  Denies chest pain or shortness of breath today.  Continues

to feel tired. 

 

OBJECTIVE:  VITAL SIGNS:  Temperature 98.8, heart rate 92, blood pressure 112/80,

respiratory rate 20, and O2 saturation 100%. 

GENERAL:  No acute distress.  Alert. 

NECK:  No JVD. 

CHEST:  Clear to auscultation. 

CARDIOVASCULAR:  Irregularly irregular rate and rhythm.  Normal S1, S2. 

ABDOMEN:  Soft, nontender. 

EXTREMITIES:  No edema.

 

CARDIOVASCULAR MEDICATION:  Reviewed.  Furosemide 40 mg IV daily, Xarelto 20 mg daily,

carvedilol 37.5 mg every 12 hours. 

 

STUDIES:  Reviewed.  Sodium 134, potassium 3.6, chloride at 97, bicarbonate 28, BUN 26,

creatinine 1.4, glucose 279.  White blood cells 10.8, hemoglobin 12.8, and platelets

165.  INR 1.1.  , ALT 1170, total bilirubin 1.  Alkaline phosphatase 131. 

 

ASSESSMENT:  

1. A 54-year-old man, presents with abdominal discomfort and diarrhea, noted to have

significant elevation in transaminases; workup advised. 

2. Renal function abnormal, chronic kidney disease with possible component of resolving

acute kidney injury. 

3. Anemia.

4. Leukocytosis.

5. Chronic atrial fibrillation.

6. Acute-on-chronic severe systolic heart failure, known diagnosis.

7. Schizophrenia.

8. Morbid obesity.

 

RECOMMENDATION:  

1. Continue current cardiovascular medications, however, as needed if any procedure

planned can hold Xarelto. 

2. Approaching euvolemic state on exam, continue diuretic at current dose.

3. LFTs, abnormal, workup advised.

 

 

 

 

______________________________

MD VERITO Snyder/JOURDAN

D:  09/27/2019 14:13:20

T:  09/27/2019 17:17:16

Job #:  [id number]

## 2019-09-27 NOTE — DIAGNOSTIC IMAGING REPORT
Abdominal ultrasound.





History: Abnormal LFTs.



Comparison: CT abdomen 9/24/2019.



Discussion: Transverse and longitudinal images of the abdomen were obtained

demonstrating a liver of normal size but increased echogenicity measuring 16.8

cm in length. There is no focal hepatic abnormality. The portal vein is patent

with hepatopetal flow and is within normal limits measuring 8 mm in diameter.  

          

The biliary tree is within normal limits with the common bile duct measuring 2

mm in diameter. The gallbladder is normal without evidence of stones, wall

thickening, or pericholecystic fluid. The sonographic Villatoro's sign was

negative. 

          

The kidneys are normal in size and echogenicity bilaterally without evidence of

hydronephrosis, stones, or mass. The right kidney measures 12.3 cm and the left

kidney measures 12.6 cm in length. 



The spleen is normal in size and appearance measuring 10.5 cm in length.  The

pancreatic head and body are visualized and are normal in appearance. The

abdominal aorta and IVC are within normal limits.  There is no evidence of free

fluid.





IMPRESSION: 

Mild fatty infiltration of the liver without focal hepatic abnormality.

Otherwise unremarkable abdominal ultrasound. There is no evidence of

cholelithiasis.



 



Signed by: Erick Maynard on 9/27/2019 10:56 AM

## 2019-09-27 NOTE — NUR
Patient in . Patient assessment and vital signs completed in , patient 
tolerated well. Patient stated that "I would like to stay here thirty more minutes then I 
will be in my room". Will continue to monitor.

## 2019-09-28 NOTE — CONSULTATION
DATE OF CONSULTATION:  09/27/2019

 

GI consult note. 

 

REASON FOR CONSULT:  Abnormal liver enzymes.

 

HISTORY OF PRESENTING ILLNESS:  A 54 years white male with a history of schizophrenia.

I do not get any good history from the patient.  He is very evasive.  He starts from one

complaint and totally he goes to other complaint, which are not at all interrelated.  I

am being consulted to evaluate acute elevation in liver enzymes.  He has a history of

atrial fibrillation, on Xarelto.  He also has type 2 diabetes, hypertension,

hypothyroidism.  He got admitted with chest pain and rapid heartbeat as well as

palpitation.  The patient was noted to be in rapid ventricular response.  Initial blood

work on admission on 09/24/2019, showed normal liver enzymes.  However, the liver

enzymes jumped up significantly yesterday.  The patient reported no abdominal pain.  AST

went to 643 from 29, ALT 1170 from 17, alkaline phosphatase 137 to 131, total bilirubin

1.4 to 1.0.  Ultrasound of the abdomen showed fatty liver, no gallstones, no biliary

ductal dilation.  GI has been consulted for further evaluation and recommendation. 

 

REVIEW OF SYSTEMS:

Twelve point system reviewed, symptomatology is limited as per HPI.

 

PAST MEDICAL HISTORY:  Congestive heart failure, atrial fibrillation, dyslipidemia,

hypertension, schizophrenia, obesity, colonic diverticulosis, and type 2 diabetes. 

 

PAST SURGICAL HISTORY:  Noncontributory.

 

FAMILY HISTORY:  Negative for any GI or GYN malignancies.

 

SOCIAL HISTORY:  Mentally disabled from schizophrenia.  He lives in [facility].  No

smoking, alcohol, or any illicit drug use. 

 

ALLERGIES:  NONE.

 

HOME MEDICATION:  Tylenol No. 3, atorvastatin, carvedilol, clonazepam, insulin detemir,

lisinopril, metformin, pantoprazole, Xarelto, torsemide, and zolpidem. 

 

INPATIENT MEDICATIONS:  List reviewed as per MAR.

 

PHYSICAL EXAMINATION:

VITAL SIGNS:  Temperature 99.3, pulse 99, respirations 20, blood pressure 110/82, and

oxygen saturation 100% on room air. 

GENERAL:  Not in any acute distress. 

HEENT:  Oral mucosa is moist.  Anicteric sclerae. 

CARDIOVASCULAR SYSTEM:  S1, S2.  Irregularly irregular with a 3/6 flow murmur at the

apex. 

LUNGS:  Bilaterally grossly clear. 

ABDOMEN:  Protuberant belly and nontender.  No palpable mass or hernia.  Positive bowel

sounds. 

EXTREMITIES:  Warm.  No leg edema.

LABORATORY DATA:  Sodium 137, potassium 3.5, chloride 98, bicarb 30, BUN 35, creatinine

1.73, and glucose 20.  Liver enzymes showed a total bilirubin 1.0, , ALT 1170,

alkaline phosphatase 137.  WBC 10.84, hemoglobin 12.8, hematocrit 38.3, MCV 85.5, and

platelet count 165.  PT 15.1 and INR 1.13.  Ultrasound of the abdomen showed mild fatty

infiltration of the liver without focal hepatic abnormality.  No gallstones.  Biliary

tree within normal limits with common bile duct measuring about 2 mm in diameter. 

 

IMPRESSION:  Acutely abnormal liver enzymes with aminotransferases in 1000 in the

patient with the setting of atrial fibrillation, rapid ventricular response.  This might

have resulted into hypotension, low blood flow to the liver.  As a result of that, the

patient developed shock liver.  On the top of that, he also has congestive heart

failure.  Therefore, he also has concomitant congestive hepatopathy. 

 

PLAN:  Continue present medical management.  Supportive care.  LFTs are expected to

improve with correction of underlying problem. 

 

Nothing to offer from GI standpoint at this time except keep monitoring liver enzymes. 

 

I thank, Dr. Arambula, for allowing me to participate in the care of this patient.

 

 

 

 

______________________________

Adan Schulz MD SA/JOURDAN

D:  09/27/2019 20:00:46

T:  09/28/2019 02:04:00

Job #:  [id number]

## 2019-09-28 NOTE — PROGRESS NOTE
DATE:  09/28/2019

 

Cardiology Progress Note 

 

SUBJECTIVE:  Denies any chest pain or shortness of breath.  Continues to feel fatigued.

 

OBJECTIVE:  VITAL SIGNS:  Temperature 97.9, heart rate 88, blood pressure 92/64,

respiratory rate 18, O2 saturation 98%, and BMI 37.5. 

GENERAL:  In no acute distress.  Alert. 

NECK:  No JVD. 

CHEST:  Clear to auscultation. 

CARDIOVASCULAR:  Irregularly irregular rate and rhythm.  Normal S1, S2.  No S3 or S4.

Systolic ejection murmur. 

ABDOMEN:  Soft, nontender.  Bowel sounds positive. 

EXTREMITIES:  Trace edema.  Warm distal extremities. 

SKIN:  Intact and dry.  Mucosa is moist. 

NEURO:  Nonfocal.

 

CARDIOVASCULAR MEDICATIONS:  Reviewed.

1. Furosemide 40 mg daily. 

2. Xarelto 20 mg daily.

3. Carvedilol 37.5 mg every 12 hours.

 

STUDIES:  Reviewed.  Sodium 136, potassium 3.4, chloride 98, bicarbonate 30, BUN 20,

creatinine 1.38, and glucose 258.  White blood cell 7.8, hemoglobin 13, and platelets

165.  INR 1.1, PT 15, and PTT 31.  , , alkaline phosphatase 140, and total

bilirubin 1.1. 

 

 

 

 

______________________________

MD VERITO Snyder/JOURDAN

D:  09/28/2019 14:24:50

T:  09/28/2019 17:00:06

Job #:  [id number]

## 2019-09-28 NOTE — NUR
Patient received lying in bed. AAO x 3. Patient had no complaints of pain. No signs of 
respiratory distress. Fall precautions implemented. Patient has telemetry box in the room 
but refuses to put in on because it feels uncomfortable, Patient instructed on the 
importance of telemetry monitoring, but still declined.  Patient instructed to call for 
assistance when needed. Call light within reach.

## 2019-09-28 NOTE — PROGRESS NOTE
DATE:  09/28/2019

 

Cardiology Progress Note. 

 

ADDENDUM:  

 

CARDIOVASCULAR MEDICATIONS:  __________, Xarelto 20 mg daily, carvedilol 37.5 mg every

12 hours. 

 

STUDIES:  Sodium 136, potassium 3.4, chloride 98, bicarbonate 30, BUN 20, creatinine

1.38, glucose 258.  White blood cell 7.8, hemoglobin 13, platelets 155.  INR 1.1.  AST

214, , alkaline phosphatase 140, total bilirubin 1.1.  Atrial fibrillation on

tele review. 

 

ASSESSMENT:  A 54-year-old man presents with abdominal discomfort, diarrhea, and

abnormal LFTs.  Has history of chronic atrial fibrillation with transient episode of

rapid ventricular response severe, acute on chronic systolic heart failure with known

cardiomyopathy for which he follows up as an outpatient at [facility]

with Dr. Moran. 

 

RECOMMENDATIONS:  

1. Continue current cardiovascular medications.

2. Appreciate GI input.

3. Upon discharge, advised patient on followup with his cardiologist.

 

 

 

 

______________________________

MD VERITO Snyder/JOURDAN

D:  09/28/2019 14:27:26

T:  09/28/2019 16:59:23

Job #:  [id number]

## 2019-09-30 NOTE — PROGRESS NOTE
DATE:  09/30/2019  

 

SUBJECTIVE:  The patient reports no abdominal pain.  Tolerating oral diet.  Regular

bowel movement. 

 

REVIEW OF SYSTEMS:

GENERAL:  No fever or chills. 

CVS:  No chest pain, palpitation has resolved. 

RESPIRATORY:  No cough or expectoration.

 

MEDICATIONS:  Reviewed as per MAR.

 

PHYSICAL EXAMINATION:

VITAL SIGNS:  Temperature 96.6, pulse 84, respirations 19, blood pressure 116/88, oxygen

saturation 100% on room air. 

GENERAL:  Not in any acute distress.  Oral mucosa is moist.  Anicteric sclerae. 

ABDOMEN:  Soft, protuberant belly, nondistended, nontender.  No palpable mass or hernia.

 Positive bowel sounds. 

LABORATORY DATA:  WBC 8.70, hemoglobin 13.2, hematocrit 40.3, and platelet count 165.

Serum iron profile showed iron 60, TIBC 347, iron saturation 17, transferrin 248,

ferritin 99.63.  Sodium 134, potassium 3.8, chloride 100, bicarb 27, BUN 17, creatinine

1.24.  Liver enzymes showed total bilirubin 1.2, direct bilirubin 0.6, AST has come down

to 26 from 214, ALT is down to 432 from 946, alkaline phosphatase 124 from 140. 

 

IMPRESSION:  Acute ischemic hepatitis, this is resolving, and liver test is almost

normalizing. 

 

PLAN:  Continue monitoring liver enzymes until it normalizes.  Continue present medical

management.  I will follow him clinically. 

 

 

 

 

______________________________

Adan Schulz MD SA/JOURDAN

D:  09/30/2019 20:18:20

T:  09/30/2019 23:53:03

Job #:  [id number]

## 2019-09-30 NOTE — NUR
IMM letter delivered and explained to pt. He verbalized understanding. Signed copy placed in 
chart. Copy to pt.

## 2019-09-30 NOTE — NUR
Patient restless, agitated, throwing his gown/sheets on the floor. Haldol 1 mg administered. 
Will continue to monitor patient.

## 2019-09-30 NOTE — PROGRESS NOTE
DATE:  09/30/2019

 

Cardiology Progress Note 

 

SUBJECTIVE:  Denies any chest pain or shortness of breath.  Feels fatigued.

 

OBJECTIVE:  VITAL SIGNS:  Temperature 96.8, heart rate 79, blood pressure 118/86,

respiratory rate 22, O2 saturation 95%. 

GENERAL:  No acute distress, alert. 

NECK:  No JVD. 

CHEST:  Clear to auscultation bilaterally. 

CARDIOVASCULAR:  Irregular rate and rhythm, normal S1, S2, normal rate.  Systolic

ejection murmur 1/6.  No S3.  No S4. 

ABDOMEN:  Soft, nontender.  Bowel sounds positive. 

EXTREMITIES:  No edema.  Warm distal extremities.

 

CARDIOVASCULAR MEDICATIONS:  Reviewed.  Furosemide 40 mg daily, Xarelto 20 mg daily,

carvedilol 37.5 mg every 12 hours. 

 

LABORATORY DATA:  Reviewed.  Sodium 134, potassium 3.8, chloride 100, bicarbonate 27,

BUN 17, creatinine 1.24, glucose 249.  White blood cells 8.7, hemoglobin 13.2, platelets

165.  INR 1.1, PT 15, PTT 31.2.  AST 26, , alkaline phosphatase 124. 

 

ASSESSMENT:  A 54-year-old man presents with atrial fibrillation, acute-on-chronic

systolic heart failure, morbid obesity, abdominal discomfort, diarrhea now resolved, and

abnormal LFTs now improving. 

 

RECOMMENDATIONS:  

1. Continue current cardiovascular medications.

2. Upon discharge, follow up with Dr. Moran in [facility], who is

the patient's treating cardiologist. 

 

 

 

 

______________________________

MD VERITO Snyder/JOURDAN

D:  09/30/2019 08:59:50

T:  09/30/2019 11:32:02

Job #:  [id number]

## 2019-09-30 NOTE — PROGRESS NOTE
DATE:  09/30/2019

 

Psychiatric Progress Note 

 

SUBJECTIVE:  The patient is evaluated and events noted.  The patient is in the room.  He

is alert, awake, and oriented to situation.  He is calm at this time, but he did get

Haldol p.r.n. IM earlier this morning for current agitation, throwing stuff on the

floor, has gownishy dress look.  The patient denies any depression.  Denies anxiety.

Denies any hallucination.  Denies any suicidal ideation.  He denies any problem with

sleep or appetite.  He is getting medication to help with sleep and appetite. 

 

ASSESSMENT:  Schizoaffective disorder, bipolar type.

 

PLAN OF TREATMENT:  

1. Continue with Ambien 10 mg p.o. at bedtime.

2. Continue Klonopin 0.5 mg p.o. at bedtime.

3. Continue Abilify p.r.n. p.o.

4. Continue Ativan p.r.n. p.o.

5. Continue with Haldol p.r.n. IM.

6. Continue with Abilify 2 mg p.o. daily.

7. Monitor for mood.

8. Supportive therapy.

 

 

Dictated by Dinaa Reed PA-C

 

______________________________

MD HIRO KirklandV/JOURDAN

D:  09/30/2019 16:27:33

T:  09/30/2019 19:33:16

Job #:  [id number]

## 2019-10-01 NOTE — NUR
patient alert and oriented. discharge instructions given at this time, patient verbalized 
understanding. Both IVs discontinued at this time, catheters in tact and small dressing 
applied. patient to be wheeled out to private [facility] for return to [facility].

## 2019-10-01 NOTE — PROGRESS NOTE
DATE:  09/30/2019

 

Cardiology Progress Note 

 

SUBJECTIVE:  Denies chest pain or shortness of breath.  Feels fatigue.

 

OBJECTIVE:  VITAL SIGNS:  Temperature 97 degrees, heart rate 95, blood pressure 119/76,

respiratory rate 20, O2 saturation 98%.  BMI 38. 

GENERAL:  In no acute distress, alert. 

NECK:  No JVD. 

CHEST:  Clear to auscultation. 

CARDIOVASCULAR:  Irregularly irregular rate and rhythm.  Normal S1 and S2. 

ABDOMEN:  Soft. 

EXTREMITIES:  Trace edema.

 

CARDIOVASCULAR MEDICATIONS:  Furosemide 40 mg daily, Xarelto 20 mg daily, carvedilol

37.5 mg every 12 hours. 

 

LABORATORY DATA:  Labs reviewed.  Significant findings, transaminases trending down.

 

ASSESSMENT:  

1. A 54-year-old man presents with atrial fibrillation with episode of rapid ventricular

response, now improved. 

2. Acute-on-chronic severe systolic heart failure.

3. Morbid obesity.

4. Abnormal LFTs, improving.

 

RECOMMENDATIONS:  Continue current cardiovascular medications.  Upon discharge, follow

up with Dr. Moran at [facility], patient's treating cardiologist. 

 

 

 

 

______________________________

MD MARY SnyderV/MODL

D:  10/01/2019 19:08:29

T:  10/01/2019 22:31:39

Job #:  [id number]

## 2019-10-02 NOTE — DISCHARGE SUMMARY
CONSULTANT:  

1. Dr. Adan Schulz.

2. Dr. Zev Zelaya.

3. Dr. Devin Burnette.

 

FINAL DIAGNOSES:  

1. Acute exacerbation of atrial fibrillation with rapid ventricular rate response.

Medication adjustment was made. 

2. Acute-on-chronic systolic dysfunction, congestive heart failure exacerbation.  The

patient has ICD and EF approximately 25%. 

3. Congested hepatitis with increase in LFT, resolved.

 

SUMMARY:  This is a 54-year-old male with multiple medical problems including behavioral

health problem, came in with acute-on-chronic systolic dysfunction, congestive heart

failure with pulmonary edema.  The patient with fluid overload.  He also with congested

hepatitis.  Liver enzymes, AST as high as 643 and ALT was 1170.  The patient's liver

enzyme is normalizing.  The patient is otherwise stable.  He diuresis well.  He is not

short of breath anymore.  He does not require oxygen.  He is saturating well.

Adjustment of his medication is made.  The patient will go home today.  He will stop the

Demadex and start on Lasix 40 mg twice a day and potassium 20 mEq daily.  The patient

will resume his other home medication.  We will discontinue lisinopril due to blood

pressure and also renal insufficiency.  The patient is otherwise 

stable.  He will resume on his psychiatric medication.  The patient is stable,

discharged back to assisted living today. 

 

 

 

 

______________________________

MD SULEMAN Gaston/JOURDAN

D:  10/01/2019 09:24:55

T:  10/02/2019 07:16:56

Job #:  [id number]

## 2019-10-19 NOTE — DIAGNOSTIC IMAGING REPORT
EXAMINATION:  CHEST SINGLE (PORTABLE)    



INDICATION:      

^dizziness

^[id number]

^[id number]



COMPARISON:  9/24/2019

     

FINDINGS:  AP view   



TUBES and LINES:  Stable single view left chest wall cardiac device



LUNGS: Limited by body habitus and low lung volumes.       Central vascular

congestion.



PLEURA:  No significant pleural effusion or pneumothorax.



HEART AND MEDIASTINUM:  The cardiomediastinal silhouette is enlarged.    



BONES AND SOFT TISSUES:  No acute osseous lesion.  Soft tissues are

unremarkable.



UPPER ABDOMEN: No free air under the diaphragm.    



IMPRESSION: 

Limited as above.

Enlarged cardiomediastinal silhouette and central vascular congestion,

accentuated by low lung volumes.





Signed by: Dr. Nakul Britton MD on 10/19/2019 9:40 PM

## 2019-10-29 NOTE — DIAGNOSTIC IMAGING REPORT
EXAMINATION:  CHEST 2 VIEWS    



INDICATION: Cough



COMPARISON: Chest radiograph 10/19/2019

     

FINDINGS:



LINES/TUBES:Left chest AICD unchanged.



LUNGS:The lungs are well-inflated. No focal consolidation or pulmonary edema.



PLEURA:No pleural effusion or pneumothorax.



MEDIASTINUM:The cardiomediastinal silhouette appears unchanged in size and

shape.



BONES/SOFT TISSUES:No acute osseous injury.



ABDOMEN:No free air under the diaphragm.





IMPRESSION: 

No focal pneumonia or pulmonary edema.



Signed by: Wilma Gutierrze MD on 10/29/2019 12:15 PM

## 2019-10-29 NOTE — XMS REPORT
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 throat: negative for ear drainage, earaches, hearing loss.  

Respiratory: negative for cough, sputum, hemoptysis.  

Cardiovascular:  Negative for chest pain, dyspnea, or palpitations. 

Gastrointestinal: negative for nausea or vomiting 

Genitourinary: negative for dysuria. 

Skin: negative for rash, skin lesion(s). 

Endocrine: please see HPI 

Allergic/Immunologic: negative for urticaria, and anaphylaxis. 



Past Medical Histories

Current Outpatient Medications 

Medication Sig Dispense Refill 

 acetaminophen (TYLENOL) 650 MG CR tablet Take 650 mg by mouth.   

 Blood Glucose Monitoring Suppl (ONETOUCH VERIO) w/Device KIT Check blood sug
ars 4 times daily; Dx E11.65 1 Kit 0 

 carvedilol (COREG) 25 MG tablet Take 1 Tab by mouth two times daily. 60 Tab 
5 

 clonazepam (KLONOPIN) 1 MG tablet 1 mg two times daily.   

 Continuous Blood Gluc  (FREESTYLE PAUL 14 DAY READER) ALEXIS 1 Kit Us
e as Directed. Check glucose 4x daily; Dx E 11.65 1 Device 0 

 Continuous Blood Gluc Sensor (FREESTYLE PAUL 14 DAY SENSOR) MISC 3 Each luciano
ry 14 days. Check glucose 4x daily; Dx E11.65 3 Each 5 

 digoxin (LANOXIN) 125 MCG tablet Take 125 mcg by mouth daily.   

 docusate sodium (COLACE) 100 MG capsule Take 1 Cap by mouth two times daily.
60 Cap 2 

 Insulin Detemir (LEVEMIR FLEXTOUCH) 100 UNIT/ML SOPN ADMINISTER 32 UNITS UND
ER THE SKIN EVERY NIGHT AT BEDTIME 9 mL 3 

 Insulin Glulisine (APIDRA SOLOSTAR) 100 UNIT/ML SOPN INJECT 12 TO 20 UNTIS U
NDER THE SKIN THREE TIMES DAILY BEFORE A MEAL AS DIRECTED 48 mL 3 

 Insulin Pen Needle (BD PEN NEEDLE RANDEE U/F) 32G X 4 MM MISC Use as directed 
to inject 4 times daily 200 Each 5 

 Lancets Misc. (UNISTIK 3 COMFORT) MISC 1 Each four times daily. 200 Each 11 

 metformin (GLUCOPHAGE) 500 MG tablet TAKE 1 TABLET BY MOUTH TWICE DAILY 180 
Tab 0 

 ONETOUCH DELICA LANCETS 33G MISC Check glucose 4x daily; Dx E11.65 400 Each 
10 

 ONETOUCH DELICA LANCETS 33G MISC 1 Each four times daily. 200 Each 11 

 ONETOUCH VERIO TEST FOUR TIMES DAILY 300 Strip 0 

 oxcarbazepine (TRILEPTAL) 600 MG tablet nightly.   

 pantoprazole (PROTONIX) 40 MG tablet daily.   

 Rivaroxaban (XARELTO) 20 MG TABS Take 20 mg by mouth daily.   

 SAPHRIS 5 MG SUBL    

 torsemide (DEMADEX) 20 MG tablet Take 60mg in the morning and 20mg in the ev
ening 90 Tab 3 

 Vitamin D, Ergocalciferol, 72344 units CAPS Take 1 tablet by mouth every 7 d
ays for 12 doses. 12 Cap 0 

 zolpidem (AMBIEN) 10 MG tablet Take 15 mg by mouth nightly as needed for Sle
ep.   



No current facility-administered medications for this visit.  



Allergies 

Allergen Reactions 

 Potassium Chloride Nausea And Vomiting 

  Cannot take potassium liquid.  Can only take potassium pills with milk. 

 Sodium Chloride Nausea Only 

 Potassium-Containing Compounds Nausea And Vomiting 



Past Medical History: 

Diagnosis Date 

 Blunt head trauma 2003 

 Cocaine dependence in remission (HCCode)  

 none since  

 Elevated LFT's 2011 

 ALT 66 

 Heart failure 2011 

 Dx at [facility] 

 Hyperlipidemia, mixed 10/5/1999 

 Hypertension  

 TERRENCE (obstructive sleep apnea) 2011 - AHI 83 with SaO2 jonnathan 72% - titrated to CPAP 17cm,  - restudied a
nd apnea+hypopnea index (AHI) was 38.5, and SaO2 jonnathan was 81.0% 

 RHINITIS, ALLERGIC, DUE TO POLLEN 10/5/1999 

 Schizoaffective disorder 2001 

 Type II or unspecified type diabetes mellitus with peripheral circulatory di
sorders, not stated as uncontrolled(250.70) 2014 



Past Surgical History: 

Procedure Laterality Date 

 HX KNEE SURGERY   



Family History 

Problem Relation Name Age of Onset 

 Diabetes Father   

 Cancer Father   

     liver diagnosis,  9 month after Diagnosis, 7 years ago, 

 High Blood Pressure Mother   

 Elevated Lipids Mother   

 Heart  Disease Maternal Grandfather   



Social History 



Tobacco Use 

 Smoking status: Never Smoker 

 Smokeless tobacco: Never Used 

 Tobacco comment: Trying to quit 2011. 

Substance Use Topics 

 Alcohol use: No 

  Alcohol/week: 0.0 oz 

  Comment: Alcoholic Drinks/day: no 

 Drug use: Not Currently 

  Comment: Drug use: crack 3.5 years ago, on/off 15 years, once a month 



Physical Exam

Vitals: 

 10/17/19 1452 

BP: 114/78 

BP Location: right arm 

Patient Position: Sitting 

Cuff Size: regular 

Pulse: 104 

Resp: 16 

Weight: 229 lb (103.9 kg) 

Height: 5' 6" (1.676 m) 



Body mass index is 36.96 kg/m.



General:  alert, well appearing, and in no distress. 

Eyes: EOMI, no ptosis

Neck: no acanthosis nigricans, no thyromegaly or LAD

Lungs:  Symmetric breathing comfortable on room air

Heart: normal rate, regular rhythmn

Abdomen:  Abdomen soft and non-tender

Extremities: no pitting edema



Diabetic Foot Exam - 2019



Laboratory Studies 

A1c

Lab Results 

Component Value Date 

 HGBA1C 9.5 (H) 10/14/2019 

 HGBA1C 8.7 (H) 2019 

 HGBA1C 9.1 (H) 2019 

 HGBA1C 7.5 (H) 12/10/2018 

 HGBA1C 6.7 (A) 2018 



Lipid: 

LDL CHOLESTEROL CALCULATED 

Date Value 

10/14/2019 41 MG/DL 

2019 73 MG/DL 

2018 71 MG/DL 

2016 61 MG/DL 

2015 72 mg/dL (calc) 

 



Microalbumin:

Lab Results 

Component Value Date 

 MICROALBUR 1.3 2019 

 MICROALBUR 1.0 2018 

 MICROALBUR <0.2 2018 

 MICROALBUR 2.4 (H) 2016 

 MICROALBUR 0.5 2015 



Lab Results 

Component Value Date 

 CREATININE 1.55 (H) 2019 

 CREATININE TEST NOT PERFORMED 2019 

 CREATININE 1.56 (H) 2019 

 CREATININE 1.69 (H) 04/15/2019 

 CREATININE 1.61 (H) 2019 



Impression & Plan

Vlaeriy Vasquez is a 54 y.o. male who presents for Follow-up Visit - [address] (BS this morning 248)



Type 2 diabetes, insulin-dependent, poorly controlled

Plan:

-Continue metformin 500 mg bid

-Increase to levemir 25 units twice daily

-Continue humalog 

- Please bring blood glucose to your next office visit

-needs eye examination



Education

-Counseled checking blood glucoses.

-Counseled signs/symptoms of hypoglycemia.  Hypoglycemia precautions and managem
ent discussed.

-Counseled to eat all meals on time to avoid hypoglycemia

-Counseled patient to wear socks/shoes that are loose and examine feet daily



Follow up in 3 months with pre-clinic labs.



Discussed case with faculty, Dr. Pereira.



Mauri Ortiz M.D.

[facility] Endocrine Fellow, PGY-5







Electronically signed by Mauri Ortiz MD at 10/21/2019  6:06 PM CDT

documented in this encounter



Plan of Treatment







                          Care Team                 Description



                     Date                Type                Specialty  

 

                                        



Ralf Pereira MD



[address]



[place] [address]



[phone] (Fax)                       



                     2020          Office Visit        Endocrinology  









                                        Order Schedule



                 Name            Type            Priority        Associated Diagnoses 

 

                                        Expected: 2020, Expires: 10/17/2024



                 HEMOGLOBIN A1C     Lab             Routine         Type 2 diabetes mellitus 



                                         with insulin therapy 



                                         (HCCode) 

 

                                        Expected: 2020, Expires: 10/17/2024



                 LIPID PANEL     Lab             Routine         Type 2 diabetes mellitus 



                                         with insulin therapy 



                                         (HCCode) 









   



                 Health Maintenance     Due Date        Last Done       Comments

 

   



                           COLON CANCER SCREENIN1965  



                                         COLONOSCOPY   

 

   



                           MEDICARE AWV              1965  

 

   



                           TETANUS SHOT (ADULT)      1980  

 

   



                           ANNUAL DIABETIC           1983  



                                         RETINOPATHY SCREENING   

 

   



                           BMI FOLLOW UP PLAN        1983  

 

   



                     FLU VACCINE > 6 MONTHS     2019, 2001 

 

   



                     A1C TESTING EVERY 6     2020          10/14/2019, 2019, 2019, 



                           MONTHS                    Additional history exists 

 

   



                     ANNUAL DIABETIC FOOT EXAM     2020, 2019, 2017, 



                                         Additional history exists 

 

   



                     HEPATITIS C SCREENING     Completed           04/15/2019 

 

   



                     HIV SCREENING       Completed           04/15/2019 



documented as of this encounter



Results

Not on filedocumented in this encounter



Visit Diagnoses











                                         Diagnosis

 





                                         Type 2 diabetes mellitus with insulin therapy (HCCode) - Primary



                                         Type II or unspecified type diabetes mellitus without mention of complication, 

not stated as



                                         uncontrolled

 





                                         Uncontrolled type 2 diabetes mellitus with complication, with long-term current

 use of insulin



                                         (HCCode)



documented in this encounter



Insurance







                                        Type



            Payer      Benefit     Subscriber ID     Effective     Phone      Address 



                           Plan /                    Dates   



                                         Group     

 

                                        Medicare [employer]     [employer]     xxxxxxxxx     3/1/2018-P      [address] 



                     [address]                resent              [id number] 



                           [place], [place] 



                           PLAN                      [id number] 









     



            Guarantor Name     Account     Relation to     Date of     Phone      Billing Address



                     Type                Patient             Birth  

 

     



            Valeriy Vasquez     Personal/F     Self       1965     [phone]     [phone] 

[facility]



                     [address]aitie               (Home)              [place] [address] [phone] 



                                         (Work) 



documented as of this encounter



Advance Directives







                          Patient Representative    Explanation



                           Type                      Date Recorded  

 

                                                     



                                         Advance Directives   



                                         and Living Will   

 

                                                     



                                         Power of

## 2020-02-09 ENCOUNTER — HOSPITAL ENCOUNTER (EMERGENCY)
Dept: HOSPITAL 88 - ER | Age: 55
Discharge: HOME | End: 2020-02-09
Payer: MEDICARE

## 2020-02-09 VITALS — BODY MASS INDEX: 38.3 KG/M2 | WEIGHT: 244 LBS | HEIGHT: 67 IN

## 2020-02-09 VITALS — DIASTOLIC BLOOD PRESSURE: 100 MMHG | SYSTOLIC BLOOD PRESSURE: 140 MMHG

## 2020-02-09 DIAGNOSIS — R07.9: Primary | ICD-10-CM

## 2020-02-09 DIAGNOSIS — E11.9: ICD-10-CM

## 2020-02-09 DIAGNOSIS — F41.9: ICD-10-CM

## 2020-02-09 DIAGNOSIS — K21.9: ICD-10-CM

## 2020-02-09 DIAGNOSIS — I48.91: ICD-10-CM

## 2020-02-09 DIAGNOSIS — I10: ICD-10-CM

## 2020-02-09 LAB
ALBUMIN SERPL-MCNC: 4 G/DL (ref 3.5–5)
ALBUMIN/GLOB SERPL: 1.4 {RATIO} (ref 0.8–2)
ALP SERPL-CCNC: 101 IU/L (ref 40–150)
ALT SERPL-CCNC: 16 IU/L (ref 0–55)
ANION GAP SERPL CALC-SCNC: 16.9 MMOL/L (ref 8–16)
BASOPHILS # BLD AUTO: 0 10*3/UL (ref 0–0.1)
BASOPHILS NFR BLD AUTO: 0.2 % (ref 0–1)
BUN SERPL-MCNC: 12 MG/DL (ref 7–26)
BUN/CREAT SERPL: 9 (ref 6–25)
CALCIUM SERPL-MCNC: 8.6 MG/DL (ref 8.4–10.2)
CHLORIDE SERPL-SCNC: 105 MMOL/L (ref 98–107)
CK MB SERPL-MCNC: 1.3 NG/ML (ref 0–5)
CK SERPL-CCNC: 203 IU/L (ref 30–200)
CO2 SERPL-SCNC: 23 MMOL/L (ref 22–29)
DEPRECATED APTT PLAS QN: 39 SECONDS (ref 23.8–35.5)
DEPRECATED INR PLAS: 2.26
DEPRECATED NEUTROPHILS # BLD AUTO: 8.5 10*3/UL (ref 2.1–6.9)
EGFRCR SERPLBLD CKD-EPI 2021: 52 ML/MIN (ref 60–?)
EOSINOPHIL # BLD AUTO: 0.1 10*3/UL (ref 0–0.4)
EOSINOPHIL NFR BLD AUTO: 0.5 % (ref 0–6)
ERYTHROCYTE [DISTWIDTH] IN CORD BLOOD: 15.8 % (ref 11.7–14.4)
GLOBULIN PLAS-MCNC: 2.9 G/DL (ref 2.3–3.5)
GLUCOSE SERPLBLD-MCNC: 103 MG/DL (ref 74–118)
HCT VFR BLD AUTO: 43.4 % (ref 38.2–49.6)
HGB BLD-MCNC: 14.6 G/DL (ref 14–18)
LYMPHOCYTES # BLD: 0.8 10*3/UL (ref 1–3.2)
LYMPHOCYTES NFR BLD AUTO: 7.6 % (ref 18–39.1)
MCH RBC QN AUTO: 27.8 PG (ref 28–32)
MCHC RBC AUTO-ENTMCNC: 33.6 G/DL (ref 31–35)
MCV RBC AUTO: 82.7 FL (ref 81–99)
MONOCYTES # BLD AUTO: 0.7 10*3/UL (ref 0.2–0.8)
MONOCYTES NFR BLD AUTO: 7.4 % (ref 4.4–11.3)
NEUTS SEG NFR BLD AUTO: 84 % (ref 38.7–80)
PLATELET # BLD AUTO: 226 X10E3/UL (ref 140–360)
POTASSIUM SERPL-SCNC: 3.9 MMOL/L (ref 3.5–5.1)
PROTHROMBIN TIME: 26.6 SECONDS (ref 11.9–14.5)
RBC # BLD AUTO: 5.25 X10E6/UL (ref 4.3–5.7)
SODIUM SERPL-SCNC: 141 MMOL/L (ref 136–145)

## 2020-02-09 PROCEDURE — 85730 THROMBOPLASTIN TIME PARTIAL: CPT

## 2020-02-09 PROCEDURE — 85610 PROTHROMBIN TIME: CPT

## 2020-02-09 PROCEDURE — 84484 ASSAY OF TROPONIN QUANT: CPT

## 2020-02-09 PROCEDURE — 85025 COMPLETE CBC W/AUTO DIFF WBC: CPT

## 2020-02-09 PROCEDURE — 71045 X-RAY EXAM CHEST 1 VIEW: CPT

## 2020-02-09 PROCEDURE — 82553 CREATINE MB FRACTION: CPT

## 2020-02-09 PROCEDURE — 83880 ASSAY OF NATRIURETIC PEPTIDE: CPT

## 2020-02-09 PROCEDURE — 80053 COMPREHEN METABOLIC PANEL: CPT

## 2020-02-09 PROCEDURE — 93005 ELECTROCARDIOGRAM TRACING: CPT

## 2020-02-09 PROCEDURE — 36415 COLL VENOUS BLD VENIPUNCTURE: CPT

## 2020-02-09 PROCEDURE — 99284 EMERGENCY DEPT VISIT MOD MDM: CPT

## 2020-02-09 PROCEDURE — 82550 ASSAY OF CK (CPK): CPT

## 2020-02-09 NOTE — DIAGNOSTIC IMAGING REPORT
EXAMINATION:  CHEST SINGLE (PORTABLE)    



INDICATION: Severe chest pain.



COMPARISON:  Chest x-ray 1/21/2020.

     

FINDINGS:    



TUBES and LINES:  Left chest wall cardiac device with lead in the right

ventricle.



LUNGS:  Normal lung volumes. Left basilar subsegmental atelectasis versus

scarring. Mild pulmonary venous congestion and interstitial edema.



PLEURA:  No pleural effusion or pneumothorax.



HEART AND MEDIASTINUM:  Cardiac size is mildly enlarged. 



BONES AND SOFT TISSUES:  No acute osseous lesion.  Soft tissues are

unremarkable.



UPPER ABDOMEN: No free air under the diaphragm. 



IMPRESSION: 



Mild pulmonary venous congestion and interstitial edema.



Signed by: Dr. JESÚS Irvin M.D. on 2/9/2020 8:15 PM

## 2020-10-30 NOTE — XMS REPORT
Summary of Care: 3/27/18 - 3/28/18

                             Created on: 2082



JOANNA CABRERA

External Reference #: [id number]

: 1965

Sex: Male



Demographics







                          Address                   [address] [place] [address]

[place]  [address]

 

                          Home Phone                [phone]

 

                          Preferred Language        English

 

                          Marital Status            Single

 

                          Congregation Affiliation     Sikh

 

                          Race                      White

 

                                        Additional Race(s)  

 

                          Ethnic Group              /Latin





Author







                          Author                    [facility]

 

                          Organization              [facility]

 

                          Address                   Unknown

 

                          Phone                     Unavailable







Encounter





ANGEL LUIS Boothe(LISA) [id number] Date(s): 3/27/18 - 3/28/18

[facility] [address] [place] [address]-     [phone]


Encounter Diagnosis

Chest pain, unspecified (Final) - 18

Chronic atrial fibrillation (Final) - 

Dilated cardiomyopathy (Final) - 

Hypertensive heart and chronic kidney disease with heart failure and stage 1 thr
ough stage 4 chronic kidney disease, or unspecified chronic kidney disease 
(Final) - 

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 

Chronic kidney disease, unspecified (Final) - 

Chronic systolic (congestive) heart failure (Final) - 

Presence of automatic (implantable) cardiac defibrillator (Final) - 

Gastro-esophageal reflux disease without esophagitis (Final) - 

Schizophrenia, unspecified (Final) - 

Cough (Final) - 

Long term (current) use of anticoagulants (Final) - 

Other long term (current) drug therapy (Final) - 

Discharge Disposition: Home or Self Care

Attending Physician: Laura Ray MD





Vital Signs







                    1                   2                   3



                                         Most recent to   



                                         oldest [Reference   



                                         Range]:   

 

                                        172.72 cm 

                    (3/27/18 1:58 PM)                         



                                         Height   

 

                                        98.4 DegF 

                          (3/28/18 12:11 PM)        97.8 DegF 

                          (3/28/18 7:15 AM)         98.7 DegF 

                                        (3/28/18 4:10 AM)



                                         Temperature Oral   



                                         [96.4-99.1 DegF]   

 

                                        130/93 mmHg 

                          (3/28/18 12:11 PM)        133/87 mmHg 

                          (3/28/18 7:15 AM)         108/76 mmHg 

                                        (3/28/18 4:10 AM)



                                         Blood Pressure   



                                         [/60-90 mmHg]   

 

                                        20 BRMIN 

                          (3/28/18 12:11 PM)        20 BRMIN 

                          (3/28/18 7:15 AM)         18 BRMIN 

                                        (3/28/18 4:10 AM)



                                         Respiratory Rate   



                                         [14-20 BRMIN]   

 

                                        79 bpm 

                          (3/28/18 12:11 PM)        62 bpm 

                          (3/28/18 7:15 AM)         73 bpm 

                                        (3/28/18 4:10 AM)



                                         Peripheral Pulse   



                                         Rate [ bpm]   

 

                                        106.364 kg 

                          (3/27/18 1:58 PM)         86.364 kg 

                          (3/27/18 7:00 AM)          



                                         Weight   

 

                                        35.65 m2 

                    (3/27/18 1:58 PM)                         



                                         Body Mass Index   







Problem List







    



              Condition     Effective Dates     Status       Health Status     Informant

 

    



                     [D]Anterior chest     12             Active  



                                         wall pain(Confirmed)    

 

    



                           Anxiety(Confirmed)        Active  

 

    



                           Atrial                    Active  



                                         fibrillation(Confirm    



                                         ed)    

 

    



                           BP+ -                     Active  



                                         Hypertension(Confirm    



                                         ed)    

 

    



                           Pacemaker(Confirmed)      Active  

 

    



                           Cardiomyopathy(Confi      Resolved  



                                         rmed)    

 

    



                           CHF - Congestive          Active  



                                         heart    



                                         failure(Confirmed)    

 

    



                           CHF - Congestive          Active  



                                         heart    



                                         failure(Confirmed)    

 

    



                           Chronic renal             Active  



                                         failure(Confirmed)    

 

    



                           Down                      Active  



                                         syndrome(Confirmed)    

 

    



                           GERD -                    Active  



                                         Gastro-esophageal    



                                         reflux    



                                         disease(Confirmed)    

 

    



                           H/O:                      Active  



                                         schizophrenia(Confir    



                                         med)    

 

    



                           HTN(Confirmed)            Active  

 

    



                           ICD (implantable          Active  



                                         cardiac    



                                         defibrillator)    



                                         battery    



                                         depletion(Confirmed)    

 

    



                           NIDDM(Confirmed)          Active  

 

    



                           Obese                     Active  



                                         build(Confirmed)    

 

    



                           Schizophrenia(Confir      Active  



                                         med)    

 

    



                           Short of breath on        Active  



                                         exertion(Confirmed)    

 

    



                           Sleep                     Active  



                                         apnea(Confirmed)    

 

    



                           Sleep                     Active  



                                         apnea(Confirmed)    







Allergies, Adverse Reactions, Alerts







   



                 Substance       Reaction        Severity        Status

 

   



                           NKDA                      Active







Medications





Al hydroxide/Mg hydroxide/simethicone

30 mL, Route: PO, Drug Form: SUSP, Dosing Weight 86.364, kg, ONCE, STAT, Start d
ate: 18 8:18:00 CDT, Stop date: 18 8:18:00 CDT

Notes: (aluminum hydroxide-magnesium hyd-simethicone [id number]/5ml 30 ml ud S
US)

Start Date: 3/27/18

Stop Date: 3/27/18

Status: Completed



aspirin

325 mg, Route: PO, Drug form: TAB, ONCE, Dosing Weight 86.364, kg, Priority: STA
T, Start date: 18 7:20:00 CDT, Stop date: 18 7:20:00 CDT

Start Date: 3/27/18

Stop Date: 3/27/18

Status: Completed



atorvastatin

40 mg, 1 tab, Route: PO, Drug form: TAB, Bedtime, Dosing Weight 106.364, kg, Sta
rt date: 18 21:00:00 CDT, Duration: 30 day, Stop date: 18 21:00:00 C
DT

Notes: (Same as: Lipitor)

Start Date: 3/27/18

Stop Date: 3/28/18

Status: Discontinued



Cardizem

10 mg, 2 mL, Route: IVP, Drug form: INJ, ONCE, Dosing Weight 86.364, kg, Priorit
y: STAT, Start date: 18 8:20:00 CDT, Stop date: 18 8:20:00 CDT

Notes: (Same as: Cardizem)

Start Date: 3/27/18

Stop Date: 3/27/18

Status: Completed



clonazePAM

1 mg, 1 tab, Route: PO, Drug form: TAB, Bedtime, Dosing Weight 106.364, kg, Star
t date: 18 21:00:00 CDT, Duration: 30 day, Stop date: 18 21:00:00 CD
T

Notes: (Same As: KlonoPIN)

Start Date: 3/27/18

Stop Date: 3/28/18

Status: Discontinued



Coreg

25 mg, 1 tab, Route: PO, Drug form: TAB, BID, Dosing Weight 106.364, kg, Start d
ate: 18 21:00:00 CDT, Duration: 30 day, Stop date: 18 9:00:00 CDT

Notes: Give with food. (Same As: Coreg)

Start Date: 3/27/18

Stop Date: 3/28/18

Status: Discontinued



Dextrose 50% Syringe

25 gm, 50 mL, Route: IVP, Drug Form: INJ, Dosing Weight 106.364, kg, PRN, PRN Bl
ood Glucose Results, Start date: 18 14:25:00 CDT, Duration: 30 day, Stop d
ate: 18 14:24:00 CDT

Start Date: 3/27/18

Stop Date: 3/28/18

Status: Discontinued



Dextrose 50% Syringe

12.5 gm, 25 mL, Route: IVP, Drug Form: INJ, Dosing Weight 106.364, kg, PRN, PRN 
Blood Glucose Results, Start date: 18 14:25:00 CDT, Duration: 30 day, Stop
 date: 18 14:24:00 CDT

Start Date: 3/27/18

Stop Date: 3/28/18

Status: Discontinued



digoxin

125 microgram, 1 tab, Route: PO, Drug form: TAB, Daily, Dosing Weight 106.364, k
g, Start date: 18 9:00:00 CDT, Duration: 30 day, Stop date: 18 9:00:
00 CDT

Notes: Take on an Empty Stomach  (Same as: Lanoxin)

Start Date: 3/28/18

Stop Date: 3/28/18

Status: Discontinued



furosemide

20 mg, 1 tab, Route: PO, Drug form: TAB, BID, Dosing Weight 106.364, kg, Start d
ate: 18 17:00:00 CDT, Duration: 30 day, Stop date: 18 9:00:00 CDT

Notes: (Same as: Lasix)  May cause GI upset.  Give with food or milk.

Start Date: 3/27/18

Stop Date: 3/28/18

Status: Discontinued



glipiZIDE

10 mg, 1 tab, Route: PO, Drug form: TAB, BID, Dosing Weight 106.364, kg, Start d
ate: 18 17:00:00 CDT, Duration: 30 day, Stop date: 18 9:00:00 CDT

Notes: (Same as: Glucotrol)  30 min before meals.

Start Date: 3/27/18

Stop Date: 3/28/18

Status: Discontinued



glucagon

1 mg, Route: IM, Drug form: PDR/INJ, PRN, Dosing Weight 106.364, kg, PRN Blood G
lucose Results, Start date: 18 14:25:00 CDT, Duration: 30 day, Stop date: 
18 14:24:00 CDT

Start Date: 3/27/18

Stop Date: 3/28/18

Status: Discontinued



Humalog

15 unit, 0.15 mL, Route: SUB-Q, Drug form: SOLN, TID-Before Meals, Start date: 0
3/27/18 16:30:00 CDT, Duration: 30 day, Stop date: 18 11:30:00 CDT

Notes: (Same as: Humalog ) Roll in palms of hands gently;  Do not shake `vigorou
sly. "Single Patient Use Only "  WASTE: F/P - Black; E - Municipal Trash Bin  St
able for 28 days at room temperature.Expires in _____ days from ______________Da
te

Start Date: 3/27/18

Stop Date: 3/28/18

Status: Discontinued



insulin glargine

22 unit, 0.22 mL, Route: SUB-Q, Drug form: SOLN, Bedtime, Start date: 18 2
1:00:00 CDT, Duration: 30 day, Stop date: 18 21:00:00 CDT

Notes: (Same as: Lantus)Do not hold insulin without contacting prescriberWASTE: 
F/P - Black; E - Municipal Trash Bin  "single patient use only"

Start Date: 3/27/18

Stop Date: 3/28/18

Status: Discontinued



insulin lispro

3 unit, 0.03 mL, Route: SUB-Q, Drug form: SOLN, TID-Before Meals, Dosing Weight 
106.364, kg, PRN Blood Glucose Results, Start date: 18 14:25:00 CDT, Durat
ion: 30 day, Stop date: 18 14:24:00 CDT

Notes: (Same as: Humalog ) Roll in palms of hands gently;  Do not shake `vigorou
sly. "Single Patient Use Only "  WASTE: F/P - Black; E - Municipal Trash Bin  St
able for 28 days at room temperature.Expires in _____ days from ______________Da
te

Start Date: 3/27/18

Stop Date: 3/28/18

Status: Discontinued



insulin lispro

2 unit, 0.02 mL, Route: SUB-Q, Drug form: SOLN, TID-Before Meals, Dosing Weight 
106.364, kg, PRN Blood Glucose Results, Start date: 18 14:25:00 CDT, Durat
ion: 30 day, Stop date: 18 14:24:00 CDT

Notes: (Same as: Humalog ) Roll in palms of hands gently;  Do not shake `vigorou
sly. "Single Patient Use Only "  WASTE: F/P - Black; E - Municipal Trash Bin  St
able for 28 days at room temperature.Expires in _____ days from ______________Da
te

Start Date: 3/27/18

Stop Date: 3/28/18

Status: Discontinued



insulin lispro

1 unit, 0.01 mL, Route: SUB-Q, Drug form: SOLN, TID-Before Meals, Dosing Weight 
106.364, kg, PRN Blood Glucose Results, Start date: 18 14:25:00 CDT, Durat
ion: 30 day, Stop date: 18 14:24:00 CDT

Notes: (Same as: Humalog ) Roll in palms of hands gently;  Do not shake `vigorou
sly. "Single Patient Use Only "  WASTE: F/P - Black; E - Municipal Trash Bin  St
able for 28 days at room temperature.Expires in _____ days from ______________Da
te

Start Date: 3/27/18

Stop Date: 3/28/18

Status: Discontinued



insulin lispro

5 unit, 0.05 mL, Route: SUB-Q, Drug form: SOLN, TID-Before Meals, Dosing Weight 
106.364, kg, PRN Blood Glucose Results, Start date: 18 14:25:00 CDT, Durat
ion: 30 day, Stop date: 18 14:24:00 CDT

Notes: (Same as: Humalog ) Roll in palms of hands gently;  Do not shake `vigorou
sly. "Single Patient Use Only "  WASTE: F/P - Black; E - Municipal Trash Bin  St
able for 28 days at room temperature.Expires in _____ days from ______________Da
te

Start Date: 3/27/18

Stop Date: 3/28/18

Status: Discontinued



insulin lispro

4 unit, 0.04 mL, Route: SUB-Q, Drug form: SOLN, TID-Before Meals, Dosing Weight 
106.364, kg, PRN Blood Glucose Results, Start date: 18 14:25:00 CDT, Durat
ion: 30 day, Stop date: 18 14:24:00 CDT

Notes: (Same as: Humalog ) Roll in palms of hands gently;  Do not shake `vigorou
sly. "Single Patient Use Only "  WASTE: F/P - Black; E - Municipal Trash Bin  St
able for 28 days at room temperature.Expires in _____ days from ______________Da
te

Start Date: 3/27/18

Stop Date: 3/28/18

Status: Discontinued



Levemir

Route: SUB-Q, Bedtime, Dosing Weight 106.364, kg, Start date: 18 21:00:00 
CDT, Duration: 30 day, Stop date: 18 21:00:00 CDT

Start Date: 3/27/18

Stop Date: 3/27/18

Status: Deleted



metFORMIN

850 mg, 1 tab, Route: PO, Drug form: TAB, BID, Dosing Weight 106.364, kg, Start 
date: 18 17:00:00 CDT, Duration: 30 day, Stop date: 18 9:00:00 CDT

Notes: (Same as: Glucophage)  Take with meal

Start Date: 3/27/18

Stop Date: 3/28/18

Status: Discontinued



NovoLOG FlexPen

15 unit, Route: SUB-Q, TID-Before Meals, Dosing Weight 106.364, kg, Start date: 
18 16:30:00 CDT, Duration: 30 day, Stop date: 18 11:30:00 CDT

Start Date: 3/27/18

Stop Date: 3/27/18

Status: Deleted



OXcarbazepine

600 mg, 2 tab, Route: PO, Drug form: TAB, Bedtime, Dosing Weight 106.364, kg, St
art date: 18 21:00:00 CDT, Duration: 30 day, Stop date: 18 21:00:00 
CDT

Notes: Do not crush or chew.(Same as: Trileptal)

Start Date: 3/27/18

Stop Date: 3/28/18

Status: Discontinued



pantoprazole

40 mg, 1 tab, Route: PO, Drug form: ECTAB, Daily, Dosing Weight 106.364, kg, Sta
rt date: 18 17:33:00 CDT, Duration: 30 day, Stop date: 18 9:00:00 CD
T

Notes: Tablet should not be chewed or crushed.(Same as: Protonix)

Start Date: 3/27/18

Stop Date: 3/28/18

Status: Discontinued



rivaroxaban

20 mg, 1 tab, Route: PO, Drug form: TAB, Daily, Dosing Weight 106.364, kg, Start
 date: 18 9:00:00 CDT, Duration: 30 day, Stop date: 18 9:00:00 CDT

Notes: (Same as: Xarelto)Administer with food

Start Date: 3/28/18

Stop Date: 3/28/18

Status: Discontinued



Saphris

5 mg, Route: SL, Drug form: TAB, Daily, Dosing Weight 106.364, kg, Start date: 0
3/28/18 9:00:00 CDT, Duration: 30 day, Stop date: 18 9:00:00 CDT

Start Date: 3/28/18

Stop Date: 3/28/18

Status: Discontinued



Sodium Chloride 0.9% (Bolus) IV

500 mL, 500 ml/hr, Infuse Over: 1 hr, Route: IV, 500, Drug form: INJ, ONCE, Prio
rity: STAT, Dosing Weight 86.364 kg, Start date: 18 8:37:00 CDT, Stop date
: 18 8:37:00 CDT

Start Date: 3/27/18

Stop Date: 3/27/18

Status: Completed



Tylenol

650 mg, 2 tab, Route: PO, Drug form: TAB, Q4H, Dosing Weight 106.364, kg, PRN Pa
in 1-3/Temp > 100.4 F, Start date: 18 14:18:00 CDT, Duration: 30 day, Stop
 date: 18 14:17:00 CDT

Notes: Do not exceed 4 gm/day.  (Same as: Tylenol)

Start Date: 3/27/18

Stop Date: 3/28/18

Status: Discontinued



Results





ELECTROLYTES





                    1                   2                   3



                                         Most recent to   



                                         oldest [Reference   



                                         Range]:   

 

                                        139 mEq/L 

                          (3/28/18 4:04 AM)         137 mEq/L 

                          (3/27/18 7:25 AM)          



                                         Sodium Lvl [135-145   



                                         mEq/L]   

 

                                        4.2 mEq/L 

                          (3/28/18 4:04 AM)         4.1 mEq/L 

                          (3/27/18 7:25 AM)          



                                         Potassium Lvl   



                                         [3.5-5.1 mEq/L]   

 

                                        102 mEq/L 

                          (3/28/18 4:04 AM)         99 mEq/L 

                          (3/27/18 7:25 AM)          



                                         Chloride Lvl [   



                                         mEq/L]   

 

                                        30 mEq/L 

                          (3/28/18 4:04 AM)         29 mEq/L 

                          (3/27/18 7:25 AM)          



                                         CO2 [24-32 mEq/L]   

 

                                        11.2 mEq/L 

                          (3/28/18 4:04 AM)         13.1 mEq/L 

                          (3/27/18 7:25 AM)          



                                         AGAP [10.0-20.0   



                                         mEq/L]   







CHEM PANEL





                    1                   2                   3



                                         Most recent to   



                                         oldest [Reference   



                                         Range]:   

 

                                        1.40 mg/dL 

                          (3/28/18 4:04 AM)         1.50 mg/dL 

*HI*

                          (3/27/18 7:25 AM)          



                                         Creatinine Lvl   



                                         [0.50-1.40 mg/dL]   

 

                                        57 mL/min/1.73m2 1

*NA*

                          (3/28/18 4:04 AM)         53 mL/min/1.73m2 2

*NA*

                          (3/27/18 7:25 AM)          



                                         eGFR   

 

                                        21 mg/dL 

                          (3/28/18 4:04 AM)         27 mg/dL 

*HI*

                          (3/27/18 7:25 AM)          



                                         BUN [7-22 mg/dL]   

 

                                        147 mg/dL 

*HI*

                          (3/28/18 4:04 AM)         176 mg/dL 

*HI*

                          (3/27/18 7:25 AM)          



                                         Glucose Lvl [70-99   



                                         mg/dL]   

 

                                        7.1 g/dL 

                    (3/27/18 7:25 AM)                          



                                         Total Protein   



                                         [6.4-8.4 g/dL]   

 

                                        3.6 g/dL 

                    (3/27/18 7:25 AM)                          



                                         Albumin Lvl [3.5-5.0   



                                         g/dL]   

 

                                        3.5 g/dL 

                    (3/27/18 7:25 AM)                          



                                         Globulin [2.7-4.2   



                                         g/dL]   

 

                                        1.0 

                    (3/27/18 7:25 AM)                          



                                         A/G Ratio [0.7-1.6]   

 

                                        7.9 mg/dL 

*LOW*

                          (3/28/18 4:04 AM)         8.4 mg/dL 

*LOW*

                          (3/27/18 7:25 AM)          



                                         Calcium Lvl   



                                         [8.5-10.5 mg/dL]   

 

                                        46 unit/L 

                    (3/27/18 7:25 AM)                          



                                         ALT [0-65 unit/L]   

 

                                        20 unit/L 

                    (3/27/18 7:25 AM)                          



                                         AST [0-37 unit/L]   

 

                                        131 unit/L 

                    (3/27/18 7:25 AM)                          



                                         Alk Phos [   



                                         unit/L]   

 

                                        1.1 mg/dL 

                    (3/27/18 7:25 AM)                          



                                         Bili Total [0.2-1.3   



                                         mg/dL]   

 

                                        0.3 mg/dL 

                    (3/27/18 7:25 AM)                          



                                         Bili Direct [0.0-0.3   



                                         mg/dL]   

 

                                        0.8 mg/dL 

                    (3/27/18 7:25 AM)                          



                                         Bili Indirect   



                                         [0.0-1.0 mg/dL]   







1Result Comment: The eGFR is calculated using the CKD-EPI formula. In most 
young, healthy individuals the eGFR will be >90 mL/min/1.73m2. The eGFR declines
with age. An eGFR of 60-89 may be normal in some populations, particularly the 
elderly, for whom the CKD-EPI formula has not been extensively validated. Use of
the eGFR is not recommended in the following populations:



Individuals with unstable creatinine concentrations, including pregnant patients
and those with serious co-morbid conditions.



Patients with extremes in muscle mass or diet. 



The data above are obtained from the National Kid[employer] Disease Education Program (
NKDEP) which additionally recommends that when the eGFR is used in patients with
extremes of body mass index for purposes of drug dosing, the eGFR should be mul
tiplied by the estimated BMI.



2Result Comment: The eGFR is calculated using the CKD-EPI formula. In most 
young, healthy individuals the eGFR will be >90 mL/min/1.73m2. The eGFR declines
with age. An eGFR of 60-89 may be normal in some populations, particularly the 
elderly, for whom the CKD-EPI formula has not been extensively validated. Use of
the eGFR is not recommended in the following populations:



Individuals with unstable creatinine concentrations, including pregnant patients
and those with serious co-morbid conditions.



Patients with extremes in muscle mass or diet. 



The data above are obtained from the National Kid[employer] Disease Education Program (
NKDEP) which additionally recommends that when the eGFR is used in patients with
extremes of body mass index for purposes of drug dosing, the eGFR should be mul
tiplied by the estimated BMI.



CARDIAC ENZYMES





                    1                   2                   3



                                         Most recent to   



                                         oldest [Reference   



                                         Range]:   

 

                                        221 unit/L 

*HI*

                          (3/27/18 8:13 PM)         195 unit/L 

*HI*

                          (3/27/18 3:49 PM)         214 unit/L 

*HI*

                                        (3/27/18 7:25 AM) 



                                         Total CK [   



                                         unit/L]   

 

                                        2.7 ng/mL 

                          (3/27/18 8:13 PM)         2.4 ng/mL 

                          (3/27/18 3:49 PM)         2.6 ng/mL 

                                        (3/27/18 7:25 AM) 



                                         CK MB [0.5-3.6   



                                         ng/mL]   

 

                                        1.2 

                          (3/27/18 8:13 PM)         1.2 

                          (3/27/18 3:49 PM)         1.2 

                                        (3/27/18 7:25 AM) 



                                         CK MB Index   



                                         [0.0-2.5]   

 

                                        0.02 ng/mL 

                          (3/27/18 8:13 PM)         0.03 ng/mL 

                          (3/27/18 3:49 PM)         0.03 ng/mL 

                                        (3/27/18 7:25 AM) 



                                         Troponin-I   



                                         [0.00-0.40 ng/mL]   







TOXICOLOGY





                    1                   2                   3



                                         Most recent to   



                                         oldest [Reference   



                                         Range]:   

 

                                        0.5 ng/mL 

*LOW*

                    (3/27/18 7:25 AM)                          



                                         Digoxin Lvl [0.8-2.0   



                                         ng/mL]   







HEMATOLOGY





                    1                   2                   3



                                         Most recent to   



                                         oldest [Reference   



                                         Range]:   

 

                                        10.1 K/CMM 

                    (3/27/18 7:25 AM)                          



                                         WBC [3.7-10.4 K/CMM]   

 

                                        4.31 M/CMM 

*LOW*

                    (3/27/18 7:25 AM)                          



                                         RBC [4.70-6.10   



                                         M/CMM]   

 

                                        13.5 g/dL 

*LOW*

                    (3/27/18 7:25 AM)                          



                                         Hgb [14.0-18.0 g/dL]   

 

                                        41.2 % 

*LOW*

                    (3/27/18 7:25 AM)                          



                                         Hct [42.0-54.0 %]   

 

                                        95.6 fL 

*HI*

                    (3/27/18 7:25 AM)                          



                                         MCV [80.0-94.0 fL]   

 

                                        31.4 pg 

*HI*

                    (3/27/18 7:25 AM)                          



                                         MCH [27.0-31.0 pg]   

 

                                        32.8 g/dL 

                    (3/27/18 7:25 AM)                          



                                         MCHC [32.0-36.0   



                                         g/dL]   

 

                                        14.9 % 

*HI*

                    (3/27/18 7:25 AM)                          



                                         RDW [11.5-14.5 %]   

 

                                        8.2 fL 

                    (3/27/18 7:25 AM)                          



                                         MPV [7.4-10.4 fL]   

 

                                        203 K/CMM 

                    (3/27/18 7:25 AM)                          



                                         Platelet [133-450   



                                         K/CMM]   

 

                                        80.4 % 

*HI*

                    (3/27/18 7:25 AM)                          



                                         Segs [45.0-75.0 %]   

 

                                        12.0 % 

*LOW*

                    (3/27/18 7:25 AM)                          



                                         Lymphocytes   



                                         [20.0-40.0 %]   

 

                                        5.6 % 

                    (3/27/18 7:25 AM)                          



                                         Monocytes [2.0-12.0   



                                         %]   

 

                                        2.0 % 

                    (3/27/18 7:25 AM)                          



                                         Eosinophils [0.0-4.0   



                                         %]   

 

                                        0.0 % 

                    (3/27/18 7:25 AM)                          



                                         Basophils [0.0-1.0   



                                         %]   

 

                                        8.1 K/CMM 

                    (3/27/18 7:25 AM)                          



                                         Segs-Bands #   



                                         [1.5-8.1 K/CMM]   

 

                                        1.2 K/CMM 

                    (3/27/18 7:25 AM)                          



                                         Lymphocytes #   



                                         [1.0-5.5 K/CMM]   

 

                                        0.6 K/CMM 

                    (3/27/18 7:25 AM)                          



                                         Monocytes # [0.0-0.8   



                                         K/CMM]   

 

                                        0.2 K/CMM 

                    (3/27/18 7:25 AM)                          



                                         Eosinophils #   



                                         [0.0-0.5 K/CMM]   

 

                                        0.0 K/CMM 

                    (3/27/18 7:25 AM)                          



                                         Basophils # [0.0-0.2   



                                         K/CMM]   







Immunizations





Given and Recorded





   



                 Vaccine         Date            Status          Refusal Reason

 

   



                     pneumococcal 23-valent vaccine     3/16/18             Given 

 

   



                     influenza virus vaccine, inactivated     3/16/18             Given 

 

   



                     influenza virus vaccine, inactivated     10/1/13             Given 









Not Given





   



                 Vaccine         Date            Status          Refusal Reason

 

   



                 pneumococcal 23-valent vaccine     17         Not Given       Patient Refuses







Procedures







    



              Procedure     Date         Related Diagnosis     Body Site     Status

 

    



                           ICD - Internal cardiac defibrillator        Completed



                                         procedure1    

 

    



                           torn ligament2            Completed







1placed 2.5years ago



2left knee ligament surgery



Social History







 



                           Social History Type       Response

 

 



                           Substance Abuse           Use: Past.  Type: Cocaine.  Recreational Drug Route: Inhaled.



 

 



                           Alcohol                   Never

 

 



                           Smoking Status            Former smoker; Type: Cigarettes; Previous treatment: None; Ready

 to change:



                                         No; Concerns about tobacco use in household: No; Exposure to Tobacco Smoke



                                         None; Cigarette Smoking Last 365 Days No; Reg Smoking Cessation Counseling



                                         No; Tobacco use per day: 0; Number of years: 2; Total pack years: 2;



                                         Started at age: 22.0; Stopped at age: 24;



                                         entered on: 3/27/18







Assessment and Plan





No data available for this section [facility]